# Patient Record
Sex: FEMALE | Race: BLACK OR AFRICAN AMERICAN | NOT HISPANIC OR LATINO | Employment: OTHER | URBAN - METROPOLITAN AREA
[De-identification: names, ages, dates, MRNs, and addresses within clinical notes are randomized per-mention and may not be internally consistent; named-entity substitution may affect disease eponyms.]

---

## 2017-01-18 DIAGNOSIS — E78.5 HYPERLIPIDEMIA: ICD-10-CM

## 2017-01-18 RX ORDER — ROSUVASTATIN CALCIUM 10 MG/1
TABLET, COATED ORAL
Qty: 90 TABLET | Refills: 1 | Status: SHIPPED | OUTPATIENT
Start: 2017-01-18 | End: 2017-02-20 | Stop reason: CLARIF

## 2017-02-20 ENCOUNTER — OFFICE VISIT (OUTPATIENT)
Dept: FAMILY MEDICINE | Facility: CLINIC | Age: 77
End: 2017-02-20
Payer: MEDICARE

## 2017-02-20 VITALS
HEIGHT: 59 IN | RESPIRATION RATE: 16 BRPM | TEMPERATURE: 98 F | WEIGHT: 220.44 LBS | HEART RATE: 75 BPM | DIASTOLIC BLOOD PRESSURE: 82 MMHG | SYSTOLIC BLOOD PRESSURE: 130 MMHG | OXYGEN SATURATION: 95 % | BODY MASS INDEX: 44.44 KG/M2

## 2017-02-20 DIAGNOSIS — F32.A DEPRESSION, UNSPECIFIED DEPRESSION TYPE: Primary | ICD-10-CM

## 2017-02-20 DIAGNOSIS — J44.9 CHRONIC OBSTRUCTIVE PULMONARY DISEASE, UNSPECIFIED COPD TYPE: ICD-10-CM

## 2017-02-20 DIAGNOSIS — K21.9 GASTROESOPHAGEAL REFLUX DISEASE, ESOPHAGITIS PRESENCE NOT SPECIFIED: ICD-10-CM

## 2017-02-20 DIAGNOSIS — E66.01 MORBID OBESITY DUE TO EXCESS CALORIES: ICD-10-CM

## 2017-02-20 DIAGNOSIS — Z86.73 H/O: STROKE: ICD-10-CM

## 2017-02-20 PROCEDURE — 1126F AMNT PAIN NOTED NONE PRSNT: CPT | Mod: S$GLB,,, | Performed by: INTERNAL MEDICINE

## 2017-02-20 PROCEDURE — 1159F MED LIST DOCD IN RCRD: CPT | Mod: S$GLB,,, | Performed by: INTERNAL MEDICINE

## 2017-02-20 PROCEDURE — 3075F SYST BP GE 130 - 139MM HG: CPT | Mod: S$GLB,,, | Performed by: INTERNAL MEDICINE

## 2017-02-20 PROCEDURE — 99214 OFFICE O/P EST MOD 30 MIN: CPT | Mod: S$GLB,,, | Performed by: INTERNAL MEDICINE

## 2017-02-20 PROCEDURE — 1157F ADVNC CARE PLAN IN RCRD: CPT | Mod: S$GLB,,, | Performed by: INTERNAL MEDICINE

## 2017-02-20 PROCEDURE — 99499 UNLISTED E&M SERVICE: CPT | Mod: S$GLB,,, | Performed by: INTERNAL MEDICINE

## 2017-02-20 PROCEDURE — 3079F DIAST BP 80-89 MM HG: CPT | Mod: S$GLB,,, | Performed by: INTERNAL MEDICINE

## 2017-02-20 PROCEDURE — 99999 PR PBB SHADOW E&M-EST. PATIENT-LVL III: CPT | Mod: PBBFAC,,, | Performed by: INTERNAL MEDICINE

## 2017-02-20 RX ORDER — CITALOPRAM 20 MG/1
20 TABLET, FILM COATED ORAL DAILY
Qty: 30 TABLET | Refills: 1 | Status: SHIPPED | OUTPATIENT
Start: 2017-02-20 | End: 2017-08-11

## 2017-02-20 RX ORDER — OMEPRAZOLE 20 MG/1
20 CAPSULE, DELAYED RELEASE ORAL DAILY
Qty: 90 CAPSULE | Refills: 3 | Status: SHIPPED | OUTPATIENT
Start: 2017-02-20 | End: 2018-05-14 | Stop reason: SDUPTHER

## 2017-02-20 RX ORDER — DIAZEPAM 10 MG/1
10 TABLET ORAL EVERY 12 HOURS PRN
Qty: 60 TABLET | Refills: 0 | Status: SHIPPED | OUTPATIENT
Start: 2017-02-20 | End: 2017-05-12 | Stop reason: SDUPTHER

## 2017-02-20 RX ORDER — ATORVASTATIN CALCIUM 40 MG/1
40 TABLET, FILM COATED ORAL DAILY
Qty: 90 TABLET | Refills: 3 | Status: SHIPPED | OUTPATIENT
Start: 2017-02-20 | End: 2018-03-09 | Stop reason: SDUPTHER

## 2017-02-20 NOTE — PROGRESS NOTES
"Subjective:       Patient ID: Jose Manuel Boyd is a 76 y.o. female.    Chief Complaint: Medication Refill    HPI Comments: Depression    HPI: 77 y/o w/ HTN morbid obesity COPD presents for follow up of depressed mood. On benzos for >10 years. Reports over last two months more social isolation anhedonia easily tearful. No SI/HI. Sleep "okay" using diazepam five to six times per week. No alcohol. Had taken sertraline in past but "made me feel bad". No history of jovita or psych hospitalization    Regarding copd compliant with advair rinsing after use. No cough or wheezing    Review of Systems   Constitutional: Negative for activity change, appetite change, fatigue, fever and unexpected weight change.   HENT: Negative for ear pain, rhinorrhea and sore throat.    Eyes: Negative for discharge and visual disturbance.   Respiratory: Negative for chest tightness, shortness of breath and wheezing.    Cardiovascular: Negative for chest pain, palpitations and leg swelling.   Gastrointestinal: Negative for abdominal pain, constipation and diarrhea.   Endocrine: Negative for cold intolerance and heat intolerance.   Genitourinary: Negative for dysuria and hematuria.   Musculoskeletal: Negative for joint swelling and neck stiffness.   Skin: Negative for rash.   Neurological: Negative for dizziness, syncope, weakness and headaches.   Psychiatric/Behavioral: Negative for suicidal ideas.       Objective:     Vitals:    02/20/17 0958   BP: 130/82   BP Location: Right arm   Patient Position: Sitting   BP Method: Manual   Pulse: 75   Resp: 16   Temp: 97.8 °F (36.6 °C)   TempSrc: Oral   SpO2: 95%   Weight: 100 kg (220 lb 7.4 oz)   Height: 4' 11" (1.499 m)          Physical Exam   Constitutional: She is oriented to person, place, and time. She appears well-developed and well-nourished.   HENT:   Head: Normocephalic and atraumatic.   Eyes: Conjunctivae are normal. Pupils are equal, round, and reactive to light.   Neck: Normal range of " motion.   Cardiovascular: Normal rate and regular rhythm.  Exam reveals no gallop and no friction rub.    No murmur heard.  Pulmonary/Chest: Effort normal and breath sounds normal. She has no wheezes. She has no rales.   Abdominal: Soft. Bowel sounds are normal. There is no tenderness. There is no rebound and no guarding.   Musculoskeletal: Normal range of motion. She exhibits no edema or tenderness.   Neurological: She is alert and oriented to person, place, and time. No cranial nerve deficit.   Skin: Skin is warm and dry.   Psychiatric: She has a normal mood and affect.   Tearful taking abotu   and son       Assessment:       1. Depression, unspecified depression type    2. Chronic obstructive pulmonary disease, unspecified COPD type    3. Morbid obesity due to excess calories    4. Gastroesophageal reflux disease, esophagitis presence not specified    5. H/O: stroke        Plan:    1. Trial citalopram daily targeting anhedonia prn diazepam refilled    2. conitnue advair    3. The patient is asked to make an attempt to improve diet and exercise patterns to aid in medical management of this problem.    4. Stable on daily ppi    5. Continue full dose asa and statin bp at goal

## 2017-03-24 DIAGNOSIS — I10 ESSENTIAL HYPERTENSION: ICD-10-CM

## 2017-03-24 RX ORDER — TRIAMTERENE AND HYDROCHLOROTHIAZIDE 37.5; 25 MG/1; MG/1
1 CAPSULE ORAL EVERY MORNING
Qty: 90 CAPSULE | Refills: 1 | Status: SHIPPED | OUTPATIENT
Start: 2017-03-24 | End: 2017-09-15 | Stop reason: SDUPTHER

## 2017-05-12 ENCOUNTER — OFFICE VISIT (OUTPATIENT)
Dept: FAMILY MEDICINE | Facility: CLINIC | Age: 77
End: 2017-05-12
Payer: MEDICARE

## 2017-05-12 VITALS
BODY MASS INDEX: 44.35 KG/M2 | WEIGHT: 220 LBS | SYSTOLIC BLOOD PRESSURE: 134 MMHG | OXYGEN SATURATION: 97 % | HEIGHT: 59 IN | DIASTOLIC BLOOD PRESSURE: 86 MMHG | TEMPERATURE: 99 F | HEART RATE: 72 BPM | RESPIRATION RATE: 16 BRPM

## 2017-05-12 DIAGNOSIS — J30.89 NON-SEASONAL ALLERGIC RHINITIS, UNSPECIFIED ALLERGIC RHINITIS TRIGGER: ICD-10-CM

## 2017-05-12 DIAGNOSIS — Z23 NEED FOR VACCINATION: ICD-10-CM

## 2017-05-12 DIAGNOSIS — Z13.820 SCREENING FOR OSTEOPOROSIS: ICD-10-CM

## 2017-05-12 DIAGNOSIS — F32.A DEPRESSION, UNSPECIFIED DEPRESSION TYPE: Primary | ICD-10-CM

## 2017-05-12 DIAGNOSIS — E66.01 MORBID OBESITY DUE TO EXCESS CALORIES: ICD-10-CM

## 2017-05-12 DIAGNOSIS — J44.9 CHRONIC OBSTRUCTIVE PULMONARY DISEASE, UNSPECIFIED COPD TYPE: ICD-10-CM

## 2017-05-12 PROCEDURE — G0009 ADMIN PNEUMOCOCCAL VACCINE: HCPCS | Mod: 59,S$GLB,, | Performed by: INTERNAL MEDICINE

## 2017-05-12 PROCEDURE — 90670 PCV13 VACCINE IM: CPT | Mod: S$GLB,,, | Performed by: INTERNAL MEDICINE

## 2017-05-12 PROCEDURE — 90715 TDAP VACCINE 7 YRS/> IM: CPT | Mod: S$GLB,,, | Performed by: INTERNAL MEDICINE

## 2017-05-12 PROCEDURE — 99214 OFFICE O/P EST MOD 30 MIN: CPT | Mod: 25,S$GLB,, | Performed by: INTERNAL MEDICINE

## 2017-05-12 PROCEDURE — 1159F MED LIST DOCD IN RCRD: CPT | Mod: S$GLB,,, | Performed by: INTERNAL MEDICINE

## 2017-05-12 PROCEDURE — 99999 PR PBB SHADOW E&M-EST. PATIENT-LVL IV: CPT | Mod: PBBFAC,,, | Performed by: INTERNAL MEDICINE

## 2017-05-12 PROCEDURE — 1126F AMNT PAIN NOTED NONE PRSNT: CPT | Mod: S$GLB,,, | Performed by: INTERNAL MEDICINE

## 2017-05-12 PROCEDURE — 99499 UNLISTED E&M SERVICE: CPT | Mod: S$GLB,,, | Performed by: INTERNAL MEDICINE

## 2017-05-12 PROCEDURE — 3079F DIAST BP 80-89 MM HG: CPT | Mod: S$GLB,,, | Performed by: INTERNAL MEDICINE

## 2017-05-12 PROCEDURE — 90471 IMMUNIZATION ADMIN: CPT | Mod: 59,S$GLB,, | Performed by: INTERNAL MEDICINE

## 2017-05-12 PROCEDURE — 1160F RVW MEDS BY RX/DR IN RCRD: CPT | Mod: S$GLB,,, | Performed by: INTERNAL MEDICINE

## 2017-05-12 PROCEDURE — 3075F SYST BP GE 130 - 139MM HG: CPT | Mod: S$GLB,,, | Performed by: INTERNAL MEDICINE

## 2017-05-12 RX ORDER — ALBUTEROL SULFATE 0.63 MG/3ML
0.63 SOLUTION RESPIRATORY (INHALATION) EVERY 6 HOURS PRN
Qty: 30 ML | Refills: 5 | Status: SHIPPED | OUTPATIENT
Start: 2017-05-12 | End: 2018-05-12

## 2017-05-12 RX ORDER — DIAZEPAM 10 MG/1
10 TABLET ORAL EVERY 12 HOURS PRN
Qty: 60 TABLET | Refills: 0 | Status: SHIPPED | OUTPATIENT
Start: 2017-05-12 | End: 2017-06-13 | Stop reason: SDUPTHER

## 2017-05-12 NOTE — PROGRESS NOTES
"Subjective:       Patient ID: Jose Manuel Boyd is a 76 y.o. female.    Chief Complaint: Sinus Problem (w/earach )    HPI Comments: F/u chronic conditions    HPI: 75 y/o with COPD chronic anxiety and depressed mood presents for scheduled follow up. She discusses primarily stressors including care for a brother with DM and alzheimer's. She has not been taking citalopram regularlly "because I don't want to get hooked on it". Has been attending her [employer] and getting out more frequently then when we met three months ago. No SI/HI. Appetite and sleep reportedly "good". She does note worsening nasal congestion over last week no fevers chills. No cough no wheezing    Review of Systems   Constitutional: Negative for activity change, appetite change, fatigue, fever and unexpected weight change.   HENT: Positive for postnasal drip and rhinorrhea. Negative for ear pain, sore throat and trouble swallowing.    Eyes: Negative for discharge and visual disturbance.   Respiratory: Negative for chest tightness, shortness of breath and wheezing.    Cardiovascular: Negative for chest pain, palpitations and leg swelling.   Gastrointestinal: Negative for abdominal pain, constipation and diarrhea.   Endocrine: Negative for cold intolerance and heat intolerance.   Genitourinary: Negative for dysuria and hematuria.   Musculoskeletal: Negative for joint swelling and neck stiffness.   Skin: Negative for rash.   Neurological: Negative for dizziness, syncope, weakness and headaches.   Psychiatric/Behavioral: Negative for suicidal ideas.       Objective:     Vitals:    05/12/17 0838 05/12/17 0914   BP: (!) 130/92 134/86   BP Location: Right arm    Patient Position: Sitting    BP Method: Manual    Pulse: 81 72   Resp: 16    Temp: 98.6 °F (37 °C)    TempSrc: Oral    SpO2: 97%    Weight: 99.8 kg (220 lb 0.3 oz)    Height: 4' 11" (1.499 m)           Physical Exam   Constitutional: She is oriented to person, place, and time. She appears " well-developed and well-nourished.   HENT:   Head: Normocephalic and atraumatic.   Right Ear: Tympanic membrane normal.   Nose: Mucosal edema and rhinorrhea present.   Eyes: Conjunctivae are normal. Pupils are equal, round, and reactive to light.   Neck: Normal range of motion.   Cardiovascular: Normal rate and regular rhythm.  Exam reveals no gallop and no friction rub.    No murmur heard.  Pulmonary/Chest: Effort normal and breath sounds normal. She has no wheezes. She has no rales.   Abdominal: Soft. Bowel sounds are normal. There is no tenderness. There is no rebound and no guarding.   Musculoskeletal: Normal range of motion. She exhibits no edema or tenderness.   Neurological: She is alert and oriented to person, place, and time. No cranial nerve deficit.   Skin: Skin is warm and dry.   Psychiatric: She has a normal mood and affect.       Assessment:       1. Depression, unspecified depression type    2. Need for vaccination    3. Screening for osteoporosis    4. Morbid obesity due to excess calories    5. Non-seasonal allergic rhinitis, unspecified allergic rhinitis trigger    6. Chronic obstructive pulmonary disease, unspecified COPD type        Plan:    1. Encourage daily use of celexa to minimize symptoms uses valium sparingly ( confirms no fills since feb 2017)   2,. Pneumovax and tdap today    3. dexa    4. The patient is asked to make an attempt to improve diet and exercise patterns to aid in medical management of this problem.    5. Nasal steroid bid    6. Continue advair, prn albuterol    F/u 3 months

## 2017-05-24 RX ORDER — FLUTICASONE PROPIONATE 50 MCG
1 SPRAY, SUSPENSION (ML) NASAL 2 TIMES DAILY
Qty: 1 BOTTLE | Refills: 1 | Status: SHIPPED | OUTPATIENT
Start: 2017-05-24 | End: 2021-04-22 | Stop reason: SDUPTHER

## 2017-05-26 ENCOUNTER — HOSPITAL ENCOUNTER (OUTPATIENT)
Dept: RADIOLOGY | Facility: HOSPITAL | Age: 77
Discharge: HOME | End: 2017-05-26
Attending: INTERNAL MEDICINE
Payer: MEDICARE

## 2017-05-26 DIAGNOSIS — Z13.820 SCREENING FOR OSTEOPOROSIS: ICD-10-CM

## 2017-05-26 PROCEDURE — 77080 DXA BONE DENSITY AXIAL: CPT | Mod: TC

## 2017-05-26 PROCEDURE — 77080 DXA BONE DENSITY AXIAL: CPT | Mod: 26,,, | Performed by: RADIOLOGY

## 2017-06-13 DIAGNOSIS — F32.A DEPRESSION, UNSPECIFIED DEPRESSION TYPE: ICD-10-CM

## 2017-06-13 RX ORDER — DIAZEPAM 10 MG/1
10 TABLET ORAL EVERY 12 HOURS PRN
Qty: 60 TABLET | Refills: 0 | Status: SHIPPED | OUTPATIENT
Start: 2017-06-13 | End: 2017-08-11 | Stop reason: ALTCHOICE

## 2017-08-11 ENCOUNTER — LAB VISIT (OUTPATIENT)
Dept: LAB | Facility: HOSPITAL | Age: 77
End: 2017-08-11
Attending: INTERNAL MEDICINE
Payer: MEDICARE

## 2017-08-11 ENCOUNTER — OFFICE VISIT (OUTPATIENT)
Dept: FAMILY MEDICINE | Facility: CLINIC | Age: 77
End: 2017-08-11
Payer: MEDICARE

## 2017-08-11 VITALS
WEIGHT: 224 LBS | BODY MASS INDEX: 45.16 KG/M2 | SYSTOLIC BLOOD PRESSURE: 140 MMHG | TEMPERATURE: 98 F | DIASTOLIC BLOOD PRESSURE: 98 MMHG | HEIGHT: 59 IN | RESPIRATION RATE: 17 BRPM | HEART RATE: 72 BPM | OXYGEN SATURATION: 97 %

## 2017-08-11 DIAGNOSIS — I10 HTN (HYPERTENSION), BENIGN: Primary | ICD-10-CM

## 2017-08-11 DIAGNOSIS — R73.9 BLOOD GLUCOSE ELEVATED: ICD-10-CM

## 2017-08-11 DIAGNOSIS — E66.01 MORBID OBESITY DUE TO EXCESS CALORIES: ICD-10-CM

## 2017-08-11 DIAGNOSIS — F32.A DEPRESSION, UNSPECIFIED DEPRESSION TYPE: ICD-10-CM

## 2017-08-11 DIAGNOSIS — I10 HTN (HYPERTENSION), BENIGN: ICD-10-CM

## 2017-08-11 LAB
ALBUMIN SERPL BCP-MCNC: 3.4 G/DL
ALP SERPL-CCNC: 130 U/L
ALT SERPL W/O P-5'-P-CCNC: 11 U/L
ANION GAP SERPL CALC-SCNC: 10 MMOL/L
AST SERPL-CCNC: 16 U/L
BASOPHILS # BLD AUTO: 0.06 K/UL
BASOPHILS NFR BLD: 0.7 %
BILIRUB SERPL-MCNC: 0.4 MG/DL
BUN SERPL-MCNC: 15 MG/DL
CALCIUM SERPL-MCNC: 10 MG/DL
CHLORIDE SERPL-SCNC: 101 MMOL/L
CO2 SERPL-SCNC: 31 MMOL/L
CREAT SERPL-MCNC: 0.8 MG/DL
DIFFERENTIAL METHOD: ABNORMAL
EOSINOPHIL # BLD AUTO: 0.1 K/UL
EOSINOPHIL NFR BLD: 1.6 %
ERYTHROCYTE [DISTWIDTH] IN BLOOD BY AUTOMATED COUNT: 15.2 %
EST. GFR  (AFRICAN AMERICAN): >60 ML/MIN/1.73 M^2
EST. GFR  (NON AFRICAN AMERICAN): >60 ML/MIN/1.73 M^2
GLUCOSE SERPL-MCNC: 85 MG/DL
HCT VFR BLD AUTO: 44.5 %
HGB BLD-MCNC: 14 G/DL
LYMPHOCYTES # BLD AUTO: 1.6 K/UL
LYMPHOCYTES NFR BLD: 18.9 %
MCH RBC QN AUTO: 26.6 PG
MCHC RBC AUTO-ENTMCNC: 31.5 G/DL
MCV RBC AUTO: 84 FL
MONOCYTES # BLD AUTO: 0.6 K/UL
MONOCYTES NFR BLD: 6.7 %
NEUTROPHILS # BLD AUTO: 6.1 K/UL
NEUTROPHILS NFR BLD: 72.1 %
PLATELET # BLD AUTO: 245 K/UL
PMV BLD AUTO: 11.8 FL
POTASSIUM SERPL-SCNC: 3.9 MMOL/L
PROT SERPL-MCNC: 7.7 G/DL
RBC # BLD AUTO: 5.27 M/UL
SODIUM SERPL-SCNC: 142 MMOL/L
TSH SERPL DL<=0.005 MIU/L-ACNC: 0.92 UIU/ML
WBC # BLD AUTO: 8.38 K/UL

## 2017-08-11 PROCEDURE — 3077F SYST BP >= 140 MM HG: CPT | Mod: S$GLB,,, | Performed by: INTERNAL MEDICINE

## 2017-08-11 PROCEDURE — 80053 COMPREHEN METABOLIC PANEL: CPT

## 2017-08-11 PROCEDURE — 85025 COMPLETE CBC W/AUTO DIFF WBC: CPT

## 2017-08-11 PROCEDURE — 1125F AMNT PAIN NOTED PAIN PRSNT: CPT | Mod: S$GLB,,, | Performed by: INTERNAL MEDICINE

## 2017-08-11 PROCEDURE — 99214 OFFICE O/P EST MOD 30 MIN: CPT | Mod: S$GLB,,, | Performed by: INTERNAL MEDICINE

## 2017-08-11 PROCEDURE — 83036 HEMOGLOBIN GLYCOSYLATED A1C: CPT

## 2017-08-11 PROCEDURE — 36415 COLL VENOUS BLD VENIPUNCTURE: CPT | Mod: PN

## 2017-08-11 PROCEDURE — 82607 VITAMIN B-12: CPT

## 2017-08-11 PROCEDURE — 99999 PR PBB SHADOW E&M-EST. PATIENT-LVL III: CPT | Mod: PBBFAC,,, | Performed by: INTERNAL MEDICINE

## 2017-08-11 PROCEDURE — 99499 UNLISTED E&M SERVICE: CPT | Mod: S$GLB,,, | Performed by: INTERNAL MEDICINE

## 2017-08-11 PROCEDURE — 3080F DIAST BP >= 90 MM HG: CPT | Mod: S$GLB,,, | Performed by: INTERNAL MEDICINE

## 2017-08-11 PROCEDURE — 84443 ASSAY THYROID STIM HORMONE: CPT

## 2017-08-11 PROCEDURE — 3008F BODY MASS INDEX DOCD: CPT | Mod: S$GLB,,, | Performed by: INTERNAL MEDICINE

## 2017-08-11 PROCEDURE — 1159F MED LIST DOCD IN RCRD: CPT | Mod: S$GLB,,, | Performed by: INTERNAL MEDICINE

## 2017-08-11 RX ORDER — BUPROPION HYDROCHLORIDE 100 MG/1
100 TABLET ORAL 2 TIMES DAILY
Qty: 60 TABLET | Refills: 0 | Status: SHIPPED | OUTPATIENT
Start: 2017-08-11 | End: 2017-08-30

## 2017-08-12 LAB
ESTIMATED AVG GLUCOSE: 134 MG/DL
HBA1C MFR BLD HPLC: 6.3 %
VIT B12 SERPL-MCNC: 526 PG/ML

## 2017-08-24 ENCOUNTER — HOSPITAL ENCOUNTER (EMERGENCY)
Facility: HOSPITAL | Age: 77
Discharge: HOME | End: 2017-08-24
Attending: EMERGENCY MEDICINE
Payer: MEDICARE

## 2017-08-24 VITALS
HEART RATE: 72 BPM | OXYGEN SATURATION: 96 % | TEMPERATURE: 97 F | DIASTOLIC BLOOD PRESSURE: 66 MMHG | BODY MASS INDEX: 45.76 KG/M2 | SYSTOLIC BLOOD PRESSURE: 158 MMHG | HEIGHT: 58 IN | WEIGHT: 218 LBS | RESPIRATION RATE: 20 BRPM

## 2017-08-24 DIAGNOSIS — R51.9 ACUTE NONINTRACTABLE HEADACHE, UNSPECIFIED HEADACHE TYPE: ICD-10-CM

## 2017-08-24 DIAGNOSIS — I10 UNCONTROLLED HYPERTENSION: Primary | ICD-10-CM

## 2017-08-24 LAB
ALBUMIN SERPL BCP-MCNC: 3.1 G/DL
ALP SERPL-CCNC: 114 U/L
ALT SERPL W/O P-5'-P-CCNC: 12 U/L
ANION GAP SERPL CALC-SCNC: 14 MMOL/L
AST SERPL-CCNC: 19 U/L
BASOPHILS # BLD AUTO: 0.03 K/UL
BASOPHILS NFR BLD: 0.4 %
BILIRUB SERPL-MCNC: 0.5 MG/DL
BILIRUB UR QL STRIP: NEGATIVE
BNP SERPL-MCNC: 60 PG/ML
BUN SERPL-MCNC: 13 MG/DL
CALCIUM SERPL-MCNC: 9.8 MG/DL
CHLORIDE SERPL-SCNC: 102 MMOL/L
CLARITY UR: CLEAR
CO2 SERPL-SCNC: 24 MMOL/L
COLOR UR: NORMAL
CREAT SERPL-MCNC: 0.8 MG/DL
DIFFERENTIAL METHOD: ABNORMAL
EOSINOPHIL # BLD AUTO: 0.1 K/UL
EOSINOPHIL NFR BLD: 1.5 %
ERYTHROCYTE [DISTWIDTH] IN BLOOD BY AUTOMATED COUNT: 15 %
EST. GFR  (AFRICAN AMERICAN): >60 ML/MIN/1.73 M^2
EST. GFR  (NON AFRICAN AMERICAN): >60 ML/MIN/1.73 M^2
GLUCOSE SERPL-MCNC: 142 MG/DL
GLUCOSE UR QL STRIP: NEGATIVE
HCT VFR BLD AUTO: 41 %
HGB BLD-MCNC: 13.3 G/DL
HGB UR QL STRIP: NEGATIVE
KETONES UR QL STRIP: NEGATIVE
LEUKOCYTE ESTERASE UR QL STRIP: NEGATIVE
LYMPHOCYTES # BLD AUTO: 1.3 K/UL
LYMPHOCYTES NFR BLD: 15.6 %
MCH RBC QN AUTO: 26.5 PG
MCHC RBC AUTO-ENTMCNC: 32.4 G/DL
MCV RBC AUTO: 82 FL
MONOCYTES # BLD AUTO: 0.6 K/UL
MONOCYTES NFR BLD: 7.1 %
NEUTROPHILS # BLD AUTO: 6.2 K/UL
NEUTROPHILS NFR BLD: 75.4 %
NITRITE UR QL STRIP: NEGATIVE
PH UR STRIP: 7 [PH] (ref 5–8)
PLATELET # BLD AUTO: 206 K/UL
PMV BLD AUTO: 10.7 FL
POTASSIUM SERPL-SCNC: 3.3 MMOL/L
PROT SERPL-MCNC: 7.1 G/DL
PROT UR QL STRIP: NEGATIVE
RBC # BLD AUTO: 5.02 M/UL
SODIUM SERPL-SCNC: 140 MMOL/L
SP GR UR STRIP: 1 (ref 1–1.03)
TROPONIN I SERPL DL<=0.01 NG/ML-MCNC: <0.006 NG/ML
URN SPEC COLLECT METH UR: NORMAL
UROBILINOGEN UR STRIP-ACNC: NEGATIVE EU/DL
WBC # BLD AUTO: 8.19 K/UL

## 2017-08-24 PROCEDURE — 99284 EMERGENCY DEPT VISIT MOD MDM: CPT

## 2017-08-24 PROCEDURE — 85025 COMPLETE CBC W/AUTO DIFF WBC: CPT

## 2017-08-24 PROCEDURE — 81003 URINALYSIS AUTO W/O SCOPE: CPT

## 2017-08-24 PROCEDURE — 84484 ASSAY OF TROPONIN QUANT: CPT

## 2017-08-24 PROCEDURE — 83880 ASSAY OF NATRIURETIC PEPTIDE: CPT

## 2017-08-24 PROCEDURE — 80053 COMPREHEN METABOLIC PANEL: CPT

## 2017-08-24 PROCEDURE — 93010 ELECTROCARDIOGRAM REPORT: CPT | Mod: ,,, | Performed by: INTERNAL MEDICINE

## 2017-08-24 PROCEDURE — 25000003 PHARM REV CODE 250: Performed by: EMERGENCY MEDICINE

## 2017-08-24 RX ORDER — BUTALBITAL, ACETAMINOPHEN AND CAFFEINE 50; 325; 40 MG/1; MG/1; MG/1
1 TABLET ORAL
Status: COMPLETED | OUTPATIENT
Start: 2017-08-24 | End: 2017-08-24

## 2017-08-24 RX ADMIN — BUTALBITAL, ACETAMINOPHEN AND CAFFEINE 1 TABLET: 50; 325; 40 TABLET ORAL at 12:08

## 2017-08-24 NOTE — ED PROVIDER NOTES
"Encounter Date: 8/24/2017    SCRIBE #1 NOTE: I, Sierra Dawson, am scribing for, and in the presence of,  Alex Desai MD. I have scribed the following portions of the note - Other sections scribed: ROS and HPI.       History     Chief Complaint   Patient presents with    Hypertension     "I have a headache with my pressure is out of control."  At home 178/106.     CC: Hypertension  HPI: This 77 y.o. female with a past medical history of Asthma; Depression; Hypercholesterolemia; and Hypertension, presents to the ED complaining of fluctuating blood pressure with associated generalized headache since yesterday morning. No exacerbating or alleviating factors are reported. She reports noting BP of "140/70" yesterday morning and later in a day noted "117/54". She reports noting the BP of "178/106" this morning. No reported changes in her BP medications. She reports compliance with her prescribed medication. She denies CP, dizziness or any other associated sx. She reports twitching to her eyelids and reports using eye drops with minimal relief. She also reports recent visit to a eye doctor for a regular exam. She denies any urinary sx. No prior medical intervention is reported for her headache.      The history is provided by the patient. No  was used.     Review of patient's allergies indicates:   Allergen Reactions    Codeine      Other reaction(s): Rash     Past Medical History:   Diagnosis Date    Asthma     Depression     Hypercholesterolemia     Hypertension      Past Surgical History:   Procedure Laterality Date    CHOLECYSTECTOMY      HYSTERECTOMY      knee repalcement       Family History   Problem Relation Age of Onset    Diabetes Mother     Hypertension Mother     Kidney disease Mother     Heart disease Father      Social History   Substance Use Topics    Smoking status: Never Smoker    Smokeless tobacco: Never Used    Alcohol use No     Review of Systems   Constitutional: " Negative for chills and fever.   HENT: Negative for congestion, ear pain, rhinorrhea and sore throat.    Eyes: Negative for pain and visual disturbance.   Respiratory: Negative for cough and shortness of breath.    Cardiovascular: Negative for chest pain.   Gastrointestinal: Negative for abdominal pain, diarrhea, nausea and vomiting.   Genitourinary: Negative for dysuria.   Musculoskeletal: Negative for back pain and neck pain.   Skin: Negative for rash.   Neurological: Positive for headaches.   All other systems reviewed and are negative.      Physical Exam     Initial Vitals [08/24/17 0843]   BP Pulse Resp Temp SpO2   130/73 87 16 98.3 °F (36.8 °C) 96 %      MAP       92         Physical Exam  Nursing note and vitals reviewed.  Constitutional:  Well appearing elderly female in no obvious distress or discomfort  HENT:    Head: NC/AT    Eyes: Conjunctivae normal.   (-) scleral icterus.              Mouth/Throat: MMM.      Neck: Neck supple, normal rom.  (-) Nuchal rigidity  Cardiovascular: RRR  Pulmonary/Chest: CTAB   Abdominal: Soft. ND/NT   (-) CVA tenderness.  Musculoskeletal: FROM of all major joints. No extremity edema or tenderness.  Neurological: A&Ox3, Normal speech.  No acute focal motor deficits.     Skin: Skin is warm and dry.   Psychiatric: normal mood and affect.      ED Course   Procedures  Labs Reviewed   CBC W/ AUTO DIFFERENTIAL - Abnormal; Notable for the following:        Result Value    MCH 26.5 (*)     RDW 15.0 (*)     Gran% 75.4 (*)     Lymph% 15.6 (*)     All other components within normal limits   COMPREHENSIVE METABOLIC PANEL - Abnormal; Notable for the following:     Potassium 3.3 (*)     Glucose 142 (*)     Albumin 3.1 (*)     All other components within normal limits   TROPONIN I   B-TYPE NATRIURETIC PEPTIDE   URINALYSIS          X-Rays:   Independently Interpreted Readings:   Head CT: No hemorrhage.  No skull fracture.  No acute stroke.               EKG Readings: (Independently  Interpreted)   Initial Reading: No STEMI.   Normal sinus rhythm, rate 72, normal axis/intervals, no ST/T-wave abnormalities.    Additional Medical Decision Makin-year-old female with history of hypertension, COPD and depression along with a past medical history of ischemic stroke who presents to the emergency department complaining of uncontrolled hypertension along with generalized headache x 24hrs.    Initial vitals wnls - /73.   Basic screening labs wnls - no evidence of end organ damage.  EKG without ischemia or dysrhythmia - troponin negative.  BNP within normal limits.  CT head unremarkable.  Findings at this time are most consistent with uncomplicated headache along with unrelated uncontrolled hypertension.  She has been advised to follow-up with her primary care physician for reevaluation further management including repeat BP check.  Headache precautions as well as return instructions discussed prior to discharge.      Scribe Attestation:   Scribe #1: I performed the above scribed service and the documentation accurately describes the services I performed. I attest to the accuracy of the note.    Attending Attestation:           Physician Attestation for Scribe:  Physician Attestation Statement for Scribe #1: I, Alex Desai MD, reviewed documentation, as scribed by Sierra Dawson in my presence, and it is both accurate and complete.                 ED Course     Clinical Impression:   The primary encounter diagnosis was Uncontrolled hypertension. A diagnosis of Acute nonintractable headache, unspecified headache type was also pertinent to this visit.    Disposition:   Disposition: Discharged            Alex Desai MD  [phone]

## 2017-08-24 NOTE — ED PROVIDER NOTES
"Encounter Date: 8/24/2017    SCRIBE #1 NOTE: I, Dangelo Gamble, am scribing for, and in the presence of,  Kaylin Cowan MD. I have scribed the following portions of the note - Other sections scribed: HPI and ROS.       History     Chief Complaint   Patient presents with    Hypertension     "I have a headache with my pressure is out of control."  At home 178/106.     Chief Complaint: HTN    HPI: This 77 y.o. Female with hypercholesteremia, HTN, depression, and asthma presents to the ED secondary to HTN.       The history is provided by the patient. No  was used.     Review of patient's allergies indicates:   Allergen Reactions    Codeine      Other reaction(s): Rash     Past Medical History:   Diagnosis Date    Asthma     Depression     Hypercholesterolemia     Hypertension      Past Surgical History:   Procedure Laterality Date    CHOLECYSTECTOMY      HYSTERECTOMY      knee repalcement       Family History   Problem Relation Age of Onset    Diabetes Mother     Hypertension Mother     Kidney disease Mother     Heart disease Father      Social History   Substance Use Topics    Smoking status: Never Smoker    Smokeless tobacco: Never Used    Alcohol use No     Review of Systems    Physical Exam     Initial Vitals [08/24/17 0843]   BP Pulse Resp Temp SpO2   130/73 87 16 98.3 °F (36.8 °C) 96 %      MAP       92         Physical Exam    ED Course   Procedures  Labs Reviewed - No data to display                     Scribe Attestation:   Scribe #1: I performed the above scribed service and the documentation accurately describes the services I performed. I attest to the accuracy of the note.    Attending Attestation:           Physician Attestation for Scribe:  Physician Attestation Statement for Scribe #1: I, Kaylin Cowan MD, reviewed documentation, as scribed by Dangelo Gamble in my presence, and it is both accurate and complete.                 ED Course     Clinical Impression:   There " were no encounter diagnoses.

## 2017-08-24 NOTE — ED TRIAGE NOTES
78 yo female comes in with the cc of HTN. Pt aox3 denies any cp, or fanny. . Pt skin is normal. Pt in no distress

## 2017-08-30 ENCOUNTER — OFFICE VISIT (OUTPATIENT)
Dept: FAMILY MEDICINE | Facility: CLINIC | Age: 77
End: 2017-08-30
Payer: MEDICARE

## 2017-08-30 VITALS
SYSTOLIC BLOOD PRESSURE: 138 MMHG | RESPIRATION RATE: 17 BRPM | DIASTOLIC BLOOD PRESSURE: 84 MMHG | BODY MASS INDEX: 44.31 KG/M2 | HEIGHT: 59 IN | WEIGHT: 219.81 LBS | HEART RATE: 83 BPM | TEMPERATURE: 98 F | OXYGEN SATURATION: 95 %

## 2017-08-30 DIAGNOSIS — I10 HYPERTENSION, ESSENTIAL: Primary | ICD-10-CM

## 2017-08-30 PROCEDURE — 3075F SYST BP GE 130 - 139MM HG: CPT | Mod: S$GLB,,, | Performed by: INTERNAL MEDICINE

## 2017-08-30 PROCEDURE — 3079F DIAST BP 80-89 MM HG: CPT | Mod: S$GLB,,, | Performed by: INTERNAL MEDICINE

## 2017-08-30 PROCEDURE — 3008F BODY MASS INDEX DOCD: CPT | Mod: S$GLB,,, | Performed by: INTERNAL MEDICINE

## 2017-08-30 PROCEDURE — 99214 OFFICE O/P EST MOD 30 MIN: CPT | Mod: S$GLB,,, | Performed by: INTERNAL MEDICINE

## 2017-08-30 PROCEDURE — 1126F AMNT PAIN NOTED NONE PRSNT: CPT | Mod: S$GLB,,, | Performed by: INTERNAL MEDICINE

## 2017-08-30 PROCEDURE — 1159F MED LIST DOCD IN RCRD: CPT | Mod: S$GLB,,, | Performed by: INTERNAL MEDICINE

## 2017-08-30 PROCEDURE — 99499 UNLISTED E&M SERVICE: CPT | Mod: S$GLB,,, | Performed by: INTERNAL MEDICINE

## 2017-08-30 PROCEDURE — 99999 PR PBB SHADOW E&M-EST. PATIENT-LVL III: CPT | Mod: PBBFAC,,, | Performed by: INTERNAL MEDICINE

## 2017-08-30 NOTE — PROGRESS NOTES
"Subjective:       Patient ID: Jose Manuel Boyd is a 77 y.o. female.    Chief Complaint: Hypertension and Follow-up (ED)    F/u ED    HPI: 76 y/o presents for follow up from ED. She went to ED last week after deveolping a frontal headache for approximately one hour. She measured her blood pressure by wrist cuff initially got (150/100 and second repeat 170/110) she had no vision changes no difficulty with speech or motor function. ED evaluation including labs and CT head were unremarkable. Her presentation BP was 130/80's. She reports no further episodes of headache or high blood pressure readings. She denies any lower extremity swelling or CP. Last week was anniversary of her son's death and she admits to increase stress and depressed mood related to this. At last visit we discussed trying buproprion for depression she never took this medication due to side effects listed on medicatino that included dry mouth and headaches ("I already got those, I don't need more medication. . . prayer will help"). She feels mood has improved with support from her [employer]. She is looking forward to visit with her grandson in [place] the end of September      Review of Systems   Constitutional: Negative for activity change, appetite change, fatigue, fever and unexpected weight change.   HENT: Negative for ear pain, rhinorrhea and sore throat.    Eyes: Negative for discharge and visual disturbance.   Respiratory: Negative for chest tightness, shortness of breath and wheezing.    Cardiovascular: Negative for chest pain, palpitations and leg swelling.   Gastrointestinal: Negative for abdominal pain, constipation and diarrhea.   Endocrine: Negative for cold intolerance and heat intolerance.   Genitourinary: Negative for dysuria and hematuria.   Musculoskeletal: Negative for joint swelling and neck stiffness.   Skin: Negative for rash.   Neurological: Negative for dizziness, syncope, weakness and headaches. " "  Psychiatric/Behavioral: Negative for suicidal ideas.       Objective:     Vitals:    08/30/17 0903   BP: 138/84   BP Location: Right arm   Patient Position: Sitting   BP Method: Medium (Manual)   Pulse: 83   Resp: 17   Temp: 98.2 °F (36.8 °C)   TempSrc: Oral   SpO2: 95%   Weight: 99.7 kg (219 lb 12.8 oz)   Height: 4' 11" (1.499 m)          Physical Exam   Constitutional: She is oriented to person, place, and time. She appears well-developed and well-nourished.   HENT:   Head: Normocephalic and atraumatic.   Eyes: Conjunctivae are normal. Pupils are equal, round, and reactive to light.   Neck: Normal range of motion.   Cardiovascular: Normal rate and regular rhythm.  Exam reveals no gallop and no friction rub.    No murmur heard.  No pitting edema   Pulmonary/Chest: Effort normal and breath sounds normal. She has no wheezes. She has no rales.   Abdominal: Soft. Bowel sounds are normal. There is no tenderness. There is no rebound and no guarding.   Musculoskeletal: Normal range of motion. She exhibits no edema or tenderness.   Neurological: She is alert and oriented to person, place, and time. No cranial nerve deficit.   Skin: Skin is warm and dry.   Psychiatric: She has a normal mood and affect.       Assessment:       1. Hypertension, essential        Plan:        1. BP at goal continue current medications no evidence of end organ dysfunction based on recent labs.    "

## 2017-09-15 ENCOUNTER — OFFICE VISIT (OUTPATIENT)
Dept: FAMILY MEDICINE | Facility: CLINIC | Age: 77
End: 2017-09-15
Payer: MEDICARE

## 2017-09-15 VITALS
HEART RATE: 80 BPM | WEIGHT: 222.69 LBS | RESPIRATION RATE: 17 BRPM | SYSTOLIC BLOOD PRESSURE: 136 MMHG | OXYGEN SATURATION: 96 % | BODY MASS INDEX: 44.89 KG/M2 | DIASTOLIC BLOOD PRESSURE: 84 MMHG | HEIGHT: 59 IN | TEMPERATURE: 98 F

## 2017-09-15 DIAGNOSIS — I10 HYPERTENSION, ESSENTIAL: Primary | ICD-10-CM

## 2017-09-15 PROCEDURE — 3079F DIAST BP 80-89 MM HG: CPT | Mod: S$GLB,,, | Performed by: INTERNAL MEDICINE

## 2017-09-15 PROCEDURE — 1126F AMNT PAIN NOTED NONE PRSNT: CPT | Mod: S$GLB,,, | Performed by: INTERNAL MEDICINE

## 2017-09-15 PROCEDURE — 3075F SYST BP GE 130 - 139MM HG: CPT | Mod: S$GLB,,, | Performed by: INTERNAL MEDICINE

## 2017-09-15 PROCEDURE — 99999 PR PBB SHADOW E&M-EST. PATIENT-LVL III: CPT | Mod: PBBFAC,,, | Performed by: INTERNAL MEDICINE

## 2017-09-15 PROCEDURE — 1159F MED LIST DOCD IN RCRD: CPT | Mod: S$GLB,,, | Performed by: INTERNAL MEDICINE

## 2017-09-15 PROCEDURE — 3008F BODY MASS INDEX DOCD: CPT | Mod: S$GLB,,, | Performed by: INTERNAL MEDICINE

## 2017-09-15 PROCEDURE — 99499 UNLISTED E&M SERVICE: CPT | Mod: S$GLB,,, | Performed by: INTERNAL MEDICINE

## 2017-09-15 PROCEDURE — 99214 OFFICE O/P EST MOD 30 MIN: CPT | Mod: S$GLB,,, | Performed by: INTERNAL MEDICINE

## 2017-09-15 RX ORDER — TRIAMTERENE AND HYDROCHLOROTHIAZIDE 37.5; 25 MG/1; MG/1
1 CAPSULE ORAL EVERY MORNING
Qty: 90 CAPSULE | Refills: 1 | Status: SHIPPED | OUTPATIENT
Start: 2017-09-15 | End: 2017-10-31

## 2017-10-11 ENCOUNTER — OFFICE VISIT (OUTPATIENT)
Dept: FAMILY MEDICINE | Facility: CLINIC | Age: 77
End: 2017-10-11
Payer: MEDICARE

## 2017-10-11 VITALS
BODY MASS INDEX: 44.35 KG/M2 | DIASTOLIC BLOOD PRESSURE: 78 MMHG | HEART RATE: 74 BPM | SYSTOLIC BLOOD PRESSURE: 142 MMHG | RESPIRATION RATE: 16 BRPM | WEIGHT: 220 LBS | OXYGEN SATURATION: 97 % | TEMPERATURE: 98 F | HEIGHT: 59 IN

## 2017-10-11 DIAGNOSIS — G25.0 ESSENTIAL TREMOR: Primary | ICD-10-CM

## 2017-10-11 PROCEDURE — 99999 PR PBB SHADOW E&M-EST. PATIENT-LVL III: CPT | Mod: PBBFAC,,, | Performed by: INTERNAL MEDICINE

## 2017-10-11 PROCEDURE — 99214 OFFICE O/P EST MOD 30 MIN: CPT | Mod: S$GLB,,, | Performed by: INTERNAL MEDICINE

## 2017-10-11 RX ORDER — PROPRANOLOL HYDROCHLORIDE 60 MG/1
60 CAPSULE, EXTENDED RELEASE ORAL DAILY
Qty: 30 CAPSULE | Refills: 2 | Status: SHIPPED | OUTPATIENT
Start: 2017-10-11 | End: 2017-10-31

## 2017-10-11 NOTE — PROGRESS NOTES
"Subjective:       Patient ID: Jose Manuel Boyd is a 77 y.o. female.    Chief Complaint: Hypertension; Depression; and Follow-up    F/u tremor    HPI: 76 y/o w/ HTN comes to discuss "Shaking". Has resting tremor that is exacerbated when writing has taken librium and valium for this in past. [place] librium was more effective. No problems with falls or urinary incontinence. No motor weakness      Review of Systems   Constitutional: Negative for activity change, appetite change, fatigue, fever and unexpected weight change.   HENT: Negative for ear pain, rhinorrhea and sore throat.    Eyes: Negative for discharge and visual disturbance.   Respiratory: Negative for chest tightness, shortness of breath and wheezing.    Cardiovascular: Negative for chest pain, palpitations and leg swelling.   Gastrointestinal: Negative for abdominal pain, constipation and diarrhea.   Endocrine: Negative for cold intolerance and heat intolerance.   Genitourinary: Negative for dysuria and hematuria.   Musculoskeletal: Negative for joint swelling and neck stiffness.   Skin: Negative for rash.   Neurological: Negative for dizziness, syncope, weakness and headaches.   Psychiatric/Behavioral: Negative for suicidal ideas.       Objective:     Vitals:    10/11/17 1038 10/11/17 1049   BP: (!) 150/88 (!) 142/78   BP Location: Right arm    Patient Position: Sitting    BP Method: Medium (Manual)    Pulse: 74    Resp: 16    Temp: 97.6 °F (36.4 °C)    TempSrc: Oral    SpO2: 97%    Weight: 99.8 kg (220 lb 0.3 oz)    Height: 4' 11" (1.499 m)           Physical Exam   Constitutional: She is oriented to person, place, and time. She appears well-developed and well-nourished.   HENT:   Head: Normocephalic and atraumatic.   Eyes: Conjunctivae are normal. Pupils are equal, round, and reactive to light.   Neck: Normal range of motion.   Cardiovascular: Normal rate and regular rhythm.  Exam reveals no gallop and no friction rub.    No murmur " heard.  Pulmonary/Chest: Effort normal and breath sounds normal. She has no wheezes. She has no rales.   Abdominal: Soft. Bowel sounds are normal. There is no tenderness. There is no rebound and no guarding.   Musculoskeletal: Normal range of motion. She exhibits no edema or tenderness.   Neurological: She is alert and oriented to person, place, and time. No cranial nerve deficit.   Fine resting tremor of bilateral hand no tremor at end point of reach no cogwheeling of wrists or elbows   Skin: Skin is warm and dry.   Psychiatric: She has a normal mood and affect.   Full affect normal speech       Assessment:       1. Essential tremor        Plan:    1. Trial propranolol  F/u two weeks to monitor symptoms

## 2017-10-16 ENCOUNTER — HOSPITAL ENCOUNTER (EMERGENCY)
Facility: HOSPITAL | Age: 77
Discharge: HOME | End: 2017-10-16
Attending: EMERGENCY MEDICINE
Payer: MEDICARE

## 2017-10-16 VITALS
BODY MASS INDEX: 42.8 KG/M2 | TEMPERATURE: 98 F | DIASTOLIC BLOOD PRESSURE: 64 MMHG | RESPIRATION RATE: 18 BRPM | OXYGEN SATURATION: 95 % | SYSTOLIC BLOOD PRESSURE: 134 MMHG | WEIGHT: 218 LBS | HEART RATE: 63 BPM | HEIGHT: 60 IN

## 2017-10-16 DIAGNOSIS — H92.02 LEFT EAR PAIN: Primary | ICD-10-CM

## 2017-10-16 DIAGNOSIS — R51.9 NONINTRACTABLE HEADACHE, UNSPECIFIED CHRONICITY PATTERN, UNSPECIFIED HEADACHE TYPE: ICD-10-CM

## 2017-10-16 DIAGNOSIS — R07.89 CHEST DISCOMFORT: ICD-10-CM

## 2017-10-16 LAB
BUN SERPL-MCNC: 11 MG/DL (ref 6–30)
CHLORIDE SERPL-SCNC: 100 MMOL/L (ref 95–110)
CREAT SERPL-MCNC: 0.8 MG/DL (ref 0.5–1.4)
GLUCOSE SERPL-MCNC: 104 MG/DL (ref 70–110)
HCT VFR BLD CALC: 48 %PCV (ref 36–54)
POC IONIZED CALCIUM: 1.12 MMOL/L (ref 1.06–1.42)
POC TCO2 (MEASURED): 31 MMOL/L (ref 23–29)
POTASSIUM BLD-SCNC: 3.5 MMOL/L (ref 3.5–5.1)
SAMPLE: ABNORMAL
SODIUM BLD-SCNC: 140 MMOL/L (ref 136–145)

## 2017-10-16 PROCEDURE — 93005 ELECTROCARDIOGRAM TRACING: CPT

## 2017-10-16 PROCEDURE — 99283 EMERGENCY DEPT VISIT LOW MDM: CPT | Mod: 25

## 2017-10-16 PROCEDURE — 99284 EMERGENCY DEPT VISIT MOD MDM: CPT | Mod: ,,, | Performed by: PHYSICIAN ASSISTANT

## 2017-10-16 PROCEDURE — 93010 ELECTROCARDIOGRAM REPORT: CPT | Mod: ,,, | Performed by: INTERNAL MEDICINE

## 2017-10-16 NOTE — ED NOTES
Pt presented to the ED c/o multiple complaints. Pt c/o chest burning in which started this morning. Pt alert. Pt denies chest pain and sob. Pt states one of her breast are larger than the other one. Pt c/o left ear pain. Pt states it feels like a bone popping. Pt states she put sweet oil in her ear.

## 2017-10-16 NOTE — PROVIDER PROGRESS NOTES - EMERGENCY DEPT.
Encounter Date: 10/16/2017    ED Physician Progress Notes         EKG - STEMI Decision  Initial Reading: No STEMI present.  Response: No Action Needed.

## 2017-10-16 NOTE — ED NOTES
Pt identifiers Jose Manuel Boyd were checked and correct  LOC: The patient is awake, alert, aware of environment with an appropriate affect. Oriented x3, speaking appropriately  APPEARANCE: Pt resting comfortably, in no acute distress, pt is clean and well groomed, clothing properly fastened  SKIN: Skin warm, dry and intact, normal skin turgor, moist mucus membranes  RESPIRATORY: Airway is open and patent, respirations are spontaneous, even and unlabored, normal effort and rate  CARDIAC: Normal rate and rhythm, +2 peripheral edema noted to bilateral legs, capillary refill < 3 seconds, bilateral radial pulses 2+, pt c/o chest burning.  ABDOMEN: Soft, non tender, non distended. Bowel sounds present x 4 quadrants.   NEUROLOGIC: PERRLA, facial expression is symmetrical, patient moving all extremities spontaneously, normal sensation in all extremities when touched with a finger.  Follows all commands appropriately. Pt c/o pain to the left ear.    MUSCULOSKELETAL: No obvious deformities.

## 2017-10-17 NOTE — ED PROVIDER NOTES
"Encounter Date: 10/16/2017       History     Chief Complaint   Patient presents with    Multiple Complaints     burning top of head, L ear, burning in my chest, saw my dr and L breast bigger than R, seen by dr last week given med for anxiety     77-year-old female with past medical history of hypertension, hyperlipidemia, asthma presents to the ED with multiple complaints.  She reports earlier today around 2:30 PM she felt like "her head was on fire" that has since completely resolved.  She also is complaining of left ear pain, dry cough, and sore throat.  She states that she's been sweating more than normal.  She has a "burning" her bilateral breasts for the past week that is nonexertional nonradiating and intermittent.  She denies any current pain.  She denies fever, chills, abdominal pain, nausea, vomiting, changes in vision, dysuria, confusion, numbness, weakness.  She denies tobacco, alcohol, or drug use.      The history is provided by the patient.     Review of patient's allergies indicates:   Allergen Reactions    Codeine      Other reaction(s): Rash     Past Medical History:   Diagnosis Date    Asthma     Depression     Hypercholesterolemia     Hypertension      Past Surgical History:   Procedure Laterality Date    CHOLECYSTECTOMY      HYSTERECTOMY      knee repalcement       Family History   Problem Relation Age of Onset    Diabetes Mother     Hypertension Mother     Kidney disease Mother     Heart disease Father      Social History   Substance Use Topics    Smoking status: Never Smoker    Smokeless tobacco: Never Used    Alcohol use No     Review of Systems   Constitutional: Positive for diaphoresis. Negative for chills and fever.   HENT: Positive for ear pain. Negative for congestion, rhinorrhea and sore throat.    Eyes: Negative for photophobia and visual disturbance.   Respiratory: Positive for shortness of breath (secondary to asthma).    Cardiovascular: Positive for chest pain. "   Gastrointestinal: Negative for abdominal pain, constipation, diarrhea, nausea and vomiting.   Genitourinary: Negative for dysuria and hematuria.   Musculoskeletal: Negative for back pain, neck pain and neck stiffness.   Skin: Negative for rash and wound.   Neurological: Negative for dizziness, syncope, weakness, light-headedness, numbness and headaches.   Psychiatric/Behavioral: Negative for confusion.       Physical Exam     Initial Vitals [10/16/17 1625]   BP Pulse Resp Temp SpO2   (!) 191/90 70 18 97.9 °F (36.6 °C) 97 %      MAP       123.67         Physical Exam    Nursing note and vitals reviewed.  Constitutional: She appears well-developed and well-nourished. She is not diaphoretic. No distress.   HENT:   Head: Normocephalic and atraumatic.   Eyes: EOM are normal. Pupils are equal, round, and reactive to light.   Neck: Normal range of motion. Neck supple.   Cardiovascular: Normal rate, regular rhythm and normal heart sounds. Exam reveals no gallop and no friction rub.    No murmur heard.  Pulmonary/Chest: Breath sounds normal. She has no wheezes. She has no rhonchi. She has no rales.   Abdominal: Soft. Bowel sounds are normal. There is no tenderness. There is no rebound and no guarding.   Musculoskeletal: Normal range of motion.   Neurological: She is alert and oriented to person, place, and time. She has normal strength. No cranial nerve deficit or sensory deficit. Gait normal. GCS eye subscore is 4. GCS verbal subscore is 5. GCS motor subscore is 6.   Skin: Skin is warm and dry. No rash noted. No erythema.   Psychiatric: She has a normal mood and affect.         ED Course   Procedures  Labs Reviewed   ISTAT PROCEDURE - Abnormal; Notable for the following:        Result Value    POC TCO2 (MEASURED) 31 (*)     All other components within normal limits   ISTAT CHEM8                 APC / Resident Notes:   77-year-old female with past medical history of hypertension, hyperlipidemia, asthma presents to the ED  with multiple complaints.  Vital signs stable.  Regular rate and rhythm.  Lungs are clear.  Abdomen is soft and nontender.  No signs of acute otitis media or strep pharyngitis.  Neurologically intact without any focal neuro deficits.  Patient denies any current symptoms of chest pain, chest discomfort, or headache.      EKG shows normal sinus rhythm without any signs of ischemia.  I do not suspect ACS, acute intracranial hemorrhage, pneumonia.  Patient is requesting lab work at this time, will obtain chem 8 for further evaluation.    Chem8 with no abnormalities. Repeat /64.    I do not feel that she needs any further labs or imaging at this time. Stable for discharge.    She was discharged without any new prescriptions.  She will follow up with her PCP.  All of the patient's questions were answered.  I reviewed the patient's chart and labs and discussed the case with my supervising physician.                 ED Course      Clinical Impression:   The primary encounter diagnosis was Left ear pain. Diagnoses of Chest discomfort and Nonintractable headache, unspecified chronicity pattern, unspecified headache type were also pertinent to this visit.    Disposition:   Disposition: Discharged  Condition: Stable                        Tressa Barker PA-C  10/16/17 0642

## 2017-10-31 ENCOUNTER — OFFICE VISIT (OUTPATIENT)
Dept: FAMILY MEDICINE | Facility: CLINIC | Age: 77
End: 2017-10-31
Payer: MEDICARE

## 2017-10-31 VITALS
OXYGEN SATURATION: 97 % | DIASTOLIC BLOOD PRESSURE: 86 MMHG | SYSTOLIC BLOOD PRESSURE: 126 MMHG | HEART RATE: 72 BPM | RESPIRATION RATE: 12 BRPM | HEIGHT: 60 IN | BODY MASS INDEX: 43.07 KG/M2 | TEMPERATURE: 97 F | WEIGHT: 219.38 LBS

## 2017-10-31 DIAGNOSIS — I10 HYPERTENSION, ESSENTIAL: ICD-10-CM

## 2017-10-31 PROCEDURE — 99213 OFFICE O/P EST LOW 20 MIN: CPT | Mod: S$GLB,,, | Performed by: INTERNAL MEDICINE

## 2017-10-31 PROCEDURE — 99999 PR PBB SHADOW E&M-EST. PATIENT-LVL III: CPT | Mod: PBBFAC,,, | Performed by: INTERNAL MEDICINE

## 2017-10-31 PROCEDURE — 99499 UNLISTED E&M SERVICE: CPT | Mod: S$GLB,,, | Performed by: INTERNAL MEDICINE

## 2017-10-31 RX ORDER — TRIAMTERENE AND HYDROCHLOROTHIAZIDE 37.5; 25 MG/1; MG/1
1 CAPSULE ORAL EVERY MORNING
Qty: 90 CAPSULE | Refills: 1 | Status: SHIPPED | OUTPATIENT
Start: 2017-10-31 | End: 2018-01-09 | Stop reason: SDUPTHER

## 2017-10-31 NOTE — PROGRESS NOTES
"Subjective:       Patient ID: Jose Manuel Boyd is a 77 y.o. female.    Chief Complaint: Follow-up    F/u tremors    HPI: 76 y/o seen for follow up of resting tremor. Started two weeks ago on propranolol. After two days she develped "burning" of scalp and right ear. She stopped medication went to ED. Work up was essentially normal she has not taken the propranolol further and her symptoms have not returned (resolved one day after stopping propranolol) she takes valium when she feels tremor is effecting her (five to six times in last week).       Review of Systems   Constitutional: Negative for activity change, appetite change, fatigue, fever and unexpected weight change.   HENT: Negative for ear pain, rhinorrhea and sore throat.    Eyes: Negative for discharge and visual disturbance.   Respiratory: Negative for chest tightness, shortness of breath and wheezing.    Cardiovascular: Negative for chest pain, palpitations and leg swelling.   Gastrointestinal: Negative for abdominal pain, constipation and diarrhea.   Endocrine: Negative for cold intolerance and heat intolerance.   Genitourinary: Negative for dysuria and hematuria.   Musculoskeletal: Negative for joint swelling and neck stiffness.   Skin: Negative for rash.   Neurological: Negative for dizziness, syncope, weakness and headaches.   Psychiatric/Behavioral: Negative for suicidal ideas.       Objective:     Vitals:    10/31/17 1145   BP: 126/86   BP Location: Left arm   Patient Position: Sitting   BP Method: Medium (Manual)   Pulse: 72   Resp: 12   Temp: 97 °F (36.1 °C)   TempSrc: Oral   SpO2: 97%   Weight: 99.5 kg (219 lb 5.7 oz)   Height: 5' (1.524 m)          Physical Exam   Constitutional: She is oriented to person, place, and time. She appears well-developed and well-nourished.   HENT:   Head: Normocephalic and atraumatic.   Eyes: Conjunctivae are normal. Pupils are equal, round, and reactive to light.   Neck: Normal range of motion.   Cardiovascular: " Normal rate and regular rhythm.  Exam reveals no gallop and no friction rub.    No murmur heard.  Pulmonary/Chest: Effort normal and breath sounds normal. She has no wheezes. She has no rales.   Abdominal: Soft. Bowel sounds are normal. There is no tenderness. There is no rebound and no guarding.   Musculoskeletal: Normal range of motion. She exhibits no edema or tenderness.   Neurological: She is alert and oriented to person, place, and time. No cranial nerve deficit.   Fine resting tremor noted   Skin: Skin is warm and dry.   Psychiatric: She has a normal mood and affect.       Assessment:       1. Hypertension, essential        Plan:    1. At goal continue current medications    Intolerant of propranolol added to allergy list, continue prn diazepam

## 2017-10-31 NOTE — PROGRESS NOTES
Subjective:       Patient ID: Jose Manuel Boyd is a 77 y.o. female.    Chief Complaint: Follow-up    HPI  Review of Systems    Objective:     Vitals:    10/31/17 1145   BP: 126/86   BP Location: Left arm   Patient Position: Sitting   BP Method: Medium (Manual)   Pulse: 72   Resp: 12   Temp: 97 °F (36.1 °C)   TempSrc: Oral   SpO2: 97%   Weight: 99.5 kg (219 lb 5.7 oz)   Height: 5' (1.524 m)          Physical Exam    Assessment:       No diagnosis found.    Plan:       ***

## 2017-12-19 DIAGNOSIS — F32.A DEPRESSION, UNSPECIFIED DEPRESSION TYPE: ICD-10-CM

## 2017-12-19 RX ORDER — DIAZEPAM 10 MG/1
TABLET ORAL
Qty: 30 TABLET | Refills: 0 | Status: SHIPPED | OUTPATIENT
Start: 2017-12-19 | End: 2018-03-09 | Stop reason: SINTOL

## 2018-01-09 ENCOUNTER — OFFICE VISIT (OUTPATIENT)
Dept: FAMILY MEDICINE | Facility: CLINIC | Age: 78
End: 2018-01-09
Payer: MEDICARE

## 2018-01-09 VITALS
WEIGHT: 220.44 LBS | OXYGEN SATURATION: 97 % | SYSTOLIC BLOOD PRESSURE: 130 MMHG | DIASTOLIC BLOOD PRESSURE: 86 MMHG | BODY MASS INDEX: 43.28 KG/M2 | TEMPERATURE: 98 F | HEIGHT: 60 IN | HEART RATE: 76 BPM | RESPIRATION RATE: 16 BRPM

## 2018-01-09 DIAGNOSIS — I10 HTN (HYPERTENSION), BENIGN: Primary | ICD-10-CM

## 2018-01-09 DIAGNOSIS — E66.01 MORBID OBESITY: ICD-10-CM

## 2018-01-09 DIAGNOSIS — I10 HYPERTENSION, ESSENTIAL: ICD-10-CM

## 2018-01-09 PROCEDURE — 99999 PR PBB SHADOW E&M-EST. PATIENT-LVL III: CPT | Mod: PBBFAC,,, | Performed by: INTERNAL MEDICINE

## 2018-01-09 PROCEDURE — 99214 OFFICE O/P EST MOD 30 MIN: CPT | Mod: S$GLB,,, | Performed by: INTERNAL MEDICINE

## 2018-01-09 RX ORDER — TRIAMTERENE AND HYDROCHLOROTHIAZIDE 37.5; 25 MG/1; MG/1
1 CAPSULE ORAL 2 TIMES DAILY
Qty: 180 CAPSULE | Refills: 1 | Status: SHIPPED | OUTPATIENT
Start: 2018-01-09 | End: 2019-01-24 | Stop reason: SDUPTHER

## 2018-01-09 NOTE — PROGRESS NOTES
Subjective:       Patient ID: Jose Manuel Boyd is a 77 y.o. female.    Chief Complaint: Headache    F/u blood pressure    HPI: 78 y/o w/ HTN COPD presents to discuss blood pressure. Two weeks ago she had headache for two days she noted blood pressure on home cuff with systolic 160-180 she took an extra dose of her HCTZ medication and pressures improved she has been taking twice per day since that time. No LE swelling no leg cramps. She denies any vision changes parathesia or motor weakness. No further headaches      Review of Systems   Constitutional: Negative for activity change, appetite change, fatigue, fever and unexpected weight change.   HENT: Negative for ear pain, rhinorrhea and sore throat.    Eyes: Negative for discharge and visual disturbance.   Respiratory: Negative for chest tightness, shortness of breath and wheezing.    Cardiovascular: Negative for chest pain, palpitations and leg swelling.   Gastrointestinal: Negative for abdominal pain, constipation and diarrhea.   Endocrine: Negative for cold intolerance and heat intolerance.   Genitourinary: Negative for dysuria and hematuria.   Musculoskeletal: Negative for joint swelling and neck stiffness.   Skin: Negative for rash.   Neurological: Positive for headaches. Negative for dizziness, syncope and weakness.   Psychiatric/Behavioral: Negative for suicidal ideas.       Objective:     Vitals:    01/09/18 0958   BP: 130/86   BP Location: Left arm   Patient Position: Sitting   BP Method: Medium (Manual)   Pulse: 76   Resp: 16   Temp: 98.2 °F (36.8 °C)   TempSrc: Oral   SpO2: 97%   Weight: 100 kg (220 lb 7.4 oz)   Height: 5' (1.524 m)          Physical Exam   Constitutional: She is oriented to person, place, and time. She appears well-developed and well-nourished.   HENT:   Head: Normocephalic and atraumatic.   Eyes: Conjunctivae are normal. Pupils are equal, round, and reactive to light.   Neck: Normal range of motion.   Cardiovascular: Normal rate and  regular rhythm.  Exam reveals no gallop and no friction rub.    No murmur heard.  No LE edema   Pulmonary/Chest: Effort normal and breath sounds normal. She has no wheezes. She has no rales.   Good air movement to bases bilaterally   Abdominal: Soft. Bowel sounds are normal. There is no tenderness. There is no rebound and no guarding.   obese   Musculoskeletal: Normal range of motion. She exhibits no edema or tenderness.   Neurological: She is alert and oriented to person, place, and time. No cranial nerve deficit.   Negative pronator drift, symmetric face, 5/5  strength bilaterally normal tandem gait observed   Skin: Skin is warm and dry.   Psychiatric: She has a normal mood and affect.       Assessment:       1. HTN (hypertension), benign    2. Morbid obesity    3. Hypertension, essential        Plan:    1/3. BP at goal continue split dosing of hctz no evidence of end organ damage on exam    2.  The patient is asked to make an attempt to improve diet and exercise patterns to aid in medical management of this problem.

## 2018-02-23 ENCOUNTER — HOSPITAL ENCOUNTER (EMERGENCY)
Facility: HOSPITAL | Age: 78
Discharge: HOME | End: 2018-02-23
Attending: EMERGENCY MEDICINE
Payer: MEDICARE

## 2018-02-23 VITALS
TEMPERATURE: 98 F | DIASTOLIC BLOOD PRESSURE: 68 MMHG | HEART RATE: 78 BPM | OXYGEN SATURATION: 95 % | BODY MASS INDEX: 38.3 KG/M2 | WEIGHT: 190 LBS | HEIGHT: 59 IN | RESPIRATION RATE: 16 BRPM | SYSTOLIC BLOOD PRESSURE: 147 MMHG

## 2018-02-23 DIAGNOSIS — R06.02 SHORTNESS OF BREATH: ICD-10-CM

## 2018-02-23 DIAGNOSIS — M79.89 LEG SWELLING: Primary | ICD-10-CM

## 2018-02-23 LAB
ALBUMIN SERPL BCP-MCNC: 3.5 G/DL
ALP SERPL-CCNC: 122 U/L
ALT SERPL W/O P-5'-P-CCNC: 22 U/L
ANION GAP SERPL CALC-SCNC: 12 MMOL/L
AST SERPL-CCNC: 31 U/L
BASOPHILS # BLD AUTO: 0.06 K/UL
BASOPHILS NFR BLD: 0.4 %
BILIRUB SERPL-MCNC: 0.4 MG/DL
BILIRUB UR QL STRIP: NEGATIVE
BNP SERPL-MCNC: 99 PG/ML
BUN SERPL-MCNC: 11 MG/DL
CALCIUM SERPL-MCNC: 10 MG/DL
CHLORIDE SERPL-SCNC: 100 MMOL/L
CLARITY UR REFRACT.AUTO: CLEAR
CO2 SERPL-SCNC: 27 MMOL/L
COLOR UR AUTO: NORMAL
CREAT SERPL-MCNC: 0.8 MG/DL
DIFFERENTIAL METHOD: ABNORMAL
EOSINOPHIL # BLD AUTO: 0 K/UL
EOSINOPHIL NFR BLD: 0.2 %
ERYTHROCYTE [DISTWIDTH] IN BLOOD BY AUTOMATED COUNT: 15.3 %
EST. GFR  (AFRICAN AMERICAN): >60 ML/MIN/1.73 M^2
EST. GFR  (NON AFRICAN AMERICAN): >60 ML/MIN/1.73 M^2
GLUCOSE SERPL-MCNC: 88 MG/DL
GLUCOSE UR QL STRIP: NEGATIVE
HCT VFR BLD AUTO: 41.6 %
HGB BLD-MCNC: 13.3 G/DL
HGB UR QL STRIP: NEGATIVE
IMM GRANULOCYTES # BLD AUTO: 0.06 K/UL
IMM GRANULOCYTES NFR BLD AUTO: 0.4 %
KETONES UR QL STRIP: NEGATIVE
LEUKOCYTE ESTERASE UR QL STRIP: NEGATIVE
LYMPHOCYTES # BLD AUTO: 1.6 K/UL
LYMPHOCYTES NFR BLD: 11.4 %
MCH RBC QN AUTO: 25.7 PG
MCHC RBC AUTO-ENTMCNC: 32 G/DL
MCV RBC AUTO: 80 FL
MONOCYTES # BLD AUTO: 0.9 K/UL
MONOCYTES NFR BLD: 6.5 %
NEUTROPHILS # BLD AUTO: 11.4 K/UL
NEUTROPHILS NFR BLD: 81.1 %
NITRITE UR QL STRIP: NEGATIVE
NRBC BLD-RTO: 0 /100 WBC
PH UR STRIP: 7 [PH] (ref 5–8)
PLATELET # BLD AUTO: 273 K/UL
PMV BLD AUTO: 10.7 FL
POTASSIUM SERPL-SCNC: 3.7 MMOL/L
PROT SERPL-MCNC: 8 G/DL
PROT UR QL STRIP: NEGATIVE
RBC # BLD AUTO: 5.18 M/UL
SODIUM SERPL-SCNC: 139 MMOL/L
SP GR UR STRIP: 1 (ref 1–1.03)
URN SPEC COLLECT METH UR: NORMAL
UROBILINOGEN UR STRIP-ACNC: NEGATIVE EU/DL
WBC # BLD AUTO: 14.05 K/UL

## 2018-02-23 PROCEDURE — 99284 EMERGENCY DEPT VISIT MOD MDM: CPT

## 2018-02-23 PROCEDURE — 83880 ASSAY OF NATRIURETIC PEPTIDE: CPT

## 2018-02-23 PROCEDURE — 81003 URINALYSIS AUTO W/O SCOPE: CPT

## 2018-02-23 PROCEDURE — 85025 COMPLETE CBC W/AUTO DIFF WBC: CPT

## 2018-02-23 PROCEDURE — 80053 COMPREHEN METABOLIC PANEL: CPT

## 2018-02-23 NOTE — ED PROVIDER NOTES
"Encounter Date: 2/23/2018       History     Chief Complaint   Patient presents with    Shortness of Breath     cleaning with bleach and it touched her skin ,  also her legs and abdomen are swollen. denies chest pain . C/O increased body tremors. Her mouth is dry and "something is wrong".      76 y/o female with history of HTN, depression, asthma presents to the ER with chief complaint of bilateral upper leg swelling x 6 days . Patient reports that her legs gave out on her this morning while standing.  She denies head injury, associated dizziness, LOC, and she says her brother helped her up. She denies new leg pain.  She has chronic left knee pain.  She has low back pain, which is intermittent for 2 months.  She denies bowel or bladder incontinence, saddle paresthesias, numbness or tingling of the extremities.    Patient says she has been cleaning with bleach all this week and notes that she has increased activity with cleaning around the house this week. She has SOB "sometimes" (with exertion and when in the sun for 2 years), but says she is breathing well today.  She reports throat burning, but denies difficulty swallowing.  She noticed a jefe on her arm and maybe some skin discoloration and thought this might be from the bleach.     Patient reports that she had 1 episode of burning in her chest while eating beans 2 weeks ago, but this pain has now resolved.  She denies abdominal pain, dysuria, urinary frequency or urgency, vomiting, diarrhea, fever, productive cough, wheezing, or any additional complaints at this time.            Review of patient's allergies indicates:   Allergen Reactions    Codeine      Other reaction(s): Rash    Propranolol Other (See Comments)     Burning sensation of scalp/skin     Past Medical History:   Diagnosis Date    Asthma     Depression     Hypercholesterolemia     Hypertension      Past Surgical History:   Procedure Laterality Date    CHOLECYSTECTOMY      HYSTERECTOMY      " knee repalcement       Family History   Problem Relation Age of Onset    Diabetes Mother     Hypertension Mother     Kidney disease Mother     Heart disease Father      Social History   Substance Use Topics    Smoking status: Never Smoker    Smokeless tobacco: Never Used    Alcohol use No     Review of Systems   Constitutional: Negative for chills and fever.   HENT: Negative for sore throat.    Respiratory: Positive for shortness of breath. Negative for cough, chest tightness and wheezing.    Cardiovascular: Negative for chest pain.   Gastrointestinal: Negative for abdominal pain, nausea and vomiting.   Genitourinary: Negative for dysuria.   Musculoskeletal: Negative for back pain.   Skin: Negative for rash.   Neurological: Negative for dizziness, syncope, weakness and light-headedness.   Hematological: Does not bruise/bleed easily.   Psychiatric/Behavioral: Negative for confusion.       Physical Exam     Initial Vitals [02/23/18 0815]   BP Pulse Resp Temp SpO2   (!) 152/91 96 18 98.7 °F (37.1 °C) 95 %      MAP       111.33         Physical Exam    Nursing note and vitals reviewed.  Constitutional: She appears well-developed and well-nourished.   HENT:   Head: Atraumatic.   Mouth/Throat: Oropharynx is clear and moist.   Eyes: Conjunctivae and EOM are normal. Pupils are equal, round, and reactive to light.   Neck: Normal range of motion. Neck supple.   Cardiovascular: Normal rate, regular rhythm and intact distal pulses.   Pulmonary/Chest: Breath sounds normal. No respiratory distress. She has no wheezes. She has no rhonchi. She has no rales.   Abdominal: Soft. Bowel sounds are normal. There is no tenderness.   Musculoskeletal: She exhibits edema.        Left knee: She exhibits normal range of motion, no swelling, no erythema, no LCL laxity and no MCL laxity.   Bilateral swelling upper thighs, no skin changes or tenderness.    Neurological: She is alert and oriented to person, place, and time. She has normal  strength. No cranial nerve deficit or sensory deficit.   Skin: Capillary refill takes less than 2 seconds. No rash noted.   Psychiatric: She has a normal mood and affect.         ED Course   Procedures  Labs Reviewed   CBC W/ AUTO DIFFERENTIAL - Abnormal; Notable for the following:        Result Value    WBC 14.05 (*)     MCV 80 (*)     MCH 25.7 (*)     RDW 15.3 (*)     Gran # (ANC) 11.4 (*)     Immature Grans (Abs) 0.06 (*)     Gran% 81.1 (*)     Lymph% 11.4 (*)     All other components within normal limits   URINALYSIS, REFLEX TO URINE CULTURE    Narrative:     Preferred Collection Type->Urine, Clean Catch   COMPREHENSIVE METABOLIC PANEL   B-TYPE NATRIURETIC PEPTIDE        Imaging Results          US Lower Extremity Veins Bilateral (Final result)  Result time 02/23/18 13:23:28    Final result by Fabienne Mauricio MD (02/23/18 13:23:28)                 Impression:        No evidence of deep venous thrombosis bilaterally.  ______________________________________     Electronically signed by resident: RODRIGO BERRIOS  Date:     02/23/18  Time:    13:17            As the supervising and teaching physician, I personally reviewed the images and resident's interpretation and I agree with the findings.            Electronically signed by: FABIENNE MAURICIO MD  Date:     02/23/18  Time:    13:23              Narrative:    Time of Procedure: 02/23/18 11:00:00  Accession # [id number]    Technique: Duplex and color flow Doppler evaluation of the bilateral lower extremities.    Comparison: None.    Clinical Indication:   edema.    Findings:    Right - There is no evidence of acute venous thrombosis in the common femoral, superficial femoral, greater saphenous, popliteal, peroneal, anterior tibial and posterior tibial veins.  There is no reflux to suggest valvular incompetence.    Left - There is no evidence of acute venous thrombosis in the common femoral, superficial femoral, greater saphenous, popliteal, peroneal, anterior  tibial and posterior tibial veins.  There is no reflux to suggest valvular incompetence.                             X-Ray Chest PA And Lateral (Final result)  Result time 02/23/18 10:24:09    Final result by Regulo Nieves MD (02/23/18 10:24:09)                 Impression:     See above      Electronically signed by: Regulo Nieves MD  Date:     02/23/18  Time:    10:24              Narrative:    2 views    Heart size normal.  Mild opacification in the left lung base.  Otherwise the lungs are clear.  No pleural effusion                                       APC / Resident Notes:   Patient presents to the ER with chief complaint of bilateral upper leg swelling ×6 days.  Patient has chronic left knee pain but denies new leg pain.  She is having intermittent low back pain ×2 months but denies previous fall.  She is neurovascularly intact on exam and is ambulating without assistance and that.   There are no signs of saddle anesthesia, incontinence,  fevers, trauma or midline tenderness on history or physical to suggest cauda equina, infectious process, fracture or subluxation.   She has no skin changes or obvious signs of infection.  Due to new leg swelling we will order ultrasound of the lower extremities to evaluate for possible DVT.    Patient was reporting some shortness of breath over the last week and says that she has been using bleach to clean for the last week.  She is not complaining of shortness of breath or wheezing today and her lungs are clear on exam.    I will order labs, chest xray and reassess.      Patient's labs reveal slight elevation of CBC, otherwise labs are within normal limits.  She has a normal BNP and I do not suspect CHF.   Chest xray reveals mild opacification of the left lung base, no other acute process.  She has no fever or cough and her presentation is not concerning for pneumonia.    Patients U/S is negative for DVT.  Bilateral leg swelling likely secondary to dependent edema/ venous  insufficiency.   We feel the patient is stable for discharge and she is advised on close f/u with her PCP within 1 week for ER follow up exam and to schedule repeat chest xray.   She is given ER return precautions.   I discussed the care of this pt with my supervising MD.             Attending Attestation:     Physician Attestation Statement for NP/PA:   I reviewed the chart but I did not personally examine the patient. The face to face encounter was performed by the NP/PA.                     Clinical Impression:   The primary encounter diagnosis was Leg swelling. A diagnosis of Shortness of breath was also pertinent to this visit.                           LEEANNE Su  02/23/18 [id number]

## 2018-02-23 NOTE — ED TRIAGE NOTES
Presents to ER with SOB having using bleach all week to clean with.    Pt identifiers checked and correct  LOC: The patient is awake, alert, aware of environment with an appropriate affect. Oriented x3, speaking appropriately  APPEARANCE: Pt resting comfortably, in no acute distress, pt is clean and well groomed, clothing properly fastened  SKIN: Skin warm, dry and intact, normal skin turgor, moist mucus membranes  RESPIRATORY: Airway is open and patent, respirations are spontaneous, even and unlabored, normal effort and rate  MUSCULOSKELETAL: No obvious deformities.

## 2018-02-25 ENCOUNTER — HOSPITAL ENCOUNTER (EMERGENCY)
Facility: HOSPITAL | Age: 78
Discharge: HOME | End: 2018-02-25
Attending: EMERGENCY MEDICINE
Payer: MEDICARE

## 2018-02-25 VITALS
WEIGHT: 190 LBS | HEART RATE: 77 BPM | BODY MASS INDEX: 43.97 KG/M2 | HEIGHT: 55 IN | RESPIRATION RATE: 17 BRPM | OXYGEN SATURATION: 100 % | TEMPERATURE: 98 F | SYSTOLIC BLOOD PRESSURE: 147 MMHG | DIASTOLIC BLOOD PRESSURE: 63 MMHG

## 2018-02-25 DIAGNOSIS — E87.6 HYPOKALEMIA: Primary | ICD-10-CM

## 2018-02-25 PROBLEM — Z76.89 ENCOUNTER FOR PSYCHIATRIC ASSESSMENT: Status: ACTIVE | Noted: 2018-02-25

## 2018-02-25 LAB
ALBUMIN SERPL BCP-MCNC: 3.2 G/DL
ALP SERPL-CCNC: 106 U/L
ALT SERPL W/O P-5'-P-CCNC: 18 U/L
AMPHET+METHAMPHET UR QL: NEGATIVE
ANION GAP SERPL CALC-SCNC: 11 MMOL/L
APAP SERPL-MCNC: <3 UG/ML
AST SERPL-CCNC: 22 U/L
BARBITURATES UR QL SCN>200 NG/ML: NEGATIVE
BASOPHILS # BLD AUTO: 0.08 K/UL
BASOPHILS NFR BLD: 0.6 %
BENZODIAZ UR QL SCN>200 NG/ML: NORMAL
BILIRUB SERPL-MCNC: 0.4 MG/DL
BILIRUB UR QL STRIP: NEGATIVE
BUN SERPL-MCNC: 11 MG/DL
BZE UR QL SCN: NEGATIVE
CALCIUM SERPL-MCNC: 9.3 MG/DL
CANNABINOIDS UR QL SCN: NEGATIVE
CHLORIDE SERPL-SCNC: 103 MMOL/L
CLARITY UR REFRACT.AUTO: ABNORMAL
CO2 SERPL-SCNC: 28 MMOL/L
COLOR UR AUTO: YELLOW
CREAT SERPL-MCNC: 0.8 MG/DL
CREAT UR-MCNC: 170 MG/DL
DIFFERENTIAL METHOD: ABNORMAL
EOSINOPHIL # BLD AUTO: 0.1 K/UL
EOSINOPHIL NFR BLD: 0.7 %
ERYTHROCYTE [DISTWIDTH] IN BLOOD BY AUTOMATED COUNT: 17.3 %
EST. GFR  (AFRICAN AMERICAN): >60 ML/MIN/1.73 M^2
EST. GFR  (NON AFRICAN AMERICAN): >60 ML/MIN/1.73 M^2
ETHANOL SERPL-MCNC: <10 MG/DL
GLUCOSE SERPL-MCNC: 111 MG/DL
GLUCOSE UR QL STRIP: NEGATIVE
HCT VFR BLD AUTO: 45.8 %
HGB BLD-MCNC: 14.5 G/DL
HGB UR QL STRIP: NEGATIVE
IMM GRANULOCYTES # BLD AUTO: 0.04 K/UL
IMM GRANULOCYTES NFR BLD AUTO: 0.3 %
KETONES UR QL STRIP: ABNORMAL
LEUKOCYTE ESTERASE UR QL STRIP: NEGATIVE
LYMPHOCYTES # BLD AUTO: 2.4 K/UL
LYMPHOCYTES NFR BLD: 18.4 %
MCH RBC QN AUTO: 25.7 PG
MCHC RBC AUTO-ENTMCNC: 31.7 G/DL
MCV RBC AUTO: 81 FL
METHADONE UR QL SCN>300 NG/ML: NEGATIVE
MONOCYTES # BLD AUTO: 1.3 K/UL
MONOCYTES NFR BLD: 10.2 %
NEUTROPHILS # BLD AUTO: 9 K/UL
NEUTROPHILS NFR BLD: 69.8 %
NITRITE UR QL STRIP: NEGATIVE
NRBC BLD-RTO: 0 /100 WBC
OPIATES UR QL SCN: NEGATIVE
PCP UR QL SCN>25 NG/ML: NEGATIVE
PH UR STRIP: 6 [PH] (ref 5–8)
PLATELET # BLD AUTO: 288 K/UL
PMV BLD AUTO: 10.2 FL
POTASSIUM SERPL-SCNC: 3.1 MMOL/L
PROT SERPL-MCNC: 6.8 G/DL
PROT UR QL STRIP: NEGATIVE
RBC # BLD AUTO: 5.65 M/UL
SODIUM SERPL-SCNC: 142 MMOL/L
SP GR UR STRIP: 1.01 (ref 1–1.03)
T4 FREE SERPL-MCNC: 1.32 NG/DL
TOXICOLOGY INFORMATION: NORMAL
TSH SERPL DL<=0.005 MIU/L-ACNC: 0.15 UIU/ML
URN SPEC COLLECT METH UR: ABNORMAL
UROBILINOGEN UR STRIP-ACNC: NEGATIVE EU/DL
WBC # BLD AUTO: 12.88 K/UL

## 2018-02-25 PROCEDURE — 80053 COMPREHEN METABOLIC PANEL: CPT

## 2018-02-25 PROCEDURE — 80320 DRUG SCREEN QUANTALCOHOLS: CPT

## 2018-02-25 PROCEDURE — 80329 ANALGESICS NON-OPIOID 1 OR 2: CPT

## 2018-02-25 PROCEDURE — 25000003 PHARM REV CODE 250: Performed by: EMERGENCY MEDICINE

## 2018-02-25 PROCEDURE — 51701 INSERT BLADDER CATHETER: CPT

## 2018-02-25 PROCEDURE — 99282 EMERGENCY DEPT VISIT SF MDM: CPT | Mod: ,,, | Performed by: EMERGENCY MEDICINE

## 2018-02-25 PROCEDURE — 81003 URINALYSIS AUTO W/O SCOPE: CPT

## 2018-02-25 PROCEDURE — 80307 DRUG TEST PRSMV CHEM ANLYZR: CPT

## 2018-02-25 PROCEDURE — 84443 ASSAY THYROID STIM HORMONE: CPT

## 2018-02-25 PROCEDURE — 84439 ASSAY OF FREE THYROXINE: CPT

## 2018-02-25 PROCEDURE — 85025 COMPLETE CBC W/AUTO DIFF WBC: CPT

## 2018-02-25 PROCEDURE — 99284 EMERGENCY DEPT VISIT MOD MDM: CPT | Mod: 25

## 2018-02-25 RX ORDER — POTASSIUM CHLORIDE 20 MEQ/1
40 TABLET, EXTENDED RELEASE ORAL
Status: COMPLETED | OUTPATIENT
Start: 2018-02-25 | End: 2018-02-25

## 2018-02-25 RX ADMIN — POTASSIUM CHLORIDE 40 MEQ: 1500 TABLET, EXTENDED RELEASE ORAL at 07:02

## 2018-02-25 NOTE — ED NOTES
Resting in stretcher at this time.  Family at bedside.  Attempted to void but unable to produce sample. Ambulates with steady gait.  Denies pain at this time.

## 2018-02-25 NOTE — ASSESSMENT & PLAN NOTE
- no objective signs of psychosis on exam and patient does not endorse symptoms of psychosis  - testing negative for delirium  - some word finding difficulty and occasional tangential thought process occurring in context of rumination on various somatic complaints, differential remains broad; would rule out neurocognitive disorder, medication induced side effect ( 2/2 valium), anxiety disorder  - collateral from patient's relative at bedside supports that patient is likely at her baseline, by history patient's functional status does not appear to be impaired to point of grave disability  - do not recommend PEC or inpatient psychiatric hospitalization at this time, patient requests to be discharged after medical clearance  - would recommend patient decrease dose of valium or discontinue due to increased risk of falls and cognitive impairment in the elderly  - patient should follow up with outpatient provider, please provide patient with resources to do so if needed
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
## 2018-02-25 NOTE — SUBJECTIVE & OBJECTIVE
Patient History           Medical as of 2/25/2018     Past Medical History     Diagnosis Date Comments Source    Asthma -- -- Provider    Depression -- -- Provider    Hypercholesterolemia -- -- Provider    Hypertension -- -- Provider                  Surgical as of 2/25/2018     Past Surgical History     Procedure Laterality Date Comments Source    HYSTERECTOMY -- -- -- Provider    CHOLECYSTECTOMY -- -- -- Provider    knee repalcement [Other] -- -- -- Provider                  Family as of 2/25/2018     Problem Relation Name Age of Onset Comments Source    Diabetes Mother -- -- -- Provider    Hypertension Mother -- -- -- Provider    Kidney disease Mother -- -- -- Provider    Heart disease Father -- -- -- Provider            Tobacco Use as of 2/25/2018     Smoking Status Smoking Start Date Smoking Quit Date Packs/day Years Used    Never Smoker -- -- -- --    Types Comments Smokeless Tobacco Status Smokeless Tobacco Quit Date Source    -- -- Never Used -- Provider            Alcohol Use as of 2/25/2018     Alcohol Use Drinks/Week Alcohol/Week Comments Source    No -- -- -- Provider            Drug Use as of 2/25/2018     Drug Use Types Frequency Comments Source    No -- -- -- Provider            Sexual Activity as of 2/25/2018     Sexually Active Birth Control Partners Comments Source    -- -- -- -- Provider            Activities of Daily Living as of 2/25/2018    **None**           Social Documentation as of 2/25/2018    **None**           Occupational as of 2/25/2018    **None**           Socioeconomic as of 2/25/2018     Marital Status Spouse Name Number of Children Years Education Preferred Language Ethnicity Race Source     -- -- -- English /Black Black or  --         Pertinent History Q A Comments    as of 2/25/2018 Lives with      Place in Birth Order      Lives in      Number of Siblings      Raised by      Legal Involvement      Childhood Trauma      Criminal  History of      Financial Status      Highest Level of Education      Does patient have access to a firearm?       Service      Primary Leisure Activity      Spirituality       Past Medical History:   Diagnosis Date    Asthma     Depression     Hypercholesterolemia     Hypertension      Past Surgical History:   Procedure Laterality Date    CHOLECYSTECTOMY      HYSTERECTOMY      knee repalcement       Family History     Problem Relation (Age of Onset)    Diabetes Mother    Heart disease Father    Hypertension Mother    Kidney disease Mother        Social History Main Topics    Smoking status: Never Smoker    Smokeless tobacco: Never Used    Alcohol use No    Drug use: No    Sexual activity: Not on file     Review of patient's allergies indicates:   Allergen Reactions    Codeine      Other reaction(s): Rash    Propranolol Other (See Comments)     Burning sensation of scalp/skin       No current facility-administered medications on file prior to encounter.      Current Outpatient Prescriptions on File Prior to Encounter   Medication Sig    aspirin 325 MG tablet Take 1 tablet (325 mg total) by mouth once daily.    atorvastatin (LIPITOR) 40 MG tablet Take 1 tablet (40 mg total) by mouth once daily.    diazePAM (VALIUM) 10 MG Tab take 1 tablet by mouth every 12 hours if needed    omeprazole (PRILOSEC) 20 MG capsule Take 1 capsule (20 mg total) by mouth once daily.    ADVAIR DISKUS 250-50 mcg/dose diskus inhaler     albuterol (ACCUNEB) 0.63 mg/3 mL Nebu Take 3 mLs (0.63 mg total) by nebulization every 6 (six) hours as needed. Rescue    albuterol (VENTOLIN HFA) 90 mcg/actuation inhaler Inhale 2 puffs into the lungs every 4 (four) hours as needed for Wheezing.    epinastine 0.05 % ophthalmic solution Place 1 drop into both eyes 2 (two) times daily.    fluticasone (FLONASE) 50 mcg/actuation nasal spray 1 spray by Each Nare route 2 (two) times daily.    FLUZONE HIGH-DOSE 2017-18, PF, 180 mcg/0.5  "mL vaccine     triamterene-hydrochlorothiazide 37.5-25 mg (DYAZIDE) 37.5-25 mg per capsule Take 1 capsule by mouth 2 (two) times daily.     Psychotherapeutics     None        Review of Systems  Strengths and Liabilities: Strength: Patient accepts guidance/feedback, Strength: Patient is expressive/articulate., Liability: Patient has possible cognitive impairment.    Objective:     Vital Signs (Most Recent):  Temp: 97.8 °F (36.6 °C) (02/25/18 0102)  Pulse: 96 (02/25/18 0102)  Resp: 20 (02/25/18 0102)  BP: (!) 153/77 (02/25/18 0102)  SpO2: 95 % (02/25/18 0102) Vital Signs (24h Range):  Temp:  [97.8 °F (36.6 °C)] 97.8 °F (36.6 °C)  Pulse:  [96] 96  Resp:  [20] 20  SpO2:  [95 %] 95 %  BP: (153)/(77) 153/77     Height: 4' (121.9 cm)  Weight: 86.2 kg (190 lb)  Body mass index is 57.98 kg/m².    No intake or output data in the 24 hours ending 02/25/18 0410    Physical Exam       CONSTITUTIONAL  General Appearance: in casual dress, NAD, calm, cooperative    PSYCHIATRIC   Level of Consciousness: alert  Orientation: oriented to person, place, situation, date, month, year  Grooming: fair  Psychomotor Behavior: no agitation, retardation  Speech: normal rate, volume, tone  Language: English, some word finding difficulties  Mood: "ok"  Affect: constricted  Thought Process: mostly linear, with some tangential instances throughout  Associations: cohesive  Thought Content: denies Si, Hi, AVH  Memory: intact to recent and remote events  Attention: not distracted, SAVEAHAART with no errors  Fund of Knowledge: appropriate for education level  Insight: fair  Judgment: fair    "

## 2018-02-25 NOTE — DISCHARGE INSTRUCTIONS
COMMUNITY CLINICS     Mj Banerjee   [address]   [phone]     Floyd Ragsdale   [address] [facility] [address] [facility]   [phone]     Efren Sandhu    [address]   [phone]     Pavan Mahoney   [address] [facility] [address]   [phone]     [facility]   [address] for HIV  Patients   [phone]     OTHER    [facility]   [address]   [phone]     [facility]   [address]   [phone]     [facility]   [address] [address] [facility]   [phone]     [facility]   [address] [facility]   [phone]     [facility]   [address]  (Mon- Wed       Fri 1-5pm)   [phone]     [facility]    [phone]     [facility]   [address]   [phone]     [facility]   [address] [facility]   [phone]     [facility]   [address]   [phone]     MENTAL HEALTH    [facility]   [address]   [phone]     [facility]   [address] [place] [address]   [phone]     [facility]   [address]   [phone]     [facility]   [address]   [phone]     [facility]   [address] [place] [address]   [phone]     [facility]   [address]   [phone]     [facility]    [address] [place] [address] [phone]     [facility]    [phone]         MEDICARE AND MEDICAID SERVICES                                [phone]     [facility]    [facility] All Specialties    [phone]     Medicine Clinic   [address]   [phone]     Dermatology   [address]   [phone]     Ophthalmology Clinic   [address]   [phone]     Primary Care   [address]   [phone]     Infectious Disease   [address]   [phone]     [facility]   [address]   [phone]     [facility]  Practice    [phone]     [facility]   [address] [facility]   [phone]     [facility]   [address]   [phone] and [phone]
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
## 2018-02-25 NOTE — ED NOTES
Dr. Das made aware of pt being unable to void.  Straight cath performed as per facility protocol w/o difficulty.
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
## 2018-03-01 ENCOUNTER — OFFICE VISIT (OUTPATIENT)
Dept: FAMILY MEDICINE | Facility: CLINIC | Age: 78
End: 2018-03-01
Payer: MEDICARE

## 2018-03-01 VITALS
BODY MASS INDEX: 47.8 KG/M2 | TEMPERATURE: 98 F | HEIGHT: 55 IN | DIASTOLIC BLOOD PRESSURE: 76 MMHG | HEART RATE: 85 BPM | RESPIRATION RATE: 17 BRPM | WEIGHT: 206.56 LBS | OXYGEN SATURATION: 95 % | SYSTOLIC BLOOD PRESSURE: 118 MMHG

## 2018-03-01 DIAGNOSIS — N30.00 ACUTE CYSTITIS WITHOUT HEMATURIA: ICD-10-CM

## 2018-03-01 DIAGNOSIS — R53.83 FATIGUE, UNSPECIFIED TYPE: Primary | ICD-10-CM

## 2018-03-01 LAB
BILIRUB SERPL-MCNC: NORMAL MG/DL
BLOOD URINE, POC: NORMAL
COLOR, POC UA: YELLOW
GLUCOSE UR QL STRIP: NORMAL
KETONES UR QL STRIP: NORMAL
LEUKOCYTE ESTERASE URINE, POC: NORMAL
NITRITE, POC UA: NORMAL
PH, POC UA: 5
PROTEIN, POC: NORMAL
SPECIFIC GRAVITY, POC UA: 1.01
UROBILINOGEN, POC UA: NORMAL

## 2018-03-01 PROCEDURE — 81002 URINALYSIS NONAUTO W/O SCOPE: CPT | Mod: S$GLB,,, | Performed by: NURSE PRACTITIONER

## 2018-03-01 PROCEDURE — 3078F DIAST BP <80 MM HG: CPT | Mod: S$GLB,,, | Performed by: NURSE PRACTITIONER

## 2018-03-01 PROCEDURE — 99999 PR PBB SHADOW E&M-EST. PATIENT-LVL IV: CPT | Mod: PBBFAC,,, | Performed by: NURSE PRACTITIONER

## 2018-03-01 PROCEDURE — 99214 OFFICE O/P EST MOD 30 MIN: CPT | Mod: 25,S$GLB,, | Performed by: NURSE PRACTITIONER

## 2018-03-01 PROCEDURE — 3074F SYST BP LT 130 MM HG: CPT | Mod: S$GLB,,, | Performed by: NURSE PRACTITIONER

## 2018-03-01 RX ORDER — SULFAMETHOXAZOLE AND TRIMETHOPRIM 800; 160 MG/1; MG/1
1 TABLET ORAL 2 TIMES DAILY
Qty: 6 TABLET | Refills: 0 | Status: SHIPPED | OUTPATIENT
Start: 2018-03-01 | End: 2018-03-09 | Stop reason: ALTCHOICE

## 2018-03-01 NOTE — PATIENT INSTRUCTIONS
Follow up with Dr Piña on Friday 3/9/18  ER for new worse or concerning symptoms  Monitor blood pressure every day, bring these numbers to your appointment with Dr Piña  If your blood pressure is less than 120/80 Hold your triamterene    Understanding Urinary Tract Infections (UTIs)  Most UTIs are caused by bacteria, although they may also be caused by viruses or fungi. Bacteria from the bowel are the most common source of infection. The infection may start because of any of the following:  · Sexual activity. During sex, bacteria can travel from the penis, vagina, or rectum into the urethra.   · Bacteria on the skin outside the rectum may travel into the urethra. This is more common in women since the rectum and urethra are closer to each other than in men. Wiping from front to back after using the toilet and keeping the area clean can help prevent germs from getting to the urethra.  · Blockage of urine flow through the urinary tract. If urine sits too long, germs may start to grow out of control.      Parts of the urinary tract  The infection can occur in any part of the urinary tract.  · The kidneys collect and store urine.  · The ureters carry urine from the kidneys to the bladder.  · The bladder holds urine until you are ready to let it out.  · The urethra carries urine from the bladder out of the body. It is shorter in women, so bacteria can move through it more easily. The urethra is longer in men, so a UTI is less likely to reach the bladder or kidneys in men.  Date Last Reviewed: 1/1/2017  © 5605-2872 [employer]. [address] [place] [address], [place] [address]. All rights reserved. This information is not intended as a substitute for professional medical care. Always follow your healthcare professional's instructions.

## 2018-03-01 NOTE — Clinical Note
I'm not sure what's going on with her, but she was WNL to the extent she can be checked here.  She has a f/u with you next week, she's going to monitor her BP daily.
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
## 2018-03-09 ENCOUNTER — OFFICE VISIT (OUTPATIENT)
Dept: FAMILY MEDICINE | Facility: CLINIC | Age: 78
End: 2018-03-09
Payer: MEDICARE

## 2018-03-09 ENCOUNTER — LAB VISIT (OUTPATIENT)
Dept: LAB | Facility: HOSPITAL | Age: 78
End: 2018-03-09
Attending: INTERNAL MEDICINE
Payer: MEDICARE

## 2018-03-09 VITALS
OXYGEN SATURATION: 95 % | BODY MASS INDEX: 41.81 KG/M2 | WEIGHT: 212.94 LBS | TEMPERATURE: 98 F | HEART RATE: 77 BPM | DIASTOLIC BLOOD PRESSURE: 96 MMHG | SYSTOLIC BLOOD PRESSURE: 130 MMHG | HEIGHT: 60 IN | RESPIRATION RATE: 16 BRPM

## 2018-03-09 DIAGNOSIS — R06.02 SHORTNESS OF BREATH: Primary | ICD-10-CM

## 2018-03-09 DIAGNOSIS — R79.89 ABNORMAL TSH: ICD-10-CM

## 2018-03-09 DIAGNOSIS — R73.09 ELEVATED RANDOM BLOOD GLUCOSE LEVEL: ICD-10-CM

## 2018-03-09 DIAGNOSIS — F32.A DEPRESSION, UNSPECIFIED DEPRESSION TYPE: ICD-10-CM

## 2018-03-09 DIAGNOSIS — G47.00 INSOMNIA, UNSPECIFIED TYPE: ICD-10-CM

## 2018-03-09 DIAGNOSIS — Z86.73 H/O: STROKE: ICD-10-CM

## 2018-03-09 DIAGNOSIS — E66.01 MORBID OBESITY DUE TO EXCESS CALORIES: ICD-10-CM

## 2018-03-09 DIAGNOSIS — R06.02 SHORTNESS OF BREATH: ICD-10-CM

## 2018-03-09 LAB
ALBUMIN SERPL BCP-MCNC: 3.2 G/DL
ALP SERPL-CCNC: 99 U/L
ALT SERPL W/O P-5'-P-CCNC: 17 U/L
ANION GAP SERPL CALC-SCNC: 9 MMOL/L
AST SERPL-CCNC: 15 U/L
BASOPHILS # BLD AUTO: 0.05 K/UL
BASOPHILS NFR BLD: 0.5 %
BILIRUB SERPL-MCNC: 0.3 MG/DL
BUN SERPL-MCNC: 14 MG/DL
CALCIUM SERPL-MCNC: 9.7 MG/DL
CHLORIDE SERPL-SCNC: 103 MMOL/L
CO2 SERPL-SCNC: 29 MMOL/L
CREAT SERPL-MCNC: 0.9 MG/DL
DIFFERENTIAL METHOD: ABNORMAL
EOSINOPHIL # BLD AUTO: 0.1 K/UL
EOSINOPHIL NFR BLD: 1 %
ERYTHROCYTE [DISTWIDTH] IN BLOOD BY AUTOMATED COUNT: 16.4 %
EST. GFR  (AFRICAN AMERICAN): >60 ML/MIN/1.73 M^2
EST. GFR  (NON AFRICAN AMERICAN): >60 ML/MIN/1.73 M^2
GLUCOSE SERPL-MCNC: 87 MG/DL
HCT VFR BLD AUTO: 43.7 %
HGB BLD-MCNC: 13.6 G/DL
LYMPHOCYTES # BLD AUTO: 1.7 K/UL
LYMPHOCYTES NFR BLD: 16.3 %
MCH RBC QN AUTO: 25.9 PG
MCHC RBC AUTO-ENTMCNC: 31.1 G/DL
MCV RBC AUTO: 83 FL
MONOCYTES # BLD AUTO: 0.9 K/UL
MONOCYTES NFR BLD: 8.4 %
NEUTROPHILS # BLD AUTO: 7.5 K/UL
NEUTROPHILS NFR BLD: 73.7 %
PLATELET # BLD AUTO: 263 K/UL
PMV BLD AUTO: 11.4 FL
POTASSIUM SERPL-SCNC: 4.2 MMOL/L
PROT SERPL-MCNC: 6.9 G/DL
RBC # BLD AUTO: 5.26 M/UL
SODIUM SERPL-SCNC: 141 MMOL/L
T4 FREE SERPL-MCNC: 1.05 NG/DL
TSH SERPL DL<=0.005 MIU/L-ACNC: 0.87 UIU/ML
WBC # BLD AUTO: 10.14 K/UL

## 2018-03-09 PROCEDURE — 83036 HEMOGLOBIN GLYCOSYLATED A1C: CPT

## 2018-03-09 PROCEDURE — 3080F DIAST BP >= 90 MM HG: CPT | Mod: S$GLB,,, | Performed by: INTERNAL MEDICINE

## 2018-03-09 PROCEDURE — 80053 COMPREHEN METABOLIC PANEL: CPT

## 2018-03-09 PROCEDURE — 99999 PR PBB SHADOW E&M-EST. PATIENT-LVL III: CPT | Mod: PBBFAC,,, | Performed by: INTERNAL MEDICINE

## 2018-03-09 PROCEDURE — 99214 OFFICE O/P EST MOD 30 MIN: CPT | Mod: S$GLB,,, | Performed by: INTERNAL MEDICINE

## 2018-03-09 PROCEDURE — 99499 UNLISTED E&M SERVICE: CPT | Mod: S$GLB,,, | Performed by: INTERNAL MEDICINE

## 2018-03-09 PROCEDURE — 36415 COLL VENOUS BLD VENIPUNCTURE: CPT | Mod: PN

## 2018-03-09 PROCEDURE — 85025 COMPLETE CBC W/AUTO DIFF WBC: CPT

## 2018-03-09 PROCEDURE — 84439 ASSAY OF FREE THYROXINE: CPT

## 2018-03-09 PROCEDURE — 84443 ASSAY THYROID STIM HORMONE: CPT

## 2018-03-09 PROCEDURE — 3075F SYST BP GE 130 - 139MM HG: CPT | Mod: S$GLB,,, | Performed by: INTERNAL MEDICINE

## 2018-03-09 RX ORDER — ATORVASTATIN CALCIUM 40 MG/1
40 TABLET, FILM COATED ORAL DAILY
Qty: 90 TABLET | Refills: 3 | Status: SHIPPED | OUTPATIENT
Start: 2018-03-09 | End: 2018-07-16 | Stop reason: SDUPTHER

## 2018-03-09 RX ORDER — MIRTAZAPINE 7.5 MG/1
7.5 TABLET, FILM COATED ORAL NIGHTLY
Qty: 30 TABLET | Refills: 0 | Status: SHIPPED | OUTPATIENT
Start: 2018-03-09 | End: 2018-03-26 | Stop reason: ALTCHOICE

## 2018-03-09 RX ORDER — DIAZEPAM 10 MG/1
10 TABLET ORAL EVERY 12 HOURS PRN
Qty: 30 TABLET | Refills: 0 | Status: CANCELLED | OUTPATIENT
Start: 2018-03-09

## 2018-03-09 RX ORDER — ALBUTEROL SULFATE 90 UG/1
2 AEROSOL, METERED RESPIRATORY (INHALATION) EVERY 4 HOURS PRN
Qty: 6.7 G | Refills: 6 | Status: SHIPPED | OUTPATIENT
Start: 2018-03-09 | End: 2021-02-01 | Stop reason: SDUPTHER

## 2018-03-09 NOTE — PROGRESS NOTES
"Subjective:       Patient ID: Jose Manuel Boyd is a 77 y.o. female.    Chief Complaint: Fatigue; Hypertension; and Follow-up    Anxiety and fatigue    HPI: 78 y/o w/ HTN ?COPD? Presents with her aunt to discuss some problems with anxiety. Has had two ED visits within last month with multiple somatic complaints. seh relates these to mold in her home which she tried to clean with bleach. Since that time she feels more short of breath at rest and with exertion. No cough no fevers chills. No change in breathign with lyign flat. She reported legs were more swollen two weeks ago but this has resolved. She has been taking diazepam up to twice per day for last week for "my nerves". She had negative ultrasound for DVT and psychiatric evaluation while in ED did not find any signficiant threat to herself or others. Her aunt reports her home was inspected for mold and none was found. She feels this is accurate and no longer believes her home to have mold in it. She does endorse difficulty with falling and staying asleep. Of note last ED visit TSH was suppressed with normal free T4      Review of Systems   Constitutional: Positive for fatigue. Negative for activity change, appetite change, fever and unexpected weight change.   HENT: Negative for ear pain, rhinorrhea and sore throat.    Eyes: Negative for discharge and visual disturbance.   Respiratory: Positive for shortness of breath. Negative for cough, chest tightness and wheezing.    Cardiovascular: Negative for chest pain, palpitations and leg swelling.   Gastrointestinal: Negative for abdominal pain, constipation and diarrhea.   Endocrine: Negative for cold intolerance and heat intolerance.   Genitourinary: Negative for dysuria and hematuria.   Musculoskeletal: Negative for joint swelling and neck stiffness.   Skin: Negative for rash.   Neurological: Negative for dizziness, syncope, weakness and headaches.   Psychiatric/Behavioral: Positive for sleep disturbance. " Negative for suicidal ideas. The patient is nervous/anxious.        Objective:     Vitals:    03/09/18 0926   BP: (!) 130/96   BP Location: Right arm   Patient Position: Sitting   BP Method: Medium (Manual)   Pulse: 77   Resp: 16   Temp: 97.9 °F (36.6 °C)   TempSrc: Oral   SpO2: 95%   Weight: 96.6 kg (212 lb 15.4 oz)   Height: 5' (1.524 m)          Physical Exam   Constitutional: She is oriented to person, place, and time. She appears well-developed and well-nourished.   Morbidly obese. Siting SpO2 95% with ambulating 300 feet lowest SpO2 93%   HENT:   Head: Normocephalic and atraumatic.   Eyes: Conjunctivae are normal. Pupils are equal, round, and reactive to light.   Neck: Normal range of motion. No thyromegaly present.   Cardiovascular: Normal rate and regular rhythm.  Exam reveals no gallop and no friction rub.    No murmur heard.  Pulmonary/Chest: Effort normal and breath sounds normal. She has no wheezes. She has no rales.   No palpable axillary adenopathy   Abdominal: Soft. Bowel sounds are normal. There is no tenderness. There is no rebound and no guarding.   Musculoskeletal: Normal range of motion. She exhibits no edema or tenderness.   Lymphadenopathy:     She has no cervical adenopathy.   Neurological: She is alert and oriented to person, place, and time. No cranial nerve deficit.   Skin: Skin is warm and dry.   Psychiatric: She has a normal mood and affect.     CXR from 2/23 decreased penetration to left lower field (body habitus?)   Assessment:       1. Shortness of breath    2. Depression, unspecified depression type    3. Morbid obesity due to excess calories    4. Abnormal TSH    5. Elevated random blood glucose level    6. H/O: stroke    7. Insomnia, unspecified type        Plan:        1. For > 1 month she has reported history of COPD, has been using prn albuterol for long period of time check non contrast ct for interstitial changes versus pleural disease.     2. With chronic intermittent benzo  use as well as nightly antihistamine, these meds could be contributing to altered mentation, stop trial nightly mirtazipine     4. Repeat tsh and free t4 today doubt hyperthryoid but if further suppression could consider check thyroid ultrasound    5. Repeat a1c to monotire    6. On statin and full dose asa continue    7. Mirtazipine, stop antihistamine and benzos    F/u two weeks

## 2018-03-10 LAB
ESTIMATED AVG GLUCOSE: 134 MG/DL
HBA1C MFR BLD HPLC: 6.3 %

## 2018-03-13 ENCOUNTER — TELEPHONE (OUTPATIENT)
Dept: FAMILY MEDICINE | Facility: CLINIC | Age: 78
End: 2018-03-13

## 2018-03-13 NOTE — TELEPHONE ENCOUNTER
Spoke with patient regarding medication Remeron. She states she started taking medication on 3/9/18 and every time she take it; she get headache that she can't bare. Please advise.

## 2018-03-21 ENCOUNTER — TELEPHONE (OUTPATIENT)
Dept: FAMILY MEDICINE | Facility: CLINIC | Age: 78
End: 2018-03-21

## 2018-03-21 ENCOUNTER — HOSPITAL ENCOUNTER (OUTPATIENT)
Dept: RADIOLOGY | Facility: HOSPITAL | Age: 78
Discharge: HOME | End: 2018-03-21
Attending: INTERNAL MEDICINE
Payer: MEDICARE

## 2018-03-21 DIAGNOSIS — R79.89 ABNORMAL TSH: ICD-10-CM

## 2018-03-21 DIAGNOSIS — F32.A DEPRESSION, UNSPECIFIED DEPRESSION TYPE: ICD-10-CM

## 2018-03-21 DIAGNOSIS — R06.02 SHORTNESS OF BREATH: ICD-10-CM

## 2018-03-21 DIAGNOSIS — E04.1 THYROID NODULE: Primary | ICD-10-CM

## 2018-03-21 PROCEDURE — 71250 CT THORAX DX C-: CPT | Mod: 26,,, | Performed by: RADIOLOGY

## 2018-03-21 PROCEDURE — 71250 CT THORAX DX C-: CPT | Mod: TC

## 2018-03-21 PROCEDURE — 76536 US EXAM OF HEAD AND NECK: CPT | Mod: TC

## 2018-03-21 PROCEDURE — 76536 US EXAM OF HEAD AND NECK: CPT | Mod: 26,,, | Performed by: RADIOLOGY

## 2018-03-21 NOTE — TELEPHONE ENCOUNTER
Please let the patient know she has thyroid nodules, I have sent a referral to interventional radiology for a fine needle aspiration.

## 2018-03-22 RX ORDER — DIAZEPAM 10 MG/1
TABLET ORAL
Qty: 30 TABLET | Refills: 0 | OUTPATIENT
Start: 2018-03-22

## 2018-03-22 NOTE — TELEPHONE ENCOUNTER
----- Message from Paris Mckeon sent at 3/21/2018  1:45 PM CDT -----  Contact: self  Patient called stating that she wants to get back on Diazepam. Please contact her at [phone].    Thanks!

## 2018-03-26 ENCOUNTER — OFFICE VISIT (OUTPATIENT)
Dept: FAMILY MEDICINE | Facility: CLINIC | Age: 78
End: 2018-03-26
Payer: MEDICARE

## 2018-03-26 VITALS
DIASTOLIC BLOOD PRESSURE: 78 MMHG | OXYGEN SATURATION: 96 % | BODY MASS INDEX: 41.81 KG/M2 | RESPIRATION RATE: 16 BRPM | WEIGHT: 212.94 LBS | HEIGHT: 60 IN | SYSTOLIC BLOOD PRESSURE: 156 MMHG | HEART RATE: 72 BPM | TEMPERATURE: 98 F

## 2018-03-26 DIAGNOSIS — E66.01 MORBID OBESITY DUE TO EXCESS CALORIES: ICD-10-CM

## 2018-03-26 DIAGNOSIS — J44.9 CHRONIC OBSTRUCTIVE PULMONARY DISEASE, UNSPECIFIED COPD TYPE: Primary | ICD-10-CM

## 2018-03-26 DIAGNOSIS — E04.1 THYROID NODULE: ICD-10-CM

## 2018-03-26 PROCEDURE — 3078F DIAST BP <80 MM HG: CPT | Mod: CPTII,S$GLB,, | Performed by: INTERNAL MEDICINE

## 2018-03-26 PROCEDURE — 3077F SYST BP >= 140 MM HG: CPT | Mod: CPTII,S$GLB,, | Performed by: INTERNAL MEDICINE

## 2018-03-26 PROCEDURE — 99999 PR PBB SHADOW E&M-EST. PATIENT-LVL III: CPT | Mod: PBBFAC,,, | Performed by: INTERNAL MEDICINE

## 2018-03-26 PROCEDURE — 99214 OFFICE O/P EST MOD 30 MIN: CPT | Mod: S$GLB,,, | Performed by: INTERNAL MEDICINE

## 2018-03-26 PROCEDURE — 99499 UNLISTED E&M SERVICE: CPT | Mod: S$GLB,,, | Performed by: INTERNAL MEDICINE

## 2018-03-26 NOTE — PROGRESS NOTES
Subjective:       Patient ID: Jose Manuel Boyd is a 77 y.o. female.    Chief Complaint: Hypertension and Follow-up    F/u sleep    HPI: 76 y/o seen two weeks ago for disturbed sleep. Since moving in with her aunt sleep has improved significantly she is not taking remeron because it was causing her headaches. She feels breathing is back to normal. No wheezing she is not exerting herself. She is having all carpeting and curtains removed from her home with hopes this will improve her breathing. CT non contrast of chest did not show any significant masses or consolidation. She did have 1.9cm right thyroid nodule for which she is awaiting a biopsy (TSH was normal at repeat draw)       Review of Systems   Constitutional: Negative for activity change, appetite change, fatigue, fever and unexpected weight change.   HENT: Negative for ear pain, rhinorrhea and sore throat.    Eyes: Negative for discharge and visual disturbance.   Respiratory: Negative for chest tightness, shortness of breath and wheezing.    Cardiovascular: Negative for chest pain, palpitations and leg swelling.   Gastrointestinal: Negative for abdominal pain, constipation and diarrhea.   Endocrine: Negative for cold intolerance and heat intolerance.   Genitourinary: Negative for dysuria and hematuria.   Musculoskeletal: Negative for joint swelling and neck stiffness.   Skin: Negative for rash.   Neurological: Negative for dizziness, syncope, weakness and headaches.   Psychiatric/Behavioral: Negative for suicidal ideas.       Objective:     Vitals:    03/26/18 1436 03/26/18 1527   BP: (!) 170/96 (!) 156/78   BP Location: Right arm    Patient Position: Sitting    BP Method: Large (Manual)    Pulse: 78 72   Resp: 16    Temp: 97.9 °F (36.6 °C)    TempSrc: Oral    SpO2: 96%    Weight: 96.6 kg (212 lb 15.4 oz)    Height: 5' (1.524 m)           Physical Exam   Constitutional: She is oriented to person, place, and time. She appears well-developed and  well-nourished.   HENT:   Head: Normocephalic and atraumatic.   Eyes: Conjunctivae are normal. Pupils are equal, round, and reactive to light.   Neck: Normal range of motion.   Cardiovascular: Normal rate and regular rhythm.  Exam reveals no gallop and no friction rub.    No murmur heard.  Pulmonary/Chest: Effort normal and breath sounds normal. She has no wheezes. She has no rales.   Abdominal: Soft. Bowel sounds are normal. There is no tenderness. There is no rebound and no guarding.   Musculoskeletal: Normal range of motion. She exhibits no edema or tenderness.   Neurological: She is alert and oriented to person, place, and time. No cranial nerve deficit.   Skin: Skin is warm and dry.   Psychiatric: She has a normal mood and affect.       Assessment:       1. Chronic obstructive pulmonary disease, unspecified COPD type    2. Morbid obesity due to excess calories    3. Thyroid nodule        Plan:    1. Improving respiratory  Status with change in home suggests some environmental irritatant she is back to baseline and not using albuterol daily    2. The patient is asked to make an attempt to improve diet and exercise patterns to aid in medical management of this problem.    3. IR to preform FNA

## 2018-04-16 ENCOUNTER — HOSPITAL ENCOUNTER (OUTPATIENT)
Dept: INTERVENTIONAL RADIOLOGY/VASCULAR | Facility: HOSPITAL | Age: 78
Discharge: HOME | End: 2018-04-16
Attending: NURSE PRACTITIONER
Payer: MEDICARE

## 2018-04-16 DIAGNOSIS — E04.1 THYROID NODULE: ICD-10-CM

## 2018-04-16 PROCEDURE — 88173 CYTOPATH EVAL FNA REPORT: CPT | Mod: 26,,, | Performed by: PATHOLOGY

## 2018-04-16 PROCEDURE — 88172 CYTP DX EVAL FNA 1ST EA SITE: CPT | Mod: 26,,, | Performed by: PATHOLOGY

## 2018-04-16 PROCEDURE — 88172 CYTP DX EVAL FNA 1ST EA SITE: CPT | Performed by: PATHOLOGY

## 2018-04-16 PROCEDURE — 10022 IR FNA WITH ULTRASOUND: CPT

## 2018-04-16 PROCEDURE — 10022 IR FNA WITH ULTRASOUND: CPT | Mod: LT,,, | Performed by: RADIOLOGY

## 2018-04-16 PROCEDURE — 76942 ECHO GUIDE FOR BIOPSY: CPT | Mod: 26,,, | Performed by: RADIOLOGY

## 2018-04-16 NOTE — OP NOTE
[facility]  Interventional Radiology  Procedure - Outpatient    Date: 04/16/2018 Time: 10:44 AM    Pre-Op Diagnosis: Thyroid nodule, left    Post-Op Diagnosis: same    Procedure Performed by: Alex Coto MD    Assistant: none    Procedure: U/S guided FNA left thyroid nodule.    Specimen/Tissue Removed: 3 x 25 gauge FNA passes    Estimated Blood Loss: Less than 5 mL    Procedure Note/Findings: U/S guided FNA of dominant nodule left lobe performed with 3 x 25 gauge FNA passes obtained.    Please refer to dictated report for additional details.

## 2018-04-16 NOTE — DISCHARGE SUMMARY
Radiology Discharge Summary      Admit date: 4/16/2018  8:47 AM  Discharge date: April 16, 2018    Instructions Given to patient: YesVerbal    Diet: Regular    Activity:NO Restrictions    Medications on discharge (List): Refer to Discharge Medication List    Hospital Course: U/S guided FNA of left thyroid nodule    Description of Condition on Discharge: stable    Discharge Disposition: Home    Discharge Diagnosis: Left thyroid nodule    Follow up with Dr. Cooley as scheduled.

## 2018-04-16 NOTE — H&P
[facility]  History & Physical - Short Stay  Interventional Radiology    SUBJECTIVE:     Chief Complaint/Reason for Admission: Left lobe thyroid nodule    Informant(s):  self and Electronic Health Record    History of Present Illness:  Jose Manuel Boyd is a 77 y.o. female with a history of multinodular thyroid with dominant left lobe nodule.    Patient presents for U/S guided thyroid nodule FNA.    Scheduled Meds:   Continuous Infusions:   PRN Meds:     Review of patient's allergies indicates:   Allergen Reactions    Codeine      Other reaction(s): Rash    Propranolol Other (See Comments)     Burning sensation of scalp/skin       Past Medical History:   Diagnosis Date    Asthma     Depression     Hypercholesterolemia     Hypertension      Past Surgical History:   Procedure Laterality Date    CHOLECYSTECTOMY      HYSTERECTOMY      knee repalcement       Family History   Problem Relation Age of Onset    Diabetes Mother     Hypertension Mother     Kidney disease Mother     Heart disease Father      Social History   Substance Use Topics    Smoking status: Never Smoker    Smokeless tobacco: Never Used    Alcohol use No        Review of Systems:  ROS not obtained.    OBJECTIVE:     Vital Signs (Most Recent):       Physical Exam:  Alert and oriented    Laboratory  CBC:   Lab Results   Component Value Date/Time    WBC 10.14 03/09/2018 10:57 AM    RBC 5.26 03/09/2018 10:57 AM    HGB 13.6 03/09/2018 10:57 AM    HCT 43.7 03/09/2018 10:57 AM    HCT 48 10/16/2017 07:27 PM     03/09/2018 10:57 AM    MCV 83 03/09/2018 10:57 AM    MCH 25.9 (L) 03/09/2018 10:57 AM    MCHC 31.1 (L) 03/09/2018 10:57 AM         ASSESSMENT/PLAN:     Left lobe thyroid nodule.    Patient will undergo U/S guided thyroid FNA.    Sedation Plan: 1% lidocaine local anesthesia

## 2018-04-18 ENCOUNTER — TELEPHONE (OUTPATIENT)
Dept: FAMILY MEDICINE | Facility: CLINIC | Age: 78
End: 2018-04-18

## 2018-04-18 NOTE — TELEPHONE ENCOUNTER
Patient contacted and ID confirmed by name and     Reviewed thyroid pathology result no atypical cells no indication for further treatment all questions answered

## 2018-05-14 DIAGNOSIS — K21.9 GASTROESOPHAGEAL REFLUX DISEASE, ESOPHAGITIS PRESENCE NOT SPECIFIED: ICD-10-CM

## 2018-05-14 RX ORDER — OMEPRAZOLE 20 MG/1
CAPSULE, DELAYED RELEASE ORAL
Qty: 90 CAPSULE | Refills: 3 | Status: SHIPPED | OUTPATIENT
Start: 2018-05-14 | End: 2019-04-02 | Stop reason: CLARIF

## 2018-05-18 ENCOUNTER — LAB VISIT (OUTPATIENT)
Dept: LAB | Facility: HOSPITAL | Age: 78
End: 2018-05-18
Attending: FAMILY MEDICINE
Payer: MEDICARE

## 2018-05-18 ENCOUNTER — OFFICE VISIT (OUTPATIENT)
Dept: FAMILY MEDICINE | Facility: CLINIC | Age: 78
End: 2018-05-18
Payer: MEDICARE

## 2018-05-18 VITALS
BODY MASS INDEX: 41.07 KG/M2 | HEIGHT: 60 IN | TEMPERATURE: 98 F | OXYGEN SATURATION: 96 % | WEIGHT: 209.19 LBS | DIASTOLIC BLOOD PRESSURE: 78 MMHG | HEART RATE: 78 BPM | SYSTOLIC BLOOD PRESSURE: 140 MMHG | RESPIRATION RATE: 16 BRPM

## 2018-05-18 DIAGNOSIS — M54.50 ACUTE BILATERAL LOW BACK PAIN WITHOUT SCIATICA: ICD-10-CM

## 2018-05-18 DIAGNOSIS — R10.30 LOWER ABDOMINAL PAIN: Primary | ICD-10-CM

## 2018-05-18 DIAGNOSIS — R10.30 LOWER ABDOMINAL PAIN: ICD-10-CM

## 2018-05-18 LAB
ALBUMIN SERPL BCP-MCNC: 3.7 G/DL
ALP SERPL-CCNC: 116 U/L
ALT SERPL W/O P-5'-P-CCNC: 15 U/L
ANION GAP SERPL CALC-SCNC: 9 MMOL/L
AST SERPL-CCNC: 16 U/L
BASOPHILS # BLD AUTO: 0.06 K/UL
BASOPHILS NFR BLD: 0.5 %
BILIRUB SERPL-MCNC: 0.4 MG/DL
BUN SERPL-MCNC: 14 MG/DL
CALCIUM SERPL-MCNC: 10.3 MG/DL
CHLORIDE SERPL-SCNC: 98 MMOL/L
CO2 SERPL-SCNC: 31 MMOL/L
CREAT SERPL-MCNC: 0.8 MG/DL
DIFFERENTIAL METHOD: ABNORMAL
EOSINOPHIL # BLD AUTO: 0.1 K/UL
EOSINOPHIL NFR BLD: 0.7 %
ERYTHROCYTE [DISTWIDTH] IN BLOOD BY AUTOMATED COUNT: 16.4 %
EST. GFR  (AFRICAN AMERICAN): >60 ML/MIN/1.73 M^2
EST. GFR  (NON AFRICAN AMERICAN): >60 ML/MIN/1.73 M^2
GLUCOSE SERPL-MCNC: 62 MG/DL
HCT VFR BLD AUTO: 42.1 %
HGB BLD-MCNC: 13.6 G/DL
LYMPHOCYTES # BLD AUTO: 1.7 K/UL
LYMPHOCYTES NFR BLD: 13.7 %
MCH RBC QN AUTO: 26.1 PG
MCHC RBC AUTO-ENTMCNC: 32.3 G/DL
MCV RBC AUTO: 81 FL
MONOCYTES # BLD AUTO: 1.3 K/UL
MONOCYTES NFR BLD: 10.3 %
NEUTROPHILS # BLD AUTO: 9.3 K/UL
NEUTROPHILS NFR BLD: 74.4 %
PLATELET # BLD AUTO: 284 K/UL
PMV BLD AUTO: 11.5 FL
POTASSIUM SERPL-SCNC: 4.6 MMOL/L
PROT SERPL-MCNC: 7.4 G/DL
RBC # BLD AUTO: 5.22 M/UL
SODIUM SERPL-SCNC: 138 MMOL/L
WBC # BLD AUTO: 12.54 K/UL

## 2018-05-18 PROCEDURE — 3078F DIAST BP <80 MM HG: CPT | Mod: CPTII,S$GLB,, | Performed by: FAMILY MEDICINE

## 2018-05-18 PROCEDURE — 36415 COLL VENOUS BLD VENIPUNCTURE: CPT | Mod: PN

## 2018-05-18 PROCEDURE — 99999 PR PBB SHADOW E&M-EST. PATIENT-LVL III: CPT | Mod: PBBFAC,,, | Performed by: FAMILY MEDICINE

## 2018-05-18 PROCEDURE — 87086 URINE CULTURE/COLONY COUNT: CPT

## 2018-05-18 PROCEDURE — 80053 COMPREHEN METABOLIC PANEL: CPT

## 2018-05-18 PROCEDURE — 99214 OFFICE O/P EST MOD 30 MIN: CPT | Mod: S$GLB,,, | Performed by: FAMILY MEDICINE

## 2018-05-18 PROCEDURE — 85025 COMPLETE CBC W/AUTO DIFF WBC: CPT

## 2018-05-18 PROCEDURE — 3077F SYST BP >= 140 MM HG: CPT | Mod: CPTII,S$GLB,, | Performed by: FAMILY MEDICINE

## 2018-05-18 RX ORDER — NAPROXEN 375 MG/1
375 TABLET ORAL 2 TIMES DAILY WITH MEALS
Qty: 15 TABLET | Refills: 0 | Status: SHIPPED | OUTPATIENT
Start: 2018-05-18 | End: 2019-02-22

## 2018-05-20 LAB — BACTERIA UR CULT: NORMAL

## 2018-05-20 NOTE — PROGRESS NOTES
Routine Office Visit    Patient Name: Jose Manuel Boyd    : 1940  MRN: [id number]    Subjective:  Jose Manuel is a 77 y.o. female who presents today for:   Chief Complaint   Patient presents with    Back Pain    Abdominal Pain       77-year-old female comes in with complaint 4-5 days of lower abdominal pain, and low back pain she reports that she is not sure what started the pain.  She denies any burning with urination, fevers, chills, blood in the urine or stools, constipation, diarrhea, nausea or vomiting.  She states that yesterday she took an Aleve and it helped with both pains.  She denies any radiation of the back pain into her legs.  She denies any numbness in her inner thighs.    Past Medical History  Past Medical History:   Diagnosis Date    Asthma     Depression     Hypercholesterolemia     Hypertension        Past Surgical History  Past Surgical History:   Procedure Laterality Date    CHOLECYSTECTOMY      HYSTERECTOMY      knee repalcement          Family History  Family History   Problem Relation Age of Onset    Diabetes Mother     Hypertension Mother     Kidney disease Mother     Heart disease Father        Social History  Social History     Social History    Marital status:      Spouse name: N/A    Number of children: N/A    Years of education: N/A     Occupational History    Not on file.     Social History Main Topics    Smoking status: Never Smoker    Smokeless tobacco: Never Used    Alcohol use No    Drug use: No    Sexual activity: Not on file     Other Topics Concern    Not on file     Social History Narrative    No narrative on file       Current Medications  Current Outpatient Prescriptions on File Prior to Visit   Medication Sig Dispense Refill    albuterol (VENTOLIN HFA) 90 mcg/actuation inhaler Inhale 2 puffs into the lungs every 4 (four) hours as needed for Wheezing. 6.7 g 6    aspirin 325 MG tablet Take 1 tablet (325 mg total) by mouth once daily. 1  Bottle 0    atorvastatin (LIPITOR) 40 MG tablet Take 1 tablet (40 mg total) by mouth once daily. 90 tablet 3    epinastine 0.05 % ophthalmic solution Place 1 drop into both eyes 2 (two) times daily.  0    fluticasone (FLONASE) 50 mcg/actuation nasal spray 1 spray by Each Nare route 2 (two) times daily. 1 Bottle 1    FLUZONE HIGH-DOSE 2017-18, PF, 180 mcg/0.5 mL vaccine       omeprazole (PRILOSEC) 20 MG capsule take 1 capsule by mouth once daily 90 capsule 3    triamterene-hydrochlorothiazide 37.5-25 mg (DYAZIDE) 37.5-25 mg per capsule Take 1 capsule by mouth 2 (two) times daily. 180 capsule 1     No current facility-administered medications on file prior to visit.        Allergies   Review of patient's allergies indicates:   Allergen Reactions    Codeine      Other reaction(s): Rash    Propranolol Other (See Comments)     Burning sensation of scalp/skin       Review of Systems   Gastrointestinal: Positive for abdominal pain. Negative for blood in stool, constipation, diarrhea, nausea and vomiting.   Genitourinary: Negative for dysuria.   Musculoskeletal: Positive for back pain. Negative for gait problem.   Neurological: Negative for numbness.         BP (!) 140/78 (BP Location: Left arm, Patient Position: Sitting, BP Method: Medium (Manual))   Pulse 78   Temp 98 °F (36.7 °C) (Oral)   Resp 16   Ht 5' (1.524 m)   Wt 94.9 kg (209 lb 3.5 oz)   SpO2 96%   BMI 40.86 kg/m²     Physical Exam   Constitutional: She appears well-developed and well-nourished.   HENT:   Head: Normocephalic and atraumatic.   Right Ear: External ear normal.   Left Ear: External ear normal.   Nose: Nose normal.   Mouth/Throat: Oropharynx is clear and moist. No oropharyngeal exudate.   Eyes: Conjunctivae and EOM are normal. Pupils are equal, round, and reactive to light. Right eye exhibits no discharge. Left eye exhibits no discharge.   Neck: Normal range of motion. Neck supple. No tracheal deviation present.   Cardiovascular: Normal  rate, regular rhythm, normal heart sounds and intact distal pulses.    No murmur heard.  Pulmonary/Chest: Effort normal and breath sounds normal. She has no wheezes. She has no rales.   Abdominal: Soft. Bowel sounds are normal. She exhibits no mass. There is no tenderness.   Lymphadenopathy:     She has no cervical adenopathy.   Psychiatric: She has a normal mood and affect.   Vitals reviewed.      Assessment/Plan:  Jose Manuel was seen today for back pain and abdominal pain.    Diagnoses and all orders for this visit:    Lower abdominal pain  -     Comprehensive metabolic panel; Future  -     POCT URINE DIPSTICK WITH MICROSCOPE, AUTOMATED  -     CBC auto differential; Future  -     Urine culture  Urine dipstick in office was negative.  Will send for urine culture however.  Check labs for further evaluation.  Patient leave as it has helped with the abdominal pain and not the back pain.  Reevaluate next week.    Acute bilateral low back pain without sciatica  -     POCT URINE DIPSTICK WITH MICROSCOPE, AUTOMATED  -     naproxen (EC-NAPROSYN) 375 MG TbEC EC tablet; Take 1 tablet (375 mg total) by mouth 2 (two) times daily with meals.  As above

## 2018-05-22 ENCOUNTER — OFFICE VISIT (OUTPATIENT)
Dept: FAMILY MEDICINE | Facility: CLINIC | Age: 78
End: 2018-05-22
Payer: MEDICARE

## 2018-05-22 VITALS
HEART RATE: 81 BPM | WEIGHT: 203.94 LBS | SYSTOLIC BLOOD PRESSURE: 138 MMHG | BODY MASS INDEX: 40.04 KG/M2 | TEMPERATURE: 98 F | RESPIRATION RATE: 18 BRPM | DIASTOLIC BLOOD PRESSURE: 90 MMHG | HEIGHT: 60 IN | OXYGEN SATURATION: 97 %

## 2018-05-22 DIAGNOSIS — R63.4 ABNORMAL WEIGHT LOSS: ICD-10-CM

## 2018-05-22 DIAGNOSIS — R10.819 ABDOMINAL TENDERNESS WITHOUT REBOUND TENDERNESS, UNSPECIFIED LOCATION: ICD-10-CM

## 2018-05-22 DIAGNOSIS — R10.30 LOWER ABDOMINAL PAIN: Primary | ICD-10-CM

## 2018-05-22 LAB
BILIRUB UR QL STRIP: NEGATIVE
CLARITY UR: CLEAR
COLOR UR: YELLOW
GLUCOSE UR QL STRIP: NEGATIVE
HGB UR QL STRIP: NEGATIVE
KETONES UR QL STRIP: NEGATIVE
LEUKOCYTE ESTERASE UR QL STRIP: NEGATIVE
MICROSCOPIC COMMENT: NORMAL
NITRITE UR QL STRIP: NEGATIVE
PH UR STRIP: 7 [PH] (ref 5–8)
PROT UR QL STRIP: NEGATIVE
RBC #/AREA URNS HPF: 1 /HPF (ref 0–4)
SP GR UR STRIP: 1.01 (ref 1–1.03)
SQUAMOUS #/AREA URNS HPF: 2 /HPF
URN SPEC COLLECT METH UR: NORMAL
UROBILINOGEN UR STRIP-ACNC: NEGATIVE EU/DL

## 2018-05-22 PROCEDURE — 81000 URINALYSIS NONAUTO W/SCOPE: CPT

## 2018-05-22 PROCEDURE — 99214 OFFICE O/P EST MOD 30 MIN: CPT | Mod: S$GLB,,, | Performed by: FAMILY MEDICINE

## 2018-05-22 PROCEDURE — 99999 PR PBB SHADOW E&M-EST. PATIENT-LVL IV: CPT | Mod: PBBFAC,,, | Performed by: FAMILY MEDICINE

## 2018-05-22 PROCEDURE — 3080F DIAST BP >= 90 MM HG: CPT | Mod: CPTII,S$GLB,, | Performed by: FAMILY MEDICINE

## 2018-05-22 PROCEDURE — 3075F SYST BP GE 130 - 139MM HG: CPT | Mod: CPTII,S$GLB,, | Performed by: FAMILY MEDICINE

## 2018-05-23 ENCOUNTER — TELEPHONE (OUTPATIENT)
Dept: FAMILY MEDICINE | Facility: CLINIC | Age: 78
End: 2018-05-23

## 2018-05-23 ENCOUNTER — HOSPITAL ENCOUNTER (OUTPATIENT)
Dept: RADIOLOGY | Facility: HOSPITAL | Age: 78
Discharge: HOME | End: 2018-05-23
Attending: FAMILY MEDICINE
Payer: MEDICARE

## 2018-05-23 DIAGNOSIS — R63.4 ABNORMAL WEIGHT LOSS: ICD-10-CM

## 2018-05-23 DIAGNOSIS — R10.819 ABDOMINAL TENDERNESS WITHOUT REBOUND TENDERNESS, UNSPECIFIED LOCATION: ICD-10-CM

## 2018-05-23 DIAGNOSIS — R10.30 LOWER ABDOMINAL PAIN: ICD-10-CM

## 2018-05-23 DIAGNOSIS — K57.32 SIGMOID DIVERTICULITIS: Primary | ICD-10-CM

## 2018-05-23 PROCEDURE — 74177 CT ABD & PELVIS W/CONTRAST: CPT | Mod: TC

## 2018-05-23 PROCEDURE — 25500020 PHARM REV CODE 255: Performed by: FAMILY MEDICINE

## 2018-05-23 PROCEDURE — 74177 CT ABD & PELVIS W/CONTRAST: CPT | Mod: 26,,, | Performed by: RADIOLOGY

## 2018-05-23 RX ORDER — AMOXICILLIN AND CLAVULANATE POTASSIUM 500; 125 MG/1; MG/1
1 TABLET, FILM COATED ORAL 3 TIMES DAILY
Qty: 21 TABLET | Refills: 0 | Status: SHIPPED | OUTPATIENT
Start: 2018-05-23 | End: 2018-05-30

## 2018-05-23 RX ADMIN — IOHEXOL 80 ML: 350 INJECTION, SOLUTION INTRAVENOUS at 03:05

## 2018-05-23 RX ADMIN — IOHEXOL 15 ML: 300 INJECTION, SOLUTION INTRAVENOUS at 03:05

## 2018-05-23 NOTE — PROGRESS NOTES
Routine Office Visit    Patient Name: Jose Manuel Boyd    : 1940  MRN: [id number]    Subjective:  Jose Manuel is a 77 y.o. female who presents today for:   Chief Complaint   Patient presents with    Abdominal discomfort       77 year old female comes in for evaluation of continued abdominal discomfort. She states that she feels a not in her lower abdomen. Furthermore it feels like it is turning of her. She reports that it feels like the not is turning on itself and that she has a lot of rumbling sensation.     Past Medical History  Past Medical History:   Diagnosis Date    Asthma     Depression     Hypercholesterolemia     Hypertension        Past Surgical History  Past Surgical History:   Procedure Laterality Date    CHOLECYSTECTOMY      HYSTERECTOMY      knee repalcement          Family History  Family History   Problem Relation Age of Onset    Diabetes Mother     Hypertension Mother     Kidney disease Mother     Heart disease Father        Social History  Social History     Social History    Marital status:      Spouse name: N/A    Number of children: N/A    Years of education: N/A     Occupational History    Not on file.     Social History Main Topics    Smoking status: Never Smoker    Smokeless tobacco: Never Used    Alcohol use No    Drug use: No    Sexual activity: Not on file     Other Topics Concern    Not on file     Social History Narrative    No narrative on file       Current Medications  Current Outpatient Prescriptions on File Prior to Visit   Medication Sig Dispense Refill    albuterol (VENTOLIN HFA) 90 mcg/actuation inhaler Inhale 2 puffs into the lungs every 4 (four) hours as needed for Wheezing. 6.7 g 6    aspirin 325 MG tablet Take 1 tablet (325 mg total) by mouth once daily. 1 Bottle 0    atorvastatin (LIPITOR) 40 MG tablet Take 1 tablet (40 mg total) by mouth once daily. 90 tablet 3    epinastine 0.05 % ophthalmic solution Place 1 drop into both eyes 2  (two) times daily.  0    fluticasone (FLONASE) 50 mcg/actuation nasal spray 1 spray by Each Nare route 2 (two) times daily. 1 Bottle 1    FLUZONE HIGH-DOSE 2017-18, PF, 180 mcg/0.5 mL vaccine       naproxen (EC-NAPROSYN) 375 MG TbEC EC tablet Take 1 tablet (375 mg total) by mouth 2 (two) times daily with meals. 15 tablet 0    omeprazole (PRILOSEC) 20 MG capsule take 1 capsule by mouth once daily 90 capsule 3    triamterene-hydrochlorothiazide 37.5-25 mg (DYAZIDE) 37.5-25 mg per capsule Take 1 capsule by mouth 2 (two) times daily. 180 capsule 1     No current facility-administered medications on file prior to visit.        Allergies   Review of patient's allergies indicates:   Allergen Reactions    Codeine      Other reaction(s): Rash    Propranolol Other (See Comments)     Burning sensation of scalp/skin       Review of Systems   Constitutional: Positive for fatigue.   Respiratory: Negative for cough.    Gastrointestinal: Positive for abdominal pain. Negative for blood in stool, constipation, diarrhea and nausea.   Genitourinary: Negative for dysuria.       BP (!) 138/90 (BP Location: Right arm, Patient Position: Sitting, BP Method: Medium (Manual))   Pulse 81   Temp 98.2 °F (36.8 °C) (Oral)   Resp 18   Ht 5' (1.524 m)   Wt 92.5 kg (203 lb 14.8 oz)   SpO2 97%   BMI 39.83 kg/m²     Physical Exam   Constitutional: She is active and cooperative.   Cardiovascular: Normal rate, S1 normal and S2 normal.    Pulmonary/Chest: No tachypnea and no bradypnea. She has no wheezes. She has no rhonchi. She has no rales.   Abdominal: Soft. Bowel sounds are normal. She exhibits distension. She exhibits no abdominal bruit and no pulsatile midline mass. There is tenderness in the suprapubic area. There is no rigidity, no rebound, no CVA tenderness, no tenderness at McBurney's point and negative Doty's sign.   Vitals reviewed.      Assessment/Plan:  Jose Manuel was seen today for abdominal discomfort.    Diagnoses and all  orders for this visit:    Lower abdominal pain  -     URINALYSIS  -     Urinalysis Microscopic  -     Ambulatory referral to Gastroenterology  -     CT Abdomen Pelvis With Contrast; Future    Abdominal tenderness without rebound tenderness, unspecified location  -     URINALYSIS  -     Urinalysis Microscopic  -     Ambulatory referral to Gastroenterology  -     CT Abdomen Pelvis With Contrast; Future    Abnormal weight loss  -     Ambulatory referral to Gastroenterology  -     CT Abdomen Pelvis With Contrast; Future      Review of her record shows that her weight has dropped 9 lbs in under 2 months. Given reported symptoms and physical exam finding, will send for urgent CT abdomen and pelvis to evaluate for any masses.  Patient also referred to GI for urgent evaluation.  Discussed with patient concern for a mass, potentially a malignant mass.   Patient agrees to do CT scan and then follow up in the office.

## 2018-05-24 NOTE — TELEPHONE ENCOUNTER
Patient was informed via phone yesterday of CT scan findings and need to start antibiotic.   She reports no penicillin allergy. As such will start on Augment and patient agrees to come in for office visit on Friday for reevaluation.    Advised if any acute worsening or severe abdominal pain to call 911. Patient expresses understanding

## 2018-05-25 ENCOUNTER — OFFICE VISIT (OUTPATIENT)
Dept: FAMILY MEDICINE | Facility: CLINIC | Age: 78
End: 2018-05-25
Payer: MEDICARE

## 2018-05-25 VITALS
HEART RATE: 80 BPM | SYSTOLIC BLOOD PRESSURE: 146 MMHG | DIASTOLIC BLOOD PRESSURE: 82 MMHG | RESPIRATION RATE: 17 BRPM | BODY MASS INDEX: 40.21 KG/M2 | HEIGHT: 60 IN | TEMPERATURE: 98 F | OXYGEN SATURATION: 96 % | WEIGHT: 204.81 LBS

## 2018-05-25 DIAGNOSIS — K63.0 ABSCESS OF SIGMOID COLON: Primary | ICD-10-CM

## 2018-05-25 DIAGNOSIS — K57.32 SIGMOID DIVERTICULITIS: ICD-10-CM

## 2018-05-25 PROCEDURE — 3077F SYST BP >= 140 MM HG: CPT | Mod: CPTII,S$GLB,, | Performed by: FAMILY MEDICINE

## 2018-05-25 PROCEDURE — 99999 PR PBB SHADOW E&M-EST. PATIENT-LVL III: CPT | Mod: PBBFAC,,, | Performed by: FAMILY MEDICINE

## 2018-05-25 PROCEDURE — 3079F DIAST BP 80-89 MM HG: CPT | Mod: CPTII,S$GLB,, | Performed by: FAMILY MEDICINE

## 2018-05-25 PROCEDURE — 99214 OFFICE O/P EST MOD 30 MIN: CPT | Mod: S$GLB,,, | Performed by: FAMILY MEDICINE

## 2018-05-25 RX ORDER — ONDANSETRON 4 MG/1
4 TABLET, ORALLY DISINTEGRATING ORAL EVERY 8 HOURS PRN
Qty: 15 TABLET | Refills: 0 | Status: SHIPPED | OUTPATIENT
Start: 2018-05-25 | End: 2019-12-10

## 2018-05-25 NOTE — PROGRESS NOTES
Routine Office Visit    Patient Name: Jose Manuel Boyd    : 1940  MRN: [id number]    Subjective:  Jose Manuel is a 77 y.o. female who presents today for:   Chief Complaint   Patient presents with    Follow-up     divirticulitis     Fatigue    Anorexia       77 year old female comes in for follow up on diverticulitis. She reports that she started tammie antibiotic and felt better yesterday compared to Wed, and today feels better compared to yesterday. She is still having loss of appetite however, and some difficulty with sleep. Pain is improving. One episode of nausea yesterday, but no vomiting. No fever or chills. No chest pain or shortness of breath. No urinary symptoms. Today, she reports that she did have an episode of diverticulitis a few years ago, which at that time required hospitalization.     Past Medical History  Past Medical History:   Diagnosis Date    Asthma     Depression     Hypercholesterolemia     Hypertension        Past Surgical History  Past Surgical History:   Procedure Laterality Date    CHOLECYSTECTOMY      HYSTERECTOMY      knee repalcement          Family History  Family History   Problem Relation Age of Onset    Diabetes Mother     Hypertension Mother     Kidney disease Mother     Heart disease Father        Social History  Social History     Social History    Marital status:      Spouse name: N/A    Number of children: N/A    Years of education: N/A     Occupational History    Not on file.     Social History Main Topics    Smoking status: Never Smoker    Smokeless tobacco: Never Used    Alcohol use No    Drug use: No    Sexual activity: Not on file     Other Topics Concern    Not on file     Social History Narrative    No narrative on file       Current Medications  Current Outpatient Prescriptions on File Prior to Visit   Medication Sig Dispense Refill    albuterol (VENTOLIN HFA) 90 mcg/actuation inhaler Inhale 2 puffs into the lungs every 4 (four) hours  as needed for Wheezing. 6.7 g 6    amoxicillin-clavulanate 500-125mg (AUGMENTIN) 500-125 mg Tab Take 1 tablet (500 mg total) by mouth 3 (three) times daily. 21 tablet 0    aspirin 325 MG tablet Take 1 tablet (325 mg total) by mouth once daily. 1 Bottle 0    atorvastatin (LIPITOR) 40 MG tablet Take 1 tablet (40 mg total) by mouth once daily. 90 tablet 3    epinastine 0.05 % ophthalmic solution Place 1 drop into both eyes 2 (two) times daily.  0    fluticasone (FLONASE) 50 mcg/actuation nasal spray 1 spray by Each Nare route 2 (two) times daily. 1 Bottle 1    FLUZONE HIGH-DOSE 2017-18, PF, 180 mcg/0.5 mL vaccine       naproxen (EC-NAPROSYN) 375 MG TbEC EC tablet Take 1 tablet (375 mg total) by mouth 2 (two) times daily with meals. 15 tablet 0    omeprazole (PRILOSEC) 20 MG capsule take 1 capsule by mouth once daily 90 capsule 3    triamterene-hydrochlorothiazide 37.5-25 mg (DYAZIDE) 37.5-25 mg per capsule Take 1 capsule by mouth 2 (two) times daily. 180 capsule 1     No current facility-administered medications on file prior to visit.        Allergies   Review of patient's allergies indicates:   Allergen Reactions    Codeine      Other reaction(s): Rash    Propranolol Other (See Comments)     Burning sensation of scalp/skin       Review of Systems   Constitutional: Positive for fatigue. Negative for chills and fever.   Respiratory: Negative for cough.    Cardiovascular: Negative for chest pain.   Gastrointestinal: Positive for abdominal pain. Negative for blood in stool, constipation, diarrhea and nausea.   Genitourinary: Negative for dysuria.       BP (!) 146/82 (BP Location: Right arm, Patient Position: Sitting, BP Method: Medium (Manual))   Pulse 80   Temp 98.1 °F (36.7 °C) (Oral)   Resp 17   Ht 5' (1.524 m)   Wt 92.9 kg (204 lb 12.9 oz)   SpO2 96%   BMI 40.00 kg/m²     Physical Exam   Constitutional: She is active and cooperative.   Cardiovascular: Normal rate, S1 normal, S2 normal, normal heart  sounds and intact distal pulses.    Pulmonary/Chest: No tachypnea and no bradypnea. She has no wheezes. She has no rhonchi. She has no rales.   Abdominal: Soft. Bowel sounds are normal. She exhibits distension. She exhibits no abdominal bruit and no pulsatile midline mass. There is tenderness in the suprapubic area. There is no rigidity, no rebound, no CVA tenderness, no tenderness at McBurney's point and negative Doty's sign.   Vitals reviewed.      Assessment/Plan:  Jose Manuel was seen today for follow-up, fatigue and anorexia.    Diagnoses and all orders for this visit:    Abscess of sigmoid colon  -     Ambulatory referral to Colorectal Surgery  -     ondansetron (ZOFRAN-ODT) 4 MG TbDL; Take 1 tablet (4 mg total) by mouth every 8 (eight) hours as needed (nausea).  CT scan results reviewed with patient.  Will refer to colorectal for surgery.  Appointment made for Monday.  Advised patient if any fevers, chills, starts having more nausea, or blood in stools, or pain intensifies to call 911, and patient states she will.  Continue antibiotic.     Sigmoid diverticulitis  -     ondansetron (ZOFRAN-ODT) 4 MG TbDL; Take 1 tablet (4 mg total) by mouth every 8 (eight) hours as needed (nausea).  See above

## 2018-05-27 ENCOUNTER — HOSPITAL ENCOUNTER (EMERGENCY)
Facility: HOSPITAL | Age: 78
Discharge: HOME | End: 2018-05-27
Attending: EMERGENCY MEDICINE
Payer: MEDICARE

## 2018-05-27 VITALS
HEIGHT: 60 IN | TEMPERATURE: 98 F | SYSTOLIC BLOOD PRESSURE: 124 MMHG | WEIGHT: 190 LBS | HEART RATE: 68 BPM | DIASTOLIC BLOOD PRESSURE: 59 MMHG | RESPIRATION RATE: 20 BRPM | BODY MASS INDEX: 37.3 KG/M2 | OXYGEN SATURATION: 96 %

## 2018-05-27 DIAGNOSIS — R51.9 ACUTE NONINTRACTABLE HEADACHE, UNSPECIFIED HEADACHE TYPE: Primary | ICD-10-CM

## 2018-05-27 DIAGNOSIS — E87.6 HYPOKALEMIA: ICD-10-CM

## 2018-05-27 LAB
ALBUMIN SERPL BCP-MCNC: 3.1 G/DL
ALP SERPL-CCNC: 117 U/L
ALT SERPL W/O P-5'-P-CCNC: 15 U/L
ANION GAP SERPL CALC-SCNC: 10 MMOL/L
AST SERPL-CCNC: 16 U/L
BASOPHILS # BLD AUTO: 0.07 K/UL
BASOPHILS NFR BLD: 0.6 %
BILIRUB SERPL-MCNC: 0.4 MG/DL
BILIRUB UR QL STRIP: NEGATIVE
BUN SERPL-MCNC: 12 MG/DL
BUN SERPL-MCNC: 14 MG/DL (ref 6–30)
CALCIUM SERPL-MCNC: 9.6 MG/DL
CHLORIDE SERPL-SCNC: 102 MMOL/L
CHLORIDE SERPL-SCNC: 98 MMOL/L (ref 95–110)
CLARITY UR REFRACT.AUTO: CLEAR
CO2 SERPL-SCNC: 27 MMOL/L
COLOR UR AUTO: NORMAL
CREAT SERPL-MCNC: 0.8 MG/DL
CREAT SERPL-MCNC: 0.8 MG/DL (ref 0.5–1.4)
DIFFERENTIAL METHOD: ABNORMAL
EOSINOPHIL # BLD AUTO: 0.1 K/UL
EOSINOPHIL NFR BLD: 0.6 %
ERYTHROCYTE [DISTWIDTH] IN BLOOD BY AUTOMATED COUNT: 15.5 %
EST. GFR  (AFRICAN AMERICAN): >60 ML/MIN/1.73 M^2
EST. GFR  (NON AFRICAN AMERICAN): >60 ML/MIN/1.73 M^2
GLUCOSE SERPL-MCNC: 163 MG/DL
GLUCOSE SERPL-MCNC: 164 MG/DL (ref 70–110)
GLUCOSE UR QL STRIP: NEGATIVE
HCT VFR BLD AUTO: 40 %
HCT VFR BLD CALC: 41 %PCV (ref 36–54)
HGB BLD-MCNC: 12.5 G/DL
HGB UR QL STRIP: NEGATIVE
IMM GRANULOCYTES # BLD AUTO: 0.04 K/UL
IMM GRANULOCYTES NFR BLD AUTO: 0.3 %
KETONES UR QL STRIP: NEGATIVE
LEUKOCYTE ESTERASE UR QL STRIP: NEGATIVE
LYMPHOCYTES # BLD AUTO: 1.2 K/UL
LYMPHOCYTES NFR BLD: 10.4 %
MCH RBC QN AUTO: 26 PG
MCHC RBC AUTO-ENTMCNC: 31.3 G/DL
MCV RBC AUTO: 83 FL
MONOCYTES # BLD AUTO: 1 K/UL
MONOCYTES NFR BLD: 8.1 %
NEUTROPHILS # BLD AUTO: 9.4 K/UL
NEUTROPHILS NFR BLD: 80 %
NITRITE UR QL STRIP: NEGATIVE
NRBC BLD-RTO: 0 /100 WBC
PH UR STRIP: 7 [PH] (ref 5–8)
PLATELET # BLD AUTO: 248 K/UL
PMV BLD AUTO: 11.3 FL
POC IONIZED CALCIUM: 1.09 MMOL/L (ref 1.06–1.42)
POC TCO2 (MEASURED): 32 MMOL/L (ref 23–29)
POTASSIUM BLD-SCNC: 3.1 MMOL/L (ref 3.5–5.1)
POTASSIUM SERPL-SCNC: 3.2 MMOL/L
PROT SERPL-MCNC: 6.9 G/DL
PROT UR QL STRIP: NEGATIVE
RBC # BLD AUTO: 4.8 M/UL
SAMPLE: ABNORMAL
SODIUM BLD-SCNC: 140 MMOL/L (ref 136–145)
SODIUM SERPL-SCNC: 139 MMOL/L
SP GR UR STRIP: 1 (ref 1–1.03)
URN SPEC COLLECT METH UR: NORMAL
UROBILINOGEN UR STRIP-ACNC: NEGATIVE EU/DL
WBC # BLD AUTO: 11.7 K/UL

## 2018-05-27 PROCEDURE — 96374 THER/PROPH/DIAG INJ IV PUSH: CPT

## 2018-05-27 PROCEDURE — 25000003 PHARM REV CODE 250: Performed by: EMERGENCY MEDICINE

## 2018-05-27 PROCEDURE — 96361 HYDRATE IV INFUSION ADD-ON: CPT

## 2018-05-27 PROCEDURE — 99284 EMERGENCY DEPT VISIT MOD MDM: CPT | Mod: ,,, | Performed by: EMERGENCY MEDICINE

## 2018-05-27 PROCEDURE — 81003 URINALYSIS AUTO W/O SCOPE: CPT

## 2018-05-27 PROCEDURE — 99284 EMERGENCY DEPT VISIT MOD MDM: CPT | Mod: 25

## 2018-05-27 PROCEDURE — 85025 COMPLETE CBC W/AUTO DIFF WBC: CPT

## 2018-05-27 PROCEDURE — 63600175 PHARM REV CODE 636 W HCPCS: Performed by: EMERGENCY MEDICINE

## 2018-05-27 PROCEDURE — 96375 TX/PRO/DX INJ NEW DRUG ADDON: CPT

## 2018-05-27 PROCEDURE — 80053 COMPREHEN METABOLIC PANEL: CPT

## 2018-05-27 RX ORDER — DIPHENHYDRAMINE HYDROCHLORIDE 50 MG/ML
12.5 INJECTION INTRAMUSCULAR; INTRAVENOUS
Status: COMPLETED | OUTPATIENT
Start: 2018-05-27 | End: 2018-05-27

## 2018-05-27 RX ORDER — POTASSIUM CHLORIDE 20 MEQ/15ML
40 SOLUTION ORAL
Status: COMPLETED | OUTPATIENT
Start: 2018-05-27 | End: 2018-05-27

## 2018-05-27 RX ORDER — ACETAMINOPHEN 325 MG/1
650 TABLET ORAL
Status: COMPLETED | OUTPATIENT
Start: 2018-05-27 | End: 2018-05-27

## 2018-05-27 RX ORDER — METOCLOPRAMIDE HYDROCHLORIDE 5 MG/ML
5 INJECTION INTRAMUSCULAR; INTRAVENOUS
Status: COMPLETED | OUTPATIENT
Start: 2018-05-27 | End: 2018-05-27

## 2018-05-27 RX ADMIN — POTASSIUM CHLORIDE 40 MEQ: 20 SOLUTION ORAL at 05:05

## 2018-05-27 RX ADMIN — SODIUM CHLORIDE 500 ML: 9 INJECTION, SOLUTION INTRAVENOUS at 04:05

## 2018-05-27 RX ADMIN — DIPHENHYDRAMINE HYDROCHLORIDE 12.5 MG: 50 INJECTION, SOLUTION INTRAMUSCULAR; INTRAVENOUS at 04:05

## 2018-05-27 RX ADMIN — ACETAMINOPHEN 650 MG: 325 TABLET ORAL at 04:05

## 2018-05-27 RX ADMIN — METOCLOPRAMIDE 5 MG: 5 INJECTION, SOLUTION INTRAMUSCULAR; INTRAVENOUS at 05:05

## 2018-05-27 NOTE — ED PROVIDER NOTES
Encounter Date: 5/27/2018    SCRIBE #1 NOTE: I, Danitza Stearns , am scribing for, and in the presence of,  Dr. Das. I have scribed the entire note.       History     Chief Complaint   Patient presents with    Headache     my head been hurting , i'm on antibiotics for diverticulitis, i have to keep putting cold rags on my head     Time seen by provider: 4:24 PM    This is a 77 y.o. female with pmhx HLD, HTN, depression, asthma, diverticulosis, CVA, obesity presents with complaint of acute headache x4 hours. Non-thunderclap in onset. Associated with chills as onset. Pain is located at the top of her head with radiation to generalized scalp. She reported seeing dark spots in her vision and lightheadedness upon standing while at [employer] earlier today. Pt states she gets headaches regularly however symptoms were not resolved by taking Excedrin as usual. She is currently on antibiotics for diverticulitis. No fever. No pain in eyes, ear, or mouth.       The history is provided by the patient and medical records.     Review of patient's allergies indicates:   Allergen Reactions    Codeine      Other reaction(s): Rash    Propranolol Other (See Comments)     Burning sensation of scalp/skin     Past Medical History:   Diagnosis Date    Asthma     Depression     Hypercholesterolemia     Hypertension      Past Surgical History:   Procedure Laterality Date    CHOLECYSTECTOMY      HYSTERECTOMY      knee repalcement       Family History   Problem Relation Age of Onset    Diabetes Mother     Hypertension Mother     Kidney disease Mother     Heart disease Father      Social History   Substance Use Topics    Smoking status: Never Smoker    Smokeless tobacco: Never Used    Alcohol use No     Review of Systems   Constitutional: Positive for chills. Negative for fever.   HENT: Negative for ear pain.    Eyes: Positive for visual disturbance.   Respiratory: Negative for shortness of breath.    Cardiovascular: Negative  for chest pain.   Gastrointestinal: Negative for diarrhea.   Genitourinary: Negative for dysuria.   Musculoskeletal: Negative for back pain.   Skin: Negative for rash.   Neurological: Positive for light-headedness and headaches.       Physical Exam     Initial Vitals [05/27/18 1545]   BP Pulse Resp Temp SpO2   (!) 152/68 91 18 98.2 °F (36.8 °C) 96 %      MAP       96         Physical Exam    Nursing note and vitals reviewed.  Constitutional: She appears well-developed and well-nourished. She is not diaphoretic. No distress.   HENT:   Head: Normocephalic and atraumatic.   Right Ear: Tympanic membrane normal.   Left Ear: Tympanic membrane normal.   Mouth/Throat: Oropharynx is clear and moist.   Tenderness to palpation along the occiput. No tenderness to percussion on the sinuses. No active dental infection.     Eyes: EOM are normal. Right eye exhibits no discharge. Left eye exhibits no discharge.   Neck: Normal range of motion. Neck supple.   No meningeal signs.    Cardiovascular: Normal rate, regular rhythm, normal heart sounds and intact distal pulses. Exam reveals no gallop and no friction rub.    No murmur heard.  Pulmonary/Chest: Breath sounds normal. No respiratory distress.   Abdominal: Soft. Bowel sounds are normal. She exhibits no distension. There is guarding (Voluntary). There is no rebound.   Lower abdomen tenderness to palpation.    Musculoskeletal: Normal range of motion. She exhibits no tenderness.   Neurological: She is alert and oriented to person, place, and time. She has normal strength. No cranial nerve deficit or sensory deficit. Coordination normal. GCS eye subscore is 4. GCS verbal subscore is 5. GCS motor subscore is 6.   Cranial nerves 2 through 12 intact. No pronator drift bilaterally. Finger to nose normal bilaterally.    Skin: Skin is warm and dry. Capillary refill takes less than 2 seconds.   No rashes or wound on scalp.    Psychiatric: Her affect is labile.   Tearful             ED Course    Procedures  Labs Reviewed   CBC W/ AUTO DIFFERENTIAL - Abnormal; Notable for the following:        Result Value    MCH 26.0 (*)     MCHC 31.3 (*)     RDW 15.5 (*)     Gran # (ANC) 9.4 (*)     Gran% 80.0 (*)     Lymph% 10.4 (*)     All other components within normal limits   COMPREHENSIVE METABOLIC PANEL - Abnormal; Notable for the following:     Potassium 3.2 (*)     Glucose 163 (*)     Albumin 3.1 (*)     All other components within normal limits   ISTAT PROCEDURE - Abnormal; Notable for the following:     POC Glucose 164 (*)     POC Potassium 3.1 (*)     POC TCO2 (MEASURED) 32 (*)     All other components within normal limits   URINALYSIS, REFLEX TO URINE CULTURE    Narrative:     Preferred Collection Type->Urine, Clean Catch             Medical Decision Making:   History:   Old Medical Records: I decided to obtain old medical records.  Initial Assessment:   77 y.o. female presents with 4 hours of occipital headache which is reproducible on palpation. Pt is neurologically intact and well appearing, I doubt subarachnoid hemorrhage. No fever or meningeal signs to suggest meningitis. Brain tumor is possible but less likely given the short time course.     Will administer migraine cocktail, obtain labs, and CT head. Monitor response to treatment.    Reassessment:   UA clear. Labs without leucocytosis or anemia. CMP with mild hypokalemia which was replaced by mouth. On reassessment, pt reports feeling improved. Ambulating independently thoughout ED without difficulty.  CT head is clear. She reports feeling improved and appears well. Provided with extensive return precautions. Follow up with PCP.  Independently Interpreted Test(s):   I have ordered and independently interpreted X-rays - see prior notes.  Clinical Tests:   Lab Tests: Ordered and Reviewed  Radiological Study: Ordered and Reviewed    Imaging Results          CT Head Without Contrast (Final result)  Result time 05/27/18 20:39:43    Final result by Gerald  PRERNA Reyez MD (05/27/18 20:39:43)                 Impression:      No acute intracranial abnormality.    Senescent changes as above.    Electronically signed by resident: Jamie Shay  Date:    05/27/2018  Time:    20:26    Electronically signed by: Gerald Reyez MD  Date:    05/27/2018  Time:    20:39             Narrative:    EXAMINATION:  CT HEAD WITHOUT CONTRAST    CLINICAL HISTORY:  Headache, acute, severe, thunderclap, worst HA of life;    TECHNIQUE:  Low dose axial images were obtained through the head.  Coronal and sagittal reformations were also performed. Contrast was not administered.    COMPARISON:  CT head without contrast 08/24/2017    FINDINGS:  Ventricles are appropriate in size and configuration.    Brain parenchyma demonstrates generalized cerebral volume loss with compensatory sulcal prominence.  Mild periventricular hypoattenuation suggests chronic microvascular ischemic change.  Incidental note is made of a cavum septum pellucidum.  No mass lesion, parenchymal hemorrhage, or evidence for acute major vascular distribution infarction.    No extra-axial hemorrhage or abnormal fluid collections.    Osseous calvarium appears intact.  Visualized paranasal sinuses and mastoid air cells are essentially clear.                                        Scribe Attestation:   Scribe #1: I performed the above scribed service and the documentation accurately describes the services I performed. I attest to the accuracy of the note.    Attending Attestation:           Physician Attestation for Scribe:      Comments: I, Dr. Barron Das, personally performed the services described in this documentation. All medical record entries made by the scribe were at my direction and in my presence.  I have reviewed the chart and agree that the record reflects my personal performance and is accurate and complete. Barron Das MD.  10:23 PM 05/27/2018                 Clinical Impression:   The primary encounter diagnosis  was Acute nonintractable headache, unspecified headache type. A diagnosis of Hypokalemia was also pertinent to this visit.    Disposition:   Disposition: Discharged  Condition: Stable                        Barron Das MD  05/27/18 7103

## 2018-05-27 NOTE — ED NOTES
To recliner area to obtain urine speci,  IV fluids and wait for CT scan. Transported per ER tech/ w/c.

## 2018-05-28 ENCOUNTER — OFFICE VISIT (OUTPATIENT)
Dept: SURGERY | Facility: CLINIC | Age: 78
End: 2018-05-28
Payer: MEDICARE

## 2018-05-28 VITALS
WEIGHT: 209.19 LBS | HEART RATE: 70 BPM | SYSTOLIC BLOOD PRESSURE: 155 MMHG | DIASTOLIC BLOOD PRESSURE: 84 MMHG | BODY MASS INDEX: 41.07 KG/M2 | HEIGHT: 60 IN

## 2018-05-28 DIAGNOSIS — K57.32 DIVERTICULITIS OF COLON: Primary | ICD-10-CM

## 2018-05-28 PROCEDURE — 3079F DIAST BP 80-89 MM HG: CPT | Mod: CPTII,S$GLB,, | Performed by: COLON & RECTAL SURGERY

## 2018-05-28 PROCEDURE — 3077F SYST BP >= 140 MM HG: CPT | Mod: CPTII,S$GLB,, | Performed by: COLON & RECTAL SURGERY

## 2018-05-28 PROCEDURE — 99999 PR PBB SHADOW E&M-EST. PATIENT-LVL III: CPT | Mod: PBBFAC,,, | Performed by: COLON & RECTAL SURGERY

## 2018-05-28 PROCEDURE — 99203 OFFICE O/P NEW LOW 30 MIN: CPT | Mod: S$GLB,,, | Performed by: COLON & RECTAL SURGERY

## 2018-05-28 NOTE — PROGRESS NOTES
Subjective:       Patient ID: Jose Manuel Boyd is a 77 y.o. female.    Chief Complaint: No chief complaint on file.    HPI  77 to F referred following recent episode of diverticulitis.        She presented to her PCPs office on 5/22/2018 with complaints of lower abdominal pain.  CT scan was done the following day  which revealed sigmoid diverticulitis.  She is being treated with a course of Augmentin that we'll finish in 2 days.  She continues to have some lower abdominal pain.  She was also in the emergency room estradiol with severe headaches.  She denies fevers or chills.  She denies nausea or vomiting.  She is eating well.    CT A/P 5/23/18: (Images personally reviewed.)  -Findings suggestive of sigmoid colon diverticulitis with possible small (2.7 x 1.4 cm) abscess along the wall of the sigmoid colon.  Note that this is not amenable to percutaneous drainage due to its close proximity/involvement of the wall of the sigmoid colon.  -Small hiatal hernia.  -Right inguinal fluid collection likely representing a small lymphocele, stable when compared to prior examinations.    Last colonoscopy was done more than 3 years ago at [facility].  She has had a prior hospitalization in the distant past for diverticulitis.    No family hx of CRC or IBD.      Review of patient's allergies indicates:   Allergen Reactions    Codeine      Other reaction(s): Rash    Propranolol Other (See Comments)     Burning sensation of scalp/skin       Past Medical History:   Diagnosis Date    Asthma     Depression     Hypercholesterolemia     Hypertension        Past Surgical History:   Procedure Laterality Date    CHOLECYSTECTOMY      HYSTERECTOMY      knee repalcement         Current Outpatient Prescriptions   Medication Sig Dispense Refill    albuterol (VENTOLIN HFA) 90 mcg/actuation inhaler Inhale 2 puffs into the lungs every 4 (four) hours as needed for Wheezing. 6.7 g 6    aspirin 325 MG tablet Take 1 tablet  (325 mg total) by mouth once daily. 1 Bottle 0    atorvastatin (LIPITOR) 40 MG tablet Take 1 tablet (40 mg total) by mouth once daily. 90 tablet 3    epinastine 0.05 % ophthalmic solution Place 1 drop into both eyes 2 (two) times daily.  0    fluticasone (FLONASE) 50 mcg/actuation nasal spray 1 spray by Each Nare route 2 (two) times daily. 1 Bottle 1    FLUZONE HIGH-DOSE 2017-18, PF, 180 mcg/0.5 mL vaccine       naproxen (EC-NAPROSYN) 375 MG TbEC EC tablet Take 1 tablet (375 mg total) by mouth 2 (two) times daily with meals. 15 tablet 0    omeprazole (PRILOSEC) 20 MG capsule take 1 capsule by mouth once daily 90 capsule 3    ondansetron (ZOFRAN-ODT) 4 MG TbDL Take 1 tablet (4 mg total) by mouth every 8 (eight) hours as needed (nausea). 15 tablet 0    triamterene-hydrochlorothiazide 37.5-25 mg (DYAZIDE) 37.5-25 mg per capsule Take 1 capsule by mouth 2 (two) times daily. 180 capsule 1     No current facility-administered medications for this visit.        Family History   Problem Relation Age of Onset    Diabetes Mother     Hypertension Mother     Kidney disease Mother     Heart disease Father        Social History     Social History    Marital status:      Spouse name: N/A    Number of children: N/A    Years of education: N/A     Social History Main Topics    Smoking status: Never Smoker    Smokeless tobacco: Never Used    Alcohol use No    Drug use: No    Sexual activity: Not Asked     Other Topics Concern    None     Social History Narrative    None       Review of Systems   Constitutional: Negative for chills and fever.   HENT: Negative for congestion and sore throat.    Eyes: Negative for visual disturbance.   Respiratory: Negative for cough and shortness of breath.    Cardiovascular: Negative for chest pain and palpitations.   Gastrointestinal: Positive for abdominal pain. Negative for abdominal distention, anal bleeding, blood in stool, constipation, diarrhea, nausea, rectal pain  and vomiting.   Endocrine: Negative for cold intolerance and heat intolerance.   Genitourinary: Negative for dysuria and frequency.   Musculoskeletal: Negative for arthralgias, back pain and neck pain.   Skin: Negative for rash.   Allergic/Immunologic: Negative for immunocompromised state.   Neurological: Positive for headaches. Negative for dizziness and light-headedness.   Hematological: Does not bruise/bleed easily.   Psychiatric/Behavioral: Negative for confusion. The patient is not nervous/anxious.        Objective:      Physical Exam   Constitutional: She is oriented to person, place, and time. She appears well-developed and well-nourished.   HENT:   Head: Normocephalic.   Pulmonary/Chest: Effort normal. No respiratory distress.   Abdominal: Soft. Bowel sounds are normal. She exhibits no distension and no mass. There is tenderness (mild, lower abdominal ). There is no rebound and no guarding. No hernia.   Musculoskeletal: Normal range of motion.   Neurological: She is alert and oriented to person, place, and time.   Skin: Skin is warm and dry.   Psychiatric: She has a normal mood and affect.         Lab Results   Component Value Date    WBC 11.70 05/27/2018    HGB 12.5 05/27/2018    HCT 41 05/27/2018    MCV 83 05/27/2018     05/27/2018     BMP  Lab Results   Component Value Date     05/27/2018    K 3.2 (L) 05/27/2018     05/27/2018    CO2 27 05/27/2018    BUN 12 05/27/2018    CREATININE 0.8 05/27/2018    CALCIUM 9.6 05/27/2018    ANIONGAP 10 05/27/2018    ESTGFRAFRICA >60.0 05/27/2018    EGFRNONAA >60.0 05/27/2018     CMP  Sodium   Date Value Ref Range Status   05/27/2018 139 136 - 145 mmol/L Final     Potassium   Date Value Ref Range Status   05/27/2018 3.2 (L) 3.5 - 5.1 mmol/L Final     Chloride   Date Value Ref Range Status   05/27/2018 102 95 - 110 mmol/L Final     CO2   Date Value Ref Range Status   05/27/2018 27 23 - 29 mmol/L Final     Glucose   Date Value Ref Range Status    05/27/2018 163 (H) 70 - 110 mg/dL Final     BUN, Bld   Date Value Ref Range Status   05/27/2018 12 8 - 23 mg/dL Final     Creatinine   Date Value Ref Range Status   05/27/2018 0.8 0.5 - 1.4 mg/dL Final     Calcium   Date Value Ref Range Status   05/27/2018 9.6 8.7 - 10.5 mg/dL Final     Total Protein   Date Value Ref Range Status   05/27/2018 6.9 6.0 - 8.4 g/dL Final     Albumin   Date Value Ref Range Status   05/27/2018 3.1 (L) 3.5 - 5.2 g/dL Final     Total Bilirubin   Date Value Ref Range Status   05/27/2018 0.4 0.1 - 1.0 mg/dL Final     Comment:     For infants and newborns, interpretation of results should be based  on gestational age, weight and in agreement with clinical  observations.  Premature Infant recommended reference ranges:  Up to 24 hours.............<8.0 mg/dL  Up to 48 hours............<12.0 mg/dL  3-5 days..................<15.0 mg/dL  6-29 days.................<15.0 mg/dL       Alkaline Phosphatase   Date Value Ref Range Status   05/27/2018 117 55 - 135 U/L Final     AST   Date Value Ref Range Status   05/27/2018 16 10 - 40 U/L Final     ALT   Date Value Ref Range Status   05/27/2018 15 10 - 44 U/L Final     Anion Gap   Date Value Ref Range Status   05/27/2018 10 8 - 16 mmol/L Final     eGFR if    Date Value Ref Range Status   05/27/2018 >60.0 >60 mL/min/1.73 m^2 Final     eGFR if non    Date Value Ref Range Status   05/27/2018 >60.0 >60 mL/min/1.73 m^2 Final     Comment:     Calculation used to obtain the estimated glomerular filtration  rate (eGFR) is the CKD-EPI equation.        No results found for: CEA        Assessment:       1. Diverticulitis of colon        Plan:   Given that she still is having some abdominal pain, I'd like to see her back in a week after she's completed antibiotics.  If she still having abdominal pain at that point we may consider repeating her CT scan given that she did have a small pericolonic abscess.  She'll need a colonoscopy 6-8  weeks from resolution of symptoms.  I counseled her regarding a low fiber diet for now which will be transitioned to a high-fiber diet once this acute episode has resolved.    Alexandru Mukherjee MD, FACS, FASCRS  Staff Surgeon  Department of Colon & Rectal Surgery

## 2018-05-28 NOTE — LETTER
June 4, 2018      Cam Mendez Jr., MD  [address] [facility]  [place] [address]           [facility]  [address]  [facility] [address]  Phone: [phone]          Patient: Jose Manuel Boyd   MR Number: [id number]   YOB: 1940   Date of Visit: 5/28/2018       Dear Dr. Cam Mendez Jr.:    Thank you for referring Jose Manuel Boyd to me for evaluation. Attached you will find relevant portions of my assessment and plan of care.    If you have questions, please do not hesitate to call me. I look forward to following Jose Manuel Boyd along with you.    Sincerely,    Alexandru Mukherjee MD    Enclosure  CC:  No Recipients    If you would like to receive this communication electronically, please contact [email or web address] or [phone] to request more information on [employer] Link access.    For providers and/or their staff who would like to refer a patient to [facility], please contact us through our one-stop-shop provider referral line, [facility], at [phone].    If you feel you have received this communication in error or would no longer like to receive these types of communications, please e-mail [email or web address]

## 2018-05-29 ENCOUNTER — TELEPHONE (OUTPATIENT)
Dept: ADMINISTRATIVE | Facility: HOSPITAL | Age: 78
End: 2018-05-29

## 2018-06-04 ENCOUNTER — OFFICE VISIT (OUTPATIENT)
Dept: SURGERY | Facility: CLINIC | Age: 78
End: 2018-06-04
Payer: MEDICARE

## 2018-06-04 VITALS
SYSTOLIC BLOOD PRESSURE: 193 MMHG | HEIGHT: 60 IN | DIASTOLIC BLOOD PRESSURE: 88 MMHG | BODY MASS INDEX: 39.78 KG/M2 | WEIGHT: 202.63 LBS | HEART RATE: 80 BPM

## 2018-06-04 DIAGNOSIS — R10.84 GENERALIZED ABDOMINAL PAIN: ICD-10-CM

## 2018-06-04 DIAGNOSIS — K57.32 DIVERTICULITIS OF COLON: Primary | ICD-10-CM

## 2018-06-04 PROCEDURE — 3077F SYST BP >= 140 MM HG: CPT | Mod: CPTII,S$GLB,, | Performed by: COLON & RECTAL SURGERY

## 2018-06-04 PROCEDURE — 99999 PR PBB SHADOW E&M-EST. PATIENT-LVL III: CPT | Mod: PBBFAC,,, | Performed by: COLON & RECTAL SURGERY

## 2018-06-04 PROCEDURE — 3079F DIAST BP 80-89 MM HG: CPT | Mod: CPTII,S$GLB,, | Performed by: COLON & RECTAL SURGERY

## 2018-06-04 PROCEDURE — 99213 OFFICE O/P EST LOW 20 MIN: CPT | Mod: S$GLB,,, | Performed by: COLON & RECTAL SURGERY

## 2018-06-04 NOTE — LETTER
June 11, 2018      Ajit Piña MD  [address]  [place] [address]           [facility]  [address]  [facility] [address]  Phone: [phone]          Patient: Jose Manuel Boyd   MR Number: [id number]   YOB: 1940   Date of Visit: 6/4/2018       Dear No ref. provider found:    Thank you for referring Jose Manuel Boyd to me for evaluation. Attached you will find relevant portions of my assessment and plan of care.    If you have questions, please do not hesitate to call me. I look forward to following Jose Manuel Boyd along with you.    Sincerely,    Alexandru Mukherjee MD    Enclosure  CC:  Cam Mendez Jr., MD    If you would like to receive this communication electronically, please contact [email or web address] or [phone] to request more information on [employer] Link access.    For providers and/or their staff who would like to refer a patient to [facility], please contact us through our one-stop-shop provider referral line, [facility], at [phone].    If you feel you have received this communication in error or would no longer like to receive these types of communications, please e-mail [email or web address]

## 2018-06-09 ENCOUNTER — HOSPITAL ENCOUNTER (OUTPATIENT)
Dept: RADIOLOGY | Facility: HOSPITAL | Age: 78
Discharge: HOME | End: 2018-06-09
Attending: COLON & RECTAL SURGERY
Payer: MEDICARE

## 2018-06-09 DIAGNOSIS — K57.32 DIVERTICULITIS OF COLON: ICD-10-CM

## 2018-06-09 DIAGNOSIS — R10.84 GENERALIZED ABDOMINAL PAIN: ICD-10-CM

## 2018-06-09 PROCEDURE — 74177 CT ABD & PELVIS W/CONTRAST: CPT | Mod: TC

## 2018-06-09 PROCEDURE — 74177 CT ABD & PELVIS W/CONTRAST: CPT | Mod: 26,,, | Performed by: RADIOLOGY

## 2018-06-09 PROCEDURE — 25500020 PHARM REV CODE 255: Performed by: COLON & RECTAL SURGERY

## 2018-06-09 RX ADMIN — IOHEXOL 100 ML: 350 INJECTION, SOLUTION INTRAVENOUS at 12:06

## 2018-06-09 RX ADMIN — IOHEXOL 15 ML: 350 INJECTION, SOLUTION INTRAVENOUS at 12:06

## 2018-06-11 NOTE — PROGRESS NOTES
Subjective:       Patient ID: Jose Manuel Boyd is a 77 y.o. female.    Chief Complaint: Follow-up and Diverticulitis    HPI  77 to F referred following recent episode of diverticulitis.      She presented to her PCPs office on 5/22/2018 with complaints of lower abdominal pain.  CT scan was done the following day which revealed sigmoid diverticulitis.  I initially saw her on 05/28/2018.  At that time she was being treated with a course of Augmentin that had 2 days left.  She continued to have some lower abdominal pain.  She was also in the emergency room the day prior with severe headaches.  She denied fevers or chills.  She denied nausea or vomiting.  She was eating well.    CT A/P 5/23/18: (Images personally reviewed.)  -Findings suggestive of sigmoid colon diverticulitis with possible small (2.7 x 1.4 cm) abscess along the wall of the sigmoid colon.  Note that this is not amenable to percutaneous drainage due to its close proximity/involvement of the wall of the sigmoid colon.  -Small hiatal hernia.  -Right inguinal fluid collection likely representing a small lymphocele, stable when compared to prior examinations.    Last colonoscopy was done more than 3 years ago at [facility].  She has had a prior hospitalization in the distant past for diverticulitis.    No family hx of CRC or IBD.    At that time she was still having some moderate abdominal discomfort on examination/stir see me back in a week after she completed her course of antibiotics.    She returns today for follow-up.  She continues to have abdominal pain in the lower portion of her abdomen. She denies any fevers or chills.  Says she is eating well but is losing weight.  She denies nausea or vomiting.    Review of patient's allergies indicates:   Allergen Reactions    Codeine      Other reaction(s): Rash    Propranolol Other (See Comments)     Burning sensation of scalp/skin       Past Medical History:   Diagnosis Date    Asthma      Depression     Hypercholesterolemia     Hypertension        Past Surgical History:   Procedure Laterality Date    CHOLECYSTECTOMY      HYSTERECTOMY      knee repalcement         Current Outpatient Prescriptions   Medication Sig Dispense Refill    albuterol (VENTOLIN HFA) 90 mcg/actuation inhaler Inhale 2 puffs into the lungs every 4 (four) hours as needed for Wheezing. 6.7 g 6    aspirin 325 MG tablet Take 1 tablet (325 mg total) by mouth once daily. 1 Bottle 0    atorvastatin (LIPITOR) 40 MG tablet Take 1 tablet (40 mg total) by mouth once daily. 90 tablet 3    epinastine 0.05 % ophthalmic solution Place 1 drop into both eyes 2 (two) times daily.  0    fluticasone (FLONASE) 50 mcg/actuation nasal spray 1 spray by Each Nare route 2 (two) times daily. 1 Bottle 1    FLUZONE HIGH-DOSE 2017-18, PF, 180 mcg/0.5 mL vaccine       naproxen (EC-NAPROSYN) 375 MG TbEC EC tablet Take 1 tablet (375 mg total) by mouth 2 (two) times daily with meals. 15 tablet 0    omeprazole (PRILOSEC) 20 MG capsule take 1 capsule by mouth once daily 90 capsule 3    ondansetron (ZOFRAN-ODT) 4 MG TbDL Take 1 tablet (4 mg total) by mouth every 8 (eight) hours as needed (nausea). 15 tablet 0    triamterene-hydrochlorothiazide 37.5-25 mg (DYAZIDE) 37.5-25 mg per capsule Take 1 capsule by mouth 2 (two) times daily. 180 capsule 1     No current facility-administered medications for this visit.        Family History   Problem Relation Age of Onset    Diabetes Mother     Hypertension Mother     Kidney disease Mother     Heart disease Father        Social History     Social History    Marital status:      Spouse name: N/A    Number of children: N/A    Years of education: N/A     Social History Main Topics    Smoking status: Never Smoker    Smokeless tobacco: Never Used    Alcohol use No    Drug use: No    Sexual activity: Not Asked     Other Topics Concern    None     Social History Narrative    None       Review of  Systems   Constitutional: Negative for chills and fever.   HENT: Negative for congestion and sore throat.    Eyes: Negative for visual disturbance.   Respiratory: Negative for cough and shortness of breath.    Cardiovascular: Negative for chest pain and palpitations.   Gastrointestinal: Positive for abdominal pain. Negative for abdominal distention, anal bleeding, blood in stool, constipation, diarrhea, nausea, rectal pain and vomiting.   Endocrine: Negative for cold intolerance and heat intolerance.   Genitourinary: Negative for dysuria and frequency.   Musculoskeletal: Negative for arthralgias, back pain and neck pain.   Skin: Negative for rash.   Allergic/Immunologic: Negative for immunocompromised state.   Neurological: Positive for headaches. Negative for dizziness and light-headedness.   Hematological: Does not bruise/bleed easily.   Psychiatric/Behavioral: Negative for confusion. The patient is not nervous/anxious.        Objective:      Physical Exam   Constitutional: She is oriented to person, place, and time. She appears well-developed and well-nourished.   HENT:   Head: Normocephalic.   Pulmonary/Chest: Effort normal. No respiratory distress.   Abdominal: Soft. Bowel sounds are normal. She exhibits no distension and no mass. There is tenderness (mild, lower abdominal ). There is no rebound and no guarding. No hernia.   Musculoskeletal: Normal range of motion.   Neurological: She is alert and oriented to person, place, and time.   Skin: Skin is warm and dry.   Psychiatric: She has a normal mood and affect.         Lab Results   Component Value Date    WBC 11.08 06/04/2018    HGB 13.8 06/04/2018    HCT 44.6 06/04/2018    MCV 84 06/04/2018     06/04/2018     BMP  Lab Results   Component Value Date     06/04/2018    K 3.8 06/04/2018    CL 95 06/04/2018    CO2 29 06/04/2018    BUN 9 06/04/2018    CREATININE 0.7 06/04/2018    CALCIUM 10.1 06/04/2018    ANIONGAP 12 06/04/2018    ESTGFRAFRICA >60.0  06/04/2018    EGFRNONAA >60.0 06/04/2018     CMP  Sodium   Date Value Ref Range Status   06/04/2018 136 136 - 145 mmol/L Final     Potassium   Date Value Ref Range Status   06/04/2018 3.8 3.5 - 5.1 mmol/L Final     Chloride   Date Value Ref Range Status   06/04/2018 95 95 - 110 mmol/L Final     CO2   Date Value Ref Range Status   06/04/2018 29 23 - 29 mmol/L Final     Glucose   Date Value Ref Range Status   06/04/2018 105 70 - 110 mg/dL Final     BUN, Bld   Date Value Ref Range Status   06/04/2018 9 8 - 23 mg/dL Final     Creatinine   Date Value Ref Range Status   06/04/2018 0.7 0.5 - 1.4 mg/dL Final     Calcium   Date Value Ref Range Status   06/04/2018 10.1 8.7 - 10.5 mg/dL Final     Total Protein   Date Value Ref Range Status   06/04/2018 7.4 6.0 - 8.4 g/dL Final     Albumin   Date Value Ref Range Status   06/04/2018 3.3 (L) 3.5 - 5.2 g/dL Final     Total Bilirubin   Date Value Ref Range Status   06/04/2018 0.4 0.1 - 1.0 mg/dL Final     Comment:     For infants and newborns, interpretation of results should be based  on gestational age, weight and in agreement with clinical  observations.  Premature Infant recommended reference ranges:  Up to 24 hours.............<8.0 mg/dL  Up to 48 hours............<12.0 mg/dL  3-5 days..................<15.0 mg/dL  6-29 days.................<15.0 mg/dL       Alkaline Phosphatase   Date Value Ref Range Status   06/04/2018 136 (H) 55 - 135 U/L Final     AST   Date Value Ref Range Status   06/04/2018 15 10 - 40 U/L Final     ALT   Date Value Ref Range Status   06/04/2018 13 10 - 44 U/L Final     Anion Gap   Date Value Ref Range Status   06/04/2018 12 8 - 16 mmol/L Final     eGFR if    Date Value Ref Range Status   06/04/2018 >60.0 >60 mL/min/1.73 m^2 Final     eGFR if non    Date Value Ref Range Status   06/04/2018 >60.0 >60 mL/min/1.73 m^2 Final     Comment:     Calculation used to obtain the estimated glomerular filtration  rate (eGFR) is the  CKD-EPI equation.        No results found for: CEA        Assessment:       1. Diverticulitis of colon    2. Generalized abdominal pain        Plan:   Given that she still is having some abdominal pain after completing her course of antibiotics I would like to repeat her CT scan to be sure that the pericolonic abscess is not worsening.  I have also ordered blood work, including a CBC and a CMP.  I told her to continue with a low residue diet for the time being.    She'll need a colonoscopy 6-8 weeks from resolution of symptoms.      Alexandru Mukherjee MD, FACS, FASCRS  Staff Surgeon  Department of Colon & Rectal Surgery

## 2018-06-12 ENCOUNTER — OFFICE VISIT (OUTPATIENT)
Dept: FAMILY MEDICINE | Facility: CLINIC | Age: 78
End: 2018-06-12
Payer: MEDICARE

## 2018-06-12 VITALS
DIASTOLIC BLOOD PRESSURE: 78 MMHG | HEART RATE: 88 BPM | BODY MASS INDEX: 39.14 KG/M2 | SYSTOLIC BLOOD PRESSURE: 118 MMHG | OXYGEN SATURATION: 96 % | RESPIRATION RATE: 16 BRPM | WEIGHT: 199.38 LBS | HEIGHT: 60 IN | TEMPERATURE: 98 F

## 2018-06-12 DIAGNOSIS — K57.32 SIGMOID DIVERTICULITIS: Primary | ICD-10-CM

## 2018-06-12 DIAGNOSIS — G47.00 INSOMNIA, UNSPECIFIED TYPE: ICD-10-CM

## 2018-06-12 PROCEDURE — 99214 OFFICE O/P EST MOD 30 MIN: CPT | Mod: S$GLB,,, | Performed by: FAMILY MEDICINE

## 2018-06-12 PROCEDURE — 3074F SYST BP LT 130 MM HG: CPT | Mod: CPTII,S$GLB,, | Performed by: FAMILY MEDICINE

## 2018-06-12 PROCEDURE — 99999 PR PBB SHADOW E&M-EST. PATIENT-LVL III: CPT | Mod: PBBFAC,,, | Performed by: FAMILY MEDICINE

## 2018-06-12 PROCEDURE — 3078F DIAST BP <80 MM HG: CPT | Mod: CPTII,S$GLB,, | Performed by: FAMILY MEDICINE

## 2018-06-12 RX ORDER — TRAZODONE HYDROCHLORIDE 50 MG/1
50 TABLET ORAL NIGHTLY
Qty: 30 TABLET | Refills: 3 | Status: SHIPPED | OUTPATIENT
Start: 2018-06-12 | End: 2018-07-10

## 2018-06-12 NOTE — PROGRESS NOTES
Routine Office Visit    Patient Name: Jose Manuel Boyd    : 1940  MRN: [id number]    Subjective:  Jose Manuel is a 77 y.o. female who presents today for:   Chief Complaint   Patient presents with    Diverticulitis       77 year old female comes in for follow up on diverticulitis. Reports that all her abdominal symptoms have resolved and she is back to a normal diet. Moving bowels normally. Urinating normally. She is feeling her normal self.    Only concern is problems falling and staying asleep at night. States this has been for a long time. Would like to try something that is not habit forming.    Past Medical History  Past Medical History:   Diagnosis Date    Asthma     Depression     Hypercholesterolemia     Hypertension        Past Surgical History  Past Surgical History:   Procedure Laterality Date    CHOLECYSTECTOMY      HYSTERECTOMY      knee repalcement          Family History  Family History   Problem Relation Age of Onset    Diabetes Mother     Hypertension Mother     Kidney disease Mother     Heart disease Father        Social History  Social History     Social History    Marital status:      Spouse name: N/A    Number of children: N/A    Years of education: N/A     Occupational History    Not on file.     Social History Main Topics    Smoking status: Never Smoker    Smokeless tobacco: Never Used    Alcohol use No    Drug use: No    Sexual activity: Not on file     Other Topics Concern    Not on file     Social History Narrative    No narrative on file       Current Medications  Current Outpatient Prescriptions on File Prior to Visit   Medication Sig Dispense Refill    albuterol (VENTOLIN HFA) 90 mcg/actuation inhaler Inhale 2 puffs into the lungs every 4 (four) hours as needed for Wheezing. 6.7 g 6    aspirin 325 MG tablet Take 1 tablet (325 mg total) by mouth once daily. 1 Bottle 0    atorvastatin (LIPITOR) 40 MG tablet Take 1 tablet (40 mg total) by mouth once  daily. 90 tablet 3    epinastine 0.05 % ophthalmic solution Place 1 drop into both eyes 2 (two) times daily.  0    fluticasone (FLONASE) 50 mcg/actuation nasal spray 1 spray by Each Nare route 2 (two) times daily. 1 Bottle 1    naproxen (EC-NAPROSYN) 375 MG TbEC EC tablet Take 1 tablet (375 mg total) by mouth 2 (two) times daily with meals. 15 tablet 0    omeprazole (PRILOSEC) 20 MG capsule take 1 capsule by mouth once daily 90 capsule 3    ondansetron (ZOFRAN-ODT) 4 MG TbDL Take 1 tablet (4 mg total) by mouth every 8 (eight) hours as needed (nausea). 15 tablet 0    triamterene-hydrochlorothiazide 37.5-25 mg (DYAZIDE) 37.5-25 mg per capsule Take 1 capsule by mouth 2 (two) times daily. 180 capsule 1    FLUZONE HIGH-DOSE 2017-18, PF, 180 mcg/0.5 mL vaccine        No current facility-administered medications on file prior to visit.        Allergies   Review of patient's allergies indicates:   Allergen Reactions    Codeine      Other reaction(s): Rash    Propranolol Other (See Comments)     Burning sensation of scalp/skin       Review of Systems   Constitutional: Negative for chills, fatigue and fever.   Gastrointestinal: Negative for abdominal pain, blood in stool, diarrhea and nausea.   Psychiatric/Behavioral: Positive for sleep disturbance. Negative for confusion, decreased concentration, dysphoric mood, hallucinations and suicidal ideas. The patient is not nervous/anxious and is not hyperactive.        /78 (BP Location: Left arm, Patient Position: Sitting, BP Method: Medium (Manual))   Pulse 88   Temp 98.1 °F (36.7 °C) (Oral)   Resp 16   Ht 5' (1.524 m)   Wt 90.4 kg (199 lb 6.4 oz)   SpO2 96%   BMI 38.94 kg/m²     Physical Exam   Constitutional: She appears well-developed and well-nourished.   HENT:   Head: Normocephalic and atraumatic.   Right Ear: External ear normal.   Left Ear: External ear normal.   Nose: Nose normal.   Mouth/Throat: Oropharynx is clear and moist. No oropharyngeal exudate.    Eyes: Conjunctivae and EOM are normal. Pupils are equal, round, and reactive to light. Right eye exhibits no discharge. Left eye exhibits no discharge.   Neck: Normal range of motion. Neck supple. No tracheal deviation present.   Cardiovascular: Normal rate, regular rhythm, normal heart sounds and intact distal pulses.    No murmur heard.  Pulmonary/Chest: Effort normal and breath sounds normal. She has no wheezes. She has no rales.   Abdominal: Soft. Bowel sounds are normal. She exhibits no mass. There is no tenderness.   Lymphadenopathy:     She has no cervical adenopathy.   Psychiatric: She has a normal mood and affect. Her behavior is normal. Thought content normal.   Vitals reviewed.      Assessment/Plan:  Jose Manuel was seen today for diverticulitis.    Diagnoses and all orders for this visit:    Sigmoid diverticulitis  Patient doing better.  Patient advised that if symptoms return, to call office immediately.    Insomnia, unspecified type  -     traZODone (DESYREL) 50 MG tablet; Take 1 tablet (50 mg total) by mouth every evening.  Discussed risks and benefits of Trazodone and correct way to use.  Will do trial of trazodone.

## 2018-07-02 ENCOUNTER — OFFICE VISIT (OUTPATIENT)
Dept: FAMILY MEDICINE | Facility: CLINIC | Age: 78
End: 2018-07-02
Payer: MEDICARE

## 2018-07-02 VITALS
TEMPERATURE: 98 F | HEART RATE: 80 BPM | HEIGHT: 60 IN | DIASTOLIC BLOOD PRESSURE: 80 MMHG | WEIGHT: 196 LBS | RESPIRATION RATE: 18 BRPM | BODY MASS INDEX: 38.48 KG/M2 | OXYGEN SATURATION: 97 % | SYSTOLIC BLOOD PRESSURE: 128 MMHG

## 2018-07-02 DIAGNOSIS — F32.A DEPRESSION, UNSPECIFIED DEPRESSION TYPE: Primary | ICD-10-CM

## 2018-07-02 DIAGNOSIS — G47.00 INSOMNIA, UNSPECIFIED TYPE: ICD-10-CM

## 2018-07-02 DIAGNOSIS — R63.4 WEIGHT LOSS, UNINTENTIONAL: ICD-10-CM

## 2018-07-02 DIAGNOSIS — F32.A DEPRESSION, UNSPECIFIED DEPRESSION TYPE: ICD-10-CM

## 2018-07-02 DIAGNOSIS — K57.90 DIVERTICULOSIS OF INTESTINE WITHOUT BLEEDING, UNSPECIFIED INTESTINAL TRACT LOCATION: ICD-10-CM

## 2018-07-02 PROCEDURE — 3074F SYST BP LT 130 MM HG: CPT | Mod: CPTII,S$GLB,, | Performed by: FAMILY MEDICINE

## 2018-07-02 PROCEDURE — 99214 OFFICE O/P EST MOD 30 MIN: CPT | Mod: 25,S$GLB,, | Performed by: FAMILY MEDICINE

## 2018-07-02 PROCEDURE — 99999 PR PBB SHADOW E&M-EST. PATIENT-LVL IV: CPT | Mod: PBBFAC,,, | Performed by: FAMILY MEDICINE

## 2018-07-02 PROCEDURE — 3079F DIAST BP 80-89 MM HG: CPT | Mod: CPTII,S$GLB,, | Performed by: FAMILY MEDICINE

## 2018-07-02 RX ORDER — MIRTAZAPINE 15 MG/1
15 TABLET, FILM COATED ORAL NIGHTLY
Qty: 30 TABLET | Refills: 0 | Status: SHIPPED | OUTPATIENT
Start: 2018-07-02 | End: 2018-07-10 | Stop reason: SDUPTHER

## 2018-07-02 RX ORDER — MIRTAZAPINE 15 MG/1
TABLET, FILM COATED ORAL
Qty: 90 TABLET | Refills: 0 | OUTPATIENT
Start: 2018-07-02

## 2018-07-02 NOTE — PROGRESS NOTES
Routine Office Visit    Patient Name: Jose Manuel Boyd    : 1940  MRN: [id number]    Subjective:  Jose Manuel is a 77 y.o. female who presents today for:   Chief Complaint   Patient presents with    Depression    Fatigue       77 year old female comes in today with her granddaughter because of continued insomnia, and poor appetite. She states that she feels full as soon as she eats. She reports that she is also losing weight. No fevers or chills. No chest pain or palpitations. No abdominal pain, nausea, vomiting, or blood in stool. She does have a history of diverticulosis.   On questioning she reports low mood, difficulty concentrating, some memory concerns, and feeling depressed.    Patient reports trazodone did not help with sleep.    Past Medical History  Past Medical History:   Diagnosis Date    Asthma     Depression     Hypercholesterolemia     Hypertension        Past Surgical History  Past Surgical History:   Procedure Laterality Date    CHOLECYSTECTOMY      HYSTERECTOMY      knee repalcement          Family History  Family History   Problem Relation Age of Onset    Diabetes Mother     Hypertension Mother     Kidney disease Mother     Heart disease Father        Social History  Social History     Social History    Marital status:      Spouse name: N/A    Number of children: N/A    Years of education: N/A     Occupational History    Not on file.     Social History Main Topics    Smoking status: Never Smoker    Smokeless tobacco: Never Used    Alcohol use No    Drug use: No    Sexual activity: Not on file     Other Topics Concern    Not on file     Social History Narrative    No narrative on file       Current Medications  Current Outpatient Prescriptions on File Prior to Visit   Medication Sig Dispense Refill    albuterol (VENTOLIN HFA) 90 mcg/actuation inhaler Inhale 2 puffs into the lungs every 4 (four) hours as needed for Wheezing. 6.7 g 6    aspirin 325 MG tablet Take  1 tablet (325 mg total) by mouth once daily. 1 Bottle 0    atorvastatin (LIPITOR) 40 MG tablet Take 1 tablet (40 mg total) by mouth once daily. 90 tablet 3    epinastine 0.05 % ophthalmic solution Place 1 drop into both eyes 2 (two) times daily.  0    fluticasone (FLONASE) 50 mcg/actuation nasal spray 1 spray by Each Nare route 2 (two) times daily. 1 Bottle 1    FLUZONE HIGH-DOSE 2017-18, PF, 180 mcg/0.5 mL vaccine       naproxen (EC-NAPROSYN) 375 MG TbEC EC tablet Take 1 tablet (375 mg total) by mouth 2 (two) times daily with meals. 15 tablet 0    omeprazole (PRILOSEC) 20 MG capsule take 1 capsule by mouth once daily 90 capsule 3    ondansetron (ZOFRAN-ODT) 4 MG TbDL Take 1 tablet (4 mg total) by mouth every 8 (eight) hours as needed (nausea). 15 tablet 0    traZODone (DESYREL) 50 MG tablet Take 1 tablet (50 mg total) by mouth every evening. 30 tablet 3    triamterene-hydrochlorothiazide 37.5-25 mg (DYAZIDE) 37.5-25 mg per capsule Take 1 capsule by mouth 2 (two) times daily. 180 capsule 1     No current facility-administered medications on file prior to visit.        Allergies   Review of patient's allergies indicates:   Allergen Reactions    Codeine      Other reaction(s): Rash    Propranolol Other (See Comments)     Burning sensation of scalp/skin       Review of Systems   Constitutional: Negative for chills and fever.   Respiratory: Negative for shortness of breath.    Cardiovascular: Negative for chest pain and palpitations.   Gastrointestinal: Negative for abdominal pain, blood in stool, constipation, diarrhea and nausea.   Psychiatric/Behavioral: Positive for decreased concentration, dysphoric mood and sleep disturbance. Negative for self-injury and suicidal ideas. The patient is nervous/anxious. The patient is not hyperactive.        /80 (BP Location: Left arm, Patient Position: Sitting, BP Method: Medium (Manual))   Pulse 80   Temp 97.7 °F (36.5 °C) (Oral)   Resp 18   Ht 5' (1.524 m)    Wt 88.9 kg (195 lb 15.8 oz)   SpO2 97%   BMI 38.28 kg/m²     Physical Exam   Constitutional: She appears well-developed.   Cardiovascular: Normal rate, regular rhythm, normal heart sounds and intact distal pulses.    Pulmonary/Chest: Effort normal and breath sounds normal. She has no wheezes. She has no rales.   Abdominal: Soft. Bowel sounds are normal. She exhibits no mass. There is no tenderness.   Psychiatric: Her mood appears anxious. Her speech is not rapid and/or pressured. She is not actively hallucinating. Thought content is not paranoid. Cognition and memory are not impaired. She does not express impulsivity or inappropriate judgment. She exhibits a depressed mood. She is attentive.   Vitals reviewed.      Assessment/Plan:  Jose Manuel was seen today for depression and fatigue.    Diagnoses and all orders for this visit:    Depression, unspecified depression type  -     TSH; Future  -     mirtazapine (REMERON) 15 MG tablet; Take 1 tablet (15 mg total) by mouth every evening.  -     Ambulatory referral to Psychiatry  Patient to start on mirtazapine. Benefits and side effects discussed.   Should help with her appetite, sleep, and depression  Also advised follow up with psychiatry.    Diverticulosis of intestine without bleeding, unspecified intestinal tract location  -     Ambulatory referral to Gastroenterology  Referral for GI placed again.    Weight loss, unintentional  -     mirtazapine (REMERON) 15 MG tablet; Take 1 tablet (15 mg total) by mouth every evening.  -     Ambulatory referral to Gastroenterology  Likely from decreased eating.  Will monitor.  Rule out thyroid concern.    Insomnia, unspecified type  -     mirtazapine (REMERON) 15 MG tablet; Take 1 tablet (15 mg total) by mouth every evening.  -     Ambulatory referral to Psychiatry

## 2018-07-05 ENCOUNTER — TELEPHONE (OUTPATIENT)
Dept: ADMINISTRATIVE | Facility: HOSPITAL | Age: 78
End: 2018-07-05

## 2018-07-09 ENCOUNTER — OFFICE VISIT (OUTPATIENT)
Dept: SURGERY | Facility: CLINIC | Age: 78
End: 2018-07-09
Payer: MEDICARE

## 2018-07-09 VITALS
DIASTOLIC BLOOD PRESSURE: 75 MMHG | WEIGHT: 197.06 LBS | HEART RATE: 84 BPM | HEIGHT: 60 IN | BODY MASS INDEX: 38.69 KG/M2 | SYSTOLIC BLOOD PRESSURE: 154 MMHG

## 2018-07-09 DIAGNOSIS — K57.32 DIVERTICULITIS OF COLON: Primary | ICD-10-CM

## 2018-07-09 PROCEDURE — 3077F SYST BP >= 140 MM HG: CPT | Mod: CPTII,S$GLB,, | Performed by: COLON & RECTAL SURGERY

## 2018-07-09 PROCEDURE — 99213 OFFICE O/P EST LOW 20 MIN: CPT | Mod: S$GLB,,, | Performed by: COLON & RECTAL SURGERY

## 2018-07-09 PROCEDURE — 99999 PR PBB SHADOW E&M-EST. PATIENT-LVL III: CPT | Mod: PBBFAC,,, | Performed by: COLON & RECTAL SURGERY

## 2018-07-09 PROCEDURE — 3078F DIAST BP <80 MM HG: CPT | Mod: CPTII,S$GLB,, | Performed by: COLON & RECTAL SURGERY

## 2018-07-09 NOTE — LETTER
July 9, 2018      Ajit Piña MD  [address]  [place] [address]           [facility]  [address]  [facility] [address]  Phone: [phone]          Patient: Jose Manuel Boyd   MR Number: [id number]   YOB: 1940   Date of Visit: 7/9/2018       Dear Dr. Ajit Piña:    Thank you for referring Jose Manuel Boyd to me for evaluation. Attached you will find relevant portions of my assessment and plan of care.    If you have questions, please do not hesitate to call me. I look forward to following Jose Manuel Boyd along with you.    Sincerely,    Alexandru Mukherjee MD    Enclosure  CC:  No Recipients    If you would like to receive this communication electronically, please contact [email or web address] or [phone] to request more information on [employer] Link access.    For providers and/or their staff who would like to refer a patient to [facility], please contact us through our one-stop-shop provider referral line, [facility], at [phone].    If you feel you have received this communication in error or would no longer like to receive these types of communications, please e-mail [email or web address]

## 2018-07-09 NOTE — PROGRESS NOTES
Subjective:       Patient ID: Jose Manuel Boyd is a 77 y.o. female.    Chief Complaint: Follow-up and Diverticulitis    HPI  77 to F referred following recent episode of diverticulitis.      She presented to her PCPs office on 5/22/2018 with complaints of lower abdominal pain.  CT scan was done the following day which revealed sigmoid diverticulitis.  I initially saw her on 05/28/2018.  At that time she was being treated with a course of Augmentin that had 2 days left.  She continued to have some lower abdominal pain.  She was also in the emergency room the day prior with severe headaches.  She denied fevers or chills.  She denied nausea or vomiting.  She was eating well.    CT A/P 5/23/18: (Images personally reviewed.)  -Findings suggestive of sigmoid colon diverticulitis with possible small (2.7 x 1.4 cm) abscess along the wall of the sigmoid colon.  Note that this is not amenable to percutaneous drainage due to its close proximity/involvement of the wall of the sigmoid colon.  -Small hiatal hernia.  -Right inguinal fluid collection likely representing a small lymphocele, stable when compared to prior examinations.    Last colonoscopy was done more than 3 years ago at [facility].  She has had a prior hospitalization in the distant past for diverticulitis.    No family hx of CRC or IBD.    At that time she was still having some moderate abdominal discomfort on examination/stir see me back in a week after she completed her course of antibiotics.    She returned 6/4/18 for follow-up.  She continued to have abdominal pain in the lower portion of her abdomen. She denied any fevers or chills.  Said she was eating well but was losing weight.  She denied nausea or vomiting.    Repeat CT 6/9/18:  1. No significant change in the appearance of sigmoid colon diverticulitis with small mural fluid collection.  2. Right inguinal hernia containing loop of small bowel without obstruction or strangulation.  3.  Small subcutaneous fluid collection at the right groin, possibly seroma or lymphocele.    At that time she was feeling better so we not resume antibiotics.    She returns today for follow-up - she has no abdominal pain at all.  She is eating well, no N/V, no F/C, having normal BM's.      Review of patient's allergies indicates:   Allergen Reactions    Codeine      Other reaction(s): Rash    Propranolol Other (See Comments)     Burning sensation of scalp/skin       Past Medical History:   Diagnosis Date    Asthma     Depression     Hypercholesterolemia     Hypertension        Past Surgical History:   Procedure Laterality Date    CHOLECYSTECTOMY      HYSTERECTOMY      knee repalcement         Current Outpatient Prescriptions   Medication Sig Dispense Refill    albuterol (VENTOLIN HFA) 90 mcg/actuation inhaler Inhale 2 puffs into the lungs every 4 (four) hours as needed for Wheezing. 6.7 g 6    aspirin 325 MG tablet Take 1 tablet (325 mg total) by mouth once daily. 1 Bottle 0    atorvastatin (LIPITOR) 40 MG tablet Take 1 tablet (40 mg total) by mouth once daily. 90 tablet 3    epinastine 0.05 % ophthalmic solution Place 1 drop into both eyes 2 (two) times daily.  0    fluticasone (FLONASE) 50 mcg/actuation nasal spray 1 spray by Each Nare route 2 (two) times daily. 1 Bottle 1    FLUZONE HIGH-DOSE 2017-18, PF, 180 mcg/0.5 mL vaccine       naproxen (EC-NAPROSYN) 375 MG TbEC EC tablet Take 1 tablet (375 mg total) by mouth 2 (two) times daily with meals. 15 tablet 0    omeprazole (PRILOSEC) 20 MG capsule take 1 capsule by mouth once daily 90 capsule 3    ondansetron (ZOFRAN-ODT) 4 MG TbDL Take 1 tablet (4 mg total) by mouth every 8 (eight) hours as needed (nausea). 15 tablet 0    traZODone (DESYREL) 50 MG tablet Take 1 tablet (50 mg total) by mouth every evening. 30 tablet 3    triamterene-hydrochlorothiazide 37.5-25 mg (DYAZIDE) 37.5-25 mg per capsule Take 1 capsule by mouth 2 (two) times daily. 180  capsule 1    mirtazapine (REMERON) 15 MG tablet Take 1 tablet (15 mg total) by mouth every evening. 30 tablet 0     No current facility-administered medications for this visit.        Family History   Problem Relation Age of Onset    Diabetes Mother     Hypertension Mother     Kidney disease Mother     Heart disease Father        Social History     Social History    Marital status:      Spouse name: N/A    Number of children: N/A    Years of education: N/A     Social History Main Topics    Smoking status: Never Smoker    Smokeless tobacco: Never Used    Alcohol use No    Drug use: No    Sexual activity: Not Asked     Other Topics Concern    None     Social History Narrative    None       Review of Systems   Constitutional: Negative for chills and fever.   HENT: Negative for congestion and sore throat.    Eyes: Negative for visual disturbance.   Respiratory: Negative for cough and shortness of breath.    Cardiovascular: Negative for chest pain and palpitations.   Gastrointestinal: Negative for abdominal distention, abdominal pain, anal bleeding, blood in stool, constipation, diarrhea, nausea, rectal pain and vomiting.   Endocrine: Negative for cold intolerance and heat intolerance.   Genitourinary: Negative for dysuria and frequency.   Musculoskeletal: Negative for arthralgias, back pain and neck pain.   Skin: Negative for rash.   Allergic/Immunologic: Negative for immunocompromised state.   Neurological: Negative for dizziness and light-headedness.   Hematological: Does not bruise/bleed easily.   Psychiatric/Behavioral: Negative for confusion. The patient is not nervous/anxious.        Objective:      Physical Exam   Constitutional: She is oriented to person, place, and time. She appears well-developed and well-nourished.   HENT:   Head: Normocephalic.   Pulmonary/Chest: Effort normal. No respiratory distress.   Abdominal: Soft. Bowel sounds are normal. She exhibits no distension and no mass.  There is no tenderness (mild, lower abdominal ). There is no rebound and no guarding. No hernia.   Musculoskeletal: Normal range of motion.   Neurological: She is alert and oriented to person, place, and time.   Skin: Skin is warm and dry.   Psychiatric: She has a normal mood and affect.         Lab Results   Component Value Date    WBC 11.08 06/04/2018    HGB 13.8 06/04/2018    HCT 44.6 06/04/2018    MCV 84 06/04/2018     06/04/2018     BMP  Lab Results   Component Value Date     06/04/2018    K 3.8 06/04/2018    CL 95 06/04/2018    CO2 29 06/04/2018    BUN 9 06/04/2018    CREATININE 0.7 06/04/2018    CALCIUM 10.1 06/04/2018    ANIONGAP 12 06/04/2018    ESTGFRAFRICA >60.0 06/04/2018    EGFRNONAA >60.0 06/04/2018     CMP  Sodium   Date Value Ref Range Status   06/04/2018 136 136 - 145 mmol/L Final     Potassium   Date Value Ref Range Status   06/04/2018 3.8 3.5 - 5.1 mmol/L Final     Chloride   Date Value Ref Range Status   06/04/2018 95 95 - 110 mmol/L Final     CO2   Date Value Ref Range Status   06/04/2018 29 23 - 29 mmol/L Final     Glucose   Date Value Ref Range Status   06/04/2018 105 70 - 110 mg/dL Final     BUN, Bld   Date Value Ref Range Status   06/04/2018 9 8 - 23 mg/dL Final     Creatinine   Date Value Ref Range Status   06/04/2018 0.7 0.5 - 1.4 mg/dL Final     Calcium   Date Value Ref Range Status   06/04/2018 10.1 8.7 - 10.5 mg/dL Final     Total Protein   Date Value Ref Range Status   06/04/2018 7.4 6.0 - 8.4 g/dL Final     Albumin   Date Value Ref Range Status   06/04/2018 3.3 (L) 3.5 - 5.2 g/dL Final     Total Bilirubin   Date Value Ref Range Status   06/04/2018 0.4 0.1 - 1.0 mg/dL Final     Comment:     For infants and newborns, interpretation of results should be based  on gestational age, weight and in agreement with clinical  observations.  Premature Infant recommended reference ranges:  Up to 24 hours.............<8.0 mg/dL  Up to 48 hours............<12.0 mg/dL  3-5  days..................<15.0 mg/dL  6-29 days.................<15.0 mg/dL       Alkaline Phosphatase   Date Value Ref Range Status   06/04/2018 136 (H) 55 - 135 U/L Final     AST   Date Value Ref Range Status   06/04/2018 15 10 - 40 U/L Final     ALT   Date Value Ref Range Status   06/04/2018 13 10 - 44 U/L Final     Anion Gap   Date Value Ref Range Status   06/04/2018 12 8 - 16 mmol/L Final     eGFR if    Date Value Ref Range Status   06/04/2018 >60.0 >60 mL/min/1.73 m^2 Final     eGFR if non    Date Value Ref Range Status   06/04/2018 >60.0 >60 mL/min/1.73 m^2 Final     Comment:     Calculation used to obtain the estimated glomerular filtration  rate (eGFR) is the CKD-EPI equation.        No results found for: CEA        Assessment:       1. Diverticulitis of colon      Now resolved  Plan:   I have recommended a colonoscopy in a month or so - she is fairly adamant that she does not want to have this done at the present time.  I asked her to call if she reconsiders and we can get her scheduled.  Recommend high fiber diet  RTO prn.      Alexandru Mukherjee MD, FACS, FASCRS  Staff Surgeon  Department of Colon & Rectal Surgery

## 2018-07-10 ENCOUNTER — OFFICE VISIT (OUTPATIENT)
Dept: PSYCHIATRY | Facility: CLINIC | Age: 78
End: 2018-07-10
Payer: COMMERCIAL

## 2018-07-10 VITALS
BODY MASS INDEX: 38.91 KG/M2 | HEIGHT: 60 IN | SYSTOLIC BLOOD PRESSURE: 138 MMHG | HEART RATE: 82 BPM | DIASTOLIC BLOOD PRESSURE: 68 MMHG | WEIGHT: 198.19 LBS

## 2018-07-10 DIAGNOSIS — F51.05 INSOMNIA RELATED TO ANOTHER MENTAL DISORDER: ICD-10-CM

## 2018-07-10 DIAGNOSIS — F33.0 MAJOR DEPRESSIVE DISORDER, RECURRENT EPISODE, MILD WITH ANXIOUS DISTRESS: Primary | ICD-10-CM

## 2018-07-10 PROCEDURE — 99999 PR PBB SHADOW E&M-EST. PATIENT-LVL III: CPT | Mod: PBBFAC,,, | Performed by: NURSE PRACTITIONER

## 2018-07-10 PROCEDURE — 90792 PSYCH DIAG EVAL W/MED SRVCS: CPT | Mod: S$GLB,,, | Performed by: NURSE PRACTITIONER

## 2018-07-10 PROCEDURE — 99499 UNLISTED E&M SERVICE: CPT | Mod: S$GLB,,, | Performed by: NURSE PRACTITIONER

## 2018-07-10 RX ORDER — MIRTAZAPINE 15 MG/1
15 TABLET, FILM COATED ORAL NIGHTLY
Qty: 30 TABLET | Refills: 0 | Status: SHIPPED | OUTPATIENT
Start: 2018-07-10 | End: 2018-08-10 | Stop reason: SDUPTHER

## 2018-07-10 RX ORDER — HYDROXYZINE PAMOATE 25 MG/1
25 CAPSULE ORAL 2 TIMES DAILY PRN
Qty: 60 CAPSULE | Refills: 0 | Status: SHIPPED | OUTPATIENT
Start: 2018-07-10 | End: 2019-01-11

## 2018-07-10 NOTE — PROGRESS NOTES
"Outpatient Psychiatry Initial Visit (MD/NP)    7/10/2018    Jose Manuel Boyd, a 77 y.o. female, presenting for initial evaluation visit. Met with patient.    Reason for Encounter: Referral from Dr. Mendez. Patient complains of   Chief Complaint   Patient presents with    New Patient/ Depression    Anxiety   .    History of Present Illness: Jose Manuel Boyd states that her physician, Dr. Mendez wanted her to come her because her daughter "raised hell". She states she has been on nerve medication for over 40 years. She states she was on valium and librium and she is no longer receiving a prescription for these medications. Dr. Mendez gave her Remeron for sleep and she states this helps some. She states she was seeing Dr. Marie but Dr. Marie's father  and he is on leave. She states that she went to the ER two days in a row because her nerves were bad and she had inhaled a lot of bleach from cleaning and it made her feel sick. She also had LE edema which she attributes to inhaling the bleach. She talks about her  , grandson, daughter, brother and mother in law and is tearful. They all  within 8-12 months of each other. She states that the remeron is helping her mood as she is not as sad as she was and she is sleeping o.k. on it. She has started taking ensure and it is helping to increase her energy. Overall, her mood and sleep have improved with the Remeron and her anxiety is under control.     He overall symptom complex includes: poor sleep, feels anxious, tearful, cries easily, feels sad, anhedonia, feels lonely, multiple losses in close proximity by death, excessive worry, poor appetite, low energy    Review Of Systems:     GENERAL:  Appears actual age  SKIN:  No rashes or lacerations  HEAD:  No headache today  EYES:  No exophthalmos, jaundice or blindness  EARS:  No dizziness, tinnitus or hearing loss  NOSE:  No changes in smell  MOUTH & THROAT:  No dyskinetic movements or obvious " "goiter  CHEST:  No shortness of breath, hyperventilation or cough  CARDIOVASCULAR:  No tachycardia or chest pain  ABDOMEN:  No nausea, vomiting, pain, constipation or diarrhea  URINARY:  No frequency, or dysuria   ENDOCRINE:  No polydipsia, polyuria  MUSCULOSKELETAL:  No tremors or tics  NEUROLOGIC:  No abnormal movements    Current Evaluation:     Nutritional Screening: Considering the patient's height and weight, medications, medical history and preferences, should a referral be made to the dietitian? Patient being followed by PCP.    Constitutional  Vitals:  Most recent vital signs, dated less than 90 days prior to this appointment, were reviewed.    Vitals:    07/10/18 0929   BP: 138/68   Pulse: 82   Weight: 89.9 kg (198 lb 3.1 oz)   Height: 5' (1.524 m)        General:  unremarkable, age appropriate     Musculoskeletal  Muscle Strength/Tone:  no tremor, no tic   Gait & Station:  non-ataxic     Psychiatric  Speech:  no latency; no press   Mood & Affect:  "yesterday was the best day I had in quite a while and today is pretty good."  tearful when speaking about sad things otherwise appropriate and congruent   Thought Process:  normal and logical   Associations:  intact   Thought Content:  normal, no suicidality, no homicidality, delusions, or paranoia   Insight:  has awareness of illness   Judgement: behavior is adequate to circumstances   Orientation:  grossly intact, person, place, situation   Memory: intact for content of interview; immediate memory is 3/3 objects, after 5 minutes is 3/3 objects   Language: grossly intact   Attention Span & Concentration:  able to focus; able to spell WORLD forward and DOULW.backward and states she cannot spell words backward   Fund of Knowledge:  intact and appropriate to age and level of education; adequate (President, Taurus Issa, current events: Trump and [place]).       Relevant Elements of Neurological Exam: normal gait    Functioning in " Relationships:  Spouse/partner: NA  Peers: Good  Employers: NA    Laboratory Data  No visits with results within 1 Month(s) from this visit.   Latest known visit with results is:   Lab Visit on 06/04/2018   Component Date Value Ref Range Status    WBC 06/04/2018 11.08  3.90 - 12.70 K/uL Final    RBC 06/04/2018 5.33  4.00 - 5.40 M/uL Final    Hemoglobin 06/04/2018 13.8  12.0 - 16.0 g/dL Final    Hematocrit 06/04/2018 44.6  37.0 - 48.5 % Final    MCV 06/04/2018 84  82 - 98 fL Final    MCH 06/04/2018 25.9* 27.0 - 31.0 pg Final    MCHC 06/04/2018 30.9* 32.0 - 36.0 g/dL Final    RDW 06/04/2018 15.9* 11.5 - 14.5 % Final    Platelets 06/04/2018 299  150 - 350 K/uL Final    MPV 06/04/2018 11.5  9.2 - 12.9 fL Final    Immature Granulocytes 06/04/2018 0.5  0.0 - 0.5 % Final    Gran # (ANC) 06/04/2018 8.0* 1.8 - 7.7 K/uL Final    Immature Grans (Abs) 06/04/2018 0.05* 0.00 - 0.04 K/uL Final    Lymph # 06/04/2018 1.7  1.0 - 4.8 K/uL Final    Mono # 06/04/2018 1.1* 0.3 - 1.0 K/uL Final    Eos # 06/04/2018 0.1  0.0 - 0.5 K/uL Final    Baso # 06/04/2018 0.06  0.00 - 0.20 K/uL Final    nRBC 06/04/2018 0  0 /100 WBC Final    Gran% 06/04/2018 72.5  38.0 - 73.0 % Final    Lymph% 06/04/2018 15.3* 18.0 - 48.0 % Final    Mono% 06/04/2018 10.3  4.0 - 15.0 % Final    Eosinophil% 06/04/2018 0.9  0.0 - 8.0 % Final    Basophil% 06/04/2018 0.5  0.0 - 1.9 % Final    Differential Method 06/04/2018 Automated   Final    Sodium 06/04/2018 136  136 - 145 mmol/L Final    Potassium 06/04/2018 3.8  3.5 - 5.1 mmol/L Final    Chloride 06/04/2018 95  95 - 110 mmol/L Final    CO2 06/04/2018 29  23 - 29 mmol/L Final    Glucose 06/04/2018 105  70 - 110 mg/dL Final    BUN, Bld 06/04/2018 9  8 - 23 mg/dL Final    Creatinine 06/04/2018 0.7  0.5 - 1.4 mg/dL Final    Calcium 06/04/2018 10.1  8.7 - 10.5 mg/dL Final    Total Protein 06/04/2018 7.4  6.0 - 8.4 g/dL Final    Albumin 06/04/2018 3.3* 3.5 - 5.2 g/dL Final    Total  Bilirubin 06/04/2018 0.4  0.1 - 1.0 mg/dL Final    Alkaline Phosphatase 06/04/2018 136* 55 - 135 U/L Final    AST 06/04/2018 15  10 - 40 U/L Final    ALT 06/04/2018 13  10 - 44 U/L Final    Anion Gap 06/04/2018 12  8 - 16 mmol/L Final    eGFR if African American 06/04/2018 >60.0  >60 mL/min/1.73 m^2 Final    eGFR if non African American 06/04/2018 >60.0  >60 mL/min/1.73 m^2 Final         Medications  Outpatient Encounter Prescriptions as of 7/10/2018   Medication Sig Dispense Refill    albuterol (VENTOLIN HFA) 90 mcg/actuation inhaler Inhale 2 puffs into the lungs every 4 (four) hours as needed for Wheezing. 6.7 g 6    aspirin 325 MG tablet Take 1 tablet (325 mg total) by mouth once daily. 1 Bottle 0    atorvastatin (LIPITOR) 40 MG tablet Take 1 tablet (40 mg total) by mouth once daily. 90 tablet 3    epinastine 0.05 % ophthalmic solution Place 1 drop into both eyes 2 (two) times daily.  0    fluticasone (FLONASE) 50 mcg/actuation nasal spray 1 spray by Each Nare route 2 (two) times daily. 1 Bottle 1    FLUZONE HIGH-DOSE 2017-18, PF, 180 mcg/0.5 mL vaccine       mirtazapine (REMERON) 15 MG tablet Take 1 tablet (15 mg total) by mouth every evening. 30 tablet 0    naproxen (EC-NAPROSYN) 375 MG TbEC EC tablet Take 1 tablet (375 mg total) by mouth 2 (two) times daily with meals. 15 tablet 0    omeprazole (PRILOSEC) 20 MG capsule take 1 capsule by mouth once daily 90 capsule 3    ondansetron (ZOFRAN-ODT) 4 MG TbDL Take 1 tablet (4 mg total) by mouth every 8 (eight) hours as needed (nausea). 15 tablet 0    traZODone (DESYREL) 50 MG tablet Take 1 tablet (50 mg total) by mouth every evening. 30 tablet 3    triamterene-hydrochlorothiazide 37.5-25 mg (DYAZIDE) 37.5-25 mg per capsule Take 1 capsule by mouth 2 (two) times daily. 180 capsule 1     No facility-administered encounter medications on file as of 7/10/2018.        Lab Visit on 06/04/2018   Component Date Value Ref Range Status    WBC 06/04/2018  11.08  3.90 - 12.70 K/uL Final    RBC 06/04/2018 5.33  4.00 - 5.40 M/uL Final    Hemoglobin 06/04/2018 13.8  12.0 - 16.0 g/dL Final    Hematocrit 06/04/2018 44.6  37.0 - 48.5 % Final    MCV 06/04/2018 84  82 - 98 fL Final    MCH 06/04/2018 25.9* 27.0 - 31.0 pg Final    MCHC 06/04/2018 30.9* 32.0 - 36.0 g/dL Final    RDW 06/04/2018 15.9* 11.5 - 14.5 % Final    Platelets 06/04/2018 299  150 - 350 K/uL Final    MPV 06/04/2018 11.5  9.2 - 12.9 fL Final    Immature Granulocytes 06/04/2018 0.5  0.0 - 0.5 % Final    Gran # (ANC) 06/04/2018 8.0* 1.8 - 7.7 K/uL Final    Immature Grans (Abs) 06/04/2018 0.05* 0.00 - 0.04 K/uL Final    Comment: Mild elevation in immature granulocytes is non specific and   can be seen in a variety of conditions including stress response,   acute inflammation, trauma and pregnancy. Correlation with other   laboratory and clinical findings is essential.      Lymph # 06/04/2018 1.7  1.0 - 4.8 K/uL Final    Mono # 06/04/2018 1.1* 0.3 - 1.0 K/uL Final    Eos # 06/04/2018 0.1  0.0 - 0.5 K/uL Final    Baso # 06/04/2018 0.06  0.00 - 0.20 K/uL Final    nRBC 06/04/2018 0  0 /100 WBC Final    Gran% 06/04/2018 72.5  38.0 - 73.0 % Final    Lymph% 06/04/2018 15.3* 18.0 - 48.0 % Final    Mono% 06/04/2018 10.3  4.0 - 15.0 % Final    Eosinophil% 06/04/2018 0.9  0.0 - 8.0 % Final    Basophil% 06/04/2018 0.5  0.0 - 1.9 % Final    Differential Method 06/04/2018 Automated   Final    Sodium 06/04/2018 136  136 - 145 mmol/L Final    Potassium 06/04/2018 3.8  3.5 - 5.1 mmol/L Final    Chloride 06/04/2018 95  95 - 110 mmol/L Final    CO2 06/04/2018 29  23 - 29 mmol/L Final    Glucose 06/04/2018 105  70 - 110 mg/dL Final    BUN, Bld 06/04/2018 9  8 - 23 mg/dL Final    Creatinine 06/04/2018 0.7  0.5 - 1.4 mg/dL Final    Calcium 06/04/2018 10.1  8.7 - 10.5 mg/dL Final    Total Protein 06/04/2018 7.4  6.0 - 8.4 g/dL Final    Albumin 06/04/2018 3.3* 3.5 - 5.2 g/dL Final    Total Bilirubin  06/04/2018 0.4  0.1 - 1.0 mg/dL Final    Comment: For infants and newborns, interpretation of results should be based  on gestational age, weight and in agreement with clinical  observations.  Premature Infant recommended reference ranges:  Up to 24 hours.............<8.0 mg/dL  Up to 48 hours............<12.0 mg/dL  3-5 days..................<15.0 mg/dL  6-29 days.................<15.0 mg/dL      Alkaline Phosphatase 06/04/2018 136* 55 - 135 U/L Final    AST 06/04/2018 15  10 - 40 U/L Final    ALT 06/04/2018 13  10 - 44 U/L Final    Anion Gap 06/04/2018 12  8 - 16 mmol/L Final    eGFR if African American 06/04/2018 >60.0  >60 mL/min/1.73 m^2 Final    eGFR if non African American 06/04/2018 >60.0  >60 mL/min/1.73 m^2 Final    Comment: Calculation used to obtain the estimated glomerular filtration  rate (eGFR) is the CKD-EPI equation.      Admission on 05/27/2018, Discharged on 05/27/2018   Component Date Value Ref Range Status    Specimen UA 05/27/2018 Urine, Clean Catch   Final    Color, UA 05/27/2018 Straw  Yellow, Straw, Dulce Maria Final    Appearance, UA 05/27/2018 Clear  Clear Final    pH, UA 05/27/2018 7.0  5.0 - 8.0 Final    Specific Gravity, UA 05/27/2018 1.005  1.005 - 1.030 Final    Protein, UA 05/27/2018 Negative  Negative Final    Comment: Recommend a 24 hour urine protein or a urine   protein/creatinine ratio if globulin induced proteinuria is  clinically suspected.      Glucose, UA 05/27/2018 Negative  Negative Final    Ketones, UA 05/27/2018 Negative  Negative Final    Bilirubin (UA) 05/27/2018 Negative  Negative Final    Occult Blood UA 05/27/2018 Negative  Negative Final    Nitrite, UA 05/27/2018 Negative  Negative Final    Urobilinogen, UA 05/27/2018 Negative  <2.0 EU/dL Final    Leukocytes, UA 05/27/2018 Negative  Negative Final    WBC 05/27/2018 11.70  3.90 - 12.70 K/uL Final    RBC 05/27/2018 4.80  4.00 - 5.40 M/uL Final    Hemoglobin 05/27/2018 12.5  12.0 - 16.0 g/dL Final     Hematocrit 05/27/2018 40.0  37.0 - 48.5 % Final    MCV 05/27/2018 83  82 - 98 fL Final    MCH 05/27/2018 26.0* 27.0 - 31.0 pg Final    MCHC 05/27/2018 31.3* 32.0 - 36.0 g/dL Final    RDW 05/27/2018 15.5* 11.5 - 14.5 % Final    Platelets 05/27/2018 248  150 - 350 K/uL Final    MPV 05/27/2018 11.3  9.2 - 12.9 fL Final    Immature Granulocytes 05/27/2018 0.3  0.0 - 0.5 % Final    Gran # (ANC) 05/27/2018 9.4* 1.8 - 7.7 K/uL Final    Immature Grans (Abs) 05/27/2018 0.04  0.00 - 0.04 K/uL Final    Comment: Mild elevation in immature granulocytes is non specific and   can be seen in a variety of conditions including stress response,   acute inflammation, trauma and pregnancy. Correlation with other   laboratory and clinical findings is essential.      Lymph # 05/27/2018 1.2  1.0 - 4.8 K/uL Final    Mono # 05/27/2018 1.0  0.3 - 1.0 K/uL Final    Eos # 05/27/2018 0.1  0.0 - 0.5 K/uL Final    Baso # 05/27/2018 0.07  0.00 - 0.20 K/uL Final    nRBC 05/27/2018 0  0 /100 WBC Final    Gran% 05/27/2018 80.0* 38.0 - 73.0 % Final    Lymph% 05/27/2018 10.4* 18.0 - 48.0 % Final    Mono% 05/27/2018 8.1  4.0 - 15.0 % Final    Eosinophil% 05/27/2018 0.6  0.0 - 8.0 % Final    Basophil% 05/27/2018 0.6  0.0 - 1.9 % Final    Differential Method 05/27/2018 Automated   Final    Sodium 05/27/2018 139  136 - 145 mmol/L Final    Potassium 05/27/2018 3.2* 3.5 - 5.1 mmol/L Final    Chloride 05/27/2018 102  95 - 110 mmol/L Final    CO2 05/27/2018 27  23 - 29 mmol/L Final    Glucose 05/27/2018 163* 70 - 110 mg/dL Final    BUN, Bld 05/27/2018 12  8 - 23 mg/dL Final    Creatinine 05/27/2018 0.8  0.5 - 1.4 mg/dL Final    Calcium 05/27/2018 9.6  8.7 - 10.5 mg/dL Final    Total Protein 05/27/2018 6.9  6.0 - 8.4 g/dL Final    Albumin 05/27/2018 3.1* 3.5 - 5.2 g/dL Final    Total Bilirubin 05/27/2018 0.4  0.1 - 1.0 mg/dL Final    Comment: For infants and newborns, interpretation of results should be based  on gestational age,  weight and in agreement with clinical  observations.  Premature Infant recommended reference ranges:  Up to 24 hours.............<8.0 mg/dL  Up to 48 hours............<12.0 mg/dL  3-5 days..................<15.0 mg/dL  6-29 days.................<15.0 mg/dL      Alkaline Phosphatase 05/27/2018 117  55 - 135 U/L Final    AST 05/27/2018 16  10 - 40 U/L Final    ALT 05/27/2018 15  10 - 44 U/L Final    Anion Gap 05/27/2018 10  8 - 16 mmol/L Final    eGFR if African American 05/27/2018 >60.0  >60 mL/min/1.73 m^2 Final    eGFR if non African American 05/27/2018 >60.0  >60 mL/min/1.73 m^2 Final    Comment: Calculation used to obtain the estimated glomerular filtration  rate (eGFR) is the CKD-EPI equation.       POC Glucose 05/27/2018 164* 70 - 110 mg/dL Final    POC BUN 05/27/2018 14  6 - 30 mg/dL Final    POC Creatinine 05/27/2018 0.8  0.5 - 1.4 mg/dL Final    POC Sodium 05/27/2018 140  136 - 145 mmol/L Final    POC Potassium 05/27/2018 3.1* 3.5 - 5.1 mmol/L Final    POC Chloride 05/27/2018 98  95 - 110 mmol/L Final    POC TCO2 (MEASURED) 05/27/2018 32* 23 - 29 mmol/L Final    POC Ionized Calcium 05/27/2018 1.09  1.06 - 1.42 mmol/L Final    POC Hematocrit 05/27/2018 41  36 - 54 %PCV Final    Sample 05/27/2018 DEVON   Final   Office Visit on 05/22/2018   Component Date Value Ref Range Status    Specimen UA 05/22/2018 Urine, Clean Catch   Final    Color, UA 05/22/2018 Yellow  Yellow, Straw, Dulce Maria Final    Appearance, UA 05/22/2018 Clear  Clear Final    pH, UA 05/22/2018 7.0  5.0 - 8.0 Final    Specific Gravity, UA 05/22/2018 1.010  1.005 - 1.030 Final    Protein, UA 05/22/2018 Negative  Negative Final    Comment: Recommend a 24 hour urine protein or a urine   protein/creatinine ratio if globulin induced proteinuria is  clinically suspected.      Glucose, UA 05/22/2018 Negative  Negative Final    Ketones, UA 05/22/2018 Negative  Negative Final    Bilirubin (UA) 05/22/2018 Negative  Negative Final     Occult Blood UA 05/22/2018 Negative  Negative Final    Nitrite, UA 05/22/2018 Negative  Negative Final    Urobilinogen, UA 05/22/2018 Negative  <2.0 EU/dL Final    Leukocytes, UA 05/22/2018 Negative  Negative Final    RBC, UA 05/22/2018 1  0 - 4 /hpf Final    Squam Epithel, UA 05/22/2018 2  /hpf Final    Microscopic Comment 05/22/2018 SEE COMMENT   Final    Comment: Other formed elements not mentioned in the report are not   present in the microscopic examination.      Lab Visit on 05/18/2018   Component Date Value Ref Range Status    Sodium 05/18/2018 138  136 - 145 mmol/L Final    Potassium 05/18/2018 4.6  3.5 - 5.1 mmol/L Final    Chloride 05/18/2018 98  95 - 110 mmol/L Final    CO2 05/18/2018 31* 23 - 29 mmol/L Final    Glucose 05/18/2018 62* 70 - 110 mg/dL Final    BUN, Bld 05/18/2018 14  8 - 23 mg/dL Final    Creatinine 05/18/2018 0.8  0.5 - 1.4 mg/dL Final    Calcium 05/18/2018 10.3  8.7 - 10.5 mg/dL Final    Total Protein 05/18/2018 7.4  6.0 - 8.4 g/dL Final    Albumin 05/18/2018 3.7  3.5 - 5.2 g/dL Final    Total Bilirubin 05/18/2018 0.4  0.1 - 1.0 mg/dL Final    Comment: For infants and newborns, interpretation of results should be based  on gestational age, weight and in agreement with clinical  observations.  Premature Infant recommended reference ranges:  Up to 24 hours.............<8.0 mg/dL  Up to 48 hours............<12.0 mg/dL  3-5 days..................<15.0 mg/dL  6-29 days.................<15.0 mg/dL      Alkaline Phosphatase 05/18/2018 116  55 - 135 U/L Final    AST 05/18/2018 16  10 - 40 U/L Final    ALT 05/18/2018 15  10 - 44 U/L Final    Anion Gap 05/18/2018 9  8 - 16 mmol/L Final    eGFR if African American 05/18/2018 >60  >60 mL/min/1.73 m^2 Final    eGFR if non African American 05/18/2018 >60  >60 mL/min/1.73 m^2 Final    Comment: Calculation used to obtain the estimated glomerular filtration  rate (eGFR) is the CKD-EPI equation.       WBC 05/18/2018 12.54  3.90 -  12.70 K/uL Final    RBC 05/18/2018 5.22  4.00 - 5.40 M/uL Final    Hemoglobin 05/18/2018 13.6  12.0 - 16.0 g/dL Final    Hematocrit 05/18/2018 42.1  37.0 - 48.5 % Final    MCV 05/18/2018 81* 82 - 98 fL Final    MCH 05/18/2018 26.1* 27.0 - 31.0 pg Final    MCHC 05/18/2018 32.3  32.0 - 36.0 g/dL Final    RDW 05/18/2018 16.4* 11.5 - 14.5 % Final    Platelets 05/18/2018 284  150 - 350 K/uL Final    MPV 05/18/2018 11.5  9.2 - 12.9 fL Final    Gran # (ANC) 05/18/2018 9.3* 1.8 - 7.7 K/uL Final    Lymph # 05/18/2018 1.7  1.0 - 4.8 K/uL Final    Mono # 05/18/2018 1.3* 0.3 - 1.0 K/uL Final    Eos # 05/18/2018 0.1  0.0 - 0.5 K/uL Final    Baso # 05/18/2018 0.06  0.00 - 0.20 K/uL Final    Gran% 05/18/2018 74.4* 38.0 - 73.0 % Final    Lymph% 05/18/2018 13.7* 18.0 - 48.0 % Final    Mono% 05/18/2018 10.3  4.0 - 15.0 % Final    Eosinophil% 05/18/2018 0.7  0.0 - 8.0 % Final    Basophil% 05/18/2018 0.5  0.0 - 1.9 % Final    Differential Method 05/18/2018 Automated   Final   Office Visit on 05/18/2018   Component Date Value Ref Range Status    Urine Culture, Routine 05/18/2018 No significant growth   Final   ]      Assessment - Diagnosis - Goals:     Impression: depression, anxiety, insomnia      ICD-10-CM ICD-9-CM   1. 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 Insomnia related to another mental disorder F51.05 300.9     327.02       Strengths and Liabilities: Strength: Patient accepts guidance/feedback, Strength: Patient is expressive/articulate., Liability: Patient lacks coping skills.    Treatment Goals:  Specify outcomes written in observable, behavioral terms:   Safety: Will call 911 or Crisis Line or go to ER for suicidal ideation, adverse effects of medication or any other emergency  Anxiety: Will continue to verbalize that her anxiety is under control with Remeron  Sleep: Will continue to sleep a minimum of 7 hours of restful sleep a night on the  Remeron.  Depression: Will verbalize that she is no longer depressed.    Treatment Plan/Recommendations:   · Medication Management: The risks and benefits of medication were discussed with the patient.  · The treatment plan and follow up plan were reviewed with the patient.   1. Safety: Call 911 or Crisis Line or go to ER for suicidal ideation, adverse effects of medication or any other emergency  2. Continue Remeron 15 mg po qhs for sleep, depression and anxiety.  3. Return to clinic in one month or sooner prn.    Patient agrees with POC.    INSTRUCTIONS    Instructed on uses, effects, side effects, adverse reactions and benefits vs risks of Remeron with emphasis on risks for suicidality and jovita, and instructed on when to seek 911/ER. Verbalizes understanding and plans to comply.    Instructed to call 911 or go to ER for suicidal ideation, adverse effects of medication or any other emergency. Verbalizes understanding and plan to comply.      Return to Clinic: 1 month, as needed    Counseling time: 35 minutes  Total time: 67 minutes  Consulting clinician was informed of the encounter and consult note.

## 2018-07-10 NOTE — PATIENT INSTRUCTIONS
[facility] - DEPARTMENT OF PSYCHIATRY   NEW PATIENT ORIENTATION INFORMATION  OUTPATIENT SERVICES COUNSELING CONTRACT    We appreciate the opportunity to participate in your medical care and hope the following protocols will make it easier for you to receive quality treatment in our department.    1. PUNCTUALITY: Your appointment is scheduled for a fixed amount of time reserved especially for you.  To get the benefit of your appointment, please arrive early enough to allow time for parking and registration.  If you are late for your appointment, your clinician is not able to offer additional time.  Please make every effort to be on time.      2. PAYMENT FOR SERVICES:   Payments are expected at the time of service.  Please contact [phone] if you need to resolve issues involving your account at [facility] or to set up a payment plan.    3. CANCELLATION / MISSED APPOINTMENTS:   In order to receive quality care, all appointments must be kept.  Appointment may be cancelled, ONLY by talking with an  at phone number [phone], between 8:00 a.m. and 5:00 p.m., Monday through Friday, at least 24 hours before your appointment time.  Your clinician reserves this time specifically for you, and if you will be unable to use it, it is necessary that you cancel in a timely manner.  If you do not give at least 24-hour notice of cancellation a full fee will be assessed.  Please note that insurance does not cover no-show charges, so you will be billed directly.  If you are consistently late, cancel, or do not show for your appointments, our department reserves the right to terminate treatment    4. CALLING THE DEPARTMENT:   MESSAGES, SCHEDULE OR CANCEL APPOINTMENTS- In general you can reach the department by calling [phone], between 8:00 a.m. and 5:00 p.m., Monday through Friday, to schedule or cancel appointments or leave a message for your clinician.  It is advisable to schedule your visits  far in advance to obtain the most convenient times for your appointments.   AFTER HOURS, WEEKEND OR HOLIDAYS- For urgent questions after hours, weekends and holidays, calling the department number [phone] will connect you to the nursing staff on the Psychiatry Inpatient Unit.  The nursing staff will speak with you and contact the On Call psychiatrist if necessary.   EMERGENCY-  In case of a crisis when there is a concern of harm to self or others, call 911 or the office [phone] between 8:00 a.m. and 5:00 p.m., Monday through Friday.  After hours, weekends or holidays, please call 911 or go to the Emergency Department where you can be thoroughly evaluated by the On Call psychiatrist.  If possible, contact the psychiatrist on call at [phone] prior to leaving for the [facility] Department.    5. TEAM APPROACH:  Most patients receive therapy through our team system.  In the team system, your primary therapist will be a , psychologist, psychiatry resident or psychiatrist.  If your therapist is a , psychologist or psychiatry resident, please contact your primary therapist first in matters other than medications or acute medical problems.    6. PRESCRIPTION REFILLS:  Prescription refills must be done at your physician office visit.  You will be given a sufficient number of refills to last one extra month beyond your next appointment.  No additional refills will be approved beyond the original treatment plan.  After hours, sufficient medication may be approved to last until the next scheduled appointment. After hours requests for refills on controlled substances may be declined by the psychiatrist on call, as he or she may not be familiar with your case  Please work closely with your doctor so that you have sufficient medication until your next appointment.  Again, please note that no additional prescriptions will be approved per patient request over the phone.   No  additional refills will be approved beyond the original treatment plan.   In certain exceptional situations, a phone consult appointment may be arranged, with appropriate charge, for the review and approval of prescription refills to last until the next scheduled appointment.    7. FOLLOW UP APPOINTMENTS:  Follow-up appointments can be made in person at the Psychiatry Appointment Desk, [place] [address], or by calling [phone], from 8am to 5pm, between Monday and Friday.  It is advisable to schedule your office visits far enough in advance to obtain the most convenient times for your appointments.    Revised Feb. 23, 2010 - F/OA1/Newpatient

## 2018-07-16 ENCOUNTER — LAB VISIT (OUTPATIENT)
Dept: LAB | Facility: HOSPITAL | Age: 78
End: 2018-07-16
Attending: FAMILY MEDICINE
Payer: MEDICARE

## 2018-07-16 ENCOUNTER — OFFICE VISIT (OUTPATIENT)
Dept: FAMILY MEDICINE | Facility: CLINIC | Age: 78
End: 2018-07-16
Payer: MEDICARE

## 2018-07-16 VITALS
BODY MASS INDEX: 38.14 KG/M2 | TEMPERATURE: 98 F | WEIGHT: 194.25 LBS | OXYGEN SATURATION: 97 % | SYSTOLIC BLOOD PRESSURE: 118 MMHG | RESPIRATION RATE: 17 BRPM | DIASTOLIC BLOOD PRESSURE: 72 MMHG | HEART RATE: 73 BPM | HEIGHT: 60 IN

## 2018-07-16 DIAGNOSIS — F32.A DEPRESSION, UNSPECIFIED DEPRESSION TYPE: ICD-10-CM

## 2018-07-16 DIAGNOSIS — R63.4 WEIGHT LOSS, UNINTENTIONAL: Primary | ICD-10-CM

## 2018-07-16 DIAGNOSIS — Z86.73 H/O: STROKE: ICD-10-CM

## 2018-07-16 DIAGNOSIS — K57.90 DIVERTICULOSIS OF INTESTINE WITHOUT BLEEDING, UNSPECIFIED INTESTINAL TRACT LOCATION: ICD-10-CM

## 2018-07-16 LAB — TSH SERPL DL<=0.005 MIU/L-ACNC: 0.96 UIU/ML

## 2018-07-16 PROCEDURE — 99999 PR PBB SHADOW E&M-EST. PATIENT-LVL III: CPT | Mod: PBBFAC,,, | Performed by: FAMILY MEDICINE

## 2018-07-16 PROCEDURE — 84443 ASSAY THYROID STIM HORMONE: CPT

## 2018-07-16 PROCEDURE — 3078F DIAST BP <80 MM HG: CPT | Mod: CPTII,S$GLB,, | Performed by: FAMILY MEDICINE

## 2018-07-16 PROCEDURE — 3074F SYST BP LT 130 MM HG: CPT | Mod: CPTII,S$GLB,, | Performed by: FAMILY MEDICINE

## 2018-07-16 PROCEDURE — 99214 OFFICE O/P EST MOD 30 MIN: CPT | Mod: S$GLB,,, | Performed by: FAMILY MEDICINE

## 2018-07-16 PROCEDURE — 36415 COLL VENOUS BLD VENIPUNCTURE: CPT | Mod: PN

## 2018-07-16 RX ORDER — ATORVASTATIN CALCIUM 40 MG/1
40 TABLET, FILM COATED ORAL DAILY
Qty: 90 TABLET | Refills: 3 | Status: SHIPPED | OUTPATIENT
Start: 2018-07-16 | End: 2019-01-30 | Stop reason: SDUPTHER

## 2018-07-16 NOTE — PROGRESS NOTES
Routine Office Visit    Patient Name: Jose Manuel Boyd    : 1940  MRN: [id number]    Subjective:  Jose Manuel is a 77 y.o. female who presents today for:   Chief Complaint   Patient presents with    Weight Loss     37-year-old female comes in for follow-up on weight loss and depression.  She reports that she has been taking the Remeron since I prescribed.  She reports that her appetite is improving and her mood is improving.  She reports that she has seen the psychiatric nurse practitioner since I last saw her.  She states that the psychiatric nurse practitioner recommended she continues on Remeron.  She reports that she continues to lose some weight however.  She denies any abdominal pain, but still has the rumbling in her lower abdomen.  She also reports that she saw the colorectal surgeon, who recommended she have a colonoscopy.  However, she refused to have the [place] stating [place]  She also states that she does not want to do it at [facility].    She denies any nausea or vomiting, she denies a any blood in the stools or other changes in the stool.  She denies any fevers.  On questioning, she reports that she wants to continue the same dose of the Remeron and does not want to increase the just yet.    Past Medical History  Past Medical History:   Diagnosis Date    Anxiety     Asthma     Depression     Headache     Hx of psychiatric care     Hypercholesterolemia     Hypertension     Psychiatric problem     Sleep difficulties     Therapy     Thyroid disease     multiple nodules       Past Surgical History  Past Surgical History:   Procedure Laterality Date    CHOLECYSTECTOMY      HYSTERECTOMY      knee repalcement          Family History  Family History   Problem Relation Age of Onset    Diabetes Mother     Hypertension Mother     Kidney disease Mother     Heart disease Father     Bipolar disorder Daughter         Social History  Social History     Social History    Marital status:      Spouse name: N/A    Number of children: N/A    Years of education: N/A     Occupational History    retired      Domestic service, [employer]     Social History Main Topics    Smoking status: Never Smoker    Smokeless tobacco: Never Used    Alcohol use No    Drug use: No    Sexual activity: No     Other Topics Concern    Patient Feels They Ought To Cut Down On Drinking/Drug Use No    Patient Annoyed By Others Criticizing Their Drinking/Drug Use No    Patient Has Felt Bad Or Guilty About Drinking/Drug Use No    Patient Has Had A Drink/Used Drugs As An Eye Opener In [place] No     Social History Narrative    Enjoys going to [employer]       Current Medications  Current Outpatient Prescriptions on File Prior to Visit   Medication Sig Dispense Refill    albuterol (VENTOLIN HFA) 90 mcg/actuation inhaler Inhale 2 puffs into the lungs every 4 (four) hours as needed for Wheezing. 6.7 g 6    aspirin 325 MG tablet Take 1 tablet (325 mg total) by mouth once daily. 1 Bottle 0    atorvastatin (LIPITOR) 40 MG tablet Take 1 tablet (40 mg total) by mouth once daily. 90 tablet 3    epinastine 0.05 % ophthalmic solution Place 1 drop into both eyes 2 (two) times daily.  0    fluticasone (FLONASE) 50 mcg/actuation nasal spray 1 spray by Each Nare route 2 (two) times daily. 1 Bottle 1    hydrOXYzine pamoate (VISTARIL) 25 MG Cap Take 1 capsule (25 mg total) by mouth 2 (two) times daily as needed (anxiety/sleep). 60 capsule 0    mirtazapine (REMERON) 15 MG tablet Take 1 tablet (15 mg total) by mouth every evening. 30 tablet 0    naproxen (EC-NAPROSYN) 375 MG TbEC EC tablet Take 1 tablet (375 mg total) by mouth 2 (two) times daily with meals. 15 tablet 0    omeprazole (PRILOSEC) 20 MG capsule take 1 capsule by mouth once daily 90 capsule 3    ondansetron (ZOFRAN-ODT) 4 MG TbDL Take 1 tablet (4 mg total) by mouth every 8 (eight) hours as  needed (nausea). 15 tablet 0    triamterene-hydrochlorothiazide 37.5-25 mg (DYAZIDE) 37.5-25 mg per capsule Take 1 capsule by mouth 2 (two) times daily. 180 capsule 1     No current facility-administered medications on file prior to visit.        Allergies   Review of patient's allergies indicates:   Allergen Reactions    Codeine      Other reaction(s): Rash    Propranolol Other (See Comments)     Burning sensation of scalp/skin     Review of Systems   Constitutional: Negative for chills, fatigue and fever.   Respiratory: Negative for cough, shortness of breath and wheezing.    Cardiovascular: Negative for chest pain and palpitations.   Gastrointestinal: Negative for abdominal pain, blood in stool, diarrhea and nausea.   Genitourinary: Negative for dysuria, hematuria, pelvic pain and urgency.   Psychiatric/Behavioral: Negative for confusion, decreased concentration, dysphoric mood, hallucinations, sleep disturbance and suicidal ideas. The patient is not nervous/anxious and is not hyperactive.        /72 (BP Location: Right arm, Patient Position: Sitting, BP Method: Medium (Manual))   Pulse 73   Temp 98 °F (36.7 °C) (Oral)   Resp 17   Ht 5' (1.524 m)   Wt 88.1 kg (194 lb 3.6 oz)   SpO2 97%   BMI 37.93 kg/m²     Physical Exam   Constitutional: She appears well-developed.   Eyes: EOM are normal. Pupils are equal, round, and reactive to light.   Neck: Normal range of motion. Neck supple. No tracheal deviation present.   Cardiovascular: Normal rate, regular rhythm, normal heart sounds and intact distal pulses.    Pulmonary/Chest: Effort normal and breath sounds normal. She has no wheezes. She has no rales.   Abdominal: Soft. Bowel sounds are normal. She exhibits no mass. There is no tenderness.   Lymphadenopathy:     She has no cervical adenopathy.   Psychiatric: Her mood appears not anxious. Her speech is not rapid and/or pressured. She is not agitated and not actively hallucinating. Thought content is  not paranoid. Cognition and memory are not impaired. She does not express impulsivity or inappropriate judgment. She does not exhibit a depressed mood. She expresses no suicidal ideation. She expresses no suicidal plans. She is attentive.   Vitals reviewed.      Assessment/Plan:  Jose Manuel was seen today for weight loss.    Diagnoses and all orders for this visit:    Weight loss, unintentional  -     Ambulatory referral to Gastroenterology  The patient was advised to continue current Remeron.  She was referred to Gastroenterology for follow-up on [place] loss [place]  Discussed with patient risk of malignancy and need for further studies.  She explained that if she does have a malignancy, she would leave that in God's hands and she is not too concerned.  Patient to follow up within the next 6 weeks after seeing GI    Depression, unspecified depression type  As patient is doing better, will continue Remeron, and management as per Psychiatry recommendations.    Diverticulosis of intestine without bleeding, unspecified intestinal tract location  -     Ambulatory referral to Gastroenterology  Referral to Gastroenterology    H/O: stroke  -     atorvastatin (LIPITOR) 40 MG tablet; Take 1 tablet (40 mg total) by mouth once daily.  Patient requested renewal of atorvastatin, and this was sent in.            This office note has been dictated.  This dictation has been generated using M-Modal Fluency Direct dictation; some phonetic errors may occur.

## 2018-08-10 ENCOUNTER — OFFICE VISIT (OUTPATIENT)
Dept: PSYCHIATRY | Facility: CLINIC | Age: 78
End: 2018-08-10
Payer: MEDICARE

## 2018-08-10 VITALS
WEIGHT: 199.94 LBS | DIASTOLIC BLOOD PRESSURE: 70 MMHG | SYSTOLIC BLOOD PRESSURE: 136 MMHG | BODY MASS INDEX: 39.25 KG/M2 | HEIGHT: 60 IN | HEART RATE: 80 BPM

## 2018-08-10 DIAGNOSIS — F33.0 MAJOR DEPRESSIVE DISORDER, RECURRENT EPISODE, MILD WITH ANXIOUS DISTRESS: Primary | ICD-10-CM

## 2018-08-10 DIAGNOSIS — F51.05 INSOMNIA RELATED TO ANOTHER MENTAL DISORDER: ICD-10-CM

## 2018-08-10 PROCEDURE — 3075F SYST BP GE 130 - 139MM HG: CPT | Mod: CPTII,S$GLB,, | Performed by: NURSE PRACTITIONER

## 2018-08-10 PROCEDURE — 99213 OFFICE O/P EST LOW 20 MIN: CPT | Mod: S$GLB,,, | Performed by: NURSE PRACTITIONER

## 2018-08-10 PROCEDURE — 99999 PR PBB SHADOW E&M-EST. PATIENT-LVL III: CPT | Mod: PBBFAC,,, | Performed by: NURSE PRACTITIONER

## 2018-08-10 PROCEDURE — 3078F DIAST BP <80 MM HG: CPT | Mod: CPTII,S$GLB,, | Performed by: NURSE PRACTITIONER

## 2018-08-10 RX ORDER — MIRTAZAPINE 15 MG/1
15 TABLET, FILM COATED ORAL NIGHTLY
Qty: 30 TABLET | Refills: 2 | Status: SHIPPED | OUTPATIENT
Start: 2018-08-10 | End: 2018-11-12 | Stop reason: SDUPTHER

## 2018-08-10 RX ORDER — MIRTAZAPINE 15 MG/1
15 TABLET, FILM COATED ORAL NIGHTLY
Qty: 30 TABLET | Refills: 2 | Status: SHIPPED | OUTPATIENT
Start: 2018-08-10 | End: 2018-08-10 | Stop reason: SDUPTHER

## 2018-08-10 RX ORDER — POLYETHYLENE GLYCOL-3350 AND ELECTROLYTES WITH FLAVOR PACK 240; 5.84; 2.98; 6.72; 22.72 G/278.26G; G/278.26G; G/278.26G; G/278.26G; G/278.26G
POWDER, FOR SOLUTION ORAL
COMMUNITY
Start: 2018-08-07 | End: 2019-12-10

## 2018-08-10 NOTE — PROGRESS NOTES
Outpatient Psychiatry Follow-Up Visit (MD/NP)    8/10/2018    Clinical Status of Patient:  Outpatient (Ambulatory)    Chief Complaint:  Jose Manuel Boyd is a 78 y.o. female who presents today for follow-up of depression, anxiety and poor sleep.  Met with patient.      Interval History and Content of Current Session:  Interim Events/Subjective Report/Content of Current Session: Jose Manuel  was last seen by me on 07/10/2018 for an initial psychiatric evaluation. Jose Manuel was diagnosed with MDD, mild with anxious distress and insomnia. A plan of care was devised to include:    1. Safety: Call 911 or Crisis Line or go to ER for suicidal ideation, adverse effects of medication or any other emergency  2. Continue Remeron 15 mg po qhs for sleep, depression and anxiety.  3. Return to clinic in one month or sooner prn.     Today Jose Manuel is seen face to face. Denies any problems with medications. She states she is doing well. Sleep is good. Appetite has improved and energy level is fair. Her depression is under control with the Remeron but she does get lonely. She is spontaneous and talkative today. She has a colonoscopy on next Friday.     Psychotherapy:  · Target symptoms: depression, anxiety , poor sleep  · Why chosen therapy is appropriate versus another modality: relevant to diagnosis, evidence based practice  · Outcome monitoring methods: self-report, observation  · Therapeutic intervention type: supportive psychotherapy  · Topics discussed/themes: relationships difficulties with grown son  · The patient's response to the intervention is accepting. The patient's progress toward treatment goals is good.   · Duration of intervention: 16 minutes.    Review of Systems   PSYCHIATRIC: Pertinant items are noted in the narrative.   GENERAL:  Appears actual age  SKIN:  No rashes or lacerations  HEAD:  No headache today  EYES:  No exophthalmos, jaundice or blindness  EARS:  No dizziness, tinnitus or hearing loss  NOSE:  No  "changes in smell  MOUTH & THROAT:  No dyskinetic movements or obvious goiter  CHEST:  No shortness of breath, hyperventilation or cough  CARDIOVASCULAR:  No tachycardia or chest pain  ABDOMEN:  No nausea, vomiting, pain, constipation or diarrhea  URINARY:  No frequency, or dysuria   ENDOCRINE:  No polydipsia, polyuria  MUSCULOSKELETAL:  No tremors or tics  NEUROLOGIC:  No abnormal movements    Past Medical, Family and Social History: The patient's past medical, family and social history have been reviewed and updated as appropriate within the electronic medical record - see encounter notes.    Compliance: yes    Side effects: None    Risk Parameters:  Patient reports no suicidal ideation  Patient reports no homicidal ideation  Patient reports no self-injurious behavior  Patient reports no violent behavior    Exam (detailed: at least 9 elements; comprehensive: all 15 elements)   Constitutional  Vitals:  Most recent vital signs, dated less than 90 days prior to this appointment, were reviewed.   Vitals:    08/10/18 1035   BP: 136/70   Pulse: 80   Weight: 90.7 kg (199 lb 15.3 oz)   Height: 5' (1.524 m)        General:  unremarkable, age appropriate     Musculoskeletal  Muscle Strength/Tone:  no tremor, no tic   Gait & Station:  non-ataxic     Psychiatric  Speech:  no latency; no press   Mood & Affect:  " pretty good."  appropriate   Thought Process:  normal and logical   Associations:  intact   Thought Content:  normal, no suicidality, no homicidality, delusions, or paranoia   Insight:  has awareness of illness   Judgement: behavior is adequate to circumstances   Orientation:  person, place, situation   Memory: intact for content of interview   Language: grossly intact   Attention Span & Concentration:  able to focus   Fund of Knowledge:  intact and appropriate to age and level of education     Assessment and Diagnosis   Status/Progress: Based on the examination today, the patient's problem(s) is/are improved.  New " problems have not been presented today.   Lack of compliance are not complicating management of the primary condition.  There are no active rule-out diagnoses for this patient at this time.     General Impression: She is doing well.      ICD-10-CM ICD-9-CM   1. 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 Insomnia related to another mental disorder F51.05 300.9     327.02       Intervention/Counseling/Treatment Plan   · Medication Management: The risks and benefits of medication were discussed with the patient.  · The treatment plan and follow up plan were reviewed with the patient.   1. Safety: Call 911 or Crisis Line or go to ER for suicidal ideation, adverse effects of medication or any other emergency  2. Continue Remeron 15 mg po qhs for sleep, depression and anxiety.  3. Return to clinic in 3 months or sooner prn.    Patient agrees with POC.    INSTRUCTIONS    Instructed to call 911 or go to ER for suicidal ideation, adverse effects of medication or any other emergency. Verbalizes understanding and plan to comply.      Return to Clinic: 3 months, as needed

## 2018-08-10 NOTE — PATIENT INSTRUCTIONS
"        You have been provided with a certain amount of medication with a specified number of refills.  Please follow up within an adequate time before you run out of medications.    REFILLS FOR CONTROLLED SUBSTANCES WILL NOT BE GIVEN WITHOUT AN APPOINTMENT.  I will not honor or fill automated refill requests from pharmacies.  You must come in for an appointment to get refills.        Please book your next appointment for myself or therapist by phone by calling our office at [phone].          PLEASE BE AT LEAST 15 MINUTES EARLY FOR YOUR NEXT APPOINTMENT.  PLEASE, DO NOT BE LATE OR YOU WILL BE TURNED AWAY AND ASKED TO RESCHEDULE.  YOU MUST COME EARLY TO ALLOW TIME FOR CHECK-IN AS WELL AS GET YOUR VITAL SIGNS AND GO OVER YOUR MEDICATIONS.  [facility] is not fair to the patients who present after you and are on time for their appointments.  It causes a delay in the appointments for patients and staff.  IF YOU ARE LATE, THERE IS A POSSIBILITY THAT YOU WILL BE CHARGED FOR THE APPOINTMENT TIME PERSONALLY AND IT WILL NOT GO TO YOUR INSURANCE.  YOU MAY ALSO BE DISCHARGED FROM CLINIC with multiple "No Show" appointments.       -----------------------------------------------------------------------------------------------------------------  IF YOU FEEL SUICIDAL OR HAVING THOUGHTS OR PLANS TO HURT YOURSELF OR OTHERS, CALL 911 OR REPORT TO THE NEAREST EMERGENCY ROOM.  YOU CAN ALSO ACCESS THE FOLLOWING HOTLINE:    National Suicide Hotline Number 5-917-301-TALK (8807)                  "

## 2018-11-12 ENCOUNTER — OFFICE VISIT (OUTPATIENT)
Dept: PSYCHIATRY | Facility: CLINIC | Age: 78
End: 2018-11-12
Payer: COMMERCIAL

## 2018-11-12 VITALS
DIASTOLIC BLOOD PRESSURE: 62 MMHG | HEART RATE: 82 BPM | BODY MASS INDEX: 39.43 KG/M2 | SYSTOLIC BLOOD PRESSURE: 116 MMHG | WEIGHT: 200.81 LBS | HEIGHT: 60 IN

## 2018-11-12 DIAGNOSIS — F51.05 INSOMNIA RELATED TO ANOTHER MENTAL DISORDER: ICD-10-CM

## 2018-11-12 DIAGNOSIS — F33.0 MAJOR DEPRESSIVE DISORDER, RECURRENT EPISODE, MILD WITH ANXIOUS DISTRESS: Primary | ICD-10-CM

## 2018-11-12 PROCEDURE — 99999 PR PBB SHADOW E&M-EST. PATIENT-LVL III: CPT | Mod: PBBFAC,,, | Performed by: NURSE PRACTITIONER

## 2018-11-12 PROCEDURE — 1101F PT FALLS ASSESS-DOCD LE1/YR: CPT | Mod: CPTII,S$GLB,, | Performed by: NURSE PRACTITIONER

## 2018-11-12 PROCEDURE — 99214 OFFICE O/P EST MOD 30 MIN: CPT | Mod: S$GLB,,, | Performed by: NURSE PRACTITIONER

## 2018-11-12 PROCEDURE — 3074F SYST BP LT 130 MM HG: CPT | Mod: CPTII,S$GLB,, | Performed by: NURSE PRACTITIONER

## 2018-11-12 PROCEDURE — 99499 UNLISTED E&M SERVICE: CPT | Mod: S$GLB,,, | Performed by: NURSE PRACTITIONER

## 2018-11-12 PROCEDURE — 3078F DIAST BP <80 MM HG: CPT | Mod: CPTII,S$GLB,, | Performed by: NURSE PRACTITIONER

## 2018-11-12 PROCEDURE — 90833 PSYTX W PT W E/M 30 MIN: CPT | Mod: S$GLB,,, | Performed by: NURSE PRACTITIONER

## 2018-11-12 RX ORDER — MIRTAZAPINE 30 MG/1
30 TABLET, FILM COATED ORAL NIGHTLY
Qty: 30 TABLET | Refills: 0 | Status: SHIPPED | OUTPATIENT
Start: 2018-11-12 | End: 2018-12-12 | Stop reason: SDUPTHER

## 2018-11-12 NOTE — PATIENT INSTRUCTIONS
"You have been provided with a certain amount of medication with a specified number of refills.  Please follow up within an adequate time before you run out of medications.    REFILLS FOR CONTROLLED SUBSTANCES WILL NOT BE GIVEN WITHOUT AN APPOINTMENT.  I will not honor or fill automated refill requests from pharmacies.  You must come in for an appointment to get refills.    Please book your next appointment for myself or therapist by phone by calling our office at [phone].      PLEASE BE AT LEAST 15 MINUTES EARLY FOR YOUR NEXT APPOINTMENT.  PLEASE, DO NOT BE LATE OR YOU WILL BE TURNED AWAY AND ASKED TO RESCHEDULE.  YOU MUST COME EARLY TO ALLOW TIME FOR CHECK-IN AS WELL AS GET YOUR VITAL SIGNS AND GO OVER YOUR MEDICATIONS.  [facility] is not fair to the patients who present after you and are on time for their appointments.  It causes a delay in the appointments for patients and staff.  IF YOU ARE LATE, THERE IS A POSSIBILITY THAT YOU WILL BE CHARGED FOR THE APPOINTMENT TIME PERSONALLY AND IT WILL NOT GO TO YOUR INSURANCE.  YOU MAY ALSO BE DISCHARGED FROM CLINIC with multiple "No Show" appointments.  -----------------------------------------------------------------------------------------------------------------  IF YOU FEEL SUICIDAL OR HAVING THOUGHTS OR PLANS TO HURT YOURSELF OR OTHERS, CALL 911 OR REPORT TO THE NEAREST EMERGENCY ROOM.  YOU CAN ALSO ACCESS THE FOLLOWING HOTLINE(S):    National Suicide Hotline Number 5-786-683-TALK (2086)     ([facility] Mobile Crisis, [phone]'   [facility] Crisis Line, [phone]; [place], 24 hours / 7 days, [phone]-COPE (1575), [phone]-COPE ([phone]))     "

## 2018-11-12 NOTE — PROGRESS NOTES
Outpatient Psychiatry Follow-Up Visit (MD/NP)    11/12/2018    Clinical Status of Patient:  Outpatient (Ambulatory)    Chief Complaint:  Jose Manuel Boyd is a 78 y.o. female who presents today for follow-up of depression, anxiety and poor sleep.  Met with patient.      Interval History and Content of Current Session:  Interim Events/Subjective Report/Content of Current Session: Jose Manuel  was last seen by me on 08/10/2018 for a follow up visit. She was doing well. Jose Manuel was diagnosed with MDD, mild with anxious distress and insomnia. A plan of care was devised to include:    1. Safety: Call 911 or Crisis Line or go to ER for suicidal ideation, adverse effects of medication or any other emergency  2. Continue Remeron 15 mg po qhs for sleep, depression and anxiety.  3. Return to clinic in 3 months or sooner prn.     Today Jose Manuel is seen face to face. Her sleep is not good and she stopped the Remeron because it ws not working for sleep. She states she is feeling much better. She states she feels depressed at times but not all the time. She has no thoughts of hurting or killing herself or anyone else. She spends a lot of time talking about other people and their problems. She especially focuses on her son who was diagnosed with Diabetes. She states she is nervous about leaving her brother at her house because she fears he will cook and fall asleep. She admits that she isolates. Her energy level is low. She cries easily at times. She did have a good night where she made a big pot of soup. She feels less depressed but still feels down. She admits that she was less depressed when she was taking the Remeron. She agrees to restart it now. She was sleeping about 4 hours on the Remeron 15 mgs.     What was scheduled as a 30 minute visit actually takes 42 minutes with greater than 50% of time spent in psychotherapy.    PSYCHOTHERAPY    · Target symptoms: depression, anxiety , poor sleep  · Why chosen therapy is appropriate  versus another modality: relevant to diagnosis, evidence based practice  · Outcome monitoring methods: self-report, observation  · Therapeutic intervention type: supportive psychotherapy  · Topics discussed/themes: relationships difficulties with grown son, worrying about extended family, going away for Thanksgiving, sleep, Reverse mortgage, Prayer  · The patient's response to the intervention is accepting. The patient's progress toward treatment goals is good.   · Duration of intervention: 23 minutes.    Review of Systems   PSYCHIATRIC: Pertinant items are noted in the narrative.   GENERAL:  Appears actual age  SKIN:  No rashes or lacerations  HEAD:  No headache today, states getting less headaches now  MOUTH & THROAT:  No dyskinetic movements or obvious goiter  CHEST:  No shortness of breath, hyperventilation or cough  CARDIOVASCULAR:  No tachycardia or chest pain  ABDOMEN:  No nausea, vomiting, pain, constipation or diarrhea  URINARY:  No dysuria   MUSCULOSKELETAL:  No tremors or tics, left leg pain (states from the cold weather, took Tylenol this morning).  NEUROLOGIC:  No abnormal movements    Past Medical, Family and Social History: The patient's past medical, family and social history have been reviewed and updated as appropriate within the electronic medical record - see encounter notes.    Compliance: yes    Side effects: None    Risk Parameters:  Patient reports no suicidal ideation  Patient reports no homicidal ideation  Patient reports no self-injurious behavior  Patient reports no violent behavior    Exam (detailed: at least 9 elements; comprehensive: all 15 elements)   Constitutional  Vitals:  Most recent vital signs, dated less than 90 days prior to this appointment, were reviewed.   Vitals:    11/12/18 0902   BP: 116/62   Pulse: 82   Weight: 91.1 kg (200 lb 13.4 oz)   Height: 5' (1.524 m)        General:  unremarkable, age appropriate     Musculoskeletal  Muscle Strength/Tone:  no tremor, no tic  "  Gait & Station:  non-ataxic     Psychiatric  Speech:  no latency; no press   Mood & Affect:  " Its good."  anxious, tearful at times   Thought Process:  normal and logical   Associations:  intact   Thought Content:  normal, no suicidality, no homicidality, delusions, or paranoia   Insight:  has awareness of illness   Judgement: behavior is adequate to circumstances   Orientation:  person, place, situation   Memory: intact for content of interview   Language: grossly intact   Attention Span & Concentration:  able to focus   Fund of Knowledge:  intact and appropriate to age and level of education     Assessment and Diagnosis   Status/Progress: Based on the examination today, the patient's problem(s) is/are worsening.  New problems have not been presented today.   Lack of compliance is complicating management of the primary condition.  There are no active rule-out diagnoses for this patient at this time.     General Impression: She has stopped her medication and has regressed.       ICD-10-CM ICD-9-CM   1. 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 Insomnia related to another mental disorder F51.05 300.9     327.02       Intervention/Counseling/Treatment Plan   · Medication Management: The risks and benefits of medication were discussed with the patient.  · The treatment plan and follow up plan were reviewed with the patient.   1. Safety: Call 911 or Crisis Line or go to ER for suicidal ideation, adverse effects of medication or any other emergency  2. Restart Remeron at 30 mg po qhs for sleep, depression and anxiety.  3. Return to clinic in one month or sooner prn.    Patient agrees with POC.    INSTRUCTIONS    Instructed to call 911 or go to ER for suicidal ideation, adverse effects of medication or any other emergency. Verbalizes understanding and plan to comply.      Return to Clinic: 1 month, as needed  "

## 2018-12-12 ENCOUNTER — OFFICE VISIT (OUTPATIENT)
Dept: PSYCHIATRY | Facility: CLINIC | Age: 78
End: 2018-12-12
Payer: MEDICARE

## 2018-12-12 VITALS
HEIGHT: 60 IN | HEART RATE: 80 BPM | BODY MASS INDEX: 40.13 KG/M2 | WEIGHT: 204.38 LBS | SYSTOLIC BLOOD PRESSURE: 174 MMHG | DIASTOLIC BLOOD PRESSURE: 100 MMHG

## 2018-12-12 DIAGNOSIS — F39 MOOD INSOMNIA: ICD-10-CM

## 2018-12-12 DIAGNOSIS — F33.0 MAJOR DEPRESSIVE DISORDER, RECURRENT EPISODE, MILD WITH ANXIOUS DISTRESS: Primary | ICD-10-CM

## 2018-12-12 DIAGNOSIS — F51.05 MOOD INSOMNIA: ICD-10-CM

## 2018-12-12 DIAGNOSIS — Z91.148 NONCOMPLIANCE WITH MEDICATION REGIMEN: ICD-10-CM

## 2018-12-12 PROCEDURE — 99999 PR PBB SHADOW E&M-EST. PATIENT-LVL III: CPT | Mod: PBBFAC,,, | Performed by: NURSE PRACTITIONER

## 2018-12-12 PROCEDURE — 3077F SYST BP >= 140 MM HG: CPT | Mod: CPTII,S$GLB,, | Performed by: NURSE PRACTITIONER

## 2018-12-12 PROCEDURE — 3080F DIAST BP >= 90 MM HG: CPT | Mod: CPTII,S$GLB,, | Performed by: NURSE PRACTITIONER

## 2018-12-12 PROCEDURE — 90833 PSYTX W PT W E/M 30 MIN: CPT | Mod: ,,, | Performed by: NURSE PRACTITIONER

## 2018-12-12 PROCEDURE — 99214 OFFICE O/P EST MOD 30 MIN: CPT | Mod: S$GLB,,, | Performed by: NURSE PRACTITIONER

## 2018-12-12 PROCEDURE — 1101F PT FALLS ASSESS-DOCD LE1/YR: CPT | Mod: CPTII,S$GLB,, | Performed by: NURSE PRACTITIONER

## 2018-12-12 RX ORDER — MIRTAZAPINE 30 MG/1
30 TABLET, FILM COATED ORAL NIGHTLY
Qty: 30 TABLET | Refills: 0 | Status: SHIPPED | OUTPATIENT
Start: 2018-12-12 | End: 2019-01-11 | Stop reason: SDUPTHER

## 2018-12-12 RX ORDER — BUSPIRONE HYDROCHLORIDE 7.5 MG/1
7.5 TABLET ORAL 2 TIMES DAILY
Qty: 60 TABLET | Refills: 0 | Status: SHIPPED | OUTPATIENT
Start: 2018-12-12 | End: 2019-01-11 | Stop reason: SDUPTHER

## 2018-12-12 NOTE — PATIENT INSTRUCTIONS
"You have been provided with a certain amount of medication with a specified number of refills.  Please follow up within an adequate time before you run out of medications.    REFILLS FOR CONTROLLED SUBSTANCES WILL NOT BE GIVEN WITHOUT AN APPOINTMENT.  I will not honor or fill automated refill requests from pharmacies.  You must come in for an appointment to get refills.    Please book your next appointment for myself or therapist by phone by calling our office at [phone].      PLEASE BE AT LEAST 15 MINUTES EARLY FOR YOUR NEXT APPOINTMENT.  PLEASE, DO NOT BE LATE OR YOU WILL BE TURNED AWAY AND ASKED TO RESCHEDULE.  YOU MUST COME EARLY TO ALLOW TIME FOR CHECK-IN AS WELL AS GET YOUR VITAL SIGNS AND GO OVER YOUR MEDICATIONS.  [facility] is not fair to the patients who present after you and are on time for their appointments.  It causes a delay in the appointments for patients and staff.  IF YOU ARE LATE, THERE IS A POSSIBILITY THAT YOU WILL BE CHARGED FOR THE APPOINTMENT TIME PERSONALLY AND IT WILL NOT GO TO YOUR INSURANCE.  YOU MAY ALSO BE DISCHARGED FROM CLINIC with multiple "No Show" appointments.  -----------------------------------------------------------------------------------------------------------------  IF YOU FEEL SUICIDAL OR HAVING THOUGHTS OR PLANS TO HURT YOURSELF OR OTHERS, CALL 911 OR REPORT TO THE NEAREST EMERGENCY ROOM.  YOU CAN ALSO ACCESS THE FOLLOWING HOTLINE(S):    National Suicide Hotline Number 0-028-713-TALK (5559)     ([facility] Mobile Crisis, [phone]'   [facility], [phone]; [place], 24 hours / 7 days, [phone]) 357-COPE (2925), 1-100-510-COPE (0828))     "

## 2018-12-12 NOTE — PROGRESS NOTES
Outpatient Psychiatry Follow-Up Visit (MD/NP)    12/12/2018    Clinical Status of Patient:  Outpatient (Ambulatory)    Chief Complaint:  Jose Manuel Boyd is a 78 y.o. female who presents today for follow-up of depression, anxiety and poor sleep.  Met with patient.      Interval History and Content of Current Session:  Interim Events/Subjective Report/Content of Current Session: Jose Manuel  was last seen by me on 11/12/2018 for a follow up visit. She had stopped her medication and had regressed. Jose Manuel was diagnosed with MDD, mild with anxious distress and insomnia. A plan of care was devised to include:    1. Safety: Call 911 or Crisis Line or go to ER for suicidal ideation, adverse effects of medication or any other emergency  2. Restart Remeron at 30 mg po qhs for sleep, depression and anxiety.  3. Return to clinic in one month or sooner prn.     Today Jose Manuel is seen face to face. Her sleep fluctuates. She states she is worrying about her son and this is what interferes with her sleep. Emotional support given. Her appetite is good. Her energy level is okay. Her depression has improved. Her anxiety is not bothering her at this time. She went to [place], by her daughter for Thanks giving and had a great time. Her blood pressure is high and she states she took her medication late this morning and is worried about her son getting back home from a trip. Instructed on dangers of elevated blood pressure with emphasis on stroke and death. She is asking about valium for her anxiety. Instructed that valium increases risks for falls and memory problems and is addictive. Verbalizes understanding. Offered buspar and initially refuses but at the end of the interview, agrees to try.     PSYCHOTHERAPY    · Target symptoms: depression, anxiety , poor sleep  · Why chosen therapy is appropriate versus another modality: relevant to diagnosis, evidence based practice  · Outcome monitoring methods: self-report,  observation  · Therapeutic intervention type: supportive psychotherapy  · Topics discussed/themes: relationships difficulties with grown son, helping daughter with financial concerns.  · The patient's response to the intervention is accepting. The patient's progress toward treatment goals is good.  · Duration of intervention: 18 minutes.    Review of Systems   PSYCHIATRIC: Pertinant items are noted in the narrative.   GENERAL:  Appears actual age  SKIN:  No rashes or lacerations  HEAD:  No headache today, states getting less headaches now  MOUTH & THROAT:  No dyskinetic movements or obvious goiter  CHEST:  No shortness of breath, hyperventilation or cough  CARDIOVASCULAR:  No tachycardia or chest pain  ABDOMEN:  No nausea, vomiting, pain, constipation or diarrhea  URINARY:  No dysuria   MUSCULOSKELETAL:  Left hand tremor noted today, states both hands have a tremor at times and has had this for years ([facility] sees neurologist at [place]), no tics  NEUROLOGIC:  Left hand tremor    Past Medical, Family and Social History: The patient's past medical, family and social history have been reviewed and updated as appropriate within the electronic medical record - see encounter notes.    Compliance: states she is partially compliant with Remeron because some nights she does not want to go sleep.    Side effects: None    Risk Parameters:  Patient reports no suicidal ideation  Patient reports no homicidal ideation  Patient reports no self-injurious behavior  Patient reports no violent behavior    Exam (detailed: at least 9 elements; comprehensive: all 15 elements)   Constitutional  Vitals:  Most recent vital signs, dated less than 90 days prior to this appointment, were reviewed.   Vitals:    12/12/18 0930   BP: (!) 174/100   Pulse: 80   Weight: 92.7 kg (204 lb 5.9 oz)   Height: 5' (1.524 m)        General:  unremarkable, age appropriate     Musculoskeletal  Muscle Strength/Tone:  no tremor, no tic   Gait & Station:   "non-ataxic     Psychiatric  Speech:  no latency; no press   Mood & Affect:  " Good"  calm and pleasant    Thought Process:  normal and logical   Associations:  intact   Thought Content:  normal, no suicidality, no homicidality, delusions, or paranoia   Insight:  has awareness of illness   Judgement: behavior is adequate to circumstances   Orientation:  person, place, situation   Memory: intact for content of interview   Language: grossly intact   Attention Span & Concentration:  able to focus   Fund of Knowledge:  intact and appropriate to age and level of education     Assessment and Diagnosis   Status/Progress: Based on the examination today, the patient's problem(s) is/are improved.  New problems have not been presented today.   Lack of compliance is complicating management of the primary condition.  There are no active rule-out diagnoses for this patient at this time.     General Impression: She has improved but is only partially compliant with her medication.    ICD-10-CM ICD-9-CM   1. 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 Noncompliance with medication regimen Z91.14 V15.81       Intervention/Counseling/Treatment Plan   · Medication Management: The risks and benefits of medication were discussed with the patient.  · The treatment plan and follow up plan were reviewed with the patient.   1. Safety: Call 911 or Crisis Line or go to ER for suicidal ideation, adverse effects of medication or any other emergency  2. Remeron 30 mg po qhs for sleep, depression and anxiety.  3. Start Buspar 7.5 mg po BID.   4. Return to clinic in 1 month or sooner prn.  5. Follow up with PCP regarding elevated blood pressure readings.    Patient agrees with POC.    INSTRUCTIONS    Instructed to call 911 or go to ER for suicidal ideation, adverse effects of medication or any other emergency. Verbalizes understanding and plan to " comply.    Instructed that Remeron is also for her mood and she should take it every night to help with keeping her mood good. Verbalizes understanding.     Instructed on uses, effects, side effects, adverse reactions and benefits vs risks of Buspar with emphasis on delay in feeling effects of medication, insomnia and nausea and instructed on when to seek 911/ER. Verbalizes understanding and plans to comply.      Return to Clinic: 1 month, as needed

## 2018-12-26 ENCOUNTER — OFFICE VISIT (OUTPATIENT)
Dept: FAMILY MEDICINE | Facility: CLINIC | Age: 78
End: 2018-12-26
Payer: MEDICARE

## 2018-12-26 VITALS
OXYGEN SATURATION: 98 % | RESPIRATION RATE: 17 BRPM | HEART RATE: 88 BPM | TEMPERATURE: 99 F | SYSTOLIC BLOOD PRESSURE: 137 MMHG | DIASTOLIC BLOOD PRESSURE: 63 MMHG | WEIGHT: 205.5 LBS | BODY MASS INDEX: 40.34 KG/M2 | HEIGHT: 60 IN

## 2018-12-26 DIAGNOSIS — J43.8 OTHER EMPHYSEMA: Primary | ICD-10-CM

## 2018-12-26 PROCEDURE — 99999 PR PBB SHADOW E&M-EST. PATIENT-LVL III: CPT | Mod: PBBFAC,,, | Performed by: INTERNAL MEDICINE

## 2018-12-26 PROCEDURE — 1101F PT FALLS ASSESS-DOCD LE1/YR: CPT | Mod: CPTII,S$GLB,, | Performed by: INTERNAL MEDICINE

## 2018-12-26 PROCEDURE — 3075F SYST BP GE 130 - 139MM HG: CPT | Mod: CPTII,S$GLB,, | Performed by: INTERNAL MEDICINE

## 2018-12-26 PROCEDURE — 99214 OFFICE O/P EST MOD 30 MIN: CPT | Mod: S$GLB,,, | Performed by: INTERNAL MEDICINE

## 2018-12-26 PROCEDURE — 3078F DIAST BP <80 MM HG: CPT | Mod: CPTII,S$GLB,, | Performed by: INTERNAL MEDICINE

## 2018-12-26 RX ORDER — PREDNISONE 20 MG/1
40 TABLET ORAL DAILY
Qty: 10 TABLET | Refills: 0 | Status: SHIPPED | OUTPATIENT
Start: 2018-12-26 | End: 2018-12-31

## 2018-12-26 NOTE — PROGRESS NOTES
Subjective:       Patient ID: Jose Manuel Boyd is a 78 y.o. female.    Chief Complaint: Cough and Establish Care    Cough   This is a recurrent problem. The current episode started in the past 7 days. The problem has been unchanged. The problem occurs constantly. The cough is non-productive. Associated symptoms include postnasal drip, rhinorrhea, shortness of breath and wheezing. Pertinent negatives include no chest pain, chills, ear pain, fever, headaches, hemoptysis, rash or sore throat. She has tried a beta-agonist inhaler for the symptoms. The treatment provided moderate relief. Her past medical history is significant for COPD and emphysema.     Review of Systems   Constitutional: Negative for activity change, appetite change, chills, fatigue, fever and unexpected weight change.   HENT: Positive for postnasal drip and rhinorrhea. Negative for ear pain and sore throat.    Eyes: Negative for discharge and visual disturbance.   Respiratory: Positive for cough, shortness of breath and wheezing. Negative for hemoptysis and chest tightness.    Cardiovascular: Negative for chest pain, palpitations and leg swelling.   Gastrointestinal: Negative for abdominal pain, constipation and diarrhea.   Endocrine: Negative for cold intolerance and heat intolerance.   Genitourinary: Negative for dysuria and hematuria.   Musculoskeletal: Negative for joint swelling and neck stiffness.   Skin: Negative for rash.   Neurological: Negative for dizziness, syncope, weakness and headaches.   Psychiatric/Behavioral: Negative for suicidal ideas.       Objective:     Vitals:    12/26/18 1354 12/26/18 1358   BP: (!) 175/81 137/63   BP Location: Right arm    Patient Position: Sitting    Pulse: 88    Resp: 17    Temp: 98.5 °F (36.9 °C)    TempSrc: Oral    SpO2: 98%    Weight: 93.2 kg (205 lb 7.5 oz)    Height: 5' (1.524 m)           Physical Exam   Constitutional: She is oriented to person, place, and time. She appears well-developed and  well-nourished.   HENT:   Head: Normocephalic and atraumatic.   Eyes: Conjunctivae are normal. Pupils are equal, round, and reactive to light.   Neck: Normal range of motion.   Cardiovascular: Normal rate and regular rhythm. Exam reveals no gallop and no friction rub.   No murmur heard.  Pulmonary/Chest: Effort normal. She has wheezes. She has no rales.   Bilateral end exp wheezes   Abdominal: Soft. Bowel sounds are normal. There is no tenderness. There is no rebound and no guarding.   Musculoskeletal: Normal range of motion. She exhibits no edema or tenderness.   Neurological: She is alert and oriented to person, place, and time. No cranial nerve deficit.   Skin: Skin is warm and dry.   Psychiatric: She has a normal mood and affect.       Assessment and Plan   1. Other emphysema  Prednisone daily x five days if no improvement will need advance imaging  - predniSONE (DELTASONE) 20 MG tablet; Take 2 tablets (40 mg total) by mouth once daily. for 5 days  Dispense: 10 tablet; Refill: 0

## 2019-01-11 ENCOUNTER — OFFICE VISIT (OUTPATIENT)
Dept: PSYCHIATRY | Facility: CLINIC | Age: 79
End: 2019-01-11
Payer: COMMERCIAL

## 2019-01-11 VITALS
DIASTOLIC BLOOD PRESSURE: 88 MMHG | WEIGHT: 203.25 LBS | HEIGHT: 60 IN | BODY MASS INDEX: 39.9 KG/M2 | SYSTOLIC BLOOD PRESSURE: 142 MMHG | HEART RATE: 76 BPM

## 2019-01-11 DIAGNOSIS — F39 MOOD INSOMNIA: ICD-10-CM

## 2019-01-11 DIAGNOSIS — F51.05 MOOD INSOMNIA: ICD-10-CM

## 2019-01-11 DIAGNOSIS — F33.0 MAJOR DEPRESSIVE DISORDER, RECURRENT EPISODE, MILD WITH ANXIOUS DISTRESS: ICD-10-CM

## 2019-01-11 PROCEDURE — 99214 PR OFFICE/OUTPT VISIT, EST, LEVL IV, 30-39 MIN: ICD-10-PCS | Mod: S$GLB,,, | Performed by: NURSE PRACTITIONER

## 2019-01-11 PROCEDURE — 3079F PR MOST RECENT DIASTOLIC BLOOD PRESSURE 80-89 MM HG: ICD-10-PCS | Mod: CPTII,S$GLB,, | Performed by: NURSE PRACTITIONER

## 2019-01-11 PROCEDURE — 3077F PR MOST RECENT SYSTOLIC BLOOD PRESSURE >= 140 MM HG: ICD-10-PCS | Mod: CPTII,S$GLB,, | Performed by: NURSE PRACTITIONER

## 2019-01-11 PROCEDURE — 99214 OFFICE O/P EST MOD 30 MIN: CPT | Mod: S$GLB,,, | Performed by: NURSE PRACTITIONER

## 2019-01-11 PROCEDURE — 3079F DIAST BP 80-89 MM HG: CPT | Mod: CPTII,S$GLB,, | Performed by: NURSE PRACTITIONER

## 2019-01-11 PROCEDURE — 99999 PR PBB SHADOW E&M-EST. PATIENT-LVL III: ICD-10-PCS | Mod: PBBFAC,,, | Performed by: NURSE PRACTITIONER

## 2019-01-11 PROCEDURE — 99999 PR PBB SHADOW E&M-EST. PATIENT-LVL III: CPT | Mod: PBBFAC,,, | Performed by: NURSE PRACTITIONER

## 2019-01-11 PROCEDURE — 1101F PT FALLS ASSESS-DOCD LE1/YR: CPT | Mod: CPTII,S$GLB,, | Performed by: NURSE PRACTITIONER

## 2019-01-11 PROCEDURE — 1101F PR PT FALLS ASSESS DOC 0-1 FALLS W/OUT INJ PAST YR: ICD-10-PCS | Mod: CPTII,S$GLB,, | Performed by: NURSE PRACTITIONER

## 2019-01-11 PROCEDURE — 3077F SYST BP >= 140 MM HG: CPT | Mod: CPTII,S$GLB,, | Performed by: NURSE PRACTITIONER

## 2019-01-11 RX ORDER — BUSPIRONE HYDROCHLORIDE 7.5 MG/1
7.5 TABLET ORAL 2 TIMES DAILY
Qty: 180 TABLET | Refills: 0 | Status: SHIPPED | OUTPATIENT
Start: 2019-01-11 | End: 2019-02-22 | Stop reason: SINTOL

## 2019-01-11 RX ORDER — MIRTAZAPINE 30 MG/1
30 TABLET, FILM COATED ORAL NIGHTLY
Qty: 90 TABLET | Refills: 0 | Status: SHIPPED | OUTPATIENT
Start: 2019-01-11 | End: 2019-04-02 | Stop reason: SDUPTHER

## 2019-01-11 NOTE — PROGRESS NOTES
Outpatient Psychiatry Follow-Up Visit (MD/NP)    1/11/2019    Clinical Status of Patient:  Outpatient (Ambulatory)    Chief Complaint:  Jose Manuel Boyd is a 78 y.o. female who presents today for follow-up of depression, anxiety and poor sleep.  Met with patient.      Interval History and Content of Current Session:  Interim Events/Subjective Report/Content of Current Session: Jose Manuel  was last seen by me on 12/12/2018 for a follow up visit. She was better but admitted that she was only partially compliant with her medication. Jose Manuel was diagnosed with MDD, mild with anxious distress and insomnia. A plan of care was devised to include:    1. Safety: Call 911 or Crisis Line or go to ER for suicidal ideation, adverse effects of medication or any other emergency  2. Remeron 30 mg po qhs for sleep, depression and anxiety.  3. Start Buspar 7.5 mg po BID.   4. Return to clinic in 1 month or sooner prn.  5. Follow up with PCP regarding elevated blood pressure readings.     Today Jose Manuel is seen face to face. She has infected sinuses and has had the flu since she was last seen. Her sleep is poor because of the coughing, but she states it is letting up some and she has a decrease in congestion now. Her appetite is fair. Her energy level is low. Her depression is not bothering her at this time. Her anxiety is not bothering her at this time. She did followup with her PCP regarding her elevated blood pressure and Dr. Fong told her to take her blood pressure medication twice a day but to not take the second dose if her pressure is low. She takes her pressure everyday now. She states she no longer takes Hydroxyzine Pamoate as she does not feel she needs it. She feels that she is doing well on the Buspar and Remeron.     PSYCHOTHERAPY    · Target symptoms: depression, anxiety , poor sleep  · Why chosen therapy is appropriate versus another modality: relevant to diagnosis, evidence based practice  · Outcome monitoring  "methods: self-report, observation  · Therapeutic intervention type: supportive psychotherapy  · Topics discussed/themes: physical problems, blood pressure, flu, sinus problems, symptom improvement.   · The patient's response to the intervention is accepting. The patient's progress toward treatment goals is good.  · Duration of intervention: 19 minutes.    Review of Systems   PSYCHIATRIC: Pertinant items are noted in the narrative.   GENERAL:  Appears actual age  SKIN:  No rashes or lacerations  HEAD:  No headache today, states getting less headaches now  MOUTH & THROAT:  No dyskinetic movements or obvious goiter  CHEST:  No shortness of breath, hyperventilation or cough  CARDIOVASCULAR:  No tachycardia but has chest pain from coughing  ABDOMEN:  No nausea, vomiting, pain, constipation or diarrhea  URINARY:  No dysuria   MUSCULOSKELETAL:  Left hand tremor noted today, which is less in intensity than on her last visit (hospitals sees neurologist at [place]), no tics  NEUROLOGIC:  Left hand tremor    Past Medical, Family and Social History: The patient's past medical, family and social history have been reviewed and updated as appropriate within the electronic medical record - see encounter notes.    Compliance: yes    Side effects: None    Risk Parameters:  Patient reports no suicidal ideation  Patient reports no homicidal ideation  Patient reports no self-injurious behavior  Patient reports no violent behavior    Exam (detailed: at least 9 elements; comprehensive: all 15 elements)   Constitutional  Vitals:  Most recent vital signs, dated less than 90 days prior to this appointment, were reviewed.   Vitals:    01/11/19 0937   BP: (!) 142/88   Pulse: 76   Weight: 92.2 kg (203 lb 4.2 oz)   Height: 5' (1.524 m)        General:  unremarkable, age appropriate     Musculoskeletal  Muscle Strength/Tone:  no tremor, no tic   Gait & Station:  non-ataxic     Psychiatric  Speech:  no latency; no press   Mood & Affect:  " " "Good"  calm and pleasant    Thought Process:  normal and logical   Associations:  intact   Thought Content:  normal, no suicidality, no homicidality, delusions, or paranoia   Insight:  has awareness of illness   Judgement: behavior is adequate to circumstances   Orientation:  person, place, situation   Memory: intact for content of interview   Language: grossly intact   Attention Span & Concentration:  able to focus   Fund of Knowledge:  intact and appropriate to age and level of education     Assessment and Diagnosis   Status/Progress: Based on the examination today, the patient's problem(s) is/are improved.  New problems have not been presented today.   Lack of compliance is not complicating management of the primary condition.  There are no active rule-out diagnoses for this patient at this time.       Compliance: States is being compliant daily.    General Impression: She is doing well.      ICD-10-CM ICD-9-CM   1. 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 Mood insomnia F51.05 [id number]    F39        Intervention/Counseling/Treatment Plan   · Medication Management: The risks and benefits of medication were discussed with the patient.  · The treatment plan and follow up plan were reviewed with the patient.   1. Safety: Call 911 or Crisis Line or go to ER for suicidal ideation, adverse effects of medication or any other emergency  2. Remeron 30 mg po qhs for sleep, depression and anxiety.  3.  Buspar 7.5 mg po BID.   4. Return to clinic in 3 months or sooner prn.  5. D/C Hydroxyzine pamoate as patient is no longer taking.     Patient agrees with POC.    INSTRUCTIONS    Instructed to call 911 or go to ER for suicidal ideation, adverse effects of medication or any other emergency. Verbalizes understanding and plan to comply.    Return to Clinic: 3 months, as needed  "

## 2019-01-11 NOTE — PATIENT INSTRUCTIONS
"You have been provided with a certain amount of medication with a specified number of refills.  Please follow up within an adequate time before you run out of medications.    REFILLS FOR CONTROLLED SUBSTANCES WILL NOT BE GIVEN WITHOUT AN APPOINTMENT.  I will not honor or fill automated refill requests from pharmacies.  You must come in for an appointment to get refills.    Please book your next appointment for myself or therapist by phone by calling our office at [phone].      PLEASE BE AT LEAST 15 MINUTES EARLY FOR YOUR NEXT APPOINTMENT.  PLEASE, DO NOT BE LATE OR YOU WILL BE TURNED AWAY AND ASKED TO RESCHEDULE.  YOU MUST COME EARLY TO ALLOW TIME FOR CHECK-IN AS WELL AS GET YOUR VITAL SIGNS AND GO OVER YOUR MEDICATIONS.  [facility] is not fair to the patients who present after you and are on time for their appointments.  It causes a delay in the appointments for patients and staff.  IF YOU ARE LATE, THERE IS A POSSIBILITY THAT YOU WILL BE CHARGED FOR THE APPOINTMENT TIME PERSONALLY AND IT WILL NOT GO TO YOUR INSURANCE.  YOU MAY ALSO BE DISCHARGED FROM CLINIC with multiple "No Show" appointments.  -----------------------------------------------------------------------------------------------------------------  IF YOU FEEL SUICIDAL OR HAVING THOUGHTS OR PLANS TO HURT YOURSELF OR OTHERS, CALL 911 OR REPORT TO THE NEAREST EMERGENCY ROOM.  YOU CAN ALSO ACCESS THE FOLLOWING HOTLINE(S):    National Suicide Hotline Number 1-709-695-TALK (9265)     ([facility] Mobile Crisis, [phone]'   [facility], (389) 232-2423; [facility], 24 hours / 7 days, (039) 013-COPE (4613), 6-061-587-COPE (7212))     "

## 2019-01-24 DIAGNOSIS — I10 HYPERTENSION, ESSENTIAL: Primary | ICD-10-CM

## 2019-01-24 RX ORDER — TRIAMTERENE/HYDROCHLOROTHIAZID 37.5-25 MG
1 TABLET ORAL 2 TIMES DAILY
Qty: 180 TABLET | Refills: 1 | Status: SHIPPED | OUTPATIENT
Start: 2019-01-24 | End: 2020-02-18

## 2019-01-25 ENCOUNTER — HOSPITAL ENCOUNTER (EMERGENCY)
Facility: HOSPITAL | Age: 79
Discharge: HOME | End: 2019-01-25
Attending: EMERGENCY MEDICINE
Payer: MEDICARE

## 2019-01-25 VITALS
SYSTOLIC BLOOD PRESSURE: 144 MMHG | OXYGEN SATURATION: 98 % | WEIGHT: 203 LBS | BODY MASS INDEX: 39.85 KG/M2 | RESPIRATION RATE: 21 BRPM | HEIGHT: 60 IN | TEMPERATURE: 98 F | HEART RATE: 84 BPM | DIASTOLIC BLOOD PRESSURE: 84 MMHG

## 2019-01-25 DIAGNOSIS — R20.0 RIGHT LEG NUMBNESS: Primary | ICD-10-CM

## 2019-01-25 DIAGNOSIS — I10 HYPERTENSION, UNSPECIFIED TYPE: ICD-10-CM

## 2019-01-25 DIAGNOSIS — R20.2 PARESTHESIA: ICD-10-CM

## 2019-01-25 LAB
ALBUMIN SERPL BCP-MCNC: 3.3 G/DL
ALP SERPL-CCNC: 102 U/L
ALT SERPL W/O P-5'-P-CCNC: 12 U/L
ANION GAP SERPL CALC-SCNC: 12 MMOL/L
AST SERPL-CCNC: 17 U/L
BASOPHILS # BLD AUTO: 0.05 K/UL
BASOPHILS NFR BLD: 0.5 %
BILIRUB SERPL-MCNC: 0.2 MG/DL
BNP SERPL-MCNC: 93 PG/ML
BUN SERPL-MCNC: 19 MG/DL
CALCIUM SERPL-MCNC: 10.3 MG/DL
CHLORIDE SERPL-SCNC: 99 MMOL/L
CO2 SERPL-SCNC: 28 MMOL/L
CREAT SERPL-MCNC: 1 MG/DL
DIFFERENTIAL METHOD: ABNORMAL
EOSINOPHIL # BLD AUTO: 0.2 K/UL
EOSINOPHIL NFR BLD: 1.9 %
ERYTHROCYTE [DISTWIDTH] IN BLOOD BY AUTOMATED COUNT: 15.4 %
EST. GFR  (AFRICAN AMERICAN): >60 ML/MIN/1.73 M^2
EST. GFR  (NON AFRICAN AMERICAN): 53 ML/MIN/1.73 M^2
GLUCOSE SERPL-MCNC: 170 MG/DL
HCT VFR BLD AUTO: 42.8 %
HGB BLD-MCNC: 13.6 G/DL
LYMPHOCYTES # BLD AUTO: 2.4 K/UL
LYMPHOCYTES NFR BLD: 21.6 %
MAGNESIUM SERPL-MCNC: 1.9 MG/DL
MCH RBC QN AUTO: 26.8 PG
MCHC RBC AUTO-ENTMCNC: 31.8 G/DL
MCV RBC AUTO: 84 FL
MONOCYTES # BLD AUTO: 0.8 K/UL
MONOCYTES NFR BLD: 7.3 %
NEUTROPHILS # BLD AUTO: 7.6 K/UL
NEUTROPHILS NFR BLD: 68.7 %
PHOSPHATE SERPL-MCNC: 3.2 MG/DL
PLATELET # BLD AUTO: 302 K/UL
PMV BLD AUTO: 11.3 FL
POTASSIUM SERPL-SCNC: 3.6 MMOL/L
PROT SERPL-MCNC: 7.4 G/DL
RBC # BLD AUTO: 5.08 M/UL
SODIUM SERPL-SCNC: 139 MMOL/L
TROPONIN I SERPL DL<=0.01 NG/ML-MCNC: <0.006 NG/ML
WBC # BLD AUTO: 11.08 K/UL

## 2019-01-25 PROCEDURE — 85025 COMPLETE CBC W/AUTO DIFF WBC: CPT

## 2019-01-25 PROCEDURE — 84484 ASSAY OF TROPONIN QUANT: CPT

## 2019-01-25 PROCEDURE — 99284 EMERGENCY DEPT VISIT MOD MDM: CPT

## 2019-01-25 PROCEDURE — 93005 ELECTROCARDIOGRAM TRACING: CPT

## 2019-01-25 PROCEDURE — 84100 ASSAY OF PHOSPHORUS: CPT

## 2019-01-25 PROCEDURE — 93010 EKG 12-LEAD: ICD-10-PCS | Mod: ,,, | Performed by: INTERNAL MEDICINE

## 2019-01-25 PROCEDURE — 80053 COMPREHEN METABOLIC PANEL: CPT

## 2019-01-25 PROCEDURE — 83735 ASSAY OF MAGNESIUM: CPT

## 2019-01-25 PROCEDURE — 83880 ASSAY OF NATRIURETIC PEPTIDE: CPT

## 2019-01-25 PROCEDURE — 93010 ELECTROCARDIOGRAM REPORT: CPT | Mod: ,,, | Performed by: INTERNAL MEDICINE

## 2019-01-26 NOTE — ED TRIAGE NOTES
Patient presents to Ed via personal transportation. Patient states feeling knot at the back of the neck, then part of right leg started to feel a tingling sensation. Patient states she placed a warm towel on leg when she felt the tingling sensation and went away. Patient denies SOB, headache, N/V, dizziness, diarrhea.

## 2019-01-26 NOTE — ED PROVIDER NOTES
"Encounter Date: 1/25/2019    SCRIBE #1 NOTE: I, Kj Lentz, am scribing for, and in the presence of,  Jean Carlos Flores MD. I have scribed the following portions of the note - Other sections scribed: HPI, ROS, PE.       History     Chief Complaint   Patient presents with    Tingling     right leg tingling about 30 min ago; she reports she felt a "knot" in her "vein" in the right side of her neck but it has spontaneously resolved; also reports she was unable to obtain a BP at home     CC: Tingling    HPI: This 78 y.o female with medical hx of HCL, HTN, HA, Thyorid disease presents to the ED c/o acute onset, R-leg tingling sensations located from thigh to knee that began at about 1900 tonight (Friday 1/25/19). Pt reports that she was laying on her stomach in bed talking on the phone until her tingling began. She states that she noticed a "knot" in her "vein" to lateral R-side neck and was feeling "funny" at that time, noting her head felt heavy. She denies back pain. No dizziness, blurry vision, palpitations, CP, or SOB. No abdominal pain or N/V/D. No urinary sx. She denies being able to take BP reading at the time due to complications with her machine. She attempted tx by taking her BP medication, juice, lemon water, and applying a hot towel to R-leg with relief of her tingling sensations, noting it had lasted about 10-15 minutes.  She denied any focal weakness. No dizziness.  No numbness anywhere else.  She is completely asymptomatic at this time.  Denies any speech disturbance.    Pt mentions hx of TIA 20 years ago which involved speech difficulty.      The history is provided by the patient. No  was used.     Review of patient's allergies indicates:   Allergen Reactions    Codeine      Other reaction(s): Rash    Propranolol Other (See Comments)     Burning sensation of scalp/skin     Past Medical History:   Diagnosis Date    Anxiety     Asthma     Depression     Headache     Hx of " psychiatric care     Hypercholesterolemia     Hypertension     Psychiatric problem     Sleep difficulties     Therapy     Thyroid disease     multiple nodules     Past Surgical History:   Procedure Laterality Date    CHOLECYSTECTOMY      HYSTERECTOMY      knee repalcement       Family History   Problem Relation Age of Onset    Diabetes Mother     Hypertension Mother     Kidney disease Mother     Heart disease Father     Bipolar disorder Daughter      Social History     Tobacco Use    Smoking status: Never Smoker    Smokeless tobacco: Never Used   Substance Use Topics    Alcohol use: No    Drug use: No     Review of Systems   Constitutional: Negative for chills and fever.   HENT: Negative for congestion, ear pain, rhinorrhea and sore throat.    Eyes: Negative for pain and visual disturbance.   Respiratory: Negative for cough and shortness of breath.    Cardiovascular: Negative for chest pain.   Gastrointestinal: Negative for abdominal pain, diarrhea, nausea and vomiting.   Genitourinary: Negative for dysuria.   Musculoskeletal: Negative for back pain and neck pain.   Skin: Negative for rash.   Neurological: Positive for numbness (R-leg tingling, thigh to knee). Negative for headaches.   All other systems reviewed and are negative.      Physical Exam     Initial Vitals [01/25/19 1929]   BP Pulse Resp Temp SpO2   (!) 181/83 96 15 98 °F (36.7 °C) 98 %      MAP       --         Vitals:    01/25/19 1929 01/25/19 2031 01/25/19 2042   BP: (!) 181/83 (!) 144/84    BP Location: Right arm     Patient Position: Sitting     Pulse: 96 84    Resp: 15 (!) 21    Temp: 98 °F (36.7 °C)  98.1 °F (36.7 °C)   TempSrc: Oral     SpO2: 98% 98%    Weight: 92.1 kg (203 lb)     Height: 5' (1.524 m)         Physical Exam    Nursing note and vitals reviewed.  Constitutional: She appears well-developed and well-nourished. She is not diaphoretic. No distress.   HENT:   Head: Normocephalic and atraumatic.   Nose: Nose normal.    Mouth/Throat: Oropharynx is clear and moist.   Eyes: Conjunctivae and EOM are normal. Pupils are equal, round, and reactive to light. No scleral icterus.   Neck: Normal range of motion. Neck supple. No thyromegaly present. No JVD present.   Cardiovascular: Normal rate, regular rhythm and normal heart sounds. Exam reveals no gallop and no friction rub.    No murmur heard.  Pulmonary/Chest: Breath sounds normal. No stridor. No respiratory distress.   Abdominal: Soft. She exhibits no distension. There is no tenderness.   Musculoskeletal: Normal range of motion. She exhibits no edema or tenderness.   Neurological: She is alert and oriented to person, place, and time. She has normal strength. No cranial nerve deficit or sensory deficit. GCS score is 15. GCS eye subscore is 4. GCS verbal subscore is 5. GCS motor subscore is 6.   Normal coordination.   Skin: Skin is warm and dry. No rash noted.   Psychiatric: She has a normal mood and affect. Her behavior is normal. Judgment and thought content normal.         ED Course   Procedures  Labs Reviewed   CBC W/ AUTO DIFFERENTIAL - Abnormal; Notable for the following components:       Result Value    MCH 26.8 (*)     MCHC 31.8 (*)     RDW 15.4 (*)     All other components within normal limits   COMPREHENSIVE METABOLIC PANEL - Abnormal; Notable for the following components:    Glucose 170 (*)     Albumin 3.3 (*)     eGFR if non  53 (*)     All other components within normal limits   MAGNESIUM   TROPONIN I   PHOSPHORUS   B-TYPE NATRIURETIC PEPTIDE     EKG Readings: (Independently Interpreted)   Initial Reading: No STEMI. Rhythm: Normal Sinus Rhythm. Heart Rate: 92. Ectopy: PVCs. Conduction: Normal. ST Segments: Normal ST Segments. T Waves: Normal. Axis: Normal.       Imaging Results    None          Medical Decision Making:   Differential Diagnosis:   Neurapraxia, peripheral neuropathy, hyperglycemia, TIA, sciatica.  Clinical Tests:   Lab Tests: Reviewed  The  following lab test(s) were unremarkable: CBC, CMP, Troponin and BNP  Medical Tests: Reviewed  ED Management:  Numbness is located to a focal area in the lateral right leg at the thigh and knee.  Given the brief illness of the symptoms and the fact that her symptoms were not more diffuse, I suspect that her symptoms were likely due to peripheral nerve impingement that resolved after she changed positions.  The there is no evidence on exam or history that the patient had a TIA.  Patient is ready go home does not want any further testing.  States she will follow up with her primary care physician.  Blood pressure is normal at this point.            Scribe Attestation:   Scribe #1: I performed the above scribed service and the documentation accurately describes the services I performed. I attest to the accuracy of the note.    Attending Attestation:           Physician Attestation for Scribe:  Physician Attestation Statement for Scribe #1: I, Jean Carlos Flores MD, reviewed documentation, as scribed by Kj Lentz in my presence, and it is both accurate and complete.                    Clinical Impression:   The primary encounter diagnosis was Right leg numbness. Diagnoses of Paresthesia and Hypertension, unspecified type were also pertinent to this visit.      Disposition:   Disposition: Discharged  Condition: Stable                        Jean Carlos Flores MD  01/25/19 2118

## 2019-01-30 ENCOUNTER — OFFICE VISIT (OUTPATIENT)
Dept: FAMILY MEDICINE | Facility: CLINIC | Age: 79
End: 2019-01-30
Payer: MEDICARE

## 2019-01-30 ENCOUNTER — LAB VISIT (OUTPATIENT)
Dept: LAB | Facility: HOSPITAL | Age: 79
End: 2019-01-30
Attending: FAMILY MEDICINE
Payer: MEDICARE

## 2019-01-30 VITALS
BODY MASS INDEX: 39.65 KG/M2 | HEIGHT: 60 IN | SYSTOLIC BLOOD PRESSURE: 150 MMHG | OXYGEN SATURATION: 95 % | DIASTOLIC BLOOD PRESSURE: 88 MMHG | WEIGHT: 201.94 LBS | HEART RATE: 83 BPM | RESPIRATION RATE: 16 BRPM | TEMPERATURE: 98 F

## 2019-01-30 DIAGNOSIS — I10 ESSENTIAL HYPERTENSION: Primary | ICD-10-CM

## 2019-01-30 DIAGNOSIS — R73.9 HYPERGLYCEMIA: ICD-10-CM

## 2019-01-30 DIAGNOSIS — J44.9 CHRONIC OBSTRUCTIVE PULMONARY DISEASE, UNSPECIFIED COPD TYPE: ICD-10-CM

## 2019-01-30 DIAGNOSIS — Z86.73 H/O: STROKE: ICD-10-CM

## 2019-01-30 DIAGNOSIS — E66.01 MORBID OBESITY DUE TO EXCESS CALORIES: ICD-10-CM

## 2019-01-30 PROCEDURE — 83036 HEMOGLOBIN GLYCOSYLATED A1C: CPT

## 2019-01-30 PROCEDURE — 3077F PR MOST RECENT SYSTOLIC BLOOD PRESSURE >= 140 MM HG: ICD-10-PCS | Mod: CPTII,S$GLB,, | Performed by: FAMILY MEDICINE

## 2019-01-30 PROCEDURE — 99499 UNLISTED E&M SERVICE: CPT | Mod: S$GLB,,, | Performed by: FAMILY MEDICINE

## 2019-01-30 PROCEDURE — 1101F PR PT FALLS ASSESS DOC 0-1 FALLS W/OUT INJ PAST YR: ICD-10-PCS | Mod: CPTII,S$GLB,, | Performed by: FAMILY MEDICINE

## 2019-01-30 PROCEDURE — 36415 COLL VENOUS BLD VENIPUNCTURE: CPT | Mod: PN

## 2019-01-30 PROCEDURE — 99499 RISK ADDL DX/OHS AUDIT: ICD-10-PCS | Mod: S$GLB,,, | Performed by: FAMILY MEDICINE

## 2019-01-30 PROCEDURE — 99999 PR PBB SHADOW E&M-EST. PATIENT-LVL III: CPT | Mod: PBBFAC,,, | Performed by: FAMILY MEDICINE

## 2019-01-30 PROCEDURE — 99999 PR PBB SHADOW E&M-EST. PATIENT-LVL III: ICD-10-PCS | Mod: PBBFAC,,, | Performed by: FAMILY MEDICINE

## 2019-01-30 PROCEDURE — 99214 OFFICE O/P EST MOD 30 MIN: CPT | Mod: S$GLB,,, | Performed by: FAMILY MEDICINE

## 2019-01-30 PROCEDURE — 1101F PT FALLS ASSESS-DOCD LE1/YR: CPT | Mod: CPTII,S$GLB,, | Performed by: FAMILY MEDICINE

## 2019-01-30 PROCEDURE — 3079F PR MOST RECENT DIASTOLIC BLOOD PRESSURE 80-89 MM HG: ICD-10-PCS | Mod: CPTII,S$GLB,, | Performed by: FAMILY MEDICINE

## 2019-01-30 PROCEDURE — 3077F SYST BP >= 140 MM HG: CPT | Mod: CPTII,S$GLB,, | Performed by: FAMILY MEDICINE

## 2019-01-30 PROCEDURE — 3079F DIAST BP 80-89 MM HG: CPT | Mod: CPTII,S$GLB,, | Performed by: FAMILY MEDICINE

## 2019-01-30 PROCEDURE — 99214 PR OFFICE/OUTPT VISIT, EST, LEVL IV, 30-39 MIN: ICD-10-PCS | Mod: S$GLB,,, | Performed by: FAMILY MEDICINE

## 2019-01-30 RX ORDER — ATORVASTATIN CALCIUM 40 MG/1
40 TABLET, FILM COATED ORAL DAILY
Qty: 90 TABLET | Refills: 3 | Status: SHIPPED | OUTPATIENT
Start: 2019-01-30 | End: 2019-04-02 | Stop reason: SDUPTHER

## 2019-01-30 RX ORDER — AMLODIPINE BESYLATE 2.5 MG/1
2.5 TABLET ORAL DAILY
Qty: 30 TABLET | Refills: 0 | Status: SHIPPED | OUTPATIENT
Start: 2019-01-30 | End: 2019-02-22

## 2019-01-30 NOTE — PROGRESS NOTES
Routine Office Visit    Patient Name: Jose Manuel Boyd    : 1940  MRN: [id number]    Subjective:  Jose Manuel is a 78 y.o. female who presents today for:   Chief Complaint   Patient presents with    Hospital Follow Up    Hypertension    Medication Problem     pt wants to request Lipitor be added back to her formalary       78-year-old female comes in because she was recently in the hospital emergency room and her blood pressure was noted to be extremely elevated.  The she was discharged from the emergency room without any changes in her blood pressure regimen.  She reports that she has been checking her blood pressures at home and have been elevated at home.  She denies changing her blood pressure medication.  She reports that she continues taking the triamterene-hydrochlorothiazide.  She reports that a friend told her to take it twice a day because of the elevation.  She denies a doing this although she was tempted to.  Noted in her blood tests is that her sugars have been trending up.  She does admit that she has been drinking more and more sugar water when she gets nervous.  The patient also reports that she would like a refill on her Lipitor.  She is convinced that the last medicine she was given for her cholesterol, was not Lipitor so she has not been taking it.  The patient has a history of chronic obstructive pulmonary disease and has no acute concerns with this.    Past Medical History  Past Medical History:   Diagnosis Date    Anxiety     Asthma     Depression     Headache     Hx of psychiatric care     Hypercholesterolemia     Hypertension     Psychiatric problem     Sleep difficulties     Therapy     Thyroid disease     multiple nodules       Past Surgical History  Past Surgical History:   Procedure Laterality Date    CHOLECYSTECTOMY      HYSTERECTOMY      knee repalcement          Family History  Family History   Problem Relation Age of Onset    Diabetes Mother     Hypertension  Mother     Kidney disease Mother     Heart disease Father     Bipolar disorder Daughter        Social History  Social History     Socioeconomic History    Marital status:      Spouse name: Not on file    Number of children: Not on file    Years of education: Not on file    Highest education level: Not on file   Social Needs    Financial resource strain: Not on file    Food insecurity - worry: Not on file    Food insecurity - inability: Not on file    Transportation needs - medical: Not on file    Transportation needs - non-medical: Not on file   Occupational History    Occupation: retired     Comment: Domestic service, [employer]   Tobacco Use    Smoking status: Never Smoker    Smokeless tobacco: Never Used   Substance and Sexual Activity    Alcohol use: No    Drug use: No    Sexual activity: No   Other Topics Concern    Patient feels they ought to cut down on drinking/drug use No    Patient annoyed by others criticizing their drinking/drug use No    Patient has felt bad or guilty about drinking/drug use No    Patient has had a drink/used drugs as an eye opener in the AM No   Social History Narrative    Enjoys going to [employer]       Current Medications  Current Outpatient Medications on File Prior to Visit   Medication Sig Dispense Refill    albuterol (VENTOLIN HFA) 90 mcg/actuation inhaler Inhale 2 puffs into the lungs every 4 (four) hours as needed for Wheezing. 6.7 g 6    aspirin 325 MG tablet Take 1 tablet (325 mg total) by mouth once daily. 1 Bottle 0    busPIRone (BUSPAR) 7.5 MG tablet Take 1 tablet (7.5 mg total) by mouth 2 (two) times daily. 180 tablet 0    epinastine 0.05 % ophthalmic solution Place 1 drop into both eyes 2 (two) times daily.  0    fluticasone (FLONASE) 50 mcg/actuation nasal spray 1 spray by Each Nare route 2 (two) times daily. 1 Bottle 1    GAVILYTE-C 240-22.72-6.72 -5.84 gram SolR       mirtazapine (REMERON) 30 MG tablet Take 1 tablet (30 mg total) by mouth  every evening. 90 tablet 0    naproxen (EC-NAPROSYN) 375 MG TbEC EC tablet Take 1 tablet (375 mg total) by mouth 2 (two) times daily with meals. 15 tablet 0    omeprazole (PRILOSEC) 20 MG capsule take 1 capsule by mouth once daily 90 capsule 3    ondansetron (ZOFRAN-ODT) 4 MG TbDL Take 1 tablet (4 mg total) by mouth every 8 (eight) hours as needed (nausea). 15 tablet 0    triamterene-hydrochlorothiazide 37.5-25 mg (MAXZIDE-25) 37.5-25 mg per tablet Take 1 tablet by mouth 2 (two) times daily. 180 tablet 1    [DISCONTINUED] atorvastatin (LIPITOR) 40 MG tablet Take 1 tablet (40 mg total) by mouth once daily. 90 tablet 3     No current facility-administered medications on file prior to visit.        Allergies   Review of patient's allergies indicates:   Allergen Reactions    Codeine      Other reaction(s): Rash    Propranolol Other (See Comments)     Burning sensation of scalp/skin       Review of Systems   Constitutional: Negative for chills, fatigue and fever.   Respiratory: Negative for cough, shortness of breath and wheezing.    Cardiovascular: Negative for chest pain and palpitations.   Gastrointestinal: Negative for abdominal pain, blood in stool, diarrhea and nausea.   Genitourinary: Negative for dysuria, hematuria, pelvic pain and urgency.   Neurological: Negative for light-headedness and headaches.   Psychiatric/Behavioral: Positive for sleep disturbance. Negative for confusion, decreased concentration, dysphoric mood, hallucinations and suicidal ideas. The patient is not nervous/anxious and is not hyperactive.      BP (!) 150/88 (BP Location: Right arm, Patient Position: Sitting, BP Method: Medium (Manual))   Pulse 83   Temp 97.9 °F (36.6 °C) (Oral)   Resp 16   Ht 5' (1.524 m)   Wt 91.6 kg (201 lb 15.1 oz)   SpO2 95%   BMI 39.44 kg/m²     Physical Exam   Constitutional: She appears well-developed.   Eyes: EOM are normal. Pupils are equal, round, and reactive to light.   Neck: Normal range of  motion. Neck supple. No tracheal deviation present.   Cardiovascular: Normal rate, regular rhythm, normal heart sounds and intact distal pulses.   Pulmonary/Chest: Effort normal and breath sounds normal. She has no wheezes. She has no rales.   Abdominal: Soft. Bowel sounds are normal. She exhibits no mass. There is no tenderness.   Lymphadenopathy:     She has no cervical adenopathy.   Psychiatric: Her mood appears not anxious. Her speech is not rapid and/or pressured. She is not agitated and not actively hallucinating. Thought content is not paranoid. Cognition and memory are not impaired. She does not express impulsivity or inappropriate judgment. She does not exhibit a depressed mood. She expresses no suicidal ideation. She expresses no suicidal plans. She is attentive.   Vitals reviewed.      Assessment/Plan:  Jose Manuel was seen today for hospital follow up, hypertension and medication problem.    Diagnoses and all orders for this visit:    Essential hypertension  -     amLODIPine (NORVASC) 2.5 MG tablet; Take 1 tablet (2.5 mg total) by mouth once daily.  I discussed with the patient that I would not want her to increase the triamterene-hydrochlorothiazide for several reasons 1A can affect her kidneys.  To it can worsen her sugar levels.  Rather I would like her to add amlodipine to her regimen.  I will start a low-dose amlodipine.  She is to return to the office in 1-2 weeks to evaluate tolerance and check her blood pressure.    Hyperglycemia  -     Hemoglobin A1c; Future  Although patient was extremely hesitant to check for diabetes, she agrees to have the A1c test done.    H/O: stroke  -     atorvastatin (LIPITOR) 40 MG tablet; Take 1 tablet (40 mg total) by mouth once daily.  Lipitor sent to the pharmacy.     Morbid obesity due to excess calories  Discussed with patient between morbid obesity and diabetes and other chronic conditions such as hypertension.    Chronic obstructive pulmonary disease, unspecified  COPD type  No acute concerns.        This office note has been dictated.  This dictation has been generated using M-Modal Fluency Direct dictation; some phonetic errors may occur.

## 2019-01-31 LAB
ESTIMATED AVG GLUCOSE: 137 MG/DL
HBA1C MFR BLD HPLC: 6.4 %

## 2019-02-22 ENCOUNTER — OFFICE VISIT (OUTPATIENT)
Dept: FAMILY MEDICINE | Facility: CLINIC | Age: 79
End: 2019-02-22
Payer: MEDICARE

## 2019-02-22 VITALS
HEART RATE: 64 BPM | DIASTOLIC BLOOD PRESSURE: 67 MMHG | TEMPERATURE: 99 F | SYSTOLIC BLOOD PRESSURE: 152 MMHG | HEIGHT: 60 IN | RESPIRATION RATE: 16 BRPM | WEIGHT: 200.81 LBS | OXYGEN SATURATION: 97 % | BODY MASS INDEX: 39.43 KG/M2

## 2019-02-22 DIAGNOSIS — F32.A DEPRESSION, UNSPECIFIED DEPRESSION TYPE: ICD-10-CM

## 2019-02-22 DIAGNOSIS — I10 HTN (HYPERTENSION), BENIGN: Primary | ICD-10-CM

## 2019-02-22 PROCEDURE — 3078F PR MOST RECENT DIASTOLIC BLOOD PRESSURE < 80 MM HG: ICD-10-PCS | Mod: CPTII,S$GLB,, | Performed by: INTERNAL MEDICINE

## 2019-02-22 PROCEDURE — 3077F SYST BP >= 140 MM HG: CPT | Mod: CPTII,S$GLB,, | Performed by: INTERNAL MEDICINE

## 2019-02-22 PROCEDURE — 99214 OFFICE O/P EST MOD 30 MIN: CPT | Mod: S$GLB,,, | Performed by: INTERNAL MEDICINE

## 2019-02-22 PROCEDURE — 3077F PR MOST RECENT SYSTOLIC BLOOD PRESSURE >= 140 MM HG: ICD-10-PCS | Mod: CPTII,S$GLB,, | Performed by: INTERNAL MEDICINE

## 2019-02-22 PROCEDURE — 99999 PR PBB SHADOW E&M-EST. PATIENT-LVL III: ICD-10-PCS | Mod: PBBFAC,,, | Performed by: INTERNAL MEDICINE

## 2019-02-22 PROCEDURE — 3078F DIAST BP <80 MM HG: CPT | Mod: CPTII,S$GLB,, | Performed by: INTERNAL MEDICINE

## 2019-02-22 PROCEDURE — 99999 PR PBB SHADOW E&M-EST. PATIENT-LVL III: CPT | Mod: PBBFAC,,, | Performed by: INTERNAL MEDICINE

## 2019-02-22 PROCEDURE — 1101F PR PT FALLS ASSESS DOC 0-1 FALLS W/OUT INJ PAST YR: ICD-10-PCS | Mod: CPTII,S$GLB,, | Performed by: INTERNAL MEDICINE

## 2019-02-22 PROCEDURE — 99214 PR OFFICE/OUTPT VISIT, EST, LEVL IV, 30-39 MIN: ICD-10-PCS | Mod: S$GLB,,, | Performed by: INTERNAL MEDICINE

## 2019-02-22 PROCEDURE — 1101F PT FALLS ASSESS-DOCD LE1/YR: CPT | Mod: CPTII,S$GLB,, | Performed by: INTERNAL MEDICINE

## 2019-02-22 NOTE — PROGRESS NOTES
Subjective:       Patient ID: Jose Manuel Boyd is a 78 y.o. female.    Chief Complaint: No chief complaint on file.    Sweating    HPI: 79 y/o w/ HTN presents to discuss sweating from scalp. She reports when she takes a hot bath her hair becomes very wet from sweating. No sweating during the day. She denies heat or cold intolerance. No change in bowels no diarrhea ro constipation. No dyspnea with walking. She does take benadryl most nights to help her sleep.       Review of Systems   Constitutional: Negative for activity change, appetite change, fatigue, fever and unexpected weight change.   HENT: Negative for ear pain, rhinorrhea and sore throat.    Eyes: Negative for discharge and visual disturbance.   Respiratory: Negative for chest tightness, shortness of breath and wheezing.    Cardiovascular: Negative for chest pain, palpitations and leg swelling.   Gastrointestinal: Negative for abdominal pain, constipation and diarrhea.   Endocrine: Negative for cold intolerance and heat intolerance.   Genitourinary: Negative for dysuria and hematuria.   Musculoskeletal: Negative for joint swelling and neck stiffness.   Skin: Negative for rash.   Neurological: Negative for dizziness, syncope, weakness and headaches.   Psychiatric/Behavioral: Negative for suicidal ideas.       Objective:     Vitals:    02/22/19 1516   BP: (!) 152/67   BP Location: Right arm   Patient Position: Sitting   Pulse: 64   Resp: 16   Temp: 98.8 °F (37.1 °C)   TempSrc: Oral   SpO2: 97%   Weight: 91.1 kg (200 lb 13.4 oz)   Height: 5' (1.524 m)          Physical Exam   Constitutional: She is oriented to person, place, and time. She appears well-developed and well-nourished.   HENT:   Head: Normocephalic and atraumatic.   Eyes: Conjunctivae are normal. No scleral icterus.   Neck: Normal range of motion. No thyromegaly present.   Cardiovascular: Normal rate and regular rhythm. Exam reveals no gallop and no friction rub.   No murmur  heard.  Pulmonary/Chest: Effort normal and breath sounds normal. She has no wheezes. She has no rales.   Abdominal: Soft. Bowel sounds are normal. There is no tenderness. There is no rebound and no guarding.   Musculoskeletal: Normal range of motion. She exhibits no edema or tenderness.   Lymphadenopathy:     She has no cervical adenopathy.   Neurological: She is alert and oriented to person, place, and time. No cranial nerve deficit.   Skin: Skin is warm and dry.   Psychiatric: She has a normal mood and affect.       Assessment and Plan   1. HTN (hypertension), benign  BP just above goal for age avoid polypharmacy will montior I do not think this is the cause of her sweatign with baths, she has no other symptoms to suggest sympathetic surge there is no lymphadenopathy on exam her weight is essentially unchanged    2. Depression, unspecified depression type  Continue nightly remeron stop buspar as this could be causing sweating

## 2019-04-02 ENCOUNTER — OFFICE VISIT (OUTPATIENT)
Dept: FAMILY MEDICINE | Facility: CLINIC | Age: 79
End: 2019-04-02
Payer: MEDICARE

## 2019-04-02 VITALS
RESPIRATION RATE: 17 BRPM | OXYGEN SATURATION: 98 % | HEIGHT: 60 IN | HEART RATE: 78 BPM | BODY MASS INDEX: 40.47 KG/M2 | SYSTOLIC BLOOD PRESSURE: 130 MMHG | DIASTOLIC BLOOD PRESSURE: 94 MMHG | WEIGHT: 206.13 LBS | TEMPERATURE: 98 F

## 2019-04-02 DIAGNOSIS — Z86.73 H/O: STROKE: ICD-10-CM

## 2019-04-02 DIAGNOSIS — F39 MOOD INSOMNIA: ICD-10-CM

## 2019-04-02 DIAGNOSIS — K21.9 GASTROESOPHAGEAL REFLUX DISEASE, ESOPHAGITIS PRESENCE NOT SPECIFIED: ICD-10-CM

## 2019-04-02 DIAGNOSIS — F51.05 MOOD INSOMNIA: ICD-10-CM

## 2019-04-02 DIAGNOSIS — F33.0 MAJOR DEPRESSIVE DISORDER, RECURRENT EPISODE, MILD WITH ANXIOUS DISTRESS: ICD-10-CM

## 2019-04-02 PROCEDURE — 99499 UNLISTED E&M SERVICE: CPT | Mod: S$GLB,,, | Performed by: INTERNAL MEDICINE

## 2019-04-02 PROCEDURE — 3080F PR MOST RECENT DIASTOLIC BLOOD PRESSURE >= 90 MM HG: ICD-10-PCS | Mod: CPTII,S$GLB,, | Performed by: INTERNAL MEDICINE

## 2019-04-02 PROCEDURE — 99214 OFFICE O/P EST MOD 30 MIN: CPT | Mod: S$GLB,,, | Performed by: INTERNAL MEDICINE

## 2019-04-02 PROCEDURE — 1101F PT FALLS ASSESS-DOCD LE1/YR: CPT | Mod: CPTII,S$GLB,, | Performed by: INTERNAL MEDICINE

## 2019-04-02 PROCEDURE — 99499 RISK ADDL DX/OHS AUDIT: ICD-10-PCS | Mod: S$GLB,,, | Performed by: INTERNAL MEDICINE

## 2019-04-02 PROCEDURE — 3080F DIAST BP >= 90 MM HG: CPT | Mod: CPTII,S$GLB,, | Performed by: INTERNAL MEDICINE

## 2019-04-02 PROCEDURE — 99214 PR OFFICE/OUTPT VISIT, EST, LEVL IV, 30-39 MIN: ICD-10-PCS | Mod: S$GLB,,, | Performed by: INTERNAL MEDICINE

## 2019-04-02 PROCEDURE — 3075F PR MOST RECENT SYSTOLIC BLOOD PRESS GE 130-139MM HG: ICD-10-PCS | Mod: CPTII,S$GLB,, | Performed by: INTERNAL MEDICINE

## 2019-04-02 PROCEDURE — 1101F PR PT FALLS ASSESS DOC 0-1 FALLS W/OUT INJ PAST YR: ICD-10-PCS | Mod: CPTII,S$GLB,, | Performed by: INTERNAL MEDICINE

## 2019-04-02 PROCEDURE — 3075F SYST BP GE 130 - 139MM HG: CPT | Mod: CPTII,S$GLB,, | Performed by: INTERNAL MEDICINE

## 2019-04-02 PROCEDURE — 99999 PR PBB SHADOW E&M-EST. PATIENT-LVL III: CPT | Mod: PBBFAC,,, | Performed by: INTERNAL MEDICINE

## 2019-04-02 PROCEDURE — 99999 PR PBB SHADOW E&M-EST. PATIENT-LVL III: ICD-10-PCS | Mod: PBBFAC,,, | Performed by: INTERNAL MEDICINE

## 2019-04-02 RX ORDER — OMEPRAZOLE 20 MG/1
20 CAPSULE, DELAYED RELEASE ORAL DAILY
Qty: 90 CAPSULE | Refills: 3 | Status: SHIPPED | OUTPATIENT
Start: 2019-04-02 | End: 2019-10-21 | Stop reason: SDUPTHER

## 2019-04-02 RX ORDER — MIRTAZAPINE 30 MG/1
30 TABLET, FILM COATED ORAL NIGHTLY
Qty: 90 TABLET | Refills: 3 | Status: SHIPPED | OUTPATIENT
Start: 2019-04-02 | End: 2019-08-09

## 2019-04-02 RX ORDER — ATORVASTATIN CALCIUM 40 MG/1
40 TABLET, FILM COATED ORAL DAILY
Qty: 90 TABLET | Refills: 3 | Status: SHIPPED | OUTPATIENT
Start: 2019-04-02 | End: 2019-10-21 | Stop reason: SDUPTHER

## 2019-04-02 NOTE — PROGRESS NOTES
Subjective:       Patient ID: Jose Manuel Boyd is a 78 y.o. female.    Chief Complaint: Establish Care and Medication Refill    F/u chronic conditions    HPI: 79 y/o w/ HTN prediabetes (no medications) GERD presents for follow up feels. Well requesting refill of medicaitons has been out of mirtazapine x one week feels sleep is more broken without this medication. Head sweating resovled with stopping buspar.     Review of Systems   Constitutional: Negative for activity change, appetite change, fatigue, fever and unexpected weight change.   HENT: Negative for ear pain, rhinorrhea and sore throat.    Eyes: Negative for discharge and visual disturbance.   Respiratory: Negative for chest tightness, shortness of breath and wheezing.    Cardiovascular: Negative for chest pain, palpitations and leg swelling.   Gastrointestinal: Negative for abdominal pain, constipation and diarrhea.   Endocrine: Negative for cold intolerance and heat intolerance.   Genitourinary: Negative for dysuria and hematuria.   Musculoskeletal: Negative for joint swelling and neck stiffness.   Skin: Negative for rash.   Neurological: Negative for dizziness, syncope, weakness and headaches.   Psychiatric/Behavioral: Negative for suicidal ideas.       Objective:     Vitals:    04/02/19 1529   BP: (!) 130/94   BP Location: Right arm   Patient Position: Sitting   Pulse: 78   Resp: 17   Temp: 98.2 °F (36.8 °C)   TempSrc: Oral   SpO2: 98%   Weight: 93.5 kg (206 lb 2.1 oz)   Height: 5' (1.524 m)          Physical Exam   Constitutional: She is oriented to person, place, and time. She appears well-developed and well-nourished.   HENT:   Head: Normocephalic and atraumatic.   Eyes: Pupils are equal, round, and reactive to light. Conjunctivae are normal.   Neck: Normal range of motion.   Cardiovascular: Normal rate and regular rhythm. Exam reveals no gallop and no friction rub.   No murmur heard.  Pulmonary/Chest: Effort normal and breath sounds normal. She  has no wheezes. She has no rales.   Abdominal: Soft. Bowel sounds are normal. There is no tenderness. There is no rebound and no guarding.   Musculoskeletal: Normal range of motion. She exhibits no edema or tenderness.   Neurological: She is alert and oriented to person, place, and time. No cranial nerve deficit.   Skin: Skin is warm and dry.   Psychiatric: She has a normal mood and affect.       Assessment and Plan   1. H/O: stroke  On asa and statin BP just at goal   - atorvastatin (LIPITOR) 40 MG tablet; Take 1 tablet (40 mg total) by mouth once daily.  Dispense: 90 tablet; Refill: 3    2. Major depressive disorder, recurrent episode, mild with anxious distress  Restart remeron  - mirtazapine (REMERON) 30 MG tablet; Take 1 tablet (30 mg total) by mouth every evening.  Dispense: 90 tablet; Refill: 3    3. Mood insomnia  As abvoe  - mirtazapine (REMERON) 30 MG tablet; Take 1 tablet (30 mg total) by mouth every evening.  Dispense: 90 tablet; Refill: 3    4. Gastroesophageal reflux disease, esophagitis presence not specified  ppi at OTC dose due to refractory symptoms on H2 blocker  - omeprazole (PRILOSEC) 20 MG capsule; Take 1 capsule (20 mg total) by mouth once daily.  Dispense: 90 capsule; Refill: 3

## 2019-04-17 ENCOUNTER — HOSPITAL ENCOUNTER (EMERGENCY)
Facility: HOSPITAL | Age: 79
Discharge: HOME | End: 2019-04-18
Attending: EMERGENCY MEDICINE
Payer: MEDICARE

## 2019-04-17 VITALS
DIASTOLIC BLOOD PRESSURE: 73 MMHG | WEIGHT: 198 LBS | HEART RATE: 67 BPM | SYSTOLIC BLOOD PRESSURE: 162 MMHG | RESPIRATION RATE: 19 BRPM | TEMPERATURE: 98 F | OXYGEN SATURATION: 94 % | BODY MASS INDEX: 33.8 KG/M2 | HEIGHT: 64 IN

## 2019-04-17 DIAGNOSIS — I10 HYPERTENSION, UNSPECIFIED TYPE: ICD-10-CM

## 2019-04-17 DIAGNOSIS — R51.9 NONINTRACTABLE HEADACHE, UNSPECIFIED CHRONICITY PATTERN, UNSPECIFIED HEADACHE TYPE: Primary | ICD-10-CM

## 2019-04-17 LAB
ALBUMIN SERPL BCP-MCNC: 3.3 G/DL (ref 3.5–5.2)
ALP SERPL-CCNC: 112 U/L (ref 55–135)
ALT SERPL W/O P-5'-P-CCNC: 9 U/L (ref 10–44)
ANION GAP SERPL CALC-SCNC: 9 MMOL/L (ref 8–16)
APTT BLDCRRT: 25.7 SEC (ref 21–32)
AST SERPL-CCNC: 14 U/L (ref 10–40)
BASOPHILS # BLD AUTO: 0.06 K/UL (ref 0–0.2)
BASOPHILS NFR BLD: 0.7 % (ref 0–1.9)
BILIRUB SERPL-MCNC: 0.2 MG/DL (ref 0.1–1)
BUN SERPL-MCNC: 17 MG/DL (ref 8–23)
CALCIUM SERPL-MCNC: 9.3 MG/DL (ref 8.7–10.5)
CHLORIDE SERPL-SCNC: 103 MMOL/L (ref 95–110)
CO2 SERPL-SCNC: 30 MMOL/L (ref 23–29)
CREAT SERPL-MCNC: 0.8 MG/DL (ref 0.5–1.4)
DIFFERENTIAL METHOD: ABNORMAL
EOSINOPHIL # BLD AUTO: 0.3 K/UL (ref 0–0.5)
EOSINOPHIL NFR BLD: 3.2 % (ref 0–8)
ERYTHROCYTE [DISTWIDTH] IN BLOOD BY AUTOMATED COUNT: 15.4 % (ref 11.5–14.5)
EST. GFR  (AFRICAN AMERICAN): >60 ML/MIN/1.73 M^2
EST. GFR  (NON AFRICAN AMERICAN): >60 ML/MIN/1.73 M^2
GLUCOSE SERPL-MCNC: 138 MG/DL (ref 70–110)
HCT VFR BLD AUTO: 41.1 % (ref 37–48.5)
HGB BLD-MCNC: 12.9 G/DL (ref 12–16)
INR PPP: 1 (ref 0.8–1.2)
LYMPHOCYTES # BLD AUTO: 2 K/UL (ref 1–4.8)
LYMPHOCYTES NFR BLD: 22.2 % (ref 18–48)
MCH RBC QN AUTO: 26.9 PG (ref 27–31)
MCHC RBC AUTO-ENTMCNC: 31.4 G/DL (ref 32–36)
MCV RBC AUTO: 86 FL (ref 82–98)
MONOCYTES # BLD AUTO: 0.9 K/UL (ref 0.3–1)
MONOCYTES NFR BLD: 10 % (ref 4–15)
NEUTROPHILS # BLD AUTO: 5.7 K/UL (ref 1.8–7.7)
NEUTROPHILS NFR BLD: 63.9 % (ref 38–73)
PLATELET # BLD AUTO: 238 K/UL (ref 150–350)
PMV BLD AUTO: 10.9 FL (ref 9.2–12.9)
POTASSIUM SERPL-SCNC: 3.6 MMOL/L (ref 3.5–5.1)
PROT SERPL-MCNC: 7.1 G/DL (ref 6–8.4)
PROTHROMBIN TIME: 10.2 SEC (ref 9–12.5)
RBC # BLD AUTO: 4.79 M/UL (ref 4–5.4)
SODIUM SERPL-SCNC: 142 MMOL/L (ref 136–145)
WBC # BLD AUTO: 8.96 K/UL (ref 3.9–12.7)

## 2019-04-17 PROCEDURE — 82962 GLUCOSE BLOOD TEST: CPT

## 2019-04-17 PROCEDURE — 85730 THROMBOPLASTIN TIME PARTIAL: CPT

## 2019-04-17 PROCEDURE — 99284 EMERGENCY DEPT VISIT MOD MDM: CPT | Mod: 25

## 2019-04-17 PROCEDURE — 85610 PROTHROMBIN TIME: CPT

## 2019-04-17 PROCEDURE — 80053 COMPREHEN METABOLIC PANEL: CPT

## 2019-04-17 PROCEDURE — 85025 COMPLETE CBC W/AUTO DIFF WBC: CPT

## 2019-04-17 NOTE — ED TRIAGE NOTES
Pt arrives to er via personal vehicle from home aaox4, no distress. Pt states left side of head 7/10, throbbing pain and tingling around 1530. Pt states 325 asa, 81 asa. Pt states never had headache like this before. Denies numbness, chest pain, sob,.

## 2019-04-17 NOTE — ED PROVIDER NOTES
Encounter Date: 4/17/2019    SCRIBE #1 NOTE: I, Tacho Muñiz, am scribing for, and in the presence of,  Luly Ward MD. I have scribed the following portions of the note - Other sections scribed: HPI and ROS.       History     Chief Complaint   Patient presents with    Headache     Pt report L side headache and tingling that started about 20 mins ago. Pt denies extremity weakness with triage.      CC: Headache    HPI: Patient is a 78-year-old female PMHx hypercholesteremia, HTN, depression, anxiety, thyroid disease, psychiatric care who presents with acute onset left temporal headache at approximately 15:30 this afternoon. Patient characterizes her headache as throbbing. She denies vision changes, slurred speech, weakness, numbness, chest pain, SOB. Patient expresses concern due to a family history of aneurysm. She denies any previous history of similar headache. She took Aspirin prior to arrival.     The history is provided by the patient. No  was used.     Review of patient's allergies indicates:   Allergen Reactions    Codeine      Other reaction(s): Rash    Propranolol Other (See Comments)     Burning sensation of scalp/skin     Past Medical History:   Diagnosis Date    Anxiety     Asthma     Depression     Headache     Hx of psychiatric care     Hypercholesterolemia     Hypertension     Psychiatric problem     Sleep difficulties     Therapy     Thyroid disease     multiple nodules     Past Surgical History:   Procedure Laterality Date    CHOLECYSTECTOMY      HYSTERECTOMY      knee repalcement       Family History   Problem Relation Age of Onset    Diabetes Mother     Hypertension Mother     Kidney disease Mother     Heart disease Father     Bipolar disorder Daughter      Social History     Tobacco Use    Smoking status: Never Smoker    Smokeless tobacco: Never Used   Substance Use Topics    Alcohol use: No    Drug use: No     Review of Systems    Constitutional: Negative for fever.   Eyes: Negative for visual disturbance.   Respiratory: Negative for shortness of breath.    Cardiovascular: Negative for chest pain.   Gastrointestinal: Negative for abdominal pain, diarrhea, nausea and vomiting.   Genitourinary: Negative for dysuria.   Neurological: Positive for headaches. Negative for speech difficulty, weakness and numbness.   All other systems reviewed and are negative.      Physical Exam     Initial Vitals [04/17/19 1558]   BP Pulse Resp Temp SpO2   (!) 183/83 91 18 98.3 °F (36.8 °C) 97 %      MAP       --         Physical Exam    Constitutional: She appears well-developed and well-nourished. She is not diaphoretic. No distress.   HENT:   Mouth/Throat: Oropharynx is clear and moist.   No tenderness on palpation of temporal area bilaterally   Eyes: EOM are normal. Pupils are equal, round, and reactive to light.   Neck: Neck supple.   Cardiovascular: Normal rate and regular rhythm.   Pulmonary/Chest: Breath sounds normal.   Abdominal: Soft. There is no tenderness.   Musculoskeletal: She exhibits no edema.   Neurological: She is alert and oriented to person, place, and time. She has normal strength. No cranial nerve deficit or sensory deficit. GCS score is 15. GCS eye subscore is 4. GCS verbal subscore is 5. GCS motor subscore is 6.   No pronator drift, normal finger to nose testin   Skin: Skin is warm and dry.   Psychiatric: She has a normal mood and affect.         ED Course   Procedures  Labs Reviewed   CBC W/ AUTO DIFFERENTIAL - Abnormal; Notable for the following components:       Result Value    MCH 26.9 (*)     MCHC 31.4 (*)     RDW 15.4 (*)     All other components within normal limits   COMPREHENSIVE METABOLIC PANEL - Abnormal; Notable for the following components:    CO2 30 (*)     Glucose 138 (*)     Albumin 3.3 (*)     ALT 9 (*)     All other components within normal limits   POCT GLUCOSE - Abnormal; Notable for the following components:    POCT  Glucose 143 (*)     All other components within normal limits   APTT   PROTIME-INR     EKG Readings: (Independently Interpreted)   Normal sinus rhythm, rate 82 beats per minute, no ST elevation, no T-wave inversions, normal TN interval, .  No STEMI.       Imaging Results          CT Head Without Contrast (Final result)  Result time 04/17/19 17:34:52    Final result by Chung Reagan MD (04/17/19 17:34:52)                 Impression:      1. No convincing acute intraparenchymal abnormality, noting motion limited exam.  2. Grossly stable sequela of chronic microvascular ischemic change and senescent change.      Electronically signed by: Chung Reagan MD  Date:    04/17/2019  Time:    17:34             Narrative:    EXAMINATION:  CT HEAD WITHOUT CONTRAST    CLINICAL HISTORY:  Headache, acute, severe, thunderclap, worst HA of life;    TECHNIQUE:  Low dose axial images were obtained through the head.  Coronal and sagittal reformations were also performed. Contrast was not administered.    COMPARISON:  05/27/2018    FINDINGS:  There is generalized cerebral volume loss.  There is hypoattenuation in a periventricular fashion, likely sequela of chronic microvascular ischemic change.  There is no evidence of acute major vascular territory infarct, hemorrhage, or mass.  There is no hydrocephalus.  There are no abnormal extra-axial fluid collections.  The paranasal sinuses and mastoid air cells are clear, and there is no evidence of calvarial fracture.  The visualized soft tissues are unremarkable.                                 Medical Decision Making:   Initial Assessment:   78-year-old female presents with sudden, left-sided headache. Denies previous history of similar headaches in the past.  Denies neurologic symptoms such as change in vision, difficulty with speech, numbness or weakness. Her exam is nonfocal. No temporal tenderness to suggest arteritis, no focal deficits to suggest acute CVA, no fever neck  pain to suggest meningitis.  Concern for subarachnoid hemorrhage given the patient's report of her symptoms.  Initial workup with labs, coags and CT head.  I discussed with patient the possibility of performing a lumbar puncture after the CT depending on results.            Scribe Attestation:   Scribe #1: I performed the above scribed service and the documentation accurately describes the services I performed. I attest to the accuracy of the note.    Attending Attestation:           Physician Attestation for Scribe:  Physician Attestation Statement for Scribe #1: I, Luly Ward MD, reviewed documentation, as scribed by Tacho Muñiz in my presence, and it is both accurate and complete.                 ED Course as of Apr 18 1240 Wed Apr 17, 2019 1918 Patient's CT head shows no acute findings, remaining labs are within acceptable limits. Her blood pressure has improved without intervention to 162/73.  Patient denies headache at this time.  We discussed proceeding with a lumbar puncture she complained of sudden onset severe headache.  I reviewed the risks and benefits of the procedure, advised patient I cannot rule out a subarachnoid hemorrhage from an aneurysm based on the CT results and that a missed subarachnoid hemorrhage could lead to death or permanent disability.  The patient is very hesitant to proceed with the lumbar puncture as she has had a negative experience in the past when she was younger.  She wants to call her sister to discuss.  I will reassess shortly.    [LH]   2003 I reassessed the patient, she remains asymptomatic.  She talked to her family members via phone and declines proceeding with lumbar puncture at this time.  Reason for declining as she feels that given her age and her previous experience, she does not feel that the risk outweighs the benefits at this time.  The patient does not show any signs of intoxication or impairment, I believe she has the capacity to make her own medical  decisions and she understands the risks and benefits of those decisions.  I advised patient I can send her to a neurologist to follow up, I also advised her she may return to the emergency department at any point with any new or worsening symptoms or she changes her mind regarding proceeding with a lumbar puncture.  She states she understands and agrees with this plan.    [LH]      ED Course User Index  [LH] Luly Ward MD     Clinical Impression:       ICD-10-CM ICD-9-CM   1. Nonintractable headache, unspecified chronicity pattern, unspecified headache type R51 784.0   2. Hypertension, unspecified type I10 401.9                                Luly Ward MD  04/18/19 1244

## 2019-04-18 LAB — POCT GLUCOSE: 143 MG/DL (ref 70–110)

## 2019-04-22 NOTE — PROGRESS NOTES
"Subjective:       Patient ID: Jose Manuel Boyd is a 77 y.o. female.    Chief Complaint: Depression; Hypertension; and Follow-up (3 month)    F/u chronic conditions    HPI: 78 y/o w/ asthma morbid obesity HTN, impaired fasting glucose (no medications) and depression presents for scheduled follow up. Stopped taking celexa due to "making me feel funny" does admit to feelings of increased anxiety now off diazepam. Sleep "okay". Mood is exacerbated as this si the three year anniversary of son's death. Other adult son lives in [place] she speaks with him daily. She reports breathing at baseline using albuteroal inhaler for wheezing one to two times per week. No night times symptoms no cough. Regarding blood pressure she reports compliance with medication did not take today. No LE swelling       Review of Systems   Constitutional: Negative for activity change, appetite change, fatigue, fever and unexpected weight change.   HENT: Negative for ear pain, rhinorrhea and sore throat.    Eyes: Negative for discharge and visual disturbance.   Respiratory: Negative for chest tightness, shortness of breath and wheezing.    Cardiovascular: Negative for chest pain, palpitations and leg swelling.   Gastrointestinal: Negative for abdominal pain, constipation and diarrhea.   Endocrine: Negative for cold intolerance and heat intolerance.   Genitourinary: Negative for dysuria and hematuria.   Musculoskeletal: Negative for joint swelling and neck stiffness.   Skin: Negative for rash.   Neurological: Negative for dizziness, syncope, weakness and headaches.   Psychiatric/Behavioral: Negative for suicidal ideas.       Objective:     Vitals:    08/11/17 0855   BP: (!) 140/98   BP Location: Right arm   Patient Position: Sitting   BP Method: Medium (Manual)   Pulse: 72   Resp: 17   Temp: 97.7 °F (36.5 °C)   TempSrc: Oral   SpO2: 97%   Weight: 101.6 kg (223 lb 15.8 oz)   Height: 4' 11" (1.499 m)          Physical Exam   Constitutional: She " Subjective   Mary Alice Sanders is a 82 y.o. female. Patient is here today for   Chief Complaint   Patient presents with   • Follow-up     Vitamin D poisoning, did not have checked prior to    • Urinary Frequency          Vitals:    04/11/19 1319   BP: 108/72   Pulse: 66   Resp: 16   Temp: 97.6 °F (36.4 °C)   SpO2: 97%       Past Medical History:   Diagnosis Date   • Allergic rhinitis    • Chest pain    • GERD (gastroesophageal reflux disease)    • Hx of migraine headaches    • Hx: UTI (urinary tract infection)    • Hypertension    • Varicose veins       Allergies   Allergen Reactions   • No Known Drug Allergy       Social History     Socioeconomic History   • Marital status:      Spouse name: Not on file   • Number of children: Not on file   • Years of education: Not on file   • Highest education level: Not on file   Tobacco Use   • Smoking status: Never Smoker   • Smokeless tobacco: Never Used   Substance and Sexual Activity   • Alcohol use: No   • Drug use: Defer   • Sexual activity: Defer        Current Outpatient Medications:   •  acetaminophen-codeine (TYLENOL #3) 300-30 MG per tablet, Take 1 tablet by mouth Every 6 (Six) Hours As Needed for Moderate Pain ., Disp: 12 tablet, Rfl: 0  •  amitriptyline (ELAVIL) 25 MG tablet, Take 1 tablet by mouth Every Night., Disp: 90 tablet, Rfl: 3  •  atorvastatin (LIPITOR) 40 MG tablet, Take 1 tablet by mouth Daily., Disp: 90 tablet, Rfl: 3  •  donepezil (ARICEPT) 10 MG tablet, Take 1 tablet by mouth Every Night., Disp: 30 tablet, Rfl: 1  •  DULoxetine (CYMBALTA) 60 MG capsule, Take 1 capsule by mouth Daily., Disp: 90 capsule, Rfl: 3  •  esomeprazole (NEXIUM) 40 MG capsule, Take 1 capsule by mouth Every Morning Before Breakfast., Disp: 90 capsule, Rfl: 3  •  memantine (NAMENDA) 10 MG tablet, Take 1 tablet by mouth 2 (Two) Times a Day., Disp: 180 tablet, Rfl: 3  •  propranolol (INDERAL) 20 MG tablet, Take 1 tablet by mouth 2 (Two) Times a Day., Disp: 180 tablet, Rfl: 3  •   is oriented to person, place, and time. She appears well-developed and well-nourished.   HENT:   Head: Normocephalic and atraumatic.   Eyes: Conjunctivae are normal. Pupils are equal, round, and reactive to light.   Neck: Normal range of motion.   Cardiovascular: Normal rate and regular rhythm.  Exam reveals no gallop and no friction rub.    No murmur heard.  Pulmonary/Chest: Effort normal and breath sounds normal. She has no wheezes. She has no rales.   Abdominal: Soft. Bowel sounds are normal. There is no tenderness. There is no rebound and no guarding.   Musculoskeletal: Normal range of motion. She exhibits no edema or tenderness.   Neurological: She is alert and oriented to person, place, and time. No cranial nerve deficit.   Skin: Skin is warm and dry.   Psychiatric: She has a normal mood and affect.       Assessment:       1. HTN (hypertension), benign    2. Depression, unspecified depression type    3. Morbid obesity due to excess calories    4. Blood glucose elevated        Plan:    1. Above goal today off medication restart likely would benefit from additon of ACEi check renal function and electrolytes return in 4 weeks to follow up and monitor    2. Uncontrolled no medications, trial bupropion twice daily discussed GI side effects. Follow up one month to monitor symptoms    3. The patient is asked to make an attempt to improve diet and exercise patterns to aid in medical management of this problem.    4. Repeat A1c today          sulfamethoxazole-trimethoprim (BACTRIM DS) 800-160 MG per tablet, Take 1 tablet by mouth 2 (Two) Times a Day., Disp: 14 tablet, Rfl: 0     Objective     She is pleasant, but demented.  Her son is with her today.    She is here to follow-up on hypercholesterolemia.    She tells me that she feels well.         Review of Systems   Constitutional: Negative.    HENT: Negative.    Respiratory: Negative.    Cardiovascular: Negative.    Musculoskeletal: Negative.    Psychiatric/Behavioral: Positive for confusion.       Physical Exam   Constitutional: She is oriented to person, place, and time. She appears well-developed and well-nourished.   Pleasant, neatly groomed, BMI 21.2   HENT:   Head: Normocephalic and atraumatic.   Neck: Neck supple.   Cardiovascular: Normal rate, regular rhythm and normal heart sounds.   Pulmonary/Chest: Effort normal and breath sounds normal.   Neurological: She is alert and oriented to person, place, and time.   Psychiatric: She has a normal mood and affect. Her behavior is normal.   Nursing note and vitals reviewed.        Problem List Items Addressed This Visit        Cardiovascular and Mediastinum    Hypercholesterolemia    Relevant Orders    Lipid Panel With LDL / HDL Ratio (Completed)    BP (high blood pressure)    Relevant Orders    CBC & Differential (Completed)       Nervous and Auditory    Alzheimer's disease       Other    Blues    Vitamin D toxicity    Relevant Orders    Vitamin D 25 Hydroxy (Completed)      Other Visit Diagnoses     Urinary body odor    -  Primary    Relevant Orders    POC Urinalysis Dipstick, Automated (Completed)    Comprehensive Metabolic Panel (Completed)    Hyperglycemia        Relevant Orders    Hemoglobin A1c (Completed)    Vitamin D deficiency         Relevant Orders    Vitamin D 25 Hydroxy (Completed)            PLAN  Her hypertension appears to be well controlled.    She has a history of vitamin D toxicity.  I would recheck her vitamin D level.  She is  fasting this morning, she has a history of hypercholesterolemia, will check a lipid profile, comprehensive metabolic panel, and hemoglobin A1c (she has a history of hyperglycemia.    Follow-up 4 months or so.  No Follow-up on file.

## 2019-08-09 ENCOUNTER — OFFICE VISIT (OUTPATIENT)
Dept: FAMILY MEDICINE | Facility: CLINIC | Age: 79
End: 2019-08-09
Payer: MEDICARE

## 2019-08-09 VITALS
TEMPERATURE: 98 F | OXYGEN SATURATION: 94 % | HEART RATE: 81 BPM | DIASTOLIC BLOOD PRESSURE: 84 MMHG | BODY MASS INDEX: 36.92 KG/M2 | SYSTOLIC BLOOD PRESSURE: 171 MMHG | HEIGHT: 64 IN | WEIGHT: 216.25 LBS | RESPIRATION RATE: 17 BRPM

## 2019-08-09 DIAGNOSIS — F41.9 ANXIETY: ICD-10-CM

## 2019-08-09 DIAGNOSIS — I10 BENIGN ESSENTIAL HTN: Primary | ICD-10-CM

## 2019-08-09 PROCEDURE — 3077F PR MOST RECENT SYSTOLIC BLOOD PRESSURE >= 140 MM HG: ICD-10-PCS | Mod: CPTII,S$GLB,, | Performed by: FAMILY MEDICINE

## 2019-08-09 PROCEDURE — 1101F PT FALLS ASSESS-DOCD LE1/YR: CPT | Mod: CPTII,S$GLB,, | Performed by: FAMILY MEDICINE

## 2019-08-09 PROCEDURE — 3079F PR MOST RECENT DIASTOLIC BLOOD PRESSURE 80-89 MM HG: ICD-10-PCS | Mod: CPTII,S$GLB,, | Performed by: FAMILY MEDICINE

## 2019-08-09 PROCEDURE — 99999 PR PBB SHADOW E&M-EST. PATIENT-LVL III: ICD-10-PCS | Mod: PBBFAC,,, | Performed by: FAMILY MEDICINE

## 2019-08-09 PROCEDURE — 3077F SYST BP >= 140 MM HG: CPT | Mod: CPTII,S$GLB,, | Performed by: FAMILY MEDICINE

## 2019-08-09 PROCEDURE — 1101F PR PT FALLS ASSESS DOC 0-1 FALLS W/OUT INJ PAST YR: ICD-10-PCS | Mod: CPTII,S$GLB,, | Performed by: FAMILY MEDICINE

## 2019-08-09 PROCEDURE — 99999 PR PBB SHADOW E&M-EST. PATIENT-LVL III: CPT | Mod: PBBFAC,,, | Performed by: FAMILY MEDICINE

## 2019-08-09 PROCEDURE — 99499 UNLISTED E&M SERVICE: CPT | Mod: S$GLB,,, | Performed by: FAMILY MEDICINE

## 2019-08-09 PROCEDURE — 3079F DIAST BP 80-89 MM HG: CPT | Mod: CPTII,S$GLB,, | Performed by: FAMILY MEDICINE

## 2019-08-09 PROCEDURE — 99214 OFFICE O/P EST MOD 30 MIN: CPT | Mod: S$GLB,,, | Performed by: FAMILY MEDICINE

## 2019-08-09 PROCEDURE — 99214 PR OFFICE/OUTPT VISIT, EST, LEVL IV, 30-39 MIN: ICD-10-PCS | Mod: S$GLB,,, | Performed by: FAMILY MEDICINE

## 2019-08-09 PROCEDURE — 99499 RISK ADDL DX/OHS AUDIT: ICD-10-PCS | Mod: S$GLB,,, | Performed by: FAMILY MEDICINE

## 2019-08-09 RX ORDER — OFLOXACIN 3 MG/ML
SOLUTION/ DROPS OPHTHALMIC
Refills: 1 | Status: ON HOLD | COMMUNITY
Start: 2019-06-03 | End: 2021-06-28

## 2019-08-09 RX ORDER — BROMFENAC SODIUM 0.7 MG/ML
SOLUTION/ DROPS OPHTHALMIC
Refills: 1 | Status: ON HOLD | COMMUNITY
Start: 2019-06-04 | End: 2021-06-28

## 2019-08-09 RX ORDER — DUREZOL 0.5 MG/ML
EMULSION OPHTHALMIC
Refills: 1 | Status: ON HOLD | COMMUNITY
Start: 2019-06-03 | End: 2021-06-28

## 2019-08-09 RX ORDER — SERTRALINE HYDROCHLORIDE 25 MG/1
25 TABLET, FILM COATED ORAL DAILY
Qty: 30 TABLET | Refills: 1 | Status: SHIPPED | OUTPATIENT
Start: 2019-08-09 | End: 2019-08-31 | Stop reason: SDUPTHER

## 2019-08-09 RX ORDER — LOSARTAN POTASSIUM 25 MG/1
25 TABLET ORAL DAILY
Qty: 90 TABLET | Refills: 0 | Status: SHIPPED | OUTPATIENT
Start: 2019-08-09 | End: 2019-10-12 | Stop reason: SDUPTHER

## 2019-08-09 NOTE — PROGRESS NOTES
"Routine Office Visit    Patient Name: Jose Manuel Boyd    : 1940  MRN: [id number]    Subjective:  Jose Manuel is a 79 y.o. female who presents today for:    1. htn and anxiety  Patient presenting today for anxiety and elevated blood pressure.  She states that this month is the 3 year anniversary of her grandsons death and this year is the 7th anniversary of her  and daughters death.  She states that despite time she has not been able to cope with their deaths.  She reports that her PCP took weaned her off Valium, but she feels she needs to be back on the medication.  She states that "my nerves are bad" and sometimes they are so bad that "my hands shake and I can't write pretty".  She does have episodes of crying when thinking about the members of her family that she has lost.  She does have her sister, great grandchildren, and other family members that she has for support.  She states that she is not sure if the anxiety is causing her blood pressure to be high, but she is getting headaches from the blood pressure and has been to the ED recently where her blood pressure was in the 180's systolic.  She has not had chest pain, shortness of breath, weakness, or slurred speech.  She is currently on Maxide and reports that PCP told her she could take it twice a day if needed.  She is not currently on an ACEI or ARB.      Past Medical History  Past Medical History:   Diagnosis Date    Anxiety     Asthma     Depression     Headache     Hx of psychiatric care     Hypercholesterolemia     Hypertension     Psychiatric problem     Sleep difficulties     Therapy     Thyroid disease     multiple nodules       Past Surgical History  Past Surgical History:   Procedure Laterality Date    CHOLECYSTECTOMY      HYSTERECTOMY      knee repalcement         Family History  Family History   Problem Relation Age of Onset    Diabetes Mother     Hypertension Mother     Kidney disease Mother     Heart disease Father "     Bipolar disorder Daughter        Social History  Social History     Socioeconomic History    Marital status:      Spouse name: Not on file    Number of children: Not on file    Years of education: Not on file    Highest education level: Not on file   Occupational History    Occupation: retired     Comment: Domestic service, [employer]   Social Needs    Financial resource strain: Not on file    Food insecurity:     Worry: Not on file     Inability: Not on file    Transportation needs:     Medical: Not on file     Non-medical: Not on file   Tobacco Use    Smoking status: Never Smoker    Smokeless tobacco: Never Used   Substance and Sexual Activity    Alcohol use: No    Drug use: No    Sexual activity: Never   Lifestyle    Physical activity:     Days per week: Not on file     Minutes per session: Not on file    Stress: Not on file   Relationships    Social connections:     Talks on phone: Not on file     Gets together: Not on file     Attends [employer] service: Not on file     Active member of club or organization: Not on file     Attends meetings of clubs or organizations: Not on file     Relationship status: Not on file   Other Topics Concern    Patient feels they ought to cut down on drinking/drug use No    Patient annoyed by others criticizing their drinking/drug use No    Patient has felt bad or guilty about drinking/drug use No    Patient has had a drink/used drugs as an eye opener in the AM No   Social History Narrative    Enjoys going to [employer]       Current Medications  Current Outpatient Medications on File Prior to Visit   Medication Sig Dispense Refill    albuterol (VENTOLIN HFA) 90 mcg/actuation inhaler Inhale 2 puffs into the lungs every 4 (four) hours as needed for Wheezing. 6.7 g 6    aspirin 325 MG tablet Take 1 tablet (325 mg total) by mouth once daily. 1 Bottle 0    atorvastatin (LIPITOR) 40 MG tablet Take 1 tablet (40 mg total) by mouth once daily. 90 tablet 3     "DUREZOL 0.05 % Drop ophthalmic solution INSTILL 2 DROPS IN SURGICAL EYE TWICE A DAY. START AFTER SURGERY.  1    epinastine 0.05 % ophthalmic solution Place 1 drop into both eyes 2 (two) times daily.  0    fluticasone (FLONASE) 50 mcg/actuation nasal spray 1 spray by Each Nare route 2 (two) times daily. 1 Bottle 1    GAVILYTE-C 240-22.72-6.72 -5.84 gram SolR       ofloxacin (OCUFLOX) 0.3 % ophthalmic solution 1 DROP IN BOTH EYES FOUR TIMES A DAY 1 DROP 4 TIMES A DAY  1    omeprazole (PRILOSEC) 20 MG capsule Take 1 capsule (20 mg total) by mouth once daily. 90 capsule 3    ondansetron (ZOFRAN-ODT) 4 MG TbDL Take 1 tablet (4 mg total) by mouth every 8 (eight) hours as needed (nausea). 15 tablet 0    PROLENSA 0.07 % Drop 2 DROPS IN SURGICAL EYE AT BEDTIME START 2 DAYS BEFORE SURGERY  1    triamterene-hydrochlorothiazide 37.5-25 mg (MAXZIDE-25) 37.5-25 mg per tablet Take 1 tablet by mouth 2 (two) times daily. 180 tablet 1    [DISCONTINUED] mirtazapine (REMERON) 30 MG tablet Take 1 tablet (30 mg total) by mouth every evening. 90 tablet 3     No current facility-administered medications on file prior to visit.        Allergies   Review of patient's allergies indicates:   Allergen Reactions    Codeine      Other reaction(s): Rash    Propranolol Other (See Comments)     Burning sensation of scalp/skin       Review of Systems (Pertinent positives)  Review of Systems   Constitutional: Negative.    HENT: Negative.    Eyes: Negative.    Respiratory: Negative.    Cardiovascular: Negative.    Gastrointestinal: Negative.    Skin: Negative.    Neurological: Negative.    Psychiatric/Behavioral: Positive for depression. Negative for hallucinations, memory loss, substance abuse and suicidal ideas. The patient is nervous/anxious and has insomnia.          BP (!) 171/84 (BP Location: Left arm, Patient Position: Sitting, BP Method: Medium (Automatic))   Pulse 81   Temp 98.1 °F (36.7 °C) (Oral)   Resp 17   Ht 5' 4.02" (1.626 " m)   Wt 98.1 kg (216 lb 4.3 oz)   SpO2 (!) 94%   BMI 37.10 kg/m²     GENERAL APPEARANCE: in no apparent distress and well developed and well nourished  HEENT: PERRL, EOMI, Sclera clear, anicteric, Oropharynx clear, no lesions, Neck supple with midline trachea  NECK: normal, supple, no adenopathy, thyroid normal in size  RESPIRATORY: appears well, vitals normal, no respiratory distress, acyanotic, normal RR, chest clear, no wheezing, crepitations, rhonchi, normal symmetric air entry  HEART: regular rate and rhythm, S1, S2 normal, no murmur, click, rub or gallop.    ABDOMEN: abdomen is soft without tenderness, no masses, no hernias, no organomegaly, no rebound, no guarding. Suprapubic tenderness absent. No CVA tenderness.  NEUROLOGIC: normal without focal findings, CN II-XII are intact.    SKIN: no rashes, no wounds, no other lesions  PSYCH: Alert, oriented x 3, thought content appropriate, speech normal, pleasant and cooperative, good eye contact, well groomed    Assessment/Plan:  Jose Manuel Boyd is a 79 y.o. female who presents today for :    Jose Manuel was seen today for headache.    Diagnoses and all orders for this visit:    Benign essential HTN  -     losartan (COZAAR) 25 MG tablet; Take 1 tablet (25 mg total) by mouth once daily.    Anxiety  -     sertraline (ZOLOFT) 25 MG tablet; Take 1 tablet (25 mg total) by mouth once daily.      1.  Add losartan for blood pressure control  2.  Stop Remeron and start Zoloft  3.  She was told to follow up in 4 weeks or sooner if symptoms worsen  4.  She is to call 911 or go to the ED for any SI/HI or AH/VH  5.  Patient was instructed to contact insurance company for potential therapist she could use  6.  She is to call with any concerns      Chi Thomson MD

## 2019-08-31 DIAGNOSIS — F41.9 ANXIETY: ICD-10-CM

## 2019-09-03 RX ORDER — SERTRALINE HYDROCHLORIDE 25 MG/1
TABLET, FILM COATED ORAL
Qty: 30 TABLET | Refills: 1 | Status: SHIPPED | OUTPATIENT
Start: 2019-09-03 | End: 2019-09-18 | Stop reason: SDUPTHER

## 2019-09-18 DIAGNOSIS — F41.9 ANXIETY: ICD-10-CM

## 2019-09-18 RX ORDER — SERTRALINE HYDROCHLORIDE 25 MG/1
25 TABLET, FILM COATED ORAL DAILY
Qty: 90 TABLET | Refills: 1 | Status: SHIPPED | OUTPATIENT
Start: 2019-09-18 | End: 2019-10-21

## 2019-09-20 ENCOUNTER — TELEPHONE (OUTPATIENT)
Dept: FAMILY MEDICINE | Facility: CLINIC | Age: 79
End: 2019-09-20

## 2019-09-20 NOTE — TELEPHONE ENCOUNTER
LVM for patient to contact the clinic. Patient no showed OV on 9/9/19.   Patient scheduled for 1st available on 10/21/19 at 1:40 and placed on Wait List. If patient does not want that appoitment, patient will need to be rescheduled with the NP for a sooner appointment. No further questions at this time.       *PATIENT SCHEDULED WITH DR. HORN FOR 10/21/19, PT CAN SEE NP IF SOONER APPOINTMENT NEEDED.

## 2019-09-20 NOTE — TELEPHONE ENCOUNTER
----- Message from Maribell Dhaval sent at 9/20/2019 10:10 AM CDT -----  Contact: Self [phone]  Type:  Sooner Appointment Request    Patient is requesting a sooner appointment.  Patient declined first available appointment listed as well as another facility and provider .  Patient will not accept being placed on the waitlist and is requesting a message be sent to doctor.    Name of Caller: Self    When is the first available appointment? 10/21    Symptoms: blood pressure has been high lately and she has headaches     Would the patient rather a call back or a response via [facility]? Call back    Best Call Back Number: [phone]

## 2019-10-12 DIAGNOSIS — I10 BENIGN ESSENTIAL HTN: ICD-10-CM

## 2019-10-14 RX ORDER — LOSARTAN POTASSIUM 25 MG/1
TABLET ORAL
Qty: 90 TABLET | Refills: 0 | Status: SHIPPED | OUTPATIENT
Start: 2019-10-14 | End: 2019-10-21

## 2019-10-21 ENCOUNTER — OFFICE VISIT (OUTPATIENT)
Dept: FAMILY MEDICINE | Facility: CLINIC | Age: 79
End: 2019-10-21
Payer: MEDICARE

## 2019-10-21 VITALS
WEIGHT: 209 LBS | BODY MASS INDEX: 35.68 KG/M2 | TEMPERATURE: 99 F | OXYGEN SATURATION: 95 % | HEART RATE: 81 BPM | DIASTOLIC BLOOD PRESSURE: 84 MMHG | HEIGHT: 64 IN | SYSTOLIC BLOOD PRESSURE: 150 MMHG

## 2019-10-21 DIAGNOSIS — Z86.73 H/O: STROKE: ICD-10-CM

## 2019-10-21 DIAGNOSIS — G25.0 ESSENTIAL TREMOR: ICD-10-CM

## 2019-10-21 DIAGNOSIS — I10 BENIGN ESSENTIAL HTN: ICD-10-CM

## 2019-10-21 DIAGNOSIS — G47.00 INSOMNIA, UNSPECIFIED TYPE: ICD-10-CM

## 2019-10-21 DIAGNOSIS — Z23 NEED FOR INFLUENZA VACCINATION: ICD-10-CM

## 2019-10-21 DIAGNOSIS — I10 HTN (HYPERTENSION), BENIGN: Primary | ICD-10-CM

## 2019-10-21 DIAGNOSIS — K21.9 GASTROESOPHAGEAL REFLUX DISEASE, ESOPHAGITIS PRESENCE NOT SPECIFIED: ICD-10-CM

## 2019-10-21 DIAGNOSIS — R73.09 ELEVATED RANDOM BLOOD GLUCOSE LEVEL: ICD-10-CM

## 2019-10-21 PROCEDURE — 99214 OFFICE O/P EST MOD 30 MIN: CPT | Mod: 25,S$GLB,, | Performed by: INTERNAL MEDICINE

## 2019-10-21 PROCEDURE — 99999 PR PBB SHADOW E&M-EST. PATIENT-LVL III: ICD-10-PCS | Mod: PBBFAC,,, | Performed by: INTERNAL MEDICINE

## 2019-10-21 PROCEDURE — 99214 PR OFFICE/OUTPT VISIT, EST, LEVL IV, 30-39 MIN: ICD-10-PCS | Mod: 25,S$GLB,, | Performed by: INTERNAL MEDICINE

## 2019-10-21 PROCEDURE — 1101F PR PT FALLS ASSESS DOC 0-1 FALLS W/OUT INJ PAST YR: ICD-10-PCS | Mod: CPTII,S$GLB,, | Performed by: INTERNAL MEDICINE

## 2019-10-21 PROCEDURE — 1101F PT FALLS ASSESS-DOCD LE1/YR: CPT | Mod: CPTII,S$GLB,, | Performed by: INTERNAL MEDICINE

## 2019-10-21 PROCEDURE — 3077F SYST BP >= 140 MM HG: CPT | Mod: CPTII,S$GLB,, | Performed by: INTERNAL MEDICINE

## 2019-10-21 PROCEDURE — 3079F DIAST BP 80-89 MM HG: CPT | Mod: CPTII,S$GLB,, | Performed by: INTERNAL MEDICINE

## 2019-10-21 PROCEDURE — 3077F PR MOST RECENT SYSTOLIC BLOOD PRESSURE >= 140 MM HG: ICD-10-PCS | Mod: CPTII,S$GLB,, | Performed by: INTERNAL MEDICINE

## 2019-10-21 PROCEDURE — 99999 PR PBB SHADOW E&M-EST. PATIENT-LVL III: CPT | Mod: PBBFAC,,, | Performed by: INTERNAL MEDICINE

## 2019-10-21 PROCEDURE — 90662 IIV NO PRSV INCREASED AG IM: CPT | Mod: S$GLB,,, | Performed by: INTERNAL MEDICINE

## 2019-10-21 PROCEDURE — 90662 FLU VACCINE - HIGH DOSE (65+) PRESERVATIVE FREE IM: ICD-10-PCS | Mod: S$GLB,,, | Performed by: INTERNAL MEDICINE

## 2019-10-21 PROCEDURE — 3079F PR MOST RECENT DIASTOLIC BLOOD PRESSURE 80-89 MM HG: ICD-10-PCS | Mod: CPTII,S$GLB,, | Performed by: INTERNAL MEDICINE

## 2019-10-21 PROCEDURE — G0008 ADMIN INFLUENZA VIRUS VAC: HCPCS | Mod: S$GLB,,, | Performed by: INTERNAL MEDICINE

## 2019-10-21 PROCEDURE — G0008 FLU VACCINE - HIGH DOSE (65+) PRESERVATIVE FREE IM: ICD-10-PCS | Mod: S$GLB,,, | Performed by: INTERNAL MEDICINE

## 2019-10-21 RX ORDER — TIZANIDINE 4 MG/1
4 TABLET ORAL NIGHTLY PRN
Qty: 90 TABLET | Refills: 0 | Status: SHIPPED | OUTPATIENT
Start: 2019-10-21 | End: 2020-01-16

## 2019-10-21 RX ORDER — PROPRANOLOL HYDROCHLORIDE 20 MG/1
20 TABLET ORAL 2 TIMES DAILY
Qty: 60 TABLET | Refills: 11 | Status: SHIPPED | OUTPATIENT
Start: 2019-10-21 | End: 2020-10-13

## 2019-10-21 RX ORDER — OMEPRAZOLE 20 MG/1
20 CAPSULE, DELAYED RELEASE ORAL DAILY
Qty: 90 CAPSULE | Refills: 3 | Status: SHIPPED | OUTPATIENT
Start: 2019-10-21 | End: 2019-12-10

## 2019-10-21 RX ORDER — ATORVASTATIN CALCIUM 40 MG/1
40 TABLET, FILM COATED ORAL DAILY
Qty: 90 TABLET | Refills: 3 | Status: SHIPPED | OUTPATIENT
Start: 2019-10-21 | End: 2020-02-18 | Stop reason: SDUPTHER

## 2019-10-21 RX ORDER — LOSARTAN POTASSIUM 50 MG/1
50 TABLET ORAL DAILY
Qty: 90 TABLET | Refills: 0 | Status: SHIPPED | OUTPATIENT
Start: 2019-10-21 | End: 2020-01-16

## 2019-10-21 NOTE — PROGRESS NOTES
"Subjective:       Patient ID: Jose Manuel Boyd is a 79 y.o. female.    Chief Complaint: Anxiety (patient c/o upset abdomen, headaches, no appetite for x 3 weeks. patient is not taking her medications as prescribe. ); Headache; and Insomnia    Anxiety    HPI: 80 y/o w/ HTN MDD presents for follow up. She reports sleep is "terrible" sleep two to three hours at a time awakening with racing thoughts. No SI/HI. She reports feeling her shaking is worse. No falls. She is using a cane. She does not have her medications with her  And is unsure of the names of the medications that she takes. During our interview I spoke with ehr daughter, Rossana, via phone with the patient in the room. Rossana reports patient has been complaining of poor sleep for several months. She was weaned of diazepam due to concerns of cognitive changes.     Review of Systems   Constitutional: Negative for activity change, appetite change, fatigue, fever and unexpected weight change.   HENT: Negative for ear pain, rhinorrhea and sore throat.    Eyes: Negative for discharge and visual disturbance.   Respiratory: Negative for chest tightness, shortness of breath and wheezing.    Cardiovascular: Negative for chest pain, palpitations and leg swelling.   Gastrointestinal: Negative for abdominal pain, constipation and diarrhea.   Endocrine: Negative for cold intolerance and heat intolerance.   Genitourinary: Negative for dysuria and hematuria.   Musculoskeletal: Negative for joint swelling and neck stiffness.   Skin: Negative for rash.   Neurological: Negative for dizziness, syncope, weakness and headaches.   Psychiatric/Behavioral: Positive for sleep disturbance. Negative for suicidal ideas. The patient is nervous/anxious.        Objective:     Vitals:    10/21/19 1333   BP: (!) 150/84   BP Location: Right arm   Patient Position: Sitting   BP Method: Medium (Manual)   Pulse: 81   Temp: 98.7 °F (37.1 °C)   TempSrc: Oral   SpO2: 95%   Weight: 94.8 kg (208 " "lb 15.9 oz)   Height: 5' 4" (1.626 m)          Physical Exam   Constitutional: She is oriented to person, place, and time. She appears well-developed and well-nourished.   HENT:   Head: Normocephalic and atraumatic.   Eyes: Conjunctivae are normal. No scleral icterus.   Neck: Normal range of motion.   Cardiovascular: Normal rate and regular rhythm. Exam reveals no gallop and no friction rub.   No murmur heard.  No LE edema   Pulmonary/Chest: Effort normal and breath sounds normal. She has no wheezes. She has no rales.   Abdominal: Soft. Bowel sounds are normal. There is no tenderness. There is no rebound and no guarding.   Musculoskeletal: Normal range of motion. She exhibits no edema or tenderness.   Neurological: She is alert and oriented to person, place, and time. No cranial nerve deficit.   Resting attention tremor bilateral hands no cogwheeling of wrists   Skin: Skin is warm and dry.   Psychiatric: She has a normal mood and affect.       Assessment and Plan   1. HTN (hypertension), benign  Increase arb dose, check labs for end organ damage  - CBC auto differential; Future  - Comprehensive metabolic panel; Future  - losartan (COZAAR) 50 MG tablet; Take 1 tablet (50 mg total) by mouth once daily. For high blood pressure  Dispense: 90 tablet; Refill: 0    2. Elevated random blood glucose level  Repeat a1c   - Hemoglobin A1c; Future    3. Benign essential HTN  As above  - propranolol (INDERAL) 20 MG tablet; Take 1 tablet (20 mg total) by mouth 2 (two) times daily. For shaking  Dispense: 60 tablet; Refill: 11    4. Essential tremor  Low dose propranolol for tremor and anxiety    5. Insomnia, unspecified type  Nightly tizanidine    Medication indication written on all prescriptions sent to pharmacy today  - tiZANidine (ZANAFLEX) 4 MG tablet; Take 1 tablet (4 mg total) by mouth nightly as needed (for insomnia).  Dispense: 90 tablet; Refill: 0    6. H/O: stroke  Continue statin  - atorvastatin (LIPITOR) 40 MG tablet; " Take 1 tablet (40 mg total) by mouth once daily.  Dispense: 90 tablet; Refill: 3    7. Need for influenza vaccination  Flu vaccine today  - Influenza - High Dose (65+) (PF) (IM)    8. Gastroesophageal reflux disease, esophagitis presence not specified  ppi once daily  - omeprazole (PRILOSEC) 20 MG capsule; Take 1 capsule (20 mg total) by mouth once daily. For acid reflux  Dispense: 90 capsule; Refill: 3

## 2019-10-28 ENCOUNTER — TELEPHONE (OUTPATIENT)
Dept: FAMILY MEDICINE | Facility: CLINIC | Age: 79
End: 2019-10-28

## 2019-10-28 NOTE — TELEPHONE ENCOUNTER
I spoke to the pt and she reports that the Inderal given for shaking is helping some. The medication given for sleep is not helping and she reports that she is taking it as directed. Please advise.

## 2019-10-28 NOTE — TELEPHONE ENCOUNTER
----- Message from Yossi Cornjeo sent at 10/28/2019  2:38 PM CDT -----  Contact: Self  Type: Patient Call Back    Who called:Self    What is the request in detail:She states the medication is not very effective     propranolol (INDERAL) 20 MG tablet   tiZANidine (ZANAFLEX) 4 MG tablet     She also states she has abdominal pain.    Can the clinic reply by [facility]?No    Would the patient rather a call back or a response via [facility]? Callback    Best call back number:[phone]    Additional Information:n/a

## 2019-11-07 ENCOUNTER — HOSPITAL ENCOUNTER (EMERGENCY)
Facility: HOSPITAL | Age: 79
Discharge: HOME | End: 2019-11-07
Attending: EMERGENCY MEDICINE
Payer: MEDICARE

## 2019-11-07 VITALS
HEART RATE: 78 BPM | OXYGEN SATURATION: 95 % | HEIGHT: 60 IN | WEIGHT: 208 LBS | BODY MASS INDEX: 40.84 KG/M2 | RESPIRATION RATE: 17 BRPM | DIASTOLIC BLOOD PRESSURE: 82 MMHG | TEMPERATURE: 98 F | SYSTOLIC BLOOD PRESSURE: 180 MMHG

## 2019-11-07 DIAGNOSIS — R51.9 NONINTRACTABLE HEADACHE, UNSPECIFIED CHRONICITY PATTERN, UNSPECIFIED HEADACHE TYPE: ICD-10-CM

## 2019-11-07 DIAGNOSIS — K30 UPSET STOMACH: ICD-10-CM

## 2019-11-07 DIAGNOSIS — I10 HYPERTENSION, UNSPECIFIED TYPE: Primary | ICD-10-CM

## 2019-11-07 LAB
ALBUMIN SERPL BCP-MCNC: 3.4 G/DL (ref 3.5–5.2)
ALP SERPL-CCNC: 109 U/L (ref 55–135)
ALT SERPL W/O P-5'-P-CCNC: 7 U/L (ref 10–44)
ANION GAP SERPL CALC-SCNC: 7 MMOL/L (ref 8–16)
AST SERPL-CCNC: 12 U/L (ref 10–40)
BASOPHILS # BLD AUTO: 0.08 K/UL (ref 0–0.2)
BASOPHILS NFR BLD: 0.7 % (ref 0–1.9)
BILIRUB SERPL-MCNC: 0.5 MG/DL (ref 0.1–1)
BILIRUB UR QL STRIP: NEGATIVE
BUN SERPL-MCNC: 9 MG/DL (ref 8–23)
CALCIUM SERPL-MCNC: 9.3 MG/DL (ref 8.7–10.5)
CHLORIDE SERPL-SCNC: 104 MMOL/L (ref 95–110)
CLARITY UR: CLEAR
CO2 SERPL-SCNC: 28 MMOL/L (ref 23–29)
COLOR UR: NORMAL
CREAT SERPL-MCNC: 0.8 MG/DL (ref 0.5–1.4)
DIFFERENTIAL METHOD: ABNORMAL
EOSINOPHIL # BLD AUTO: 0.3 K/UL (ref 0–0.5)
EOSINOPHIL NFR BLD: 2.7 % (ref 0–8)
ERYTHROCYTE [DISTWIDTH] IN BLOOD BY AUTOMATED COUNT: 16.2 % (ref 11.5–14.5)
EST. GFR  (AFRICAN AMERICAN): >60 ML/MIN/1.73 M^2
EST. GFR  (NON AFRICAN AMERICAN): >60 ML/MIN/1.73 M^2
GLUCOSE SERPL-MCNC: 110 MG/DL (ref 70–110)
GLUCOSE UR QL STRIP: NEGATIVE
HCT VFR BLD AUTO: 42.9 % (ref 37–48.5)
HGB BLD-MCNC: 13.4 G/DL (ref 12–16)
HGB UR QL STRIP: NEGATIVE
IMM GRANULOCYTES # BLD AUTO: 0.04 K/UL (ref 0–0.04)
IMM GRANULOCYTES NFR BLD AUTO: 0.3 % (ref 0–0.5)
KETONES UR QL STRIP: NEGATIVE
LEUKOCYTE ESTERASE UR QL STRIP: NEGATIVE
LYMPHOCYTES # BLD AUTO: 1.8 K/UL (ref 1–4.8)
LYMPHOCYTES NFR BLD: 15.9 % (ref 18–48)
MCH RBC QN AUTO: 26.1 PG (ref 27–31)
MCHC RBC AUTO-ENTMCNC: 31.2 G/DL (ref 32–36)
MCV RBC AUTO: 84 FL (ref 82–98)
MONOCYTES # BLD AUTO: 1.1 K/UL (ref 0.3–1)
MONOCYTES NFR BLD: 9.4 % (ref 4–15)
NEUTROPHILS # BLD AUTO: 8.2 K/UL (ref 1.8–7.7)
NEUTROPHILS NFR BLD: 71 % (ref 38–73)
NITRITE UR QL STRIP: NEGATIVE
NRBC BLD-RTO: 0 /100 WBC
PH UR STRIP: 6 [PH] (ref 5–8)
PLATELET # BLD AUTO: 248 K/UL (ref 150–350)
PMV BLD AUTO: 10.8 FL (ref 9.2–12.9)
POTASSIUM SERPL-SCNC: 3.8 MMOL/L (ref 3.5–5.1)
PROT SERPL-MCNC: 7.1 G/DL (ref 6–8.4)
PROT UR QL STRIP: NEGATIVE
RBC # BLD AUTO: 5.13 M/UL (ref 4–5.4)
SODIUM SERPL-SCNC: 139 MMOL/L (ref 136–145)
SP GR UR STRIP: 1 (ref 1–1.03)
URN SPEC COLLECT METH UR: NORMAL
UROBILINOGEN UR STRIP-ACNC: NEGATIVE EU/DL
WBC # BLD AUTO: 11.55 K/UL (ref 3.9–12.7)

## 2019-11-07 PROCEDURE — 80053 COMPREHEN METABOLIC PANEL: CPT

## 2019-11-07 PROCEDURE — 85025 COMPLETE CBC W/AUTO DIFF WBC: CPT

## 2019-11-07 PROCEDURE — 25000003 PHARM REV CODE 250: Performed by: EMERGENCY MEDICINE

## 2019-11-07 PROCEDURE — 96375 TX/PRO/DX INJ NEW DRUG ADDON: CPT

## 2019-11-07 PROCEDURE — 96374 THER/PROPH/DIAG INJ IV PUSH: CPT

## 2019-11-07 PROCEDURE — 96361 HYDRATE IV INFUSION ADD-ON: CPT

## 2019-11-07 PROCEDURE — 99284 EMERGENCY DEPT VISIT MOD MDM: CPT | Mod: 25

## 2019-11-07 PROCEDURE — 63600175 PHARM REV CODE 636 W HCPCS: Performed by: EMERGENCY MEDICINE

## 2019-11-07 PROCEDURE — 81003 URINALYSIS AUTO W/O SCOPE: CPT

## 2019-11-07 RX ORDER — BUTALBITAL, ACETAMINOPHEN AND CAFFEINE 50; 325; 40 MG/1; MG/1; MG/1
1 TABLET ORAL EVERY 4 HOURS PRN
Qty: 30 TABLET | Refills: 0 | Status: SHIPPED | OUTPATIENT
Start: 2019-11-07 | End: 2019-12-07

## 2019-11-07 RX ORDER — DEXAMETHASONE SODIUM PHOSPHATE 4 MG/ML
12 INJECTION, SOLUTION INTRA-ARTICULAR; INTRALESIONAL; INTRAMUSCULAR; INTRAVENOUS; SOFT TISSUE
Status: COMPLETED | OUTPATIENT
Start: 2019-11-07 | End: 2019-11-07

## 2019-11-07 RX ORDER — PROCHLORPERAZINE EDISYLATE 5 MG/ML
10 INJECTION INTRAMUSCULAR; INTRAVENOUS ONCE
Status: COMPLETED | OUTPATIENT
Start: 2019-11-07 | End: 2019-11-07

## 2019-11-07 RX ORDER — KETOROLAC TROMETHAMINE 30 MG/ML
15 INJECTION, SOLUTION INTRAMUSCULAR; INTRAVENOUS
Status: COMPLETED | OUTPATIENT
Start: 2019-11-07 | End: 2019-11-07

## 2019-11-07 RX ORDER — HYDRALAZINE HYDROCHLORIDE 10 MG/1
10 TABLET, FILM COATED ORAL 3 TIMES DAILY
Qty: 90 TABLET | Refills: 2 | Status: SHIPPED | OUTPATIENT
Start: 2019-11-07 | End: 2019-11-07 | Stop reason: SDUPTHER

## 2019-11-07 RX ORDER — SUCRALFATE 1 G/1
1 TABLET ORAL 2 TIMES DAILY
Qty: 60 TABLET | Refills: 2 | Status: SHIPPED | OUTPATIENT
Start: 2019-11-07 | End: 2019-12-10

## 2019-11-07 RX ORDER — METHYLPREDNISOLONE SOD SUCC 125 MG
125 VIAL (EA) INJECTION
Status: COMPLETED | OUTPATIENT
Start: 2019-11-07 | End: 2019-11-07

## 2019-11-07 RX ORDER — HYDRALAZINE HYDROCHLORIDE 20 MG/ML
10 INJECTION INTRAMUSCULAR; INTRAVENOUS
Status: COMPLETED | OUTPATIENT
Start: 2019-11-07 | End: 2019-11-07

## 2019-11-07 RX ORDER — HYDRALAZINE HYDROCHLORIDE 10 MG/1
10 TABLET, FILM COATED ORAL 3 TIMES DAILY
Qty: 90 TABLET | Refills: 2 | Status: SHIPPED | OUTPATIENT
Start: 2019-11-07 | End: 2020-02-18

## 2019-11-07 RX ORDER — BUTALBITAL, ACETAMINOPHEN AND CAFFEINE 50; 325; 40 MG/1; MG/1; MG/1
1 TABLET ORAL
Status: COMPLETED | OUTPATIENT
Start: 2019-11-07 | End: 2019-11-07

## 2019-11-07 RX ADMIN — LIDOCAINE HYDROCHLORIDE: 20 SOLUTION ORAL; TOPICAL at 04:11

## 2019-11-07 RX ADMIN — DEXAMETHASONE SODIUM PHOSPHATE 12 MG: 4 INJECTION, SOLUTION INTRA-ARTICULAR; INTRALESIONAL; INTRAMUSCULAR; INTRAVENOUS; SOFT TISSUE at 02:11

## 2019-11-07 RX ADMIN — HYDRALAZINE HYDROCHLORIDE 10 MG: 20 INJECTION INTRAMUSCULAR; INTRAVENOUS at 02:11

## 2019-11-07 RX ADMIN — SODIUM CHLORIDE 1000 ML: 0.9 INJECTION, SOLUTION INTRAVENOUS at 02:11

## 2019-11-07 RX ADMIN — PROCHLORPERAZINE EDISYLATE 10 MG: 5 INJECTION INTRAMUSCULAR; INTRAVENOUS at 02:11

## 2019-11-07 RX ADMIN — KETOROLAC TROMETHAMINE 15 MG: 30 INJECTION, SOLUTION INTRAMUSCULAR; INTRAVENOUS at 02:11

## 2019-11-07 RX ADMIN — BUTALBITAL, ACETAMINOPHEN AND CAFFEINE 1 TABLET: 50; 325; 40 TABLET ORAL at 02:11

## 2019-11-07 RX ADMIN — METHYLPREDNISOLONE SODIUM SUCCINATE 125 MG: 125 INJECTION, POWDER, FOR SOLUTION INTRAMUSCULAR; INTRAVENOUS at 02:11

## 2019-11-07 NOTE — ED TRIAGE NOTES
Patient reports headache since this morning  Denies vision changes, fever, N/V/D  Patient is crying due to pain

## 2019-11-07 NOTE — ED PROVIDER NOTES
Encounter Date: 11/7/2019       History     Chief Complaint   Patient presents with    Headache     The patient reports a generalized headache that started this morning. Denies vision changes, numbness/tingling, unilateral weakness, difficulty speaking.     79 y.o. female Past Medical History:  No date: Anxiety  No date: Asthma  No date: Depression  No date: Headache  No date: Hx of psychiatric care  No date: Hypercholesterolemia  No date: Hypertension  No date: Psychiatric problem  No date: Sleep difficulties  No date: Therapy  No date: Thyroid disease      Comment:  multiple nodules    Presents for evaluation of headache. Pt notes that she has been getting nearly daily headaches for the past 2 weeks, which is very unusual for her. Notes that today she awoke with mild headache which has since progressed to severe, whole head, no photophobia/neck stiffness/vomiting/confusion/falls/trauma/injury. Notes that her pmd recently increased her bp meds as her bp has been running high. Denies vomiting.         Review of patient's allergies indicates:   Allergen Reactions    Codeine      Other reaction(s): Rash     Past Medical History:   Diagnosis Date    Anxiety     Asthma     Depression     Headache     Hx of psychiatric care     Hypercholesterolemia     Hypertension     Psychiatric problem     Sleep difficulties     Therapy     Thyroid disease     multiple nodules     Past Surgical History:   Procedure Laterality Date    CHOLECYSTECTOMY      HYSTERECTOMY      knee repalcement       Family History   Problem Relation Age of Onset    Diabetes Mother     Hypertension Mother     Kidney disease Mother     Heart disease Father     Bipolar disorder Daughter      Social History     Tobacco Use    Smoking status: Never Smoker    Smokeless tobacco: Never Used   Substance Use Topics    Alcohol use: No    Drug use: No     Review of Systems   Constitutional: Negative for fever.   HENT: Negative for sore throat.     Respiratory: Negative for shortness of breath.    Cardiovascular: Negative for chest pain.   Gastrointestinal: Negative for nausea.   Genitourinary: Negative for dysuria.   Musculoskeletal: Negative for back pain.   Skin: Negative for rash.   Neurological: Positive for headaches. Negative for weakness.   Hematological: Does not bruise/bleed easily.   All other systems reviewed and are negative.      Physical Exam     Initial Vitals [11/07/19 1411]   BP Pulse Resp Temp SpO2   (!) 198/90 69 18 99 °F (37.2 °C) 95 %      MAP       --         Physical Exam    Nursing note and vitals reviewed.  Constitutional: She appears well-developed and well-nourished.   HENT:   Head: Normocephalic and atraumatic.   Eyes: Conjunctivae and EOM are normal. Pupils are equal, round, and reactive to light.   Neck: Normal range of motion.   Cardiovascular: Normal rate and regular rhythm.   Pulmonary/Chest: Breath sounds normal. No respiratory distress.   Abdominal: She exhibits no distension.   Musculoskeletal: Normal range of motion.   Neurological: She is alert. No cranial nerve deficit. GCS score is 15. GCS eye subscore is 4. GCS verbal subscore is 5. GCS motor subscore is 6.   Skin: Skin is warm and dry.   Psychiatric: She has a normal mood and affect. Thought content normal.     no ttp over temporal arteries  Cn 2-12 grossly intact    ED Course   Procedures  Labs Reviewed - No data to display       Imaging Results    None                                      given age and new frequency/persistence of headache will get head ct, will treat htn and manage pain.      Headache has improved. Will write for hydralazine, fioricet, carafate.  Clinical Impression:       ICD-10-CM ICD-9-CM   1. Hypertension, unspecified type I10 401.9   2. Nonintractable headache, unspecified chronicity pattern, unspecified headache type R51 784.0   3. Upset stomach K30 536.8                             Juancho Mckeon MD  11/07/19 [id number]

## 2019-11-07 NOTE — DISCHARGE INSTRUCTIONS
Thank you for coming to our Emergency Department today. It is important to remember that some problems are difficult to diagnose and may not be found during your first visit. Be sure to follow up with your primary care doctor and review any labs/imaging that was performed with them. If you do not have a primary care doctor, you may contact the one listed on your discharge paperwork or you may also call the [facility] Appointment Desk at [phone] to schedule an appointment with one.     All medications may potentially have side effects and it is impossible to predict which medications may give you side effects. If you feel that you are having a negative effect of any medication you should immediately stop taking them and seek medical attention.    Return to the ER with any questions/concerns, new/concerning symptoms, worsening or failure to improve. Do not drive or make any important decisions for 24 hours if you have received any pain medications, sedatives or mood altering drugs during your ER visit.

## 2019-11-11 ENCOUNTER — OFFICE VISIT (OUTPATIENT)
Dept: PSYCHIATRY | Facility: CLINIC | Age: 79
End: 2019-11-11
Payer: MEDICARE

## 2019-11-11 ENCOUNTER — LAB VISIT (OUTPATIENT)
Dept: LAB | Facility: HOSPITAL | Age: 79
End: 2019-11-11
Attending: NURSE PRACTITIONER
Payer: MEDICARE

## 2019-11-11 VITALS
DIASTOLIC BLOOD PRESSURE: 92 MMHG | SYSTOLIC BLOOD PRESSURE: 148 MMHG | HEART RATE: 60 BPM | BODY MASS INDEX: 40.15 KG/M2 | HEIGHT: 60 IN | WEIGHT: 204.5 LBS

## 2019-11-11 DIAGNOSIS — F33.0 MAJOR DEPRESSIVE DISORDER, RECURRENT EPISODE, MILD WITH ANXIOUS DISTRESS: Primary | ICD-10-CM

## 2019-11-11 DIAGNOSIS — F51.05 MOOD INSOMNIA: ICD-10-CM

## 2019-11-11 DIAGNOSIS — F41.1 GAD (GENERALIZED ANXIETY DISORDER): ICD-10-CM

## 2019-11-11 DIAGNOSIS — F39 MOOD INSOMNIA: ICD-10-CM

## 2019-11-11 DIAGNOSIS — F33.0 MAJOR DEPRESSIVE DISORDER, RECURRENT EPISODE, MILD WITH ANXIOUS DISTRESS: ICD-10-CM

## 2019-11-11 DIAGNOSIS — Z79.899 PHARMACOLOGIC THERAPY: ICD-10-CM

## 2019-11-11 DIAGNOSIS — Z91.199 NONCOMPLIANCE WITH TREATMENT: ICD-10-CM

## 2019-11-11 LAB
AMPHET+METHAMPHET UR QL: NEGATIVE
BARBITURATES UR QL SCN>200 NG/ML: NORMAL
BENZODIAZ UR QL SCN>200 NG/ML: NEGATIVE
BZE UR QL SCN: NEGATIVE
CANNABINOIDS UR QL SCN: NEGATIVE
CHOLEST SERPL-MCNC: 176 MG/DL (ref 120–199)
CHOLEST/HDLC SERPL: 3.5 {RATIO} (ref 2–5)
CREAT UR-MCNC: 212 MG/DL (ref 15–325)
ETHANOL UR-MCNC: <10 MG/DL
HDLC SERPL-MCNC: 50 MG/DL (ref 40–75)
HDLC SERPL: 28.4 % (ref 20–50)
LDLC SERPL CALC-MCNC: 110.2 MG/DL (ref 63–159)
METHADONE UR QL SCN>300 NG/ML: NEGATIVE
NONHDLC SERPL-MCNC: 126 MG/DL
OPIATES UR QL SCN: NEGATIVE
PCP UR QL SCN>25 NG/ML: NEGATIVE
T3FREE SERPL-MCNC: 2.1 PG/ML (ref 2.3–4.2)
T4 FREE SERPL-MCNC: 1.26 NG/DL (ref 0.71–1.51)
TOXICOLOGY INFORMATION: NORMAL
TRIGL SERPL-MCNC: 79 MG/DL (ref 30–150)
TSH SERPL DL<=0.005 MIU/L-ACNC: 0.67 UIU/ML (ref 0.4–4)

## 2019-11-11 PROCEDURE — 99499 RISK ADDL DX/OHS AUDIT: ICD-10-PCS | Mod: S$GLB,,, | Performed by: NURSE PRACTITIONER

## 2019-11-11 PROCEDURE — 99999 PR PBB SHADOW E&M-EST. PATIENT-LVL III: CPT | Mod: PBBFAC,,, | Performed by: NURSE PRACTITIONER

## 2019-11-11 PROCEDURE — 99214 OFFICE O/P EST MOD 30 MIN: CPT | Mod: S$GLB,,, | Performed by: NURSE PRACTITIONER

## 2019-11-11 PROCEDURE — 99214 PR OFFICE/OUTPT VISIT, EST, LEVL IV, 30-39 MIN: ICD-10-PCS | Mod: S$GLB,,, | Performed by: NURSE PRACTITIONER

## 2019-11-11 PROCEDURE — 99999 PR PBB SHADOW E&M-EST. PATIENT-LVL III: ICD-10-PCS | Mod: PBBFAC,,, | Performed by: NURSE PRACTITIONER

## 2019-11-11 PROCEDURE — 1101F PR PT FALLS ASSESS DOC 0-1 FALLS W/OUT INJ PAST YR: ICD-10-PCS | Mod: CPTII,S$GLB,, | Performed by: NURSE PRACTITIONER

## 2019-11-11 PROCEDURE — 80307 DRUG TEST PRSMV CHEM ANLYZR: CPT

## 2019-11-11 PROCEDURE — 80061 LIPID PANEL: CPT

## 2019-11-11 PROCEDURE — 3077F PR MOST RECENT SYSTOLIC BLOOD PRESSURE >= 140 MM HG: ICD-10-PCS | Mod: CPTII,S$GLB,, | Performed by: NURSE PRACTITIONER

## 2019-11-11 PROCEDURE — 84481 FREE ASSAY (FT-3): CPT

## 2019-11-11 PROCEDURE — 3077F SYST BP >= 140 MM HG: CPT | Mod: CPTII,S$GLB,, | Performed by: NURSE PRACTITIONER

## 2019-11-11 PROCEDURE — 36415 COLL VENOUS BLD VENIPUNCTURE: CPT

## 2019-11-11 PROCEDURE — 84439 ASSAY OF FREE THYROXINE: CPT

## 2019-11-11 PROCEDURE — 90833 PSYTX W PT W E/M 30 MIN: CPT | Mod: S$GLB,,, | Performed by: NURSE PRACTITIONER

## 2019-11-11 PROCEDURE — 1101F PT FALLS ASSESS-DOCD LE1/YR: CPT | Mod: CPTII,S$GLB,, | Performed by: NURSE PRACTITIONER

## 2019-11-11 PROCEDURE — 99499 UNLISTED E&M SERVICE: CPT | Mod: S$GLB,,, | Performed by: NURSE PRACTITIONER

## 2019-11-11 PROCEDURE — 3080F PR MOST RECENT DIASTOLIC BLOOD PRESSURE >= 90 MM HG: ICD-10-PCS | Mod: CPTII,S$GLB,, | Performed by: NURSE PRACTITIONER

## 2019-11-11 PROCEDURE — 3080F DIAST BP >= 90 MM HG: CPT | Mod: CPTII,S$GLB,, | Performed by: NURSE PRACTITIONER

## 2019-11-11 PROCEDURE — 90833 PR PSYCHOTHERAPY W/PATIENT W/E&M, 30 MIN (ADD ON): ICD-10-PCS | Mod: S$GLB,,, | Performed by: NURSE PRACTITIONER

## 2019-11-11 PROCEDURE — 84443 ASSAY THYROID STIM HORMONE: CPT

## 2019-11-11 RX ORDER — MIRTAZAPINE 15 MG/1
15 TABLET, FILM COATED ORAL NIGHTLY
Qty: 30 TABLET | Refills: 0 | Status: SHIPPED | OUTPATIENT
Start: 2019-11-11 | End: 2019-12-10 | Stop reason: SDUPTHER

## 2019-11-11 RX ORDER — BUSPIRONE HYDROCHLORIDE 7.5 MG/1
7.5 TABLET ORAL 3 TIMES DAILY
Qty: 90 TABLET | Refills: 0 | Status: SHIPPED | OUTPATIENT
Start: 2019-11-11 | End: 2019-12-10 | Stop reason: SDUPTHER
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
## 2019-11-11 NOTE — PATIENT INSTRUCTIONS
"You have been provided with a certain amount of medication with a specified number of refills.  Please follow up within an adequate time before you run out of medications.    REFILLS FOR CONTROLLED SUBSTANCES WILL NOT BE GIVEN WITHOUT AN APPOINTMENT.  I will not honor or fill automated refill requests from pharmacies.  You must come in for an appointment to get refills.    Please book your next appointment for myself or therapist by phone by calling our office at [phone].      PLEASE BE AT LEAST 15 MINUTES EARLY FOR YOUR NEXT APPOINTMENT.  PLEASE, DO NOT BE LATE OR YOU WILL BE TURNED AWAY AND ASKED TO RESCHEDULE.  YOU MUST COME EARLY TO ALLOW TIME FOR CHECK-IN AS WELL AS GET YOUR VITAL SIGNS AND GO OVER YOUR MEDICATIONS.  [employer] is not fair to the patients who present after you and are on time for their appointments.  It causes a delay in the appointments for patients and staff.  IF YOU ARE LATE, THERE IS A POSSIBILITY THAT YOU WILL BE CHARGED FOR THE APPOINTMENT TIME PERSONALLY AND IT WILL NOT GO TO YOUR INSURANCE.  YOU MAY ALSO BE DISCHARGED FROM CLINIC with multiple "No Show" appointments.  -----------------------------------------------------------------------------------------------------------------  IF YOU FEEL SUICIDAL OR HAVING THOUGHTS OR PLANS TO HURT YOURSELF OR OTHERS, CALL 911 OR REPORT TO THE NEAREST EMERGENCY ROOM.  YOU CAN ALSO ACCESS THE FOLLOWING HOTLINE(S):    National Suicide Hotline Number 6-369-321-TALK (1125)     ([facility] Mobile Crisis, [phone]'   [place] [facility] Line, [phone]) [phone]; [facility], 24 hours / 7 days, [phone]) [phone]-COPE (8110), 4-414-529-COPE (4763))     TIPS FOR GETTING YOUR PRESCRIPTIONS:    You can always ask your pharmacist the cost of your medications without the use of your insurance. Sometimes the medication will be cheaper if you do not use your insurance.     If you decide you want to have your prescriptions filled at " a different pharmacy, you can always go to the new pharmacy of your choice and have them call the pharmacy where your prescription was sent and they can have your prescription transferred to the new pharmacy.

## 2019-11-18 ENCOUNTER — OFFICE VISIT (OUTPATIENT)
Dept: FAMILY MEDICINE | Facility: CLINIC | Age: 79
End: 2019-11-18
Payer: MEDICARE

## 2019-11-18 VITALS
WEIGHT: 204.56 LBS | HEART RATE: 110 BPM | TEMPERATURE: 98 F | BODY MASS INDEX: 40.16 KG/M2 | HEIGHT: 60 IN | OXYGEN SATURATION: 95 % | SYSTOLIC BLOOD PRESSURE: 138 MMHG | DIASTOLIC BLOOD PRESSURE: 86 MMHG

## 2019-11-18 DIAGNOSIS — I10 HTN (HYPERTENSION), BENIGN: Primary | ICD-10-CM

## 2019-11-18 DIAGNOSIS — G25.0 ESSENTIAL TREMOR: ICD-10-CM

## 2019-11-18 PROCEDURE — 1101F PT FALLS ASSESS-DOCD LE1/YR: CPT | Mod: CPTII,S$GLB,, | Performed by: INTERNAL MEDICINE

## 2019-11-18 PROCEDURE — 3079F PR MOST RECENT DIASTOLIC BLOOD PRESSURE 80-89 MM HG: ICD-10-PCS | Mod: CPTII,S$GLB,, | Performed by: INTERNAL MEDICINE

## 2019-11-18 PROCEDURE — 99214 OFFICE O/P EST MOD 30 MIN: CPT | Mod: S$GLB,,, | Performed by: INTERNAL MEDICINE

## 2019-11-18 PROCEDURE — 99214 PR OFFICE/OUTPT VISIT, EST, LEVL IV, 30-39 MIN: ICD-10-PCS | Mod: S$GLB,,, | Performed by: INTERNAL MEDICINE

## 2019-11-18 PROCEDURE — 3075F PR MOST RECENT SYSTOLIC BLOOD PRESS GE 130-139MM HG: ICD-10-PCS | Mod: CPTII,S$GLB,, | Performed by: INTERNAL MEDICINE

## 2019-11-18 PROCEDURE — 99999 PR PBB SHADOW E&M-EST. PATIENT-LVL IV: ICD-10-PCS | Mod: PBBFAC,,, | Performed by: INTERNAL MEDICINE

## 2019-11-18 PROCEDURE — 3075F SYST BP GE 130 - 139MM HG: CPT | Mod: CPTII,S$GLB,, | Performed by: INTERNAL MEDICINE

## 2019-11-18 PROCEDURE — 1101F PR PT FALLS ASSESS DOC 0-1 FALLS W/OUT INJ PAST YR: ICD-10-PCS | Mod: CPTII,S$GLB,, | Performed by: INTERNAL MEDICINE

## 2019-11-18 PROCEDURE — 3079F DIAST BP 80-89 MM HG: CPT | Mod: CPTII,S$GLB,, | Performed by: INTERNAL MEDICINE

## 2019-11-18 PROCEDURE — 99999 PR PBB SHADOW E&M-EST. PATIENT-LVL IV: CPT | Mod: PBBFAC,,, | Performed by: INTERNAL MEDICINE

## 2019-11-18 NOTE — PROGRESS NOTES
Subjective:       Patient ID: Jose Manuel Boyd is a 79 y.o. female.    Chief Complaint: Follow-up    F/u tremor    HPI: 80 y/o w/ HTN MDD presents for follow up of tremor. Last visit started on twice daily propranolol she felt this helped significantly also helped with anxiety no orthostatic symptoms. She has not taken the propranolol since yesterday morning. She does note more shaking when she misses dose. No bowel or bladder incontinenece. Recently started on nihgtly mirtazpine for sleep which she feels is helping.     Review of Systems   Constitutional: Negative for activity change, appetite change, fatigue, fever and unexpected weight change.   HENT: Negative for ear pain, rhinorrhea and sore throat.    Eyes: Negative for discharge and visual disturbance.   Respiratory: Negative for chest tightness, shortness of breath and wheezing.    Cardiovascular: Negative for chest pain, palpitations and leg swelling.   Gastrointestinal: Negative for abdominal pain, constipation and diarrhea.   Endocrine: Negative for cold intolerance and heat intolerance.   Genitourinary: Negative for dysuria and hematuria.   Musculoskeletal: Negative for joint swelling and neck stiffness.   Skin: Negative for rash.   Neurological: Negative for dizziness, syncope, weakness and headaches.   Psychiatric/Behavioral: Negative for suicidal ideas.       Objective:     Vitals:    11/18/19 1307 11/18/19 1314   BP: (!) 146/80 (!) 140/78   Pulse: 110    Temp: 98.2 °F (36.8 °C)    TempSrc: Oral    SpO2: 95%    Weight: 92.8 kg (204 lb 9.4 oz)    Height: 5' (1.524 m)           Physical Exam   Constitutional: She is oriented to person, place, and time. She appears well-developed and well-nourished.   HENT:   Head: Normocephalic and atraumatic.   Eyes: Conjunctivae are normal. No scleral icterus.   Neck: Normal range of motion.   Cardiovascular: Normal rate and regular rhythm. Exam reveals no gallop and no friction rub.   No murmur heard.  Rate in   90's and regular   Pulmonary/Chest: Effort normal and breath sounds normal. She has no wheezes. She has no rales.   Abdominal: Soft. Bowel sounds are normal. There is no tenderness. There is no rebound and no guarding.   Musculoskeletal: Normal range of motion. She exhibits no edema or tenderness.   Neurological: She is alert and oriented to person, place, and time. No cranial nerve deficit.   Skin: Skin is warm and dry.   Psychiatric: She has a normal mood and affect.       Assessment and Plan   1. HTN (hypertension), benign  Just above goal no medication adjustment in light of no proprnaolol >24hrs    2. Essential tremor  Continue propranolol

## 2019-12-01 DIAGNOSIS — F41.1 GAD (GENERALIZED ANXIETY DISORDER): ICD-10-CM

## 2019-12-01 RX ORDER — BUSPIRONE HYDROCHLORIDE 7.5 MG/1
7.5 TABLET ORAL 3 TIMES DAILY
Qty: 90 TABLET | Refills: 0 | OUTPATIENT
Start: 2019-12-01 | End: 2020-11-30

## 2019-12-03 DIAGNOSIS — F51.05 MOOD INSOMNIA: ICD-10-CM

## 2019-12-03 DIAGNOSIS — F41.1 GAD (GENERALIZED ANXIETY DISORDER): ICD-10-CM

## 2019-12-03 DIAGNOSIS — F39 MOOD INSOMNIA: ICD-10-CM

## 2019-12-03 DIAGNOSIS — F33.0 MAJOR DEPRESSIVE DISORDER, RECURRENT EPISODE, MILD WITH ANXIOUS DISTRESS: ICD-10-CM

## 2019-12-03 RX ORDER — MIRTAZAPINE 15 MG/1
15 TABLET, FILM COATED ORAL NIGHTLY
Qty: 30 TABLET | Refills: 0 | OUTPATIENT
Start: 2019-12-03 | End: 2020-12-02

## 2019-12-09 NOTE — PROGRESS NOTES
Outpatient Psychiatry Follow-Up Visit (MD/NP)    12/10/2019    Clinical Status of Patient:  Outpatient (Ambulatory)    Chief Complaint:  Jose Manuel Boyd is a 79 y.o. female who presents today for follow-up of depression, anxiety and poor sleep.  Met with patient.      Interval History and Content of Current Session:  Interim Events/Subjective Report/Content of Current Session: Jose Manuel  was last seen by me on 11/11/2019 for a follow up visit. Jose Manuel was diagnosed with MDD, mild with anxious distress and mood insomnia, ZAK and noncompliance. She had been noncompliant and was symptomatic. The patient also made questionable comments that might be indicative for a mild psychosis but it was not clear at that time if it is an actual psychosis or personality. A plan of care was devised to include:    1. Safety: Call 911 or Crisis Line or go to ER for suicidal ideation, adverse effects of medication or any other emergency  2. Start Remeron 30 mg po qhs for sleep, depression and anxiety.  3. Start Buspar 7.5 mg po BID.   4. Lab: TSH, Free T3, Free T4, lipid panel, urine drug screening  5. Return to clinic in one month or sooner prn.      Today Jose Manuel is seen face to face. Her depression has improved. She reports that her anxiety level has decreased and is approaching her normal. Her sleep is better. Her appetite is poor. Her energy level is low. She has been noncompliant with her medications. She is only taking Buspar twice a day. She does not want to take it TID. She refuses an increase in dosage BID. She has her bottle with her and she has approximately 60 out of 90 tablets left, indicating that it is more likely that she is taking Buspar once a day.  She also admits to not taking all of her blood pressure medications as prescribed. She states the pharmacist told her things about the amount of blood pressure medications she is taking. She is no longer taking the Maxzide. Her blood pressure is high today. She is  instructed on the risk for death/paralysis in relation to poor compliance with her antihypertensive medication and is instructed to discuss her medications with the prescriber so that they can explain why she needs them as opposed to asking the pharmacist who only has information on the medication and does not have access to her personal medical history or test/lab results. Verbalizes understanding. She does state multiple times during the interview that she does not like taking medication. She is advised multiple times on the dangers of poor medication compliance to include but limited to increasing anxiety from elevated blood pressures, risk of stroke, paralysis and death. She verbalizes understanding.     Her labs are reviewed.   Lab Visit on 11/11/2019   Component Date Value Ref Range Status    TSH 11/11/2019 0.665  0.400 - 4.000 uIU/mL Final    T3, Free 11/11/2019 2.1* 2.3 - 4.2 pg/mL Final    Free T4 11/11/2019 1.26  0.71 - 1.51 ng/dL Final    Cholesterol 11/11/2019 176  120 - 199 mg/dL Final    Comment: The National Cholesterol Education Program (NCEP) has set the  following guidelines (reference ranges) for Cholesterol:  Optimal.....................<200 mg/dL  Borderline High.............200-239 mg/dL  High........................> or = 240 mg/dL      Triglycerides 11/11/2019 79  30 - 150 mg/dL Final    Comment: The National Cholesterol Education Program (NCEP) has set the  following guidelines (reference values) for triglycerides:  Normal......................<150 mg/dL  Borderline High.............150-199 mg/dL  High........................200-499 mg/dL      HDL 11/11/2019 50  40 - 75 mg/dL Final    Comment: The National Cholesterol Education Program (NCEP) has set the  following guidelines (reference values) for HDL Cholesterol:  Low...............<40 mg/dL  Optimal...........>60 mg/dL      LDL Cholesterol 11/11/2019 110.2  63.0 - 159.0 mg/dL Final    Comment: The National Cholesterol Education  Program (NCEP) has set the  following guidelines (reference values) for LDL Cholesterol:  Optimal.......................<130 mg/dL  Borderline High...............130-159 mg/dL  High..........................160-189 mg/dL  Very High.....................>190 mg/dL      Hdl/Cholesterol Ratio 11/11/2019 28.4  20.0 - 50.0 % Final    Total Cholesterol/HDL Ratio 11/11/2019 3.5  2.0 - 5.0 Final    Non-HDL Cholesterol 11/11/2019 126  mg/dL Final    Comment: Risk category and Non-HDL cholesterol goals:  Coronary heart disease (CHD)or equivalent (10-year risk of CHD >20%):  Non-HDL cholesterol goal     <130 mg/dL  Two or more CHD risk factors and 10-year risk of CHD <= 20%:  Non-HDL cholesterol goal     <160 mg/dL  0 to 1 CHD risk factor:  Non-HDL cholesterol goal     <190 mg/dL      Alcohol, Urine 11/11/2019 <10  <10 mg/dL Final    Benzodiazepines 11/11/2019 Negative   Final    Methadone metabolites 11/11/2019 Negative   Final    Cocaine (Metab.) 11/11/2019 Negative   Final    Opiate Scrn, Ur 11/11/2019 Negative   Final    Barbiturate Screen, Ur 11/11/2019 Presumptive Positive   Final    Amphetamine Screen, Ur 11/11/2019 Negative   Final    THC 11/11/2019 Negative   Final    Phencyclidine 11/11/2019 Negative   Final    Creatinine, Random Ur 11/11/2019 212.0  15.0 - 325.0 mg/dL Final    Comment: The random urine reference ranges provided were established   for 24 hour urine collections.  No reference ranges exist for  random urine specimens.  Correlate clinically.      Toxicology Information 11/11/2019 SEE COMMENT   Final    Comment: This screen includes the following classes of drugs at the   listed cut-off:  Benzodiazepines                  200 ng/ml  Methadone                        300 ng/ml  Cocaine metabolite               300 ng/ml  Opiates                          300 ng/ml  Barbiturates                     200 ng/ml  Amphetamines                    1000 ng/ml  Marijuana metabs (THC)            50  ng/ml  Phencyclidine (PCP)               25 ng/ml  High concentrations of Diphenhydramine may cross-react with  Phencyclidine PCP screening immunoassay giving a false   positive result.  High concentrations of Methylenedioxymethamphetamine (MDMA aka  Ectasy) and other structurally similar compounds may cross-   react with the Amphetamine/Methamphetamine screening   immunoassay giving a false positive result.  A metabolite of the anti-HIV drug Sustiva () may cause  false positive results in the Marijuana metabolite (THC)   screening assay.  Note: This exception list includes only more common   interferants i                           n toxicology screen testing.  Because of many   cross-reactantspositive results on toxicology drug screens   should be confirmed whenever results do not correlate with   clinical presentation.  This report is intended for use in clinical monitoring and  management of patients. It is not intended for use in   employment related drug testing.  Because of any cross-reactants, positive results on toxicology  drug screens should be confirmed whenever results do not  correlate with clinical presentation.  Presumptive positive results are unconfirmed and may be used   only for medical purposes.  Assay Intended Use: This assay provides only a preliminary analytical  test result. A more specific alternate chemical method must be used  to obtain a confirmed analytical result. Gas chromatography/mass  spectrometry (GS/MS)is the preferred confirmatory method. Clinical  consideration and professional judgement should be applied to any   drug of abuse test result, particularly when preliminary resul                           ts  are used.     ]    PSYCHOTHERAPY    · Target symptoms: depression, anxiety , poor sleep, noncompliance with treatment plan  · Why chosen therapy is appropriate versus another modality: relevant to diagnosis, evidence based practice  · Outcome monitoring methods:  "self-report, observation  · Therapeutic intervention type: supportive psychotherapy  · Topics discussed/themes: medication compliance, symptoms, medications, risk for stroke related to poor compliance with antihypertensive medications  · The patient's response to the intervention is guarded. The patient's progress toward treatment goals is good  · Duration of intervention: 20 minutes.    Review of Systems   PSYCHIATRIC: Pertinant items are noted in the narrative.   GENERAL:  Appears actual age  SKIN:  No rashes or lacerations  HEAD:  No headache today  MOUTH & THROAT:  No dyskinetic movements or obvious goiter  CHEST:  No shortness of breath, hyperventilation or cough  CARDIOVASCULAR:  No tachycardia   ABDOMEN:  No nausea, vomiting, pain, constipation or diarrhea  URINARY:  No dysuria   MUSCULOSKELETAL:  tremor noted (states sees neurologist at [place]), no tics  NEUROLOGIC:  generalized mild tremor    Past Medical, Family and Social History: The patient's past medical, family and social history have been reviewed and updated as appropriate within the electronic medical record - see encounter notes.    Compliance: partial    Side effects: None    Risk Parameters:  Patient reports no suicidal ideation  Patient reports no homicidal ideation  Patient reports no self-injurious behavior  Patient reports no violent behavior    Exam (detailed: at least 9 elements; comprehensive: all 15 elements)   Constitutional  Vitals:  Most recent vital signs, dated less than 90 days prior to this appointment, were reviewed.   Vitals:    12/10/19 0801   BP: (!) 144/82   Pulse: 63   Weight: 92.8 kg (204 lb 7.6 oz)   Height: 5' (1.524 m)      General:  unremarkable, age appropriate     Musculoskeletal  Muscle Strength/Tone:  tremor noted hands bilaterally, takes Propranolol and states this helps to decrease shaking, no tic   Gait & Station:  non-ataxic     Psychiatric  Speech:  no latency; no press   Mood & Affect:  " " "better"  appropriate   Thought Process:  normal and logical   Associations:  intact   Thought Content:  normal, no suicidality, no homicidality, delusions, or paranoia   Insight:  has awareness of illness   Judgement: poor in relation to coping and treatment plan compliance   Orientation:  person, place, situation   Memory: intact for content of interview   Language: grossly intact   Attention Span & Concentration:  able to focus   Fund of Knowledge:  intact and appropriate to age and level of education     Assessment and Diagnosis   Status/Progress: Based on the examination today, the patient's problem(s) is/are improved.  New problems have not been presented today.   Lack of compliance is complicating management of the primary condition.  There are no active rule-out diagnoses for this patient at this time.    General Impression: She has improved but is noncompliant with her medication regime and therefore at high risk for regression. She refuses any changes to her medication regime.       ICD-10-CM ICD-9-CM   1. 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 Mood insomnia F51.05 [id number]    F39    3. ZAK (generalized anxiety disorder) F41.1 300.02   4. Noncompliance with medication regimen Z91.14 V15.81       Intervention/Counseling/Treatment Plan   · Medication Management: The risks and benefits of medication were discussed with the patient.  · The treatment plan and follow up plan were reviewed with the patient.   1. Safety: Call 911 or Crisis Line or go to ER for suicidal ideation, adverse effects of medication or any other emergency  2. Continue Remeron 30 mg po qhs for sleep, depression and anxiety.  3. Continue Buspar 7.5 mg po BID.   4. Return to clinic in 3 months or sooner prn.  5. Patient to speak to prescriber about all blood pressure medications.    Patient agrees with POC.    INSTRUCTIONS    Instructed to call 911 or go to ER for suicidal ideation, adverse " effects of medication or any other emergency. Verbalizes understanding and plan to comply.    Return to Clinic: 3 months, as needed

## 2019-12-10 ENCOUNTER — OFFICE VISIT (OUTPATIENT)
Dept: PSYCHIATRY | Facility: CLINIC | Age: 79
End: 2019-12-10
Payer: MEDICARE

## 2019-12-10 VITALS
BODY MASS INDEX: 40.15 KG/M2 | DIASTOLIC BLOOD PRESSURE: 82 MMHG | HEART RATE: 63 BPM | SYSTOLIC BLOOD PRESSURE: 144 MMHG | HEIGHT: 60 IN | WEIGHT: 204.5 LBS

## 2019-12-10 DIAGNOSIS — Z91.148 NONCOMPLIANCE WITH MEDICATION REGIMEN: ICD-10-CM

## 2019-12-10 DIAGNOSIS — F33.0 MAJOR DEPRESSIVE DISORDER, RECURRENT EPISODE, MILD WITH ANXIOUS DISTRESS: Primary | ICD-10-CM

## 2019-12-10 DIAGNOSIS — F41.1 GAD (GENERALIZED ANXIETY DISORDER): ICD-10-CM

## 2019-12-10 DIAGNOSIS — F39 MOOD INSOMNIA: ICD-10-CM

## 2019-12-10 DIAGNOSIS — F51.05 MOOD INSOMNIA: ICD-10-CM

## 2019-12-10 PROCEDURE — 3077F PR MOST RECENT SYSTOLIC BLOOD PRESSURE >= 140 MM HG: ICD-10-PCS | Mod: CPTII,S$GLB,, | Performed by: NURSE PRACTITIONER

## 2019-12-10 PROCEDURE — 99999 PR PBB SHADOW E&M-EST. PATIENT-LVL III: ICD-10-PCS | Mod: PBBFAC,,, | Performed by: NURSE PRACTITIONER

## 2019-12-10 PROCEDURE — 1159F PR MEDICATION LIST DOCUMENTED IN MEDICAL RECORD: ICD-10-PCS | Mod: S$GLB,,, | Performed by: NURSE PRACTITIONER

## 2019-12-10 PROCEDURE — 3079F PR MOST RECENT DIASTOLIC BLOOD PRESSURE 80-89 MM HG: ICD-10-PCS | Mod: CPTII,S$GLB,, | Performed by: NURSE PRACTITIONER

## 2019-12-10 PROCEDURE — 99999 PR PBB SHADOW E&M-EST. PATIENT-LVL III: CPT | Mod: PBBFAC,,, | Performed by: NURSE PRACTITIONER

## 2019-12-10 PROCEDURE — 99499 RISK ADDL DX/OHS AUDIT: ICD-10-PCS | Mod: S$GLB,,, | Performed by: NURSE PRACTITIONER

## 2019-12-10 PROCEDURE — 3079F DIAST BP 80-89 MM HG: CPT | Mod: CPTII,S$GLB,, | Performed by: NURSE PRACTITIONER

## 2019-12-10 PROCEDURE — 99213 OFFICE O/P EST LOW 20 MIN: CPT | Mod: S$GLB,,, | Performed by: NURSE PRACTITIONER

## 2019-12-10 PROCEDURE — 1126F AMNT PAIN NOTED NONE PRSNT: CPT | Mod: S$GLB,,, | Performed by: NURSE PRACTITIONER

## 2019-12-10 PROCEDURE — 3077F SYST BP >= 140 MM HG: CPT | Mod: CPTII,S$GLB,, | Performed by: NURSE PRACTITIONER

## 2019-12-10 PROCEDURE — 99213 PR OFFICE/OUTPT VISIT, EST, LEVL III, 20-29 MIN: ICD-10-PCS | Mod: S$GLB,,, | Performed by: NURSE PRACTITIONER

## 2019-12-10 PROCEDURE — 1101F PT FALLS ASSESS-DOCD LE1/YR: CPT | Mod: CPTII,S$GLB,, | Performed by: NURSE PRACTITIONER

## 2019-12-10 PROCEDURE — 1126F PR PAIN SEVERITY QUANTIFIED, NO PAIN PRESENT: ICD-10-PCS | Mod: S$GLB,,, | Performed by: NURSE PRACTITIONER

## 2019-12-10 PROCEDURE — 99499 UNLISTED E&M SERVICE: CPT | Mod: S$GLB,,, | Performed by: NURSE PRACTITIONER

## 2019-12-10 PROCEDURE — 1159F MED LIST DOCD IN RCRD: CPT | Mod: S$GLB,,, | Performed by: NURSE PRACTITIONER

## 2019-12-10 PROCEDURE — 1101F PR PT FALLS ASSESS DOC 0-1 FALLS W/OUT INJ PAST YR: ICD-10-PCS | Mod: CPTII,S$GLB,, | Performed by: NURSE PRACTITIONER

## 2019-12-10 RX ORDER — BUSPIRONE HYDROCHLORIDE 7.5 MG/1
7.5 TABLET ORAL 2 TIMES DAILY
Qty: 120 TABLET | Refills: 0 | Status: SHIPPED | OUTPATIENT
Start: 2019-12-10 | End: 2020-06-29

## 2019-12-10 RX ORDER — MIRTAZAPINE 15 MG/1
15 TABLET, FILM COATED ORAL NIGHTLY
Qty: 90 TABLET | Refills: 0 | Status: SHIPPED | OUTPATIENT
Start: 2019-12-10 | End: 2020-03-25 | Stop reason: SDUPTHER

## 2019-12-10 NOTE — PATIENT INSTRUCTIONS
"You have been provided with a certain amount of medication with a specified number of refills.  Please follow up within an adequate time before you run out of medications.    REFILLS FOR CONTROLLED SUBSTANCES WILL NOT BE GIVEN WITHOUT AN APPOINTMENT.  I will not honor or fill automated refill requests from pharmacies.  You must come in for an appointment to get refills.    Please book your next appointment for myself or therapist by phone by calling our office at [phone].      PLEASE BE AT LEAST 15 MINUTES EARLY FOR YOUR NEXT APPOINTMENT.  PLEASE, DO NOT BE LATE OR YOU WILL BE TURNED AWAY AND ASKED TO RESCHEDULE.  YOU MUST COME EARLY TO ALLOW TIME FOR CHECK-IN AS WELL AS GET YOUR VITAL SIGNS AND GO OVER YOUR MEDICATIONS.  [facility] is not fair to the patients who present after you and are on time for their appointments.  It causes a delay in the appointments for patients and staff.  IF YOU ARE LATE, THERE IS A POSSIBILITY THAT YOU WILL BE CHARGED FOR THE APPOINTMENT TIME PERSONALLY AND IT WILL NOT GO TO YOUR INSURANCE.  YOU MAY ALSO BE DISCHARGED FROM CLINIC with multiple "No Show" appointments.  -----------------------------------------------------------------------------------------------------------------  IF YOU FEEL SUICIDAL OR HAVING THOUGHTS OR PLANS TO HURT YOURSELF OR OTHERS, CALL 911 OR REPORT TO THE NEAREST EMERGENCY ROOM.  YOU CAN ALSO ACCESS THE FOLLOWING HOTLINE(S):    National Suicide Hotline Number 8-054-620-TALK (6784)     ([facility] Crisis, [phone]'   [place] [facility], [phone]) [phone]; [facility], 24 hours / 7 days, [phone]) 159-COPE (7458), 3-691-294-COPE (7323))     TIPS FOR GETTING YOUR PRESCRIPTIONS:    You can always ask your pharmacist the cost of your medications without the use of your insurance. Sometimes the medication will be cheaper if you do not use your insurance.     If you decide you want to have your prescriptions filled at " a different pharmacy, you can always go to the new pharmacy of your choice and have them call the pharmacy where your prescription was sent and they can have your prescription transferred to the new pharmacy.

## 2020-01-09 DIAGNOSIS — F41.1 GAD (GENERALIZED ANXIETY DISORDER): ICD-10-CM

## 2020-01-09 DIAGNOSIS — F51.05 MOOD INSOMNIA: ICD-10-CM

## 2020-01-09 DIAGNOSIS — F39 MOOD INSOMNIA: ICD-10-CM

## 2020-01-09 DIAGNOSIS — F33.0 MAJOR DEPRESSIVE DISORDER, RECURRENT EPISODE, MILD WITH ANXIOUS DISTRESS: ICD-10-CM

## 2020-01-10 RX ORDER — MIRTAZAPINE 15 MG/1
TABLET, FILM COATED ORAL
Qty: 90 TABLET | Refills: 0 | OUTPATIENT
Start: 2020-01-10

## 2020-01-15 DIAGNOSIS — G47.00 INSOMNIA, UNSPECIFIED TYPE: ICD-10-CM

## 2020-01-15 DIAGNOSIS — I10 HTN (HYPERTENSION), BENIGN: ICD-10-CM

## 2020-01-16 RX ORDER — TIZANIDINE 4 MG/1
4 TABLET ORAL NIGHTLY PRN
Qty: 90 TABLET | Refills: 0 | Status: SHIPPED | OUTPATIENT
Start: 2020-01-16 | End: 2020-01-26

## 2020-01-16 RX ORDER — LOSARTAN POTASSIUM 50 MG/1
50 TABLET ORAL DAILY
Qty: 90 TABLET | Refills: 0 | Status: SHIPPED | OUTPATIENT
Start: 2020-01-16 | End: 2020-06-29 | Stop reason: SDUPTHER

## 2020-02-04 ENCOUNTER — TELEPHONE (OUTPATIENT)
Dept: FAMILY MEDICINE | Facility: CLINIC | Age: 80
End: 2020-02-04

## 2020-02-04 NOTE — TELEPHONE ENCOUNTER
Called pt regarding a ov with  tomorrow concerning left side leg and foot swelling. Advised pt to come in and see the NP because her PCP is filled for tomorrow but PCP will be notified of current condition. Pt expressed understanding verbally. Appointment scheduled!

## 2020-02-05 ENCOUNTER — OFFICE VISIT (OUTPATIENT)
Dept: FAMILY MEDICINE | Facility: CLINIC | Age: 80
End: 2020-02-05
Payer: MEDICARE

## 2020-02-05 ENCOUNTER — LAB VISIT (OUTPATIENT)
Dept: LAB | Facility: HOSPITAL | Age: 80
End: 2020-02-05
Payer: MEDICARE

## 2020-02-05 VITALS
TEMPERATURE: 98 F | DIASTOLIC BLOOD PRESSURE: 71 MMHG | RESPIRATION RATE: 20 BRPM | BODY MASS INDEX: 40.16 KG/M2 | HEART RATE: 66 BPM | HEIGHT: 60 IN | OXYGEN SATURATION: 97 % | SYSTOLIC BLOOD PRESSURE: 127 MMHG | WEIGHT: 204.56 LBS

## 2020-02-05 DIAGNOSIS — R06.02 SHORTNESS OF BREATH: ICD-10-CM

## 2020-02-05 DIAGNOSIS — R60.0 BILATERAL LOWER EXTREMITY EDEMA: Primary | ICD-10-CM

## 2020-02-05 LAB — BNP SERPL-MCNC: 167 PG/ML (ref 0–99)

## 2020-02-05 PROCEDURE — 36415 COLL VENOUS BLD VENIPUNCTURE: CPT | Mod: PN

## 2020-02-05 PROCEDURE — 3078F DIAST BP <80 MM HG: CPT | Mod: CPTII,S$GLB,, | Performed by: NURSE PRACTITIONER

## 2020-02-05 PROCEDURE — 1101F PT FALLS ASSESS-DOCD LE1/YR: CPT | Mod: CPTII,S$GLB,, | Performed by: NURSE PRACTITIONER

## 2020-02-05 PROCEDURE — 1159F MED LIST DOCD IN RCRD: CPT | Mod: S$GLB,,, | Performed by: NURSE PRACTITIONER

## 2020-02-05 PROCEDURE — 3074F PR MOST RECENT SYSTOLIC BLOOD PRESSURE < 130 MM HG: ICD-10-PCS | Mod: CPTII,S$GLB,, | Performed by: NURSE PRACTITIONER

## 2020-02-05 PROCEDURE — 3078F PR MOST RECENT DIASTOLIC BLOOD PRESSURE < 80 MM HG: ICD-10-PCS | Mod: CPTII,S$GLB,, | Performed by: NURSE PRACTITIONER

## 2020-02-05 PROCEDURE — 3074F SYST BP LT 130 MM HG: CPT | Mod: CPTII,S$GLB,, | Performed by: NURSE PRACTITIONER

## 2020-02-05 PROCEDURE — 93005 EKG 12-LEAD: ICD-10-PCS | Mod: S$GLB,,, | Performed by: NURSE PRACTITIONER

## 2020-02-05 PROCEDURE — 93010 ELECTROCARDIOGRAM REPORT: CPT | Mod: S$GLB,,, | Performed by: INTERNAL MEDICINE

## 2020-02-05 PROCEDURE — 93010 EKG 12-LEAD: ICD-10-PCS | Mod: S$GLB,,, | Performed by: INTERNAL MEDICINE

## 2020-02-05 PROCEDURE — 99999 PR PBB SHADOW E&M-EST. PATIENT-LVL IV: CPT | Mod: PBBFAC,,, | Performed by: NURSE PRACTITIONER

## 2020-02-05 PROCEDURE — 99999 PR PBB SHADOW E&M-EST. PATIENT-LVL IV: ICD-10-PCS | Mod: PBBFAC,,, | Performed by: NURSE PRACTITIONER

## 2020-02-05 PROCEDURE — 83880 ASSAY OF NATRIURETIC PEPTIDE: CPT

## 2020-02-05 PROCEDURE — 99214 PR OFFICE/OUTPT VISIT, EST, LEVL IV, 30-39 MIN: ICD-10-PCS | Mod: S$GLB,,, | Performed by: NURSE PRACTITIONER

## 2020-02-05 PROCEDURE — 1159F PR MEDICATION LIST DOCUMENTED IN MEDICAL RECORD: ICD-10-PCS | Mod: S$GLB,,, | Performed by: NURSE PRACTITIONER

## 2020-02-05 PROCEDURE — 99214 OFFICE O/P EST MOD 30 MIN: CPT | Mod: S$GLB,,, | Performed by: NURSE PRACTITIONER

## 2020-02-05 PROCEDURE — 1101F PR PT FALLS ASSESS DOC 0-1 FALLS W/OUT INJ PAST YR: ICD-10-PCS | Mod: CPTII,S$GLB,, | Performed by: NURSE PRACTITIONER

## 2020-02-05 PROCEDURE — 93005 ELECTROCARDIOGRAM TRACING: CPT | Mod: S$GLB,,, | Performed by: NURSE PRACTITIONER

## 2020-02-05 RX ORDER — OMEPRAZOLE 20 MG/1
20 CAPSULE, DELAYED RELEASE ORAL DAILY
COMMUNITY
Start: 2019-12-20 | End: 2020-03-16

## 2020-02-05 RX ORDER — MIRTAZAPINE 30 MG/1
TABLET, FILM COATED ORAL
COMMUNITY
Start: 2020-01-12 | End: 2020-03-25 | Stop reason: SDUPTHER

## 2020-02-05 NOTE — PROGRESS NOTES
Routine Office Visit    Patient Name: Jose Manuel Boyd    : 1940  MRN: [id number]    Chief Complaint:  Leg swelling    Subjective:  Jose Manuel is a 79 y.o. female who presents today for:    1.  Leg swelling - patient reports today for bilateral leg swelling. States that she developed swelling on her left leg and foot 1 week ago and then she had the same swelling develop on her right leg 2 days ago.  She denies any calf pain, recent immobilizations, history of cancer,, or smoking.  She states that the swelling on her bilateral legs got so bad yesterday that she took 1 of her brothers Lasix and this helped with the swelling tremendously.  Of note, she states that she has been short of breath more than usual lately in the past 2 weeks which she believes is due to her asthma.  She has a history of COPD/asthma and has been needing to use her albuterol inhaler more frequently in the past couple of weeks.  She states that the Lasix did help with her shortness of breath as well.  +wheezing.  Denies any coughing, fevers, chills, chest pain, or palpitations.  She reports that both her mother and father were diagnosed with heart failure.  She has never been told that she has any cardiac problems.    Last 2D echo from 2012 showed normal EF.  Last BNP done 1 year ago was within normal limits.    Patient is new to me and this is the extent of her concerns at this time.    Past Medical History  Past Medical History:   Diagnosis Date    Anxiety     Asthma     Depression     Headache     Hx of psychiatric care     Hypercholesterolemia     Hypertension     Psychiatric problem     Sleep difficulties     Therapy     Thyroid disease     multiple nodules       Past Surgical History  Past Surgical History:   Procedure Laterality Date    CHOLECYSTECTOMY      HYSTERECTOMY      knee repalcement         Family History  Family History   Problem Relation Age of Onset    Diabetes Mother     Hypertension Mother      Kidney disease Mother     Heart disease Father     Bipolar disorder Daughter        Social History  Social History     Socioeconomic History    Marital status:      Spouse name: Not on file    Number of children: 4    Years of education: Not on file    Highest education level: Not on file   Occupational History    Occupation: retired     Comment: Domestic service, [employer]   Social Needs    Financial resource strain: Not on file    Food insecurity:     Worry: Not on file     Inability: Not on file    Transportation needs:     Medical: Not on file     Non-medical: Not on file   Tobacco Use    Smoking status: Never Smoker    Smokeless tobacco: Never Used   Substance and Sexual Activity    Alcohol use: No    Drug use: No    Sexual activity: Not Currently   Lifestyle    Physical activity:     Days per week: Not on file     Minutes per session: Not on file    Stress: Not on file   Relationships    Social connections:     Talks on phone: Not on file     Gets together: Not on file     Attends [employer] service: Not on file     Active member of club or organization: Not on file     Attends meetings of clubs or organizations: Not on file     Relationship status: Not on file   Other Topics Concern    Patient feels they ought to cut down on drinking/drug use No    Patient annoyed by others criticizing their drinking/drug use No    Patient has felt bad or guilty about drinking/drug use No    Patient has had a drink/used drugs as an eye opener in the AM No   Social History Narrative    Enjoys going to [employer]       Current Medications  Current Outpatient Medications on File Prior to Visit   Medication Sig Dispense Refill    albuterol (VENTOLIN HFA) 90 mcg/actuation inhaler Inhale 2 puffs into the lungs every 4 (four) hours as needed for Wheezing. 6.7 g 6    aspirin 325 MG tablet Take 1 tablet (325 mg total) by mouth once daily. 1 Bottle 0    atorvastatin (LIPITOR) 40 MG tablet Take 1 tablet (40 mg  total) by mouth once daily. 90 tablet 3    busPIRone (BUSPAR) 7.5 MG tablet Take 1 tablet (7.5 mg total) by mouth 2 (two) times daily. 120 tablet 0    DUREZOL 0.05 % Drop ophthalmic solution INSTILL 2 DROPS IN SURGICAL EYE TWICE A DAY. START AFTER SURGERY.  1    epinastine 0.05 % ophthalmic solution Place 1 drop into both eyes 2 (two) times daily.  0    fluticasone (FLONASE) 50 mcg/actuation nasal spray 1 spray by Each Nare route 2 (two) times daily. 1 Bottle 1    hydrALAZINE (APRESOLINE) 10 MG tablet Take 1 tablet (10 mg total) by mouth 3 (three) times daily. 90 tablet 2    losartan (COZAAR) 50 MG tablet TAKE 1 TABLET (50 MG TOTAL) BY MOUTH ONCE DAILY. FOR HIGH BLOOD PRESSURE 90 tablet 0    mirtazapine (REMERON) 15 MG tablet Take 1 tablet (15 mg total) by mouth every evening. At bedtime for sleep, anxiety and depression. 90 tablet 0    mirtazapine (REMERON) 30 MG tablet TAKE 1 TABLET (30 MG TOTAL) BY MOUTH EVERY EVENING.      omeprazole (PRILOSEC) 20 MG capsule Take 20 mg by mouth once daily.      propranolol (INDERAL) 20 MG tablet Take 1 tablet (20 mg total) by mouth 2 (two) times daily. For shaking 60 tablet 11    ofloxacin (OCUFLOX) 0.3 % ophthalmic solution 1 DROP IN BOTH EYES FOUR TIMES A DAY 1 DROP 4 TIMES A DAY  1    PROLENSA 0.07 % Drop 2 DROPS IN SURGICAL EYE AT BEDTIME START 2 DAYS BEFORE SURGERY  1    psyllium (METAMUCIL) powder Take 1 packet by mouth daily as needed.      triamterene-hydrochlorothiazide 37.5-25 mg (MAXZIDE-25) 37.5-25 mg per tablet Take 1 tablet by mouth 2 (two) times daily. (Patient not taking: Reported on 2/5/2020) 180 tablet 1     No current facility-administered medications on file prior to visit.        Allergies   Review of patient's allergies indicates:   Allergen Reactions    Codeine      Other reaction(s): Rash       Review of Systems (Pertinent positives)  Review of Systems   Constitutional: Negative for chills, diaphoresis, fever, malaise/fatigue and weight  loss.   HENT: Negative.    Eyes: Negative.    Respiratory: Positive for shortness of breath and wheezing. Negative for cough, hemoptysis and sputum production.    Cardiovascular: Positive for orthopnea and leg swelling. Negative for chest pain, palpitations, claudication and PND.   Gastrointestinal: Negative.    Genitourinary: Negative.    Musculoskeletal: Negative.    Skin: Negative.    Neurological: Negative for dizziness, tingling, tremors, sensory change, speech change, focal weakness, seizures, loss of consciousness, weakness and headaches.   Endo/Heme/Allergies: Negative.    Psychiatric/Behavioral: Negative.        /71 (BP Location: Left arm, Patient Position: Sitting, BP Method: Small (Manual))   Pulse 66   Temp 98.4 °F (36.9 °C) (Oral)   Resp 20   Ht 5' (1.524 m)   Wt 92.8 kg (204 lb 9.4 oz)   SpO2 97%   BMI 39.96 kg/m²     Physical Exam   Constitutional: She is oriented to person, place, and time. She appears well-developed and well-nourished.  Non-toxic appearance. She does not have a sickly appearance. She does not appear ill. No distress.   Patient resting comfortably in examination room in no acute distress, speaking in complete sentences conversing appropriately   Eyes: Pupils are equal, round, and reactive to light. Conjunctivae and EOM are normal.   Neck: Normal range of motion. Neck supple. No JVD present.   Cardiovascular: Normal rate, regular rhythm, normal heart sounds, intact distal pulses and normal pulses. Exam reveals no gallop and no friction rub.   No murmur heard.  Pulses:       Dorsalis pedis pulses are 2+ on the right side.        Posterior tibial pulses are 2+ on the right side, and 2+ on the left side.     Patient has 2+ pretibial pitting edema noted on bilateral lower extremities extending from ankles MCFP up tibia; no pedal edema       Pulmonary/Chest: Effort normal and breath sounds normal. No stridor. No respiratory distress. She has no wheezes. She has no rales.  She exhibits no tenderness.   Good air movement to bases bilaterally no adventitious sounds   Abdominal: Soft. Bowel sounds are normal.   Musculoskeletal: Normal range of motion. She exhibits edema. She exhibits no tenderness.   Negative Homans sign bilaterally    No calf TTP bilaterally   Lymphadenopathy:     She has no cervical adenopathy.   Neurological: She is alert and oriented to person, place, and time. She displays normal reflexes. No cranial nerve deficit or sensory deficit. She exhibits normal muscle tone. Coordination normal.   Skin: Skin is warm and dry. Capillary refill takes less than 2 seconds. She is not diaphoretic.   Vitals reviewed.       Assessment/Plan:  Jose Manuel Boyd is a 79 y.o. female who presents today for :    Jose Manuel was seen today for leg swelling and foot swelling.    Diagnoses and all orders for this visit:    Bilateral lower extremity edema  -     US Ankle Brachial Indices Ext LTD WO Str; Future  -     CV US Lower Extremity Veins Bilateral Insufficiency; Future    Shortness of breath  -     Brain natriuretic peptide; Future  -     IN OFFICE EKG 12-LEAD (to [place])  -     Ambulatory referral/consult to Cardiology; Future  -     X-Ray Chest PA And Lateral; Future     An EKG was done in the clinic which showed sinus bradycardia.  Patient without history of cardiac issues.  I had a long discussion with the patient regarding the potential etiology of her symptoms.  Educated patient that her lower extremity edema could be due to venous disease. Will check ROSY and insufficiency ultrasound to evaluate.  Instructed patient to elevate her legs every time she sits or lies down.    Given symptoms will check BNP and x-ray.  Shortness of breath could be due to asthma rather than a cardiac issue so I encouraged the patient to continue her albuterol p.r.n..  No wheezing no rhonchi on examination no rales.  No need for antibiotics or steroids at this time.  Patient is resting comfortably in  examination room in no acute distress.    I will however refer the patient to Cardiology in case the patient's symptoms worsen.    Patient verbalized understanding of plan of care and is in agreement with plan of care.    Patient should follow up or go to the emergency room for any acutely worsening signs or symptoms.    Patient verbalized understanding of instructions.        This office note has been dictated.  This dictation has been generated using M-Modal Fluency Direct dictation; some phonetic errors may occur.   My collaborating physician is Dr. Jose French.

## 2020-02-07 ENCOUNTER — TELEPHONE (OUTPATIENT)
Dept: FAMILY MEDICINE | Facility: CLINIC | Age: 80
End: 2020-02-07

## 2020-02-07 NOTE — TELEPHONE ENCOUNTER
----- Message from Fareed Brumfield NP sent at 2/7/2020  3:24 PM CST -----  [place], please call patient and let her know that her x-ray does not show any acute changes.  No fluid buildup in the lungs no pneumonia.  If her symptoms do not improve she needs to follow up with Cardiology.  Thank you

## 2020-02-12 ENCOUNTER — HOSPITAL ENCOUNTER (OUTPATIENT)
Dept: CARDIOLOGY | Facility: HOSPITAL | Age: 80
Discharge: HOME | End: 2020-02-12
Attending: NURSE PRACTITIONER
Payer: MEDICARE

## 2020-02-12 ENCOUNTER — HOSPITAL ENCOUNTER (OUTPATIENT)
Dept: RADIOLOGY | Facility: HOSPITAL | Age: 80
Discharge: HOME | End: 2020-02-12
Attending: NURSE PRACTITIONER
Payer: MEDICARE

## 2020-02-12 DIAGNOSIS — R60.0 BILATERAL LOWER EXTREMITY EDEMA: ICD-10-CM

## 2020-02-12 PROCEDURE — 93970 EXTREMITY STUDY: CPT | Mod: 26,,, | Performed by: SURGERY

## 2020-02-12 PROCEDURE — 93970 EXTREMITY STUDY: CPT | Mod: 50,TC

## 2020-02-12 PROCEDURE — 93922 UPR/L XTREMITY ART 2 LEVELS: CPT | Mod: TC

## 2020-02-12 PROCEDURE — 93922 US ANKLE/BRACH INDICES EXT LTD W/O STR 1-2 LEVELS: ICD-10-PCS | Mod: 26,,, | Performed by: RADIOLOGY

## 2020-02-12 PROCEDURE — 93970 CV US LOWER VENOUS INSUFFICIENCY BILATERAL (CUPID ONLY): ICD-10-PCS | Mod: 26,,, | Performed by: SURGERY

## 2020-02-12 PROCEDURE — 93922 UPR/L XTREMITY ART 2 LEVELS: CPT | Mod: 26,,, | Performed by: RADIOLOGY

## 2020-02-13 LAB
LEFT GREAT SAPHENOUS DISTAL THIGH DIA: 0.4 CM
LEFT GREAT SAPHENOUS JUNCTION DIA: 0.7 CM
LEFT GREAT SAPHENOUS KNEE DIA: 0.3 CM
LEFT GREAT SAPHENOUS MIDDLE THIGH DIA: 0.3 CM
LEFT GREAT SAPHENOUS PROXIMAL CALF DIA: 0.3 CM
LEFT SMALL SAPHENOUS KNEE DIA: 0.3 CM
LEFT SMALL SAPHENOUS SPJ DIA: 0.2 CM
RIGHT GREAT SAPHENOUS DISTAL THIGH DIA: 0.3 CM
RIGHT GREAT SAPHENOUS JUNCTION DIA: 0.8 CM
RIGHT GREAT SAPHENOUS KNEE DIA: 0.3 CM
RIGHT GREAT SAPHENOUS MIDDLE THIGH DIA: 0.4 CM
RIGHT GREAT SAPHENOUS PROXIMAL CALF DIA: 0.4 CM
RIGHT SMALL SAPHENOUS KNEE DIA: 0.3 CM
RIGHT SMALL SAPHENOUS SPJ DIA: 0.3 CM

## 2020-02-18 ENCOUNTER — OFFICE VISIT (OUTPATIENT)
Dept: FAMILY MEDICINE | Facility: CLINIC | Age: 80
End: 2020-02-18
Payer: MEDICARE

## 2020-02-18 ENCOUNTER — TELEPHONE (OUTPATIENT)
Dept: ADMINISTRATIVE | Facility: HOSPITAL | Age: 80
End: 2020-02-18

## 2020-02-18 VITALS
TEMPERATURE: 98 F | DIASTOLIC BLOOD PRESSURE: 80 MMHG | RESPIRATION RATE: 17 BRPM | HEIGHT: 60 IN | BODY MASS INDEX: 41.07 KG/M2 | SYSTOLIC BLOOD PRESSURE: 146 MMHG | OXYGEN SATURATION: 95 % | WEIGHT: 209.19 LBS | HEART RATE: 72 BPM

## 2020-02-18 DIAGNOSIS — I10 HTN (HYPERTENSION), BENIGN: Primary | ICD-10-CM

## 2020-02-18 DIAGNOSIS — Z86.73 H/O: STROKE: ICD-10-CM

## 2020-02-18 DIAGNOSIS — E66.01 MORBID OBESITY DUE TO EXCESS CALORIES: ICD-10-CM

## 2020-02-18 PROCEDURE — 1126F PR PAIN SEVERITY QUANTIFIED, NO PAIN PRESENT: ICD-10-PCS | Mod: S$GLB,,, | Performed by: INTERNAL MEDICINE

## 2020-02-18 PROCEDURE — 3077F PR MOST RECENT SYSTOLIC BLOOD PRESSURE >= 140 MM HG: ICD-10-PCS | Mod: CPTII,S$GLB,, | Performed by: INTERNAL MEDICINE

## 2020-02-18 PROCEDURE — 3077F SYST BP >= 140 MM HG: CPT | Mod: CPTII,S$GLB,, | Performed by: INTERNAL MEDICINE

## 2020-02-18 PROCEDURE — 99499 RISK ADDL DX/OHS AUDIT: ICD-10-PCS | Mod: S$GLB,,, | Performed by: INTERNAL MEDICINE

## 2020-02-18 PROCEDURE — 1101F PT FALLS ASSESS-DOCD LE1/YR: CPT | Mod: CPTII,S$GLB,, | Performed by: INTERNAL MEDICINE

## 2020-02-18 PROCEDURE — 99499 UNLISTED E&M SERVICE: CPT | Mod: S$GLB,,, | Performed by: INTERNAL MEDICINE

## 2020-02-18 PROCEDURE — 1159F PR MEDICATION LIST DOCUMENTED IN MEDICAL RECORD: ICD-10-PCS | Mod: S$GLB,,, | Performed by: INTERNAL MEDICINE

## 2020-02-18 PROCEDURE — 3079F DIAST BP 80-89 MM HG: CPT | Mod: CPTII,S$GLB,, | Performed by: INTERNAL MEDICINE

## 2020-02-18 PROCEDURE — 1101F PR PT FALLS ASSESS DOC 0-1 FALLS W/OUT INJ PAST YR: ICD-10-PCS | Mod: CPTII,S$GLB,, | Performed by: INTERNAL MEDICINE

## 2020-02-18 PROCEDURE — 1159F MED LIST DOCD IN RCRD: CPT | Mod: S$GLB,,, | Performed by: INTERNAL MEDICINE

## 2020-02-18 PROCEDURE — 99214 OFFICE O/P EST MOD 30 MIN: CPT | Mod: S$GLB,,, | Performed by: INTERNAL MEDICINE

## 2020-02-18 PROCEDURE — 99999 PR PBB SHADOW E&M-EST. PATIENT-LVL IV: ICD-10-PCS | Mod: PBBFAC,,, | Performed by: INTERNAL MEDICINE

## 2020-02-18 PROCEDURE — 3079F PR MOST RECENT DIASTOLIC BLOOD PRESSURE 80-89 MM HG: ICD-10-PCS | Mod: CPTII,S$GLB,, | Performed by: INTERNAL MEDICINE

## 2020-02-18 PROCEDURE — 99214 PR OFFICE/OUTPT VISIT, EST, LEVL IV, 30-39 MIN: ICD-10-PCS | Mod: S$GLB,,, | Performed by: INTERNAL MEDICINE

## 2020-02-18 PROCEDURE — 99999 PR PBB SHADOW E&M-EST. PATIENT-LVL IV: CPT | Mod: PBBFAC,,, | Performed by: INTERNAL MEDICINE

## 2020-02-18 PROCEDURE — 1126F AMNT PAIN NOTED NONE PRSNT: CPT | Mod: S$GLB,,, | Performed by: INTERNAL MEDICINE

## 2020-02-18 RX ORDER — FUROSEMIDE 20 MG/1
20 TABLET ORAL DAILY
Qty: 90 TABLET | Refills: 0 | Status: SHIPPED | OUTPATIENT
Start: 2020-02-18 | End: 2020-05-28

## 2020-02-18 RX ORDER — ATORVASTATIN CALCIUM 40 MG/1
40 TABLET, FILM COATED ORAL DAILY
Qty: 90 TABLET | Refills: 3 | Status: SHIPPED | OUTPATIENT
Start: 2020-02-18 | End: 2020-06-29 | Stop reason: SDUPTHER

## 2020-02-18 NOTE — TELEPHONE ENCOUNTER
Patient did not want to schedule Cardiology at this time. She states she will discuss this when she sees her doctor

## 2020-02-18 NOTE — PROGRESS NOTES
"Subjective:       Patient ID: Jose Manuel Boyd is a 79 y.o. female.    Chief Complaint: Establish Care and Follow-up    F/u leg swelling    HPI: 80 y/o w/ HTN presents for follow up of leg swelling. Seen by NP two weeks ago for acute onset bilateral leg swelling. At that time had taken single dose of furosumide with resolution. She is no longer taking trimeterene/hctz due to perceived side effects. Denies muscle cramping no orthopnea or PND. No cough. Currently feels legs are "fine" does note tightness around socks if she is on feet for most of day. No skin breakdown     Review of Systems   Constitutional: Negative for activity change, appetite change, fatigue, fever and unexpected weight change.   HENT: Negative for ear pain, rhinorrhea and sore throat.    Eyes: Negative for discharge and visual disturbance.   Respiratory: Negative for chest tightness, shortness of breath and wheezing.    Cardiovascular: Positive for leg swelling. Negative for chest pain and palpitations.   Gastrointestinal: Negative for abdominal pain, constipation and diarrhea.   Endocrine: Negative for cold intolerance and heat intolerance.   Genitourinary: Negative for dysuria and hematuria.   Musculoskeletal: Negative for joint swelling and neck stiffness.   Skin: Negative for rash.   Neurological: Negative for dizziness, syncope, weakness and headaches.   Psychiatric/Behavioral: Negative for suicidal ideas.       Objective:     Vitals:    02/18/20 1406 02/18/20 1501   BP: (!) 163/68 (!) 146/80   BP Location: Right arm    Patient Position: Sitting    Pulse: 64 72   Resp: 17    Temp: 98.2 °F (36.8 °C)    TempSrc: Oral    SpO2: 95%    Weight: 94.9 kg (209 lb 3.5 oz)    Height: 5' (1.524 m)           Physical Exam   Constitutional: She is oriented to person, place, and time. She appears well-developed and well-nourished.   HENT:   Head: Normocephalic and atraumatic.   Eyes: Conjunctivae are normal. No scleral icterus.   Neck: Normal range of " motion.   Cardiovascular: Normal rate and regular rhythm. Exam reveals no gallop and no friction rub.   No murmur heard.  Trace pedal edema bilaterally   Pulmonary/Chest: Effort normal and breath sounds normal. She has no wheezes. She has no rales.   Abdominal: Soft. Bowel sounds are normal. There is no tenderness. There is no rebound and no guarding.   Musculoskeletal: Normal range of motion. She exhibits no edema or tenderness.   Neurological: She is alert and oriented to person, place, and time. No cranial nerve deficit.   Skin: Skin is warm and dry.   Psychiatric: She has a normal mood and affect.     ROSY's reviewed no significant PAD venous ultrasound w/o reflux  Assessment and Plan   1. H/O: stroke  Daily statin  - atorvastatin (LIPITOR) 40 MG tablet; Take 1 tablet (40 mg total) by mouth once daily.  Dispense: 90 tablet; Refill: 3    2. HTN (hypertension), benign  BP above goal add low dose loop diuretic also to target edema   - furosemide (LASIX) 20 MG tablet; Take 1 tablet (20 mg total) by mouth once daily.  Dispense: 90 tablet; Refill: 0  - Basic metabolic panel; Future    3. Morbid obesity due to excess calories  The patient is asked to make an attempt to improve diet and exercise patterns to aid in medical management of this problem.

## 2020-02-19 ENCOUNTER — TELEPHONE (OUTPATIENT)
Dept: FAMILY MEDICINE | Facility: CLINIC | Age: 80
End: 2020-02-19

## 2020-02-19 NOTE — TELEPHONE ENCOUNTER
----- Message from Fareed Brumfield NP sent at 2/18/2020 12:52 PM CST -----  Please let patient know that the studies on her legs show no abnormalities.  She should follow up with Cardiology or her PCP to discuss this especially if the swelling in her legs persists.  Thank you

## 2020-02-19 NOTE — TELEPHONE ENCOUNTER
Called pt and discussed US results . Pt verbalized understanding and agreed to f/u with cardiology .

## 2020-03-16 RX ORDER — OMEPRAZOLE 20 MG/1
CAPSULE, DELAYED RELEASE ORAL
Qty: 90 CAPSULE | Refills: 3 | Status: SHIPPED | OUTPATIENT
Start: 2020-03-16 | End: 2021-04-22 | Stop reason: SDUPTHER

## 2020-03-18 ENCOUNTER — LAB VISIT (OUTPATIENT)
Dept: LAB | Facility: HOSPITAL | Age: 80
End: 2020-03-18
Attending: INTERNAL MEDICINE
Payer: MEDICARE

## 2020-03-18 DIAGNOSIS — I10 HTN (HYPERTENSION), BENIGN: ICD-10-CM

## 2020-03-18 LAB
ANION GAP SERPL CALC-SCNC: 9 MMOL/L (ref 8–16)
BUN SERPL-MCNC: 14 MG/DL (ref 8–23)
CALCIUM SERPL-MCNC: 9.4 MG/DL (ref 8.7–10.5)
CHLORIDE SERPL-SCNC: 106 MMOL/L (ref 95–110)
CO2 SERPL-SCNC: 29 MMOL/L (ref 23–29)
CREAT SERPL-MCNC: 0.8 MG/DL (ref 0.5–1.4)
EST. GFR  (AFRICAN AMERICAN): >60 ML/MIN/1.73 M^2
EST. GFR  (NON AFRICAN AMERICAN): >60 ML/MIN/1.73 M^2
GLUCOSE SERPL-MCNC: 131 MG/DL (ref 70–110)
POTASSIUM SERPL-SCNC: 4.5 MMOL/L (ref 3.5–5.1)
SODIUM SERPL-SCNC: 144 MMOL/L (ref 136–145)

## 2020-03-18 PROCEDURE — 80048 BASIC METABOLIC PNL TOTAL CA: CPT

## 2020-03-18 PROCEDURE — 36415 COLL VENOUS BLD VENIPUNCTURE: CPT | Mod: PO

## 2020-03-25 ENCOUNTER — TELEPHONE (OUTPATIENT)
Dept: FAMILY MEDICINE | Facility: CLINIC | Age: 80
End: 2020-03-25

## 2020-03-25 DIAGNOSIS — F39 MOOD INSOMNIA: ICD-10-CM

## 2020-03-25 DIAGNOSIS — F41.1 GAD (GENERALIZED ANXIETY DISORDER): ICD-10-CM

## 2020-03-25 DIAGNOSIS — F51.05 MOOD INSOMNIA: ICD-10-CM

## 2020-03-25 DIAGNOSIS — F33.0 MAJOR DEPRESSIVE DISORDER, RECURRENT EPISODE, MILD WITH ANXIOUS DISTRESS: ICD-10-CM

## 2020-03-25 RX ORDER — MIRTAZAPINE 30 MG/1
30 TABLET, FILM COATED ORAL NIGHTLY PRN
Qty: 90 TABLET | Refills: 0 | Status: SHIPPED | OUTPATIENT
Start: 2020-03-25 | End: 2020-06-29 | Stop reason: SDUPTHER

## 2020-03-25 NOTE — TELEPHONE ENCOUNTER
Patient contacted and ID confirmed by name and   Reviewed recent labs normal potassium after starting dialy lasix reports leg swelling has resolved no difficulty with urination feels well following social distancing recommendations no fever requested refill of mirtazipine to assist with sleep sent to pharmacy all questions answered

## 2020-05-19 ENCOUNTER — TELEPHONE (OUTPATIENT)
Dept: FAMILY MEDICINE | Facility: CLINIC | Age: 80
End: 2020-05-19

## 2020-05-19 NOTE — TELEPHONE ENCOUNTER
----- Message from Wendi Waite sent at 5/19/2020 10:19 AM CDT -----  Contact: self  Type: Patient Call Back    Who called SELF    What is the request in detail:Dr Galvan are at end of June Pt need to se Dr CARDOZA.     Can the clinic reply by [facility]?NO    Would the patient rather a call back or a response via [facility]? CALL    Best call back number: [phone]

## 2020-05-27 DIAGNOSIS — I10 HTN (HYPERTENSION), BENIGN: ICD-10-CM

## 2020-05-28 RX ORDER — FUROSEMIDE 20 MG/1
TABLET ORAL
Qty: 90 TABLET | Refills: 0 | Status: SHIPPED | OUTPATIENT
Start: 2020-05-28 | End: 2020-08-21

## 2020-06-29 ENCOUNTER — OFFICE VISIT (OUTPATIENT)
Dept: FAMILY MEDICINE | Facility: CLINIC | Age: 80
End: 2020-06-29
Payer: MEDICARE

## 2020-06-29 VITALS
HEIGHT: 60 IN | OXYGEN SATURATION: 96 % | WEIGHT: 203.69 LBS | RESPIRATION RATE: 17 BRPM | HEART RATE: 64 BPM | SYSTOLIC BLOOD PRESSURE: 130 MMHG | DIASTOLIC BLOOD PRESSURE: 89 MMHG | TEMPERATURE: 97 F | BODY MASS INDEX: 39.99 KG/M2

## 2020-06-29 DIAGNOSIS — F39 MOOD INSOMNIA: ICD-10-CM

## 2020-06-29 DIAGNOSIS — Z86.73 H/O: STROKE: ICD-10-CM

## 2020-06-29 DIAGNOSIS — I10 HTN (HYPERTENSION), BENIGN: Primary | ICD-10-CM

## 2020-06-29 DIAGNOSIS — F33.0 MAJOR DEPRESSIVE DISORDER, RECURRENT EPISODE, MILD WITH ANXIOUS DISTRESS: ICD-10-CM

## 2020-06-29 DIAGNOSIS — F51.05 MOOD INSOMNIA: ICD-10-CM

## 2020-06-29 DIAGNOSIS — F41.1 GAD (GENERALIZED ANXIETY DISORDER): ICD-10-CM

## 2020-06-29 PROCEDURE — 99999 PR PBB SHADOW E&M-EST. PATIENT-LVL IV: ICD-10-PCS | Mod: PBBFAC,,, | Performed by: INTERNAL MEDICINE

## 2020-06-29 PROCEDURE — 1159F MED LIST DOCD IN RCRD: CPT | Mod: S$GLB,,, | Performed by: INTERNAL MEDICINE

## 2020-06-29 PROCEDURE — 3075F PR MOST RECENT SYSTOLIC BLOOD PRESS GE 130-139MM HG: ICD-10-PCS | Mod: CPTII,S$GLB,, | Performed by: INTERNAL MEDICINE

## 2020-06-29 PROCEDURE — 1126F AMNT PAIN NOTED NONE PRSNT: CPT | Mod: S$GLB,,, | Performed by: INTERNAL MEDICINE

## 2020-06-29 PROCEDURE — 99214 PR OFFICE/OUTPT VISIT, EST, LEVL IV, 30-39 MIN: ICD-10-PCS | Mod: S$GLB,,, | Performed by: INTERNAL MEDICINE

## 2020-06-29 PROCEDURE — 3079F PR MOST RECENT DIASTOLIC BLOOD PRESSURE 80-89 MM HG: ICD-10-PCS | Mod: CPTII,S$GLB,, | Performed by: INTERNAL MEDICINE

## 2020-06-29 PROCEDURE — 3075F SYST BP GE 130 - 139MM HG: CPT | Mod: CPTII,S$GLB,, | Performed by: INTERNAL MEDICINE

## 2020-06-29 PROCEDURE — 99999 PR PBB SHADOW E&M-EST. PATIENT-LVL IV: CPT | Mod: PBBFAC,,, | Performed by: INTERNAL MEDICINE

## 2020-06-29 PROCEDURE — 1159F PR MEDICATION LIST DOCUMENTED IN MEDICAL RECORD: ICD-10-PCS | Mod: S$GLB,,, | Performed by: INTERNAL MEDICINE

## 2020-06-29 PROCEDURE — 1101F PT FALLS ASSESS-DOCD LE1/YR: CPT | Mod: CPTII,S$GLB,, | Performed by: INTERNAL MEDICINE

## 2020-06-29 PROCEDURE — 1126F PR PAIN SEVERITY QUANTIFIED, NO PAIN PRESENT: ICD-10-PCS | Mod: S$GLB,,, | Performed by: INTERNAL MEDICINE

## 2020-06-29 PROCEDURE — 3079F DIAST BP 80-89 MM HG: CPT | Mod: CPTII,S$GLB,, | Performed by: INTERNAL MEDICINE

## 2020-06-29 PROCEDURE — 99214 OFFICE O/P EST MOD 30 MIN: CPT | Mod: S$GLB,,, | Performed by: INTERNAL MEDICINE

## 2020-06-29 PROCEDURE — 1101F PR PT FALLS ASSESS DOC 0-1 FALLS W/OUT INJ PAST YR: ICD-10-PCS | Mod: CPTII,S$GLB,, | Performed by: INTERNAL MEDICINE

## 2020-06-29 RX ORDER — MIRTAZAPINE 30 MG/1
30 TABLET, FILM COATED ORAL NIGHTLY PRN
Qty: 90 TABLET | Refills: 1 | Status: SHIPPED | OUTPATIENT
Start: 2020-06-29 | End: 2020-12-14 | Stop reason: SDUPTHER

## 2020-06-29 RX ORDER — ATORVASTATIN CALCIUM 40 MG/1
40 TABLET, FILM COATED ORAL DAILY
Qty: 90 TABLET | Refills: 3 | Status: SHIPPED | OUTPATIENT
Start: 2020-06-29 | End: 2021-10-29

## 2020-06-29 RX ORDER — BUSPIRONE HYDROCHLORIDE 7.5 MG/1
7.5 TABLET ORAL 2 TIMES DAILY
Qty: 180 TABLET | Refills: 1 | Status: SHIPPED | OUTPATIENT
Start: 2020-06-29 | End: 2021-01-03

## 2020-06-29 RX ORDER — LOSARTAN POTASSIUM 50 MG/1
50 TABLET ORAL DAILY
Qty: 90 TABLET | Refills: 1 | Status: SHIPPED | OUTPATIENT
Start: 2020-06-29 | End: 2020-12-14 | Stop reason: SDUPTHER

## 2020-06-29 NOTE — PROGRESS NOTES
"Subjective:       Patient ID: Jose Manuel Boyd is a 79 y.o. female.    Chief Complaint: Medication Refill, Establish Care, and Follow-up    F/u mood    HPI: 78 y/o w/ HTN MDD presents for follow up. Doing "okay" had several cousins and friends pass from COVID. seh has remained isolated speaking with family via phone. Sleep "okay" using remeron nightly propranolol has helpe diwth tremor using buspar twice daily for anxiety with good effect weight unchanged no LE swelling using lasix daily     Review of Systems   Constitutional: Negative for activity change, appetite change, fatigue, fever and unexpected weight change.   HENT: Negative for ear pain, rhinorrhea and sore throat.    Eyes: Negative for discharge and visual disturbance.   Respiratory: Negative for chest tightness, shortness of breath and wheezing.    Cardiovascular: Negative for chest pain, palpitations and leg swelling.   Gastrointestinal: Negative for abdominal pain, constipation and diarrhea.   Endocrine: Negative for cold intolerance and heat intolerance.   Genitourinary: Negative for dysuria and hematuria.   Musculoskeletal: Negative for joint swelling and neck stiffness.   Skin: Negative for rash.   Neurological: Negative for dizziness, syncope, weakness and headaches.   Psychiatric/Behavioral: Negative for suicidal ideas.       Objective:     Vitals:    06/29/20 1012   BP: 130/89   BP Location: Right arm   Patient Position: Sitting   BP Method: Medium (Automatic)   Pulse: 64   Resp: 17   Temp: 96.9 °F (36.1 °C)   TempSrc: Oral   SpO2: 96%   Weight: 92.4 kg (203 lb 11.3 oz)   Height: 5' (1.524 m)          Physical Exam  Constitutional:       Appearance: She is well-developed.   HENT:      Head: Normocephalic and atraumatic.   Eyes:      General: No scleral icterus.     Conjunctiva/sclera: Conjunctivae normal.   Neck:      Musculoskeletal: Normal range of motion.   Cardiovascular:      Rate and Rhythm: Normal rate and regular rhythm.      Heart " sounds: No murmur. No friction rub. No gallop.    Pulmonary:      Effort: Pulmonary effort is normal.      Breath sounds: Normal breath sounds. No wheezing or rales.   Abdominal:      Palpations: Abdomen is soft.      Tenderness: There is no abdominal tenderness. There is no guarding or rebound.   Musculoskeletal: Normal range of motion.         General: No tenderness.   Skin:     General: Skin is warm and dry.   Neurological:      Mental Status: She is alert and oriented to person, place, and time.      Cranial Nerves: No cranial nerve deficit.   Psychiatric:         Mood and Affect: Mood normal.         Thought Content: Thought content normal.         Judgment: Judgment normal.         Assessment and Plan   1. HTN (hypertension), benign  Continue loop diuretic and arb repeat bmp prior to follow up to monitor electrolytes and renal function  - losartan (COZAAR) 50 MG tablet; Take 1 tablet (50 mg total) by mouth once daily. For high blood pressure  Dispense: 90 tablet; Refill: 1  - CBC auto differential; Future  - Basic metabolic panel; Future    2. H/O: stroke  On statin continue  - atorvastatin (LIPITOR) 40 MG tablet; Take 1 tablet (40 mg total) by mouth once daily.  Dispense: 90 tablet; Refill: 3    3. Major depressive disorder, recurrent episode, mild with anxious distress  Well controlle diwth current nightly mirtazipine and daytime buspar continue  - mirtazapine (REMERON) 30 MG tablet; Take 1 tablet (30 mg total) by mouth nightly as needed (insomnia). At bedtime for sleep, anxiety and depression.  Dispense: 90 tablet; Refill: 1  - busPIRone (BUSPAR) 7.5 MG tablet; Take 1 tablet (7.5 mg total) by mouth 2 (two) times daily.  Dispense: 180 tablet; Refill: 1    4. Mood insomnia  As above    5. ZAK (generalized anxiety disorder)  As above

## 2020-10-06 ENCOUNTER — LAB VISIT (OUTPATIENT)
Dept: LAB | Facility: HOSPITAL | Age: 80
End: 2020-10-06
Attending: INTERNAL MEDICINE
Payer: MEDICARE

## 2020-10-06 DIAGNOSIS — I10 HTN (HYPERTENSION), BENIGN: ICD-10-CM

## 2020-10-06 LAB
ANION GAP SERPL CALC-SCNC: 12 MMOL/L (ref 8–16)
BASOPHILS # BLD AUTO: 0.09 K/UL (ref 0–0.2)
BASOPHILS NFR BLD: 0.8 % (ref 0–1.9)
BUN SERPL-MCNC: 17 MG/DL (ref 8–23)
CALCIUM SERPL-MCNC: 9.2 MG/DL (ref 8.7–10.5)
CHLORIDE SERPL-SCNC: 100 MMOL/L (ref 95–110)
CO2 SERPL-SCNC: 28 MMOL/L (ref 23–29)
CREAT SERPL-MCNC: 1 MG/DL (ref 0.5–1.4)
DIFFERENTIAL METHOD: ABNORMAL
EOSINOPHIL # BLD AUTO: 0.4 K/UL (ref 0–0.5)
EOSINOPHIL NFR BLD: 3.1 % (ref 0–8)
ERYTHROCYTE [DISTWIDTH] IN BLOOD BY AUTOMATED COUNT: 15.1 % (ref 11.5–14.5)
EST. GFR  (AFRICAN AMERICAN): >60 ML/MIN/1.73 M^2
EST. GFR  (NON AFRICAN AMERICAN): 53 ML/MIN/1.73 M^2
GLUCOSE SERPL-MCNC: 225 MG/DL (ref 70–110)
HCT VFR BLD AUTO: 42.3 % (ref 37–48.5)
HGB BLD-MCNC: 12.6 G/DL (ref 12–16)
IMM GRANULOCYTES # BLD AUTO: 0.15 K/UL (ref 0–0.04)
IMM GRANULOCYTES NFR BLD AUTO: 1.3 % (ref 0–0.5)
LYMPHOCYTES # BLD AUTO: 2.1 K/UL (ref 1–4.8)
LYMPHOCYTES NFR BLD: 18.7 % (ref 18–48)
MCH RBC QN AUTO: 26.8 PG (ref 27–31)
MCHC RBC AUTO-ENTMCNC: 29.8 G/DL (ref 32–36)
MCV RBC AUTO: 90 FL (ref 82–98)
MONOCYTES # BLD AUTO: 1 K/UL (ref 0.3–1)
MONOCYTES NFR BLD: 8.4 % (ref 4–15)
NEUTROPHILS # BLD AUTO: 7.7 K/UL (ref 1.8–7.7)
NEUTROPHILS NFR BLD: 67.7 % (ref 38–73)
NRBC BLD-RTO: 0 /100 WBC
PLATELET # BLD AUTO: 263 K/UL (ref 150–350)
PMV BLD AUTO: 11.6 FL (ref 9.2–12.9)
POTASSIUM SERPL-SCNC: 4.6 MMOL/L (ref 3.5–5.1)
RBC # BLD AUTO: 4.7 M/UL (ref 4–5.4)
SODIUM SERPL-SCNC: 140 MMOL/L (ref 136–145)
WBC # BLD AUTO: 11.37 K/UL (ref 3.9–12.7)

## 2020-10-06 PROCEDURE — 80048 BASIC METABOLIC PNL TOTAL CA: CPT

## 2020-10-06 PROCEDURE — 36415 COLL VENOUS BLD VENIPUNCTURE: CPT | Mod: PN

## 2020-10-06 PROCEDURE — 85025 COMPLETE CBC W/AUTO DIFF WBC: CPT

## 2020-10-19 ENCOUNTER — PES CALL (OUTPATIENT)
Dept: ADMINISTRATIVE | Facility: CLINIC | Age: 80
End: 2020-10-19

## 2020-10-21 ENCOUNTER — OFFICE VISIT (OUTPATIENT)
Dept: FAMILY MEDICINE | Facility: CLINIC | Age: 80
End: 2020-10-21
Payer: MEDICARE

## 2020-10-21 VITALS
DIASTOLIC BLOOD PRESSURE: 62 MMHG | SYSTOLIC BLOOD PRESSURE: 124 MMHG | WEIGHT: 205.94 LBS | HEIGHT: 60 IN | HEART RATE: 38 BPM | RESPIRATION RATE: 18 BRPM | TEMPERATURE: 98 F | BODY MASS INDEX: 40.43 KG/M2 | OXYGEN SATURATION: 97 %

## 2020-10-21 DIAGNOSIS — I10 BENIGN ESSENTIAL HTN: ICD-10-CM

## 2020-10-21 DIAGNOSIS — R00.1 BRADYCARDIA: Primary | ICD-10-CM

## 2020-10-21 DIAGNOSIS — R73.09 ELEVATED RANDOM BLOOD GLUCOSE LEVEL: ICD-10-CM

## 2020-10-21 LAB — GLUCOSE SERPL-MCNC: 107 MG/DL (ref 70–110)

## 2020-10-21 PROCEDURE — 99999 PR PBB SHADOW E&M-EST. PATIENT-LVL III: CPT | Mod: PBBFAC,,, | Performed by: INTERNAL MEDICINE

## 2020-10-21 PROCEDURE — 3078F DIAST BP <80 MM HG: CPT | Mod: CPTII,S$GLB,, | Performed by: INTERNAL MEDICINE

## 2020-10-21 PROCEDURE — 3074F PR MOST RECENT SYSTOLIC BLOOD PRESSURE < 130 MM HG: ICD-10-PCS | Mod: CPTII,S$GLB,, | Performed by: INTERNAL MEDICINE

## 2020-10-21 PROCEDURE — 1159F PR MEDICATION LIST DOCUMENTED IN MEDICAL RECORD: ICD-10-PCS | Mod: S$GLB,,, | Performed by: INTERNAL MEDICINE

## 2020-10-21 PROCEDURE — 3078F PR MOST RECENT DIASTOLIC BLOOD PRESSURE < 80 MM HG: ICD-10-PCS | Mod: CPTII,S$GLB,, | Performed by: INTERNAL MEDICINE

## 2020-10-21 PROCEDURE — 93005 EKG 12-LEAD: ICD-10-PCS | Mod: S$GLB,,, | Performed by: INTERNAL MEDICINE

## 2020-10-21 PROCEDURE — 1126F PR PAIN SEVERITY QUANTIFIED, NO PAIN PRESENT: ICD-10-PCS | Mod: S$GLB,,, | Performed by: INTERNAL MEDICINE

## 2020-10-21 PROCEDURE — 3288F PR FALLS RISK ASSESSMENT DOCUMENTED: ICD-10-PCS | Mod: CPTII,S$GLB,, | Performed by: INTERNAL MEDICINE

## 2020-10-21 PROCEDURE — 93010 EKG 12-LEAD: ICD-10-PCS | Mod: S$GLB,,, | Performed by: INTERNAL MEDICINE

## 2020-10-21 PROCEDURE — 1100F PTFALLS ASSESS-DOCD GE2>/YR: CPT | Mod: CPTII,S$GLB,, | Performed by: INTERNAL MEDICINE

## 2020-10-21 PROCEDURE — 93010 ELECTROCARDIOGRAM REPORT: CPT | Mod: S$GLB,,, | Performed by: INTERNAL MEDICINE

## 2020-10-21 PROCEDURE — 99214 PR OFFICE/OUTPT VISIT, EST, LEVL IV, 30-39 MIN: ICD-10-PCS | Mod: S$GLB,,, | Performed by: INTERNAL MEDICINE

## 2020-10-21 PROCEDURE — 99214 OFFICE O/P EST MOD 30 MIN: CPT | Mod: S$GLB,,, | Performed by: INTERNAL MEDICINE

## 2020-10-21 PROCEDURE — 3288F FALL RISK ASSESSMENT DOCD: CPT | Mod: CPTII,S$GLB,, | Performed by: INTERNAL MEDICINE

## 2020-10-21 PROCEDURE — 1100F PR PT FALLS ASSESS DOC 2+ FALLS/FALL W/INJURY/YR: ICD-10-PCS | Mod: CPTII,S$GLB,, | Performed by: INTERNAL MEDICINE

## 2020-10-21 PROCEDURE — 82962 GLUCOSE BLOOD TEST: CPT | Mod: S$GLB,,, | Performed by: INTERNAL MEDICINE

## 2020-10-21 PROCEDURE — 99999 PR PBB SHADOW E&M-EST. PATIENT-LVL III: ICD-10-PCS | Mod: PBBFAC,,, | Performed by: INTERNAL MEDICINE

## 2020-10-21 PROCEDURE — 3074F SYST BP LT 130 MM HG: CPT | Mod: CPTII,S$GLB,, | Performed by: INTERNAL MEDICINE

## 2020-10-21 PROCEDURE — 1126F AMNT PAIN NOTED NONE PRSNT: CPT | Mod: S$GLB,,, | Performed by: INTERNAL MEDICINE

## 2020-10-21 PROCEDURE — 93005 ELECTROCARDIOGRAM TRACING: CPT | Mod: S$GLB,,, | Performed by: INTERNAL MEDICINE

## 2020-10-21 PROCEDURE — 1159F MED LIST DOCD IN RCRD: CPT | Mod: S$GLB,,, | Performed by: INTERNAL MEDICINE

## 2020-10-21 PROCEDURE — 82962 POCT GLUCOSE, HAND-HELD DEVICE: ICD-10-PCS | Mod: S$GLB,,, | Performed by: INTERNAL MEDICINE

## 2020-10-21 RX ORDER — PROPRANOLOL HYDROCHLORIDE 10 MG/1
10 TABLET ORAL 2 TIMES DAILY
Qty: 180 TABLET | Refills: 3 | Status: ON HOLD | OUTPATIENT
Start: 2020-10-21 | End: 2021-06-28

## 2020-10-21 RX ORDER — PROPRANOLOL HYDROCHLORIDE 10 MG/1
10 TABLET ORAL 2 TIMES DAILY
Qty: 180 TABLET | Refills: 3
Start: 2020-10-21 | End: 2020-10-21

## 2020-10-21 NOTE — PROGRESS NOTES
Subjective:       Patient ID: Jose Manuel Boyd is a 80 y.o. female.    Chief Complaint: Follow-up and Flu Vaccine    F/u chronic condtiions/recent labs    HPI: 79 y/o w/ essential tremor and HTN presents for follow up. Reports generalized fatigue for last two months. No falls no change in bowel or bladder frequency. Taking propranolol 20mg bid for tremor. No orthostatic symptoms no dyspnea or chest pain.     Review of Systems   Constitutional: Positive for fatigue. Negative for activity change, appetite change, fever and unexpected weight change.   HENT: Negative for ear pain, rhinorrhea and sore throat.    Eyes: Negative for discharge and visual disturbance.   Respiratory: Negative for chest tightness, shortness of breath and wheezing.    Cardiovascular: Negative for chest pain, palpitations and leg swelling.   Gastrointestinal: Negative for abdominal pain, constipation and diarrhea.   Endocrine: Negative for cold intolerance and heat intolerance.   Genitourinary: Negative for dysuria and hematuria.   Musculoskeletal: Negative for joint swelling and neck stiffness.   Skin: Negative for rash.   Neurological: Negative for dizziness, syncope, weakness and headaches.   Psychiatric/Behavioral: Negative for suicidal ideas.       Objective:     Vitals:    10/21/20 1448   BP: 124/62   BP Location: Right arm   Patient Position: Sitting   BP Method: Medium (Automatic)   Pulse: (!) 38   Resp: 18   Temp: 98.1 °F (36.7 °C)   TempSrc: Oral   SpO2: 97%   Weight: 93.4 kg (205 lb 14.6 oz)   Height: 5' (1.524 m)          Physical Exam  Constitutional:       Appearance: She is well-developed.   HENT:      Head: Normocephalic and atraumatic.   Eyes:      General: No scleral icterus.     Conjunctiva/sclera: Conjunctivae normal.   Neck:      Musculoskeletal: Normal range of motion.   Cardiovascular:      Rate and Rhythm: Regular rhythm. Bradycardia present.      Heart sounds: No murmur. No friction rub. No gallop.    Pulmonary:       Effort: Pulmonary effort is normal.      Breath sounds: Normal breath sounds. No wheezing or rales.   Abdominal:      General: Bowel sounds are normal.      Palpations: Abdomen is soft.      Tenderness: There is no abdominal tenderness. There is no right CVA tenderness, left CVA tenderness or guarding.   Musculoskeletal: Normal range of motion.         General: No tenderness.   Skin:     General: Skin is warm and dry.   Neurological:      Mental Status: She is alert and oriented to person, place, and time.      Cranial Nerves: No cranial nerve deficit.       EKG sinus mickey rate 38   Assessment and Plan   1. Benign essential HTN  bp at goal on diuretic and arb low heart rate related to propranolol decrease dose short follow up to monitor   - Basic Metabolic Panel; Future  - CBC auto differential; Future  - propranoloL (INDERAL) 10 MG tablet; Take 1 tablet (10 mg total) by mouth 2 (two) times daily. For shaking  Dispense: 180 tablet; Refill: 3    2. Bradycardia  As above  - EKG 12-lead    3. Elevated random blood glucose level  Repeat a1c prior to follow up  - POCT Glucose, Hand-Held Device  - Hemoglobin A1C; Future

## 2020-11-20 ENCOUNTER — LAB VISIT (OUTPATIENT)
Dept: LAB | Facility: HOSPITAL | Age: 80
End: 2020-11-20
Attending: INTERNAL MEDICINE
Payer: MEDICARE

## 2020-11-20 DIAGNOSIS — I10 BENIGN ESSENTIAL HTN: ICD-10-CM

## 2020-11-20 DIAGNOSIS — R73.09 ELEVATED RANDOM BLOOD GLUCOSE LEVEL: ICD-10-CM

## 2020-11-20 LAB
ANION GAP SERPL CALC-SCNC: 13 MMOL/L (ref 8–16)
BASOPHILS # BLD AUTO: 0.08 K/UL (ref 0–0.2)
BASOPHILS NFR BLD: 0.8 % (ref 0–1.9)
BUN SERPL-MCNC: 17 MG/DL (ref 8–23)
CALCIUM SERPL-MCNC: 9.7 MG/DL (ref 8.7–10.5)
CHLORIDE SERPL-SCNC: 106 MMOL/L (ref 95–110)
CO2 SERPL-SCNC: 29 MMOL/L (ref 23–29)
CREAT SERPL-MCNC: 0.8 MG/DL (ref 0.5–1.4)
DIFFERENTIAL METHOD: ABNORMAL
EOSINOPHIL # BLD AUTO: 0.3 K/UL (ref 0–0.5)
EOSINOPHIL NFR BLD: 3.1 % (ref 0–8)
ERYTHROCYTE [DISTWIDTH] IN BLOOD BY AUTOMATED COUNT: 15.4 % (ref 11.5–14.5)
EST. GFR  (AFRICAN AMERICAN): >60 ML/MIN/1.73 M^2
EST. GFR  (NON AFRICAN AMERICAN): >60 ML/MIN/1.73 M^2
ESTIMATED AVG GLUCOSE: 134 MG/DL (ref 68–131)
GLUCOSE SERPL-MCNC: 122 MG/DL (ref 70–110)
HBA1C MFR BLD HPLC: 6.3 % (ref 4–5.6)
HCT VFR BLD AUTO: 41.1 % (ref 37–48.5)
HGB BLD-MCNC: 13.1 G/DL (ref 12–16)
IMM GRANULOCYTES # BLD AUTO: 0.03 K/UL (ref 0–0.04)
IMM GRANULOCYTES NFR BLD AUTO: 0.3 % (ref 0–0.5)
LYMPHOCYTES # BLD AUTO: 1.7 K/UL (ref 1–4.8)
LYMPHOCYTES NFR BLD: 17 % (ref 18–48)
MCH RBC QN AUTO: 26.7 PG (ref 27–31)
MCHC RBC AUTO-ENTMCNC: 31.9 G/DL (ref 32–36)
MCV RBC AUTO: 84 FL (ref 82–98)
MONOCYTES # BLD AUTO: 0.9 K/UL (ref 0.3–1)
MONOCYTES NFR BLD: 9.1 % (ref 4–15)
NEUTROPHILS # BLD AUTO: 6.7 K/UL (ref 1.8–7.7)
NEUTROPHILS NFR BLD: 69.7 % (ref 38–73)
NRBC BLD-RTO: 0 /100 WBC
PLATELET # BLD AUTO: 254 K/UL (ref 150–350)
PMV BLD AUTO: 11.3 FL (ref 9.2–12.9)
POTASSIUM SERPL-SCNC: 4.2 MMOL/L (ref 3.5–5.1)
RBC # BLD AUTO: 4.91 M/UL (ref 4–5.4)
SODIUM SERPL-SCNC: 148 MMOL/L (ref 136–145)
WBC # BLD AUTO: 9.68 K/UL (ref 3.9–12.7)

## 2020-11-20 PROCEDURE — 80048 BASIC METABOLIC PNL TOTAL CA: CPT

## 2020-11-20 PROCEDURE — 36415 COLL VENOUS BLD VENIPUNCTURE: CPT | Mod: PN

## 2020-11-20 PROCEDURE — 85025 COMPLETE CBC W/AUTO DIFF WBC: CPT

## 2020-11-20 PROCEDURE — 83036 HEMOGLOBIN GLYCOSYLATED A1C: CPT

## 2020-12-14 ENCOUNTER — OFFICE VISIT (OUTPATIENT)
Dept: FAMILY MEDICINE | Facility: CLINIC | Age: 80
End: 2020-12-14
Payer: MEDICARE

## 2020-12-14 VITALS
DIASTOLIC BLOOD PRESSURE: 80 MMHG | WEIGHT: 206.56 LBS | TEMPERATURE: 98 F | HEART RATE: 88 BPM | SYSTOLIC BLOOD PRESSURE: 120 MMHG | BODY MASS INDEX: 40.55 KG/M2 | OXYGEN SATURATION: 95 % | HEIGHT: 60 IN

## 2020-12-14 DIAGNOSIS — F33.0 MAJOR DEPRESSIVE DISORDER, RECURRENT EPISODE, MILD WITH ANXIOUS DISTRESS: ICD-10-CM

## 2020-12-14 DIAGNOSIS — I10 HTN (HYPERTENSION), BENIGN: ICD-10-CM

## 2020-12-14 PROCEDURE — 3079F DIAST BP 80-89 MM HG: CPT | Mod: CPTII,S$GLB,, | Performed by: INTERNAL MEDICINE

## 2020-12-14 PROCEDURE — 3074F SYST BP LT 130 MM HG: CPT | Mod: CPTII,S$GLB,, | Performed by: INTERNAL MEDICINE

## 2020-12-14 PROCEDURE — 1159F PR MEDICATION LIST DOCUMENTED IN MEDICAL RECORD: ICD-10-PCS | Mod: S$GLB,,, | Performed by: INTERNAL MEDICINE

## 2020-12-14 PROCEDURE — 3074F PR MOST RECENT SYSTOLIC BLOOD PRESSURE < 130 MM HG: ICD-10-PCS | Mod: CPTII,S$GLB,, | Performed by: INTERNAL MEDICINE

## 2020-12-14 PROCEDURE — 99214 PR OFFICE/OUTPT VISIT, EST, LEVL IV, 30-39 MIN: ICD-10-PCS | Mod: S$GLB,,, | Performed by: INTERNAL MEDICINE

## 2020-12-14 PROCEDURE — 99999 PR PBB SHADOW E&M-EST. PATIENT-LVL IV: ICD-10-PCS | Mod: PBBFAC,,, | Performed by: INTERNAL MEDICINE

## 2020-12-14 PROCEDURE — 1125F AMNT PAIN NOTED PAIN PRSNT: CPT | Mod: S$GLB,,, | Performed by: INTERNAL MEDICINE

## 2020-12-14 PROCEDURE — 1159F MED LIST DOCD IN RCRD: CPT | Mod: S$GLB,,, | Performed by: INTERNAL MEDICINE

## 2020-12-14 PROCEDURE — 3079F PR MOST RECENT DIASTOLIC BLOOD PRESSURE 80-89 MM HG: ICD-10-PCS | Mod: CPTII,S$GLB,, | Performed by: INTERNAL MEDICINE

## 2020-12-14 PROCEDURE — 1125F PR PAIN SEVERITY QUANTIFIED, PAIN PRESENT: ICD-10-PCS | Mod: S$GLB,,, | Performed by: INTERNAL MEDICINE

## 2020-12-14 PROCEDURE — 99214 OFFICE O/P EST MOD 30 MIN: CPT | Mod: S$GLB,,, | Performed by: INTERNAL MEDICINE

## 2020-12-14 PROCEDURE — 99999 PR PBB SHADOW E&M-EST. PATIENT-LVL IV: CPT | Mod: PBBFAC,,, | Performed by: INTERNAL MEDICINE

## 2020-12-14 RX ORDER — MIRTAZAPINE 30 MG/1
30 TABLET, FILM COATED ORAL NIGHTLY PRN
Qty: 90 TABLET | Refills: 1 | Status: SHIPPED | OUTPATIENT
Start: 2020-12-14 | End: 2022-08-24 | Stop reason: SDUPTHER

## 2020-12-14 RX ORDER — LOSARTAN POTASSIUM 50 MG/1
50 TABLET ORAL DAILY
Qty: 90 TABLET | Refills: 1 | Status: SHIPPED | OUTPATIENT
Start: 2020-12-14 | End: 2021-06-21

## 2020-12-14 NOTE — PROGRESS NOTES
"Subjective:       Patient ID: Jose Manuel Boyd is a 80 y.o. female.    Chief Complaint: Hypertension and Follow-up    F/u fatigue    HPI: 81 y/o w/ HTN MDD presents for follow up of fatigue. Last visit her propranolol for essential tremor was reduced in half due to fatigue and bradycardia. She does feel she is able to do more within her house since reducing dose. No orthostatic symptoms no tremor. No falls breathing "okay" no LE swelling (she does wear compression stockings at home not wearing today)    Review of Systems   Constitutional: Negative for activity change, appetite change, fatigue, fever and unexpected weight change.   HENT: Negative for ear pain, rhinorrhea and sore throat.    Eyes: Negative for discharge and visual disturbance.   Respiratory: Negative for chest tightness, shortness of breath and wheezing.    Cardiovascular: Negative for chest pain, palpitations and leg swelling.   Gastrointestinal: Negative for abdominal pain, constipation and diarrhea.   Endocrine: Negative for cold intolerance and heat intolerance.   Genitourinary: Negative for dysuria and hematuria.   Musculoskeletal: Negative for joint swelling and neck stiffness.   Skin: Negative for rash.   Neurological: Negative for dizziness, syncope, weakness and headaches.   Psychiatric/Behavioral: Negative for suicidal ideas.       Objective:     Vitals:    12/14/20 1315   BP: 120/80   BP Location: Left arm   Patient Position: Sitting   BP Method: Large (Manual)   Pulse: 88   Temp: 98.3 °F (36.8 °C)   TempSrc: Oral   SpO2: 95%   Weight: 93.7 kg (206 lb 9.1 oz)   Height: 5' (1.524 m)          Physical Exam  Constitutional:       Appearance: She is well-developed.   HENT:      Head: Normocephalic and atraumatic.   Eyes:      General: No scleral icterus.     Conjunctiva/sclera: Conjunctivae normal.   Neck:      Musculoskeletal: Normal range of motion.   Cardiovascular:      Rate and Rhythm: Normal rate and regular rhythm.      Heart sounds: " No murmur. No friction rub. No gallop.    Pulmonary:      Effort: Pulmonary effort is normal. No respiratory distress.      Breath sounds: Normal breath sounds. No wheezing or rales.   Abdominal:      General: Bowel sounds are normal.      Palpations: Abdomen is soft.      Tenderness: There is no abdominal tenderness. There is no guarding or rebound.   Musculoskeletal: Normal range of motion.         General: No tenderness.      Right lower leg: No edema.      Left lower leg: No edema.   Skin:     General: Skin is warm and dry.   Neurological:      Mental Status: She is alert and oriented to person, place, and time.      Cranial Nerves: No cranial nerve deficit.   Psychiatric:         Mood and Affect: Mood normal.         Behavior: Behavior normal.         Assessment and Plan   1. HTN (hypertension), benign  At goal continue arb  - losartan (COZAAR) 50 MG tablet; Take 1 tablet (50 mg total) by mouth once daily. For high blood pressure  Dispense: 90 tablet; Refill: 1    2. Major depressive disorder, recurrent episode, mild with anxious distress  Nightly remeron  - mirtazapine (REMERON) 30 MG tablet; Take 1 tablet (30 mg total) by mouth nightly as needed (insomnia). At bedtime for sleep, anxiety and depression.  Dispense: 90 tablet; Refill: 1

## 2021-01-10 ENCOUNTER — IMMUNIZATION (OUTPATIENT)
Dept: OBSTETRICS AND GYNECOLOGY | Facility: CLINIC | Age: 81
End: 2021-01-10
Payer: MEDICARE

## 2021-01-10 DIAGNOSIS — Z23 NEED FOR VACCINATION: ICD-10-CM

## 2021-01-10 PROCEDURE — 91300 COVID-19, MRNA, LNP-S, PF, 30 MCG/0.3 ML DOSE VACCINE: CPT | Mod: PBBFAC | Performed by: FAMILY MEDICINE

## 2021-01-21 ENCOUNTER — TELEPHONE (OUTPATIENT)
Dept: FAMILY MEDICINE | Facility: CLINIC | Age: 81
End: 2021-01-21

## 2021-01-31 ENCOUNTER — IMMUNIZATION (OUTPATIENT)
Dept: OBSTETRICS AND GYNECOLOGY | Facility: CLINIC | Age: 81
End: 2021-01-31
Payer: MEDICARE

## 2021-01-31 DIAGNOSIS — Z23 NEED FOR VACCINATION: Primary | ICD-10-CM

## 2021-01-31 PROCEDURE — 91300 COVID-19, MRNA, LNP-S, PF, 30 MCG/0.3 ML DOSE VACCINE: CPT | Mod: PBBFAC | Performed by: FAMILY MEDICINE

## 2021-01-31 PROCEDURE — 0002A COVID-19, MRNA, LNP-S, PF, 30 MCG/0.3 ML DOSE VACCINE: CPT | Mod: PBBFAC | Performed by: FAMILY MEDICINE

## 2021-02-01 ENCOUNTER — OFFICE VISIT (OUTPATIENT)
Dept: FAMILY MEDICINE | Facility: CLINIC | Age: 81
End: 2021-02-01
Payer: MEDICARE

## 2021-02-01 VITALS
SYSTOLIC BLOOD PRESSURE: 150 MMHG | DIASTOLIC BLOOD PRESSURE: 90 MMHG | RESPIRATION RATE: 18 BRPM | HEIGHT: 60 IN | HEART RATE: 92 BPM | OXYGEN SATURATION: 94 % | BODY MASS INDEX: 41.07 KG/M2 | WEIGHT: 209.19 LBS | TEMPERATURE: 99 F

## 2021-02-01 DIAGNOSIS — R06.02 SHORTNESS OF BREATH: ICD-10-CM

## 2021-02-01 DIAGNOSIS — J45.901 EXACERBATION OF ASTHMA, UNSPECIFIED ASTHMA SEVERITY, UNSPECIFIED WHETHER PERSISTENT: Primary | ICD-10-CM

## 2021-02-01 DIAGNOSIS — I10 HTN (HYPERTENSION), BENIGN: ICD-10-CM

## 2021-02-01 PROCEDURE — 1159F MED LIST DOCD IN RCRD: CPT | Mod: S$GLB,,, | Performed by: NURSE PRACTITIONER

## 2021-02-01 PROCEDURE — 99999 PR PBB SHADOW E&M-EST. PATIENT-LVL V: ICD-10-PCS | Mod: PBBFAC,,, | Performed by: NURSE PRACTITIONER

## 2021-02-01 PROCEDURE — 1159F PR MEDICATION LIST DOCUMENTED IN MEDICAL RECORD: ICD-10-PCS | Mod: S$GLB,,, | Performed by: NURSE PRACTITIONER

## 2021-02-01 PROCEDURE — 3080F PR MOST RECENT DIASTOLIC BLOOD PRESSURE >= 90 MM HG: ICD-10-PCS | Mod: CPTII,S$GLB,, | Performed by: NURSE PRACTITIONER

## 2021-02-01 PROCEDURE — 99499 UNLISTED E&M SERVICE: CPT | Mod: S$GLB,,, | Performed by: NURSE PRACTITIONER

## 2021-02-01 PROCEDURE — 3077F PR MOST RECENT SYSTOLIC BLOOD PRESSURE >= 140 MM HG: ICD-10-PCS | Mod: CPTII,S$GLB,, | Performed by: NURSE PRACTITIONER

## 2021-02-01 PROCEDURE — 99214 OFFICE O/P EST MOD 30 MIN: CPT | Mod: S$GLB,,, | Performed by: NURSE PRACTITIONER

## 2021-02-01 PROCEDURE — 3077F SYST BP >= 140 MM HG: CPT | Mod: CPTII,S$GLB,, | Performed by: NURSE PRACTITIONER

## 2021-02-01 PROCEDURE — 99999 PR PBB SHADOW E&M-EST. PATIENT-LVL V: CPT | Mod: PBBFAC,,, | Performed by: NURSE PRACTITIONER

## 2021-02-01 PROCEDURE — 99499 RISK ADDL DX/OHS AUDIT: ICD-10-PCS | Mod: S$GLB,,, | Performed by: NURSE PRACTITIONER

## 2021-02-01 PROCEDURE — 3080F DIAST BP >= 90 MM HG: CPT | Mod: CPTII,S$GLB,, | Performed by: NURSE PRACTITIONER

## 2021-02-01 PROCEDURE — 99214 PR OFFICE/OUTPT VISIT, EST, LEVL IV, 30-39 MIN: ICD-10-PCS | Mod: S$GLB,,, | Performed by: NURSE PRACTITIONER

## 2021-02-01 RX ORDER — ALBUTEROL SULFATE 90 UG/1
2 AEROSOL, METERED RESPIRATORY (INHALATION) EVERY 4 HOURS PRN
Qty: 6.7 G | Refills: 6 | Status: SHIPPED | OUTPATIENT
Start: 2021-02-01

## 2021-02-01 RX ORDER — PREDNISONE 20 MG/1
40 TABLET ORAL DAILY
Qty: 10 TABLET | Refills: 0 | Status: SHIPPED | OUTPATIENT
Start: 2021-02-01 | End: 2021-02-06

## 2021-02-18 ENCOUNTER — OFFICE VISIT (OUTPATIENT)
Dept: FAMILY MEDICINE | Facility: CLINIC | Age: 81
End: 2021-02-18
Payer: MEDICARE

## 2021-02-18 VITALS
HEIGHT: 60 IN | TEMPERATURE: 99 F | SYSTOLIC BLOOD PRESSURE: 136 MMHG | DIASTOLIC BLOOD PRESSURE: 74 MMHG | OXYGEN SATURATION: 95 % | HEART RATE: 83 BPM | WEIGHT: 207.44 LBS | BODY MASS INDEX: 40.72 KG/M2 | RESPIRATION RATE: 18 BRPM

## 2021-02-18 DIAGNOSIS — E66.01 MORBID OBESITY DUE TO EXCESS CALORIES: ICD-10-CM

## 2021-02-18 DIAGNOSIS — I10 HTN (HYPERTENSION), BENIGN: ICD-10-CM

## 2021-02-18 DIAGNOSIS — J45.909 ASTHMA, UNSPECIFIED ASTHMA SEVERITY, UNSPECIFIED WHETHER COMPLICATED, UNSPECIFIED WHETHER PERSISTENT: Primary | ICD-10-CM

## 2021-02-18 PROCEDURE — 1126F AMNT PAIN NOTED NONE PRSNT: CPT | Mod: S$GLB,,, | Performed by: NURSE PRACTITIONER

## 2021-02-18 PROCEDURE — 99999 PR PBB SHADOW E&M-EST. PATIENT-LVL V: ICD-10-PCS | Mod: PBBFAC,,, | Performed by: NURSE PRACTITIONER

## 2021-02-18 PROCEDURE — 99214 OFFICE O/P EST MOD 30 MIN: CPT | Mod: S$GLB,,, | Performed by: NURSE PRACTITIONER

## 2021-02-18 PROCEDURE — 3075F PR MOST RECENT SYSTOLIC BLOOD PRESS GE 130-139MM HG: ICD-10-PCS | Mod: CPTII,S$GLB,, | Performed by: NURSE PRACTITIONER

## 2021-02-18 PROCEDURE — 1159F PR MEDICATION LIST DOCUMENTED IN MEDICAL RECORD: ICD-10-PCS | Mod: S$GLB,,, | Performed by: NURSE PRACTITIONER

## 2021-02-18 PROCEDURE — 1159F MED LIST DOCD IN RCRD: CPT | Mod: S$GLB,,, | Performed by: NURSE PRACTITIONER

## 2021-02-18 PROCEDURE — 1126F PR PAIN SEVERITY QUANTIFIED, NO PAIN PRESENT: ICD-10-PCS | Mod: S$GLB,,, | Performed by: NURSE PRACTITIONER

## 2021-02-18 PROCEDURE — 3078F DIAST BP <80 MM HG: CPT | Mod: CPTII,S$GLB,, | Performed by: NURSE PRACTITIONER

## 2021-02-18 PROCEDURE — 99999 PR PBB SHADOW E&M-EST. PATIENT-LVL V: CPT | Mod: PBBFAC,,, | Performed by: NURSE PRACTITIONER

## 2021-02-18 PROCEDURE — 3075F SYST BP GE 130 - 139MM HG: CPT | Mod: CPTII,S$GLB,, | Performed by: NURSE PRACTITIONER

## 2021-02-18 PROCEDURE — 3288F PR FALLS RISK ASSESSMENT DOCUMENTED: ICD-10-PCS | Mod: CPTII,S$GLB,, | Performed by: NURSE PRACTITIONER

## 2021-02-18 PROCEDURE — 3288F FALL RISK ASSESSMENT DOCD: CPT | Mod: CPTII,S$GLB,, | Performed by: NURSE PRACTITIONER

## 2021-02-18 PROCEDURE — 99214 PR OFFICE/OUTPT VISIT, EST, LEVL IV, 30-39 MIN: ICD-10-PCS | Mod: S$GLB,,, | Performed by: NURSE PRACTITIONER

## 2021-02-18 PROCEDURE — 3078F PR MOST RECENT DIASTOLIC BLOOD PRESSURE < 80 MM HG: ICD-10-PCS | Mod: CPTII,S$GLB,, | Performed by: NURSE PRACTITIONER

## 2021-02-18 PROCEDURE — 1101F PT FALLS ASSESS-DOCD LE1/YR: CPT | Mod: CPTII,S$GLB,, | Performed by: NURSE PRACTITIONER

## 2021-02-18 PROCEDURE — 1101F PR PT FALLS ASSESS DOC 0-1 FALLS W/OUT INJ PAST YR: ICD-10-PCS | Mod: CPTII,S$GLB,, | Performed by: NURSE PRACTITIONER

## 2021-04-08 LAB
LEFT EYE DM RETINOPATHY: NEGATIVE
RIGHT EYE DM RETINOPATHY: NEGATIVE

## 2021-04-19 ENCOUNTER — OFFICE VISIT (OUTPATIENT)
Dept: FAMILY MEDICINE | Facility: CLINIC | Age: 81
End: 2021-04-19
Payer: MEDICARE

## 2021-04-19 ENCOUNTER — LAB VISIT (OUTPATIENT)
Dept: LAB | Facility: HOSPITAL | Age: 81
End: 2021-04-19
Attending: INTERNAL MEDICINE
Payer: MEDICARE

## 2021-04-19 VITALS
DIASTOLIC BLOOD PRESSURE: 84 MMHG | HEIGHT: 60 IN | SYSTOLIC BLOOD PRESSURE: 126 MMHG | OXYGEN SATURATION: 97 % | BODY MASS INDEX: 39.87 KG/M2 | RESPIRATION RATE: 16 BRPM | HEART RATE: 79 BPM | WEIGHT: 203.06 LBS

## 2021-04-19 DIAGNOSIS — I10 HTN (HYPERTENSION), BENIGN: Primary | ICD-10-CM

## 2021-04-19 DIAGNOSIS — R10.32 LEFT LOWER QUADRANT ABDOMINAL PAIN: ICD-10-CM

## 2021-04-19 DIAGNOSIS — R73.01 IMPAIRED FASTING GLUCOSE: ICD-10-CM

## 2021-04-19 DIAGNOSIS — I10 HTN (HYPERTENSION), BENIGN: ICD-10-CM

## 2021-04-19 LAB
ALBUMIN SERPL BCP-MCNC: 3.4 G/DL (ref 3.5–5.2)
ALP SERPL-CCNC: 120 U/L (ref 55–135)
ALT SERPL W/O P-5'-P-CCNC: 14 U/L (ref 10–44)
ANION GAP SERPL CALC-SCNC: 6 MMOL/L (ref 8–16)
AST SERPL-CCNC: 18 U/L (ref 10–40)
BASOPHILS # BLD AUTO: 0.08 K/UL (ref 0–0.2)
BASOPHILS NFR BLD: 0.8 % (ref 0–1.9)
BILIRUB SERPL-MCNC: 0.4 MG/DL (ref 0.1–1)
BUN SERPL-MCNC: 13 MG/DL (ref 8–23)
CALCIUM SERPL-MCNC: 9.4 MG/DL (ref 8.7–10.5)
CHLORIDE SERPL-SCNC: 103 MMOL/L (ref 95–110)
CO2 SERPL-SCNC: 34 MMOL/L (ref 23–29)
CREAT SERPL-MCNC: 0.7 MG/DL (ref 0.5–1.4)
DIFFERENTIAL METHOD: ABNORMAL
EOSINOPHIL # BLD AUTO: 0.2 K/UL (ref 0–0.5)
EOSINOPHIL NFR BLD: 1.8 % (ref 0–8)
ERYTHROCYTE [DISTWIDTH] IN BLOOD BY AUTOMATED COUNT: 14.6 % (ref 11.5–14.5)
EST. GFR  (AFRICAN AMERICAN): >60 ML/MIN/1.73 M^2
EST. GFR  (NON AFRICAN AMERICAN): >60 ML/MIN/1.73 M^2
GLUCOSE SERPL-MCNC: 110 MG/DL (ref 70–110)
HCT VFR BLD AUTO: 42.6 % (ref 37–48.5)
HGB BLD-MCNC: 13.5 G/DL (ref 12–16)
IMM GRANULOCYTES # BLD AUTO: 0.02 K/UL (ref 0–0.04)
IMM GRANULOCYTES NFR BLD AUTO: 0.2 % (ref 0–0.5)
LYMPHOCYTES # BLD AUTO: 2.6 K/UL (ref 1–4.8)
LYMPHOCYTES NFR BLD: 24.2 % (ref 18–48)
MCH RBC QN AUTO: 26.6 PG (ref 27–31)
MCHC RBC AUTO-ENTMCNC: 31.7 G/DL (ref 32–36)
MCV RBC AUTO: 84 FL (ref 82–98)
MONOCYTES # BLD AUTO: 0.9 K/UL (ref 0.3–1)
MONOCYTES NFR BLD: 8.3 % (ref 4–15)
NEUTROPHILS # BLD AUTO: 6.9 K/UL (ref 1.8–7.7)
NEUTROPHILS NFR BLD: 64.7 % (ref 38–73)
NRBC BLD-RTO: 0 /100 WBC
PLATELET # BLD AUTO: 272 K/UL (ref 150–450)
PMV BLD AUTO: 10.9 FL (ref 9.2–12.9)
POTASSIUM SERPL-SCNC: 4.5 MMOL/L (ref 3.5–5.1)
PROT SERPL-MCNC: 7.2 G/DL (ref 6–8.4)
RBC # BLD AUTO: 5.08 M/UL (ref 4–5.4)
SODIUM SERPL-SCNC: 143 MMOL/L (ref 136–145)
WBC # BLD AUTO: 10.64 K/UL (ref 3.9–12.7)

## 2021-04-19 PROCEDURE — 3074F PR MOST RECENT SYSTOLIC BLOOD PRESSURE < 130 MM HG: ICD-10-PCS | Mod: CPTII,S$GLB,, | Performed by: INTERNAL MEDICINE

## 2021-04-19 PROCEDURE — 99214 PR OFFICE/OUTPT VISIT, EST, LEVL IV, 30-39 MIN: ICD-10-PCS | Mod: S$GLB,,, | Performed by: INTERNAL MEDICINE

## 2021-04-19 PROCEDURE — 1101F PT FALLS ASSESS-DOCD LE1/YR: CPT | Mod: CPTII,S$GLB,, | Performed by: INTERNAL MEDICINE

## 2021-04-19 PROCEDURE — 83036 HEMOGLOBIN GLYCOSYLATED A1C: CPT | Performed by: INTERNAL MEDICINE

## 2021-04-19 PROCEDURE — 3288F FALL RISK ASSESSMENT DOCD: CPT | Mod: CPTII,S$GLB,, | Performed by: INTERNAL MEDICINE

## 2021-04-19 PROCEDURE — 99999 PR PBB SHADOW E&M-EST. PATIENT-LVL IV: CPT | Mod: PBBFAC,,, | Performed by: INTERNAL MEDICINE

## 2021-04-19 PROCEDURE — 1101F PR PT FALLS ASSESS DOC 0-1 FALLS W/OUT INJ PAST YR: ICD-10-PCS | Mod: CPTII,S$GLB,, | Performed by: INTERNAL MEDICINE

## 2021-04-19 PROCEDURE — 1125F PR PAIN SEVERITY QUANTIFIED, PAIN PRESENT: ICD-10-PCS | Mod: S$GLB,,, | Performed by: INTERNAL MEDICINE

## 2021-04-19 PROCEDURE — 1125F AMNT PAIN NOTED PAIN PRSNT: CPT | Mod: S$GLB,,, | Performed by: INTERNAL MEDICINE

## 2021-04-19 PROCEDURE — 3074F SYST BP LT 130 MM HG: CPT | Mod: CPTII,S$GLB,, | Performed by: INTERNAL MEDICINE

## 2021-04-19 PROCEDURE — 80053 COMPREHEN METABOLIC PANEL: CPT | Performed by: INTERNAL MEDICINE

## 2021-04-19 PROCEDURE — 85025 COMPLETE CBC W/AUTO DIFF WBC: CPT | Performed by: INTERNAL MEDICINE

## 2021-04-19 PROCEDURE — 99214 OFFICE O/P EST MOD 30 MIN: CPT | Mod: S$GLB,,, | Performed by: INTERNAL MEDICINE

## 2021-04-19 PROCEDURE — 3079F PR MOST RECENT DIASTOLIC BLOOD PRESSURE 80-89 MM HG: ICD-10-PCS | Mod: CPTII,S$GLB,, | Performed by: INTERNAL MEDICINE

## 2021-04-19 PROCEDURE — 3079F DIAST BP 80-89 MM HG: CPT | Mod: CPTII,S$GLB,, | Performed by: INTERNAL MEDICINE

## 2021-04-19 PROCEDURE — 36415 COLL VENOUS BLD VENIPUNCTURE: CPT | Mod: PN | Performed by: INTERNAL MEDICINE

## 2021-04-19 PROCEDURE — 1159F MED LIST DOCD IN RCRD: CPT | Mod: S$GLB,,, | Performed by: INTERNAL MEDICINE

## 2021-04-19 PROCEDURE — 3288F PR FALLS RISK ASSESSMENT DOCUMENTED: ICD-10-PCS | Mod: CPTII,S$GLB,, | Performed by: INTERNAL MEDICINE

## 2021-04-19 PROCEDURE — 99999 PR PBB SHADOW E&M-EST. PATIENT-LVL IV: ICD-10-PCS | Mod: PBBFAC,,, | Performed by: INTERNAL MEDICINE

## 2021-04-19 PROCEDURE — 1159F PR MEDICATION LIST DOCUMENTED IN MEDICAL RECORD: ICD-10-PCS | Mod: S$GLB,,, | Performed by: INTERNAL MEDICINE

## 2021-04-19 RX ORDER — METRONIDAZOLE 500 MG/1
500 TABLET ORAL EVERY 8 HOURS
Qty: 21 TABLET | Refills: 0 | Status: SHIPPED | OUTPATIENT
Start: 2021-04-19 | End: 2021-04-26

## 2021-04-20 LAB
ESTIMATED AVG GLUCOSE: 140 MG/DL (ref 68–131)
HBA1C MFR BLD: 6.5 % (ref 4–5.6)

## 2021-04-22 ENCOUNTER — OFFICE VISIT (OUTPATIENT)
Dept: PULMONOLOGY | Facility: CLINIC | Age: 81
End: 2021-04-22
Payer: MEDICARE

## 2021-04-22 VITALS
WEIGHT: 203.13 LBS | TEMPERATURE: 99 F | DIASTOLIC BLOOD PRESSURE: 77 MMHG | BODY MASS INDEX: 39.88 KG/M2 | HEIGHT: 60 IN | OXYGEN SATURATION: 96 % | HEART RATE: 66 BPM | SYSTOLIC BLOOD PRESSURE: 176 MMHG

## 2021-04-22 DIAGNOSIS — Z01.818 PREPROCEDURAL EXAMINATION: ICD-10-CM

## 2021-04-22 DIAGNOSIS — R06.02 SOB (SHORTNESS OF BREATH): ICD-10-CM

## 2021-04-22 DIAGNOSIS — J30.9 ALLERGIC RHINITIS, UNSPECIFIED SEASONALITY, UNSPECIFIED TRIGGER: ICD-10-CM

## 2021-04-22 DIAGNOSIS — K21.9 GASTROESOPHAGEAL REFLUX DISEASE, UNSPECIFIED WHETHER ESOPHAGITIS PRESENT: ICD-10-CM

## 2021-04-22 DIAGNOSIS — I10 HTN (HYPERTENSION), BENIGN: ICD-10-CM

## 2021-04-22 DIAGNOSIS — J45.30 MILD PERSISTENT ASTHMA, UNSPECIFIED WHETHER COMPLICATED: Primary | ICD-10-CM

## 2021-04-22 PROCEDURE — 1126F PR PAIN SEVERITY QUANTIFIED, NO PAIN PRESENT: ICD-10-PCS | Mod: S$GLB,,, | Performed by: NURSE PRACTITIONER

## 2021-04-22 PROCEDURE — 99203 PR OFFICE/OUTPT VISIT, NEW, LEVL III, 30-44 MIN: ICD-10-PCS | Mod: S$GLB,,, | Performed by: NURSE PRACTITIONER

## 2021-04-22 PROCEDURE — 1159F PR MEDICATION LIST DOCUMENTED IN MEDICAL RECORD: ICD-10-PCS | Mod: S$GLB,,, | Performed by: NURSE PRACTITIONER

## 2021-04-22 PROCEDURE — 99999 PR PBB SHADOW E&M-EST. PATIENT-LVL III: CPT | Mod: PBBFAC,,, | Performed by: NURSE PRACTITIONER

## 2021-04-22 PROCEDURE — 1101F PT FALLS ASSESS-DOCD LE1/YR: CPT | Mod: CPTII,S$GLB,, | Performed by: NURSE PRACTITIONER

## 2021-04-22 PROCEDURE — 1101F PR PT FALLS ASSESS DOC 0-1 FALLS W/OUT INJ PAST YR: ICD-10-PCS | Mod: CPTII,S$GLB,, | Performed by: NURSE PRACTITIONER

## 2021-04-22 PROCEDURE — 3288F FALL RISK ASSESSMENT DOCD: CPT | Mod: CPTII,S$GLB,, | Performed by: NURSE PRACTITIONER

## 2021-04-22 PROCEDURE — 1159F MED LIST DOCD IN RCRD: CPT | Mod: S$GLB,,, | Performed by: NURSE PRACTITIONER

## 2021-04-22 PROCEDURE — 3288F PR FALLS RISK ASSESSMENT DOCUMENTED: ICD-10-PCS | Mod: CPTII,S$GLB,, | Performed by: NURSE PRACTITIONER

## 2021-04-22 PROCEDURE — 99999 PR PBB SHADOW E&M-EST. PATIENT-LVL III: ICD-10-PCS | Mod: PBBFAC,,, | Performed by: NURSE PRACTITIONER

## 2021-04-22 PROCEDURE — 99203 OFFICE O/P NEW LOW 30 MIN: CPT | Mod: S$GLB,,, | Performed by: NURSE PRACTITIONER

## 2021-04-22 PROCEDURE — 1126F AMNT PAIN NOTED NONE PRSNT: CPT | Mod: S$GLB,,, | Performed by: NURSE PRACTITIONER

## 2021-04-22 RX ORDER — FLUTICASONE PROPIONATE 50 MCG
1 SPRAY, SUSPENSION (ML) NASAL 2 TIMES DAILY
Qty: 18 ML | Refills: 11 | Status: SHIPPED | OUTPATIENT
Start: 2021-04-22

## 2021-04-22 RX ORDER — OMEPRAZOLE 20 MG/1
20 CAPSULE, DELAYED RELEASE ORAL DAILY
Qty: 90 CAPSULE | Refills: 3 | Status: SHIPPED | OUTPATIENT
Start: 2021-04-22

## 2021-04-29 PROBLEM — R06.02 SOB (SHORTNESS OF BREATH): Status: ACTIVE | Noted: 2021-04-29

## 2021-05-03 ENCOUNTER — LAB VISIT (OUTPATIENT)
Dept: FAMILY MEDICINE | Facility: CLINIC | Age: 81
End: 2021-05-03
Payer: MEDICARE

## 2021-05-03 DIAGNOSIS — Z01.818 PREPROCEDURAL EXAMINATION: ICD-10-CM

## 2021-05-03 PROCEDURE — U0005 INFEC AGEN DETEC AMPLI PROBE: HCPCS | Performed by: NURSE PRACTITIONER

## 2021-05-03 PROCEDURE — U0003 INFECTIOUS AGENT DETECTION BY NUCLEIC ACID (DNA OR RNA); SEVERE ACUTE RESPIRATORY SYNDROME CORONAVIRUS 2 (SARS-COV-2) (CORONAVIRUS DISEASE [COVID-19]), AMPLIFIED PROBE TECHNIQUE, MAKING USE OF HIGH THROUGHPUT TECHNOLOGIES AS DESCRIBED BY CMS-2020-01-R: HCPCS | Performed by: NURSE PRACTITIONER

## 2021-05-04 LAB — SARS-COV-2 RNA RESP QL NAA+PROBE: NOT DETECTED

## 2021-05-06 ENCOUNTER — TELEPHONE (OUTPATIENT)
Dept: PULMONOLOGY | Facility: CLINIC | Age: 81
End: 2021-05-06

## 2021-05-10 ENCOUNTER — HOSPITAL ENCOUNTER (OUTPATIENT)
Dept: RESPIRATORY THERAPY | Facility: HOSPITAL | Age: 81
Discharge: HOME | End: 2021-05-10
Attending: NURSE PRACTITIONER
Payer: MEDICARE

## 2021-05-10 VITALS — RESPIRATION RATE: 18 BRPM | HEART RATE: 68 BPM | OXYGEN SATURATION: 99 %

## 2021-05-10 DIAGNOSIS — R06.02 SOB (SHORTNESS OF BREATH): ICD-10-CM

## 2021-05-10 DIAGNOSIS — J45.30 MILD PERSISTENT ASTHMA, UNSPECIFIED WHETHER COMPLICATED: ICD-10-CM

## 2021-05-10 PROCEDURE — 94727 PR PULM FUNCTION TEST BY GAS: ICD-10-PCS | Mod: 26,,, | Performed by: INTERNAL MEDICINE

## 2021-05-10 PROCEDURE — 94729 PR C02/MEMBANE DIFFUSE CAPACITY: ICD-10-PCS | Mod: 26,,, | Performed by: INTERNAL MEDICINE

## 2021-05-10 PROCEDURE — 94060 PR EVAL OF BRONCHOSPASM: ICD-10-PCS | Mod: 26,,, | Performed by: INTERNAL MEDICINE

## 2021-05-10 PROCEDURE — 25000242 PHARM REV CODE 250 ALT 637 W/ HCPCS: Performed by: NURSE PRACTITIONER

## 2021-05-10 PROCEDURE — 94060 EVALUATION OF WHEEZING: CPT | Mod: 26,,, | Performed by: INTERNAL MEDICINE

## 2021-05-10 PROCEDURE — 94010 BREATHING CAPACITY TEST: CPT

## 2021-05-10 PROCEDURE — 94729 DIFFUSING CAPACITY: CPT | Mod: 26,,, | Performed by: INTERNAL MEDICINE

## 2021-05-10 PROCEDURE — 94727 GAS DIL/WSHOT DETER LNG VOL: CPT

## 2021-05-10 PROCEDURE — 94729 DIFFUSING CAPACITY: CPT

## 2021-05-10 PROCEDURE — 94727 GAS DIL/WSHOT DETER LNG VOL: CPT | Mod: 26,,, | Performed by: INTERNAL MEDICINE

## 2021-05-10 PROCEDURE — 94618 PULMONARY STRESS TESTING: CPT

## 2021-05-10 RX ORDER — ALBUTEROL SULFATE 2.5 MG/.5ML
2.5 SOLUTION RESPIRATORY (INHALATION) ONCE
Status: COMPLETED | OUTPATIENT
Start: 2021-05-10 | End: 2021-05-10

## 2021-05-10 RX ADMIN — ALBUTEROL SULFATE 2.5 MG: 2.5 SOLUTION RESPIRATORY (INHALATION) at 11:05

## 2021-05-11 LAB
BRPFT: NORMAL
DLCO ADJ PRE: 12.05 ML/(MIN*MMHG)
DLCO SINGLE BREATH LLN: 11.21
DLCO SINGLE BREATH PRE REF: 71.3 %
DLCO SINGLE BREATH REF: 16.95
DLCOC SBVA LLN: 2.35
DLCOC SBVA PRE REF: 112.5 %
DLCOC SBVA REF: 4
DLCOC SINGLE BREATH LLN: 11.21
DLCOC SINGLE BREATH PRE REF: 71.1 %
DLCOC SINGLE BREATH REF: 16.95
DLCOVA LLN: 2.35
DLCOVA PRE REF: 112.8 %
DLCOVA PRE: 4.51 ML/(MIN*MMHG*L)
DLCOVA REF: 4
DLVAADJ PRE: 4.49 ML/(MIN*MMHG*L)
ERVN2 LLN: -16449.58
ERVN2 PRE REF: 77.9 %
ERVN2 PRE: 0.33 L
ERVN2 REF: 0.42
FEF 25 75 CHG: 26.2 %
FEF 25 75 LLN: 0.38
FEF 25 75 POST REF: 40.4 %
FEF 25 75 PRE REF: 32 %
FEF 25 75 REF: 1.19
FET100 CHG: -8.8 %
FEV1 CHG: 1.6 %
FEV1 FVC CHG: 4.2 %
FEV1 FVC LLN: 63
FEV1 FVC POST REF: 85 %
FEV1 FVC PRE REF: 81.5 %
FEV1 FVC REF: 78
FEV1 LLN: 0.92
FEV1 POST REF: 72.7 %
FEV1 PRE REF: 71.6 %
FEV1 REF: 1.39
FRCN2 LLN: 1.66
FRCN2 PRE REF: 62.3 %
FRCN2 REF: 2.49
FVC CHG: -2.5 %
FVC LLN: 1.21
FVC POST REF: 84.7 %
FVC PRE REF: 86.9 %
FVC REF: 1.81
IVC PRE: 1.49 L
IVC SINGLE BREATH LLN: 1.21
IVC SINGLE BREATH PRE REF: 82.2 %
IVC SINGLE BREATH REF: 1.81
PEF CHG: -10.9 %
PEF LLN: 1.31
PEF POST REF: 85.4 %
PEF PRE REF: 95.8 %
PEF REF: 3.06
POST FEF 25 75: 0.48 L/S
POST FET 100: 9.41 SEC
POST FEV1 FVC: 66.02 %
POST FEV1: 1.01 L
POST FVC: 1.54 L
POST PEF: 2.62 L/S
PRE DLCO: 12.09 ML/(MIN*MMHG)
PRE FEF 25 75: 0.38 L/S
PRE FET 100: 10.32 SEC
PRE FEV1 FVC: 63.32 %
PRE FEV1: 1 L
PRE FRC N2: 1.55 L
PRE FVC: 1.58 L
PRE PEF: 2.93 L/S
RVN2 LLN: 1.49
RVN2 PRE REF: 59.1 %
RVN2 PRE: 1.22 L
RVN2 REF: 2.06
RVN2TLCN2 LLN: 37.25
RVN2TLCN2 PRE REF: 90.8 %
RVN2TLCN2 PRE: 42.51 %
RVN2TLCN2 REF: 46.84
TLCN2 LLN: 3.25
TLCN2 PRE REF: 67.7 %
TLCN2 PRE: 2.87 L
TLCN2 REF: 4.24
VA PRE: 2.68 L
VA SINGLE BREATH LLN: 4.09
VA SINGLE BREATH PRE REF: 65.6 %
VA SINGLE BREATH REF: 4.09
VCMAXN2 LLN: 1.21
VCMAXN2 PRE REF: 91 %
VCMAXN2 PRE: 1.65 L
VCMAXN2 REF: 1.81

## 2021-05-12 DIAGNOSIS — I10 HTN (HYPERTENSION), BENIGN: ICD-10-CM

## 2021-05-12 RX ORDER — FUROSEMIDE 20 MG/1
TABLET ORAL
Qty: 90 TABLET | Refills: 1 | Status: SHIPPED | OUTPATIENT
Start: 2021-05-12 | End: 2022-06-01

## 2021-05-13 ENCOUNTER — OFFICE VISIT (OUTPATIENT)
Dept: FAMILY MEDICINE | Facility: CLINIC | Age: 81
End: 2021-05-13
Payer: MEDICARE

## 2021-05-13 VITALS
BODY MASS INDEX: 40.69 KG/M2 | WEIGHT: 207.25 LBS | HEART RATE: 76 BPM | HEIGHT: 60 IN | OXYGEN SATURATION: 96 % | SYSTOLIC BLOOD PRESSURE: 134 MMHG | DIASTOLIC BLOOD PRESSURE: 79 MMHG | TEMPERATURE: 99 F

## 2021-05-13 DIAGNOSIS — I10 HTN (HYPERTENSION), BENIGN: Primary | ICD-10-CM

## 2021-05-13 DIAGNOSIS — R73.01 IMPAIRED FASTING GLUCOSE: ICD-10-CM

## 2021-05-13 PROCEDURE — 1126F PR PAIN SEVERITY QUANTIFIED, NO PAIN PRESENT: ICD-10-PCS | Mod: S$GLB,,, | Performed by: INTERNAL MEDICINE

## 2021-05-13 PROCEDURE — 1126F AMNT PAIN NOTED NONE PRSNT: CPT | Mod: S$GLB,,, | Performed by: INTERNAL MEDICINE

## 2021-05-13 PROCEDURE — 1159F PR MEDICATION LIST DOCUMENTED IN MEDICAL RECORD: ICD-10-PCS | Mod: S$GLB,,, | Performed by: INTERNAL MEDICINE

## 2021-05-13 PROCEDURE — 3075F PR MOST RECENT SYSTOLIC BLOOD PRESS GE 130-139MM HG: ICD-10-PCS | Mod: CPTII,S$GLB,, | Performed by: INTERNAL MEDICINE

## 2021-05-13 PROCEDURE — 1159F MED LIST DOCD IN RCRD: CPT | Mod: S$GLB,,, | Performed by: INTERNAL MEDICINE

## 2021-05-13 PROCEDURE — 3075F SYST BP GE 130 - 139MM HG: CPT | Mod: CPTII,S$GLB,, | Performed by: INTERNAL MEDICINE

## 2021-05-13 PROCEDURE — 1101F PR PT FALLS ASSESS DOC 0-1 FALLS W/OUT INJ PAST YR: ICD-10-PCS | Mod: CPTII,S$GLB,, | Performed by: INTERNAL MEDICINE

## 2021-05-13 PROCEDURE — 99999 PR PBB SHADOW E&M-EST. PATIENT-LVL IV: CPT | Mod: PBBFAC,,, | Performed by: INTERNAL MEDICINE

## 2021-05-13 PROCEDURE — 99214 OFFICE O/P EST MOD 30 MIN: CPT | Mod: S$GLB,,, | Performed by: INTERNAL MEDICINE

## 2021-05-13 PROCEDURE — 99214 PR OFFICE/OUTPT VISIT, EST, LEVL IV, 30-39 MIN: ICD-10-PCS | Mod: S$GLB,,, | Performed by: INTERNAL MEDICINE

## 2021-05-13 PROCEDURE — 3288F PR FALLS RISK ASSESSMENT DOCUMENTED: ICD-10-PCS | Mod: CPTII,S$GLB,, | Performed by: INTERNAL MEDICINE

## 2021-05-13 PROCEDURE — 3078F DIAST BP <80 MM HG: CPT | Mod: CPTII,S$GLB,, | Performed by: INTERNAL MEDICINE

## 2021-05-13 PROCEDURE — 3288F FALL RISK ASSESSMENT DOCD: CPT | Mod: CPTII,S$GLB,, | Performed by: INTERNAL MEDICINE

## 2021-05-13 PROCEDURE — 99499 UNLISTED E&M SERVICE: CPT | Mod: S$GLB,,, | Performed by: INTERNAL MEDICINE

## 2021-05-13 PROCEDURE — 99499 RISK ADDL DX/OHS AUDIT: ICD-10-PCS | Mod: S$GLB,,, | Performed by: INTERNAL MEDICINE

## 2021-05-13 PROCEDURE — 1101F PT FALLS ASSESS-DOCD LE1/YR: CPT | Mod: CPTII,S$GLB,, | Performed by: INTERNAL MEDICINE

## 2021-05-13 PROCEDURE — 3078F PR MOST RECENT DIASTOLIC BLOOD PRESSURE < 80 MM HG: ICD-10-PCS | Mod: CPTII,S$GLB,, | Performed by: INTERNAL MEDICINE

## 2021-05-13 PROCEDURE — 99999 PR PBB SHADOW E&M-EST. PATIENT-LVL IV: ICD-10-PCS | Mod: PBBFAC,,, | Performed by: INTERNAL MEDICINE

## 2021-05-19 ENCOUNTER — PATIENT OUTREACH (OUTPATIENT)
Dept: ADMINISTRATIVE | Facility: OTHER | Age: 81
End: 2021-05-19

## 2021-05-20 ENCOUNTER — LAB VISIT (OUTPATIENT)
Dept: LAB | Facility: HOSPITAL | Age: 81
End: 2021-05-20
Attending: NURSE PRACTITIONER
Payer: MEDICARE

## 2021-05-20 ENCOUNTER — OFFICE VISIT (OUTPATIENT)
Dept: PULMONOLOGY | Facility: CLINIC | Age: 81
End: 2021-05-20
Payer: MEDICARE

## 2021-05-20 VITALS
TEMPERATURE: 97 F | DIASTOLIC BLOOD PRESSURE: 81 MMHG | OXYGEN SATURATION: 95 % | WEIGHT: 207.13 LBS | SYSTOLIC BLOOD PRESSURE: 160 MMHG | HEART RATE: 62 BPM | BODY MASS INDEX: 40.45 KG/M2

## 2021-05-20 DIAGNOSIS — G47.30 SLEEP-RELATED BREATHING DISORDER: ICD-10-CM

## 2021-05-20 DIAGNOSIS — J96.91 RESPIRATORY FAILURE WITH HYPOXIA, UNSPECIFIED CHRONICITY: ICD-10-CM

## 2021-05-20 DIAGNOSIS — J45.40 MODERATE PERSISTENT ASTHMA, UNSPECIFIED WHETHER COMPLICATED: Primary | ICD-10-CM

## 2021-05-20 LAB
ANION GAP SERPL CALC-SCNC: 8 MMOL/L (ref 8–16)
BNP SERPL-MCNC: 225 PG/ML (ref 0–99)
BUN SERPL-MCNC: 9 MG/DL (ref 8–23)
CALCIUM SERPL-MCNC: 9.8 MG/DL (ref 8.7–10.5)
CHLORIDE SERPL-SCNC: 102 MMOL/L (ref 95–110)
CO2 SERPL-SCNC: 33 MMOL/L (ref 23–29)
CREAT SERPL-MCNC: 0.8 MG/DL (ref 0.5–1.4)
D DIMER PPP IA.FEU-MCNC: 1.58 MG/L FEU
EST. GFR  (AFRICAN AMERICAN): >60 ML/MIN/1.73 M^2
EST. GFR  (NON AFRICAN AMERICAN): >60 ML/MIN/1.73 M^2
GLUCOSE SERPL-MCNC: 62 MG/DL (ref 70–110)
POTASSIUM SERPL-SCNC: 4 MMOL/L (ref 3.5–5.1)
SODIUM SERPL-SCNC: 143 MMOL/L (ref 136–145)

## 2021-05-20 PROCEDURE — 80048 BASIC METABOLIC PNL TOTAL CA: CPT | Performed by: NURSE PRACTITIONER

## 2021-05-20 PROCEDURE — 1101F PR PT FALLS ASSESS DOC 0-1 FALLS W/OUT INJ PAST YR: ICD-10-PCS | Mod: CPTII,S$GLB,, | Performed by: NURSE PRACTITIONER

## 2021-05-20 PROCEDURE — 1101F PT FALLS ASSESS-DOCD LE1/YR: CPT | Mod: CPTII,S$GLB,, | Performed by: NURSE PRACTITIONER

## 2021-05-20 PROCEDURE — 99499 RISK ADDL DX/OHS AUDIT: ICD-10-PCS | Mod: S$GLB,,, | Performed by: NURSE PRACTITIONER

## 2021-05-20 PROCEDURE — 1159F MED LIST DOCD IN RCRD: CPT | Mod: S$GLB,,, | Performed by: NURSE PRACTITIONER

## 2021-05-20 PROCEDURE — 99499 UNLISTED E&M SERVICE: CPT | Mod: S$GLB,,, | Performed by: NURSE PRACTITIONER

## 2021-05-20 PROCEDURE — 3288F FALL RISK ASSESSMENT DOCD: CPT | Mod: CPTII,S$GLB,, | Performed by: NURSE PRACTITIONER

## 2021-05-20 PROCEDURE — 36415 COLL VENOUS BLD VENIPUNCTURE: CPT | Performed by: NURSE PRACTITIONER

## 2021-05-20 PROCEDURE — 1126F PR PAIN SEVERITY QUANTIFIED, NO PAIN PRESENT: ICD-10-PCS | Mod: S$GLB,,, | Performed by: NURSE PRACTITIONER

## 2021-05-20 PROCEDURE — 99214 PR OFFICE/OUTPT VISIT, EST, LEVL IV, 30-39 MIN: ICD-10-PCS | Mod: S$GLB,,, | Performed by: NURSE PRACTITIONER

## 2021-05-20 PROCEDURE — 85379 FIBRIN DEGRADATION QUANT: CPT | Performed by: NURSE PRACTITIONER

## 2021-05-20 PROCEDURE — 99999 PR PBB SHADOW E&M-EST. PATIENT-LVL V: CPT | Mod: PBBFAC,,, | Performed by: NURSE PRACTITIONER

## 2021-05-20 PROCEDURE — 99999 PR PBB SHADOW E&M-EST. PATIENT-LVL V: ICD-10-PCS | Mod: PBBFAC,,, | Performed by: NURSE PRACTITIONER

## 2021-05-20 PROCEDURE — 3288F PR FALLS RISK ASSESSMENT DOCUMENTED: ICD-10-PCS | Mod: CPTII,S$GLB,, | Performed by: NURSE PRACTITIONER

## 2021-05-20 PROCEDURE — 1126F AMNT PAIN NOTED NONE PRSNT: CPT | Mod: S$GLB,,, | Performed by: NURSE PRACTITIONER

## 2021-05-20 PROCEDURE — 99214 OFFICE O/P EST MOD 30 MIN: CPT | Mod: S$GLB,,, | Performed by: NURSE PRACTITIONER

## 2021-05-20 PROCEDURE — 83880 ASSAY OF NATRIURETIC PEPTIDE: CPT | Performed by: NURSE PRACTITIONER

## 2021-05-20 PROCEDURE — 1159F PR MEDICATION LIST DOCUMENTED IN MEDICAL RECORD: ICD-10-PCS | Mod: S$GLB,,, | Performed by: NURSE PRACTITIONER

## 2021-05-20 RX ORDER — FLUTICASONE PROPIONATE AND SALMETEROL 500; 50 UG/1; UG/1
1 POWDER RESPIRATORY (INHALATION) 2 TIMES DAILY
Qty: 60 EACH | Refills: 11 | Status: SHIPPED | OUTPATIENT
Start: 2021-05-20 | End: 2021-08-10 | Stop reason: SDUPTHER

## 2021-05-21 ENCOUNTER — TELEPHONE (OUTPATIENT)
Dept: PULMONOLOGY | Facility: CLINIC | Age: 81
End: 2021-05-21

## 2021-05-21 DIAGNOSIS — R06.02 SOB (SHORTNESS OF BREATH): Primary | ICD-10-CM

## 2021-05-21 DIAGNOSIS — R07.9 ACUTE CHEST PAIN: ICD-10-CM

## 2021-05-25 ENCOUNTER — HOSPITAL ENCOUNTER (OUTPATIENT)
Dept: RADIOLOGY | Facility: HOSPITAL | Age: 81
Discharge: HOME | End: 2021-05-25
Attending: NURSE PRACTITIONER
Payer: MEDICARE

## 2021-05-25 DIAGNOSIS — R06.02 SOB (SHORTNESS OF BREATH): ICD-10-CM

## 2021-05-26 ENCOUNTER — PATIENT OUTREACH (OUTPATIENT)
Dept: ADMINISTRATIVE | Facility: HOSPITAL | Age: 81
End: 2021-05-26

## 2021-05-27 PROBLEM — J96.91 RESPIRATORY FAILURE WITH HYPOXIA: Status: ACTIVE | Noted: 2021-05-27

## 2021-05-27 PROBLEM — J45.40 MODERATE PERSISTENT ASTHMA WITHOUT COMPLICATION: Status: ACTIVE | Noted: 2021-04-22

## 2021-05-27 PROBLEM — G47.30 SLEEP-RELATED BREATHING DISORDER: Status: ACTIVE | Noted: 2021-05-27

## 2021-05-28 ENCOUNTER — TELEPHONE (OUTPATIENT)
Dept: PULMONOLOGY | Facility: CLINIC | Age: 81
End: 2021-05-28

## 2021-05-28 DIAGNOSIS — J44.9 CHRONIC OBSTRUCTIVE PULMONARY DISEASE, UNSPECIFIED COPD TYPE: Primary | ICD-10-CM

## 2021-06-17 ENCOUNTER — HOSPITAL ENCOUNTER (OUTPATIENT)
Dept: RESPIRATORY THERAPY | Facility: HOSPITAL | Age: 81
Discharge: HOME | End: 2021-06-17
Attending: NURSE PRACTITIONER
Payer: MEDICARE

## 2021-06-17 DIAGNOSIS — J96.91 RESPIRATORY FAILURE WITH HYPOXIA, UNSPECIFIED CHRONICITY: ICD-10-CM

## 2021-06-17 LAB
ALLENS TEST: ABNORMAL
DELSYS: ABNORMAL
FIO2: 21
HCO3 UR-SCNC: 29.2 MMOL/L (ref 24–28)
MODE: ABNORMAL
PCO2 BLDA: 47.4 MMHG (ref 35–45)
PH SMN: 7.4 [PH] (ref 7.35–7.45)
PO2 BLDA: 67 MMHG (ref 80–100)
POC BE: 4 MMOL/L
POC SATURATED O2: 93 % (ref 95–100)
POC TCO2: 31 MMOL/L (ref 23–27)
SAMPLE: ABNORMAL
SITE: ABNORMAL

## 2021-06-17 PROCEDURE — 82803 BLOOD GASES ANY COMBINATION: CPT

## 2021-06-17 PROCEDURE — 36600 WITHDRAWAL OF ARTERIAL BLOOD: CPT

## 2021-06-17 PROCEDURE — 99900035 HC TECH TIME PER 15 MIN (STAT)

## 2021-06-18 ENCOUNTER — OFFICE VISIT (OUTPATIENT)
Dept: PULMONOLOGY | Facility: CLINIC | Age: 81
End: 2021-06-18
Payer: MEDICARE

## 2021-06-18 VITALS
SYSTOLIC BLOOD PRESSURE: 169 MMHG | BODY MASS INDEX: 40.92 KG/M2 | HEART RATE: 77 BPM | OXYGEN SATURATION: 94 % | WEIGHT: 208.44 LBS | HEIGHT: 60 IN | DIASTOLIC BLOOD PRESSURE: 76 MMHG

## 2021-06-18 DIAGNOSIS — J96.11 CHRONIC RESPIRATORY FAILURE WITH HYPOXIA: ICD-10-CM

## 2021-06-18 DIAGNOSIS — R06.09 DOE (DYSPNEA ON EXERTION): Primary | ICD-10-CM

## 2021-06-18 DIAGNOSIS — G47.30 SLEEP-RELATED BREATHING DISORDER: ICD-10-CM

## 2021-06-18 DIAGNOSIS — J45.40 MODERATE PERSISTENT ASTHMA WITHOUT COMPLICATION: ICD-10-CM

## 2021-06-18 PROBLEM — J44.89 ASTHMA-COPD OVERLAP SYNDROME: Status: ACTIVE | Noted: 2021-04-22

## 2021-06-18 PROCEDURE — 1125F PR PAIN SEVERITY QUANTIFIED, PAIN PRESENT: ICD-10-PCS | Mod: S$GLB,,, | Performed by: NURSE PRACTITIONER

## 2021-06-18 PROCEDURE — 1159F PR MEDICATION LIST DOCUMENTED IN MEDICAL RECORD: ICD-10-PCS | Mod: S$GLB,,, | Performed by: NURSE PRACTITIONER

## 2021-06-18 PROCEDURE — 99999 PR PBB SHADOW E&M-EST. PATIENT-LVL IV: ICD-10-PCS | Mod: PBBFAC,,, | Performed by: NURSE PRACTITIONER

## 2021-06-18 PROCEDURE — 99214 PR OFFICE/OUTPT VISIT, EST, LEVL IV, 30-39 MIN: ICD-10-PCS | Mod: S$GLB,,, | Performed by: NURSE PRACTITIONER

## 2021-06-18 PROCEDURE — 1125F AMNT PAIN NOTED PAIN PRSNT: CPT | Mod: S$GLB,,, | Performed by: NURSE PRACTITIONER

## 2021-06-18 PROCEDURE — 1101F PT FALLS ASSESS-DOCD LE1/YR: CPT | Mod: CPTII,S$GLB,, | Performed by: NURSE PRACTITIONER

## 2021-06-18 PROCEDURE — 1101F PR PT FALLS ASSESS DOC 0-1 FALLS W/OUT INJ PAST YR: ICD-10-PCS | Mod: CPTII,S$GLB,, | Performed by: NURSE PRACTITIONER

## 2021-06-18 PROCEDURE — 99214 OFFICE O/P EST MOD 30 MIN: CPT | Mod: S$GLB,,, | Performed by: NURSE PRACTITIONER

## 2021-06-18 PROCEDURE — 1159F MED LIST DOCD IN RCRD: CPT | Mod: S$GLB,,, | Performed by: NURSE PRACTITIONER

## 2021-06-18 PROCEDURE — 99999 PR PBB SHADOW E&M-EST. PATIENT-LVL IV: CPT | Mod: PBBFAC,,, | Performed by: NURSE PRACTITIONER

## 2021-06-18 PROCEDURE — 3288F PR FALLS RISK ASSESSMENT DOCUMENTED: ICD-10-PCS | Mod: CPTII,S$GLB,, | Performed by: NURSE PRACTITIONER

## 2021-06-18 PROCEDURE — 3288F FALL RISK ASSESSMENT DOCD: CPT | Mod: CPTII,S$GLB,, | Performed by: NURSE PRACTITIONER

## 2021-06-21 ENCOUNTER — OFFICE VISIT (OUTPATIENT)
Dept: CARDIOLOGY | Facility: CLINIC | Age: 81
End: 2021-06-21
Payer: MEDICARE

## 2021-06-21 ENCOUNTER — HOSPITAL ENCOUNTER (OUTPATIENT)
Dept: RADIOLOGY | Facility: HOSPITAL | Age: 81
Discharge: HOME | End: 2021-06-21
Attending: NURSE PRACTITIONER
Payer: MEDICARE

## 2021-06-21 VITALS
HEART RATE: 69 BPM | WEIGHT: 205.81 LBS | BODY MASS INDEX: 40.4 KG/M2 | SYSTOLIC BLOOD PRESSURE: 210 MMHG | OXYGEN SATURATION: 95 % | HEIGHT: 60 IN | DIASTOLIC BLOOD PRESSURE: 102 MMHG

## 2021-06-21 DIAGNOSIS — J45.40 MODERATE PERSISTENT ASTHMA WITHOUT COMPLICATION: ICD-10-CM

## 2021-06-21 DIAGNOSIS — I10 HTN (HYPERTENSION), BENIGN: ICD-10-CM

## 2021-06-21 DIAGNOSIS — K21.9 GASTROESOPHAGEAL REFLUX DISEASE, UNSPECIFIED WHETHER ESOPHAGITIS PRESENT: ICD-10-CM

## 2021-06-21 DIAGNOSIS — R06.09 DOE (DYSPNEA ON EXERTION): Primary | ICD-10-CM

## 2021-06-21 DIAGNOSIS — R60.0 LOCALIZED EDEMA: ICD-10-CM

## 2021-06-21 DIAGNOSIS — J96.91 RESPIRATORY FAILURE WITH HYPOXIA, UNSPECIFIED CHRONICITY: ICD-10-CM

## 2021-06-21 DIAGNOSIS — Z86.73 H/O: STROKE: ICD-10-CM

## 2021-06-21 DIAGNOSIS — E66.01 CLASS 3 SEVERE OBESITY DUE TO EXCESS CALORIES WITH SERIOUS COMORBIDITY AND BODY MASS INDEX (BMI) OF 40.0 TO 44.9 IN ADULT: ICD-10-CM

## 2021-06-21 PROCEDURE — 93000 ELECTROCARDIOGRAM COMPLETE: CPT | Mod: S$GLB,,, | Performed by: INTERNAL MEDICINE

## 2021-06-21 PROCEDURE — 99999 PR PBB SHADOW E&M-EST. PATIENT-LVL IV: ICD-10-PCS | Mod: PBBFAC,,, | Performed by: INTERNAL MEDICINE

## 2021-06-21 PROCEDURE — 25500020 PHARM REV CODE 255: Performed by: NURSE PRACTITIONER

## 2021-06-21 PROCEDURE — 71275 CT ANGIOGRAPHY CHEST: CPT | Mod: 26,,, | Performed by: RADIOLOGY

## 2021-06-21 PROCEDURE — 99499 UNLISTED E&M SERVICE: CPT | Mod: S$GLB,,, | Performed by: INTERNAL MEDICINE

## 2021-06-21 PROCEDURE — 99204 PR OFFICE/OUTPT VISIT, NEW, LEVL IV, 45-59 MIN: ICD-10-PCS | Mod: S$GLB,,, | Performed by: INTERNAL MEDICINE

## 2021-06-21 PROCEDURE — 3288F FALL RISK ASSESSMENT DOCD: CPT | Mod: CPTII,S$GLB,, | Performed by: INTERNAL MEDICINE

## 2021-06-21 PROCEDURE — 71275 CT ANGIOGRAPHY CHEST: CPT | Mod: TC

## 2021-06-21 PROCEDURE — 1159F MED LIST DOCD IN RCRD: CPT | Mod: S$GLB,,, | Performed by: INTERNAL MEDICINE

## 2021-06-21 PROCEDURE — 1126F AMNT PAIN NOTED NONE PRSNT: CPT | Mod: S$GLB,,, | Performed by: INTERNAL MEDICINE

## 2021-06-21 PROCEDURE — 99499 RISK ADDL DX/OHS AUDIT: ICD-10-PCS | Mod: S$GLB,,, | Performed by: INTERNAL MEDICINE

## 2021-06-21 PROCEDURE — 99204 OFFICE O/P NEW MOD 45 MIN: CPT | Mod: S$GLB,,, | Performed by: INTERNAL MEDICINE

## 2021-06-21 PROCEDURE — 1101F PT FALLS ASSESS-DOCD LE1/YR: CPT | Mod: CPTII,S$GLB,, | Performed by: INTERNAL MEDICINE

## 2021-06-21 PROCEDURE — 93000 EKG 12-LEAD: ICD-10-PCS | Mod: S$GLB,,, | Performed by: INTERNAL MEDICINE

## 2021-06-21 PROCEDURE — 3288F PR FALLS RISK ASSESSMENT DOCUMENTED: ICD-10-PCS | Mod: CPTII,S$GLB,, | Performed by: INTERNAL MEDICINE

## 2021-06-21 PROCEDURE — 1101F PR PT FALLS ASSESS DOC 0-1 FALLS W/OUT INJ PAST YR: ICD-10-PCS | Mod: CPTII,S$GLB,, | Performed by: INTERNAL MEDICINE

## 2021-06-21 PROCEDURE — 1159F PR MEDICATION LIST DOCUMENTED IN MEDICAL RECORD: ICD-10-PCS | Mod: S$GLB,,, | Performed by: INTERNAL MEDICINE

## 2021-06-21 PROCEDURE — 99999 PR PBB SHADOW E&M-EST. PATIENT-LVL IV: CPT | Mod: PBBFAC,,, | Performed by: INTERNAL MEDICINE

## 2021-06-21 PROCEDURE — 1126F PR PAIN SEVERITY QUANTIFIED, NO PAIN PRESENT: ICD-10-PCS | Mod: S$GLB,,, | Performed by: INTERNAL MEDICINE

## 2021-06-21 PROCEDURE — 71275 CTA CHEST NON CORONARY: ICD-10-PCS | Mod: 26,,, | Performed by: RADIOLOGY

## 2021-06-21 RX ORDER — LOSARTAN POTASSIUM 100 MG/1
100 TABLET ORAL DAILY
Qty: 90 TABLET | Refills: 3 | Status: SHIPPED | OUTPATIENT
Start: 2021-06-21 | End: 2021-08-13

## 2021-06-21 RX ORDER — AMLODIPINE BESYLATE 5 MG/1
5 TABLET ORAL DAILY
Qty: 90 TABLET | Refills: 3 | Status: SHIPPED | OUTPATIENT
Start: 2021-06-21 | End: 2021-08-13

## 2021-06-21 RX ORDER — NAPROXEN SODIUM 220 MG/1
81 TABLET, FILM COATED ORAL DAILY
Qty: 90 TABLET | Refills: 3 | Status: ON HOLD | OUTPATIENT
Start: 2021-06-21 | End: 2021-06-28

## 2021-06-21 RX ADMIN — IOHEXOL 75 ML: 350 INJECTION, SOLUTION INTRAVENOUS at 02:06

## 2021-06-28 ENCOUNTER — HOSPITAL ENCOUNTER (OUTPATIENT)
Facility: HOSPITAL | Age: 81
Discharge: HOME | End: 2021-06-29
Attending: EMERGENCY MEDICINE | Admitting: HOSPITALIST
Payer: MEDICARE

## 2021-06-28 DIAGNOSIS — R51.9 ACUTE NONINTRACTABLE HEADACHE, UNSPECIFIED HEADACHE TYPE: ICD-10-CM

## 2021-06-28 DIAGNOSIS — R00.1 BRADYCARDIA: Primary | ICD-10-CM

## 2021-06-28 DIAGNOSIS — I10 HYPERTENSION: ICD-10-CM

## 2021-06-28 LAB
ALBUMIN SERPL BCP-MCNC: 3.1 G/DL (ref 3.5–5.2)
ALP SERPL-CCNC: 114 U/L (ref 55–135)
ALT SERPL W/O P-5'-P-CCNC: 10 U/L (ref 10–44)
ANION GAP SERPL CALC-SCNC: 6 MMOL/L (ref 8–16)
AST SERPL-CCNC: 12 U/L (ref 10–40)
BASOPHILS # BLD AUTO: 0.09 K/UL (ref 0–0.2)
BASOPHILS NFR BLD: 0.8 % (ref 0–1.9)
BILIRUB SERPL-MCNC: 0.3 MG/DL (ref 0.1–1)
BILIRUB UR QL STRIP: NEGATIVE
BNP SERPL-MCNC: 94 PG/ML (ref 0–99)
BUN SERPL-MCNC: 16 MG/DL (ref 8–23)
CALCIUM SERPL-MCNC: 9.4 MG/DL (ref 8.7–10.5)
CHLORIDE SERPL-SCNC: 103 MMOL/L (ref 95–110)
CLARITY UR: ABNORMAL
CO2 SERPL-SCNC: 31 MMOL/L (ref 23–29)
COLOR UR: YELLOW
CREAT SERPL-MCNC: 0.8 MG/DL (ref 0.5–1.4)
CTP QC/QA: YES
DIFFERENTIAL METHOD: ABNORMAL
EOSINOPHIL # BLD AUTO: 0.3 K/UL (ref 0–0.5)
EOSINOPHIL NFR BLD: 2.5 % (ref 0–8)
ERYTHROCYTE [DISTWIDTH] IN BLOOD BY AUTOMATED COUNT: 15.6 % (ref 11.5–14.5)
EST. GFR  (AFRICAN AMERICAN): >60 ML/MIN/1.73 M^2
EST. GFR  (NON AFRICAN AMERICAN): >60 ML/MIN/1.73 M^2
GLUCOSE SERPL-MCNC: 153 MG/DL (ref 70–110)
GLUCOSE UR QL STRIP: NEGATIVE
HCT VFR BLD AUTO: 41.6 % (ref 37–48.5)
HGB BLD-MCNC: 13 G/DL (ref 12–16)
HGB UR QL STRIP: NEGATIVE
HYALINE CASTS #/AREA URNS LPF: 12 /LPF
IMM GRANULOCYTES # BLD AUTO: 0.03 K/UL (ref 0–0.04)
IMM GRANULOCYTES NFR BLD AUTO: 0.3 % (ref 0–0.5)
KETONES UR QL STRIP: NEGATIVE
LEUKOCYTE ESTERASE UR QL STRIP: ABNORMAL
LYMPHOCYTES # BLD AUTO: 2.4 K/UL (ref 1–4.8)
LYMPHOCYTES NFR BLD: 21.4 % (ref 18–48)
MAGNESIUM SERPL-MCNC: 2 MG/DL (ref 1.6–2.6)
MCH RBC QN AUTO: 26.3 PG (ref 27–31)
MCHC RBC AUTO-ENTMCNC: 31.3 G/DL (ref 32–36)
MCV RBC AUTO: 84 FL (ref 82–98)
MICROSCOPIC COMMENT: ABNORMAL
MONOCYTES # BLD AUTO: 1 K/UL (ref 0.3–1)
MONOCYTES NFR BLD: 9.3 % (ref 4–15)
NEUTROPHILS # BLD AUTO: 7.4 K/UL (ref 1.8–7.7)
NEUTROPHILS NFR BLD: 65.7 % (ref 38–73)
NITRITE UR QL STRIP: NEGATIVE
NRBC BLD-RTO: 0 /100 WBC
PH UR STRIP: 6 [PH] (ref 5–8)
PLATELET # BLD AUTO: 314 K/UL (ref 150–450)
PMV BLD AUTO: 10.8 FL (ref 9.2–12.9)
POTASSIUM SERPL-SCNC: 4 MMOL/L (ref 3.5–5.1)
PROT SERPL-MCNC: 7.2 G/DL (ref 6–8.4)
PROT UR QL STRIP: ABNORMAL
RBC # BLD AUTO: 4.95 M/UL (ref 4–5.4)
RBC #/AREA URNS HPF: 2 /HPF (ref 0–4)
SARS-COV-2 RDRP RESP QL NAA+PROBE: NEGATIVE
SODIUM SERPL-SCNC: 140 MMOL/L (ref 136–145)
SP GR UR STRIP: 1.02 (ref 1–1.03)
SQUAMOUS #/AREA URNS HPF: 5 /HPF
TROPONIN I SERPL DL<=0.01 NG/ML-MCNC: 0.01 NG/ML (ref 0–0.03)
TSH SERPL DL<=0.005 MIU/L-ACNC: 0.98 UIU/ML (ref 0.4–4)
URN SPEC COLLECT METH UR: ABNORMAL
UROBILINOGEN UR STRIP-ACNC: NEGATIVE EU/DL
WBC # BLD AUTO: 11.24 K/UL (ref 3.9–12.7)
WBC #/AREA URNS HPF: 4 /HPF (ref 0–5)
WBC CLUMPS URNS QL MICRO: ABNORMAL

## 2021-06-28 PROCEDURE — 85025 COMPLETE CBC W/AUTO DIFF WBC: CPT | Performed by: EMERGENCY MEDICINE

## 2021-06-28 PROCEDURE — G0378 HOSPITAL OBSERVATION PER HR: HCPCS

## 2021-06-28 PROCEDURE — 93005 ELECTROCARDIOGRAM TRACING: CPT

## 2021-06-28 PROCEDURE — 25000003 PHARM REV CODE 250: Performed by: NURSE PRACTITIONER

## 2021-06-28 PROCEDURE — 84484 ASSAY OF TROPONIN QUANT: CPT | Mod: 91 | Performed by: NURSE PRACTITIONER

## 2021-06-28 PROCEDURE — 83735 ASSAY OF MAGNESIUM: CPT | Performed by: EMERGENCY MEDICINE

## 2021-06-28 PROCEDURE — 99285 EMERGENCY DEPT VISIT HI MDM: CPT | Mod: 25

## 2021-06-28 PROCEDURE — 84443 ASSAY THYROID STIM HORMONE: CPT | Performed by: EMERGENCY MEDICINE

## 2021-06-28 PROCEDURE — 94640 AIRWAY INHALATION TREATMENT: CPT

## 2021-06-28 PROCEDURE — 80053 COMPREHEN METABOLIC PANEL: CPT | Performed by: EMERGENCY MEDICINE

## 2021-06-28 PROCEDURE — 84484 ASSAY OF TROPONIN QUANT: CPT | Performed by: EMERGENCY MEDICINE

## 2021-06-28 PROCEDURE — 94760 N-INVAS EAR/PLS OXIMETRY 1: CPT

## 2021-06-28 PROCEDURE — 93010 EKG 12-LEAD: ICD-10-PCS | Mod: ,,, | Performed by: INTERNAL MEDICINE

## 2021-06-28 PROCEDURE — 81000 URINALYSIS NONAUTO W/SCOPE: CPT | Performed by: EMERGENCY MEDICINE

## 2021-06-28 PROCEDURE — 25000003 PHARM REV CODE 250: Performed by: EMERGENCY MEDICINE

## 2021-06-28 PROCEDURE — U0002 COVID-19 LAB TEST NON-CDC: HCPCS | Performed by: EMERGENCY MEDICINE

## 2021-06-28 PROCEDURE — 99220 PR INITIAL OBSERVATION CARE,LEVL III: CPT | Mod: 25,,, | Performed by: INTERNAL MEDICINE

## 2021-06-28 PROCEDURE — 93010 ELECTROCARDIOGRAM REPORT: CPT | Mod: ,,, | Performed by: INTERNAL MEDICINE

## 2021-06-28 PROCEDURE — 25000242 PHARM REV CODE 250 ALT 637 W/ HCPCS: Performed by: EMERGENCY MEDICINE

## 2021-06-28 PROCEDURE — 83880 ASSAY OF NATRIURETIC PEPTIDE: CPT | Performed by: EMERGENCY MEDICINE

## 2021-06-28 PROCEDURE — 36415 COLL VENOUS BLD VENIPUNCTURE: CPT | Performed by: NURSE PRACTITIONER

## 2021-06-28 PROCEDURE — 99220 PR INITIAL OBSERVATION CARE,LEVL III: ICD-10-PCS | Mod: 25,,, | Performed by: INTERNAL MEDICINE

## 2021-06-28 RX ORDER — MIRTAZAPINE 15 MG/1
30 TABLET, FILM COATED ORAL NIGHTLY PRN
Status: DISCONTINUED | OUTPATIENT
Start: 2021-06-28 | End: 2021-06-29 | Stop reason: HOSPADM

## 2021-06-28 RX ORDER — PANTOPRAZOLE SODIUM 40 MG/1
40 TABLET, DELAYED RELEASE ORAL DAILY
Refills: 3 | Status: DISCONTINUED | OUTPATIENT
Start: 2021-06-29 | End: 2021-06-29 | Stop reason: HOSPADM

## 2021-06-28 RX ORDER — ACETAMINOPHEN 325 MG/1
650 TABLET ORAL EVERY 4 HOURS PRN
Status: DISCONTINUED | OUTPATIENT
Start: 2021-06-28 | End: 2021-06-29 | Stop reason: HOSPADM

## 2021-06-28 RX ORDER — AMLODIPINE BESYLATE 5 MG/1
5 TABLET ORAL DAILY
Status: DISCONTINUED | OUTPATIENT
Start: 2021-06-29 | End: 2021-06-29 | Stop reason: HOSPADM

## 2021-06-28 RX ORDER — FLUTICASONE FUROATE AND VILANTEROL 200; 25 UG/1; UG/1
1 POWDER RESPIRATORY (INHALATION) DAILY
Refills: 11 | Status: DISCONTINUED | OUTPATIENT
Start: 2021-06-29 | End: 2021-06-29 | Stop reason: HOSPADM

## 2021-06-28 RX ORDER — ATORVASTATIN CALCIUM 40 MG/1
40 TABLET, FILM COATED ORAL DAILY
Status: DISCONTINUED | OUTPATIENT
Start: 2021-06-29 | End: 2021-06-29 | Stop reason: HOSPADM

## 2021-06-28 RX ORDER — ACETAMINOPHEN 325 MG/1
650 TABLET ORAL
Status: COMPLETED | OUTPATIENT
Start: 2021-06-28 | End: 2021-06-28

## 2021-06-28 RX ORDER — ASPIRIN 325 MG
325 TABLET ORAL DAILY
Status: DISCONTINUED | OUTPATIENT
Start: 2021-06-29 | End: 2021-06-29 | Stop reason: HOSPADM

## 2021-06-28 RX ORDER — SODIUM CHLORIDE 0.9 % (FLUSH) 0.9 %
10 SYRINGE (ML) INJECTION
Status: DISCONTINUED | OUTPATIENT
Start: 2021-06-28 | End: 2021-06-29 | Stop reason: HOSPADM

## 2021-06-28 RX ORDER — IPRATROPIUM BROMIDE AND ALBUTEROL SULFATE 2.5; .5 MG/3ML; MG/3ML
3 SOLUTION RESPIRATORY (INHALATION)
Status: COMPLETED | OUTPATIENT
Start: 2021-06-28 | End: 2021-06-28

## 2021-06-28 RX ORDER — LOSARTAN POTASSIUM 25 MG/1
100 TABLET ORAL DAILY
Status: DISCONTINUED | OUTPATIENT
Start: 2021-06-29 | End: 2021-06-29 | Stop reason: HOSPADM

## 2021-06-28 RX ADMIN — BUSPIRONE HYDROCHLORIDE 7.5 MG: 5 TABLET ORAL at 08:06

## 2021-06-28 RX ADMIN — IPRATROPIUM BROMIDE AND ALBUTEROL SULFATE 3 ML: .5; 3 SOLUTION RESPIRATORY (INHALATION) at 01:06

## 2021-06-28 RX ADMIN — ACETAMINOPHEN 650 MG: 325 TABLET ORAL at 10:06

## 2021-06-29 VITALS
DIASTOLIC BLOOD PRESSURE: 70 MMHG | HEIGHT: 60 IN | TEMPERATURE: 98 F | BODY MASS INDEX: 38.96 KG/M2 | RESPIRATION RATE: 18 BRPM | WEIGHT: 198.44 LBS | OXYGEN SATURATION: 96 % | SYSTOLIC BLOOD PRESSURE: 138 MMHG | HEART RATE: 75 BPM

## 2021-06-29 LAB
ALBUMIN SERPL BCP-MCNC: 2.8 G/DL (ref 3.5–5.2)
ALP SERPL-CCNC: 95 U/L (ref 55–135)
ALT SERPL W/O P-5'-P-CCNC: 9 U/L (ref 10–44)
ANION GAP SERPL CALC-SCNC: 8 MMOL/L (ref 8–16)
AORTIC ROOT ANNULUS: 3 CM
AORTIC VALVE CUSP SEPERATION: 2.14 CM
ASCENDING AORTA: 3.29 CM
AST SERPL-CCNC: 13 U/L (ref 10–40)
AV INDEX (PROSTH): 0.77
AV MEAN GRADIENT: 4 MMHG
AV PEAK GRADIENT: 7 MMHG
AV VALVE AREA: 2.7 CM2
AV VELOCITY RATIO: 0.86
BASOPHILS # BLD AUTO: 0.1 K/UL (ref 0–0.2)
BASOPHILS NFR BLD: 1.2 % (ref 0–1.9)
BILIRUB SERPL-MCNC: 0.4 MG/DL (ref 0.1–1)
BSA FOR ECHO PROCEDURE: 1.97 M2
BUN SERPL-MCNC: 15 MG/DL (ref 8–23)
CALCIUM SERPL-MCNC: 9.1 MG/DL (ref 8.7–10.5)
CHLORIDE SERPL-SCNC: 105 MMOL/L (ref 95–110)
CO2 SERPL-SCNC: 27 MMOL/L (ref 23–29)
CREAT SERPL-MCNC: 0.7 MG/DL (ref 0.5–1.4)
CV ECHO LV RWT: 0.57 CM
DIFFERENTIAL METHOD: ABNORMAL
DOP CALC AO PEAK VEL: 1.3 M/S
DOP CALC AO VTI: 28 CM
DOP CALC LVOT AREA: 3.5 CM2
DOP CALC LVOT DIAMETER: 2.11 CM
DOP CALC LVOT PEAK VEL: 1.12 M/S
DOP CALC LVOT STROKE VOLUME: 75.66 CM3
DOP CALCLVOT PEAK VEL VTI: 21.65 CM
E WAVE DECELERATION TIME: 352.64 MSEC
E/A RATIO: 0.55
E/E' RATIO: 5.73 M/S
ECHO LV POSTERIOR WALL: 1.1 CM (ref 0.6–1.1)
EJECTION FRACTION: 65 %
EOSINOPHIL # BLD AUTO: 0.3 K/UL (ref 0–0.5)
EOSINOPHIL NFR BLD: 4 % (ref 0–8)
ERYTHROCYTE [DISTWIDTH] IN BLOOD BY AUTOMATED COUNT: 16 % (ref 11.5–14.5)
EST. GFR  (AFRICAN AMERICAN): >60 ML/MIN/1.73 M^2
EST. GFR  (NON AFRICAN AMERICAN): >60 ML/MIN/1.73 M^2
FRACTIONAL SHORTENING: 26 % (ref 28–44)
GLUCOSE SERPL-MCNC: 117 MG/DL (ref 70–110)
HCT VFR BLD AUTO: 41.1 % (ref 37–48.5)
HGB BLD-MCNC: 12.6 G/DL (ref 12–16)
IMM GRANULOCYTES # BLD AUTO: 0.02 K/UL (ref 0–0.04)
IMM GRANULOCYTES NFR BLD AUTO: 0.2 % (ref 0–0.5)
INTERVENTRICULAR SEPTUM: 1.42 CM (ref 0.6–1.1)
LA MAJOR: 5.43 CM
LA MINOR: 5.02 CM
LA WIDTH: 3.49 CM
LEFT ATRIUM SIZE: 3.74 CM
LEFT ATRIUM VOLUME INDEX: 31.1 ML/M2
LEFT ATRIUM VOLUME: 57.88 CM3
LEFT INTERNAL DIMENSION IN SYSTOLE: 2.83 CM (ref 2.1–4)
LEFT VENTRICLE DIASTOLIC VOLUME INDEX: 34.1 ML/M2
LEFT VENTRICLE DIASTOLIC VOLUME: 63.43 ML
LEFT VENTRICLE MASS INDEX: 90 G/M2
LEFT VENTRICLE SYSTOLIC VOLUME INDEX: 16.3 ML/M2
LEFT VENTRICLE SYSTOLIC VOLUME: 30.34 ML
LEFT VENTRICULAR INTERNAL DIMENSION IN DIASTOLE: 3.84 CM (ref 3.5–6)
LEFT VENTRICULAR MASS: 167.53 G
LV LATERAL E/E' RATIO: 6.14 M/S
LV SEPTAL E/E' RATIO: 5.38 M/S
LYMPHOCYTES # BLD AUTO: 1.8 K/UL (ref 1–4.8)
LYMPHOCYTES NFR BLD: 21.1 % (ref 18–48)
MAGNESIUM SERPL-MCNC: 2 MG/DL (ref 1.6–2.6)
MCH RBC QN AUTO: 26.3 PG (ref 27–31)
MCHC RBC AUTO-ENTMCNC: 30.7 G/DL (ref 32–36)
MCV RBC AUTO: 86 FL (ref 82–98)
MONOCYTES # BLD AUTO: 1 K/UL (ref 0.3–1)
MONOCYTES NFR BLD: 11.4 % (ref 4–15)
MV PEAK A VEL: 0.78 M/S
MV PEAK E VEL: 0.43 M/S
MV STENOSIS PRESSURE HALF TIME: 124.74 MS
MV VALVE AREA P 1/2 METHOD: 1.76 CM2
NEUTROPHILS # BLD AUTO: 5.3 K/UL (ref 1.8–7.7)
NEUTROPHILS NFR BLD: 62.1 % (ref 38–73)
NRBC BLD-RTO: 0 /100 WBC
PISA TR MAX VEL: 2.5 M/S
PLATELET # BLD AUTO: 254 K/UL (ref 150–450)
PMV BLD AUTO: 10.6 FL (ref 9.2–12.9)
POTASSIUM SERPL-SCNC: 3.9 MMOL/L (ref 3.5–5.1)
PROT SERPL-MCNC: 6.4 G/DL (ref 6–8.4)
PV PEAK VELOCITY: 1.09 CM/S
RA MAJOR: 5.2 CM
RA PRESSURE: 3 MMHG
RA WIDTH: 3.19 CM
RBC # BLD AUTO: 4.79 M/UL (ref 4–5.4)
RIGHT VENTRICULAR END-DIASTOLIC DIMENSION: 3.75 CM
SODIUM SERPL-SCNC: 140 MMOL/L (ref 136–145)
STJ: 3.03 CM
TDI LATERAL: 0.07 M/S
TDI SEPTAL: 0.08 M/S
TDI: 0.08 M/S
TR MAX PG: 25 MMHG
TROPONIN I SERPL DL<=0.01 NG/ML-MCNC: 0.05 NG/ML (ref 0–0.03)
TROPONIN I SERPL DL<=0.01 NG/ML-MCNC: 0.07 NG/ML (ref 0–0.03)
TV REST PULMONARY ARTERY PRESSURE: 28 MMHG
WBC # BLD AUTO: 8.59 K/UL (ref 3.9–12.7)

## 2021-06-29 PROCEDURE — 99214 OFFICE O/P EST MOD 30 MIN: CPT | Mod: 25,,, | Performed by: INTERNAL MEDICINE

## 2021-06-29 PROCEDURE — 94640 AIRWAY INHALATION TREATMENT: CPT

## 2021-06-29 PROCEDURE — 83735 ASSAY OF MAGNESIUM: CPT | Performed by: NURSE PRACTITIONER

## 2021-06-29 PROCEDURE — 99214 PR OFFICE/OUTPT VISIT, EST, LEVL IV, 30-39 MIN: ICD-10-PCS | Mod: 25,,, | Performed by: INTERNAL MEDICINE

## 2021-06-29 PROCEDURE — 36415 COLL VENOUS BLD VENIPUNCTURE: CPT | Performed by: NURSE PRACTITIONER

## 2021-06-29 PROCEDURE — 94761 N-INVAS EAR/PLS OXIMETRY MLT: CPT

## 2021-06-29 PROCEDURE — 80053 COMPREHEN METABOLIC PANEL: CPT | Performed by: NURSE PRACTITIONER

## 2021-06-29 PROCEDURE — 84484 ASSAY OF TROPONIN QUANT: CPT | Performed by: NURSE PRACTITIONER

## 2021-06-29 PROCEDURE — 25000003 PHARM REV CODE 250: Performed by: NURSE PRACTITIONER

## 2021-06-29 PROCEDURE — 25000242 PHARM REV CODE 250 ALT 637 W/ HCPCS: Performed by: NURSE PRACTITIONER

## 2021-06-29 PROCEDURE — G0378 HOSPITAL OBSERVATION PER HR: HCPCS

## 2021-06-29 PROCEDURE — 85025 COMPLETE CBC W/AUTO DIFF WBC: CPT | Performed by: NURSE PRACTITIONER

## 2021-06-29 RX ORDER — ASPIRIN 81 MG/1
81 TABLET ORAL DAILY
COMMUNITY

## 2021-06-29 RX ADMIN — LOSARTAN POTASSIUM 100 MG: 25 TABLET, FILM COATED ORAL at 09:06

## 2021-06-29 RX ADMIN — ASPIRIN 325 MG ORAL TABLET 325 MG: 325 PILL ORAL at 09:06

## 2021-06-29 RX ADMIN — MIRTAZAPINE 30 MG: 15 TABLET, FILM COATED ORAL at 12:06

## 2021-06-29 RX ADMIN — PANTOPRAZOLE SODIUM 40 MG: 40 TABLET, DELAYED RELEASE ORAL at 09:06

## 2021-06-29 RX ADMIN — BUSPIRONE HYDROCHLORIDE 7.5 MG: 5 TABLET ORAL at 09:06

## 2021-06-29 RX ADMIN — ATORVASTATIN CALCIUM 40 MG: 40 TABLET, FILM COATED ORAL at 09:06

## 2021-06-29 RX ADMIN — FLUTICASONE FUROATE AND VILANTEROL TRIFENATATE 1 PUFF: 200; 25 POWDER RESPIRATORY (INHALATION) at 08:06

## 2021-06-29 RX ADMIN — AMLODIPINE BESYLATE 5 MG: 5 TABLET ORAL at 09:06

## 2021-07-15 ENCOUNTER — PATIENT OUTREACH (OUTPATIENT)
Dept: ADMINISTRATIVE | Facility: OTHER | Age: 81
End: 2021-07-15

## 2021-08-09 ENCOUNTER — LAB VISIT (OUTPATIENT)
Dept: LAB | Facility: HOSPITAL | Age: 81
End: 2021-08-09
Attending: INTERNAL MEDICINE
Payer: MEDICARE

## 2021-08-09 DIAGNOSIS — I10 HTN (HYPERTENSION), BENIGN: ICD-10-CM

## 2021-08-09 DIAGNOSIS — R73.01 IMPAIRED FASTING GLUCOSE: ICD-10-CM

## 2021-08-09 LAB
ANION GAP SERPL CALC-SCNC: 11 MMOL/L (ref 8–16)
BASOPHILS # BLD AUTO: 0.06 K/UL (ref 0–0.2)
BASOPHILS NFR BLD: 0.7 % (ref 0–1.9)
BUN SERPL-MCNC: 14 MG/DL (ref 8–23)
CALCIUM SERPL-MCNC: 9.7 MG/DL (ref 8.7–10.5)
CHLORIDE SERPL-SCNC: 101 MMOL/L (ref 95–110)
CO2 SERPL-SCNC: 31 MMOL/L (ref 23–29)
CREAT SERPL-MCNC: 0.8 MG/DL (ref 0.5–1.4)
DIFFERENTIAL METHOD: ABNORMAL
EOSINOPHIL # BLD AUTO: 0.2 K/UL (ref 0–0.5)
EOSINOPHIL NFR BLD: 2.6 % (ref 0–8)
ERYTHROCYTE [DISTWIDTH] IN BLOOD BY AUTOMATED COUNT: 15.5 % (ref 11.5–14.5)
EST. GFR  (AFRICAN AMERICAN): >60 ML/MIN/1.73 M^2
EST. GFR  (NON AFRICAN AMERICAN): >60 ML/MIN/1.73 M^2
ESTIMATED AVG GLUCOSE: 128 MG/DL (ref 68–131)
GLUCOSE SERPL-MCNC: 124 MG/DL (ref 70–110)
HBA1C MFR BLD: 6.1 % (ref 4–5.6)
HCT VFR BLD AUTO: 40.8 % (ref 37–48.5)
HGB BLD-MCNC: 12.8 G/DL (ref 12–16)
IMM GRANULOCYTES # BLD AUTO: 0.03 K/UL (ref 0–0.04)
IMM GRANULOCYTES NFR BLD AUTO: 0.4 % (ref 0–0.5)
LYMPHOCYTES # BLD AUTO: 1.7 K/UL (ref 1–4.8)
LYMPHOCYTES NFR BLD: 21 % (ref 18–48)
MCH RBC QN AUTO: 26 PG (ref 27–31)
MCHC RBC AUTO-ENTMCNC: 31.4 G/DL (ref 32–36)
MCV RBC AUTO: 83 FL (ref 82–98)
MONOCYTES # BLD AUTO: 0.8 K/UL (ref 0.3–1)
MONOCYTES NFR BLD: 9.3 % (ref 4–15)
NEUTROPHILS # BLD AUTO: 5.4 K/UL (ref 1.8–7.7)
NEUTROPHILS NFR BLD: 66 % (ref 38–73)
NRBC BLD-RTO: 0 /100 WBC
PLATELET # BLD AUTO: ABNORMAL K/UL (ref 150–450)
PMV BLD AUTO: ABNORMAL FL (ref 9.2–12.9)
POTASSIUM SERPL-SCNC: 3.8 MMOL/L (ref 3.5–5.1)
RBC # BLD AUTO: 4.92 M/UL (ref 4–5.4)
SODIUM SERPL-SCNC: 143 MMOL/L (ref 136–145)
WBC # BLD AUTO: 8.1 K/UL (ref 3.9–12.7)

## 2021-08-09 PROCEDURE — 83036 HEMOGLOBIN GLYCOSYLATED A1C: CPT | Performed by: INTERNAL MEDICINE

## 2021-08-09 PROCEDURE — 36415 COLL VENOUS BLD VENIPUNCTURE: CPT | Mod: PN | Performed by: INTERNAL MEDICINE

## 2021-08-09 PROCEDURE — 85025 COMPLETE CBC W/AUTO DIFF WBC: CPT | Performed by: INTERNAL MEDICINE

## 2021-08-09 PROCEDURE — 80048 BASIC METABOLIC PNL TOTAL CA: CPT | Performed by: INTERNAL MEDICINE

## 2021-08-10 ENCOUNTER — OFFICE VISIT (OUTPATIENT)
Dept: PULMONOLOGY | Facility: CLINIC | Age: 81
End: 2021-08-10
Payer: MEDICARE

## 2021-08-10 VITALS
WEIGHT: 209.88 LBS | DIASTOLIC BLOOD PRESSURE: 92 MMHG | SYSTOLIC BLOOD PRESSURE: 152 MMHG | HEIGHT: 60 IN | OXYGEN SATURATION: 97 % | BODY MASS INDEX: 41.2 KG/M2 | TEMPERATURE: 99 F | HEART RATE: 84 BPM

## 2021-08-10 DIAGNOSIS — R06.09 DOE (DYSPNEA ON EXERTION): Primary | ICD-10-CM

## 2021-08-10 DIAGNOSIS — J96.11 CHRONIC RESPIRATORY FAILURE WITH HYPOXIA: ICD-10-CM

## 2021-08-10 DIAGNOSIS — I10 HTN (HYPERTENSION), BENIGN: ICD-10-CM

## 2021-08-10 DIAGNOSIS — J45.40 MODERATE PERSISTENT ASTHMA, UNSPECIFIED WHETHER COMPLICATED: ICD-10-CM

## 2021-08-10 PROCEDURE — 3080F PR MOST RECENT DIASTOLIC BLOOD PRESSURE >= 90 MM HG: ICD-10-PCS | Mod: CPTII,S$GLB,, | Performed by: NURSE PRACTITIONER

## 2021-08-10 PROCEDURE — 1101F PT FALLS ASSESS-DOCD LE1/YR: CPT | Mod: CPTII,S$GLB,, | Performed by: NURSE PRACTITIONER

## 2021-08-10 PROCEDURE — 3077F PR MOST RECENT SYSTOLIC BLOOD PRESSURE >= 140 MM HG: ICD-10-PCS | Mod: CPTII,S$GLB,, | Performed by: NURSE PRACTITIONER

## 2021-08-10 PROCEDURE — 99999 PR PBB SHADOW E&M-EST. PATIENT-LVL IV: ICD-10-PCS | Mod: PBBFAC,,, | Performed by: NURSE PRACTITIONER

## 2021-08-10 PROCEDURE — 99999 PR PBB SHADOW E&M-EST. PATIENT-LVL IV: CPT | Mod: PBBFAC,,, | Performed by: NURSE PRACTITIONER

## 2021-08-10 PROCEDURE — 99499 UNLISTED E&M SERVICE: CPT | Mod: S$GLB,,, | Performed by: NURSE PRACTITIONER

## 2021-08-10 PROCEDURE — 3288F FALL RISK ASSESSMENT DOCD: CPT | Mod: CPTII,S$GLB,, | Performed by: NURSE PRACTITIONER

## 2021-08-10 PROCEDURE — 1126F AMNT PAIN NOTED NONE PRSNT: CPT | Mod: CPTII,S$GLB,, | Performed by: NURSE PRACTITIONER

## 2021-08-10 PROCEDURE — 99214 OFFICE O/P EST MOD 30 MIN: CPT | Mod: S$GLB,,, | Performed by: NURSE PRACTITIONER

## 2021-08-10 PROCEDURE — 1101F PR PT FALLS ASSESS DOC 0-1 FALLS W/OUT INJ PAST YR: ICD-10-PCS | Mod: CPTII,S$GLB,, | Performed by: NURSE PRACTITIONER

## 2021-08-10 PROCEDURE — 1126F PR PAIN SEVERITY QUANTIFIED, NO PAIN PRESENT: ICD-10-PCS | Mod: CPTII,S$GLB,, | Performed by: NURSE PRACTITIONER

## 2021-08-10 PROCEDURE — 3080F DIAST BP >= 90 MM HG: CPT | Mod: CPTII,S$GLB,, | Performed by: NURSE PRACTITIONER

## 2021-08-10 PROCEDURE — 3288F PR FALLS RISK ASSESSMENT DOCUMENTED: ICD-10-PCS | Mod: CPTII,S$GLB,, | Performed by: NURSE PRACTITIONER

## 2021-08-10 PROCEDURE — 99499 RISK ADDL DX/OHS AUDIT: ICD-10-PCS | Mod: S$GLB,,, | Performed by: NURSE PRACTITIONER

## 2021-08-10 PROCEDURE — 3077F SYST BP >= 140 MM HG: CPT | Mod: CPTII,S$GLB,, | Performed by: NURSE PRACTITIONER

## 2021-08-10 PROCEDURE — 99214 PR OFFICE/OUTPT VISIT, EST, LEVL IV, 30-39 MIN: ICD-10-PCS | Mod: S$GLB,,, | Performed by: NURSE PRACTITIONER

## 2021-08-10 RX ORDER — FLUTICASONE PROPIONATE AND SALMETEROL 500; 50 UG/1; UG/1
1 POWDER RESPIRATORY (INHALATION) 2 TIMES DAILY
Qty: 60 EACH | Refills: 11 | Status: SHIPPED | OUTPATIENT
Start: 2021-08-10 | End: 2022-05-09 | Stop reason: SDUPTHER

## 2021-08-13 ENCOUNTER — OFFICE VISIT (OUTPATIENT)
Dept: FAMILY MEDICINE | Facility: CLINIC | Age: 81
End: 2021-08-13
Payer: MEDICARE

## 2021-08-13 VITALS
DIASTOLIC BLOOD PRESSURE: 94 MMHG | BODY MASS INDEX: 39.74 KG/M2 | OXYGEN SATURATION: 95 % | RESPIRATION RATE: 16 BRPM | TEMPERATURE: 98 F | WEIGHT: 203.5 LBS | HEART RATE: 72 BPM | SYSTOLIC BLOOD PRESSURE: 162 MMHG

## 2021-08-13 DIAGNOSIS — I10 HTN (HYPERTENSION), BENIGN: ICD-10-CM

## 2021-08-13 PROCEDURE — 1101F PR PT FALLS ASSESS DOC 0-1 FALLS W/OUT INJ PAST YR: ICD-10-PCS | Mod: CPTII,S$GLB,, | Performed by: INTERNAL MEDICINE

## 2021-08-13 PROCEDURE — 1159F MED LIST DOCD IN RCRD: CPT | Mod: CPTII,S$GLB,, | Performed by: INTERNAL MEDICINE

## 2021-08-13 PROCEDURE — 99214 OFFICE O/P EST MOD 30 MIN: CPT | Mod: S$GLB,,, | Performed by: INTERNAL MEDICINE

## 2021-08-13 PROCEDURE — 3080F PR MOST RECENT DIASTOLIC BLOOD PRESSURE >= 90 MM HG: ICD-10-PCS | Mod: CPTII,S$GLB,, | Performed by: INTERNAL MEDICINE

## 2021-08-13 PROCEDURE — 99214 PR OFFICE/OUTPT VISIT, EST, LEVL IV, 30-39 MIN: ICD-10-PCS | Mod: S$GLB,,, | Performed by: INTERNAL MEDICINE

## 2021-08-13 PROCEDURE — 3077F PR MOST RECENT SYSTOLIC BLOOD PRESSURE >= 140 MM HG: ICD-10-PCS | Mod: CPTII,S$GLB,, | Performed by: INTERNAL MEDICINE

## 2021-08-13 PROCEDURE — 1160F RVW MEDS BY RX/DR IN RCRD: CPT | Mod: CPTII,S$GLB,, | Performed by: INTERNAL MEDICINE

## 2021-08-13 PROCEDURE — 3080F DIAST BP >= 90 MM HG: CPT | Mod: CPTII,S$GLB,, | Performed by: INTERNAL MEDICINE

## 2021-08-13 PROCEDURE — 99999 PR PBB SHADOW E&M-EST. PATIENT-LVL III: ICD-10-PCS | Mod: PBBFAC,,, | Performed by: INTERNAL MEDICINE

## 2021-08-13 PROCEDURE — 1160F PR REVIEW ALL MEDS BY PRESCRIBER/CLIN PHARMACIST DOCUMENTED: ICD-10-PCS | Mod: CPTII,S$GLB,, | Performed by: INTERNAL MEDICINE

## 2021-08-13 PROCEDURE — 3288F PR FALLS RISK ASSESSMENT DOCUMENTED: ICD-10-PCS | Mod: CPTII,S$GLB,, | Performed by: INTERNAL MEDICINE

## 2021-08-13 PROCEDURE — 1101F PT FALLS ASSESS-DOCD LE1/YR: CPT | Mod: CPTII,S$GLB,, | Performed by: INTERNAL MEDICINE

## 2021-08-13 PROCEDURE — 99999 PR PBB SHADOW E&M-EST. PATIENT-LVL III: CPT | Mod: PBBFAC,,, | Performed by: INTERNAL MEDICINE

## 2021-08-13 PROCEDURE — 3288F FALL RISK ASSESSMENT DOCD: CPT | Mod: CPTII,S$GLB,, | Performed by: INTERNAL MEDICINE

## 2021-08-13 PROCEDURE — 1159F PR MEDICATION LIST DOCUMENTED IN MEDICAL RECORD: ICD-10-PCS | Mod: CPTII,S$GLB,, | Performed by: INTERNAL MEDICINE

## 2021-08-13 PROCEDURE — 3077F SYST BP >= 140 MM HG: CPT | Mod: CPTII,S$GLB,, | Performed by: INTERNAL MEDICINE

## 2021-08-13 RX ORDER — LOSARTAN POTASSIUM 50 MG/1
50 TABLET ORAL DAILY
Qty: 90 TABLET | Refills: 3 | Status: SHIPPED | OUTPATIENT
Start: 2021-08-13 | End: 2022-08-24 | Stop reason: SDUPTHER

## 2021-08-16 ENCOUNTER — PATIENT OUTREACH (OUTPATIENT)
Dept: ADMINISTRATIVE | Facility: OTHER | Age: 81
End: 2021-08-16

## 2021-08-18 ENCOUNTER — OFFICE VISIT (OUTPATIENT)
Dept: CARDIOLOGY | Facility: CLINIC | Age: 81
End: 2021-08-18
Payer: MEDICARE

## 2021-08-18 ENCOUNTER — IMMUNIZATION (OUTPATIENT)
Dept: OBSTETRICS AND GYNECOLOGY | Facility: CLINIC | Age: 81
End: 2021-08-18
Payer: MEDICARE

## 2021-08-18 VITALS
HEIGHT: 60 IN | HEART RATE: 106 BPM | DIASTOLIC BLOOD PRESSURE: 111 MMHG | BODY MASS INDEX: 37.89 KG/M2 | WEIGHT: 193 LBS | OXYGEN SATURATION: 94 % | SYSTOLIC BLOOD PRESSURE: 171 MMHG

## 2021-08-18 DIAGNOSIS — Z86.73 H/O: STROKE: ICD-10-CM

## 2021-08-18 DIAGNOSIS — I51.89 GRADE I DIASTOLIC DYSFUNCTION: ICD-10-CM

## 2021-08-18 DIAGNOSIS — J45.40 MODERATE PERSISTENT ASTHMA WITHOUT COMPLICATION: ICD-10-CM

## 2021-08-18 DIAGNOSIS — I11.9 LEFT VENTRICULAR HYPERTROPHY DUE TO HYPERTENSIVE DISEASE, WITHOUT HEART FAILURE: ICD-10-CM

## 2021-08-18 DIAGNOSIS — R79.89 TROPONIN I ABOVE REFERENCE RANGE: Primary | ICD-10-CM

## 2021-08-18 DIAGNOSIS — I10 ESSENTIAL HYPERTENSION: ICD-10-CM

## 2021-08-18 DIAGNOSIS — Z23 NEED FOR VACCINATION: Primary | ICD-10-CM

## 2021-08-18 DIAGNOSIS — R00.1 BRADYCARDIA: ICD-10-CM

## 2021-08-18 DIAGNOSIS — E78.49 OTHER HYPERLIPIDEMIA: ICD-10-CM

## 2021-08-18 PROCEDURE — 99999 PR PBB SHADOW E&M-EST. PATIENT-LVL IV: ICD-10-PCS | Mod: PBBFAC,,, | Performed by: INTERNAL MEDICINE

## 2021-08-18 PROCEDURE — 3288F PR FALLS RISK ASSESSMENT DOCUMENTED: ICD-10-PCS | Mod: CPTII,S$GLB,, | Performed by: INTERNAL MEDICINE

## 2021-08-18 PROCEDURE — 1126F PR PAIN SEVERITY QUANTIFIED, NO PAIN PRESENT: ICD-10-PCS | Mod: CPTII,S$GLB,, | Performed by: INTERNAL MEDICINE

## 2021-08-18 PROCEDURE — 3080F PR MOST RECENT DIASTOLIC BLOOD PRESSURE >= 90 MM HG: ICD-10-PCS | Mod: CPTII,S$GLB,, | Performed by: INTERNAL MEDICINE

## 2021-08-18 PROCEDURE — 99999 PR PBB SHADOW E&M-EST. PATIENT-LVL IV: CPT | Mod: PBBFAC,,, | Performed by: INTERNAL MEDICINE

## 2021-08-18 PROCEDURE — 91300 COVID-19, MRNA, LNP-S, PF, 30 MCG/0.3 ML DOSE VACCINE: CPT | Mod: ,,, | Performed by: FAMILY MEDICINE

## 2021-08-18 PROCEDURE — 3080F DIAST BP >= 90 MM HG: CPT | Mod: CPTII,S$GLB,, | Performed by: INTERNAL MEDICINE

## 2021-08-18 PROCEDURE — 0003A COVID-19, MRNA, LNP-S, PF, 30 MCG/0.3 ML DOSE VACCINE: CPT | Mod: CV19,,, | Performed by: FAMILY MEDICINE

## 2021-08-18 PROCEDURE — 93000 ELECTROCARDIOGRAM COMPLETE: CPT | Mod: S$GLB,,, | Performed by: INTERNAL MEDICINE

## 2021-08-18 PROCEDURE — 0003A COVID-19, MRNA, LNP-S, PF, 30 MCG/0.3 ML DOSE VACCINE: ICD-10-PCS | Mod: CV19,,, | Performed by: FAMILY MEDICINE

## 2021-08-18 PROCEDURE — 99214 PR OFFICE/OUTPT VISIT, EST, LEVL IV, 30-39 MIN: ICD-10-PCS | Mod: S$GLB,,, | Performed by: INTERNAL MEDICINE

## 2021-08-18 PROCEDURE — 1159F MED LIST DOCD IN RCRD: CPT | Mod: CPTII,S$GLB,, | Performed by: INTERNAL MEDICINE

## 2021-08-18 PROCEDURE — 99214 OFFICE O/P EST MOD 30 MIN: CPT | Mod: S$GLB,,, | Performed by: INTERNAL MEDICINE

## 2021-08-18 PROCEDURE — 1126F AMNT PAIN NOTED NONE PRSNT: CPT | Mod: CPTII,S$GLB,, | Performed by: INTERNAL MEDICINE

## 2021-08-18 PROCEDURE — 1101F PR PT FALLS ASSESS DOC 0-1 FALLS W/OUT INJ PAST YR: ICD-10-PCS | Mod: CPTII,S$GLB,, | Performed by: INTERNAL MEDICINE

## 2021-08-18 PROCEDURE — 91300 COVID-19, MRNA, LNP-S, PF, 30 MCG/0.3 ML DOSE VACCINE: ICD-10-PCS | Mod: ,,, | Performed by: FAMILY MEDICINE

## 2021-08-18 PROCEDURE — 1101F PT FALLS ASSESS-DOCD LE1/YR: CPT | Mod: CPTII,S$GLB,, | Performed by: INTERNAL MEDICINE

## 2021-08-18 PROCEDURE — 1159F PR MEDICATION LIST DOCUMENTED IN MEDICAL RECORD: ICD-10-PCS | Mod: CPTII,S$GLB,, | Performed by: INTERNAL MEDICINE

## 2021-08-18 PROCEDURE — 3077F PR MOST RECENT SYSTOLIC BLOOD PRESSURE >= 140 MM HG: ICD-10-PCS | Mod: CPTII,S$GLB,, | Performed by: INTERNAL MEDICINE

## 2021-08-18 PROCEDURE — 3288F FALL RISK ASSESSMENT DOCD: CPT | Mod: CPTII,S$GLB,, | Performed by: INTERNAL MEDICINE

## 2021-08-18 PROCEDURE — 1160F PR REVIEW ALL MEDS BY PRESCRIBER/CLIN PHARMACIST DOCUMENTED: ICD-10-PCS | Mod: CPTII,S$GLB,, | Performed by: INTERNAL MEDICINE

## 2021-08-18 PROCEDURE — 1160F RVW MEDS BY RX/DR IN RCRD: CPT | Mod: CPTII,S$GLB,, | Performed by: INTERNAL MEDICINE

## 2021-08-18 PROCEDURE — 93000 EKG 12-LEAD: ICD-10-PCS | Mod: S$GLB,,, | Performed by: INTERNAL MEDICINE

## 2021-08-18 PROCEDURE — 3077F SYST BP >= 140 MM HG: CPT | Mod: CPTII,S$GLB,, | Performed by: INTERNAL MEDICINE

## 2021-09-20 ENCOUNTER — HOSPITAL ENCOUNTER (EMERGENCY)
Facility: HOSPITAL | Age: 81
Discharge: HOME | End: 2021-09-20
Attending: EMERGENCY MEDICINE
Payer: MEDICARE

## 2021-09-20 VITALS
WEIGHT: 203 LBS | SYSTOLIC BLOOD PRESSURE: 135 MMHG | RESPIRATION RATE: 18 BRPM | DIASTOLIC BLOOD PRESSURE: 74 MMHG | BODY MASS INDEX: 39.85 KG/M2 | OXYGEN SATURATION: 95 % | HEART RATE: 86 BPM | TEMPERATURE: 98 F | HEIGHT: 60 IN

## 2021-09-20 DIAGNOSIS — K14.0 GLOSSITIS: Primary | ICD-10-CM

## 2021-09-20 PROCEDURE — 99284 EMERGENCY DEPT VISIT MOD MDM: CPT

## 2021-09-20 RX ORDER — AMOXICILLIN 500 MG/1
500 CAPSULE ORAL EVERY 12 HOURS
Qty: 14 CAPSULE | Refills: 0 | Status: SHIPPED | OUTPATIENT
Start: 2021-09-20 | End: 2021-09-27

## 2021-09-20 RX ORDER — CHLORHEXIDINE GLUCONATE ORAL RINSE 1.2 MG/ML
15 SOLUTION DENTAL 2 TIMES DAILY
Qty: 473 ML | Refills: 3 | Status: SHIPPED | OUTPATIENT
Start: 2021-09-20 | End: 2021-10-04

## 2021-10-21 LAB
LEFT EYE DM RETINOPATHY: NEGATIVE
RIGHT EYE DM RETINOPATHY: NEGATIVE

## 2021-10-29 DIAGNOSIS — Z86.73 H/O: STROKE: ICD-10-CM

## 2021-10-29 RX ORDER — ATORVASTATIN CALCIUM 40 MG/1
TABLET, FILM COATED ORAL
Qty: 90 TABLET | Refills: 3 | Status: SHIPPED | OUTPATIENT
Start: 2021-10-29 | End: 2022-11-07

## 2021-11-05 ENCOUNTER — LAB VISIT (OUTPATIENT)
Dept: LAB | Facility: HOSPITAL | Age: 81
End: 2021-11-05
Attending: INTERNAL MEDICINE
Payer: MEDICARE

## 2021-11-05 DIAGNOSIS — I10 HTN (HYPERTENSION), BENIGN: ICD-10-CM

## 2021-11-05 LAB
ALBUMIN SERPL BCP-MCNC: 3.4 G/DL (ref 3.5–5.2)
ALP SERPL-CCNC: 125 U/L (ref 55–135)
ALT SERPL W/O P-5'-P-CCNC: 12 U/L (ref 10–44)
ANION GAP SERPL CALC-SCNC: 11 MMOL/L (ref 8–16)
AST SERPL-CCNC: 13 U/L (ref 10–40)
BASOPHILS # BLD AUTO: 0.07 K/UL (ref 0–0.2)
BASOPHILS NFR BLD: 0.8 % (ref 0–1.9)
BILIRUB SERPL-MCNC: 0.5 MG/DL (ref 0.1–1)
BUN SERPL-MCNC: 13 MG/DL (ref 8–23)
CALCIUM SERPL-MCNC: 10.3 MG/DL (ref 8.7–10.5)
CHLORIDE SERPL-SCNC: 103 MMOL/L (ref 95–110)
CO2 SERPL-SCNC: 32 MMOL/L (ref 23–29)
CREAT SERPL-MCNC: 0.8 MG/DL (ref 0.5–1.4)
DIFFERENTIAL METHOD: ABNORMAL
EOSINOPHIL # BLD AUTO: 0.2 K/UL (ref 0–0.5)
EOSINOPHIL NFR BLD: 2.2 % (ref 0–8)
ERYTHROCYTE [DISTWIDTH] IN BLOOD BY AUTOMATED COUNT: 16 % (ref 11.5–14.5)
EST. GFR  (AFRICAN AMERICAN): >60 ML/MIN/1.73 M^2
EST. GFR  (NON AFRICAN AMERICAN): >60 ML/MIN/1.73 M^2
GLUCOSE SERPL-MCNC: 132 MG/DL (ref 70–110)
HCT VFR BLD AUTO: 43.6 % (ref 37–48.5)
HGB BLD-MCNC: 13.2 G/DL (ref 12–16)
IMM GRANULOCYTES # BLD AUTO: 0.02 K/UL (ref 0–0.04)
IMM GRANULOCYTES NFR BLD AUTO: 0.2 % (ref 0–0.5)
LYMPHOCYTES # BLD AUTO: 1.9 K/UL (ref 1–4.8)
LYMPHOCYTES NFR BLD: 21.6 % (ref 18–48)
MCH RBC QN AUTO: 26.2 PG (ref 27–31)
MCHC RBC AUTO-ENTMCNC: 30.3 G/DL (ref 32–36)
MCV RBC AUTO: 87 FL (ref 82–98)
MONOCYTES # BLD AUTO: 0.7 K/UL (ref 0.3–1)
MONOCYTES NFR BLD: 8.4 % (ref 4–15)
NEUTROPHILS # BLD AUTO: 5.8 K/UL (ref 1.8–7.7)
NEUTROPHILS NFR BLD: 66.8 % (ref 38–73)
NRBC BLD-RTO: 0 /100 WBC
PLATELET # BLD AUTO: 270 K/UL (ref 150–450)
PMV BLD AUTO: 11 FL (ref 9.2–12.9)
POTASSIUM SERPL-SCNC: 4.3 MMOL/L (ref 3.5–5.1)
PROT SERPL-MCNC: 7.5 G/DL (ref 6–8.4)
RBC # BLD AUTO: 5.04 M/UL (ref 4–5.4)
SODIUM SERPL-SCNC: 146 MMOL/L (ref 136–145)
WBC # BLD AUTO: 8.74 K/UL (ref 3.9–12.7)

## 2021-11-05 PROCEDURE — 85025 COMPLETE CBC W/AUTO DIFF WBC: CPT | Performed by: INTERNAL MEDICINE

## 2021-11-05 PROCEDURE — 36415 COLL VENOUS BLD VENIPUNCTURE: CPT | Mod: PN | Performed by: INTERNAL MEDICINE

## 2021-11-05 PROCEDURE — 80053 COMPREHEN METABOLIC PANEL: CPT | Performed by: INTERNAL MEDICINE

## 2021-11-16 ENCOUNTER — OFFICE VISIT (OUTPATIENT)
Dept: FAMILY MEDICINE | Facility: CLINIC | Age: 81
End: 2021-11-16
Payer: MEDICARE

## 2021-11-16 VITALS
OXYGEN SATURATION: 95 % | SYSTOLIC BLOOD PRESSURE: 132 MMHG | DIASTOLIC BLOOD PRESSURE: 74 MMHG | HEART RATE: 88 BPM | WEIGHT: 201.94 LBS | HEIGHT: 60 IN | TEMPERATURE: 98 F | BODY MASS INDEX: 39.65 KG/M2

## 2021-11-16 DIAGNOSIS — E78.2 MIXED HYPERLIPIDEMIA: ICD-10-CM

## 2021-11-16 DIAGNOSIS — Z23 NEED FOR INFLUENZA VACCINATION: ICD-10-CM

## 2021-11-16 DIAGNOSIS — R73.01 IMPAIRED FASTING GLUCOSE: ICD-10-CM

## 2021-11-16 DIAGNOSIS — I10 HTN (HYPERTENSION), BENIGN: Primary | ICD-10-CM

## 2021-11-16 DIAGNOSIS — F33.0 MAJOR DEPRESSIVE DISORDER, RECURRENT EPISODE, MILD WITH ANXIOUS DISTRESS: ICD-10-CM

## 2021-11-16 PROCEDURE — 99499 UNLISTED E&M SERVICE: CPT | Mod: S$GLB,,, | Performed by: INTERNAL MEDICINE

## 2021-11-16 PROCEDURE — 1101F PT FALLS ASSESS-DOCD LE1/YR: CPT | Mod: CPTII,S$GLB,, | Performed by: INTERNAL MEDICINE

## 2021-11-16 PROCEDURE — 99214 PR OFFICE/OUTPT VISIT, EST, LEVL IV, 30-39 MIN: ICD-10-PCS | Mod: S$GLB,,, | Performed by: INTERNAL MEDICINE

## 2021-11-16 PROCEDURE — 99999 PR PBB SHADOW E&M-EST. PATIENT-LVL IV: ICD-10-PCS | Mod: PBBFAC,,, | Performed by: INTERNAL MEDICINE

## 2021-11-16 PROCEDURE — 1126F PR PAIN SEVERITY QUANTIFIED, NO PAIN PRESENT: ICD-10-PCS | Mod: CPTII,S$GLB,, | Performed by: INTERNAL MEDICINE

## 2021-11-16 PROCEDURE — 3078F PR MOST RECENT DIASTOLIC BLOOD PRESSURE < 80 MM HG: ICD-10-PCS | Mod: CPTII,S$GLB,, | Performed by: INTERNAL MEDICINE

## 2021-11-16 PROCEDURE — 1159F MED LIST DOCD IN RCRD: CPT | Mod: CPTII,S$GLB,, | Performed by: INTERNAL MEDICINE

## 2021-11-16 PROCEDURE — 3075F PR MOST RECENT SYSTOLIC BLOOD PRESS GE 130-139MM HG: ICD-10-PCS | Mod: CPTII,S$GLB,, | Performed by: INTERNAL MEDICINE

## 2021-11-16 PROCEDURE — 1159F PR MEDICATION LIST DOCUMENTED IN MEDICAL RECORD: ICD-10-PCS | Mod: CPTII,S$GLB,, | Performed by: INTERNAL MEDICINE

## 2021-11-16 PROCEDURE — 1126F AMNT PAIN NOTED NONE PRSNT: CPT | Mod: CPTII,S$GLB,, | Performed by: INTERNAL MEDICINE

## 2021-11-16 PROCEDURE — 99214 OFFICE O/P EST MOD 30 MIN: CPT | Mod: S$GLB,,, | Performed by: INTERNAL MEDICINE

## 2021-11-16 PROCEDURE — 3288F PR FALLS RISK ASSESSMENT DOCUMENTED: ICD-10-PCS | Mod: CPTII,S$GLB,, | Performed by: INTERNAL MEDICINE

## 2021-11-16 PROCEDURE — 3288F FALL RISK ASSESSMENT DOCD: CPT | Mod: CPTII,S$GLB,, | Performed by: INTERNAL MEDICINE

## 2021-11-16 PROCEDURE — 99499 RISK ADDL DX/OHS AUDIT: ICD-10-PCS | Mod: S$GLB,,, | Performed by: INTERNAL MEDICINE

## 2021-11-16 PROCEDURE — 3078F DIAST BP <80 MM HG: CPT | Mod: CPTII,S$GLB,, | Performed by: INTERNAL MEDICINE

## 2021-11-16 PROCEDURE — 3075F SYST BP GE 130 - 139MM HG: CPT | Mod: CPTII,S$GLB,, | Performed by: INTERNAL MEDICINE

## 2021-11-16 PROCEDURE — 1101F PR PT FALLS ASSESS DOC 0-1 FALLS W/OUT INJ PAST YR: ICD-10-PCS | Mod: CPTII,S$GLB,, | Performed by: INTERNAL MEDICINE

## 2021-11-16 PROCEDURE — 99999 PR PBB SHADOW E&M-EST. PATIENT-LVL IV: CPT | Mod: PBBFAC,,, | Performed by: INTERNAL MEDICINE

## 2021-11-16 PROCEDURE — 1160F RVW MEDS BY RX/DR IN RCRD: CPT | Mod: CPTII,S$GLB,, | Performed by: INTERNAL MEDICINE

## 2021-11-16 PROCEDURE — 1160F PR REVIEW ALL MEDS BY PRESCRIBER/CLIN PHARMACIST DOCUMENTED: ICD-10-PCS | Mod: CPTII,S$GLB,, | Performed by: INTERNAL MEDICINE

## 2021-11-18 ENCOUNTER — OFFICE VISIT (OUTPATIENT)
Dept: CARDIOLOGY | Facility: CLINIC | Age: 81
End: 2021-11-18
Payer: MEDICARE

## 2021-11-18 VITALS
BODY MASS INDEX: 39.46 KG/M2 | HEIGHT: 60 IN | DIASTOLIC BLOOD PRESSURE: 78 MMHG | OXYGEN SATURATION: 97 % | SYSTOLIC BLOOD PRESSURE: 126 MMHG | HEART RATE: 87 BPM | WEIGHT: 201 LBS

## 2021-11-18 DIAGNOSIS — E66.01 CLASS 2 SEVERE OBESITY DUE TO EXCESS CALORIES WITH SERIOUS COMORBIDITY AND BODY MASS INDEX (BMI) OF 39.0 TO 39.9 IN ADULT: ICD-10-CM

## 2021-11-18 DIAGNOSIS — E78.49 OTHER HYPERLIPIDEMIA: ICD-10-CM

## 2021-11-18 DIAGNOSIS — R79.89 TROPONIN I ABOVE REFERENCE RANGE: Primary | ICD-10-CM

## 2021-11-18 DIAGNOSIS — I10 ESSENTIAL HYPERTENSION: ICD-10-CM

## 2021-11-18 DIAGNOSIS — I51.89 GRADE I DIASTOLIC DYSFUNCTION: ICD-10-CM

## 2021-11-18 DIAGNOSIS — I11.9 LEFT VENTRICULAR HYPERTROPHY DUE TO HYPERTENSIVE DISEASE, WITHOUT HEART FAILURE: ICD-10-CM

## 2021-11-18 PROCEDURE — 1126F AMNT PAIN NOTED NONE PRSNT: CPT | Mod: CPTII,S$GLB,, | Performed by: INTERNAL MEDICINE

## 2021-11-18 PROCEDURE — 3288F FALL RISK ASSESSMENT DOCD: CPT | Mod: CPTII,S$GLB,, | Performed by: INTERNAL MEDICINE

## 2021-11-18 PROCEDURE — 3074F PR MOST RECENT SYSTOLIC BLOOD PRESSURE < 130 MM HG: ICD-10-PCS | Mod: CPTII,S$GLB,, | Performed by: INTERNAL MEDICINE

## 2021-11-18 PROCEDURE — 99214 OFFICE O/P EST MOD 30 MIN: CPT | Mod: S$GLB,,, | Performed by: INTERNAL MEDICINE

## 2021-11-18 PROCEDURE — 1159F PR MEDICATION LIST DOCUMENTED IN MEDICAL RECORD: ICD-10-PCS | Mod: CPTII,S$GLB,, | Performed by: INTERNAL MEDICINE

## 2021-11-18 PROCEDURE — 99999 PR PBB SHADOW E&M-EST. PATIENT-LVL III: ICD-10-PCS | Mod: PBBFAC,,, | Performed by: INTERNAL MEDICINE

## 2021-11-18 PROCEDURE — 1101F PR PT FALLS ASSESS DOC 0-1 FALLS W/OUT INJ PAST YR: ICD-10-PCS | Mod: CPTII,S$GLB,, | Performed by: INTERNAL MEDICINE

## 2021-11-18 PROCEDURE — 3078F PR MOST RECENT DIASTOLIC BLOOD PRESSURE < 80 MM HG: ICD-10-PCS | Mod: CPTII,S$GLB,, | Performed by: INTERNAL MEDICINE

## 2021-11-18 PROCEDURE — 1159F MED LIST DOCD IN RCRD: CPT | Mod: CPTII,S$GLB,, | Performed by: INTERNAL MEDICINE

## 2021-11-18 PROCEDURE — 1160F PR REVIEW ALL MEDS BY PRESCRIBER/CLIN PHARMACIST DOCUMENTED: ICD-10-PCS | Mod: CPTII,S$GLB,, | Performed by: INTERNAL MEDICINE

## 2021-11-18 PROCEDURE — 3078F DIAST BP <80 MM HG: CPT | Mod: CPTII,S$GLB,, | Performed by: INTERNAL MEDICINE

## 2021-11-18 PROCEDURE — 1101F PT FALLS ASSESS-DOCD LE1/YR: CPT | Mod: CPTII,S$GLB,, | Performed by: INTERNAL MEDICINE

## 2021-11-18 PROCEDURE — 99214 PR OFFICE/OUTPT VISIT, EST, LEVL IV, 30-39 MIN: ICD-10-PCS | Mod: S$GLB,,, | Performed by: INTERNAL MEDICINE

## 2021-11-18 PROCEDURE — 3074F SYST BP LT 130 MM HG: CPT | Mod: CPTII,S$GLB,, | Performed by: INTERNAL MEDICINE

## 2021-11-18 PROCEDURE — 99999 PR PBB SHADOW E&M-EST. PATIENT-LVL III: CPT | Mod: PBBFAC,,, | Performed by: INTERNAL MEDICINE

## 2021-11-18 PROCEDURE — 1126F PR PAIN SEVERITY QUANTIFIED, NO PAIN PRESENT: ICD-10-PCS | Mod: CPTII,S$GLB,, | Performed by: INTERNAL MEDICINE

## 2021-11-18 PROCEDURE — 1160F RVW MEDS BY RX/DR IN RCRD: CPT | Mod: CPTII,S$GLB,, | Performed by: INTERNAL MEDICINE

## 2021-11-18 PROCEDURE — 3288F PR FALLS RISK ASSESSMENT DOCUMENTED: ICD-10-PCS | Mod: CPTII,S$GLB,, | Performed by: INTERNAL MEDICINE

## 2021-11-27 ENCOUNTER — HOSPITAL ENCOUNTER (EMERGENCY)
Facility: HOSPITAL | Age: 81
Discharge: HOME | End: 2021-11-27
Attending: EMERGENCY MEDICINE
Payer: MEDICARE

## 2021-11-27 VITALS
TEMPERATURE: 98 F | RESPIRATION RATE: 19 BRPM | HEIGHT: 60 IN | DIASTOLIC BLOOD PRESSURE: 71 MMHG | SYSTOLIC BLOOD PRESSURE: 175 MMHG | HEART RATE: 75 BPM | BODY MASS INDEX: 39.46 KG/M2 | OXYGEN SATURATION: 96 % | WEIGHT: 201 LBS

## 2021-11-27 DIAGNOSIS — G44.209 TENSION HEADACHE: Primary | ICD-10-CM

## 2021-11-27 DIAGNOSIS — I10 HYPERTENSION: ICD-10-CM

## 2021-11-27 LAB
ALBUMIN SERPL BCP-MCNC: 3.2 G/DL (ref 3.5–5.2)
ALP SERPL-CCNC: 121 U/L (ref 55–135)
ALT SERPL W/O P-5'-P-CCNC: 13 U/L (ref 10–44)
ANION GAP SERPL CALC-SCNC: 12 MMOL/L (ref 8–16)
AST SERPL-CCNC: 17 U/L (ref 10–40)
BASOPHILS # BLD AUTO: 0.07 K/UL (ref 0–0.2)
BASOPHILS NFR BLD: 0.7 % (ref 0–1.9)
BILIRUB SERPL-MCNC: 0.2 MG/DL (ref 0.1–1)
BUN SERPL-MCNC: 13 MG/DL (ref 8–23)
CALCIUM SERPL-MCNC: 9.2 MG/DL (ref 8.7–10.5)
CHLORIDE SERPL-SCNC: 103 MMOL/L (ref 95–110)
CO2 SERPL-SCNC: 29 MMOL/L (ref 23–29)
CREAT SERPL-MCNC: 0.8 MG/DL (ref 0.5–1.4)
DIFFERENTIAL METHOD: ABNORMAL
EOSINOPHIL # BLD AUTO: 0.3 K/UL (ref 0–0.5)
EOSINOPHIL NFR BLD: 3.5 % (ref 0–8)
ERYTHROCYTE [DISTWIDTH] IN BLOOD BY AUTOMATED COUNT: 15.8 % (ref 11.5–14.5)
EST. GFR  (AFRICAN AMERICAN): >60 ML/MIN/1.73 M^2
EST. GFR  (NON AFRICAN AMERICAN): >60 ML/MIN/1.73 M^2
GLUCOSE SERPL-MCNC: 101 MG/DL (ref 70–110)
HCT VFR BLD AUTO: 41.5 % (ref 37–48.5)
HGB BLD-MCNC: 12.5 G/DL (ref 12–16)
IMM GRANULOCYTES # BLD AUTO: 0.03 K/UL (ref 0–0.04)
IMM GRANULOCYTES NFR BLD AUTO: 0.3 % (ref 0–0.5)
LYMPHOCYTES # BLD AUTO: 2.3 K/UL (ref 1–4.8)
LYMPHOCYTES NFR BLD: 24.7 % (ref 18–48)
MCH RBC QN AUTO: 26 PG (ref 27–31)
MCHC RBC AUTO-ENTMCNC: 30.1 G/DL (ref 32–36)
MCV RBC AUTO: 86 FL (ref 82–98)
MONOCYTES # BLD AUTO: 0.9 K/UL (ref 0.3–1)
MONOCYTES NFR BLD: 10 % (ref 4–15)
NEUTROPHILS # BLD AUTO: 5.7 K/UL (ref 1.8–7.7)
NEUTROPHILS NFR BLD: 60.8 % (ref 38–73)
NRBC BLD-RTO: 0 /100 WBC
PLATELET # BLD AUTO: ABNORMAL K/UL (ref 150–450)
PLATELET BLD QL SMEAR: ABNORMAL
PMV BLD AUTO: ABNORMAL FL (ref 9.2–12.9)
POTASSIUM SERPL-SCNC: 3.9 MMOL/L (ref 3.5–5.1)
PROT SERPL-MCNC: 6.4 G/DL (ref 6–8.4)
RBC # BLD AUTO: 4.81 M/UL (ref 4–5.4)
SODIUM SERPL-SCNC: 144 MMOL/L (ref 136–145)
WBC # BLD AUTO: 9.34 K/UL (ref 3.9–12.7)

## 2021-11-27 PROCEDURE — 99285 EMERGENCY DEPT VISIT HI MDM: CPT | Mod: 25

## 2021-11-27 PROCEDURE — 96376 TX/PRO/DX INJ SAME DRUG ADON: CPT

## 2021-11-27 PROCEDURE — 93010 ELECTROCARDIOGRAM REPORT: CPT | Mod: ,,, | Performed by: INTERNAL MEDICINE

## 2021-11-27 PROCEDURE — 85025 COMPLETE CBC W/AUTO DIFF WBC: CPT | Performed by: EMERGENCY MEDICINE

## 2021-11-27 PROCEDURE — 96374 THER/PROPH/DIAG INJ IV PUSH: CPT

## 2021-11-27 PROCEDURE — 93005 ELECTROCARDIOGRAM TRACING: CPT

## 2021-11-27 PROCEDURE — 93010 EKG 12-LEAD: ICD-10-PCS | Mod: ,,, | Performed by: INTERNAL MEDICINE

## 2021-11-27 PROCEDURE — 63600175 PHARM REV CODE 636 W HCPCS: Performed by: EMERGENCY MEDICINE

## 2021-11-27 PROCEDURE — 80053 COMPREHEN METABOLIC PANEL: CPT | Performed by: EMERGENCY MEDICINE

## 2021-11-27 PROCEDURE — 25000003 PHARM REV CODE 250: Performed by: EMERGENCY MEDICINE

## 2021-11-27 RX ORDER — HYDRALAZINE HYDROCHLORIDE 20 MG/ML
10 INJECTION INTRAMUSCULAR; INTRAVENOUS
Status: COMPLETED | OUTPATIENT
Start: 2021-11-27 | End: 2021-11-27

## 2021-11-27 RX ORDER — ACETAMINOPHEN 325 MG/1
650 TABLET ORAL
Status: COMPLETED | OUTPATIENT
Start: 2021-11-27 | End: 2021-11-27

## 2021-11-27 RX ADMIN — HYDRALAZINE HYDROCHLORIDE 10 MG: 20 INJECTION, SOLUTION INTRAMUSCULAR; INTRAVENOUS at 08:11

## 2021-11-27 RX ADMIN — HYDRALAZINE HYDROCHLORIDE 10 MG: 20 INJECTION, SOLUTION INTRAMUSCULAR; INTRAVENOUS at 07:11

## 2021-11-27 RX ADMIN — ACETAMINOPHEN 650 MG: 325 TABLET ORAL at 07:11

## 2021-12-06 ENCOUNTER — TELEPHONE (OUTPATIENT)
Dept: FAMILY MEDICINE | Facility: CLINIC | Age: 81
End: 2021-12-06
Payer: MEDICARE

## 2022-05-01 ENCOUNTER — HOSPITAL ENCOUNTER (EMERGENCY)
Facility: HOSPITAL | Age: 82
Discharge: HOME | End: 2022-05-01
Attending: STUDENT IN AN ORGANIZED HEALTH CARE EDUCATION/TRAINING PROGRAM
Payer: MEDICARE

## 2022-05-01 VITALS
TEMPERATURE: 99 F | OXYGEN SATURATION: 98 % | HEIGHT: 60 IN | HEART RATE: 86 BPM | RESPIRATION RATE: 18 BRPM | DIASTOLIC BLOOD PRESSURE: 76 MMHG | WEIGHT: 202 LBS | BODY MASS INDEX: 39.66 KG/M2 | SYSTOLIC BLOOD PRESSURE: 181 MMHG
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
## 2022-05-01 PROCEDURE — 99284 EMERGENCY DEPT VISIT MOD MDM: CPT | Mod: 25

## 2022-05-02 NOTE — ED PROVIDER NOTES
Encounter Date: 5/1/2022    SCRIBE #1 NOTE: I, Leeann Yan and am scribing for, and in the presence of, Beth Mcgraw DO.       History     Chief Complaint   Patient presents with    Fall     Mechanical fall tonight in the bathroom. Struck her head on the wall when falling. Struck her left side on the ground. Reports lower back pain. Denies LOC     81 year old female with a PMHx of tension headaches, asthma, hypercholesterolemia, HTN, and thyroid disease presents to the ED after a fall that occurred at 9 pm. The patient reports she was in her bathroom when she stumbled and hit her head and landed on her left side. Denies loss of consciousness. Her current complaints are a headache, back pain, and stiffness to her left side. The patient has chronic tension headaches (currently rates pain 3/10) and chronic back pain, denies any worsening pain from the fall. She took Tylenol this morning for her back pain. The patient denies any chest pain, shortness of breath, problems urinating, problems with bowel movements, numbness, abdominal pain, or any other associated symptoms. She takes Aspirin due to a previous stroke.  Denies anticoagulation.  The patient can ambulate on her own.    The history is provided by the patient. No  was used.     Review of patient's allergies indicates:   Allergen Reactions    Codeine      Other reaction(s): Rash     Past Medical History:   Diagnosis Date    Anxiety     Asthma     Depression     Headache     Hx of psychiatric care     Hypercholesterolemia     Hypertension     Psychiatric problem     Sleep difficulties     Therapy     Thyroid disease     multiple nodules     Past Surgical History:   Procedure Laterality Date    CHOLECYSTECTOMY      HYSTERECTOMY      knee repalcement       Family History   Problem Relation Age of Onset    Diabetes Mother     Hypertension Mother     Kidney disease Mother     Heart disease Father     Bipolar  disorder Daughter      Social History     Tobacco Use    Smoking status: Never Smoker    Smokeless tobacco: Never Used   Substance Use Topics    Alcohol use: No    Drug use: No     Review of Systems   Constitutional: Negative for chills and fever.   HENT: Negative for sore throat.    Eyes: Negative for visual disturbance.   Respiratory: Negative for shortness of breath.    Cardiovascular: Negative for chest pain.   Gastrointestinal: Negative for abdominal pain, anal bleeding, blood in stool, constipation, diarrhea, nausea and vomiting.   Genitourinary: Negative for decreased urine volume, difficulty urinating, dysuria, hematuria and urgency.   Musculoskeletal: Positive for back pain (chronic). Negative for gait problem and neck pain.        Positive for stiffness to her left side.   Skin: Negative for rash.   Neurological: Positive for headaches (chronic). Negative for syncope, weakness and numbness.       Physical Exam     Initial Vitals [05/01/22 2220]   BP Pulse Resp Temp SpO2   (!) 171/83 85 16 99.5 °F (37.5 °C) 98 %      MAP       --         Physical Exam    Nursing note and vitals reviewed.  Constitutional: She appears well-developed and well-nourished. She is not diaphoretic.  Non-toxic appearance. She does not have a sickly appearance. She does not appear ill.   HENT:   Head: Normocephalic and atraumatic.   Nose: Nose normal.   Eyes: EOM are normal. Pupils are equal, round, and reactive to light. No scleral icterus.   Neck: Neck supple. No JVD present.   Normal range of motion.  Cardiovascular: Normal rate, regular rhythm, normal heart sounds and intact distal pulses.   No murmur heard.  Pulmonary/Chest: Breath sounds normal. No stridor. No respiratory distress. She has no wheezes. She has no rales.   Abdominal: Abdomen is soft. She exhibits no distension. There is no abdominal tenderness. There is no rebound and no guarding.   Musculoskeletal:         General: No tenderness or edema. Normal range of  motion.      Cervical back: Normal range of motion and neck supple.     Neurological: She is alert and oriented to person, place, and time. She has normal strength. She displays no atrophy and no tremor. No cranial nerve deficit or sensory deficit. She exhibits normal muscle tone. She displays no seizure activity. Gait normal.   Moves all extremities, follows all commands, no focal neurological deficits   Skin: Skin is warm and dry. Capillary refill takes less than 2 seconds. No rash noted. No erythema.   Psychiatric: She has a normal mood and affect.         ED Course   Procedures  Labs Reviewed - No data to display       Imaging Results          CT Head Without Contrast (Final result)  Result time 05/01/22 23:04:21    Final result by Petey Brooks MD (05/01/22 23:04:21)                 Impression:      Chronic changes are noted, there is no evidence for superimposed acute intracranial process.      Electronically signed by: Petey Brooks  Date:    05/01/2022  Time:    23:04             Narrative:    EXAMINATION:  CT HEAD WITHOUT CONTRAST    CLINICAL HISTORY:  Head trauma, moderate-severe;    TECHNIQUE:  Low dose axial images were obtained through the head.  Coronal and sagittal reformations were also performed. Contrast was not administered.    COMPARISON:  CT examination of the brain without contrast November 27, 2021    FINDINGS:  The ventricular system, sulcal pattern and parenchymal attenuation characteristics are consistent with chronic change.  There is no evidence for intracranial mass, mass effect or midline shift, there is no evidence for acute intracranial hemorrhage.  Appropriate CSF spaces are seen at the skull base.    The visualized orbits appear intact.  The mastoid air cells on the left demonstrate minimal opacity, on the right appear appropriate.  The paranasal sinuses appear appropriate.  The osseous structures appear intact.                                 Medications - No data to  display  Medical Decision Making:   ED Management:   MDM  81 year old female with a PMHx of tension headaches, asthma, hypercholesterolemia, HTN, and thyroid disease presents after a mechanical fall that occurred at 9 pm. Initial vitals in the ED with a blood pressure of 171/83 and remainder vitals unremarkable . Physical exam noted above. DDx includes but is not limited to subdural hematoma, epidural hematoma, intracranial hemorrhage. Also considered but clinically less likely to be stroke, ACS.  Imaging including a CT head with was obtained.  No labs clinically indicated at this time. CT Head Without Contrast shows chronic changes with no evidence for superimposed acute intracranial process.  Patient was offered states Tylenol for pain.  However, she states that she is currently in no pain at this time.  Given benign exam and workup, I feel patient is stable for discharge. Will discharge with PCP follow-up and ED return precautions. Patient is aware of plan and is amenable.     Beth Mcgraw D.O  EMERGENCY MEDICINE  11:52 PM 05/01/2022            Scribe Attestation:   Scribe #1: I performed the above scribed service and the documentation accurately describes the services I performed. I attest to the accuracy of the note.                 Clinical Impression:   Final diagnoses:  [W19.XXXA] Fall, initial encounter (Primary)          ED Disposition Condition    Discharge Stable        ED Prescriptions     None        Follow-up Information     Follow up With Specialties Details Why Contact Info    Ajit Piña MD Internal Medicine, Wound Care Schedule an appointment as soon as possible for a visit in 2 days Emergency Room Follow-up [address] [facility] [address]  [phone]      [facility] Emergency Dept Emergency Medicine Go to  If symptoms worsen [address]  [facility] [id number] [phone]           IBeth DO, personally performed the services described in  this documentation. All medical record entries made by the scribe were at my direction and in my presence. I have reviewed the chart and agree that the record reflects my personal performance and is accurate and complete.       Beth Mcgraw, DO  05/07/22 [id number]

## 2022-05-09 ENCOUNTER — LAB VISIT (OUTPATIENT)
Dept: LAB | Facility: HOSPITAL | Age: 82
End: 2022-05-09
Attending: NURSE PRACTITIONER
Payer: MEDICARE

## 2022-05-09 ENCOUNTER — OFFICE VISIT (OUTPATIENT)
Dept: FAMILY MEDICINE | Facility: CLINIC | Age: 82
End: 2022-05-09
Payer: MEDICARE

## 2022-05-09 VITALS
DIASTOLIC BLOOD PRESSURE: 86 MMHG | OXYGEN SATURATION: 98 % | WEIGHT: 203.25 LBS | BODY MASS INDEX: 39.9 KG/M2 | HEART RATE: 88 BPM | SYSTOLIC BLOOD PRESSURE: 138 MMHG | HEIGHT: 60 IN | RESPIRATION RATE: 18 BRPM | TEMPERATURE: 99 F

## 2022-05-09 DIAGNOSIS — F33.0 MAJOR DEPRESSIVE DISORDER, RECURRENT EPISODE, MILD WITH ANXIOUS DISTRESS: ICD-10-CM

## 2022-05-09 DIAGNOSIS — R60.0 BILATERAL LOWER EXTREMITY EDEMA: ICD-10-CM

## 2022-05-09 DIAGNOSIS — E66.01 CLASS 2 SEVERE OBESITY DUE TO EXCESS CALORIES WITH SERIOUS COMORBIDITY AND BODY MASS INDEX (BMI) OF 39.0 TO 39.9 IN ADULT: ICD-10-CM

## 2022-05-09 DIAGNOSIS — J45.40 MODERATE PERSISTENT ASTHMA, UNSPECIFIED WHETHER COMPLICATED: Primary | ICD-10-CM

## 2022-05-09 LAB
ANION GAP SERPL CALC-SCNC: 11 MMOL/L (ref 8–16)
BUN SERPL-MCNC: 16 MG/DL (ref 8–23)
CALCIUM SERPL-MCNC: 9.5 MG/DL (ref 8.7–10.5)
CHLORIDE SERPL-SCNC: 102 MMOL/L (ref 95–110)
CO2 SERPL-SCNC: 32 MMOL/L (ref 23–29)
CREAT SERPL-MCNC: 0.8 MG/DL (ref 0.5–1.4)
EST. GFR  (AFRICAN AMERICAN): >60 ML/MIN/1.73 M^2
EST. GFR  (NON AFRICAN AMERICAN): >60 ML/MIN/1.73 M^2
GLUCOSE SERPL-MCNC: 124 MG/DL (ref 70–110)
POTASSIUM SERPL-SCNC: 3.8 MMOL/L (ref 3.5–5.1)
SODIUM SERPL-SCNC: 145 MMOL/L (ref 136–145)

## 2022-05-09 PROCEDURE — 1160F RVW MEDS BY RX/DR IN RCRD: CPT | Mod: CPTII,S$GLB,, | Performed by: NURSE PRACTITIONER

## 2022-05-09 PROCEDURE — 99499 RISK ADDL DX/OHS AUDIT: ICD-10-PCS | Mod: S$GLB,,, | Performed by: NURSE PRACTITIONER

## 2022-05-09 PROCEDURE — 1160F PR REVIEW ALL MEDS BY PRESCRIBER/CLIN PHARMACIST DOCUMENTED: ICD-10-PCS | Mod: CPTII,S$GLB,, | Performed by: NURSE PRACTITIONER

## 2022-05-09 PROCEDURE — 3079F DIAST BP 80-89 MM HG: CPT | Mod: CPTII,S$GLB,, | Performed by: NURSE PRACTITIONER

## 2022-05-09 PROCEDURE — 1126F AMNT PAIN NOTED NONE PRSNT: CPT | Mod: CPTII,S$GLB,, | Performed by: NURSE PRACTITIONER

## 2022-05-09 PROCEDURE — 36415 COLL VENOUS BLD VENIPUNCTURE: CPT | Mod: PN | Performed by: NURSE PRACTITIONER

## 2022-05-09 PROCEDURE — 1159F MED LIST DOCD IN RCRD: CPT | Mod: CPTII,S$GLB,, | Performed by: NURSE PRACTITIONER

## 2022-05-09 PROCEDURE — 3075F PR MOST RECENT SYSTOLIC BLOOD PRESS GE 130-139MM HG: ICD-10-PCS | Mod: CPTII,S$GLB,, | Performed by: NURSE PRACTITIONER

## 2022-05-09 PROCEDURE — 3288F FALL RISK ASSESSMENT DOCD: CPT | Mod: CPTII,S$GLB,, | Performed by: NURSE PRACTITIONER

## 2022-05-09 PROCEDURE — 1126F PR PAIN SEVERITY QUANTIFIED, NO PAIN PRESENT: ICD-10-PCS | Mod: CPTII,S$GLB,, | Performed by: NURSE PRACTITIONER

## 2022-05-09 PROCEDURE — 99214 OFFICE O/P EST MOD 30 MIN: CPT | Mod: S$GLB,,, | Performed by: NURSE PRACTITIONER

## 2022-05-09 PROCEDURE — 1101F PR PT FALLS ASSESS DOC 0-1 FALLS W/OUT INJ PAST YR: ICD-10-PCS | Mod: CPTII,S$GLB,, | Performed by: NURSE PRACTITIONER

## 2022-05-09 PROCEDURE — 80048 BASIC METABOLIC PNL TOTAL CA: CPT | Performed by: NURSE PRACTITIONER

## 2022-05-09 PROCEDURE — 99999 PR PBB SHADOW E&M-EST. PATIENT-LVL V: ICD-10-PCS | Mod: PBBFAC,,, | Performed by: NURSE PRACTITIONER

## 2022-05-09 PROCEDURE — 99999 PR PBB SHADOW E&M-EST. PATIENT-LVL V: CPT | Mod: PBBFAC,,, | Performed by: NURSE PRACTITIONER

## 2022-05-09 PROCEDURE — 3075F SYST BP GE 130 - 139MM HG: CPT | Mod: CPTII,S$GLB,, | Performed by: NURSE PRACTITIONER

## 2022-05-09 PROCEDURE — 3288F PR FALLS RISK ASSESSMENT DOCUMENTED: ICD-10-PCS | Mod: CPTII,S$GLB,, | Performed by: NURSE PRACTITIONER

## 2022-05-09 PROCEDURE — 1159F PR MEDICATION LIST DOCUMENTED IN MEDICAL RECORD: ICD-10-PCS | Mod: CPTII,S$GLB,, | Performed by: NURSE PRACTITIONER

## 2022-05-09 PROCEDURE — 99499 UNLISTED E&M SERVICE: CPT | Mod: S$GLB,,, | Performed by: NURSE PRACTITIONER

## 2022-05-09 PROCEDURE — 3079F PR MOST RECENT DIASTOLIC BLOOD PRESSURE 80-89 MM HG: ICD-10-PCS | Mod: CPTII,S$GLB,, | Performed by: NURSE PRACTITIONER

## 2022-05-09 PROCEDURE — 1101F PT FALLS ASSESS-DOCD LE1/YR: CPT | Mod: CPTII,S$GLB,, | Performed by: NURSE PRACTITIONER

## 2022-05-09 PROCEDURE — 99214 PR OFFICE/OUTPT VISIT, EST, LEVL IV, 30-39 MIN: ICD-10-PCS | Mod: S$GLB,,, | Performed by: NURSE PRACTITIONER

## 2022-05-09 RX ORDER — FLUTICASONE PROPIONATE AND SALMETEROL 500; 50 UG/1; UG/1
1 POWDER RESPIRATORY (INHALATION) 2 TIMES DAILY
Qty: 60 EACH | Refills: 11 | Status: SHIPPED | OUTPATIENT
Start: 2022-05-09 | End: 2023-10-27

## 2022-05-09 NOTE — PROGRESS NOTES
Routine Office Visit    Patient Name: Jose Manuel Boyd    : 1940  MRN: [id number]    Chief Complaint:  Follow-up, leg swelling    Subjective:  Jose Manuel is a 81 y.o. female who presents today for:    Follow-up, leg swelling - patient is known to me reports today for evaluation.  Over 1 week ago she fell and sustained an injury to her head requiring an ED visit with negative CT findings.  She is doing well from neurologic standpoint without any acute issues.    She does report over the last 2 weeks her chronic leg swelling has been getting worse.  Usually the swelling will resolve overnight, however in the last 2 weeks swelling only resolves somewhat in the mornings.  She states she has been having some shortness of breath on exertion when she goes outside only due to the pollen.  She thinks this is due to her asthma.  She does use albuterol as needed but has not been using Advair in the last couple of months.    She denies any chest pain, palpitations, or orthopnea.  She states she does not have any exertional dyspnea or dyspnea at rest when she is inside.    No other acute concerns.    Past Medical History  Past Medical History:   Diagnosis Date    Anxiety     Asthma     Depression     Headache     Hx of psychiatric care     Hypercholesterolemia     Hypertension     Psychiatric problem     Sleep difficulties     Therapy     Thyroid disease     multiple nodules       Past Surgical History  Past Surgical History:   Procedure Laterality Date    CHOLECYSTECTOMY      HYSTERECTOMY      knee repalcement         Family History  Family History   Problem Relation Age of Onset    Diabetes Mother     Hypertension Mother     Kidney disease Mother     Heart disease Father     Bipolar disorder Daughter        Social History  Social History     Socioeconomic History    Marital status:     Number of children: 4   Occupational History    Occupation: retired     Comment: Domestic service, [employer]    Tobacco Use    Smoking status: Never Smoker    Smokeless tobacco: Never Used   Substance and Sexual Activity    Alcohol use: No    Drug use: No    Sexual activity: Not Currently   Other Topics Concern    Patient feels they ought to cut down on drinking/drug use No    Patient annoyed by others criticizing their drinking/drug use No    Patient has felt bad or guilty about drinking/drug use No    Patient has had a drink/used drugs as an eye opener in the AM No   Social History Narrative    Enjoys going to [employer]       Current Medications  Current Outpatient Medications on File Prior to Visit   Medication Sig Dispense Refill    albuterol (VENTOLIN HFA) 90 mcg/actuation inhaler Inhale 2 puffs into the lungs every 4 (four) hours as needed for Wheezing. 6.7 g 6    aspirin (ECOTRIN) 81 MG EC tablet Take 81 mg by mouth once daily.      atorvastatin (LIPITOR) 40 MG tablet TAKE 1 TABLET BY MOUTH EVERY DAY 90 tablet 3    fluticasone propionate (FLONASE) 50 mcg/actuation nasal spray 1 spray (50 mcg total) by Each Nostril route 2 (two) times daily. For allergic rhinitis/sinusitis 18 mL 11    furosemide (LASIX) 20 MG tablet TAKE 1 TABLET BY MOUTH EVERY DAY 90 tablet 1    losartan (COZAAR) 50 MG tablet Take 1 tablet (50 mg total) by mouth once daily. For high blood pressure 90 tablet 3    omeprazole (PRILOSEC) 20 MG capsule Take 1 capsule (20 mg total) by mouth once daily. For gastric reflux 90 capsule 3    psyllium (METAMUCIL) powder Take 1 packet by mouth daily as needed.      [DISCONTINUED] fluticasone-salmeterol diskus inhaler 500-50 mcg Inhale 1 puff into the lungs 2 (two) times daily. Controller 60 each 11    busPIRone (BUSPAR) 7.5 MG tablet TAKE 1 TABLET (7.5 MG TOTAL) BY MOUTH 2 (TWO) TIMES DAILY. 180 tablet 1    mirtazapine (REMERON) 30 MG tablet Take 1 tablet (30 mg total) by mouth nightly as needed (insomnia). At bedtime for sleep, anxiety and depression. 90 tablet 1     No current  facility-administered medications on file prior to visit.       Allergies   Review of patient's allergies indicates:   Allergen Reactions    Codeine      Other reaction(s): Rash       Review of Systems (Pertinent positives)  Review of Systems   Constitutional: Negative.  Negative for chills, fever and weight loss.   HENT: Negative.    Respiratory: Positive for shortness of breath (On exertion, only when going outside). Negative for cough, hemoptysis, sputum production and wheezing.    Cardiovascular: Positive for leg swelling. Negative for chest pain, palpitations, orthopnea, claudication and PND.   Gastrointestinal: Negative.    Genitourinary: Negative.    Musculoskeletal: Negative.    Skin: Negative.    Neurological: Negative.    Endo/Heme/Allergies: Negative.    Psychiatric/Behavioral: Negative.        /86 (BP Location: Left arm, Patient Position: Sitting, BP Method: Large (Manual))   Pulse 88   Temp 98.8 °F (37.1 °C) (Oral)   Resp 18   Ht 5' (1.524 m)   Wt 92.2 kg (203 lb 4.2 oz)   SpO2 98%   BMI 39.70 kg/m²     Physical Exam  Vitals reviewed.   Constitutional:       General: She is not in acute distress.     Appearance: Normal appearance. She is obese. She is not ill-appearing, toxic-appearing or diaphoretic.   HENT:      Head: Normocephalic and atraumatic.   Eyes:      Extraocular Movements: Extraocular movements intact.      Conjunctiva/sclera: Conjunctivae normal.      Pupils: Pupils are equal, round, and reactive to light.   Cardiovascular:      Rate and Rhythm: Normal rate and regular rhythm.      Pulses: Normal pulses.      Heart sounds: Normal heart sounds.   Pulmonary:      Effort: Pulmonary effort is normal.      Breath sounds: Normal breath sounds.   Musculoskeletal:      Right lower le+ Edema present.      Left lower le+ Edema present.   Skin:     General: Skin is warm and dry.      Capillary Refill: Capillary refill takes less than 2 seconds.   Neurological:      General: No  focal deficit present.      Mental Status: She is alert and oriented to person, place, and time.      Motor: No weakness.      Coordination: Coordination normal.      Gait: Gait normal.      Deep Tendon Reflexes: Reflexes normal.   Psychiatric:         Mood and Affect: Mood normal.         Behavior: Behavior normal.          Assessment/Plan:  Jose Manuel Boyd is a 81 y.o. female who presents today for :    Diagnoses and all orders for this visit:    Moderate persistent asthma, unspecified whether complicated  -     fluticasone-salmeterol diskus inhaler 500-50 mcg; Inhale 1 puff into the lungs 2 (two) times daily. Controller    Breath sounds clear today.  Recommended patient to restart Advair for controller.  Discussed with patient need to rinse mouth out after each use.    Major depressive disorder, recurrent episode, mild with anxious distress    No acute concerns.    Class 2 severe obesity due to excess calories with serious comorbidity and body mass index (BMI) of 39.0 to 39.9 in adult    As below.    Bilateral lower extremity edema  -     Basic Metabolic Panel; Future    Likely contributed to by weight.  Recommended patient to exercise as much as possible.  Weight loss can help.  Reviewed previous lower extremity ultrasound which showed no reflux or DVT.  Check kidney function today.    Recommended patient increase Lasix dose to 40 mg for 5 days to help with swelling.  If symptoms do not improve she is to follow-up in the clinic.    All questions answered.  Patient verbalized understanding of instructions.        This office note has been dictated.  This dictation has been generated using M-Modal Fluency Direct dictation; some phonetic errors may occur.   My collaborating physician is Dr. Jose French.

## 2022-05-09 NOTE — PATIENT INSTRUCTIONS
Rinse mouth out after using advair.    Take two tablets of lasix daily (40 mg total) for 5 days, then decrease to normal dose

## 2022-05-10 ENCOUNTER — TELEPHONE (OUTPATIENT)
Dept: FAMILY MEDICINE | Facility: CLINIC | Age: 82
End: 2022-05-10
Payer: MEDICARE

## 2022-05-10 NOTE — TELEPHONE ENCOUNTER
Patient notified of lab results, verbalized understanding. Instructed to contact office with any questions or concerns.   ----- Message from Fareed Brumfield NP sent at 5/10/2022  8:18 AM CDT -----  Please let patient know her labs look good.  Thank you

## 2022-05-31 ENCOUNTER — HOSPITAL ENCOUNTER (EMERGENCY)
Facility: HOSPITAL | Age: 82
Discharge: HOME | End: 2022-05-31
Attending: EMERGENCY MEDICINE
Payer: MEDICARE

## 2022-05-31 VITALS
SYSTOLIC BLOOD PRESSURE: 140 MMHG | DIASTOLIC BLOOD PRESSURE: 68 MMHG | HEART RATE: 78 BPM | WEIGHT: 204 LBS | OXYGEN SATURATION: 96 % | TEMPERATURE: 98 F | BODY MASS INDEX: 40.05 KG/M2 | HEIGHT: 60 IN | RESPIRATION RATE: 18 BRPM

## 2022-05-31 DIAGNOSIS — K57.92 DIVERTICULITIS: Primary | ICD-10-CM

## 2022-05-31 DIAGNOSIS — B37.0 THRUSH: ICD-10-CM

## 2022-05-31 DIAGNOSIS — R10.13 EPIGASTRIC PAIN: ICD-10-CM

## 2022-05-31 LAB
ALBUMIN SERPL BCP-MCNC: 3.4 G/DL (ref 3.5–5.2)
ALP SERPL-CCNC: 114 U/L (ref 55–135)
ALT SERPL W/O P-5'-P-CCNC: 15 U/L (ref 10–44)
ANION GAP SERPL CALC-SCNC: 13 MMOL/L (ref 8–16)
AST SERPL-CCNC: 15 U/L (ref 10–40)
BASOPHILS # BLD AUTO: 0.07 K/UL (ref 0–0.2)
BASOPHILS NFR BLD: 0.7 % (ref 0–1.9)
BILIRUB SERPL-MCNC: 0.3 MG/DL (ref 0.1–1)
BILIRUB UR QL STRIP: NEGATIVE
BUN SERPL-MCNC: 14 MG/DL (ref 8–23)
CALCIUM SERPL-MCNC: 9.3 MG/DL (ref 8.7–10.5)
CHLORIDE SERPL-SCNC: 100 MMOL/L (ref 95–110)
CLARITY UR: CLEAR
CO2 SERPL-SCNC: 30 MMOL/L (ref 23–29)
COLOR UR: COLORLESS
CREAT SERPL-MCNC: 0.9 MG/DL (ref 0.5–1.4)
DIFFERENTIAL METHOD: ABNORMAL
EOSINOPHIL # BLD AUTO: 0.1 K/UL (ref 0–0.5)
EOSINOPHIL NFR BLD: 1.2 % (ref 0–8)
ERYTHROCYTE [DISTWIDTH] IN BLOOD BY AUTOMATED COUNT: 15.7 % (ref 11.5–14.5)
EST. GFR  (AFRICAN AMERICAN): >60 ML/MIN/1.73 M^2
EST. GFR  (NON AFRICAN AMERICAN): 59 ML/MIN/1.73 M^2
GLUCOSE SERPL-MCNC: 140 MG/DL (ref 70–110)
GLUCOSE UR QL STRIP: NEGATIVE
HCT VFR BLD AUTO: 43.3 % (ref 37–48.5)
HGB BLD-MCNC: 13.3 G/DL (ref 12–16)
HGB UR QL STRIP: NEGATIVE
IMM GRANULOCYTES # BLD AUTO: 0.03 K/UL (ref 0–0.04)
IMM GRANULOCYTES NFR BLD AUTO: 0.3 % (ref 0–0.5)
KETONES UR QL STRIP: NEGATIVE
LEUKOCYTE ESTERASE UR QL STRIP: NEGATIVE
LIPASE SERPL-CCNC: 6 U/L (ref 4–60)
LYMPHOCYTES # BLD AUTO: 1.8 K/UL (ref 1–4.8)
LYMPHOCYTES NFR BLD: 17.2 % (ref 18–48)
MCH RBC QN AUTO: 26.3 PG (ref 27–31)
MCHC RBC AUTO-ENTMCNC: 30.7 G/DL (ref 32–36)
MCV RBC AUTO: 86 FL (ref 82–98)
MONOCYTES # BLD AUTO: 0.9 K/UL (ref 0.3–1)
MONOCYTES NFR BLD: 8.9 % (ref 4–15)
NEUTROPHILS # BLD AUTO: 7.5 K/UL (ref 1.8–7.7)
NEUTROPHILS NFR BLD: 71.7 % (ref 38–73)
NITRITE UR QL STRIP: NEGATIVE
NRBC BLD-RTO: 0 /100 WBC
PH UR STRIP: 8 [PH] (ref 5–8)
PLATELET # BLD AUTO: 244 K/UL (ref 150–450)
PMV BLD AUTO: 10.7 FL (ref 9.2–12.9)
POTASSIUM SERPL-SCNC: 3.7 MMOL/L (ref 3.5–5.1)
PROT SERPL-MCNC: 7.2 G/DL (ref 6–8.4)
PROT UR QL STRIP: NEGATIVE
RBC # BLD AUTO: 5.05 M/UL (ref 4–5.4)
SODIUM SERPL-SCNC: 143 MMOL/L (ref 136–145)
SP GR UR STRIP: 1.01 (ref 1–1.03)
URN SPEC COLLECT METH UR: ABNORMAL
UROBILINOGEN UR STRIP-ACNC: NEGATIVE EU/DL
WBC # BLD AUTO: 10.41 K/UL (ref 3.9–12.7)

## 2022-05-31 PROCEDURE — 63600175 PHARM REV CODE 636 W HCPCS: Performed by: EMERGENCY MEDICINE

## 2022-05-31 PROCEDURE — 93010 EKG 12-LEAD: ICD-10-PCS | Mod: ,,, | Performed by: INTERNAL MEDICINE

## 2022-05-31 PROCEDURE — 80053 COMPREHEN METABOLIC PANEL: CPT | Performed by: EMERGENCY MEDICINE

## 2022-05-31 PROCEDURE — 93005 ELECTROCARDIOGRAM TRACING: CPT

## 2022-05-31 PROCEDURE — 85025 COMPLETE CBC W/AUTO DIFF WBC: CPT | Performed by: EMERGENCY MEDICINE

## 2022-05-31 PROCEDURE — 36000 PLACE NEEDLE IN VEIN: CPT

## 2022-05-31 PROCEDURE — 83690 ASSAY OF LIPASE: CPT | Performed by: EMERGENCY MEDICINE

## 2022-05-31 PROCEDURE — 81003 URINALYSIS AUTO W/O SCOPE: CPT | Performed by: EMERGENCY MEDICINE

## 2022-05-31 PROCEDURE — 99285 EMERGENCY DEPT VISIT HI MDM: CPT | Mod: 25

## 2022-05-31 PROCEDURE — 93010 ELECTROCARDIOGRAM REPORT: CPT | Mod: ,,, | Performed by: INTERNAL MEDICINE

## 2022-05-31 PROCEDURE — 96372 THER/PROPH/DIAG INJ SC/IM: CPT | Performed by: EMERGENCY MEDICINE

## 2022-05-31 PROCEDURE — 25000003 PHARM REV CODE 250: Performed by: EMERGENCY MEDICINE

## 2022-05-31 RX ORDER — DICYCLOMINE HYDROCHLORIDE 10 MG/ML
20 INJECTION INTRAMUSCULAR
Status: COMPLETED | OUTPATIENT
Start: 2022-05-31 | End: 2022-05-31

## 2022-05-31 RX ORDER — DICYCLOMINE HYDROCHLORIDE 20 MG/1
20 TABLET ORAL 2 TIMES DAILY PRN
Qty: 20 TABLET | Refills: 0 | Status: SHIPPED | OUTPATIENT
Start: 2022-05-31 | End: 2022-06-10

## 2022-05-31 RX ORDER — AMOXICILLIN AND CLAVULANATE POTASSIUM 875; 125 MG/1; MG/1
1 TABLET, FILM COATED ORAL 2 TIMES DAILY
Qty: 28 TABLET | Refills: 0 | Status: SHIPPED | OUTPATIENT
Start: 2022-05-31 | End: 2022-06-14

## 2022-05-31 RX ORDER — AMOXICILLIN AND CLAVULANATE POTASSIUM 875; 125 MG/1; MG/1
1 TABLET, FILM COATED ORAL
Status: COMPLETED | OUTPATIENT
Start: 2022-05-31 | End: 2022-05-31

## 2022-05-31 RX ADMIN — DICYCLOMINE HYDROCHLORIDE 20 MG: 20 INJECTION INTRAMUSCULAR at 03:05

## 2022-05-31 RX ADMIN — AMOXICILLIN AND CLAVULANATE POTASSIUM 1 TABLET: 875; 125 TABLET, FILM COATED ORAL at 04:05

## 2022-05-31 RX ADMIN — LIDOCAINE HYDROCHLORIDE 15 ML: 20 SOLUTION ORAL; TOPICAL at 03:05

## 2022-05-31 NOTE — ED PROVIDER NOTES
Encounter Date: 5/31/2022       History     Chief Complaint   Patient presents with    Abdominal Pain     Presents to the ED with c/o generalized abdominal pain and mouth pain. Reports she was on abx for mouth pain a few months back with no relief.  Denies N/V/D.      81-year-old female with a past medical history of anxiety, headache, hypertension, thyroid disease, thrush presenting today secondary to return of thrush but minimal compared to previous along with some epigastric pain which has improved over the last 24 hours.  No nausea vomiting.  No change in bowel movements.  No rashes.  No trauma to the area.  No associated symptoms.  Nothing makes it better nothing makes it worse.  No chest pain or shortness of breath.  No arm or leg pain.  No weakness.        Review of patient's allergies indicates:   Allergen Reactions    Codeine      Other reaction(s): Rash     Past Medical History:   Diagnosis Date    Anxiety     Asthma     Depression     Headache     Hx of psychiatric care     Hypercholesterolemia     Hypertension     Psychiatric problem     Sleep difficulties     Therapy     Thyroid disease     multiple nodules     Past Surgical History:   Procedure Laterality Date    CHOLECYSTECTOMY      HYSTERECTOMY      knee repalcement       Family History   Problem Relation Age of Onset    Diabetes Mother     Hypertension Mother     Kidney disease Mother     Heart disease Father     Bipolar disorder Daughter      Social History     Tobacco Use    Smoking status: Never Smoker    Smokeless tobacco: Never Used   Substance Use Topics    Alcohol use: No    Drug use: No     Review of Systems   Constitutional: Negative for fever.   HENT: Negative for sore throat.    Respiratory: Negative for shortness of breath.    Cardiovascular: Negative for chest pain.   Gastrointestinal: Negative for nausea.   Genitourinary: Negative for dysuria.   Musculoskeletal: Negative for back pain.   Skin: Negative for  rash.   Neurological: Negative for weakness.   Hematological: Does not bruise/bleed easily.   All other systems reviewed and are negative.      Physical Exam     Initial Vitals [05/31/22 1430]   BP Pulse Resp Temp SpO2   (!) 140/69 100 18 98.5 °F (36.9 °C) 95 %      MAP       --         Physical Exam    Nursing note and vitals reviewed.  Constitutional: She appears well-developed and well-nourished.   HENT:   Head: Normocephalic and atraumatic.   Mouth/Throat: Oropharynx is clear and moist and mucous membranes are normal.   Minimal thrush on tongue   Eyes: Conjunctivae and EOM are normal. Pupils are equal, round, and reactive to light. Right conjunctiva is not injected. Left conjunctiva is not injected. No scleral icterus.   Neck: Neck supple.   Normal range of motion.   Full passive range of motion without pain.     Cardiovascular: Normal rate, regular rhythm, S1 normal, S2 normal, normal heart sounds and normal pulses. Exam reveals no gallop and no friction rub.    No murmur heard.  Pulses:       Radial pulses are 2+ on the right side and 2+ on the left side.   Pulmonary/Chest: Effort normal and breath sounds normal. No respiratory distress.   Abdominal: Abdomen is soft. She exhibits no distension.   Minimal Epigastric tenderness.   Musculoskeletal:         General: No edema. Normal range of motion.      Cervical back: Full passive range of motion without pain, normal range of motion and neck supple.      Comments: Good active ROM of all extremities. No lower extremity edema or cyanosis.      Neurological: No cranial nerve deficit. Gait normal.   A&Ox4, normal speech.   Skin: Skin is warm. No ecchymosis and no rash noted.   Psychiatric: She has a normal mood and affect. Thought content normal.         ED Course   Procedures  Labs Reviewed   CBC W/ AUTO DIFFERENTIAL - Abnormal; Notable for the following components:       Result Value    MCH 26.3 (*)     MCHC 30.7 (*)     RDW 15.7 (*)     Lymph % 17.2 (*)     All  other components within normal limits   COMPREHENSIVE METABOLIC PANEL - Abnormal; Notable for the following components:    CO2 30 (*)     Glucose 140 (*)     Albumin 3.4 (*)     eGFR if non  59 (*)     All other components within normal limits   URINALYSIS, REFLEX TO URINE CULTURE - Abnormal; Notable for the following components:    Color, UA Colorless (*)     All other components within normal limits    Narrative:     Specimen Source->Urine   LIPASE     EKG Readings: (Independently Interpreted)   EKG done 1521 showing normal sinus rhythm rate 88. No ST elevation T-wave abnormality.  Normal axis QRS.  Normal EKG.       Imaging Results           CT Abdomen Pelvis  Without Contrast (Final result)  Result time 05/31/22 15:59:05    Final result by Tien Mason MD (05/31/22 15:59:05)                 Impression:      1. Diverticulosis of the sigmoid colon with associated wall thickening and mild stranding.  Findings could represent acute diverticulitis.  See above comments.  Follow-up colonoscopy recommended post treatment to exclude any underlying lesion of the sigmoid colon.  2. Small fluid collection right inguinal region, possibly within right inguinal hernia, minimally increased from the prior study.  3. Small hiatal hernia.  4. Single small nonobstructing stone or cortical calcification in the upper pole of the right kidney.  5. Possible constipation.  6.  This report was flagged in Epic as abnormal.      Electronically signed by: Tien Mason  Date:    05/31/2022  Time:    15:59             Narrative:    EXAMINATION:  CT ABDOMEN PELVIS WITHOUT CONTRAST    CLINICAL HISTORY:  Epigastric pain;    TECHNIQUE:  Low dose axial images, sagittal and coronal reformations were obtained from the lung bases to the pubic symphysis, Oral contrast was not administered.    COMPARISON:  06/09/2018    FINDINGS:  Heart: Normal in size. No pericardial effusion.    Lung Bases: Well aerated, without consolidation or  pleural fluid.    Small hiatal hernia.    Liver: Normal in size and attenuation, with no focal hepatic lesions.    Gallbladder: Status post cholecystectomy.    Bile Ducts: No evidence of dilated ducts.    Pancreas: No mass or peripancreatic fat stranding.    Spleen: Unremarkable.    Adrenals: Unremarkable.    Kidneys/ Ureters: Tiny stone or cortical calcification at the upper pole the right kidney with limited visualization.  Motion artifact.  No calcifications elsewhere bilaterally.  No mass, hydronephrosis or hydroureter bilaterally.    Bladder: Urinary bladder is incompletely distended.    Reproductive organs: Status post hysterectomy.    The appendix is within normal limits.    GI Tract/Mesentery: Diverticulosis of the sigmoid colon.  There is wall thickening noted, mildly increased from the prior study.  Mild adjacent stranding.  Findings could represent acute diverticulitis.  Follow-up colonoscopy recommended to exclude an underlying mass of the sigmoid colon.    No evidence of bowel obstruction.  Retained feces in the colon and rectum.    Peritoneal Space: No ascites. No free air.    Retroperitoneum: No significant adenopathy.    Abdominal wall: Unremarkable.    Vasculature: No significant atherosclerosis or aneurysm.    Bones: No acute fracture.    5.7 x 2.6 cm fluid collection in the right inguinal region, possibly within a right inguinal hernia, minimally increased from the prior study.    Grade 1 spondylolisthesis of L4 on L5.  Disc space narrowing and vacuum disc phenomena at L3-4, L4-5 and L5-S1.    Chronic changes of the SI joints bilaterally right greater than left.                                 Medications   magic mouthwash (diphenhydrAMINE 12.5 mg/5 mL 20 mL, aluminum & magnesium hydroxide-simethicone (MYLANTA) 20 mL, LIDOcaine HCl 2% (XYLOCAINE) 20 mL) solution (15 mLs Swish & Spit Given 5/31/22 1522)   dicyclomine injection 20 mg (20 mg Intramuscular Given 5/31/22 1523)   amoxicillin-clavulanate  875-125mg per tablet 1 tablet (1 tablet Oral Given 5/31/22 1631)     Medical Decision Making:   Initial Assessment:   81-year-old female presenting secondary to abdominal pain and also thrush on her tongue.  Magic mouthwash for patient.  Discharging home with this medication.  Epigastric tenderness.  CT showing diverticulitis.  Treating with antibiotics below.  Labs reassuring.  Repeat abdominal exam benign.  Resting comfortably in bed after Bentyl.  Tolerating p.o..  Still having bowel movements.  No urinary complaints no UTI.  Vitals reassuring.  Do not think pancreatitis or gallbladder disease or appendicitis or obstruction. I discussed with the patient/family the diagnosis, treatment plan, indications for return to the emergency department, and for expected follow-up. The patient/family verbalized an understanding. The patient/family is asked if there are any questions or concerns. We discuss the case, until all issues are addressed to the patient/familys satisfaction. Patient/family understands and is agreeable to the plan.   Martinez Esqueda      Clinical Tests:   Lab Tests: Reviewed and Ordered  Radiological Study: Reviewed and Ordered  Medical Tests: Ordered and Reviewed                      Clinical Impression:   Final diagnoses:  [R10.13] Epigastric pain  [K57.92] Diverticulitis (Primary)  [B37.0] Thrush          ED Disposition Condition    Discharge Stable        ED Prescriptions     Medication Sig Dispense Start Date End Date Auth. Provider    amoxicillin-clavulanate 875-125mg (AUGMENTIN) 875-125 mg per tablet Take 1 tablet by mouth 2 (two) times daily. for 14 days 28 tablet 5/31/2022 6/14/2022 Martinez Esqueda MD    dicyclomine (BENTYL) 20 mg tablet Take 1 tablet (20 mg total) by mouth 2 (two) times daily as needed (abdominal cramps). 20 tablet 5/31/2022 6/10/2022 Martinez Esqueda MD    diphenhydrAMINE-aluminum-magnesium hydroxide-simethicone-LIDOcaine HCl 2% Swish and spit 15 mLs every 4 (four) hours as  needed. 1 Bottle 5/31/2022  Martinez Esqueda MD        Follow-up Information     Follow up With Specialties Details Why Contact Info    Ajit Piña MD Internal Medicine, Wound Care Schedule an appointment as soon as possible for a visit in 2 days  [address] [facility] [address]  [phone]             Martinez Esqueda MD  05/31/22 3517

## 2022-05-31 NOTE — ED NOTES
"Patient presents to ED reports abd pain. Denies dysuria. Patient denies nausea. Patient reports weakness. Patient states symptoms began yesterday, but is getting progressively worse. Patient states has been rinsing mouth with prescribed medication, states now when spitting out finds liquid to be "slimy".   "

## 2022-05-31 NOTE — DISCHARGE INSTRUCTIONS
Thank you for coming to our Emergency Department today. It is important to remember that some problems are difficult to diagnose and may not be found during your first visit. Be sure to follow up with your primary care doctor and review any labs/imaging that was performed with them. If you do not have a primary care doctor, you may contact the one listed on your discharge paperwork or you may also call the [facility] Appointment Desk at [phone] to schedule an appointment with one.     All medications may potentially have side effects and it is impossible to predict which medications may give you side effects. If you feel that you are having a negative effect of any medication you should immediately stop taking them and seek medical attention.    Return to the ER with any questions/concerns, new/concerning symptoms, worsening or failure to improve. Do not drive or make any important decisions for 24 hours if you have received any pain medications, sedatives or mood altering drugs during your ER visit.

## 2022-06-01 DIAGNOSIS — I10 HTN (HYPERTENSION), BENIGN: ICD-10-CM

## 2022-06-01 DIAGNOSIS — K57.32 DIVERTICULITIS OF COLON: Primary | ICD-10-CM

## 2022-06-01 RX ORDER — FUROSEMIDE 20 MG/1
TABLET ORAL
Qty: 90 TABLET | Refills: 1 | Status: SHIPPED | OUTPATIENT
Start: 2022-06-01 | End: 2022-11-22

## 2022-06-01 NOTE — TELEPHONE ENCOUNTER
Care Due:                  Date            Visit Type   Department     Provider  --------------------------------------------------------------------------------                                [facility]                              PRIMARY      MEDICINE/  Last Visit: 11-      CARE ([facility])   INTERNAL MED   Ajit Piña                              [facility]                              PRIMARY      MEDICINE/  Next Visit: 06-      CARE ([facility])   INTERNAL MED   Ajit Piña                                                            Last  Test          Frequency    Reason                     Performed    Due Date  --------------------------------------------------------------------------------    Lipid Panel.  12 months..  atorvastatin.............  Not Found    Overdue    [employer] Embedded Care Gaps. Reference number: [id number]. 6/01/2022   12:08:18 AM CDT

## 2022-06-15 ENCOUNTER — OFFICE VISIT (OUTPATIENT)
Dept: FAMILY MEDICINE | Facility: CLINIC | Age: 82
End: 2022-06-15
Payer: MEDICARE

## 2022-06-15 VITALS
SYSTOLIC BLOOD PRESSURE: 140 MMHG | DIASTOLIC BLOOD PRESSURE: 78 MMHG | BODY MASS INDEX: 39.4 KG/M2 | RESPIRATION RATE: 17 BRPM | OXYGEN SATURATION: 97 % | HEART RATE: 75 BPM | WEIGHT: 201.75 LBS

## 2022-06-15 DIAGNOSIS — K57.32 DIVERTICULITIS OF COLON: Primary | ICD-10-CM

## 2022-06-15 PROCEDURE — 1159F PR MEDICATION LIST DOCUMENTED IN MEDICAL RECORD: ICD-10-PCS | Mod: CPTII,S$GLB,, | Performed by: INTERNAL MEDICINE

## 2022-06-15 PROCEDURE — 3077F PR MOST RECENT SYSTOLIC BLOOD PRESSURE >= 140 MM HG: ICD-10-PCS | Mod: CPTII,S$GLB,, | Performed by: INTERNAL MEDICINE

## 2022-06-15 PROCEDURE — 99999 PR PBB SHADOW E&M-EST. PATIENT-LVL IV: CPT | Mod: PBBFAC,,, | Performed by: INTERNAL MEDICINE

## 2022-06-15 PROCEDURE — 3078F DIAST BP <80 MM HG: CPT | Mod: CPTII,S$GLB,, | Performed by: INTERNAL MEDICINE

## 2022-06-15 PROCEDURE — 3078F PR MOST RECENT DIASTOLIC BLOOD PRESSURE < 80 MM HG: ICD-10-PCS | Mod: CPTII,S$GLB,, | Performed by: INTERNAL MEDICINE

## 2022-06-15 PROCEDURE — 99999 PR PBB SHADOW E&M-EST. PATIENT-LVL IV: ICD-10-PCS | Mod: PBBFAC,,, | Performed by: INTERNAL MEDICINE

## 2022-06-15 PROCEDURE — 3077F SYST BP >= 140 MM HG: CPT | Mod: CPTII,S$GLB,, | Performed by: INTERNAL MEDICINE

## 2022-06-15 PROCEDURE — 99214 OFFICE O/P EST MOD 30 MIN: CPT | Mod: S$GLB,,, | Performed by: INTERNAL MEDICINE

## 2022-06-15 PROCEDURE — 99214 PR OFFICE/OUTPT VISIT, EST, LEVL IV, 30-39 MIN: ICD-10-PCS | Mod: S$GLB,,, | Performed by: INTERNAL MEDICINE

## 2022-06-15 PROCEDURE — 1160F RVW MEDS BY RX/DR IN RCRD: CPT | Mod: CPTII,S$GLB,, | Performed by: INTERNAL MEDICINE

## 2022-06-15 PROCEDURE — 1160F PR REVIEW ALL MEDS BY PRESCRIBER/CLIN PHARMACIST DOCUMENTED: ICD-10-PCS | Mod: CPTII,S$GLB,, | Performed by: INTERNAL MEDICINE

## 2022-06-15 PROCEDURE — 1159F MED LIST DOCD IN RCRD: CPT | Mod: CPTII,S$GLB,, | Performed by: INTERNAL MEDICINE

## 2022-06-15 RX ORDER — DICYCLOMINE HYDROCHLORIDE 20 MG/1
20 TABLET ORAL 2 TIMES DAILY PRN
Qty: 60 TABLET | Refills: 0 | Status: SHIPPED | OUTPATIENT
Start: 2022-06-15 | End: 2022-07-11

## 2022-06-15 RX ORDER — OLOPATADINE HYDROCHLORIDE 1 MG/ML
1 SOLUTION/ DROPS OPHTHALMIC 2 TIMES DAILY
COMMUNITY
Start: 2022-04-29

## 2022-06-15 NOTE — PROGRESS NOTES
Subjective:       Patient ID: Jose Manuel Boyd is a 81 y.o. female.    Chief Complaint: No chief complaint on file.    ED follow up    HPI: 80 y/o w/ HTN presents for ED followup. Presented to ED 5/31 with three days diffuse abdominal pain CT of abdomen showed thickening in sigmoid colon wall. She was treated with augmentin to complete 14 day course today. No further abdominal pain stools formed without melena. Her last colonoscopy was more then 10 years ago. She is intersted in finding out other causes for this. Her weight is unchanged since last visit with me    Review of Systems   Constitutional: Negative for activity change, appetite change, fatigue, fever and unexpected weight change.   HENT: Negative for ear pain, rhinorrhea and sore throat.    Eyes: Negative for discharge and visual disturbance.   Respiratory: Negative for chest tightness, shortness of breath and wheezing.    Cardiovascular: Negative for chest pain, palpitations and leg swelling.   Gastrointestinal: Negative for abdominal pain, constipation and diarrhea.   Endocrine: Negative for cold intolerance and heat intolerance.   Genitourinary: Negative for dysuria and hematuria.   Musculoskeletal: Negative for joint swelling and neck stiffness.   Skin: Negative for rash.   Neurological: Negative for dizziness, syncope, weakness and headaches.   Psychiatric/Behavioral: Negative for suicidal ideas.       Objective:     Vitals:    06/15/22 1505   BP: (!) 140/78   BP Location: Left arm   Patient Position: Sitting   BP Method: Medium (Manual)   Pulse: 75   Resp: 17   SpO2: 97%   Weight: 91.5 kg (201 lb 11.5 oz)          Physical Exam  Constitutional:       General: She is not in acute distress.     Appearance: She is well-developed. She is obese.   HENT:      Head: Normocephalic and atraumatic.   Eyes:      Conjunctiva/sclera: Conjunctivae normal.   Cardiovascular:      Rate and Rhythm: Normal rate and regular rhythm.      Heart sounds: No murmur  heard.    No friction rub. No gallop.   Pulmonary:      Effort: Pulmonary effort is normal.      Breath sounds: Normal breath sounds. No wheezing or rales.   Abdominal:      Palpations: Abdomen is soft.      Tenderness: There is no abdominal tenderness. There is no right CVA tenderness, left CVA tenderness, guarding or rebound.   Musculoskeletal:         General: No tenderness. Normal range of motion.      Cervical back: Normal range of motion.      Comments: Wearing compression stockings   Skin:     General: Skin is warm and dry.   Neurological:      Mental Status: She is alert and oriented to person, place, and time.      Cranial Nerves: No cranial nerve deficit.         Assessment and Plan   1. Diverticulitis of colon  GI evaluation to consider colonoscopy in setting of abnromal non contrast imaging her symptoms have resolved prn bentyl   - Ambulatory referral/consult to Gastroenterology; Future  - dicyclomine (BENTYL) 20 mg tablet; Take 1 tablet (20 mg total) by mouth 2 (two) times daily as needed (abdominal pain).  Dispense: 60 tablet; Refill: 0

## 2022-06-24 ENCOUNTER — LAB VISIT (OUTPATIENT)
Dept: LAB | Facility: HOSPITAL | Age: 82
End: 2022-06-24
Attending: OTOLARYNGOLOGY
Payer: MEDICARE

## 2022-06-24 ENCOUNTER — OFFICE VISIT (OUTPATIENT)
Dept: OTOLARYNGOLOGY | Facility: CLINIC | Age: 82
End: 2022-06-24
Payer: MEDICARE

## 2022-06-24 VITALS — BODY MASS INDEX: 38.93 KG/M2 | WEIGHT: 198.31 LBS | HEIGHT: 60 IN

## 2022-06-24 DIAGNOSIS — J34.3 HYPERTROPHY OF INFERIOR NASAL TURBINATE: ICD-10-CM

## 2022-06-24 DIAGNOSIS — R13.10 DYSPHAGIA, UNSPECIFIED TYPE: ICD-10-CM

## 2022-06-24 DIAGNOSIS — K90.89 OTHER INTESTINAL MALABSORPTION: ICD-10-CM

## 2022-06-24 DIAGNOSIS — J30.2 SEASONAL ALLERGIC RHINITIS, UNSPECIFIED TRIGGER: ICD-10-CM

## 2022-06-24 DIAGNOSIS — K14.0 GLOSSITIS: ICD-10-CM

## 2022-06-24 DIAGNOSIS — K14.0 GLOSSITIS: Primary | ICD-10-CM

## 2022-06-24 PROCEDURE — 84207 ASSAY OF VITAMIN B-6: CPT | Performed by: OTOLARYNGOLOGY

## 2022-06-24 PROCEDURE — 36415 COLL VENOUS BLD VENIPUNCTURE: CPT | Performed by: OTOLARYNGOLOGY

## 2022-06-24 PROCEDURE — 1101F PR PT FALLS ASSESS DOC 0-1 FALLS W/OUT INJ PAST YR: ICD-10-PCS | Mod: CPTII,S$GLB,, | Performed by: OTOLARYNGOLOGY

## 2022-06-24 PROCEDURE — 1126F PR PAIN SEVERITY QUANTIFIED, NO PAIN PRESENT: ICD-10-PCS | Mod: CPTII,S$GLB,, | Performed by: OTOLARYNGOLOGY

## 2022-06-24 PROCEDURE — 82607 VITAMIN B-12: CPT | Performed by: OTOLARYNGOLOGY

## 2022-06-24 PROCEDURE — 1101F PT FALLS ASSESS-DOCD LE1/YR: CPT | Mod: CPTII,S$GLB,, | Performed by: OTOLARYNGOLOGY

## 2022-06-24 PROCEDURE — 1159F MED LIST DOCD IN RCRD: CPT | Mod: CPTII,S$GLB,, | Performed by: OTOLARYNGOLOGY

## 2022-06-24 PROCEDURE — 31575 DIAGNOSTIC LARYNGOSCOPY: CPT | Mod: S$GLB,,, | Performed by: OTOLARYNGOLOGY

## 2022-06-24 PROCEDURE — 1126F AMNT PAIN NOTED NONE PRSNT: CPT | Mod: CPTII,S$GLB,, | Performed by: OTOLARYNGOLOGY

## 2022-06-24 PROCEDURE — 3288F PR FALLS RISK ASSESSMENT DOCUMENTED: ICD-10-PCS | Mod: CPTII,S$GLB,, | Performed by: OTOLARYNGOLOGY

## 2022-06-24 PROCEDURE — 1159F PR MEDICATION LIST DOCUMENTED IN MEDICAL RECORD: ICD-10-PCS | Mod: CPTII,S$GLB,, | Performed by: OTOLARYNGOLOGY

## 2022-06-24 PROCEDURE — 99204 PR OFFICE/OUTPT VISIT, NEW, LEVL IV, 45-59 MIN: ICD-10-PCS | Mod: 25,S$GLB,, | Performed by: OTOLARYNGOLOGY

## 2022-06-24 PROCEDURE — 31575 PR LARYNGOSCOPY, FLEXIBLE; DIAGNOSTIC: ICD-10-PCS | Mod: S$GLB,,, | Performed by: OTOLARYNGOLOGY

## 2022-06-24 PROCEDURE — 82306 VITAMIN D 25 HYDROXY: CPT | Performed by: OTOLARYNGOLOGY

## 2022-06-24 PROCEDURE — 99204 OFFICE O/P NEW MOD 45 MIN: CPT | Mod: 25,S$GLB,, | Performed by: OTOLARYNGOLOGY

## 2022-06-24 PROCEDURE — 82746 ASSAY OF FOLIC ACID SERUM: CPT | Performed by: OTOLARYNGOLOGY

## 2022-06-24 PROCEDURE — 3288F FALL RISK ASSESSMENT DOCD: CPT | Mod: CPTII,S$GLB,, | Performed by: OTOLARYNGOLOGY

## 2022-06-24 RX ORDER — AZELASTINE 1 MG/ML
1 SPRAY, METERED NASAL 2 TIMES DAILY
Qty: 30 ML | Refills: 3 | Status: SHIPPED | OUTPATIENT
Start: 2022-06-24 | End: 2023-12-04

## 2022-06-24 NOTE — PATIENT INSTRUCTIONS
"Information and instructions from your visit with me today:    Start using the following medication nasal sprays:   Fluticasone spray:    This medication is a steroid spray. It stays within the nose and does not have absorption into the body that leads to side effects that one has with oral steroid medication. Fluticasone nasal spray is the same as the Flonase brand nasal spray. Discuss with your pharmacist if the price is lower over the counter or with a prescription ( this varies depending on insurance). The medication that is over the counter is the same as the prescription medication. Use this medication as instructed on the prescription, 1-2 sprays on each side of your nose twice daily.     Azelastine  spray:  This medication is an antihistamine used to treat nasal symptoms of allergy, which works specifically in the nose unlike antihistamine pills which have more of an effect on the whole body. Use this medication as instructed on the prescription, 1 spray on each side of your nose twice daily.     Additional instructions for medication sprays  Place the tip of the medication bottle in your nose and aim slightly up and out on each side to get medication high and deep into your nose and sinuses, and not have it all deposit in the very front of your nose. Aim the tip of the nozzle towards the outer corner of your eye . You can imagine aiming towards the back of your eyeball on each side for this, as opposed to straight back to the center of your nose and head.     You need to use this medication every day regardless of symptoms, as it takes time ( a few weeks) to work and get the benefits. It does not work on an "as needed" basis like taking a decongestant. If your symptoms only occur in a particular season, then the medication can be used seasonally instead of year long. For seasonal symptoms, you should start using the spray twice daily a month before when you normally have symptoms ( for example, if symptoms " start in August, should start at the end of June).     Start nasal irrigations with saline solution- you can either use a rinse or a mist spray:      NASAL SALINE SPRAY ( simply saline and arm and hammer are examples) There are several different brands found in the cold and flu aisle of the pharmacy. You can use any brand of saline spray - this will deliver the saline by a gentle mist ( if you have difficulty or discomfort with nasal rinse/ a lot of fluid in the nose, this will be more comfortable).       Always rinse your nose with saline prior to using medication sprays and wait a couple of hours before using again. You can use the saline throughout the day to help with stuffy nose or dry nose.    Do not use nasal decongestant sprays such as Afrin or similar products long term ( over 3 days) .  This can cause long term physical nasal addiction. Afrin should only be used if having nose bleeds, severe nasal congestion , or severe ear pain/fullness and should not be used for more than 2-3 days in a row . It is a not a medication that should be used for a long period of time.     It was nice meeting you today, and I look forward to helping you feel better soon. Please don't hesitate to call if you have any other questions or concerns, or if I can be of any assistance in the meantime.      Lyn Cabello MD    [facility]     Phone  [phone]    Fax      [phone]        Lyn Cabello MD  Otorhinolaryngology

## 2022-06-24 NOTE — PROGRESS NOTES
"OTOLARYNGOLOGY CLINIC NOTE  Date:  06/24/2022     Chief complaint:  Chief Complaint   Patient presents with    Other     Throat and mouth pains for a couple months now       History of Present Illness  Jose Manuel Boyd is a 81 y.o. female  presenting today for a new evaluation and treatment of  Oral cavity issues. Patient states that the primary problem was that her saliva has been very thick. She demonstrated to me today appearance of saliva by spitting in the sink -although she states that it is not as thick as when she initially started having the issue.   She notes that she has been to the er a couple of times for above described mouth issues (spitting up stuff that was "slimy"). Was given antibiotics and mouthwash which helped some,  Not as bad as it was but still there; mouth pain has been going on for a couple of months but she has not seen any sores/ulcers or mucosal changes. Records in [facility] show er visit on 5-31-22 noted to have slight thrush ( pt presented for return of thrush per note but I am unable to find initial note documenting when she presented and given meds for thrush however there is a note from er visit in November of 2021 that notes she feels her mouth is wet and was diagnosed with glossitis); given magic mouthwash and augmentin for diverticulitis flare up 5-31-22. She had previously been given chlorhexadine mouthwash and amoxicillin when presented to er 9-2021 with burning sensation ( similar description noted at that date as she noted to me below)     Certain foods cause burning of her tongue- she describes the sensation as feeling  like when you burn our mouth on something hot and the feeling that happens after that- this happens at times when she eats something even though it is not hot (temp wise not spicy). Does not get burning or pain without eating. No identified exacerbating foods.     No dry mouth. She has some dental issues-Has a couple of broken off teeth that needs to get " taken care of; had one that had retained roots on the other side that was removed prior to pandemic     When she swallows she gets a bitter taste; has to drink water when eating because stuff gets stuck with eating - has been that way for years. No choking with eating      Takes acid reflux meds which controls heartburn    She takes a daily vitamin     She has been using a mouthwash over the counter that she states is for sore mouths (not magic mouthwash) she is not sure of the name    Review of medical records in addition shows that patient had thyroid ultrasound in 2018 with subsequent FNA of left thyroid nodule which was benign ( bethesda II). CT chest in June of 2021 showed a sliding hiatal hernia       Past Medical History  Past Medical History:   Diagnosis Date    Anxiety     Asthma     Depression     Headache     Hx of psychiatric care     Hypercholesterolemia     Hypertension     Psychiatric problem     Sleep difficulties     Therapy     Thyroid disease     multiple nodules        Past Surgical History  Past Surgical History:   Procedure Laterality Date    CHOLECYSTECTOMY      HYSTERECTOMY      knee repalcement          Medications  Current Outpatient Medications on File Prior to Visit   Medication Sig Dispense Refill    albuterol (VENTOLIN HFA) 90 mcg/actuation inhaler Inhale 2 puffs into the lungs every 4 (four) hours as needed for Wheezing. 6.7 g 6    aspirin (ECOTRIN) 81 MG EC tablet Take 81 mg by mouth once daily.      atorvastatin (LIPITOR) 40 MG tablet TAKE 1 TABLET BY MOUTH EVERY DAY 90 tablet 3    dicyclomine (BENTYL) 20 mg tablet Take 1 tablet (20 mg total) by mouth 2 (two) times daily as needed (abdominal pain). 60 tablet 0    diphenhydrAMINE-aluminum-magnesium hydroxide-simethicone-LIDOcaine HCl 2% Swish and spit 15 mLs every 4 (four) hours as needed. 1 Bottle 0    fluticasone propionate (FLONASE) 50 mcg/actuation nasal spray 1 spray (50 mcg total) by Each Nostril route 2  (two) times daily. For allergic rhinitis/sinusitis 18 mL 11    fluticasone-salmeterol diskus inhaler 500-50 mcg Inhale 1 puff into the lungs 2 (two) times daily. Controller 60 each 11    furosemide (LASIX) 20 MG tablet TAKE 1 TABLET BY MOUTH EVERY DAY 90 tablet 1    losartan (COZAAR) 50 MG tablet Take 1 tablet (50 mg total) by mouth once daily. For high blood pressure 90 tablet 3    olopatadine (PATANOL) 0.1 % ophthalmic solution Place 1 drop into both eyes 2 (two) times daily.      omeprazole (PRILOSEC) 20 MG capsule Take 1 capsule (20 mg total) by mouth once daily. For gastric reflux 90 capsule 3    psyllium (METAMUCIL) powder Take 1 packet by mouth daily as needed.      busPIRone (BUSPAR) 7.5 MG tablet TAKE 1 TABLET (7.5 MG TOTAL) BY MOUTH 2 (TWO) TIMES DAILY. 180 tablet 1    mirtazapine (REMERON) 30 MG tablet Take 1 tablet (30 mg total) by mouth nightly as needed (insomnia). At bedtime for sleep, anxiety and depression. 90 tablet 1     No current facility-administered medications on file prior to visit.       Review of Systems  Review of Systems   Constitutional: Negative.    HENT: Positive for nosebleeds and sore throat.    Respiratory: Positive for shortness of breath.    Gastrointestinal: Negative.    Musculoskeletal: Positive for back pain.   Skin: Negative.    Neurological: Positive for headaches.   Psychiatric/Behavioral: Positive for depression.    Answers for HPI/ROS submitted by the patient on 6/24/2022  Sinus infection(s)?: Yes  mouth sores: Yes  Foot swelling?: Yes  Urinating too frequently?: Yes    Social History   reports that she has never smoked. She has never used smokeless tobacco. She reports that she does not drink alcohol and does not use drugs.     Family History  Family History   Problem Relation Age of Onset    Diabetes Mother     Hypertension Mother     Kidney disease Mother     Heart disease Father     Bipolar disorder Daughter         Physical Exam   There were no vitals  filed for this visit. Body mass index is 38.73 kg/m².  Weight: 89.9 kg (198 lb 4.9 oz)   Height: 5' (152.4 cm)     GENERAL: no acute distress.  HEAD: normocephalic.   EYES: lids and lashes normal. No scleral icterus  EARS: external ear without lesion, normal pinna shape and position.    NOSE: external nose without significant bony abnormality  ORAL CAVITY/OROPHARYNX: tongue midline and mobile. Glossitis type appearance to mucosa- blunted taste buds , smooth appearance with mild erythema. No ulcers nor other mucosal lesion by visualization nor palpation. Symmetric palate rise. Uvula midline. No thrush   NECK: trachea midline.   LYMPH NODES:No cervical lymphadenopathy.  RESPIRATORY: no stridor, no stertor. Voice normal. Respirations nonlabored.  NEURO: alert, responds to questions appropriately.   Cranial nerve exam as indicated in above sections and additionally showed facial movement symmetric with good eye closure and symmetric smile.   PSYCH:mood appropriate    PROCEDURE NOTE  NAME OF PROCEDURE: Flexible Laryngoscopy, diagnostic  INDICATIONS: gag reflex precludes mirror exam,dysphagia   FINDINGS: retropharyngeal carotid artery ; some findings suggestive of LPR such as pseudosulcus; findings of sinus disease-severe edema in left middle meatus; moderate turbinate hypertrophy but  good response to decongestant      Consent: After procedure was explained in detail and all questions answered, verbal consent was obtained for performing flexible laryngoscopy.  Anesthesia: topical 4% lidocaine and neosynephrine  Procedure: With patient in seated position, the scope was inserted into the bilateral nasal passageway and advanced atraumatically into the nasopharynx to examine the following structures:  Nasal cavity: Turbinates with moderate hypertrophy. Severe middle meatal edema on left. No purulent drainage.   Nasopharynx: no mass or lesion noted in nasopharynx.   Oropharynx: base of tongue without mass or ulceration.  Lingual tonsils normal in appearance  Hypopharynx: posterior pharyngeal wall without mass or lesion. No pooling of secretions. Pyriform sinuses visible without mass or lesion  Larynx: epiglottis normal without lesion. False vocal folds without edema/erythema/lesion. True vocal folds mobile and without lesion. pseudosulcus; Mild interarytenoid edema no erythema . Postcricoid region with mild edema no lesion   Subglottis: visualized portion of subglottis normal in appearance    After examination performed, the scope was removed atraumatically . The patient tolerated the procedure well. Photodocumentation obtained with representative images below, all images and/or videos uploaded in media section of epic.                    Retropharyngeal carotid artery ( see video showing pulsation)    Not obstructing airway but does narrow pharynx and could be leading to dysphagia          Supraglottic squeeze at times with vocal fold adduction      Imaging:  The patient does not have any recent imaging of the head and neck.     Labs:  CBC  Recent Labs   Lab 11/05/21  0957 11/27/21  1839 05/31/22  1520   WBC 8.74 9.34 10.41   Hemoglobin 13.2 12.5 13.3   Hematocrit 43.6 41.5 43.3   MCV 87 86 86   Platelets 270 SEE COMMENT 244     BMP  Recent Labs   Lab 06/28/21  1007 06/29/21  0549 08/09/21  0900 11/27/21  1839 05/09/22  1104 05/31/22  1520   Glucose 153 H 117 H   < > 101 124 H 140 H   Sodium 140 140   < > 144 145 143   Potassium 4.0 3.9   < > 3.9 3.8 3.7   Chloride 103 105   < > 103 102 100   CO2 31 H 27   < > 29 32 H 30 H   BUN 16 15   < > 13 16 14   Creatinine 0.8 0.7   < > 0.8 0.8 0.9   Calcium 9.4 9.1   < > 9.2 9.5 9.3   Magnesium 2.0 2.0  --   --   --   --     < > = values in this interval not displayed.   albumin low at 3.4  LFTs normal AST 15 ALT 15 and alk phos 114    COAGS        Assessment  1. Glossitis  - Vitamin D; Future  - VITAMIN B12; Future  - VITAMIN B6; Future  - FOLATE; Future    2. Other intestinal malabsorption    - Vitamin D; Future    3. Dysphagia, unspecified type    4. Hypertrophy of inferior nasal turbinate    5. Seasonal allergic rhinitis, unspecified trigger       Plan:  Discussed plan of care with patient in detail and all questions answered. Patient reported understanding of plan of care.   Offered to get swallow study to further investigate dysphagia . Discussed as well about aberrant artery that could be contributing. She would like to hold off for now on swallow study unless something changes with her symptoms ( reports symptoms there unchanged for many years). Discussed differential diagnosis of dysphagia including malignancy .     Add saline and astelin to flonase for nasal congestion/turbinate hypertrophy    Labs for glossitis workup- folate, B vitamins and vitamin D (has h/o diverticulitis and hiatal hernia, no other known GI issue); discussed with her about how gerd/lpr can cause saliva build up in the back of the mouth and throat however she states this is not the issue and the issue is the thick nature of her saliva. Possible burning mouth syndrome but this is diagnosis of exclusion     F/u 2-3 months to eval sinuses and if still with issue with mouth complaints    No evidence of thrush today, no lesions in mouth   Recommend scheduling appointment with dental provider and not overusing mouthwash as can disturb normal benji    Please be aware that this note has been generated with the assistance of MModal voice-to-text.  Please excuse any spelling or grammatical errors.

## 2022-06-25 LAB
25(OH)D3+25(OH)D2 SERPL-MCNC: 45 NG/ML (ref 30–96)
FOLATE SERPL-MCNC: 14.1 NG/ML (ref 4–24)
VIT B12 SERPL-MCNC: 1192 PG/ML (ref 210–950)

## 2022-06-30 LAB — PYRIDOXAL SERPL-MCNC: 12 UG/L (ref 5–50)

## 2022-07-02 ENCOUNTER — PES CALL (OUTPATIENT)
Dept: ADMINISTRATIVE | Facility: CLINIC | Age: 82
End: 2022-07-02
Payer: MEDICARE

## 2022-07-06 ENCOUNTER — PATIENT OUTREACH (OUTPATIENT)
Dept: ADMINISTRATIVE | Facility: HOSPITAL | Age: 82
End: 2022-07-06
Payer: MEDICARE

## 2022-07-24 ENCOUNTER — HOSPITAL ENCOUNTER (OUTPATIENT)
Facility: HOSPITAL | Age: 82
Discharge: HOME | End: 2022-07-25
Attending: EMERGENCY MEDICINE | Admitting: STUDENT IN AN ORGANIZED HEALTH CARE EDUCATION/TRAINING PROGRAM
Payer: MEDICARE

## 2022-07-24 DIAGNOSIS — I16.1 HYPERTENSIVE EMERGENCY: ICD-10-CM

## 2022-07-24 DIAGNOSIS — R06.09 DOE (DYSPNEA ON EXERTION): ICD-10-CM

## 2022-07-24 DIAGNOSIS — R79.89 ELEVATED TROPONIN I LEVEL: ICD-10-CM

## 2022-07-24 DIAGNOSIS — I10 UNCONTROLLED HYPERTENSION: ICD-10-CM

## 2022-07-24 DIAGNOSIS — R06.02 SHORTNESS OF BREATH: ICD-10-CM

## 2022-07-24 DIAGNOSIS — R07.9 CHEST PAIN: ICD-10-CM

## 2022-07-24 DIAGNOSIS — R61 DIAPHORESIS: Primary | ICD-10-CM

## 2022-07-24 DIAGNOSIS — R53.81 MALAISE: ICD-10-CM

## 2022-07-24 DIAGNOSIS — R06.02 SOB (SHORTNESS OF BREATH): ICD-10-CM

## 2022-07-24 PROBLEM — J96.91 RESPIRATORY FAILURE WITH HYPOXIA: Status: RESOLVED | Noted: 2021-05-27 | Resolved: 2022-07-24

## 2022-07-24 LAB
ALBUMIN SERPL BCP-MCNC: 3.3 G/DL (ref 3.5–5.2)
ALP SERPL-CCNC: 126 U/L (ref 55–135)
ALT SERPL W/O P-5'-P-CCNC: 16 U/L (ref 10–44)
ANION GAP SERPL CALC-SCNC: 9 MMOL/L (ref 8–16)
AST SERPL-CCNC: 24 U/L (ref 10–40)
BASOPHILS # BLD AUTO: 0.07 K/UL (ref 0–0.2)
BASOPHILS NFR BLD: 0.7 % (ref 0–1.9)
BILIRUB SERPL-MCNC: 0.4 MG/DL (ref 0.1–1)
BILIRUB UR QL STRIP: NEGATIVE
BNP SERPL-MCNC: 571 PG/ML (ref 0–99)
BUN SERPL-MCNC: 8 MG/DL (ref 8–23)
CALCIUM SERPL-MCNC: 9.6 MG/DL (ref 8.7–10.5)
CHLORIDE SERPL-SCNC: 103 MMOL/L (ref 95–110)
CLARITY UR: CLEAR
CO2 SERPL-SCNC: 30 MMOL/L (ref 23–29)
COLOR UR: COLORLESS
CREAT SERPL-MCNC: 0.8 MG/DL (ref 0.5–1.4)
DIFFERENTIAL METHOD: ABNORMAL
EOSINOPHIL # BLD AUTO: 0.6 K/UL (ref 0–0.5)
EOSINOPHIL NFR BLD: 5.7 % (ref 0–8)
ERYTHROCYTE [DISTWIDTH] IN BLOOD BY AUTOMATED COUNT: 15.4 % (ref 11.5–14.5)
EST. GFR  (AFRICAN AMERICAN): >60 ML/MIN/1.73 M^2
EST. GFR  (NON AFRICAN AMERICAN): >60 ML/MIN/1.73 M^2
GLUCOSE SERPL-MCNC: 115 MG/DL (ref 70–110)
GLUCOSE UR QL STRIP: NEGATIVE
HCT VFR BLD AUTO: 40.2 % (ref 37–48.5)
HGB BLD-MCNC: 12.8 G/DL (ref 12–16)
HGB UR QL STRIP: NEGATIVE
IMM GRANULOCYTES # BLD AUTO: 0.03 K/UL (ref 0–0.04)
IMM GRANULOCYTES NFR BLD AUTO: 0.3 % (ref 0–0.5)
KETONES UR QL STRIP: NEGATIVE
LEUKOCYTE ESTERASE UR QL STRIP: NEGATIVE
LYMPHOCYTES # BLD AUTO: 1.7 K/UL (ref 1–4.8)
LYMPHOCYTES NFR BLD: 17.5 % (ref 18–48)
MAGNESIUM SERPL-MCNC: 2.2 MG/DL (ref 1.6–2.6)
MCH RBC QN AUTO: 26.5 PG (ref 27–31)
MCHC RBC AUTO-ENTMCNC: 31.8 G/DL (ref 32–36)
MCV RBC AUTO: 83 FL (ref 82–98)
MONOCYTES # BLD AUTO: 0.6 K/UL (ref 0.3–1)
MONOCYTES NFR BLD: 5.6 % (ref 4–15)
NEUTROPHILS # BLD AUTO: 6.9 K/UL (ref 1.8–7.7)
NEUTROPHILS NFR BLD: 70.2 % (ref 38–73)
NITRITE UR QL STRIP: NEGATIVE
NRBC BLD-RTO: 0 /100 WBC
PH UR STRIP: 8 [PH] (ref 5–8)
PLATELET # BLD AUTO: 241 K/UL (ref 150–450)
PMV BLD AUTO: 10.8 FL (ref 9.2–12.9)
POTASSIUM SERPL-SCNC: 4.9 MMOL/L (ref 3.5–5.1)
PROT SERPL-MCNC: 7.4 G/DL (ref 6–8.4)
PROT UR QL STRIP: NEGATIVE
RBC # BLD AUTO: 4.83 M/UL (ref 4–5.4)
SODIUM SERPL-SCNC: 142 MMOL/L (ref 136–145)
SP GR UR STRIP: <1.005 (ref 1–1.03)
TROPONIN I SERPL DL<=0.01 NG/ML-MCNC: 0.04 NG/ML (ref 0–0.03)
TROPONIN I SERPL DL<=0.01 NG/ML-MCNC: 0.06 NG/ML (ref 0–0.03)
URN SPEC COLLECT METH UR: ABNORMAL
UROBILINOGEN UR STRIP-ACNC: NEGATIVE EU/DL
WBC # BLD AUTO: 9.87 K/UL (ref 3.9–12.7)

## 2022-07-24 PROCEDURE — G0378 HOSPITAL OBSERVATION PER HR: HCPCS

## 2022-07-24 PROCEDURE — 83735 ASSAY OF MAGNESIUM: CPT | Performed by: EMERGENCY MEDICINE

## 2022-07-24 PROCEDURE — 25000003 PHARM REV CODE 250: Performed by: EMERGENCY MEDICINE

## 2022-07-24 PROCEDURE — 80053 COMPREHEN METABOLIC PANEL: CPT | Performed by: EMERGENCY MEDICINE

## 2022-07-24 PROCEDURE — 93010 ELECTROCARDIOGRAM REPORT: CPT | Mod: ,,, | Performed by: INTERNAL MEDICINE

## 2022-07-24 PROCEDURE — 96374 THER/PROPH/DIAG INJ IV PUSH: CPT

## 2022-07-24 PROCEDURE — 63600175 PHARM REV CODE 636 W HCPCS: Performed by: HOSPITALIST

## 2022-07-24 PROCEDURE — 84484 ASSAY OF TROPONIN QUANT: CPT | Mod: 91 | Performed by: HOSPITALIST

## 2022-07-24 PROCEDURE — 99285 EMERGENCY DEPT VISIT HI MDM: CPT | Mod: 25

## 2022-07-24 PROCEDURE — 84484 ASSAY OF TROPONIN QUANT: CPT | Performed by: EMERGENCY MEDICINE

## 2022-07-24 PROCEDURE — 83880 ASSAY OF NATRIURETIC PEPTIDE: CPT | Performed by: EMERGENCY MEDICINE

## 2022-07-24 PROCEDURE — 85025 COMPLETE CBC W/AUTO DIFF WBC: CPT | Performed by: EMERGENCY MEDICINE

## 2022-07-24 PROCEDURE — 93010 EKG 12-LEAD: ICD-10-PCS | Mod: ,,, | Performed by: INTERNAL MEDICINE

## 2022-07-24 PROCEDURE — 96372 THER/PROPH/DIAG INJ SC/IM: CPT | Performed by: HOSPITALIST

## 2022-07-24 PROCEDURE — 25000003 PHARM REV CODE 250: Performed by: HOSPITALIST

## 2022-07-24 PROCEDURE — 81003 URINALYSIS AUTO W/O SCOPE: CPT | Performed by: EMERGENCY MEDICINE

## 2022-07-24 PROCEDURE — 96376 TX/PRO/DX INJ SAME DRUG ADON: CPT

## 2022-07-24 PROCEDURE — 63600175 PHARM REV CODE 636 W HCPCS: Performed by: EMERGENCY MEDICINE

## 2022-07-24 PROCEDURE — 93005 ELECTROCARDIOGRAM TRACING: CPT

## 2022-07-24 RX ORDER — HYDRALAZINE HYDROCHLORIDE 20 MG/ML
10 INJECTION INTRAMUSCULAR; INTRAVENOUS
Status: COMPLETED | OUTPATIENT
Start: 2022-07-24 | End: 2022-07-24

## 2022-07-24 RX ORDER — AMOXICILLIN 500 MG
CAPSULE ORAL DAILY
COMMUNITY

## 2022-07-24 RX ORDER — SIMETHICONE 80 MG
1 TABLET,CHEWABLE ORAL 4 TIMES DAILY PRN
Status: DISCONTINUED | OUTPATIENT
Start: 2022-07-24 | End: 2022-07-25 | Stop reason: HOSPADM

## 2022-07-24 RX ORDER — IBUPROFEN 200 MG
24 TABLET ORAL
Status: DISCONTINUED | OUTPATIENT
Start: 2022-07-24 | End: 2022-07-25 | Stop reason: HOSPADM

## 2022-07-24 RX ORDER — ENOXAPARIN SODIUM 100 MG/ML
40 INJECTION SUBCUTANEOUS EVERY 24 HOURS
Status: DISCONTINUED | OUTPATIENT
Start: 2022-07-24 | End: 2022-07-25 | Stop reason: HOSPADM

## 2022-07-24 RX ORDER — MAG HYDROX/ALUMINUM HYD/SIMETH 200-200-20
30 SUSPENSION, ORAL (FINAL DOSE FORM) ORAL 4 TIMES DAILY PRN
Status: DISCONTINUED | OUTPATIENT
Start: 2022-07-24 | End: 2022-07-25 | Stop reason: HOSPADM

## 2022-07-24 RX ORDER — ATORVASTATIN CALCIUM 40 MG/1
40 TABLET, FILM COATED ORAL DAILY
Status: DISCONTINUED | OUTPATIENT
Start: 2022-07-25 | End: 2022-07-25 | Stop reason: HOSPADM

## 2022-07-24 RX ORDER — IBUPROFEN 100 MG/5ML
1000 SUSPENSION, ORAL (FINAL DOSE FORM) ORAL DAILY
COMMUNITY

## 2022-07-24 RX ORDER — ASPIRIN 81 MG/1
81 TABLET ORAL DAILY
Status: DISCONTINUED | OUTPATIENT
Start: 2022-07-25 | End: 2022-07-25 | Stop reason: HOSPADM

## 2022-07-24 RX ORDER — IPRATROPIUM BROMIDE AND ALBUTEROL SULFATE 2.5; .5 MG/3ML; MG/3ML
3 SOLUTION RESPIRATORY (INHALATION) EVERY 4 HOURS PRN
Status: DISCONTINUED | OUTPATIENT
Start: 2022-07-24 | End: 2022-07-25 | Stop reason: HOSPADM

## 2022-07-24 RX ORDER — POLYETHYLENE GLYCOL 3350 17 G/17G
17 POWDER, FOR SOLUTION ORAL DAILY
Status: DISCONTINUED | OUTPATIENT
Start: 2022-07-25 | End: 2022-07-25 | Stop reason: HOSPADM

## 2022-07-24 RX ORDER — PROCHLORPERAZINE EDISYLATE 5 MG/ML
5 INJECTION INTRAMUSCULAR; INTRAVENOUS EVERY 6 HOURS PRN
Status: DISCONTINUED | OUTPATIENT
Start: 2022-07-24 | End: 2022-07-25 | Stop reason: HOSPADM

## 2022-07-24 RX ORDER — IBUPROFEN 200 MG
16 TABLET ORAL
Status: DISCONTINUED | OUTPATIENT
Start: 2022-07-24 | End: 2022-07-25 | Stop reason: HOSPADM

## 2022-07-24 RX ORDER — TALC
6 POWDER (GRAM) TOPICAL NIGHTLY PRN
Status: DISCONTINUED | OUTPATIENT
Start: 2022-07-24 | End: 2022-07-25 | Stop reason: HOSPADM

## 2022-07-24 RX ORDER — LANOLIN ALCOHOL/MO/W.PET/CERES
100 CREAM (GRAM) TOPICAL DAILY
COMMUNITY

## 2022-07-24 RX ORDER — ONDANSETRON 2 MG/ML
4 INJECTION INTRAMUSCULAR; INTRAVENOUS EVERY 8 HOURS PRN
Status: DISCONTINUED | OUTPATIENT
Start: 2022-07-24 | End: 2022-07-25 | Stop reason: HOSPADM

## 2022-07-24 RX ORDER — GLUCAGON 1 MG
1 KIT INJECTION
Status: DISCONTINUED | OUTPATIENT
Start: 2022-07-24 | End: 2022-07-25 | Stop reason: HOSPADM

## 2022-07-24 RX ORDER — CLONIDINE HYDROCHLORIDE 0.1 MG/1
0.1 TABLET ORAL
Status: COMPLETED | OUTPATIENT
Start: 2022-07-24 | End: 2022-07-24

## 2022-07-24 RX ORDER — FUROSEMIDE 20 MG/1
20 TABLET ORAL DAILY
Status: DISCONTINUED | OUTPATIENT
Start: 2022-07-25 | End: 2022-07-25 | Stop reason: HOSPADM

## 2022-07-24 RX ORDER — MIRTAZAPINE 15 MG/1
30 TABLET, FILM COATED ORAL NIGHTLY PRN
Status: DISCONTINUED | OUTPATIENT
Start: 2022-07-24 | End: 2022-07-25 | Stop reason: HOSPADM

## 2022-07-24 RX ORDER — LOSARTAN POTASSIUM 25 MG/1
50 TABLET ORAL DAILY
Status: DISCONTINUED | OUTPATIENT
Start: 2022-07-24 | End: 2022-07-25 | Stop reason: HOSPADM

## 2022-07-24 RX ORDER — ACETAMINOPHEN 500 MG
1000 TABLET ORAL DAILY PRN
Status: ON HOLD | COMMUNITY
End: 2022-07-25 | Stop reason: HOSPADM

## 2022-07-24 RX ORDER — ACETAMINOPHEN 325 MG/1
650 TABLET ORAL EVERY 6 HOURS PRN
Status: DISCONTINUED | OUTPATIENT
Start: 2022-07-24 | End: 2022-07-25 | Stop reason: HOSPADM

## 2022-07-24 RX ORDER — SODIUM CHLORIDE 0.9 % (FLUSH) 0.9 %
10 SYRINGE (ML) INJECTION EVERY 8 HOURS PRN
Status: DISCONTINUED | OUTPATIENT
Start: 2022-07-24 | End: 2022-07-25 | Stop reason: HOSPADM

## 2022-07-24 RX ORDER — ACETAMINOPHEN 500 MG
1000 TABLET ORAL
Status: COMPLETED | OUTPATIENT
Start: 2022-07-24 | End: 2022-07-24

## 2022-07-24 RX ORDER — NALOXONE HCL 0.4 MG/ML
0.02 VIAL (ML) INJECTION
Status: DISCONTINUED | OUTPATIENT
Start: 2022-07-24 | End: 2022-07-25 | Stop reason: HOSPADM

## 2022-07-24 RX ORDER — CLONIDINE HYDROCHLORIDE 0.1 MG/1
0.1 TABLET ORAL EVERY 8 HOURS PRN
Status: DISCONTINUED | OUTPATIENT
Start: 2022-07-24 | End: 2022-07-25 | Stop reason: HOSPADM

## 2022-07-24 RX ADMIN — ACETAMINOPHEN 1000 MG: 500 TABLET ORAL at 03:07

## 2022-07-24 RX ADMIN — ENOXAPARIN SODIUM 40 MG: 100 INJECTION SUBCUTANEOUS at 09:07

## 2022-07-24 RX ADMIN — HYDRALAZINE HYDROCHLORIDE 10 MG: 20 INJECTION, SOLUTION INTRAMUSCULAR; INTRAVENOUS at 07:07

## 2022-07-24 RX ADMIN — MIRTAZAPINE 30 MG: 15 TABLET, FILM COATED ORAL at 10:07

## 2022-07-24 RX ADMIN — HYDRALAZINE HYDROCHLORIDE 10 MG: 20 INJECTION, SOLUTION INTRAMUSCULAR; INTRAVENOUS at 03:07

## 2022-07-24 RX ADMIN — CLONIDINE HYDROCHLORIDE 0.1 MG: 0.1 TABLET ORAL at 07:07

## 2022-07-24 NOTE — ED TRIAGE NOTES
Patient presents to the ED via POV. Patient reports sob and diaphoresis that started around 1130 this morning, while she was sitting down in [employer]. Patient denies cardiac, pulmonary hx. Reports hx of anxiety. Denies chest pain, lightheadedness. Patient reports that she took anxiety medication prior to coming to the ED.

## 2022-07-24 NOTE — ED PROVIDER NOTES
Encounter Date: 7/24/2022       History     Chief Complaint   Patient presents with    Shortness of Breath     Pt reporting SOB and headaches x this morning. Pt has a hx of asthma. Pt thinks she may need a breathing tx.      HPI   This 81-year-old white female presents to the emergency room complaining that she developed sweating, frontal head pain and feeling bad while at [employer] this morning.  She then developed some shortness of breath.  She has a history of asthma.  She used her inhaler.  She is not short of breath now.  There is no history of cough.  The patient has no chest pain pressure or tightness.  She has no abdominal complaints.  There are no neurologic deficits.  The patient reports that her blood pressure was poorly controlled this morning.  She took her usual blood pressure medicine very early in the morning.  The patient denies other injuries or problems.  Her frontal head pain is now gone.  She is no longer feeling bad.  Review of patient's allergies indicates:   Allergen Reactions    Codeine      Other reaction(s): Rash     Past Medical History:   Diagnosis Date    Anxiety     Asthma     Depression     Headache     Hx of psychiatric care     Hypercholesterolemia     Hypertension     Psychiatric problem     Sleep difficulties     Therapy     Thyroid disease     multiple nodules     Past Surgical History:   Procedure Laterality Date    CHOLECYSTECTOMY      HYSTERECTOMY      knee repalcement       Family History   Problem Relation Age of Onset    Diabetes Mother     Hypertension Mother     Kidney disease Mother     Heart disease Father     Bipolar disorder Daughter      Social History     Tobacco Use    Smoking status: Never Smoker    Smokeless tobacco: Never Used   Substance Use Topics    Alcohol use: No    Drug use: No     Review of Systems  The patient was questioned specifically with regard to the following.  General: Fever, chills, sweats. Neuro: Headache. Eyes: eye  problems. ENT: Ear pain, sore throat. Cardiovascular: Chest pain. Respiratory: Cough, shortness of breath. Gastrointestinal: Abdominal pain, vomiting, diarrhea. Genitourinary: Painful urination.  Musculoskeletal: Arm and leg problems. Skin: Rash.  The review of systems was negative except for the following:  Frontal head pain, sweating in [employer], feeling bad, shortness of breath, now resolved.  No chest pain pressure tightness abdominal pain vomiting diarrhea painful urination fever or chills.  Patient reports that her blood pressure was high.  Physical Exam     Initial Vitals [07/24/22 1226]   BP Pulse Resp Temp SpO2   (S) (!) 236/102 (!) 56 18 98.9 °F (37.2 °C) 96 %      MAP       --         Physical Exam  The patient was examined specifically for the following:   General:No significant distress, Good color, Warm and dry. Head and neck:Scalp atraumatic, Neck supple. Neurological:Appropriate conversation, Gross motor deficits. Eyes:Conjugate gaze, Clear corneas. ENT: No epistaxis. Cardiac: Regular rate and rhythm, Grossly normal heart tones. Pulmonary: Wheezing, Rales. Gastrointestinal: Abdominal tenderness, Abdominal distention. Musculoskeletal: Extremity deformity, Apparent pain with range of motion of the joints. Skin: Rash.   The findings on examination were normal except for the following:  The patient lower I have with blood pressure 236/102.  Her heart rate is 56.  The neck is supple.  Mental status examination, cranial nerves, motor and sensory examination are normal.  Lungs are clear and free of wheezing rales rubs or rhonchi.  Heart tones are normal.  The patient has regular rate and rhythm.  The abdomen is nontender there is no guarding rebound mass or distention.  The patient has moderate bilateral symmetrical pedal edema worse in the feet and the ankles.  There is no posterior calf tenderness.  The patient has an elevated BMI.  ED Course   Procedures  Labs Reviewed   CBC W/ AUTO DIFFERENTIAL -  Abnormal; Notable for the following components:       Result Value    MCH 26.5 (*)     MCHC 31.8 (*)     RDW 15.4 (*)     Eos # 0.6 (*)     Lymph % 17.5 (*)     All other components within normal limits   B-TYPE NATRIURETIC PEPTIDE - Abnormal; Notable for the following components:     (*)     All other components within normal limits   URINALYSIS, REFLEX TO URINE CULTURE - Abnormal; Notable for the following components:    Color, UA Colorless (*)     Specific Gravity, UA <1.005 (*)     All other components within normal limits    Narrative:     Specimen Source->Urine   COMPREHENSIVE METABOLIC PANEL - Abnormal; Notable for the following components:    CO2 30 (*)     Glucose 115 (*)     Albumin 3.3 (*)     All other components within normal limits   TROPONIN I - Abnormal; Notable for the following components:    Troponin I 0.064 (*)     All other components within normal limits   MAGNESIUM     EKG Readings: (Independently Interpreted)   This patient is in a sinus bradycardia with a heart rate of 49.  There are no significant ST segment or T-wave changes.  This patient may have some premature atrial complexes.  There is no definite evidence of acute myocardial infarction.  The axis is normal.  Intervals are normal.  This is doctor Star dictating.       Imaging Results          X-Ray Chest AP Portable (Final result)  Result time 07/24/22 14:57:06    Final result by Chung Reagan MD (07/24/22 14:57:06)                 Impression:      1. Interstitial findings are likely related to shallow inspiratory effort and habitus however differential would include edema.  Correlation is advised.      Electronically signed by: Chung Reagan MD  Date:    07/24/2022  Time:    14:57             Narrative:    EXAMINATION:  XR CHEST AP PORTABLE    CLINICAL HISTORY:  chest pain;    TECHNIQUE:  Single frontal view of the chest was performed.    COMPARISON:  02/01/2021    FINDINGS:  The cardiomediastinal silhouette is  prominent noting magnification by technique, similar to the previous exam..  There is no pleural effusion.  The trachea is midline.  The lungs are symmetrically expanded bilaterally with mildly prominent central hilar interstitial attenuation accentuated by shallow inspiratory effort..  No large focal consolidation seen.  There is no pneumothorax.  The osseous structures are remarkable for degenerative changes and osteopenia..                              Medical decision making:  Given the above this patient presents to the emergency room with shortness of breath diaphoresis and feeling bad.  She is discovered to have a slightly elevated troponin.  Her heart score is 6. I will admit for observation.  Myocardial infarction remains in the differential diagnosis.  This patient may have some mild pulmonary edema  Her blood pressure is also uncontrolled.  It seems to fluctuate significantly.  Patient has no chest pain pressure tightness now she is not short of breath at this time.  Last blood pressure was 220/120.  Am treating with clonidine hydralazine and nitroglycerin.  Patient's repeat blood pressure is 165/73.       Medications   nitroGLYCERIN 2% TD oint ointment 1 inch (has no administration in time range)   hydrALAZINE injection 10 mg (10 mg Intravenous Given 7/24/22 1512)   acetaminophen tablet 1,000 mg (1,000 mg Oral Given 7/24/22 1512)   cloNIDine tablet 0.1 mg (0.1 mg Oral Given 7/24/22 1926)   hydrALAZINE injection 10 mg (10 mg Intravenous Given 7/24/22 1927)        Additional MDM:   Heart Score:    History:          Slightly suspicious.  ECG:             Normal  Age:               >65 years  Risk factors: >= 3 risk factors or history of atherosclerotic disease  Troponin:       >2x normal limit  Final Score: 6                       Clinical Impression:   Final diagnoses:  [R06.02] SOB (shortness of breath)  [R61] Diaphoresis (Primary)  [R77.8] Elevated troponin I level  [I10] Uncontrolled  hypertension  [R53.81] Malaise          ED Disposition Condition    Observation               Regulo Egan MD  07/24/22 2017

## 2022-07-25 VITALS
BODY MASS INDEX: 39.27 KG/M2 | RESPIRATION RATE: 18 BRPM | TEMPERATURE: 98 F | OXYGEN SATURATION: 95 % | HEIGHT: 60 IN | WEIGHT: 200 LBS | DIASTOLIC BLOOD PRESSURE: 72 MMHG | HEART RATE: 78 BPM | SYSTOLIC BLOOD PRESSURE: 165 MMHG

## 2022-07-25 LAB
AORTIC ROOT ANNULUS: 3.43 CM
AORTIC VALVE CUSP SEPERATION: 2.4 CM
ASCENDING AORTA: 2.81 CM
AV INDEX (PROSTH): 1.12
AV MEAN GRADIENT: 5 MMHG
AV PEAK GRADIENT: 9 MMHG
AV VALVE AREA: 4.43 CM2
AV VELOCITY RATIO: 1.12
BSA FOR ECHO PROCEDURE: 1.96 M2
CV ECHO LV RWT: 0.83 CM
DOP CALC AO PEAK VEL: 1.47 M/S
DOP CALC AO VTI: 28.43 CM
DOP CALC LVOT AREA: 3.9 CM2
DOP CALC LVOT DIAMETER: 2.24 CM
DOP CALC LVOT PEAK VEL: 1.64 M/S
DOP CALC LVOT STROKE VOLUME: 125.88 CM3
DOP CALCLVOT PEAK VEL VTI: 31.96 CM
E WAVE DECELERATION TIME: 245.73 MSEC
E/A RATIO: 0.78
E/E' RATIO: 9.71 M/S
ECHO LV POSTERIOR WALL: 1.63 CM (ref 0.6–1.1)
EJECTION FRACTION: 65 %
FRACTIONAL SHORTENING: 38 % (ref 28–44)
INTERVENTRICULAR SEPTUM: 1.79 CM (ref 0.6–1.1)
IVRT: 124.57 MSEC
LA MAJOR: 5.96 CM
LA MINOR: 6.11 CM
LA WIDTH: 4.11 CM
LEFT ATRIUM SIZE: 3.56 CM
LEFT ATRIUM VOLUME INDEX: 40.1 ML/M2
LEFT ATRIUM VOLUME: 75.04 CM3
LEFT INTERNAL DIMENSION IN SYSTOLE: 2.45 CM (ref 2.1–4)
LEFT VENTRICLE DIASTOLIC VOLUME INDEX: 35.89 ML/M2
LEFT VENTRICLE DIASTOLIC VOLUME: 67.12 ML
LEFT VENTRICLE MASS INDEX: 150 G/M2
LEFT VENTRICLE SYSTOLIC VOLUME INDEX: 11.3 ML/M2
LEFT VENTRICLE SYSTOLIC VOLUME: 21.14 ML
LEFT VENTRICULAR INTERNAL DIMENSION IN DIASTOLE: 3.93 CM (ref 3.5–6)
LEFT VENTRICULAR MASS: 280.46 G
LV LATERAL E/E' RATIO: 8.5 M/S
LV SEPTAL E/E' RATIO: 11.33 M/S
MV PEAK A VEL: 0.87 M/S
MV PEAK E VEL: 0.68 M/S
PISA TR MAX VEL: 2.86 M/S
PULM VEIN S/D RATIO: 1.23
PV PEAK D VEL: 0.44 M/S
PV PEAK S VEL: 0.54 M/S
PV PEAK VELOCITY: 1 CM/S
RA MAJOR: 5.19 CM
RA PRESSURE: 3 MMHG
RA WIDTH: 3.4 CM
RIGHT VENTRICULAR END-DIASTOLIC DIMENSION: 3.57 CM
RV TISSUE DOPPLER FREE WALL SYSTOLIC VELOCITY 1 (APICAL 4 CHAMBER VIEW): 18.78 CM/S
SINUS: 3.55 CM
STJ: 3.24 CM
TDI LATERAL: 0.08 M/S
TDI SEPTAL: 0.06 M/S
TDI: 0.07 M/S
TR MAX PG: 33 MMHG
TRICUSPID ANNULAR PLANE SYSTOLIC EXCURSION: 1.84 CM
TROPONIN I SERPL DL<=0.01 NG/ML-MCNC: 0.04 NG/ML (ref 0–0.03)
TV REST PULMONARY ARTERY PRESSURE: 36 MMHG

## 2022-07-25 PROCEDURE — 84484 ASSAY OF TROPONIN QUANT: CPT | Performed by: HOSPITALIST

## 2022-07-25 PROCEDURE — G0378 HOSPITAL OBSERVATION PER HR: HCPCS

## 2022-07-25 PROCEDURE — 25000003 PHARM REV CODE 250: Performed by: HOSPITALIST

## 2022-07-25 PROCEDURE — 36415 COLL VENOUS BLD VENIPUNCTURE: CPT | Performed by: HOSPITALIST

## 2022-07-25 PROCEDURE — 25000003 PHARM REV CODE 250: Performed by: EMERGENCY MEDICINE

## 2022-07-25 RX ORDER — ACETAMINOPHEN 325 MG/1
650 TABLET ORAL EVERY 6 HOURS PRN
Qty: 60 TABLET | Refills: 0 | Status: SHIPPED | OUTPATIENT
Start: 2022-07-25

## 2022-07-25 RX ORDER — CLONIDINE HYDROCHLORIDE 0.1 MG/1
0.1 TABLET ORAL EVERY 6 HOURS PRN
Qty: 60 TABLET | Refills: 11 | Status: SHIPPED | OUTPATIENT
Start: 2022-07-25 | End: 2022-08-17

## 2022-07-25 RX ADMIN — ACETAMINOPHEN 650 MG: 325 TABLET ORAL at 03:07

## 2022-07-25 RX ADMIN — NITROGLYCERIN 1 INCH: 20 OINTMENT TOPICAL at 05:07

## 2022-07-25 RX ADMIN — NITROGLYCERIN 1 INCH: 20 OINTMENT TOPICAL at 01:07

## 2022-07-25 RX ADMIN — FUROSEMIDE 20 MG: 20 TABLET ORAL at 09:07

## 2022-07-25 RX ADMIN — ASPIRIN 81 MG: 81 TABLET, COATED ORAL at 09:07

## 2022-07-25 RX ADMIN — LOSARTAN POTASSIUM 50 MG: 25 TABLET, FILM COATED ORAL at 09:07

## 2022-07-25 RX ADMIN — ATORVASTATIN CALCIUM 40 MG: 40 TABLET, FILM COATED ORAL at 09:07

## 2022-07-25 RX ADMIN — NITROGLYCERIN 1 INCH: 20 OINTMENT TOPICAL at 12:07

## 2022-07-25 NOTE — PLAN OF CARE
07/25/22 1209   Discharge Planning   Assessment Type Discharge Planning Brief Assessment   Resource/Environmental Concerns none   Support Systems Family members   Equipment Currently Used at Home none   Current Living Arrangements home/apartment/condo   Patient/Family Anticipates Transition to home with family   Patient/Family Anticipated Services at Transition none   DME Needed Upon Discharge  none   Discharge Plan A Home with family  (with onstructions to keep already scheduled followup appointments)     [id number] - [place] - [address]  [address] [address]  Phone: [phone] Fax: [phone]    [facility] - [place] - [address]  [address]  [place] [address]  Phone: [phone] Fax: [phone]

## 2022-07-25 NOTE — NURSING
Received report from ANNIE Rubio. Patient lying in bed resting, NAD noted. Safety precautions maintained, Will Monitor.

## 2022-07-25 NOTE — ASSESSMENT & PLAN NOTE
Patient has recurrent depression which is moderate and is currently controlled. Will Continue anti-depressant medications. We will not consult psychiatry at this time. Patient does not display psychosis at this time. Continue to monitor closely and adjust plan of care as needed.    Continue home regimen nightly mirtazapine.  Patient states that her symptoms are typical of past trouble with anxiety.  Had been on anxiolytics such as Librium and Valium in the past but not for the past few years.

## 2022-07-25 NOTE — HPI
81 y.o. female with hypertension, depression presents with complaint of shortness of breath.  Acute onset this morning, associated with mild-to-moderate generalized headache and malaise that have been intermittent for the past 2-3 days.  States her blood pressure readings on home cuff have been more elevated than usual.  Reports adherence to home medication regimen.  No known exacerbating or alleviating factors.  Attempts of treatment with albuterol without relief.  Denies fever, chills, cough, chest pain, palpitations, orthopnea, PND, dizziness, syncope, nausea, vomiting, diarrhea, abdominal pain, or dysuria.  In the ED, she was found to be markedly hypertensive with systolic blood pressures greater than 200 mm Hg, troponin found to be minimally elevated at 0.06.  .  EKG without evidence of acute ischemia.  Otherwise unremarkable for acute abnormality.  Was given antihypertensive medications with improvement of blood pressure and resolution of her symptoms.  Placed in observation for ACS rule out.

## 2022-07-25 NOTE — PLAN OF CARE
07/25/22 1211   Final Note   Assessment Type Final Discharge Note   Anticipated Discharge Disposition Home   Hospital Resources/Appts/Education Provided Appointments scheduled and added to AVS   Post-Acute Status   Post-Acute Authorization Other   Other Status No Post-Acute Service Needs   Pts nurse Beth notified that the pt can d/c from CM standpoint

## 2022-07-25 NOTE — NURSING
Pt arrive to the unit by wheelchair accompanied by transport. Assisted pt to bed. Tele monitoring initiated.  Admit assessment initiated.  Pt is AAO.  NO distress noted.

## 2022-07-25 NOTE — H&P
[facility] Medicine  History & Physical    Patient Name: Jose Manuel Boyd  MRN: [id number]  Patient Class: OP- Observation  Admission Date: 7/24/2022  Attending Physician: Ki Pope MD   Primary Care Provider: Ajit Piña MD         Patient information was obtained from patient, past medical records and ER records.     Subjective:     Principal Problem:Hypertensive emergency    Chief Complaint:   Chief Complaint   Patient presents with    Shortness of Breath     Pt reporting SOB and headaches x this morning. Pt has a hx of asthma. Pt thinks she may need a breathing tx.         HPI: 81 y.o. female with hypertension, depression presents with complaint of shortness of breath.  Acute onset this morning, associated with mild-to-moderate generalized headache and malaise that have been intermittent for the past 2-3 days.  States her blood pressure readings on home cuff have been more elevated than usual.  Reports adherence to home medication regimen.  No known exacerbating or alleviating factors.  Attempts of treatment with albuterol without relief.  Denies fever, chills, cough, chest pain, palpitations, orthopnea, PND, dizziness, syncope, nausea, vomiting, diarrhea, abdominal pain, or dysuria.  In the ED, she was found to be markedly hypertensive with systolic blood pressures greater than 200 mm Hg, troponin found to be minimally elevated at 0.06.  .  EKG without evidence of acute ischemia.  Otherwise unremarkable for acute abnormality.  Was given antihypertensive medications with improvement of blood pressure and resolution of her symptoms.  Placed in observation for ACS rule out.      Past Medical History:   Diagnosis Date    Anxiety     Asthma     Depression     Headache     Hx of psychiatric care     Hypercholesterolemia     Hypertension     Psychiatric problem     Sleep difficulties     Therapy     Thyroid disease     multiple nodules       Past Surgical History:    Procedure Laterality Date    CHOLECYSTECTOMY      HYSTERECTOMY      knee repalcement         Review of patient's allergies indicates:   Allergen Reactions    Codeine      Other reaction(s): Rash       No current facility-administered medications on file prior to encounter.     Current Outpatient Medications on File Prior to Encounter   Medication Sig    acetaminophen (TYLENOL) 500 MG tablet Take 1,000 mg by mouth daily as needed for Pain.    albuterol (VENTOLIN HFA) 90 mcg/actuation inhaler Inhale 2 puffs into the lungs every 4 (four) hours as needed for Wheezing.    aspirin (ECOTRIN) 81 MG EC tablet Take 81 mg by mouth once daily.    atorvastatin (LIPITOR) 40 MG tablet TAKE 1 TABLET BY MOUTH EVERY DAY    azelastine (ASTELIN) 137 mcg (0.1 %) nasal spray 1 spray (137 mcg total) by Nasal route 2 (two) times daily.    cyanocobalamin (VITAMIN B-12) 1000 MCG tablet Take 100 mcg by mouth once daily.    dicyclomine (BENTYL) 20 mg tablet TAKE 1 TABLET BY MOUTH TWICE A DAY AS NEEDED FOR ADOMINAL PAIN    fluticasone propionate (FLONASE) 50 mcg/actuation nasal spray 1 spray (50 mcg total) by Each Nostril route 2 (two) times daily. For allergic rhinitis/sinusitis    fluticasone-salmeterol diskus inhaler 500-50 mcg Inhale 1 puff into the lungs 2 (two) times daily. Controller    furosemide (LASIX) 20 MG tablet TAKE 1 TABLET BY MOUTH EVERY DAY    losartan (COZAAR) 50 MG tablet Take 1 tablet (50 mg total) by mouth once daily. For high blood pressure    multivitamin with minerals tablet Take 1 tablet by mouth once daily.    naphazoline HCl/pheniramine (EYE ALLERGY RELIEF OPHT) Apply to eye.    olopatadine (PATANOL) 0.1 % ophthalmic solution Place 1 drop into both eyes 2 (two) times daily.    omega-3 fatty acids/fish oil (FISH OIL-OMEGA-3 FATTY ACIDS) 300-1,000 mg capsule Take by mouth once daily.    omeprazole (PRILOSEC) 20 MG capsule Take 1 capsule (20 mg total) by mouth once daily. For gastric reflux     psyllium (METAMUCIL) powder Take 1 packet by mouth daily as needed.    ascorbic acid, vitamin C, (VITAMIN C) 1000 MG tablet Take 1,000 mg by mouth once daily.    busPIRone (BUSPAR) 7.5 MG tablet TAKE 1 TABLET (7.5 MG TOTAL) BY MOUTH 2 (TWO) TIMES DAILY.    diphenhydrAMINE-aluminum-magnesium hydroxide-simethicone-LIDOcaine HCl 2% Swish and spit 15 mLs every 4 (four) hours as needed.    mirtazapine (REMERON) 30 MG tablet Take 1 tablet (30 mg total) by mouth nightly as needed (insomnia). At bedtime for sleep, anxiety and depression.     Family History       Problem Relation (Age of Onset)    Bipolar disorder Daughter    Diabetes Mother    Heart disease Father    Hypertension Mother    Kidney disease Mother          Tobacco Use    Smoking status: Never Smoker    Smokeless tobacco: Never Used   Substance and Sexual Activity    Alcohol use: No    Drug use: No    Sexual activity: Not Currently     Review of Systems   Constitutional:  Positive for fatigue. Negative for chills and fever.   Eyes:  Negative for photophobia and visual disturbance.   Respiratory:  Positive for shortness of breath. Negative for cough.    Cardiovascular:  Negative for chest pain, palpitations and leg swelling.   Gastrointestinal:  Negative for abdominal pain, diarrhea, nausea and vomiting.   Genitourinary:  Negative for dysuria, frequency and urgency.   Skin:  Negative for pallor, rash and wound.   Neurological:  Positive for headaches. Negative for light-headedness.   Psychiatric/Behavioral:  Negative for confusion and decreased concentration.    Objective:     Vital Signs (Most Recent):  Temp: 98 °F (36.7 °C) (07/24/22 2204)  Pulse: 74 (07/24/22 2212)  Resp: 17 (07/24/22 2204)  BP: (!) 165/76 (07/24/22 2204)  SpO2: 95 % (07/24/22 2212)   Vital Signs (24h Range):  Temp:  [98 °F (36.7 °C)-98.9 °F (37.2 °C)] 98 °F (36.7 °C)  Pulse:  [56-78] 74  Resp:  [16-18] 17  SpO2:  [94 %-97 %] 95 %  BP: (127-236)/() 165/76     Weight: 90.9 kg  (200 lb 6.4 oz)  Body mass index is 39.14 kg/m².    Physical Exam  Vitals and nursing note reviewed.   Constitutional:       General: She is not in acute distress.     Appearance: She is well-developed.   HENT:      Head: Normocephalic and atraumatic.      Right Ear: External ear normal.      Left Ear: External ear normal.      Nose: Nose normal.   Eyes:      Conjunctiva/sclera: Conjunctivae normal.      Pupils: Pupils are equal, round, and reactive to light.   Cardiovascular:      Rate and Rhythm: Normal rate and regular rhythm.   Pulmonary:      Effort: Pulmonary effort is normal. No respiratory distress.      Breath sounds: Normal breath sounds. No wheezing.   Abdominal:      General: Bowel sounds are normal. There is no distension.      Palpations: Abdomen is soft.      Tenderness: There is no abdominal tenderness.      Comments: No palpable hepatomegaly or splenomegaly    Musculoskeletal:         General: No tenderness. Normal range of motion.      Cervical back: Normal range of motion and neck supple.   Skin:     General: Skin is warm and dry.   Neurological:      Mental Status: She is alert and oriented to person, place, and time.   Psychiatric:         Thought Content: Thought content normal.         CRANIAL NERVES     CN III, IV, VI   Pupils are equal, round, and reactive to light.     Significant Labs: All pertinent labs within the past 24 hours have been reviewed.    Significant Imaging: I have reviewed all pertinent imaging results/findings within the past 24 hours.    Assessment/Plan:     * Hypertensive emergency  Patient has a current diagnosis of Hypertensive emergency with end organ damage evidenced by elevated troponin which is controlled.  Latest blood pressure and vitals reviewed-   Temp:  [98 °F (36.7 °C)-98.9 °F (37.2 °C)]   Pulse:  [56-78]   Resp:  [16-18]   BP: (127-236)/()   SpO2:  [94 %-97 %] .   Patient currently off IV antihypertensives.   Home meds for hypertension were reviewed and  noted below.   Hypertension Medications             furosemide (LASIX) 20 MG tablet TAKE 1 TABLET BY MOUTH EVERY DAY    losartan (COZAAR) 50 MG tablet Take 1 tablet (50 mg total) by mouth once daily. For high blood pressure          Medication adjustment for hospital antihypertensives is as follows- resume home antihypertensive regimen with p.r.n. clonidine available    Will aim for controlled BP reduction by medications noted above. Monitor and mitigate end organ damage as indicated.    Major depressive disorder, recurrent episode, mild with anxious distress  Patient has recurrent depression which is moderate and is currently controlled. Will Continue anti-depressant medications. We will not consult psychiatry at this time. Patient does not display psychosis at this time. Continue to monitor closely and adjust plan of care as needed.    Continue home regimen nightly mirtazapine.  Patient states that her symptoms are typical of past trouble with anxiety.  Had been on anxiolytics such as Librium and Valium in the past but not for the past few years.    HTN (hypertension), benign  As above.      VTE Risk Mitigation (From admission, onward)         Ordered     enoxaparin injection 40 mg  Daily         07/24/22 2030     IP VTE HIGH RISK PATIENT  Once         07/24/22 2030     Place sequential compression device  Until discontinued         07/24/22 2030               As clarification, on 07/24/2022, patient should be placed in hospital observation services under my care in collaboration with MD Chano Amaro Jr., APRN, BILLIE-BC  Hospitalist - Department of Hospital Medicine  [facility] [address] [place] [id number]  Office #: [phone]; Pager #: [phone]    Ketoconazole Pregnancy And Lactation Text: This medication is Pregnancy Category C and it isn't know if it is safe during pregnancy. It is also excreted in breast milk and breast feeding isn't recommended.

## 2022-07-25 NOTE — ED NOTES
Pt requested that Sister is contacted and notified of her status. baljit Ramirez sister: [phone].

## 2022-07-25 NOTE — ED NOTES
Received report from ANNIE Jara. Introduced self at bedside, pt is resting comfortably, denies any pain at this time. Pt is alert, calm, and oriented x4, no acute distress noted.

## 2022-07-25 NOTE — ASSESSMENT & PLAN NOTE
Patient has a current diagnosis of Hypertensive emergency with end organ damage evidenced by elevated troponin which is controlled.  Latest blood pressure and vitals reviewed-   Temp:  [98 °F (36.7 °C)-98.9 °F (37.2 °C)]   Pulse:  [56-78]   Resp:  [16-18]   BP: (127-236)/()   SpO2:  [94 %-97 %] .   Patient currently off IV antihypertensives.   Home meds for hypertension were reviewed and noted below.   Hypertension Medications             furosemide (LASIX) 20 MG tablet TAKE 1 TABLET BY MOUTH EVERY DAY    losartan (COZAAR) 50 MG tablet Take 1 tablet (50 mg total) by mouth once daily. For high blood pressure          Medication adjustment for hospital antihypertensives is as follows- resume home antihypertensive regimen with p.r.n. clonidine available    Will aim for controlled BP reduction by medications noted above. Monitor and mitigate end organ damage as indicated.

## 2022-07-25 NOTE — ASSESSMENT & PLAN NOTE
Patient has a current diagnosis of Hypertensive emergency with end organ damage evidenced by elevated troponin which is controlled.  Latest blood pressure and vitals reviewed-   Temp:  [97.8 °F (36.6 °C)-98.9 °F (37.2 °C)]   Pulse:  [56-78]   Resp:  [16-18]   BP: (127-236)/()   SpO2:  [92 %-97 %] .   Patient currently off IV antihypertensives.   Home meds for hypertension were reviewed and noted below.   Hypertension Medications             furosemide (LASIX) 20 MG tablet TAKE 1 TABLET BY MOUTH EVERY DAY    losartan (COZAAR) 50 MG tablet Take 1 tablet (50 mg total) by mouth once daily. For high blood pressure          Medication adjustment for hospital antihypertensives is as follows- resume home antihypertensive regimen with p.r.n. clonidine available    Will aim for controlled BP reduction by medications noted above. Monitor and mitigate end organ damage as indicated.

## 2022-07-25 NOTE — SUBJECTIVE & OBJECTIVE
Past Medical History:   Diagnosis Date    Anxiety     Asthma     Depression     Headache     Hx of psychiatric care     Hypercholesterolemia     Hypertension     Psychiatric problem     Sleep difficulties     Therapy     Thyroid disease     multiple nodules       Past Surgical History:   Procedure Laterality Date    CHOLECYSTECTOMY      HYSTERECTOMY      knee repalcement         Review of patient's allergies indicates:   Allergen Reactions    Codeine      Other reaction(s): Rash       No current facility-administered medications on file prior to encounter.     Current Outpatient Medications on File Prior to Encounter   Medication Sig    acetaminophen (TYLENOL) 500 MG tablet Take 1,000 mg by mouth daily as needed for Pain.    albuterol (VENTOLIN HFA) 90 mcg/actuation inhaler Inhale 2 puffs into the lungs every 4 (four) hours as needed for Wheezing.    aspirin (ECOTRIN) 81 MG EC tablet Take 81 mg by mouth once daily.    atorvastatin (LIPITOR) 40 MG tablet TAKE 1 TABLET BY MOUTH EVERY DAY    azelastine (ASTELIN) 137 mcg (0.1 %) nasal spray 1 spray (137 mcg total) by Nasal route 2 (two) times daily.    cyanocobalamin (VITAMIN B-12) 1000 MCG tablet Take 100 mcg by mouth once daily.    dicyclomine (BENTYL) 20 mg tablet TAKE 1 TABLET BY MOUTH TWICE A DAY AS NEEDED FOR ADOMINAL PAIN    fluticasone propionate (FLONASE) 50 mcg/actuation nasal spray 1 spray (50 mcg total) by Each Nostril route 2 (two) times daily. For allergic rhinitis/sinusitis    fluticasone-salmeterol diskus inhaler 500-50 mcg Inhale 1 puff into the lungs 2 (two) times daily. Controller    furosemide (LASIX) 20 MG tablet TAKE 1 TABLET BY MOUTH EVERY DAY    losartan (COZAAR) 50 MG tablet Take 1 tablet (50 mg total) by mouth once daily. For high blood pressure    multivitamin with minerals tablet Take 1 tablet by mouth once daily.    naphazoline HCl/pheniramine (EYE ALLERGY RELIEF OPHT) Apply to eye.    olopatadine (PATANOL) 0.1 % ophthalmic solution Place 1  drop into both eyes 2 (two) times daily.    omega-3 fatty acids/fish oil (FISH OIL-OMEGA-3 FATTY ACIDS) 300-1,000 mg capsule Take by mouth once daily.    omeprazole (PRILOSEC) 20 MG capsule Take 1 capsule (20 mg total) by mouth once daily. For gastric reflux    psyllium (METAMUCIL) powder Take 1 packet by mouth daily as needed.    ascorbic acid, vitamin C, (VITAMIN C) 1000 MG tablet Take 1,000 mg by mouth once daily.    busPIRone (BUSPAR) 7.5 MG tablet TAKE 1 TABLET (7.5 MG TOTAL) BY MOUTH 2 (TWO) TIMES DAILY.    diphenhydrAMINE-aluminum-magnesium hydroxide-simethicone-LIDOcaine HCl 2% Swish and spit 15 mLs every 4 (four) hours as needed.    mirtazapine (REMERON) 30 MG tablet Take 1 tablet (30 mg total) by mouth nightly as needed (insomnia). At bedtime for sleep, anxiety and depression.     Family History       Problem Relation (Age of Onset)    Bipolar disorder Daughter    Diabetes Mother    Heart disease Father    Hypertension Mother    Kidney disease Mother          Tobacco Use    Smoking status: Never Smoker    Smokeless tobacco: Never Used   Substance and Sexual Activity    Alcohol use: No    Drug use: No    Sexual activity: Not Currently     Review of Systems   Constitutional:  Positive for fatigue. Negative for chills and fever.   Eyes:  Negative for photophobia and visual disturbance.   Respiratory:  Positive for shortness of breath. Negative for cough.    Cardiovascular:  Negative for chest pain, palpitations and leg swelling.   Gastrointestinal:  Negative for abdominal pain, diarrhea, nausea and vomiting.   Genitourinary:  Negative for dysuria, frequency and urgency.   Skin:  Negative for pallor, rash and wound.   Neurological:  Positive for headaches. Negative for light-headedness.   Psychiatric/Behavioral:  Negative for confusion and decreased concentration.    Objective:     Vital Signs (Most Recent):  Temp: 98 °F (36.7 °C) (07/24/22 2204)  Pulse: 74 (07/24/22 2212)  Resp: 17 (07/24/22 2204)  BP: (!)  165/76 (07/24/22 2204)  SpO2: 95 % (07/24/22 2212)   Vital Signs (24h Range):  Temp:  [98 °F (36.7 °C)-98.9 °F (37.2 °C)] 98 °F (36.7 °C)  Pulse:  [56-78] 74  Resp:  [16-18] 17  SpO2:  [94 %-97 %] 95 %  BP: (127-236)/() 165/76     Weight: 90.9 kg (200 lb 6.4 oz)  Body mass index is 39.14 kg/m².    Physical Exam  Vitals and nursing note reviewed.   Constitutional:       General: She is not in acute distress.     Appearance: She is well-developed.   HENT:      Head: Normocephalic and atraumatic.      Right Ear: External ear normal.      Left Ear: External ear normal.      Nose: Nose normal.   Eyes:      Conjunctiva/sclera: Conjunctivae normal.      Pupils: Pupils are equal, round, and reactive to light.   Cardiovascular:      Rate and Rhythm: Normal rate and regular rhythm.   Pulmonary:      Effort: Pulmonary effort is normal. No respiratory distress.      Breath sounds: Normal breath sounds. No wheezing.   Abdominal:      General: Bowel sounds are normal. There is no distension.      Palpations: Abdomen is soft.      Tenderness: There is no abdominal tenderness.      Comments: No palpable hepatomegaly or splenomegaly    Musculoskeletal:         General: No tenderness. Normal range of motion.      Cervical back: Normal range of motion and neck supple.   Skin:     General: Skin is warm and dry.   Neurological:      Mental Status: She is alert and oriented to person, place, and time.   Psychiatric:         Thought Content: Thought content normal.         CRANIAL NERVES     CN III, IV, VI   Pupils are equal, round, and reactive to light.     Significant Labs: All pertinent labs within the past 24 hours have been reviewed.    Significant Imaging: I have reviewed all pertinent imaging results/findings within the past 24 hours.

## 2022-07-25 NOTE — PHARMACY MED REC
"Admission Medication History     The home medication history was taken by Gerda Villavicencio.    You may go to "Admission" then "Reconcile Home Medications" tabs to review and/or act upon these items.      The home medication list has been updated by the Pharmacy department.    Please read ALL comments highlighted in yellow.    Please address this information as you see fit.     Feel free to contact us if you have any questions or require assistance.    Medications listed below were obtained from: Patient/family and Analytic software- [employer] and added to home medication:   Multivitamin    Vitamin B-12   Fish Oil   Vitamin C   Tylenol Extra Strength   Eye Allergy Relief    Potential issues to be addressed PRIOR TO DISCHARGE  Please discuss with the patient barriers to adherence with medication treatment plans  Patient requires education regarding drug therapies     The medication reconciliation was completed by the patient's bedside.    Gerda Villavicencio  [phone]                      .          "

## 2022-07-25 NOTE — DISCHARGE SUMMARY
[facility] Medicine  Discharge Summary      Patient Name: Jose Manuel Boyd  MRN: [id number]  Patient Class: OP- Observation  Admission Date: 7/24/2022  Hospital Length of Stay: 0 days  Discharge Date and Time:  07/25/2022 12:01 PM  Attending Physician: Ki Pope MD   Discharging Provider: Surjit Wallace NP  Primary Care Provider: Ajit Piña MD      HPI:   81 y.o. female with hypertension, depression presents with complaint of shortness of breath.  Acute onset this morning, associated with mild-to-moderate generalized headache and malaise that have been intermittent for the past 2-3 days.  States her blood pressure readings on home cuff have been more elevated than usual.  Reports adherence to home medication regimen.  No known exacerbating or alleviating factors.  Attempts of treatment with albuterol without relief.  Denies fever, chills, cough, chest pain, palpitations, orthopnea, PND, dizziness, syncope, nausea, vomiting, diarrhea, abdominal pain, or dysuria.  In the ED, she was found to be markedly hypertensive with systolic blood pressures greater than 200 mm Hg, troponin found to be minimally elevated at 0.06.  .  EKG without evidence of acute ischemia.  Otherwise unremarkable for acute abnormality.  Was given antihypertensive medications with improvement of blood pressure and resolution of her symptoms.  Placed in observation for ACS rule out.      * No surgery found *      Hospital Course:   81 y.o. female with hypertension, depression presents with complaint of shortness of breath.  Acute onset this morning, associated with mild-to-moderate generalized headache and malaise that have been intermittent for the past 2-3 days.  States her blood pressure readings on home cuff have been more elevated than usual.  Reports adherence to home medication regimen.  No known exacerbating or alleviating factors.  Attempts of treatment with albuterol without relief.  Denies  fever, chills, cough, chest pain, palpitations, orthopnea, PND, dizziness, syncope, nausea, vomiting, diarrhea, abdominal pain, or dysuria.  In the ED, she was found to be markedly hypertensive with systolic blood pressures greater than 200 mm Hg, troponin found to be minimally elevated at 0.06.  .  EKG without evidence of acute ischemia.  Otherwise unremarkable for acute abnormality.  Was given antihypertensive medications with improvement of blood pressure and resolution of her symptoms.  Placed in observation for ACS rule out.  Patient's blood pressure was controlled given her losartan and her Lasix.  Patient denies any shortness of breath following morning porch and to be discharged.  Shared decision-making made.  Patient will be discharged on home medicine will add clonidine 0.1 q.6 hours for systolic blood pressure greater than 180    Goals of Care Treatment Preferences:  Code Status: Full Code      Consults:     * Hypertensive emergency  Patient has a current diagnosis of Hypertensive emergency with end organ damage evidenced by elevated troponin which is controlled.  Latest blood pressure and vitals reviewed-   Temp:  [97.8 °F (36.6 °C)-98.9 °F (37.2 °C)]   Pulse:  [56-78]   Resp:  [16-18]   BP: (127-236)/()   SpO2:  [92 %-97 %] .   Patient currently off IV antihypertensives.   Home meds for hypertension were reviewed and noted below.   Hypertension Medications             furosemide (LASIX) 20 MG tablet TAKE 1 TABLET BY MOUTH EVERY DAY    losartan (COZAAR) 50 MG tablet Take 1 tablet (50 mg total) by mouth once daily. For high blood pressure          Medication adjustment for hospital antihypertensives is as follows- resume home antihypertensive regimen with p.r.n. clonidine available    Will aim for controlled BP reduction by medications noted above. Monitor and mitigate end organ damage as indicated.    Major depressive disorder, recurrent episode, mild with anxious distress  Patient has  recurrent depression which is moderate and is currently controlled. Will Continue anti-depressant medications. We will not consult psychiatry at this time. Patient does not display psychosis at this time. Continue to monitor closely and adjust plan of care as needed.    Continue home regimen nightly mirtazapine.  Patient states that her symptoms are typical of past trouble with anxiety.  Had been on anxiolytics such as Librium and Valium in the past but not for the past few years.    HTN (hypertension), benign  As above.      Final Active Diagnoses:    Diagnosis Date Noted POA    PRINCIPAL PROBLEM:  Hypertensive emergency [I16.1] 07/24/2022 Yes    Major depressive disorder, recurrent episode, mild with anxious distress [F33.0] 05/09/2022 Yes    HTN (hypertension), benign [I10] 11/05/2012 Yes      Problems Resolved During this Admission:       Discharged Condition: good    Disposition: Home or Self Care    Follow Up:    Patient Instructions:      Diet Cardiac     Notify your health care provider if you experience any of the following:  temperature >100.4     Activity as tolerated       Significant Diagnostic Studies: Labs: All labs within the past 24 hours have been reviewed  Radiology: X-Ray: CXR: X-Ray Chest 1 View (CXR): No results found for this visit on 07/24/22. and X-Ray Chest PA and Lateral (CXR): No results found for this visit on 07/24/22. and KUB: X-Ray Abdomen AP 1 View (KUB): No results found for this visit on 07/24/22.  CT scan: CT ABDOMEN PELVIS WITH CONTRAST: No results found for this visit on 07/24/22. and CT ABDOMEN PELVIS WITHOUT CONTRAST: No results found for this visit on 07/24/22.  Cardiac Graphics: Echocardiogram:   2D echo with color flow doppler:   Results for orders placed or performed in visit on 11/01/12   2D echo with color flow doppler   Result Value Ref Range    EF + QEF 65     Narrative    TEST DESCRIPTION   The aortic root is normal in size, measuring 2.9 cm across. The right  atrium is normal in size, measuring 5.2 cm in the apical views. The left atrial volume index is normal, measuring 27.62 cc/m2. The right ventricle is normal in size. The left ventricle   is normal in size, with an end-diastolic diameter of 4.3 cm, and an end-systolic diameter of 3.4 cm. Wall thickness is normal, with the septum measuring 0.9 cm and the posterior wall measuring 0.8 cm across. Relative wall thickness was normal at 0.37,   and the LV mass index was 70.5 g/m2 consistent with normal left ventricular mass.     Global right ventricular systolic function appears normal.   Regional and global left ventricular systolic function appears normal with a visually estimated ejection fraction of 65%.   The E/e'(lat) is 8, consistent with normal diastolic function. The Doppler derived stroke volume equals 78.0 ccs.     All valves appear structurally normal. There is trivial to mild mitral regurgitation. The IVC measures 5-10 mmHg. There is evidence of a trivial lateral pericardial effusion. There is no evidence of intracavity mass, thrombi, or vegetation.         CONCLUSIONS     1 - Normal left ventricular function (EF 65%).     2 - Normal diastolic function.     3 - Trivial to mild mitral regurgitation.     4 - Trivial pericardial effusion.         This document has been electronically    SIGNED BY: Lynnette Scruggs M.D. On: 11/01/2012 15:19    and Transthoracic echo (TTE) complete (Cupid Only):   Results for orders placed or performed during the hospital encounter of 06/28/21   Echo   Result Value Ref Range    Ascending aorta 3.29 cm    STJ 3.03 cm    AV mean gradient 4 mmHg    Ao peak hoang 1.30 m/s    Ao VTI 28.00 cm    IVS 1.42 (A) 0.6 - 1.1 cm    LA size 3.74 cm    Left Atrium Major Axis 5.43 cm    Left Atrium Minor Axis 5.02 cm    LVIDd 3.84 3.5 - 6.0 cm    LVIDs 2.83 2.1 - 4.0 cm    LVOT diameter 2.11 cm    LVOT peak VTI 21.65 cm    Posterior Wall 1.10 0.6 - 1.1 cm    MV Peak A Hoang 0.78 m/s    E wave  deceleration time 352.64 msec    MV Peak E Hoang 0.43 m/s    RA Major Axis 5.20 cm    RA Width 3.19 cm    RVDD 3.75 cm    TR Max Hoang 2.50 m/s    LA WIDTH 3.49 cm    Ao root annulus 3.00 cm    AORTIC VALVE CUSP SEPERATION 2.14 cm    PV PEAK VELOCITY 1.09 cm/s    MV stenosis pressure 1/2 time 124.74 ms    LV Diastolic Volume 63.43 mL    LV Systolic Volume 30.34 mL    LVOT peak hoang 1.12 m/s    TDI LATERAL 0.07 m/s    TDI SEPTAL 0.08 m/s    LV LATERAL E/E' RATIO 6.14 m/s    LV SEPTAL E/E' RATIO 5.38 m/s    FS 26 %    LA volume 57.88 cm3    LV mass 167.53 g    Left Ventricle Relative Wall Thickness 0.57 cm    AV valve area 2.70 cm2    AV Velocity Ratio 0.86     AV index (prosthetic) 0.77     MV valve area p 1/2 method 1.76 cm2    E/A ratio 0.55     Mean e' 0.08 m/s    LVOT area 3.5 cm2    LVOT stroke volume 75.66 cm3    AV peak gradient 7 mmHg    E/E' ratio 5.73 m/s    LV Systolic Volume Index 16.3 mL/m2    LV Diastolic Volume Index 34.10 mL/m2    LA Volume Index 31.1 mL/m2    LV Mass Index 90 g/m2    Triscuspid Valve Regurgitation Peak Gradient 25 mmHg    BSA 1.97 m2    Right Atrial Pressure (from IVC) 3 mmHg    EF 65 %    TV rest pulmonary artery pressure 28 mmHg    Narrative    · The estimated ejection fraction is 65%.  · Concentric hypertrophy and normal systolic function.  · Grade I left ventricular diastolic dysfunction.  · Normal right ventricular size with normal right ventricular systolic   function.  · Mild to moderate left atrial enlargement.  · Normal central venous pressure (3 mmHg).  · The estimated PA systolic pressure is 28 mmHg.          Pending Diagnostic Studies:     Procedure Component Value Units Date/Time    Echo [[id number]]     Order Status: Sent Lab Status: No result          Medications:  Reconciled Home Medications:      Medication List      CHANGE how you take these medications    acetaminophen 325 MG tablet  Commonly known as: TYLENOL  Take 2 tablets (650 mg total) by mouth every 6 (six) hours  as needed for Pain.  What changed:   · medication strength  · how much to take  · when to take this        CONTINUE taking these medications    albuterol 90 mcg/actuation inhaler  Commonly known as: VENTOLIN HFA  Inhale 2 puffs into the lungs every 4 (four) hours as needed for Wheezing.     aspirin 81 MG EC tablet  Commonly known as: ECOTRIN  Take 81 mg by mouth once daily.     atorvastatin 40 MG tablet  Commonly known as: LIPITOR  TAKE 1 TABLET BY MOUTH EVERY DAY     azelastine 137 mcg (0.1 %) nasal spray  Commonly known as: ASTELIN  1 spray (137 mcg total) by Nasal route 2 (two) times daily.     busPIRone 7.5 MG tablet  Commonly known as: BUSPAR  TAKE 1 TABLET (7.5 MG TOTAL) BY MOUTH 2 (TWO) TIMES DAILY.     dicyclomine 20 mg tablet  Commonly known as: BENTYL  TAKE 1 TABLET BY MOUTH TWICE A DAY AS NEEDED FOR ADOMINAL PAIN     diphenhydrAMINE-aluminum-magnesium hydroxide-simethicone-LIDOcaine HCl 2%  Swish and spit 15 mLs every 4 (four) hours as needed.     EYE ALLERGY RELIEF OPHT  Apply to eye.     fish oil-omega-3 fatty acids 300-1,000 mg capsule  Take by mouth once daily.     fluticasone propionate 50 mcg/actuation nasal spray  Commonly known as: FLONASE  1 spray (50 mcg total) by Each Nostril route 2 (two) times daily. For allergic rhinitis/sinusitis     fluticasone-salmeterol 500-50 mcg/dose 500-50 mcg/dose Dsdv diskus inhaler  Commonly known as: ADVAIR DISKUS  Inhale 1 puff into the lungs 2 (two) times daily. Controller     furosemide 20 MG tablet  Commonly known as: LASIX  TAKE 1 TABLET BY MOUTH EVERY DAY     losartan 50 MG tablet  Commonly known as: COZAAR  Take 1 tablet (50 mg total) by mouth once daily. For high blood pressure     mirtazapine 30 MG tablet  Commonly known as: REMERON  Take 1 tablet (30 mg total) by mouth nightly as needed (insomnia). At bedtime for sleep, anxiety and depression.     multivitamin with minerals tablet  Take 1 tablet by mouth once daily.     olopatadine 0.1 % ophthalmic  solution  Commonly known as: PATANOL  Place 1 drop into both eyes 2 (two) times daily.     omeprazole 20 MG capsule  Commonly known as: PRILOSEC  Take 1 capsule (20 mg total) by mouth once daily. For gastric reflux     psyllium powder  Commonly known as: METAMUCIL  Take 1 packet by mouth daily as needed.     VITAMIN B-12 1000 MCG tablet  Generic drug: cyanocobalamin  Take 100 mcg by mouth once daily.     VITAMIN C 1000 MG tablet  Generic drug: ascorbic acid (vitamin C)  Take 1,000 mg by mouth once daily.            Indwelling Lines/Drains at time of discharge:   Lines/Drains/Airways     None                 Time spent on the discharge of patient: 25   minutes         Surjit Wallace NP  Department of Hospital Medicine  [facility] [place] - Telemetry

## 2022-08-03 ENCOUNTER — OFFICE VISIT (OUTPATIENT)
Dept: FAMILY MEDICINE | Facility: CLINIC | Age: 82
End: 2022-08-03
Payer: MEDICARE

## 2022-08-03 VITALS
TEMPERATURE: 98 F | BODY MASS INDEX: 42.85 KG/M2 | WEIGHT: 218.25 LBS | RESPIRATION RATE: 16 BRPM | OXYGEN SATURATION: 97 % | HEART RATE: 84 BPM | HEIGHT: 60 IN | SYSTOLIC BLOOD PRESSURE: 120 MMHG | DIASTOLIC BLOOD PRESSURE: 70 MMHG

## 2022-08-03 DIAGNOSIS — R61 DIAPHORESIS: Primary | ICD-10-CM

## 2022-08-03 PROCEDURE — 1101F PR PT FALLS ASSESS DOC 0-1 FALLS W/OUT INJ PAST YR: ICD-10-PCS | Mod: CPTII,S$GLB,, | Performed by: FAMILY MEDICINE

## 2022-08-03 PROCEDURE — 1159F PR MEDICATION LIST DOCUMENTED IN MEDICAL RECORD: ICD-10-PCS | Mod: CPTII,S$GLB,, | Performed by: FAMILY MEDICINE

## 2022-08-03 PROCEDURE — 3078F PR MOST RECENT DIASTOLIC BLOOD PRESSURE < 80 MM HG: ICD-10-PCS | Mod: CPTII,S$GLB,, | Performed by: FAMILY MEDICINE

## 2022-08-03 PROCEDURE — 1126F PR PAIN SEVERITY QUANTIFIED, NO PAIN PRESENT: ICD-10-PCS | Mod: CPTII,S$GLB,, | Performed by: FAMILY MEDICINE

## 2022-08-03 PROCEDURE — 99999 PR PBB SHADOW E&M-EST. PATIENT-LVL V: CPT | Mod: PBBFAC,,, | Performed by: FAMILY MEDICINE

## 2022-08-03 PROCEDURE — 1159F MED LIST DOCD IN RCRD: CPT | Mod: CPTII,S$GLB,, | Performed by: FAMILY MEDICINE

## 2022-08-03 PROCEDURE — 3074F PR MOST RECENT SYSTOLIC BLOOD PRESSURE < 130 MM HG: ICD-10-PCS | Mod: CPTII,S$GLB,, | Performed by: FAMILY MEDICINE

## 2022-08-03 PROCEDURE — 3288F FALL RISK ASSESSMENT DOCD: CPT | Mod: CPTII,S$GLB,, | Performed by: FAMILY MEDICINE

## 2022-08-03 PROCEDURE — 99999 PR PBB SHADOW E&M-EST. PATIENT-LVL V: ICD-10-PCS | Mod: PBBFAC,,, | Performed by: FAMILY MEDICINE

## 2022-08-03 PROCEDURE — 1126F AMNT PAIN NOTED NONE PRSNT: CPT | Mod: CPTII,S$GLB,, | Performed by: FAMILY MEDICINE

## 2022-08-03 PROCEDURE — 1101F PT FALLS ASSESS-DOCD LE1/YR: CPT | Mod: CPTII,S$GLB,, | Performed by: FAMILY MEDICINE

## 2022-08-03 PROCEDURE — 99213 OFFICE O/P EST LOW 20 MIN: CPT | Mod: S$GLB,,, | Performed by: FAMILY MEDICINE

## 2022-08-03 PROCEDURE — 3074F SYST BP LT 130 MM HG: CPT | Mod: CPTII,S$GLB,, | Performed by: FAMILY MEDICINE

## 2022-08-03 PROCEDURE — 3078F DIAST BP <80 MM HG: CPT | Mod: CPTII,S$GLB,, | Performed by: FAMILY MEDICINE

## 2022-08-03 PROCEDURE — 3288F PR FALLS RISK ASSESSMENT DOCUMENTED: ICD-10-PCS | Mod: CPTII,S$GLB,, | Performed by: FAMILY MEDICINE

## 2022-08-03 PROCEDURE — 99213 PR OFFICE/OUTPT VISIT, EST, LEVL III, 20-29 MIN: ICD-10-PCS | Mod: S$GLB,,, | Performed by: FAMILY MEDICINE

## 2022-08-03 NOTE — PROGRESS NOTES
Chief Complaint   Patient presents with    oily  skin       HPI    Jose Manuel Boyd is a 81 y.o. female  presenting for evaluation for for sticky hands    Sticky hands  She first noticed this an hour ago  She washed her hands several times and used peroxide and they still felt sticky  She was inpatient for sweating and chest pain and has outpatient cardiology follow up  She states that her hands were not sweaty but they felt sticky  She cannot reproduce this but it happened after she washed her hands          ROS*  Review of Systems   Constitutional: Positive for diaphoresis. Negative for chills, fatigue, fever and unexpected weight change.   HENT: Negative for rhinorrhea, sinus pressure, sore throat and tinnitus.    Eyes: Negative for photophobia and visual disturbance.   Respiratory: Negative for cough, shortness of breath and wheezing.    Cardiovascular: Negative for chest pain and palpitations.   Gastrointestinal: Negative for abdominal pain, blood in stool, constipation, diarrhea, nausea and vomiting.   Genitourinary: Negative for dysuria, flank pain, frequency and vaginal discharge.   Musculoskeletal: Negative for arthralgias and joint swelling.   Skin: Negative for rash.   Neurological: Negative for speech difficulty, weakness, light-headedness and headaches.   Psychiatric/Behavioral: Negative for behavioral problems and dysphoric mood.         Physical Exam  Vitals:    08/03/22 1428   BP: 120/70   Pulse: 84   Resp: 16   Temp: 98 °F (36.7 °C)   TempSrc: Oral   SpO2: 97%   Weight: 99 kg (218 lb 4.1 oz)   Height: 5' (1.524 m)    Body mass index is 42.63 kg/m².  Weight: 99 kg (218 lb 4.1 oz)   Height: 5' (152.4 cm)     Physical Exam  Vitals and nursing note reviewed.   Constitutional:       Appearance: She is well-developed.   Skin:     General: Skin is warm and dry.      Findings: No erythema or rash.      Comments: Her palms are not sweaty, symptoms not reproducible   Neurological:      Mental Status: She  is alert and oriented to person, place, and time.   Psychiatric:         Behavior: Behavior normal.         ALLERGIES AND MEDICATIONS: updated and reviewed.  Review of patient's allergies indicates:   Allergen Reactions    Codeine      Other reaction(s): Rash     Medication List with Changes/Refills   Current Medications    ACETAMINOPHEN (TYLENOL) 325 MG TABLET    Take 2 tablets (650 mg total) by mouth every 6 (six) hours as needed for Pain.    ALBUTEROL (VENTOLIN HFA) 90 MCG/ACTUATION INHALER    Inhale 2 puffs into the lungs every 4 (four) hours as needed for Wheezing.    ASCORBIC ACID, VITAMIN C, (VITAMIN C) 1000 MG TABLET    Take 1,000 mg by mouth once daily.    ASPIRIN (ECOTRIN) 81 MG EC TABLET    Take 81 mg by mouth once daily.    ATORVASTATIN (LIPITOR) 40 MG TABLET    TAKE 1 TABLET BY MOUTH EVERY DAY    AZELASTINE (ASTELIN) 137 MCG (0.1 %) NASAL SPRAY    1 spray (137 mcg total) by Nasal route 2 (two) times daily.    BUSPIRONE (BUSPAR) 7.5 MG TABLET    TAKE 1 TABLET (7.5 MG TOTAL) BY MOUTH 2 (TWO) TIMES DAILY.    CLONIDINE (CATAPRES) 0.1 MG TABLET    Take 1 tablet (0.1 mg total) by mouth every 6 (six) hours as needed (SBP>180).    CYANOCOBALAMIN (VITAMIN B-12) 1000 MCG TABLET    Take 100 mcg by mouth once daily.    DICYCLOMINE (BENTYL) 20 MG TABLET    TAKE 1 TABLET BY MOUTH TWICE A DAY AS NEEDED FOR ADOMINAL PAIN    DIPHENHYDRAMINE-ALUMINUM-MAGNESIUM HYDROXIDE-SIMETHICONE-LIDOCAINE HCL 2%    Swish and spit 15 mLs every 4 (four) hours as needed.    FLUTICASONE PROPIONATE (FLONASE) 50 MCG/ACTUATION NASAL SPRAY    1 spray (50 mcg total) by Each Nostril route 2 (two) times daily. For allergic rhinitis/sinusitis    FLUTICASONE-SALMETEROL DISKUS INHALER 500-50 MCG    Inhale 1 puff into the lungs 2 (two) times daily. Controller    FUROSEMIDE (LASIX) 20 MG TABLET    TAKE 1 TABLET BY MOUTH EVERY DAY    LOSARTAN (COZAAR) 50 MG TABLET    Take 1 tablet (50 mg total) by mouth once daily. For high blood pressure     MIRTAZAPINE (REMERON) 30 MG TABLET    Take 1 tablet (30 mg total) by mouth nightly as needed (insomnia). At bedtime for sleep, anxiety and depression.    MULTIVITAMIN WITH MINERALS TABLET    Take 1 tablet by mouth once daily.    NAPHAZOLINE HCL/PHENIRAMINE (EYE ALLERGY RELIEF OPHT)    Apply to eye.    OLOPATADINE (PATANOL) 0.1 % OPHTHALMIC SOLUTION    Place 1 drop into both eyes 2 (two) times daily.    OMEGA-3 FATTY ACIDS/FISH OIL (FISH OIL-OMEGA-3 FATTY ACIDS) 300-1,000 MG CAPSULE    Take by mouth once daily.    OMEPRAZOLE (PRILOSEC) 20 MG CAPSULE    Take 1 capsule (20 mg total) by mouth once daily. For gastric reflux    PSYLLIUM (METAMUCIL) POWDER    Take 1 packet by mouth daily as needed.       Assessment & Plan  1. Diaphoresis        Problem List Items Addressed This Visit    None     Visit Diagnoses     Diaphoresis    -  Primary  Recommend we eval thyroid as she may be having increased sweating  Symptoms are not reproducible  Also consider elevated BG however she has labs set for 8/15 and is not fasting so she would like to do her labs at that time    Relevant Orders    TSH          Follow up if symptoms worsen or fail to improve.       Sonia Jean MD

## 2022-08-15 ENCOUNTER — OFFICE VISIT (OUTPATIENT)
Dept: GASTROENTEROLOGY | Facility: CLINIC | Age: 82
End: 2022-08-15
Payer: MEDICARE

## 2022-08-15 ENCOUNTER — LAB VISIT (OUTPATIENT)
Dept: LAB | Facility: HOSPITAL | Age: 82
End: 2022-08-15
Attending: INTERNAL MEDICINE
Payer: MEDICARE

## 2022-08-15 VITALS
WEIGHT: 195.69 LBS | HEIGHT: 60 IN | DIASTOLIC BLOOD PRESSURE: 94 MMHG | SYSTOLIC BLOOD PRESSURE: 174 MMHG | BODY MASS INDEX: 38.42 KG/M2 | HEART RATE: 73 BPM

## 2022-08-15 DIAGNOSIS — I10 HTN (HYPERTENSION), BENIGN: ICD-10-CM

## 2022-08-15 DIAGNOSIS — E78.2 MIXED HYPERLIPIDEMIA: ICD-10-CM

## 2022-08-15 DIAGNOSIS — R73.01 IMPAIRED FASTING GLUCOSE: ICD-10-CM

## 2022-08-15 DIAGNOSIS — K57.32 DIVERTICULITIS OF COLON: ICD-10-CM

## 2022-08-15 DIAGNOSIS — R61 DIAPHORESIS: ICD-10-CM

## 2022-08-15 LAB
ANION GAP SERPL CALC-SCNC: 9 MMOL/L (ref 8–16)
BASOPHILS # BLD AUTO: 0.09 K/UL (ref 0–0.2)
BASOPHILS NFR BLD: 0.9 % (ref 0–1.9)
BUN SERPL-MCNC: 13 MG/DL (ref 8–23)
CALCIUM SERPL-MCNC: 9.7 MG/DL (ref 8.7–10.5)
CHLORIDE SERPL-SCNC: 103 MMOL/L (ref 95–110)
CHOLEST SERPL-MCNC: 179 MG/DL (ref 120–199)
CHOLEST/HDLC SERPL: 3.4 {RATIO} (ref 2–5)
CO2 SERPL-SCNC: 29 MMOL/L (ref 23–29)
CREAT SERPL-MCNC: 0.8 MG/DL (ref 0.5–1.4)
DIFFERENTIAL METHOD: ABNORMAL
EOSINOPHIL # BLD AUTO: 0.4 K/UL (ref 0–0.5)
EOSINOPHIL NFR BLD: 3.8 % (ref 0–8)
ERYTHROCYTE [DISTWIDTH] IN BLOOD BY AUTOMATED COUNT: 15.6 % (ref 11.5–14.5)
EST. GFR  (NO RACE VARIABLE): >60 ML/MIN/1.73 M^2
ESTIMATED AVG GLUCOSE: 128 MG/DL (ref 68–131)
GLUCOSE SERPL-MCNC: 123 MG/DL (ref 70–110)
HBA1C MFR BLD: 6.1 % (ref 4–5.6)
HCT VFR BLD AUTO: 43.4 % (ref 37–48.5)
HDLC SERPL-MCNC: 52 MG/DL (ref 40–75)
HDLC SERPL: 29.1 % (ref 20–50)
HGB BLD-MCNC: 13.4 G/DL (ref 12–16)
IMM GRANULOCYTES # BLD AUTO: 0.03 K/UL (ref 0–0.04)
IMM GRANULOCYTES NFR BLD AUTO: 0.3 % (ref 0–0.5)
LDLC SERPL CALC-MCNC: 111.6 MG/DL (ref 63–159)
LYMPHOCYTES # BLD AUTO: 1.6 K/UL (ref 1–4.8)
LYMPHOCYTES NFR BLD: 16 % (ref 18–48)
MCH RBC QN AUTO: 25.9 PG (ref 27–31)
MCHC RBC AUTO-ENTMCNC: 30.9 G/DL (ref 32–36)
MCV RBC AUTO: 84 FL (ref 82–98)
MONOCYTES # BLD AUTO: 0.7 K/UL (ref 0.3–1)
MONOCYTES NFR BLD: 6.8 % (ref 4–15)
NEUTROPHILS # BLD AUTO: 7 K/UL (ref 1.8–7.7)
NEUTROPHILS NFR BLD: 72.2 % (ref 38–73)
NONHDLC SERPL-MCNC: 127 MG/DL
NRBC BLD-RTO: 0 /100 WBC
PLATELET # BLD AUTO: 256 K/UL (ref 150–450)
PMV BLD AUTO: 10.5 FL (ref 9.2–12.9)
POTASSIUM SERPL-SCNC: 4.4 MMOL/L (ref 3.5–5.1)
RBC # BLD AUTO: 5.17 M/UL (ref 4–5.4)
SODIUM SERPL-SCNC: 141 MMOL/L (ref 136–145)
TRIGL SERPL-MCNC: 77 MG/DL (ref 30–150)
TSH SERPL DL<=0.005 MIU/L-ACNC: 0.64 UIU/ML (ref 0.4–4)
WBC # BLD AUTO: 9.68 K/UL (ref 3.9–12.7)

## 2022-08-15 PROCEDURE — 1159F PR MEDICATION LIST DOCUMENTED IN MEDICAL RECORD: ICD-10-PCS | Mod: CPTII,S$GLB,, | Performed by: INTERNAL MEDICINE

## 2022-08-15 PROCEDURE — 3288F FALL RISK ASSESSMENT DOCD: CPT | Mod: CPTII,S$GLB,, | Performed by: INTERNAL MEDICINE

## 2022-08-15 PROCEDURE — 3077F PR MOST RECENT SYSTOLIC BLOOD PRESSURE >= 140 MM HG: ICD-10-PCS | Mod: CPTII,S$GLB,, | Performed by: INTERNAL MEDICINE

## 2022-08-15 PROCEDURE — 83036 HEMOGLOBIN GLYCOSYLATED A1C: CPT | Performed by: INTERNAL MEDICINE

## 2022-08-15 PROCEDURE — 3080F PR MOST RECENT DIASTOLIC BLOOD PRESSURE >= 90 MM HG: ICD-10-PCS | Mod: CPTII,S$GLB,, | Performed by: INTERNAL MEDICINE

## 2022-08-15 PROCEDURE — 3288F PR FALLS RISK ASSESSMENT DOCUMENTED: ICD-10-PCS | Mod: CPTII,S$GLB,, | Performed by: INTERNAL MEDICINE

## 2022-08-15 PROCEDURE — 80048 BASIC METABOLIC PNL TOTAL CA: CPT | Performed by: INTERNAL MEDICINE

## 2022-08-15 PROCEDURE — 99999 PR PBB SHADOW E&M-EST. PATIENT-LVL V: ICD-10-PCS | Mod: PBBFAC,,, | Performed by: INTERNAL MEDICINE

## 2022-08-15 PROCEDURE — 1160F RVW MEDS BY RX/DR IN RCRD: CPT | Mod: CPTII,S$GLB,, | Performed by: INTERNAL MEDICINE

## 2022-08-15 PROCEDURE — 84443 ASSAY THYROID STIM HORMONE: CPT | Performed by: FAMILY MEDICINE

## 2022-08-15 PROCEDURE — 1159F MED LIST DOCD IN RCRD: CPT | Mod: CPTII,S$GLB,, | Performed by: INTERNAL MEDICINE

## 2022-08-15 PROCEDURE — 99999 PR PBB SHADOW E&M-EST. PATIENT-LVL V: CPT | Mod: PBBFAC,,, | Performed by: INTERNAL MEDICINE

## 2022-08-15 PROCEDURE — 1125F AMNT PAIN NOTED PAIN PRSNT: CPT | Mod: CPTII,S$GLB,, | Performed by: INTERNAL MEDICINE

## 2022-08-15 PROCEDURE — 1125F PR PAIN SEVERITY QUANTIFIED, PAIN PRESENT: ICD-10-PCS | Mod: CPTII,S$GLB,, | Performed by: INTERNAL MEDICINE

## 2022-08-15 PROCEDURE — 36415 COLL VENOUS BLD VENIPUNCTURE: CPT | Performed by: FAMILY MEDICINE

## 2022-08-15 PROCEDURE — 1100F PR PT FALLS ASSESS DOC 2+ FALLS/FALL W/INJURY/YR: ICD-10-PCS | Mod: CPTII,S$GLB,, | Performed by: INTERNAL MEDICINE

## 2022-08-15 PROCEDURE — 85025 COMPLETE CBC W/AUTO DIFF WBC: CPT | Performed by: INTERNAL MEDICINE

## 2022-08-15 PROCEDURE — 3077F SYST BP >= 140 MM HG: CPT | Mod: CPTII,S$GLB,, | Performed by: INTERNAL MEDICINE

## 2022-08-15 PROCEDURE — 1100F PTFALLS ASSESS-DOCD GE2>/YR: CPT | Mod: CPTII,S$GLB,, | Performed by: INTERNAL MEDICINE

## 2022-08-15 PROCEDURE — 99203 PR OFFICE/OUTPT VISIT, NEW, LEVL III, 30-44 MIN: ICD-10-PCS | Mod: S$GLB,,, | Performed by: INTERNAL MEDICINE

## 2022-08-15 PROCEDURE — 1160F PR REVIEW ALL MEDS BY PRESCRIBER/CLIN PHARMACIST DOCUMENTED: ICD-10-PCS | Mod: CPTII,S$GLB,, | Performed by: INTERNAL MEDICINE

## 2022-08-15 PROCEDURE — 99203 OFFICE O/P NEW LOW 30 MIN: CPT | Mod: S$GLB,,, | Performed by: INTERNAL MEDICINE

## 2022-08-15 PROCEDURE — 3080F DIAST BP >= 90 MM HG: CPT | Mod: CPTII,S$GLB,, | Performed by: INTERNAL MEDICINE

## 2022-08-15 PROCEDURE — 80061 LIPID PANEL: CPT | Performed by: INTERNAL MEDICINE

## 2022-08-15 RX ORDER — ASPIRIN 325 MG
325 TABLET ORAL DAILY
Status: ON HOLD | COMMUNITY
End: 2022-10-19 | Stop reason: HOSPADM

## 2022-08-15 NOTE — PROGRESS NOTES
[facility] Note    CC: abdominal pain    HPI 82 y.o. female who presents for evaluation due to recent onset, left sided, moderate abdominal pain without nausea or vomiting.  She presented to the ED for this and was found to have diverticulitis.  She was discharged to home with antibiotics and is now feeling better.  She feels that eating a bag of cherries set off her diverticulitis.    She reports she had a colonoscopy at main campus with in the past few years but no colonoscopy was found on file.    Past Medical History  Past Medical History:   Diagnosis Date    Anxiety     Asthma     Depression     Headache     Hx of psychiatric care     Hypercholesterolemia     Hypertension     Psychiatric problem     Sleep difficulties     Therapy     Thyroid disease     multiple nodules     Past Surgical History  Childbirth    No pertinent family history of colon cancer    Review of Systems  General ROS: negative for chills, fever or weight loss  Cardiovascular ROS: no chest pain or dyspnea on exertion  Gastrointestinal ROS: no abdominal pain, change in bowel habits, or black/ bloody stools    Physical Examination  BP (!) 174/94 Comment: pt did not take b/p med today  Pulse 73   Ht 5' (1.524 m)   Wt 88.7 kg (195 lb 10.5 oz)   BMI 38.21 kg/m²   General appearance: alert, cooperative, no distress  HENT: Normocephalic, atraumatic, neck symmetrical, no nasal discharge   Lungs: clear to auscultation bilaterally, no dullness to percussion bilaterally  Heart: regular rate and rhythm without rub; no displacement of the PMI   Abdomen: soft, non-tender; bowel sounds normoactive; no organomegaly  Extremities: extremities symmetric; no clubbing, cyanosis, or edema  Neurologic: Alert and oriented X 3, normal strength, normal coordination and gait    Labs:  Lab Results   Component Value Date    WBC 9.68 08/15/2022    HGB 13.4 08/15/2022    HCT 43.4 08/15/2022    MCV 84 08/15/2022     08/15/2022          CMP  Sodium   Date Value Ref Range Status   08/15/2022 141 136 - 145 mmol/L Final     Potassium   Date Value Ref Range Status   08/15/2022 4.4 3.5 - 5.1 mmol/L Final     Chloride   Date Value Ref Range Status   08/15/2022 103 95 - 110 mmol/L Final     CO2   Date Value Ref Range Status   08/15/2022 29 23 - 29 mmol/L Final     Glucose   Date Value Ref Range Status   08/15/2022 123 (H) 70 - 110 mg/dL Final     BUN   Date Value Ref Range Status   08/15/2022 13 8 - 23 mg/dL Final     Creatinine   Date Value Ref Range Status   08/15/2022 0.8 0.5 - 1.4 mg/dL Final     Calcium   Date Value Ref Range Status   08/15/2022 9.7 8.7 - 10.5 mg/dL Final     Total Protein   Date Value Ref Range Status   07/24/2022 7.4 6.0 - 8.4 g/dL Final     Albumin   Date Value Ref Range Status   07/24/2022 3.3 (L) 3.5 - 5.2 g/dL Final     Total Bilirubin   Date Value Ref Range Status   07/24/2022 0.4 0.1 - 1.0 mg/dL Final     Comment:     For infants and newborns, interpretation of results should be based  on gestational age, weight and in agreement with clinical  observations.    Premature Infant recommended reference ranges:  Up to 24 hours.............<8.0 mg/dL  Up to 48 hours............<12.0 mg/dL  3-5 days..................<15.0 mg/dL  6-29 days.................<15.0 mg/dL       Alkaline Phosphatase   Date Value Ref Range Status   07/24/2022 126 55 - 135 U/L Final     AST   Date Value Ref Range Status   07/24/2022 24 10 - 40 U/L Final     ALT   Date Value Ref Range Status   07/24/2022 16 10 - 44 U/L Final     Anion Gap   Date Value Ref Range Status   08/15/2022 9 8 - 16 mmol/L Final     eGFR if    Date Value Ref Range Status   07/24/2022 >60 >60 mL/min/1.73 m^2 Final     eGFR if non    Date Value Ref Range Status   07/24/2022 >60 >60 mL/min/1.73 m^2 Final     Comment:     Calculation used to obtain the estimated glomerular filtration  rate (eGFR) is the CKD-EPI equation.            Assessment:   1.  Diverticulitis of colon        Plan:  -Schedule colonoscopy for follow up of her diverticulitis.    Yesica Pinto MD

## 2022-08-16 ENCOUNTER — TELEPHONE (OUTPATIENT)
Dept: FAMILY MEDICINE | Facility: CLINIC | Age: 82
End: 2022-08-16
Payer: MEDICARE

## 2022-08-16 DIAGNOSIS — K57.32 DIVERTICULITIS OF COLON: ICD-10-CM

## 2022-08-16 RX ORDER — DICYCLOMINE HYDROCHLORIDE 20 MG/1
TABLET ORAL
Qty: 60 TABLET | Refills: 2 | Status: SHIPPED | OUTPATIENT
Start: 2022-08-16 | End: 2023-05-01

## 2022-08-17 ENCOUNTER — OFFICE VISIT (OUTPATIENT)
Dept: CARDIOLOGY | Facility: CLINIC | Age: 82
End: 2022-08-17
Payer: MEDICARE

## 2022-08-17 VITALS
RESPIRATION RATE: 16 BRPM | DIASTOLIC BLOOD PRESSURE: 79 MMHG | BODY MASS INDEX: 37.92 KG/M2 | OXYGEN SATURATION: 95 % | HEART RATE: 63 BPM | WEIGHT: 193.13 LBS | SYSTOLIC BLOOD PRESSURE: 190 MMHG | HEIGHT: 60 IN

## 2022-08-17 DIAGNOSIS — I51.89 GRADE I DIASTOLIC DYSFUNCTION: ICD-10-CM

## 2022-08-17 DIAGNOSIS — I10 UNCONTROLLED HYPERTENSION: Primary | ICD-10-CM

## 2022-08-17 DIAGNOSIS — E78.49 OTHER HYPERLIPIDEMIA: ICD-10-CM

## 2022-08-17 DIAGNOSIS — I10 ESSENTIAL HYPERTENSION: ICD-10-CM

## 2022-08-17 DIAGNOSIS — Z86.73 H/O: STROKE: ICD-10-CM

## 2022-08-17 DIAGNOSIS — I11.9 LEFT VENTRICULAR HYPERTROPHY DUE TO HYPERTENSIVE DISEASE, WITHOUT HEART FAILURE: ICD-10-CM

## 2022-08-17 PROCEDURE — 3288F FALL RISK ASSESSMENT DOCD: CPT | Mod: CPTII,S$GLB,, | Performed by: INTERNAL MEDICINE

## 2022-08-17 PROCEDURE — 1160F RVW MEDS BY RX/DR IN RCRD: CPT | Mod: CPTII,S$GLB,, | Performed by: INTERNAL MEDICINE

## 2022-08-17 PROCEDURE — 3078F DIAST BP <80 MM HG: CPT | Mod: CPTII,S$GLB,, | Performed by: INTERNAL MEDICINE

## 2022-08-17 PROCEDURE — 1101F PT FALLS ASSESS-DOCD LE1/YR: CPT | Mod: CPTII,S$GLB,, | Performed by: INTERNAL MEDICINE

## 2022-08-17 PROCEDURE — 99214 PR OFFICE/OUTPT VISIT, EST, LEVL IV, 30-39 MIN: ICD-10-PCS | Mod: S$GLB,,, | Performed by: INTERNAL MEDICINE

## 2022-08-17 PROCEDURE — 3078F PR MOST RECENT DIASTOLIC BLOOD PRESSURE < 80 MM HG: ICD-10-PCS | Mod: CPTII,S$GLB,, | Performed by: INTERNAL MEDICINE

## 2022-08-17 PROCEDURE — 1101F PR PT FALLS ASSESS DOC 0-1 FALLS W/OUT INJ PAST YR: ICD-10-PCS | Mod: CPTII,S$GLB,, | Performed by: INTERNAL MEDICINE

## 2022-08-17 PROCEDURE — 3077F SYST BP >= 140 MM HG: CPT | Mod: CPTII,S$GLB,, | Performed by: INTERNAL MEDICINE

## 2022-08-17 PROCEDURE — 1160F PR REVIEW ALL MEDS BY PRESCRIBER/CLIN PHARMACIST DOCUMENTED: ICD-10-PCS | Mod: CPTII,S$GLB,, | Performed by: INTERNAL MEDICINE

## 2022-08-17 PROCEDURE — 3077F PR MOST RECENT SYSTOLIC BLOOD PRESSURE >= 140 MM HG: ICD-10-PCS | Mod: CPTII,S$GLB,, | Performed by: INTERNAL MEDICINE

## 2022-08-17 PROCEDURE — 3288F PR FALLS RISK ASSESSMENT DOCUMENTED: ICD-10-PCS | Mod: CPTII,S$GLB,, | Performed by: INTERNAL MEDICINE

## 2022-08-17 PROCEDURE — 99214 OFFICE O/P EST MOD 30 MIN: CPT | Mod: S$GLB,,, | Performed by: INTERNAL MEDICINE

## 2022-08-17 PROCEDURE — 99999 PR PBB SHADOW E&M-EST. PATIENT-LVL V: ICD-10-PCS | Mod: PBBFAC,,, | Performed by: INTERNAL MEDICINE

## 2022-08-17 PROCEDURE — 1159F PR MEDICATION LIST DOCUMENTED IN MEDICAL RECORD: ICD-10-PCS | Mod: CPTII,S$GLB,, | Performed by: INTERNAL MEDICINE

## 2022-08-17 PROCEDURE — 1159F MED LIST DOCD IN RCRD: CPT | Mod: CPTII,S$GLB,, | Performed by: INTERNAL MEDICINE

## 2022-08-17 PROCEDURE — 99999 PR PBB SHADOW E&M-EST. PATIENT-LVL V: CPT | Mod: PBBFAC,,, | Performed by: INTERNAL MEDICINE

## 2022-08-17 RX ORDER — CLONIDINE HYDROCHLORIDE 0.1 MG/1
0.1 TABLET ORAL 2 TIMES DAILY
Qty: 180 TABLET | Refills: 3 | Status: SHIPPED | OUTPATIENT
Start: 2022-08-17 | End: 2023-09-06
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
## 2022-08-24 ENCOUNTER — OFFICE VISIT (OUTPATIENT)
Dept: FAMILY MEDICINE | Facility: CLINIC | Age: 82
End: 2022-08-24
Payer: MEDICARE

## 2022-08-24 VITALS
TEMPERATURE: 98 F | WEIGHT: 199.5 LBS | HEIGHT: 60 IN | DIASTOLIC BLOOD PRESSURE: 80 MMHG | OXYGEN SATURATION: 99 % | BODY MASS INDEX: 39.17 KG/M2 | SYSTOLIC BLOOD PRESSURE: 172 MMHG | HEART RATE: 72 BPM

## 2022-08-24 DIAGNOSIS — I10 HTN (HYPERTENSION), BENIGN: ICD-10-CM

## 2022-08-24 DIAGNOSIS — F33.0 MAJOR DEPRESSIVE DISORDER, RECURRENT EPISODE, MILD WITH ANXIOUS DISTRESS: ICD-10-CM

## 2022-08-24 PROCEDURE — 1101F PR PT FALLS ASSESS DOC 0-1 FALLS W/OUT INJ PAST YR: ICD-10-PCS | Mod: CPTII,S$GLB,, | Performed by: INTERNAL MEDICINE

## 2022-08-24 PROCEDURE — 99214 OFFICE O/P EST MOD 30 MIN: CPT | Mod: S$GLB,,, | Performed by: INTERNAL MEDICINE

## 2022-08-24 PROCEDURE — 1126F AMNT PAIN NOTED NONE PRSNT: CPT | Mod: CPTII,S$GLB,, | Performed by: INTERNAL MEDICINE

## 2022-08-24 PROCEDURE — 3077F SYST BP >= 140 MM HG: CPT | Mod: CPTII,S$GLB,, | Performed by: INTERNAL MEDICINE

## 2022-08-24 PROCEDURE — 1160F RVW MEDS BY RX/DR IN RCRD: CPT | Mod: CPTII,S$GLB,, | Performed by: INTERNAL MEDICINE

## 2022-08-24 PROCEDURE — 1126F PR PAIN SEVERITY QUANTIFIED, NO PAIN PRESENT: ICD-10-PCS | Mod: CPTII,S$GLB,, | Performed by: INTERNAL MEDICINE

## 2022-08-24 PROCEDURE — 3079F PR MOST RECENT DIASTOLIC BLOOD PRESSURE 80-89 MM HG: ICD-10-PCS | Mod: CPTII,S$GLB,, | Performed by: INTERNAL MEDICINE

## 2022-08-24 PROCEDURE — 99214 PR OFFICE/OUTPT VISIT, EST, LEVL IV, 30-39 MIN: ICD-10-PCS | Mod: S$GLB,,, | Performed by: INTERNAL MEDICINE

## 2022-08-24 PROCEDURE — 1101F PT FALLS ASSESS-DOCD LE1/YR: CPT | Mod: CPTII,S$GLB,, | Performed by: INTERNAL MEDICINE

## 2022-08-24 PROCEDURE — 1159F PR MEDICATION LIST DOCUMENTED IN MEDICAL RECORD: ICD-10-PCS | Mod: CPTII,S$GLB,, | Performed by: INTERNAL MEDICINE

## 2022-08-24 PROCEDURE — 99999 PR PBB SHADOW E&M-EST. PATIENT-LVL V: CPT | Mod: PBBFAC,,, | Performed by: INTERNAL MEDICINE

## 2022-08-24 PROCEDURE — 99999 PR PBB SHADOW E&M-EST. PATIENT-LVL V: ICD-10-PCS | Mod: PBBFAC,,, | Performed by: INTERNAL MEDICINE

## 2022-08-24 PROCEDURE — 3079F DIAST BP 80-89 MM HG: CPT | Mod: CPTII,S$GLB,, | Performed by: INTERNAL MEDICINE

## 2022-08-24 PROCEDURE — 3077F PR MOST RECENT SYSTOLIC BLOOD PRESSURE >= 140 MM HG: ICD-10-PCS | Mod: CPTII,S$GLB,, | Performed by: INTERNAL MEDICINE

## 2022-08-24 PROCEDURE — 3288F FALL RISK ASSESSMENT DOCD: CPT | Mod: CPTII,S$GLB,, | Performed by: INTERNAL MEDICINE

## 2022-08-24 PROCEDURE — 1159F MED LIST DOCD IN RCRD: CPT | Mod: CPTII,S$GLB,, | Performed by: INTERNAL MEDICINE

## 2022-08-24 PROCEDURE — 1160F PR REVIEW ALL MEDS BY PRESCRIBER/CLIN PHARMACIST DOCUMENTED: ICD-10-PCS | Mod: CPTII,S$GLB,, | Performed by: INTERNAL MEDICINE

## 2022-08-24 PROCEDURE — 3288F PR FALLS RISK ASSESSMENT DOCUMENTED: ICD-10-PCS | Mod: CPTII,S$GLB,, | Performed by: INTERNAL MEDICINE

## 2022-08-24 RX ORDER — MIRTAZAPINE 30 MG/1
30 TABLET, FILM COATED ORAL NIGHTLY PRN
Qty: 90 TABLET | Refills: 1 | Status: SHIPPED | OUTPATIENT
Start: 2022-08-24 | End: 2023-03-01

## 2022-08-24 RX ORDER — LOSARTAN POTASSIUM 50 MG/1
50 TABLET ORAL DAILY
Qty: 90 TABLET | Refills: 3 | Status: SHIPPED | OUTPATIENT
Start: 2022-08-24 | End: 2023-09-06

## 2022-08-24 NOTE — PROGRESS NOTES
"Subjective:       Patient ID: Jose Manuel Boyd is a 82 y.o. female.    Chief Complaint: Annual Exam    F/u blood pressure    HPI: 81 y/o w/ HTN presents alone for scheduled follow up. Feels well no longer with loose stool no abdominal pain. Recently seen by cardiologist. She feels higher doses of losartan is "too strong for her" therefore was prescribed clonidine for elevated blood pressure has not yet started this medication. No palpitations no LE swelling breathing "Fine"    Review of Systems   Constitutional: Negative for activity change, appetite change, fatigue, fever and unexpected weight change.   HENT: Negative for ear pain, rhinorrhea and sore throat.    Eyes: Negative for discharge and visual disturbance.   Respiratory: Negative for chest tightness, shortness of breath and wheezing.    Cardiovascular: Negative for chest pain, palpitations and leg swelling.   Gastrointestinal: Negative for abdominal pain, constipation and diarrhea.   Endocrine: Negative for cold intolerance and heat intolerance.   Genitourinary: Negative for dysuria and hematuria.   Musculoskeletal: Negative for joint swelling and neck stiffness.   Skin: Negative for rash.   Neurological: Negative for dizziness, syncope, weakness and headaches.   Psychiatric/Behavioral: Negative for suicidal ideas.       Objective:     Vitals:    08/24/22 1511 08/24/22 1523   BP: (!) 182/78 (!) 172/80   BP Location: Right arm    Patient Position: Sitting    BP Method: Medium (Manual)    Pulse: 78 72   Temp: 98.3 °F (36.8 °C)    TempSrc: Oral    SpO2: 99%    Weight: 90.5 kg (199 lb 8.3 oz)    Height: 5' (1.524 m)           Physical Exam  Constitutional:       General: She is not in acute distress.     Appearance: She is well-developed. She is obese.   HENT:      Head: Normocephalic and atraumatic.   Eyes:      General: No scleral icterus.     Conjunctiva/sclera: Conjunctivae normal.   Cardiovascular:      Rate and Rhythm: Normal rate and regular rhythm. "      Heart sounds: No murmur heard.    No friction rub. No gallop.   Pulmonary:      Effort: Pulmonary effort is normal.      Breath sounds: Normal breath sounds. No wheezing or rales.   Abdominal:      General: There is no distension.      Palpations: Abdomen is soft.      Tenderness: There is no abdominal tenderness. There is no guarding or rebound.   Musculoskeletal:         General: No tenderness. Normal range of motion.      Cervical back: Normal range of motion.      Right lower leg: No edema.      Left lower leg: No edema.   Skin:     General: Skin is warm and dry.   Neurological:      Mental Status: She is alert and oriented to person, place, and time.      Cranial Nerves: No cranial nerve deficit.         Assessment and Plan   1. HTN (hypertension), benign  Encourage patient to start clonidine warned it can cause sedation   - losartan (COZAAR) 50 MG tablet; Take 1 tablet (50 mg total) by mouth once daily. For high blood pressure  Dispense: 90 tablet; Refill: 3    2. Major depressive disorder, recurrent episode, mild with anxious distress  Continue prn mirtazapine can hold when first starting clonidine  - mirtazapine (REMERON) 30 MG tablet; Take 1 tablet (30 mg total) by mouth nightly as needed (insomnia). At bedtime for sleep, anxiety and depression.  Dispense: 90 tablet; Refill: 1

## 2022-09-01 ENCOUNTER — PATIENT OUTREACH (OUTPATIENT)
Dept: ADMINISTRATIVE | Facility: HOSPITAL | Age: 82
End: 2022-09-01
Payer: MEDICARE

## 2022-09-01 DIAGNOSIS — Z78.0 ASYMPTOMATIC MENOPAUSAL STATE: ICD-10-CM

## 2022-09-07 DIAGNOSIS — Z78.0 MENOPAUSE: ICD-10-CM

## 2022-09-20 ENCOUNTER — TELEPHONE (OUTPATIENT)
Dept: FAMILY MEDICINE | Facility: CLINIC | Age: 82
End: 2022-09-20
Payer: MEDICARE

## 2022-09-20 NOTE — TELEPHONE ENCOUNTER
Spoke with the Patient about the EAWV and to schedule.  Patient stated that she had a visit already.  Declined appointment.

## 2022-10-11 ENCOUNTER — OFFICE VISIT (OUTPATIENT)
Dept: CARDIOLOGY | Facility: CLINIC | Age: 82
End: 2022-10-11
Payer: MEDICARE

## 2022-10-11 VITALS
HEIGHT: 60 IN | WEIGHT: 196 LBS | BODY MASS INDEX: 38.48 KG/M2 | HEART RATE: 73 BPM | OXYGEN SATURATION: 96 % | DIASTOLIC BLOOD PRESSURE: 50 MMHG | SYSTOLIC BLOOD PRESSURE: 119 MMHG

## 2022-10-11 DIAGNOSIS — E78.49 OTHER HYPERLIPIDEMIA: ICD-10-CM

## 2022-10-11 DIAGNOSIS — I51.89 GRADE I DIASTOLIC DYSFUNCTION: ICD-10-CM

## 2022-10-11 DIAGNOSIS — Z86.73 H/O: STROKE: ICD-10-CM

## 2022-10-11 DIAGNOSIS — I10 ESSENTIAL HYPERTENSION: Primary | ICD-10-CM

## 2022-10-11 DIAGNOSIS — I11.9 LEFT VENTRICULAR HYPERTROPHY DUE TO HYPERTENSIVE DISEASE, WITHOUT HEART FAILURE: ICD-10-CM

## 2022-10-11 DIAGNOSIS — I27.20 PULMONARY HTN: ICD-10-CM

## 2022-10-11 PROCEDURE — 99214 PR OFFICE/OUTPT VISIT, EST, LEVL IV, 30-39 MIN: ICD-10-PCS | Mod: S$GLB,,, | Performed by: INTERNAL MEDICINE

## 2022-10-11 PROCEDURE — 1126F PR PAIN SEVERITY QUANTIFIED, NO PAIN PRESENT: ICD-10-PCS | Mod: CPTII,S$GLB,, | Performed by: INTERNAL MEDICINE

## 2022-10-11 PROCEDURE — 3074F SYST BP LT 130 MM HG: CPT | Mod: CPTII,S$GLB,, | Performed by: INTERNAL MEDICINE

## 2022-10-11 PROCEDURE — 3078F PR MOST RECENT DIASTOLIC BLOOD PRESSURE < 80 MM HG: ICD-10-PCS | Mod: CPTII,S$GLB,, | Performed by: INTERNAL MEDICINE

## 2022-10-11 PROCEDURE — 1159F PR MEDICATION LIST DOCUMENTED IN MEDICAL RECORD: ICD-10-PCS | Mod: CPTII,S$GLB,, | Performed by: INTERNAL MEDICINE

## 2022-10-11 PROCEDURE — 3074F PR MOST RECENT SYSTOLIC BLOOD PRESSURE < 130 MM HG: ICD-10-PCS | Mod: CPTII,S$GLB,, | Performed by: INTERNAL MEDICINE

## 2022-10-11 PROCEDURE — 99214 OFFICE O/P EST MOD 30 MIN: CPT | Mod: S$GLB,,, | Performed by: INTERNAL MEDICINE

## 2022-10-11 PROCEDURE — 1160F PR REVIEW ALL MEDS BY PRESCRIBER/CLIN PHARMACIST DOCUMENTED: ICD-10-PCS | Mod: CPTII,S$GLB,, | Performed by: INTERNAL MEDICINE

## 2022-10-11 PROCEDURE — 1160F RVW MEDS BY RX/DR IN RCRD: CPT | Mod: CPTII,S$GLB,, | Performed by: INTERNAL MEDICINE

## 2022-10-11 PROCEDURE — 99499 UNLISTED E&M SERVICE: CPT | Mod: S$GLB,,, | Performed by: INTERNAL MEDICINE

## 2022-10-11 PROCEDURE — 1126F AMNT PAIN NOTED NONE PRSNT: CPT | Mod: CPTII,S$GLB,, | Performed by: INTERNAL MEDICINE

## 2022-10-11 PROCEDURE — 99999 PR PBB SHADOW E&M-EST. PATIENT-LVL IV: CPT | Mod: PBBFAC,,, | Performed by: INTERNAL MEDICINE

## 2022-10-11 PROCEDURE — 1159F MED LIST DOCD IN RCRD: CPT | Mod: CPTII,S$GLB,, | Performed by: INTERNAL MEDICINE

## 2022-10-11 PROCEDURE — 93000 ELECTROCARDIOGRAM COMPLETE: CPT | Mod: S$GLB,,, | Performed by: INTERNAL MEDICINE

## 2022-10-11 PROCEDURE — 99999 PR PBB SHADOW E&M-EST. PATIENT-LVL IV: ICD-10-PCS | Mod: PBBFAC,,, | Performed by: INTERNAL MEDICINE

## 2022-10-11 PROCEDURE — 3078F DIAST BP <80 MM HG: CPT | Mod: CPTII,S$GLB,, | Performed by: INTERNAL MEDICINE

## 2022-10-11 PROCEDURE — 93000 EKG 12-LEAD: ICD-10-PCS | Mod: S$GLB,,, | Performed by: INTERNAL MEDICINE
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
## 2022-10-16 ENCOUNTER — HOSPITAL ENCOUNTER (INPATIENT)
Facility: HOSPITAL | Age: 82
LOS: 3 days | Discharge: HOME HEALTH | DRG: 287 | End: 2022-10-19
Attending: EMERGENCY MEDICINE | Admitting: EMERGENCY MEDICINE
Payer: MEDICARE

## 2022-10-16 DIAGNOSIS — I16.1 HYPERTENSIVE EMERGENCY: ICD-10-CM

## 2022-10-16 DIAGNOSIS — R79.89 ELEVATED TROPONIN: Primary | ICD-10-CM

## 2022-10-16 DIAGNOSIS — R06.02 SHORTNESS OF BREATH: ICD-10-CM

## 2022-10-16 DIAGNOSIS — R07.9 CHEST PAIN: ICD-10-CM

## 2022-10-16 LAB
ALBUMIN SERPL BCP-MCNC: 3.3 G/DL (ref 3.5–5.2)
ALP SERPL-CCNC: 106 U/L (ref 55–135)
ALT SERPL W/O P-5'-P-CCNC: 12 U/L (ref 10–44)
ANION GAP SERPL CALC-SCNC: 9 MMOL/L (ref 8–16)
AST SERPL-CCNC: 16 U/L (ref 10–40)
BASOPHILS # BLD AUTO: 0.06 K/UL (ref 0–0.2)
BASOPHILS NFR BLD: 0.6 % (ref 0–1.9)
BILIRUB SERPL-MCNC: 0.5 MG/DL (ref 0.1–1)
BNP SERPL-MCNC: 378 PG/ML (ref 0–99)
BUN SERPL-MCNC: 8 MG/DL (ref 8–23)
CALCIUM SERPL-MCNC: 9.6 MG/DL (ref 8.7–10.5)
CHLORIDE SERPL-SCNC: 104 MMOL/L (ref 95–110)
CO2 SERPL-SCNC: 31 MMOL/L (ref 23–29)
CREAT SERPL-MCNC: 0.8 MG/DL (ref 0.5–1.4)
CTP QC/QA: YES
CTP QC/QA: YES
DIFFERENTIAL METHOD: ABNORMAL
EOSINOPHIL # BLD AUTO: 0.1 K/UL (ref 0–0.5)
EOSINOPHIL NFR BLD: 1.4 % (ref 0–8)
ERYTHROCYTE [DISTWIDTH] IN BLOOD BY AUTOMATED COUNT: 15.1 % (ref 11.5–14.5)
EST. GFR  (NO RACE VARIABLE): >60 ML/MIN/1.73 M^2
GLUCOSE SERPL-MCNC: 107 MG/DL (ref 70–110)
HCT VFR BLD AUTO: 41.5 % (ref 37–48.5)
HGB BLD-MCNC: 13.4 G/DL (ref 12–16)
IMM GRANULOCYTES # BLD AUTO: 0.03 K/UL (ref 0–0.04)
IMM GRANULOCYTES NFR BLD AUTO: 0.3 % (ref 0–0.5)
INR PPP: 1 (ref 0.8–1.2)
LYMPHOCYTES # BLD AUTO: 1.7 K/UL (ref 1–4.8)
LYMPHOCYTES NFR BLD: 18.4 % (ref 18–48)
MCH RBC QN AUTO: 27.1 PG (ref 27–31)
MCHC RBC AUTO-ENTMCNC: 32.3 G/DL (ref 32–36)
MCV RBC AUTO: 84 FL (ref 82–98)
MONOCYTES # BLD AUTO: 0.8 K/UL (ref 0.3–1)
MONOCYTES NFR BLD: 8 % (ref 4–15)
NEUTROPHILS # BLD AUTO: 6.8 K/UL (ref 1.8–7.7)
NEUTROPHILS NFR BLD: 71.3 % (ref 38–73)
NRBC BLD-RTO: 0 /100 WBC
PLATELET # BLD AUTO: 213 K/UL (ref 150–450)
PMV BLD AUTO: 11 FL (ref 9.2–12.9)
POC MOLECULAR INFLUENZA A AGN: NEGATIVE
POC MOLECULAR INFLUENZA B AGN: NEGATIVE
POTASSIUM SERPL-SCNC: 3.3 MMOL/L (ref 3.5–5.1)
PROT SERPL-MCNC: 7 G/DL (ref 6–8.4)
PROTHROMBIN TIME: 10.9 SEC (ref 9–12.5)
RBC # BLD AUTO: 4.95 M/UL (ref 4–5.4)
SARS-COV-2 RDRP RESP QL NAA+PROBE: NEGATIVE
SODIUM SERPL-SCNC: 144 MMOL/L (ref 136–145)
TROPONIN I SERPL DL<=0.01 NG/ML-MCNC: 0.3 NG/ML (ref 0–0.03)
TROPONIN I SERPL DL<=0.01 NG/ML-MCNC: 0.59 NG/ML (ref 0–0.03)
TROPONIN I SERPL DL<=0.01 NG/ML-MCNC: 0.84 NG/ML (ref 0–0.03)
WBC # BLD AUTO: 9.48 K/UL (ref 3.9–12.7)

## 2022-10-16 PROCEDURE — 84484 ASSAY OF TROPONIN QUANT: CPT | Mod: 91 | Performed by: EMERGENCY MEDICINE

## 2022-10-16 PROCEDURE — 85025 COMPLETE CBC W/AUTO DIFF WBC: CPT | Performed by: EMERGENCY MEDICINE

## 2022-10-16 PROCEDURE — 81003 URINALYSIS AUTO W/O SCOPE: CPT | Performed by: EMERGENCY MEDICINE

## 2022-10-16 PROCEDURE — 93005 ELECTROCARDIOGRAM TRACING: CPT

## 2022-10-16 PROCEDURE — 25000003 PHARM REV CODE 250: Performed by: EMERGENCY MEDICINE

## 2022-10-16 PROCEDURE — 63600175 PHARM REV CODE 636 W HCPCS: Performed by: STUDENT IN AN ORGANIZED HEALTH CARE EDUCATION/TRAINING PROGRAM

## 2022-10-16 PROCEDURE — 99291 CRITICAL CARE FIRST HOUR: CPT | Mod: 25

## 2022-10-16 PROCEDURE — 96366 THER/PROPH/DIAG IV INF ADDON: CPT

## 2022-10-16 PROCEDURE — 85610 PROTHROMBIN TIME: CPT | Performed by: EMERGENCY MEDICINE

## 2022-10-16 PROCEDURE — 96365 THER/PROPH/DIAG IV INF INIT: CPT

## 2022-10-16 PROCEDURE — 84484 ASSAY OF TROPONIN QUANT: CPT | Mod: 91 | Performed by: STUDENT IN AN ORGANIZED HEALTH CARE EDUCATION/TRAINING PROGRAM

## 2022-10-16 PROCEDURE — 63600175 PHARM REV CODE 636 W HCPCS: Performed by: EMERGENCY MEDICINE

## 2022-10-16 PROCEDURE — 93010 EKG 12-LEAD: ICD-10-PCS | Mod: ,,, | Performed by: INTERNAL MEDICINE

## 2022-10-16 PROCEDURE — 83880 ASSAY OF NATRIURETIC PEPTIDE: CPT | Performed by: EMERGENCY MEDICINE

## 2022-10-16 PROCEDURE — 11000001 HC ACUTE MED/SURG PRIVATE ROOM

## 2022-10-16 PROCEDURE — 25000003 PHARM REV CODE 250: Performed by: STUDENT IN AN ORGANIZED HEALTH CARE EDUCATION/TRAINING PROGRAM

## 2022-10-16 PROCEDURE — 87502 INFLUENZA DNA AMP PROBE: CPT

## 2022-10-16 PROCEDURE — 80053 COMPREHEN METABOLIC PANEL: CPT | Performed by: EMERGENCY MEDICINE

## 2022-10-16 PROCEDURE — 87635 SARS-COV-2 COVID-19 AMP PRB: CPT | Performed by: EMERGENCY MEDICINE

## 2022-10-16 PROCEDURE — 96375 TX/PRO/DX INJ NEW DRUG ADDON: CPT

## 2022-10-16 PROCEDURE — 93010 ELECTROCARDIOGRAM REPORT: CPT | Mod: ,,, | Performed by: INTERNAL MEDICINE

## 2022-10-16 PROCEDURE — 36415 COLL VENOUS BLD VENIPUNCTURE: CPT | Performed by: STUDENT IN AN ORGANIZED HEALTH CARE EDUCATION/TRAINING PROGRAM

## 2022-10-16 RX ORDER — HYDRALAZINE HYDROCHLORIDE 20 MG/ML
10 INJECTION INTRAMUSCULAR; INTRAVENOUS
Status: DISCONTINUED | OUTPATIENT
Start: 2022-10-16 | End: 2022-10-16

## 2022-10-16 RX ORDER — PNV NO.95/FERROUS FUM/FOLIC AC 28MG-0.8MG
100 TABLET ORAL DAILY
Status: DISCONTINUED | OUTPATIENT
Start: 2022-10-17 | End: 2022-10-19 | Stop reason: HOSPADM

## 2022-10-16 RX ORDER — NALOXONE HCL 0.4 MG/ML
0.02 VIAL (ML) INJECTION
Status: DISCONTINUED | OUTPATIENT
Start: 2022-10-16 | End: 2022-10-19 | Stop reason: HOSPADM

## 2022-10-16 RX ORDER — PANTOPRAZOLE SODIUM 40 MG/1
40 TABLET, DELAYED RELEASE ORAL DAILY
Status: DISCONTINUED | OUTPATIENT
Start: 2022-10-17 | End: 2022-10-19 | Stop reason: HOSPADM

## 2022-10-16 RX ORDER — HEPARIN SODIUM,PORCINE/D5W 25000/250
0-40 INTRAVENOUS SOLUTION INTRAVENOUS CONTINUOUS
Status: DISCONTINUED | OUTPATIENT
Start: 2022-10-16 | End: 2022-10-16

## 2022-10-16 RX ORDER — CLONIDINE HYDROCHLORIDE 0.1 MG/1
0.1 TABLET ORAL 2 TIMES DAILY
Status: DISCONTINUED | OUTPATIENT
Start: 2022-10-16 | End: 2022-10-19 | Stop reason: HOSPADM

## 2022-10-16 RX ORDER — MIRTAZAPINE 15 MG/1
30 TABLET, FILM COATED ORAL NIGHTLY
Status: DISCONTINUED | OUTPATIENT
Start: 2022-10-16 | End: 2022-10-19 | Stop reason: HOSPADM

## 2022-10-16 RX ORDER — FLUTICASONE FUROATE AND VILANTEROL 200; 25 UG/1; UG/1
1 POWDER RESPIRATORY (INHALATION) DAILY
Status: DISCONTINUED | OUTPATIENT
Start: 2022-10-17 | End: 2022-10-19 | Stop reason: HOSPADM

## 2022-10-16 RX ORDER — GLUCAGON 1 MG
1 KIT INJECTION
Status: DISCONTINUED | OUTPATIENT
Start: 2022-10-16 | End: 2022-10-19 | Stop reason: HOSPADM

## 2022-10-16 RX ORDER — LORAZEPAM 0.5 MG/1
1 TABLET ORAL
Status: COMPLETED | OUTPATIENT
Start: 2022-10-16 | End: 2022-10-16

## 2022-10-16 RX ORDER — ACETAMINOPHEN 325 MG/1
650 TABLET ORAL EVERY 6 HOURS PRN
Status: DISCONTINUED | OUTPATIENT
Start: 2022-10-16 | End: 2022-10-19 | Stop reason: HOSPADM

## 2022-10-16 RX ORDER — NAPROXEN SODIUM 220 MG/1
162 TABLET, FILM COATED ORAL DAILY
Status: DISCONTINUED | OUTPATIENT
Start: 2022-10-16 | End: 2022-10-16

## 2022-10-16 RX ORDER — FLUTICASONE PROPIONATE 50 MCG
1 SPRAY, SUSPENSION (ML) NASAL 2 TIMES DAILY
Status: DISCONTINUED | OUTPATIENT
Start: 2022-10-17 | End: 2022-10-19 | Stop reason: HOSPADM

## 2022-10-16 RX ORDER — ALBUTEROL SULFATE 90 UG/1
2 AEROSOL, METERED RESPIRATORY (INHALATION) EVERY 4 HOURS PRN
Status: DISCONTINUED | OUTPATIENT
Start: 2022-10-16 | End: 2022-10-19 | Stop reason: HOSPADM

## 2022-10-16 RX ORDER — ENOXAPARIN SODIUM 100 MG/ML
40 INJECTION SUBCUTANEOUS EVERY 24 HOURS
Status: DISCONTINUED | OUTPATIENT
Start: 2022-10-16 | End: 2022-10-16

## 2022-10-16 RX ORDER — ASPIRIN 81 MG/1
81 TABLET ORAL DAILY
Status: DISCONTINUED | OUTPATIENT
Start: 2022-10-17 | End: 2022-10-19 | Stop reason: HOSPADM

## 2022-10-16 RX ORDER — MIRTAZAPINE 15 MG/1
30 TABLET, FILM COATED ORAL NIGHTLY PRN
Status: DISCONTINUED | OUTPATIENT
Start: 2022-10-16 | End: 2022-10-16

## 2022-10-16 RX ORDER — HYDRALAZINE HYDROCHLORIDE 20 MG/ML
5 INJECTION INTRAMUSCULAR; INTRAVENOUS
Status: COMPLETED | OUTPATIENT
Start: 2022-10-16 | End: 2022-10-16

## 2022-10-16 RX ORDER — IBUPROFEN 200 MG
16 TABLET ORAL
Status: DISCONTINUED | OUTPATIENT
Start: 2022-10-16 | End: 2022-10-19 | Stop reason: HOSPADM

## 2022-10-16 RX ORDER — CLONIDINE HYDROCHLORIDE 0.1 MG/1
0.1 TABLET ORAL 2 TIMES DAILY
Status: DISCONTINUED | OUTPATIENT
Start: 2022-10-16 | End: 2022-10-16

## 2022-10-16 RX ORDER — ATORVASTATIN CALCIUM 40 MG/1
40 TABLET, FILM COATED ORAL DAILY
Status: DISCONTINUED | OUTPATIENT
Start: 2022-10-17 | End: 2022-10-19 | Stop reason: HOSPADM

## 2022-10-16 RX ORDER — POTASSIUM CHLORIDE 20 MEQ/1
20 TABLET, EXTENDED RELEASE ORAL ONCE
Status: COMPLETED | OUTPATIENT
Start: 2022-10-16 | End: 2022-10-16

## 2022-10-16 RX ORDER — ASCORBIC ACID 500 MG
1000 TABLET ORAL DAILY
Status: DISCONTINUED | OUTPATIENT
Start: 2022-10-17 | End: 2022-10-19 | Stop reason: HOSPADM

## 2022-10-16 RX ORDER — ONDANSETRON 8 MG/1
8 TABLET, ORALLY DISINTEGRATING ORAL EVERY 8 HOURS PRN
Status: DISCONTINUED | OUTPATIENT
Start: 2022-10-16 | End: 2022-10-19 | Stop reason: HOSPADM

## 2022-10-16 RX ORDER — SODIUM CHLORIDE 0.9 % (FLUSH) 0.9 %
10 SYRINGE (ML) INJECTION EVERY 12 HOURS PRN
Status: DISCONTINUED | OUTPATIENT
Start: 2022-10-16 | End: 2022-10-19 | Stop reason: HOSPADM

## 2022-10-16 RX ORDER — IBUPROFEN 200 MG
24 TABLET ORAL
Status: DISCONTINUED | OUTPATIENT
Start: 2022-10-16 | End: 2022-10-19 | Stop reason: HOSPADM

## 2022-10-16 RX ORDER — ENOXAPARIN SODIUM 100 MG/ML
1 INJECTION SUBCUTANEOUS
Status: DISCONTINUED | OUTPATIENT
Start: 2022-10-16 | End: 2022-10-19 | Stop reason: HOSPADM

## 2022-10-16 RX ORDER — OLOPATADINE HYDROCHLORIDE 1 MG/ML
1 SOLUTION/ DROPS OPHTHALMIC 2 TIMES DAILY
Status: DISCONTINUED | OUTPATIENT
Start: 2022-10-16 | End: 2022-10-19 | Stop reason: HOSPADM

## 2022-10-16 RX ORDER — FUROSEMIDE 20 MG/1
20 TABLET ORAL DAILY
Status: DISCONTINUED | OUTPATIENT
Start: 2022-10-17 | End: 2022-10-19 | Stop reason: HOSPADM

## 2022-10-16 RX ADMIN — POTASSIUM CHLORIDE 20 MEQ: 1500 TABLET, EXTENDED RELEASE ORAL at 05:10

## 2022-10-16 RX ADMIN — HEPARIN SODIUM AND DEXTROSE 12 UNITS/KG/HR: 10000; 5 INJECTION INTRAVENOUS at 02:10

## 2022-10-16 RX ADMIN — BUSPIRONE HYDROCHLORIDE 7.5 MG: 5 TABLET ORAL at 08:10

## 2022-10-16 RX ADMIN — OLOPATADINE HYDROCHLORIDE 1 DROP: 1.11 SOLUTION/ DROPS OPHTHALMIC at 08:10

## 2022-10-16 RX ADMIN — HYDRALAZINE HYDROCHLORIDE 5 MG: 20 INJECTION INTRAMUSCULAR; INTRAVENOUS at 03:10

## 2022-10-16 RX ADMIN — MIRTAZAPINE 30 MG: 15 TABLET, FILM COATED ORAL at 08:10

## 2022-10-16 RX ADMIN — LORAZEPAM 1 MG: 0.5 TABLET ORAL at 02:10

## 2022-10-16 RX ADMIN — CLONIDINE HYDROCHLORIDE 0.1 MG: 0.1 TABLET ORAL at 08:10

## 2022-10-16 RX ADMIN — ENOXAPARIN SODIUM 90 MG: 100 INJECTION SUBCUTANEOUS at 05:10

## 2022-10-16 NOTE — NURSING
[facility]  Nurses Note -- 4 Eyes      10/16/2022       Skin assessed on: Admit      [x] No Pressure Injuries Present    []Prevention Measures Documented    [] Yes LDA  for Pressure Injury Previously documented     [] Yes New Pressure Injury Discovered   [] LDA for New Pressure Injury Added      Attending RN:  Araceli Chang, RN     Second RN:  Kalee Reis, PCT

## 2022-10-16 NOTE — HPI
"Ms. Boyd is an 82-year-old female with a past medical history of depression, hyperlipidemia, hypertension and asthma who presents to the emergency department for "Slime" in her mouth.  She states she woke up this morning and noted she had sticky 7 her mouth.  She tried to rinse her mouth out with mouthwash but did not help.  She states this is the reason why she came to the emergency department.  She denies any fevers, chills, cough or shortness of breath.  No chest pain either.  She otherwise has been feeling well.    In the emergency department, she was found to be very hypertensive with systolic greater than 200.  Her troponin was also found to be elevated at 0.299.  Her EKG did not show any acute ST changes.  She was started on hypertrophied emergency department however cardiology was not consulted.  She was asked to admitted to hospital medicine for further management.  "

## 2022-10-16 NOTE — Clinical Note
The catheter was inserted into the aorta. Hemodynamics were performed.   Wire removed prior to hemos.

## 2022-10-16 NOTE — H&P
"[facility] Emergency [facility] Medicine  History & Physical    Patient Name: Jose Manuel Boyd  MRN: [id number]  Patient Class: IP- Inpatient  Admission Date: 10/16/2022  Attending Physician: John Lion MD   Primary Care Provider: Ajit Piña MD         Patient information was obtained from patient, EMS personnel, past medical records and ER records.     Subjective:     Principal Problem:HTN (hypertension), benign    Chief Complaint:   Chief Complaint   Patient presents with    Shortness of Breath     Pt c/o headache and excessive mucus in her mouth. X 2 days        HPI: Ms. Boyd is an 82-year-old female with a past medical history of depression, hyperlipidemia, hypertension and asthma who presents to the emergency department for "Slime" in her mouth.  She states she woke up this morning and noted she had sticky 7 her mouth.  She tried to rinse her mouth out with mouthwash but did not help.  She states this is the reason why she came to the emergency department.  She denies any fevers, chills, cough or shortness of breath.  No chest pain either.  She otherwise has been feeling well.    In the emergency department, she was found to be very hypertensive with systolic greater than 200.  Her troponin was also found to be elevated at 0.299.  Her EKG did not show any acute ST changes.  She was started on hypertrophied emergency department however cardiology was not consulted.  She was asked to admitted to hospital medicine for further management.      Past Medical History:   Diagnosis Date    Anxiety     Asthma     Depression     Headache     Hx of psychiatric care     Hypercholesterolemia     Hypertension     Psychiatric problem     Sleep difficulties     Therapy     Thyroid disease     multiple nodules       Past Surgical History:   Procedure Laterality Date    CHOLECYSTECTOMY      HYSTERECTOMY      knee repalcement         Review of patient's allergies indicates:   Allergen Reactions    " Codeine      Other reaction(s): Rash       No current facility-administered medications on file prior to encounter.     Current Outpatient Medications on File Prior to Encounter   Medication Sig    acetaminophen (TYLENOL) 325 MG tablet Take 2 tablets (650 mg total) by mouth every 6 (six) hours as needed for Pain.    albuterol (VENTOLIN HFA) 90 mcg/actuation inhaler Inhale 2 puffs into the lungs every 4 (four) hours as needed for Wheezing.    ascorbic acid, vitamin C, (VITAMIN C) 1000 MG tablet Take 1,000 mg by mouth once daily.    aspirin (ECOTRIN) 81 MG EC tablet Take 81 mg by mouth once daily.    atorvastatin (LIPITOR) 40 MG tablet TAKE 1 TABLET BY MOUTH EVERY DAY    azelastine (ASTELIN) 137 mcg (0.1 %) nasal spray 1 spray (137 mcg total) by Nasal route 2 (two) times daily.    cloNIDine (CATAPRES) 0.1 MG tablet Take 1 tablet (0.1 mg total) by mouth 2 (two) times daily.    cyanocobalamin (VITAMIN B-12) 1000 MCG tablet Take 100 mcg by mouth once daily.    dicyclomine (BENTYL) 20 mg tablet TAKE 1 TABLET BY MOUTH TWICE A DAY AS NEEDED FOR ADOMINAL PAIN    fluticasone propionate (FLONASE) 50 mcg/actuation nasal spray 1 spray (50 mcg total) by Each Nostril route 2 (two) times daily. For allergic rhinitis/sinusitis    fluticasone-salmeterol diskus inhaler 500-50 mcg Inhale 1 puff into the lungs 2 (two) times daily. Controller    furosemide (LASIX) 20 MG tablet TAKE 1 TABLET BY MOUTH EVERY DAY    losartan (COZAAR) 50 MG tablet Take 1 tablet (50 mg total) by mouth once daily. For high blood pressure    mirtazapine (REMERON) 30 MG tablet Take 1 tablet (30 mg total) by mouth nightly as needed (insomnia). At bedtime for sleep, anxiety and depression.    multivitamin with minerals tablet Take 1 tablet by mouth once daily.    naphazoline HCl/pheniramine (EYE ALLERGY RELIEF OPHT) Apply to eye.    olopatadine (PATANOL) 0.1 % ophthalmic solution Place 1 drop into both eyes 2 (two) times daily.    omega-3 fatty  acids/fish oil (FISH OIL-OMEGA-3 FATTY ACIDS) 300-1,000 mg capsule Take by mouth once daily.    omeprazole (PRILOSEC) 20 MG capsule Take 1 capsule (20 mg total) by mouth once daily. For gastric reflux    psyllium (METAMUCIL) powder Take 1 packet by mouth daily as needed.    aspirin 325 MG tablet Take 325 mg by mouth once daily.    busPIRone (BUSPAR) 7.5 MG tablet TAKE 1 TABLET (7.5 MG TOTAL) BY MOUTH 2 (TWO) TIMES DAILY.    diphenhydrAMINE-aluminum-magnesium hydroxide-simethicone-LIDOcaine HCl 2% Swish and spit 15 mLs every 4 (four) hours as needed.     Family History       Problem Relation (Age of Onset)    Bipolar disorder Daughter    Diabetes Mother    Heart disease Father    Hypertension Mother    Kidney disease Mother          Tobacco Use    Smoking status: Never    Smokeless tobacco: Never   Substance and Sexual Activity    Alcohol use: No    Drug use: No    Sexual activity: Not Currently     Review of Systems   Constitutional:  Negative for chills and fever.   HENT:  Positive for postnasal drip. Negative for congestion and nosebleeds.    Eyes:  Negative for visual disturbance.   Respiratory:  Negative for cough and shortness of breath.    Cardiovascular:  Negative for chest pain and leg swelling.   Gastrointestinal:  Negative for abdominal pain, diarrhea, nausea and vomiting.   Genitourinary:  Negative for dysuria and hematuria.   Musculoskeletal:  Negative for arthralgias.   Skin:  Negative for rash.   Neurological:  Negative for dizziness and weakness.   Psychiatric/Behavioral:  Negative for agitation and confusion.    Objective:     Vital Signs (Most Recent):  Temp: 98.3 °F (36.8 °C) (10/16/22 1128)  Pulse: 84 (10/16/22 1602)  Resp: 20 (10/16/22 1348)  BP: (!) 168/79 (10/16/22 1602)  SpO2: (!) 92 % (10/16/22 1548)   Vital Signs (24h Range):  Temp:  [98.3 °F (36.8 °C)] 98.3 °F (36.8 °C)  Pulse:  [65-84] 84  Resp:  [16-20] 20  SpO2:  [92 %-95 %] 92 %  BP: (152-218)/() 168/79     Weight: 88.5  kg (195 lb)  Body mass index is 38.08 kg/m².    Physical Exam  Vitals and nursing note reviewed.   Constitutional:       General: She is not in acute distress.     Appearance: Normal appearance. She is not ill-appearing.   HENT:      Head: Normocephalic and atraumatic.      Right Ear: External ear normal.      Left Ear: External ear normal.      Nose: Nose normal. No congestion or rhinorrhea.      Mouth/Throat:      Mouth: Mucous membranes are moist.      Pharynx: Oropharynx is clear. No oropharyngeal exudate.   Eyes:      General: No scleral icterus.     Conjunctiva/sclera: Conjunctivae normal.   Cardiovascular:      Rate and Rhythm: Normal rate and regular rhythm.      Pulses: Normal pulses.      Heart sounds: Normal heart sounds. No murmur heard.  Pulmonary:      Effort: Pulmonary effort is normal. No respiratory distress.      Breath sounds: Normal breath sounds. No wheezing, rhonchi or rales.   Abdominal:      General: Bowel sounds are normal. There is no distension.      Palpations: Abdomen is soft.      Tenderness: There is no abdominal tenderness. There is no guarding.   Musculoskeletal:         General: No swelling or tenderness. Normal range of motion.      Right lower leg: No edema.      Left lower leg: No edema.   Skin:     General: Skin is warm and dry.      Capillary Refill: Capillary refill takes less than 2 seconds.      Coloration: Skin is not jaundiced or pale.   Neurological:      General: No focal deficit present.      Mental Status: She is alert and oriented to person, place, and time. Mental status is at baseline.      Sensory: No sensory deficit.      Motor: No weakness.   Psychiatric:         Mood and Affect: Mood normal.         Behavior: Behavior normal.         Thought Content: Thought content normal.         Judgment: Judgment normal.           Significant Labs: All pertinent labs within the past 24 hours have been reviewed.    Significant Imaging: I have reviewed all pertinent imaging  results/findings within the past 24 hours.    Assessment/Plan:     * HTN (hypertension), benign  Uncontrolled, does not want to take higher doses of losartan so is on clonidine 0.1 mg twice a day.  It appear she does not take this all of the time.  Will restart her home medications and monitor.      Elevated troponin  Her troponin was elevated at 0.299.  She has no chest pain or shortness of breath.  Her EKG shows no ST changes.  Was given aspirin and statin.  Started on heparin but transition to Lovenox for now.  Will trend out troponin, if increases will consult Cardiology.  Check echocardiogram for thoroughness.      Gastroesophageal reflux disease  Resume PPI      Depression  Resume home mirtazapine at night      VTE Risk Mitigation (From admission, onward)         Ordered     enoxaparin injection 90 mg  Every 12 hours (non-standard times)         10/16/22 1644     IP VTE HIGH RISK PATIENT  Once         10/16/22 1638     Place sequential compression device  Until discontinued         10/16/22 1638                   John Lion MD  Department of Hospital Medicine   [facility] - Emergency Dept

## 2022-10-16 NOTE — ASSESSMENT & PLAN NOTE
Her troponin was elevated at 0.299.  She has no chest pain or shortness of breath.  Her EKG shows no ST changes.  Was given aspirin and statin.  Started on heparin but transition to Lovenox for now.  Will trend out troponin, if increases will consult Cardiology.  Check echocardiogram for thoroughness.

## 2022-10-16 NOTE — ED PROVIDER NOTES
Encounter Date: 10/16/2022    SCRIBE #1 NOTE: I, Kassie Daniel, am scribing for, and in the presence of,  Martinez Esqueda MD. I have scribed the following portions of the note - Other sections scribed: HPI, ROS, PE.     History     Chief Complaint   Patient presents with    Shortness of Breath     Pt c/o headache and excessive mucus in her mouth. X 2 days     Jose Manuel Boyd is a 82 y.o. female, with a past medical history of HTN, asthma, hypercholesterolemia, and headaches, who presents to the ED with complaints of a HA and excessive mucus in the mouth for the past 2 days. Pt has oxygen at home and last took pressure medications at 5:00 am today. No other exacerbating or alleviating factors. Patient denies chest pain, runny nose, congestion, change to baseline SOB, or other associated symptoms.      Patient took a 325 of aspirin earlier today.    The history is provided by the patient. No  was used.   Review of patient's allergies indicates:   Allergen Reactions    Codeine      Other reaction(s): Rash     Past Medical History:   Diagnosis Date    Anxiety     Asthma     Depression     Headache     Hx of psychiatric care     Hypercholesterolemia     Hypertension     Psychiatric problem     Sleep difficulties     Therapy     Thyroid disease     multiple nodules     Past Surgical History:   Procedure Laterality Date    CHOLECYSTECTOMY      HYSTERECTOMY      knee repalcement       Family History   Problem Relation Age of Onset    Diabetes Mother     Hypertension Mother     Kidney disease Mother     Heart disease Father     Bipolar disorder Daughter      Social History     Tobacco Use    Smoking status: Never    Smokeless tobacco: Never   Substance Use Topics    Alcohol use: No    Drug use: No     Review of Systems   HENT:  Positive for postnasal drip. Negative for congestion and sore throat.    Eyes:  Negative for photophobia and pain.   Respiratory:  Negative for chest tightness and shortness of  breath.    Cardiovascular:  Negative for chest pain.   Gastrointestinal:  Negative for nausea.   Endocrine: Negative for polydipsia and polyuria.   Genitourinary:  Negative for dysuria and flank pain.   Musculoskeletal:  Negative for back pain, neck pain and neck stiffness.   Skin:  Negative for rash.   Allergic/Immunologic: Negative for immunocompromised state.   Neurological:  Positive for headaches. Negative for dizziness and weakness.   Hematological:  Does not bruise/bleed easily.   Psychiatric/Behavioral:  Negative for agitation and behavioral problems.    All other systems reviewed and are negative.    Physical Exam     Initial Vitals [10/16/22 1128]   BP Pulse Resp Temp SpO2   (!) 218/100 77 18 98.3 °F (36.8 °C) (!) 94 %      MAP       --         Physical Exam    Nursing note and vitals reviewed.  Constitutional: She appears well-developed and well-nourished.   HENT:   Head: Normocephalic and atraumatic.   Mouth/Throat: Oropharynx is clear and moist and mucous membranes are normal.   Postnasal drip   Eyes: Conjunctivae and EOM are normal. Pupils are equal, round, and reactive to light. Right conjunctiva is not injected. Left conjunctiva is not injected. No scleral icterus.   Neck: Neck supple.   Normal range of motion.   Full passive range of motion without pain.     Cardiovascular:  Normal rate, regular rhythm, S1 normal, S2 normal, normal heart sounds and normal pulses.     Exam reveals no gallop and no friction rub.       No murmur heard.  Pulses:       Radial pulses are 2+ on the right side and 2+ on the left side.   Pulmonary/Chest: Effort normal and breath sounds normal. No respiratory distress.   Abdominal: Abdomen is soft. She exhibits no distension. There is no abdominal tenderness.   Musculoskeletal:         General: Normal range of motion.      Cervical back: Full passive range of motion without pain, normal range of motion and neck supple.      Right lower le+ Edema present.      Left lower  le+ Edema present.      Comments: Good active ROM of all extremities. No lower extremity edema or cyanosis.      Neurological: No cranial nerve deficit. Gait normal.   A&Ox4, normal speech.   Skin: Skin is warm. No ecchymosis and no rash noted.   Psychiatric: She has a normal mood and affect. Thought content normal.       ED Course   Procedures  Labs Reviewed   CBC W/ AUTO DIFFERENTIAL - Abnormal; Notable for the following components:       Result Value    RDW 15.1 (*)     All other components within normal limits   COMPREHENSIVE METABOLIC PANEL - Abnormal; Notable for the following components:    Potassium 3.3 (*)     CO2 31 (*)     Albumin 3.3 (*)     All other components within normal limits   TROPONIN I - Abnormal; Notable for the following components:    Troponin I 0.299 (*)     All other components within normal limits   B-TYPE NATRIURETIC PEPTIDE - Abnormal; Notable for the following components:     (*)     All other components within normal limits   PROTIME-INR   URINALYSIS, REFLEX TO URINE CULTURE   TROPONIN I   SARS-COV-2 RDRP GENE   POCT INFLUENZA A/B MOLECULAR     EKG Readings: (Independently Interpreted)   EKG done 1229 showed normal sinus rhythm with sinus arrhythmia with first-degree AV block with a rate of 63.  No ST elevation or major T-wave abnormality.  QRS is 84 and QTC is 446.  Nonspecific EKG compared to previous and similar.       Imaging Results              CT Head Without Contrast (Final result)  Result time 10/16/22 14:24:42      Final result by Chung Reagan MD (10/16/22 14:24:42)                   Impression:      1. No acute intracranial abnormalities noting sequela of chronic microvascular ischemic change and senescent change.      Electronically signed by: Chung Reagan MD  Date:    10/16/2022  Time:    14:24               Narrative:    EXAMINATION:  CT HEAD WITHOUT CONTRAST    CLINICAL HISTORY:  Headache, sudden, severe;    TECHNIQUE:  Low dose axial images were  obtained through the head.  Coronal and sagittal reformations were also performed. Contrast was not administered.    COMPARISON:  05/01/2022    FINDINGS:  There is generalized cerebral volume loss.  There is hypoattenuation in a periventricular fashion, likely sequela of chronic microvascular ischemic change.  There is no evidence of acute major vascular territory infarct, hemorrhage, or mass.  There is no hydrocephalus.  There are no abnormal extra-axial fluid collections.  The paranasal sinuses and mastoid air cells are clear, and there is no evidence of calvarial fracture.  The visualized soft tissues are unremarkable.                                       X-Ray Chest AP Portable (Final result)  Result time 10/16/22 12:18:31      Final result by Omar Aranda MD (10/16/22 12:18:31)                   Impression:      Findings suggestive of mild pulmonary edema.      Electronically signed by: Omar Aranda MD  Date:    10/16/2022  Time:    12:18               Narrative:    EXAMINATION:  XR CHEST AP PORTABLE    CLINICAL HISTORY:  CHF;    TECHNIQUE:  Single frontal view of the chest was performed.    COMPARISON:  Multiple prior exams, most recent from 07/24/2020    FINDINGS:  Cardiac silhouette is enlarged similar to prior exam.  Atherosclerosis of the aortic arch.  Prominence of the pulmonary vasculature with bilateral interstitial lung markings of the lower lung zones suggestive of mild pulmonary edema.  No pleural effusion.  No pneumothorax.  No evidence of acute fracture.                                       Medications   heparin 25,000 units in dextrose 5% 250 mL (100 units/mL) infusion LOW INTENSITY nomogram - [facility] (12 Units/kg/hr × 62.7 kg (Adjusted) Intravenous New Bag 10/16/22 4940)   heparin 25,000 units in dextrose 5% (100 units/ml) IV bolus from bag - ADDITIONAL PRN BOLUS - 60 units/kg (has no administration in time range)   heparin 25,000 units in dextrose 5% (100 units/ml) IV bolus from bag -  ADDITIONAL PRN BOLUS - 30 units/kg (has no administration in time range)   heparin 25,000 units in dextrose 5% (100 units/ml) IV bolus from bag INITIAL BOLUS (3,760 Units Intravenous Bolus from Bag 10/16/22 1450)   LORazepam tablet 1 mg (1 mg Oral Given 10/16/22 1446)   hydrALAZINE injection 5 mg (5 mg Intravenous Given 10/16/22 1542)     Medical Decision Making:   History:   Old Medical Records: I decided to obtain old medical records.  Initial Assessment:   82-year-old female presenting secondary to multiple complaints.  Patient seems anxious.  Patient has elevated blood pressure but came down without any interventions.  However when I spoke to her about her lab findings are blood pressure increased to the 240 systolic.  Gave her an Ativan and she decreased down to the 180s.  Gave 5 mg of hydralazine afterwards.  Do not want it aggressively decrease her blood pressure.  Patient's elevated troponin.  I think this is more consistent with a hypertensive emergency as opposed to NSTEMI.  Patient already took an aspirin.  Did put her on heparin prophylactically.  Had a headache before arrival to the emergency department but no major headache now.  CT head is reassuring.  No acute kidney injury.  Patient admitted for further workup management.    Please put in 35 minutes of critical care due to patient having a high risk of cardiac failure.   Separate from teaching and exclusive of procedure and ekg time  Includes:  Time at bedside  Time reviewing test results  Time discussing case with staff  Time documenting the medical record  Time spent with family members  Time spent with consults  Management    Clinical Tests:   Lab Tests: Ordered and Reviewed  Radiological Study: Reviewed and Ordered  Medical Tests: Ordered and Reviewed        Scribe Attestation:   Scribe #1: I performed the above scribed service and the documentation accurately describes the services I performed. I attest to the accuracy of the note.                    Clinical Impression:   Final diagnoses:  [R06.02] Shortness of breath  [I16.1] Hypertensive emergency        ED Disposition Condition    Admit Stable        I, Martinez Esqueda, personally performed the services described in this documentation. All medical record entries made by the scribe were at my direction and in my presence. I have reviewed the chart and agree that the record reflects my personal performance and is accurate and complete.          Martinez Esqueda MD  10/16/22 0093

## 2022-10-16 NOTE — Clinical Note
52 ml of contrast were injected throughout the case. 48 mL of contrast was the total wasted during the case. 100 mL was the total amount used during the case.

## 2022-10-16 NOTE — Clinical Note
The DP pulses were 1+ bilaterally. The PT pulses were 1+ bilaterally. The right radial pulse was +1.

## 2022-10-16 NOTE — SUBJECTIVE & OBJECTIVE
Past Medical History:   Diagnosis Date    Anxiety     Asthma     Depression     Headache     Hx of psychiatric care     Hypercholesterolemia     Hypertension     Psychiatric problem     Sleep difficulties     Therapy     Thyroid disease     multiple nodules       Past Surgical History:   Procedure Laterality Date    CHOLECYSTECTOMY      HYSTERECTOMY      knee repalcement         Review of patient's allergies indicates:   Allergen Reactions    Codeine      Other reaction(s): Rash       No current facility-administered medications on file prior to encounter.     Current Outpatient Medications on File Prior to Encounter   Medication Sig    acetaminophen (TYLENOL) 325 MG tablet Take 2 tablets (650 mg total) by mouth every 6 (six) hours as needed for Pain.    albuterol (VENTOLIN HFA) 90 mcg/actuation inhaler Inhale 2 puffs into the lungs every 4 (four) hours as needed for Wheezing.    ascorbic acid, vitamin C, (VITAMIN C) 1000 MG tablet Take 1,000 mg by mouth once daily.    aspirin (ECOTRIN) 81 MG EC tablet Take 81 mg by mouth once daily.    atorvastatin (LIPITOR) 40 MG tablet TAKE 1 TABLET BY MOUTH EVERY DAY    azelastine (ASTELIN) 137 mcg (0.1 %) nasal spray 1 spray (137 mcg total) by Nasal route 2 (two) times daily.    cloNIDine (CATAPRES) 0.1 MG tablet Take 1 tablet (0.1 mg total) by mouth 2 (two) times daily.    cyanocobalamin (VITAMIN B-12) 1000 MCG tablet Take 100 mcg by mouth once daily.    dicyclomine (BENTYL) 20 mg tablet TAKE 1 TABLET BY MOUTH TWICE A DAY AS NEEDED FOR ADOMINAL PAIN    fluticasone propionate (FLONASE) 50 mcg/actuation nasal spray 1 spray (50 mcg total) by Each Nostril route 2 (two) times daily. For allergic rhinitis/sinusitis    fluticasone-salmeterol diskus inhaler 500-50 mcg Inhale 1 puff into the lungs 2 (two) times daily. Controller    furosemide (LASIX) 20 MG tablet TAKE 1 TABLET BY MOUTH EVERY DAY    losartan (COZAAR) 50 MG tablet Take 1 tablet (50 mg total)  by mouth once daily. For high blood pressure    mirtazapine (REMERON) 30 MG tablet Take 1 tablet (30 mg total) by mouth nightly as needed (insomnia). At bedtime for sleep, anxiety and depression.    multivitamin with minerals tablet Take 1 tablet by mouth once daily.    naphazoline HCl/pheniramine (EYE ALLERGY RELIEF OPHT) Apply to eye.    olopatadine (PATANOL) 0.1 % ophthalmic solution Place 1 drop into both eyes 2 (two) times daily.    omega-3 fatty acids/fish oil (FISH OIL-OMEGA-3 FATTY ACIDS) 300-1,000 mg capsule Take by mouth once daily.    omeprazole (PRILOSEC) 20 MG capsule Take 1 capsule (20 mg total) by mouth once daily. For gastric reflux    psyllium (METAMUCIL) powder Take 1 packet by mouth daily as needed.    aspirin 325 MG tablet Take 325 mg by mouth once daily.    busPIRone (BUSPAR) 7.5 MG tablet TAKE 1 TABLET (7.5 MG TOTAL) BY MOUTH 2 (TWO) TIMES DAILY.    diphenhydrAMINE-aluminum-magnesium hydroxide-simethicone-LIDOcaine HCl 2% Swish and spit 15 mLs every 4 (four) hours as needed.     Family History       Problem Relation (Age of Onset)    Bipolar disorder Daughter    Diabetes Mother    Heart disease Father    Hypertension Mother    Kidney disease Mother          Tobacco Use    Smoking status: Never    Smokeless tobacco: Never   Substance and Sexual Activity    Alcohol use: No    Drug use: No    Sexual activity: Not Currently     Review of Systems   Constitutional:  Negative for chills and fever.   HENT:  Positive for postnasal drip. Negative for congestion and nosebleeds.    Eyes:  Negative for visual disturbance.   Respiratory:  Negative for cough and shortness of breath.    Cardiovascular:  Negative for chest pain and leg swelling.   Gastrointestinal:  Negative for abdominal pain, diarrhea, nausea and vomiting.   Genitourinary:  Negative for dysuria and hematuria.   Musculoskeletal:  Negative for arthralgias.   Skin:  Negative for rash.   Neurological:  Negative for dizziness and  weakness.   Psychiatric/Behavioral:  Negative for agitation and confusion.    Objective:     Vital Signs (Most Recent):  Temp: 98.3 °F (36.8 °C) (10/16/22 1128)  Pulse: 84 (10/16/22 1602)  Resp: 20 (10/16/22 1348)  BP: (!) 168/79 (10/16/22 1602)  SpO2: (!) 92 % (10/16/22 1548)   Vital Signs (24h Range):  Temp:  [98.3 °F (36.8 °C)] 98.3 °F (36.8 °C)  Pulse:  [65-84] 84  Resp:  [16-20] 20  SpO2:  [92 %-95 %] 92 %  BP: (152-218)/() 168/79     Weight: 88.5 kg (195 lb)  Body mass index is 38.08 kg/m².    Physical Exam  Vitals and nursing note reviewed.   Constitutional:       General: She is not in acute distress.     Appearance: Normal appearance. She is not ill-appearing.   HENT:      Head: Normocephalic and atraumatic.      Right Ear: External ear normal.      Left Ear: External ear normal.      Nose: Nose normal. No congestion or rhinorrhea.      Mouth/Throat:      Mouth: Mucous membranes are moist.      Pharynx: Oropharynx is clear. No oropharyngeal exudate.   Eyes:      General: No scleral icterus.     Conjunctiva/sclera: Conjunctivae normal.   Cardiovascular:      Rate and Rhythm: Normal rate and regular rhythm.      Pulses: Normal pulses.      Heart sounds: Normal heart sounds. No murmur heard.  Pulmonary:      Effort: Pulmonary effort is normal. No respiratory distress.      Breath sounds: Normal breath sounds. No wheezing, rhonchi or rales.   Abdominal:      General: Bowel sounds are normal. There is no distension.      Palpations: Abdomen is soft.      Tenderness: There is no abdominal tenderness. There is no guarding.   Musculoskeletal:         General: No swelling or tenderness. Normal range of motion.      Right lower leg: No edema.      Left lower leg: No edema.   Skin:     General: Skin is warm and dry.      Capillary Refill: Capillary refill takes less than 2 seconds.      Coloration: Skin is not jaundiced or pale.   Neurological:      General: No focal deficit present.      Mental Status: She is  alert and oriented to person, place, and time. Mental status is at baseline.      Sensory: No sensory deficit.      Motor: No weakness.   Psychiatric:         Mood and Affect: Mood normal.         Behavior: Behavior normal.         Thought Content: Thought content normal.         Judgment: Judgment normal.           Significant Labs: All pertinent labs within the past 24 hours have been reviewed.    Significant Imaging: I have reviewed all pertinent imaging results/findings within the past 24 hours.

## 2022-10-16 NOTE — ED NOTES
Patient wants to speak to provider prior to starting blood thinner, notified provider. States already took 325 of aspirin this AM

## 2022-10-16 NOTE — Clinical Note
The catheter was repositioned into the ostium   left main. An angiography was performed of the left coronary arteries. Multiple views were taken. The result was suboptimal.. Catheter removed.

## 2022-10-16 NOTE — Clinical Note
The catheter was inserted into the ostium   left main. An angiography was performed of the left coronary arteries. Multiple views were taken.  Catheter removed.

## 2022-10-16 NOTE — ASSESSMENT & PLAN NOTE
Uncontrolled, does not want to take higher doses of losartan so is on clonidine 0.1 mg twice a day.  It appear she does not take this all of the time.  Will restart her home medications and monitor.

## 2022-10-16 NOTE — PHARMACY MED REC
"Admission Medication History     The home medication history was taken by Gerda Villavicencio.    You may go to "Admission" then "Reconcile Home Medications" tabs to review and/or act upon these items.     The home medication list has been updated by the Pharmacy department.   Please read ALL comments highlighted in yellow.   Please address this information as you see fit.    Feel free to contact us if you have any questions or require assistance.      The medications listed below were removed from the home medication list. Please reorder if appropriate:  Patient reports no longer taking the following medication(s):  Magic Mouthwash    Medications listed below were obtained from: Patient/family and Analytic software- [employer]    The medication reconciliation was completed by the patient's bedside.      Gerda Villavicencio  [phone]                    .        "

## 2022-10-17 LAB
ANION GAP SERPL CALC-SCNC: 9 MMOL/L (ref 8–16)
AORTIC ROOT ANNULUS: 3.62 CM
AORTIC VALVE CUSP SEPERATION: 2.14 CM
ASCENDING AORTA: 2.77 CM
AV INDEX (PROSTH): 0.97
AV MEAN GRADIENT: 5 MMHG
AV PEAK GRADIENT: 8 MMHG
AV VALVE AREA: 3.34 CM2
AV VELOCITY RATIO: 0.93
BILIRUB UR QL STRIP: NEGATIVE
BSA FOR ECHO PROCEDURE: 1.95 M2
BUN SERPL-MCNC: 10 MG/DL (ref 8–23)
CALCIUM SERPL-MCNC: 9 MG/DL (ref 8.7–10.5)
CHLORIDE SERPL-SCNC: 109 MMOL/L (ref 95–110)
CLARITY UR: CLEAR
CO2 SERPL-SCNC: 28 MMOL/L (ref 23–29)
COLOR UR: COLORLESS
CREAT SERPL-MCNC: 0.7 MG/DL (ref 0.5–1.4)
CV ECHO LV RWT: 0.72 CM
DOP CALC AO PEAK VEL: 1.38 M/S
DOP CALC AO VTI: 27 CM
DOP CALC LVOT AREA: 3.4 CM2
DOP CALC LVOT DIAMETER: 2.09 CM
DOP CALC LVOT PEAK VEL: 1.28 M/S
DOP CALC LVOT STROKE VOLUME: 90.18 CM3
DOP CALCLVOT PEAK VEL VTI: 26.3 CM
E WAVE DECELERATION TIME: 205.58 MSEC
E/A RATIO: 0.79
E/E' RATIO: 11.45 M/S
ECHO LV POSTERIOR WALL: 1.38 CM (ref 0.6–1.1)
EJECTION FRACTION: 65 %
EST. GFR  (NO RACE VARIABLE): >60 ML/MIN/1.73 M^2
FRACTIONAL SHORTENING: 29 % (ref 28–44)
GLUCOSE SERPL-MCNC: 109 MG/DL (ref 70–110)
GLUCOSE UR QL STRIP: NEGATIVE
HGB UR QL STRIP: NEGATIVE
INTERVENTRICULAR SEPTUM: 1.4 CM (ref 0.6–1.1)
IVC DIAMETER: 1.91 CM
IVRT: 144.62 MSEC
KETONES UR QL STRIP: NEGATIVE
LA MAJOR: 6.43 CM
LA MINOR: 6.2 CM
LA WIDTH: 4.7 CM
LEFT ATRIUM SIZE: 3.41 CM
LEFT ATRIUM VOLUME INDEX: 46.2 ML/M2
LEFT ATRIUM VOLUME: 86 CM3
LEFT INTERNAL DIMENSION IN SYSTOLE: 2.71 CM (ref 2.1–4)
LEFT VENTRICLE DIASTOLIC VOLUME INDEX: 33.62 ML/M2
LEFT VENTRICLE DIASTOLIC VOLUME: 62.53 ML
LEFT VENTRICLE MASS INDEX: 104 G/M2
LEFT VENTRICLE SYSTOLIC VOLUME INDEX: 14.7 ML/M2
LEFT VENTRICLE SYSTOLIC VOLUME: 27.34 ML
LEFT VENTRICULAR INTERNAL DIMENSION IN DIASTOLE: 3.81 CM (ref 3.5–6)
LEFT VENTRICULAR MASS: 192.7 G
LEUKOCYTE ESTERASE UR QL STRIP: NEGATIVE
LV LATERAL E/E' RATIO: 9 M/S
LV SEPTAL E/E' RATIO: 15.75 M/S
LVOT MG: 4.52 MMHG
LVOT MV: 1.03 CM/S
MV PEAK A VEL: 0.8 M/S
MV PEAK E VEL: 0.63 M/S
MV STENOSIS PRESSURE HALF TIME: 59.62 MS
MV VALVE AREA P 1/2 METHOD: 3.69 CM2
NITRITE UR QL STRIP: NEGATIVE
PH UR STRIP: 7 [PH] (ref 5–8)
PISA TR MAX VEL: 2.21 M/S
POTASSIUM SERPL-SCNC: 3.4 MMOL/L (ref 3.5–5.1)
PROT UR QL STRIP: NEGATIVE
PULM VEIN S/D RATIO: 1.69
PV PEAK D VEL: 0.26 M/S
PV PEAK S VEL: 0.44 M/S
PV PEAK VELOCITY: 0.87 CM/S
RA MAJOR: 4.89 CM
RA PRESSURE: 8 MMHG
RA WIDTH: 4.3 CM
RIGHT VENTRICULAR END-DIASTOLIC DIMENSION: 3.9 CM
RV TISSUE DOPPLER FREE WALL SYSTOLIC VELOCITY 1 (APICAL 4 CHAMBER VIEW): 0.01 CM/S
SINUS: 3.54 CM
SODIUM SERPL-SCNC: 146 MMOL/L (ref 136–145)
SP GR UR STRIP: 1.01 (ref 1–1.03)
STJ: 2.83 CM
TDI LATERAL: 0.07 M/S
TDI SEPTAL: 0.04 M/S
TDI: 0.06 M/S
TR MAX PG: 20 MMHG
TRICUSPID ANNULAR PLANE SYSTOLIC EXCURSION: 2.02 CM
TROPONIN I SERPL DL<=0.01 NG/ML-MCNC: 0.61 NG/ML (ref 0–0.03)
TV REST PULMONARY ARTERY PRESSURE: 28 MMHG
URN SPEC COLLECT METH UR: ABNORMAL
UROBILINOGEN UR STRIP-ACNC: NEGATIVE EU/DL

## 2022-10-17 PROCEDURE — 94640 AIRWAY INHALATION TREATMENT: CPT

## 2022-10-17 PROCEDURE — 99223 PR INITIAL HOSPITAL CARE,LEVL III: ICD-10-PCS | Mod: 25,,, | Performed by: INTERNAL MEDICINE

## 2022-10-17 PROCEDURE — 25000003 PHARM REV CODE 250: Performed by: INTERNAL MEDICINE

## 2022-10-17 PROCEDURE — 25000242 PHARM REV CODE 250 ALT 637 W/ HCPCS: Performed by: STUDENT IN AN ORGANIZED HEALTH CARE EDUCATION/TRAINING PROGRAM

## 2022-10-17 PROCEDURE — 84484 ASSAY OF TROPONIN QUANT: CPT | Performed by: STUDENT IN AN ORGANIZED HEALTH CARE EDUCATION/TRAINING PROGRAM

## 2022-10-17 PROCEDURE — 80048 BASIC METABOLIC PNL TOTAL CA: CPT | Performed by: STUDENT IN AN ORGANIZED HEALTH CARE EDUCATION/TRAINING PROGRAM

## 2022-10-17 PROCEDURE — 63600175 PHARM REV CODE 636 W HCPCS: Performed by: STUDENT IN AN ORGANIZED HEALTH CARE EDUCATION/TRAINING PROGRAM

## 2022-10-17 PROCEDURE — 11000001 HC ACUTE MED/SURG PRIVATE ROOM

## 2022-10-17 PROCEDURE — 36415 COLL VENOUS BLD VENIPUNCTURE: CPT | Performed by: STUDENT IN AN ORGANIZED HEALTH CARE EDUCATION/TRAINING PROGRAM

## 2022-10-17 PROCEDURE — 99223 1ST HOSP IP/OBS HIGH 75: CPT | Mod: 25,,, | Performed by: INTERNAL MEDICINE

## 2022-10-17 PROCEDURE — 25000003 PHARM REV CODE 250: Performed by: STUDENT IN AN ORGANIZED HEALTH CARE EDUCATION/TRAINING PROGRAM

## 2022-10-17 RX ORDER — GUAIFENESIN 600 MG/1
600 TABLET, EXTENDED RELEASE ORAL 2 TIMES DAILY
Status: DISCONTINUED | OUTPATIENT
Start: 2022-10-17 | End: 2022-10-19 | Stop reason: HOSPADM

## 2022-10-17 RX ORDER — CLOPIDOGREL 300 MG/1
300 TABLET, FILM COATED ORAL ONCE
Status: COMPLETED | OUTPATIENT
Start: 2022-10-17 | End: 2022-10-17

## 2022-10-17 RX ORDER — POTASSIUM CHLORIDE 750 MG/1
10 TABLET, EXTENDED RELEASE ORAL DAILY
Status: DISCONTINUED | OUTPATIENT
Start: 2022-10-18 | End: 2022-10-19 | Stop reason: HOSPADM

## 2022-10-17 RX ORDER — HYDRALAZINE HYDROCHLORIDE 20 MG/ML
10 INJECTION INTRAMUSCULAR; INTRAVENOUS EVERY 6 HOURS PRN
Status: DISCONTINUED | OUTPATIENT
Start: 2022-10-17 | End: 2022-10-19 | Stop reason: HOSPADM

## 2022-10-17 RX ORDER — CLOPIDOGREL BISULFATE 75 MG/1
75 TABLET ORAL DAILY
Status: DISCONTINUED | OUTPATIENT
Start: 2022-10-18 | End: 2022-10-19 | Stop reason: HOSPADM

## 2022-10-17 RX ADMIN — ATORVASTATIN CALCIUM 40 MG: 40 TABLET, FILM COATED ORAL at 08:10

## 2022-10-17 RX ADMIN — VITAM B12 100 MCG: 100 TAB at 08:10

## 2022-10-17 RX ADMIN — MIRTAZAPINE 30 MG: 15 TABLET, FILM COATED ORAL at 10:10

## 2022-10-17 RX ADMIN — FLUTICASONE FUROATE AND VILANTEROL TRIFENATATE 1 PUFF: 200; 25 POWDER RESPIRATORY (INHALATION) at 08:10

## 2022-10-17 RX ADMIN — CLONIDINE HYDROCHLORIDE 0.1 MG: 0.1 TABLET ORAL at 10:10

## 2022-10-17 RX ADMIN — BUSPIRONE HYDROCHLORIDE 7.5 MG: 5 TABLET ORAL at 08:10

## 2022-10-17 RX ADMIN — OLOPATADINE HYDROCHLORIDE 1 DROP: 1.11 SOLUTION/ DROPS OPHTHALMIC at 08:10

## 2022-10-17 RX ADMIN — OLOPATADINE HYDROCHLORIDE 1 DROP: 1.11 SOLUTION/ DROPS OPHTHALMIC at 10:10

## 2022-10-17 RX ADMIN — FLUTICASONE PROPIONATE 50 MCG: 50 SPRAY, METERED NASAL at 10:10

## 2022-10-17 RX ADMIN — PANTOPRAZOLE SODIUM 40 MG: 40 TABLET, DELAYED RELEASE ORAL at 08:10

## 2022-10-17 RX ADMIN — ENOXAPARIN SODIUM 90 MG: 100 INJECTION SUBCUTANEOUS at 05:10

## 2022-10-17 RX ADMIN — HYDRALAZINE HYDROCHLORIDE 10 MG: 20 INJECTION INTRAMUSCULAR; INTRAVENOUS at 05:10

## 2022-10-17 RX ADMIN — GUAIFENESIN 600 MG: 600 TABLET, EXTENDED RELEASE ORAL at 11:10

## 2022-10-17 RX ADMIN — CLONIDINE HYDROCHLORIDE 0.1 MG: 0.1 TABLET ORAL at 08:10

## 2022-10-17 RX ADMIN — FLUTICASONE PROPIONATE 50 MCG: 50 SPRAY, METERED NASAL at 09:10

## 2022-10-17 RX ADMIN — OXYCODONE HYDROCHLORIDE AND ACETAMINOPHEN 1000 MG: 500 TABLET ORAL at 08:10

## 2022-10-17 RX ADMIN — BUSPIRONE HYDROCHLORIDE 7.5 MG: 5 TABLET ORAL at 10:10

## 2022-10-17 RX ADMIN — ACETAMINOPHEN 650 MG: 325 TABLET ORAL at 05:10

## 2022-10-17 RX ADMIN — ASPIRIN 81 MG: 81 TABLET, COATED ORAL at 08:10

## 2022-10-17 RX ADMIN — FUROSEMIDE 20 MG: 20 TABLET ORAL at 08:10

## 2022-10-17 RX ADMIN — CLOPIDOGREL BISULFATE 300 MG: 300 TABLET, FILM COATED ORAL at 05:10

## 2022-10-17 NOTE — H&P (VIEW-ONLY)
"[facility] - [place], [place] - [facility] Surg  Cardiology  Consult Note    Patient Name: Jose Manuel Boyd  MRN: [id number]  Admission Date: 10/16/2022  Hospital Length of Stay: 1 days  Code Status: Full Code   Attending Provider: John Lion MD   Consulting Provider: Yadira Packer MD  Primary Care Physician: Ajit Piña MD  Principal Problem:HTN (hypertension), benign    Patient information was obtained from patient and ER records.     Inpatient consult to Cardiology  Consult performed by: Yadira Packer MD  Consult ordered by: John Lion MD        Subjective:     Chief Complaint:  "Too much slime in my mouth"     HPI:   Patient of Dr. Randall.  Came in with lot of slime in the throat.  Denies any chest pains.  On presentation blood pressure was 218/100 mmHg.  Troponins were checked and were found to be elevated at 0.8 as the recent value.  Patient denies any chest pains at rest on exertion, orthopnea, PND.  Recently saw Dr. Randall in his clinic and was asymptomatic at that time also.  Echo done today did not show any significant new large wall motion abnormality.  Patient continues to be chest pain free.      Echocardiogram 07/25/2022:      The estimated ejection fraction is 65%.   The left ventricle is normal in size with moderate concentric hypertrophy and normal systolic function.   Moderate to severe left atrial enlargement.   Grade I left ventricular diastolic dysfunction.   Normal right ventricular size with normal right ventricular systolic function.   Mild right atrial enlargement.   Normal central venous pressure (3 mmHg).   The estimated PA systolic pressure is 36 mmHg.     Echocardiogram 06/29/2021:      The estimated ejection fraction is 65%.   Concentric hypertrophy and normal systolic function.   Grade I left ventricular diastolic dysfunction.   Normal right ventricular size with normal right ventricular systolic function.   Mild to moderate left atrial enlargement.   Normal central venous pressure " (3 mmHg).   The estimated PA systolic pressure is 28 mmHg.      Past Medical History:   Diagnosis Date    Anxiety     Asthma     Depression     Headache     Hx of psychiatric care     Hypercholesterolemia     Hypertension     Psychiatric problem     Sleep difficulties     Therapy     Thyroid disease     multiple nodules       Past Surgical History:   Procedure Laterality Date    CHOLECYSTECTOMY      HYSTERECTOMY      knee repalcement         Review of patient's allergies indicates:   Allergen Reactions    Codeine      Other reaction(s): Rash       No current facility-administered medications on file prior to encounter.     Current Outpatient Medications on File Prior to Encounter   Medication Sig    acetaminophen (TYLENOL) 325 MG tablet Take 2 tablets (650 mg total) by mouth every 6 (six) hours as needed for Pain.    albuterol (VENTOLIN HFA) 90 mcg/actuation inhaler Inhale 2 puffs into the lungs every 4 (four) hours as needed for Wheezing.    ascorbic acid, vitamin C, (VITAMIN C) 1000 MG tablet Take 1,000 mg by mouth once daily.    aspirin (ECOTRIN) 81 MG EC tablet Take 81 mg by mouth once daily.    atorvastatin (LIPITOR) 40 MG tablet TAKE 1 TABLET BY MOUTH EVERY DAY    azelastine (ASTELIN) 137 mcg (0.1 %) nasal spray 1 spray (137 mcg total) by Nasal route 2 (two) times daily.    cloNIDine (CATAPRES) 0.1 MG tablet Take 1 tablet (0.1 mg total) by mouth 2 (two) times daily.    cyanocobalamin (VITAMIN B-12) 1000 MCG tablet Take 100 mcg by mouth once daily.    dicyclomine (BENTYL) 20 mg tablet TAKE 1 TABLET BY MOUTH TWICE A DAY AS NEEDED FOR ADOMINAL PAIN    fluticasone propionate (FLONASE) 50 mcg/actuation nasal spray 1 spray (50 mcg total) by Each Nostril route 2 (two) times daily. For allergic rhinitis/sinusitis    fluticasone-salmeterol diskus inhaler 500-50 mcg Inhale 1 puff into the lungs 2 (two) times daily. Controller    furosemide (LASIX) 20 MG tablet TAKE 1 TABLET BY MOUTH EVERY DAY     losartan (COZAAR) 50 MG tablet Take 1 tablet (50 mg total) by mouth once daily. For high blood pressure    mirtazapine (REMERON) 30 MG tablet Take 1 tablet (30 mg total) by mouth nightly as needed (insomnia). At bedtime for sleep, anxiety and depression.    multivitamin with minerals tablet Take 1 tablet by mouth once daily.    naphazoline HCl/pheniramine (EYE ALLERGY RELIEF OPHT) Apply to eye.    olopatadine (PATANOL) 0.1 % ophthalmic solution Place 1 drop into both eyes 2 (two) times daily.    omega-3 fatty acids/fish oil (FISH OIL-OMEGA-3 FATTY ACIDS) 300-1,000 mg capsule Take by mouth once daily.    omeprazole (PRILOSEC) 20 MG capsule Take 1 capsule (20 mg total) by mouth once daily. For gastric reflux    psyllium (METAMUCIL) powder Take 1 packet by mouth daily as needed.    aspirin 325 MG tablet Take 325 mg by mouth once daily.    busPIRone (BUSPAR) 7.5 MG tablet TAKE 1 TABLET (7.5 MG TOTAL) BY MOUTH 2 (TWO) TIMES DAILY.    diphenhydrAMINE-aluminum-magnesium hydroxide-simethicone-LIDOcaine HCl 2% Swish and spit 15 mLs every 4 (four) hours as needed.     Family History       Problem Relation (Age of Onset)    Bipolar disorder Daughter    Diabetes Mother    Heart disease Father    Hypertension Mother    Kidney disease Mother          Tobacco Use    Smoking status: Never    Smokeless tobacco: Never   Substance and Sexual Activity    Alcohol use: No    Drug use: No    Sexual activity: Not Currently     Review of Systems   Constitutional: Negative.   HENT: Negative.     Eyes: Negative.    Cardiovascular: Negative.    Respiratory: Negative.     Endocrine: Negative.    Hematologic/Lymphatic: Negative.    Skin: Negative.    Musculoskeletal: Negative.    Gastrointestinal: Negative.    Genitourinary: Negative.    Neurological: Negative.    Psychiatric/Behavioral: Negative.     Allergic/Immunologic: Negative.    Objective:     Vital Signs (Most Recent):  Temp: 98.3 °F (36.8 °C) (10/17/22 0733)  Pulse:  83 (10/17/22 0855)  Resp: 17 (10/17/22 0855)  BP: (!) 178/79 (10/17/22 0831)  SpO2: (!) 94 % (10/17/22 0856)   Vital Signs (24h Range):  Temp:  [98.2 °F (36.8 °C)-99.2 °F (37.3 °C)] 98.3 °F (36.8 °C)  Pulse:  [65-89] 83  Resp:  [16-20] 17  SpO2:  [90 %-96 %] 94 %  BP: (120-218)/() 178/79     Weight: 90.1 kg (198 lb 10.2 oz)  Body mass index is 38.79 kg/m².    SpO2: (!) 94 %  O2 Device (Oxygen Therapy): room air      Intake/Output Summary (Last 24 hours) at 10/17/2022 1046  Last data filed at 10/16/2022 1646  Gross per 24 hour   Intake 49.92 ml   Output --   Net 49.92 ml       Lines/Drains/Airways       Peripheral Intravenous Line  Duration                  Peripheral IV - Single Lumen 07/24/22 1923 Left;Posterior Hand 84 days                    Physical Exam  Constitutional:       Appearance: Normal appearance. She is well-developed.   HENT:      Head: Normocephalic.   Eyes:      Pupils: Pupils are equal, round, and reactive to light.   Cardiovascular:      Rate and Rhythm: Normal rate and regular rhythm.   Pulmonary:      Effort: Pulmonary effort is normal.      Breath sounds: Normal breath sounds.   Abdominal:      General: Bowel sounds are normal.      Palpations: Abdomen is soft.      Tenderness: There is no abdominal tenderness.   Musculoskeletal:         General: Normal range of motion.      Cervical back: Normal range of motion and neck supple.   Skin:     General: Skin is warm.   Neurological:      Mental Status: She is alert and oriented to person, place, and time.       Significant Labs: BMP:   Recent Labs   Lab 10/16/22  1227 10/17/22  0354    109    146*   K 3.3* 3.4*    109   CO2 31* 28   BUN 8 10   CREATININE 0.8 0.7   CALCIUM 9.6 9.0   , CMP   Recent Labs   Lab 10/16/22  1227 10/17/22  0354    146*   K 3.3* 3.4*    109   CO2 31* 28    109   BUN 8 10   CREATININE 0.8 0.7   CALCIUM 9.6 9.0   PROT 7.0  --    ALBUMIN 3.3*  --    BILITOT 0.5  --    ALKPHOS 106   --    AST 16  --    ALT 12  --    ANIONGAP 9 9   , CBC   Recent Labs   Lab 10/16/22  1227   WBC 9.48   HGB 13.4   HCT 41.5      , INR   Recent Labs   Lab 10/16/22  1227   INR 1.0   , Lipid Panel No results for input(s): CHOL, HDL, LDLCALC, TRIG, CHOLHDL in the last 48 hours., Troponin   Recent Labs   Lab 10/16/22  1608 10/16/22  2005 10/17/22  0004   TROPONINI 0.594* 0.843* 0.606*   , and All pertinent lab results from the last 24 hours have been reviewed.    Significant Imaging: Echocardiogram: Transthoracic echo (TTE) complete (Cupid Only):   Results for orders placed or performed during the hospital encounter of 10/16/22   Echo   Result Value Ref Range    BSA 1.95 m2    TDI SEPTAL 0.04 m/s    LV LATERAL E/E' RATIO 9.00 m/s    LV SEPTAL E/E' RATIO 15.75 m/s    LA WIDTH 4.70 cm    IVC diameter 1.91 cm    Left Ventricular Outflow Tract Mean Velocity 1.03 cm/s    Left Ventricular Outflow Tract Mean Gradient 4.52 mmHg    AORTIC VALVE CUSP SEPERATION 2.14 cm    TDI LATERAL 0.07 m/s    PV PEAK VELOCITY 0.87 cm/s    LVIDd 3.81 3.5 - 6.0 cm    IVS 1.40 (A) 0.6 - 1.1 cm    Posterior Wall 1.38 (A) 0.6 - 1.1 cm    Ao root annulus 3.62 cm    LVIDs 2.71 2.1 - 4.0 cm    FS 29 28 - 44 %    LA volume 86.00 cm3    Sinus 3.54 cm    STJ 2.83 cm    Ascending aorta 2.77 cm    LV mass 192.70 g    LA size 3.41 cm    RVDD 3.90 cm    TAPSE 2.02 cm    RV S' 0.01 cm/s    Left Ventricle Relative Wall Thickness 0.72 cm    AV mean gradient 5 mmHg    AV valve area 3.34 cm2    AV Velocity Ratio 0.93     AV index (prosthetic) 0.97     MV valve area p 1/2 method 3.69 cm2    E/A ratio 0.79     Mean e' 0.06 m/s    E wave deceleration time 205.58 msec    IVRT 144.62 msec    Pulm vein S/D ratio 1.69     LVOT diameter 2.09 cm    LVOT area 3.4 cm2    LVOT peak hoang 1.28 m/s    LVOT peak VTI 26.30 cm    Ao peak hoang 1.38 m/s    Ao VTI 27.0 cm    LVOT stroke volume 90.18 cm3    AV peak gradient 8 mmHg    E/E' ratio 11.45 m/s    MV Peak E Hoang 0.63 m/s     TR Max Hoang 2.21 m/s    MV stenosis pressure 1/2 time 59.62 ms    MV Peak A Hoang 0.80 m/s    PV Peak S Hoang 0.44 m/s    PV Peak D Hoang 0.26 m/s    LV Systolic Volume 27.34 mL    LV Systolic Volume Index 14.7 mL/m2    LV Diastolic Volume 62.53 mL    LV Diastolic Volume Index 33.62 mL/m2    LA Volume Index 46.2 mL/m2    LV Mass Index 104 g/m2    RA Major Axis 4.89 cm    Left Atrium Minor Axis 6.20 cm    Left Atrium Major Axis 6.43 cm    Triscuspid Valve Regurgitation Peak Gradient 20 mmHg    RA Width 4.30 cm    Right Atrial Pressure (from IVC) 8 mmHg    EF 65 %    TV rest pulmonary artery pressure 28 mmHg    Narrative    · The estimated ejection fraction is 65%.  · The left ventricle is normal in size with concentric hypertrophy and   normal systolic function.  · Moderate to severe left atrial enlargement.  · Grade I left ventricular diastolic dysfunction.  · Normal right ventricular size with normal right ventricular systolic   function.  · Moderate right atrial enlargement.  · Intermediate central venous pressure (8 mmHg).  · The estimated PA systolic pressure is 28 mmHg.        Assessment and Plan:     * HTN (hypertension), benign        Elevated troponin  Patient with elevated troponin to 0.8, now down trending.  No chest pains.  EKG without any acute ischemic changes.  Echocardiogram without any wall motion abnormalities.  No angina or anginal equivalent symptoms.  Blood pressure elevated at 200 18/100 mmHg on presentation.  This could be the reason for her troponin elevation, however she does have multiple risk factors for coronary artery disease.  Hence offered her a coronary angiogram with Dr. Randall for further evaluation of this troponin elevation.  At the current time patient states that she would like to think about it and let us know if she wants to proceed with it or not..  Aspirin, Plavix, statins.    Gastroesophageal reflux disease        Depression            VTE Risk Mitigation (From admission, onward)          Ordered     enoxaparin injection 90 mg  Every 12 hours (non-standard times)         10/16/22 1644     IP VTE HIGH RISK PATIENT  Once         10/16/22 1638     Place sequential compression device  Until discontinued         10/16/22 1638                Thank you for your consult. I will follow-up with patient. Please contact us if you have any additional questions.    Yadira Packer MD  Cardiology   [facility]

## 2022-10-17 NOTE — CARE UPDATE
Case discussed with Dr. Randall.  Patient agreeable for angiogram.  Dr. Randall plans to do angiogram tomorrow.

## 2022-10-17 NOTE — ASSESSMENT & PLAN NOTE
Patient with elevated troponin to 0.8, now down trending.  No chest pains.  EKG without any acute ischemic changes.  Echocardiogram without any wall motion abnormalities.  No angina or anginal equivalent symptoms.  Blood pressure elevated at 200 18/100 mmHg on presentation.  This could be the reason for her troponin elevation, however she does have multiple risk factors for coronary artery disease.  Hence offered her a coronary angiogram with Dr. Randall for further evaluation of this troponin elevation.  At the current time patient states that she would like to think about it and let us know if she wants to proceed with it or not..  Aspirin, Plavix, statins.

## 2022-10-17 NOTE — PROGRESS NOTES
"[facility] Medicine  Progress Note    Patient Name: Jose Manuel Boyd  MRN: [id number]  Patient Class: IP- Inpatient   Admission Date: 10/16/2022  Length of Stay: 1 days  Attending Physician: John Lion MD  Primary Care Provider: Ajit Piña MD        Subjective:     Principal Problem:Elevated troponin        HPI:  Ms. Boyd is an 82-year-old female with a past medical history of depression, hyperlipidemia, hypertension and asthma who presents to the emergency department for "Slime" in her mouth.  She states she woke up this morning and noted she had sticky 7 her mouth.  She tried to rinse her mouth out with mouthwash but did not help.  She states this is the reason why she came to the emergency department.  She denies any fevers, chills, cough or shortness of breath.  No chest pain either.  She otherwise has been feeling well.    In the emergency department, she was found to be very hypertensive with systolic greater than 200.  Her troponin was also found to be elevated at 0.299.  Her EKG did not show any acute ST changes.  She was started on hypertrophied emergency department however cardiology was not consulted.  She was asked to admitted to hospital medicine for further management.      Overview/Hospital Course:  Admitted for elevated troponin and uncontrolled hypertension. Started on lovenox, troponin continued to trend up to 0.843. Cardiology consulted recommending [facility] - plan for 10/18.      Interval History:  No new issues, initially did not left heart catheterization but now that she spoke with her sister she would like to have this done before discharge.  Planning for tomorrow.    Review of Systems   Constitutional:  Negative for chills and fever.   HENT:  Positive for postnasal drip. Negative for congestion and nosebleeds.    Eyes:  Negative for visual disturbance.   Respiratory:  Negative for cough and shortness of breath.    Cardiovascular:  Negative for chest pain and leg " swelling.   Gastrointestinal:  Negative for abdominal pain, diarrhea, nausea and vomiting.   Genitourinary:  Negative for dysuria and hematuria.   Musculoskeletal:  Negative for arthralgias.   Skin:  Negative for rash.   Neurological:  Negative for dizziness and weakness.   Psychiatric/Behavioral:  Negative for agitation and confusion.    Objective:     Vital Signs (Most Recent):  Temp: 98.2 °F (36.8 °C) (10/17/22 1123)  Pulse: 71 (10/17/22 1123)  Resp: 18 (10/17/22 1123)  BP: (!) 166/80 (10/17/22 1123)  SpO2: 97 % (10/17/22 1123)   Vital Signs (24h Range):  Temp:  [98.2 °F (36.8 °C)-99.2 °F (37.3 °C)] 98.2 °F (36.8 °C)  Pulse:  [69-89] 71  Resp:  [17-20] 18  SpO2:  [90 %-97 %] 97 %  BP: (120-218)/(55-88) 166/80     Weight: 90.1 kg (198 lb 10.2 oz)  Body mass index is 38.79 kg/m².    Intake/Output Summary (Last 24 hours) at 10/17/2022 1427  Last data filed at 10/17/2022 0830  Gross per 24 hour   Intake 289.92 ml   Output --   Net 289.92 ml      Physical Exam  Vitals and nursing note reviewed.   Constitutional:       General: She is not in acute distress.     Appearance: Normal appearance. She is not ill-appearing.   HENT:      Head: Normocephalic and atraumatic.      Right Ear: External ear normal.      Left Ear: External ear normal.      Nose: Nose normal. No congestion or rhinorrhea.      Mouth/Throat:      Mouth: Mucous membranes are moist.      Pharynx: Oropharynx is clear. No oropharyngeal exudate.   Eyes:      General: No scleral icterus.     Conjunctiva/sclera: Conjunctivae normal.   Cardiovascular:      Rate and Rhythm: Normal rate and regular rhythm.      Pulses: Normal pulses.      Heart sounds: Normal heart sounds. No murmur heard.  Pulmonary:      Effort: Pulmonary effort is normal. No respiratory distress.      Breath sounds: Normal breath sounds. No wheezing, rhonchi or rales.   Abdominal:      General: Bowel sounds are normal. There is no distension.      Palpations: Abdomen is soft.      Tenderness:  There is no abdominal tenderness. There is no guarding.   Musculoskeletal:         General: No swelling or tenderness. Normal range of motion.      Right lower leg: No edema.      Left lower leg: No edema.   Skin:     General: Skin is warm and dry.      Capillary Refill: Capillary refill takes less than 2 seconds.      Coloration: Skin is not jaundiced or pale.   Neurological:      General: No focal deficit present.      Mental Status: She is alert and oriented to person, place, and time. Mental status is at baseline.      Sensory: No sensory deficit.      Motor: No weakness.   Psychiatric:         Mood and Affect: Mood normal.         Behavior: Behavior normal.         Thought Content: Thought content normal.         Judgment: Judgment normal.       Significant Labs: All pertinent labs within the past 24 hours have been reviewed.    Significant Imaging: I have reviewed all pertinent imaging results/findings within the past 24 hours.      Assessment/Plan:      * Elevated troponin  Her troponin was elevated at 0.299.  She has no chest pain or shortness of breath.  Her EKG shows no ST changes.  Was given aspirin and statin.  Started on heparin but transition to Lovenox for now.  Will trend out troponin, if increases will consult Cardiology.  Check echocardiogram for thoroughness.  - troponin peaking at 0.843, cardiology consulted and recommended a heart catheterization.    NPO tonight after midnight.  Plan for of [facility] tomorrow.    Gastroesophageal reflux disease  Resume PPI      HTN (hypertension), benign  Uncontrolled, does not want to take higher doses of losartan so is on clonidine 0.1 mg twice a day.  It appear she does not take this all of the time.  Will restart her home medications and monitor.      Depression  Resume home mirtazapine at night      VTE Risk Mitigation (From admission, onward)         Ordered     enoxaparin injection 90 mg  Every 12 hours (non-standard times)         10/16/22 1644     IP VTE HIGH  RISK PATIENT  Once         10/16/22 1638     Place sequential compression device  Until discontinued         10/16/22 1638                Discharge Planning   ROXANA:      Code Status: Full Code   Is the patient medically ready for discharge?:     Reason for patient still in hospital (select all that apply): Treatment                     John Lion MD  Department of Hospital Medicine   [facility]

## 2022-10-17 NOTE — ASSESSMENT & PLAN NOTE
Her troponin was elevated at 0.299.  She has no chest pain or shortness of breath.  Her EKG shows no ST changes.  Was given aspirin and statin.  Started on heparin but transition to Lovenox for now.  Will trend out troponin, if increases will consult Cardiology.  Check echocardiogram for thoroughness.  - troponin peaking at 0.843, cardiology consulted and recommended a heart catheterization.    NPO tonight after midnight.  Plan for of [facility] tomorrow.

## 2022-10-17 NOTE — HOSPITAL COURSE
"82F with pmh of depression, hyperlipidemia, hypertension and asthma who presents to the emergency department for "Slime" in her mouth. In the emergency department, she was found to be very hypertensive with systolic greater than 200.  Her troponin was also found to be elevated at 0.299.  Her EKG did not show any acute ST changes. Pt was admitted to hospital medicine for further evaluation. Pts  troponin peaking at 0.843, cardiology consulted and recommended a heart catheterization.    [facility] on 10/18 without significant coronary disease. Pts blood pressure was better controlled and amlodipine was added to her bp regimen at discharge. Pt with f/u with cardiology and her pcp on discharge. Pt also set up with home health nursing and aid to help with medication managment on discharge. Pt feeling good and asking to go home on the discharge.  "

## 2022-10-17 NOTE — CONSULTS
"[facility]  Cardiology  Consult Note    Patient Name: Jose Manuel Boyd  MRN: [id number]  Admission Date: 10/16/2022  Hospital Length of Stay: 1 days  Code Status: Full Code   Attending Provider: John Lion MD   Consulting Provider: Yadira Packer MD  Primary Care Physician: Ajit Piña MD  Principal Problem:HTN (hypertension), benign    Patient information was obtained from patient and ER records.     Inpatient consult to Cardiology  Consult performed by: Yadira Packer MD  Consult ordered by: John Lion MD        Subjective:     Chief Complaint:  "Too much slime in my mouth"     HPI:   Patient of Dr. Randall.  Came in with lot of slime in the throat.  Denies any chest pains.  On presentation blood pressure was 218/100 mmHg.  Troponins were checked and were found to be elevated at 0.8 as the recent value.  Patient denies any chest pains at rest on exertion, orthopnea, PND.  Recently saw Dr. Randall in his clinic and was asymptomatic at that time also.  Echo done today did not show any significant new large wall motion abnormality.  Patient continues to be chest pain free.      Echocardiogram 07/25/2022:      The estimated ejection fraction is 65%.   The left ventricle is normal in size with moderate concentric hypertrophy and normal systolic function.   Moderate to severe left atrial enlargement.   Grade I left ventricular diastolic dysfunction.   Normal right ventricular size with normal right ventricular systolic function.   Mild right atrial enlargement.   Normal central venous pressure (3 mmHg).   The estimated PA systolic pressure is 36 mmHg.     Echocardiogram 06/29/2021:      The estimated ejection fraction is 65%.   Concentric hypertrophy and normal systolic function.   Grade I left ventricular diastolic dysfunction.   Normal right ventricular size with normal right ventricular systolic function.   Mild to moderate left atrial enlargement.   Normal central venous pressure " (3 mmHg).   The estimated PA systolic pressure is 28 mmHg.      Past Medical History:   Diagnosis Date    Anxiety     Asthma     Depression     Headache     Hx of psychiatric care     Hypercholesterolemia     Hypertension     Psychiatric problem     Sleep difficulties     Therapy     Thyroid disease     multiple nodules       Past Surgical History:   Procedure Laterality Date    CHOLECYSTECTOMY      HYSTERECTOMY      knee repalcement         Review of patient's allergies indicates:   Allergen Reactions    Codeine      Other reaction(s): Rash       No current facility-administered medications on file prior to encounter.     Current Outpatient Medications on File Prior to Encounter   Medication Sig    acetaminophen (TYLENOL) 325 MG tablet Take 2 tablets (650 mg total) by mouth every 6 (six) hours as needed for Pain.    albuterol (VENTOLIN HFA) 90 mcg/actuation inhaler Inhale 2 puffs into the lungs every 4 (four) hours as needed for Wheezing.    ascorbic acid, vitamin C, (VITAMIN C) 1000 MG tablet Take 1,000 mg by mouth once daily.    aspirin (ECOTRIN) 81 MG EC tablet Take 81 mg by mouth once daily.    atorvastatin (LIPITOR) 40 MG tablet TAKE 1 TABLET BY MOUTH EVERY DAY    azelastine (ASTELIN) 137 mcg (0.1 %) nasal spray 1 spray (137 mcg total) by Nasal route 2 (two) times daily.    cloNIDine (CATAPRES) 0.1 MG tablet Take 1 tablet (0.1 mg total) by mouth 2 (two) times daily.    cyanocobalamin (VITAMIN B-12) 1000 MCG tablet Take 100 mcg by mouth once daily.    dicyclomine (BENTYL) 20 mg tablet TAKE 1 TABLET BY MOUTH TWICE A DAY AS NEEDED FOR ADOMINAL PAIN    fluticasone propionate (FLONASE) 50 mcg/actuation nasal spray 1 spray (50 mcg total) by Each Nostril route 2 (two) times daily. For allergic rhinitis/sinusitis    fluticasone-salmeterol diskus inhaler 500-50 mcg Inhale 1 puff into the lungs 2 (two) times daily. Controller    furosemide (LASIX) 20 MG tablet TAKE 1 TABLET BY MOUTH EVERY DAY     losartan (COZAAR) 50 MG tablet Take 1 tablet (50 mg total) by mouth once daily. For high blood pressure    mirtazapine (REMERON) 30 MG tablet Take 1 tablet (30 mg total) by mouth nightly as needed (insomnia). At bedtime for sleep, anxiety and depression.    multivitamin with minerals tablet Take 1 tablet by mouth once daily.    naphazoline HCl/pheniramine (EYE ALLERGY RELIEF OPHT) Apply to eye.    olopatadine (PATANOL) 0.1 % ophthalmic solution Place 1 drop into both eyes 2 (two) times daily.    omega-3 fatty acids/fish oil (FISH OIL-OMEGA-3 FATTY ACIDS) 300-1,000 mg capsule Take by mouth once daily.    omeprazole (PRILOSEC) 20 MG capsule Take 1 capsule (20 mg total) by mouth once daily. For gastric reflux    psyllium (METAMUCIL) powder Take 1 packet by mouth daily as needed.    aspirin 325 MG tablet Take 325 mg by mouth once daily.    busPIRone (BUSPAR) 7.5 MG tablet TAKE 1 TABLET (7.5 MG TOTAL) BY MOUTH 2 (TWO) TIMES DAILY.    diphenhydrAMINE-aluminum-magnesium hydroxide-simethicone-LIDOcaine HCl 2% Swish and spit 15 mLs every 4 (four) hours as needed.     Family History       Problem Relation (Age of Onset)    Bipolar disorder Daughter    Diabetes Mother    Heart disease Father    Hypertension Mother    Kidney disease Mother          Tobacco Use    Smoking status: Never    Smokeless tobacco: Never   Substance and Sexual Activity    Alcohol use: No    Drug use: No    Sexual activity: Not Currently     Review of Systems   Constitutional: Negative.   HENT: Negative.     Eyes: Negative.    Cardiovascular: Negative.    Respiratory: Negative.     Endocrine: Negative.    Hematologic/Lymphatic: Negative.    Skin: Negative.    Musculoskeletal: Negative.    Gastrointestinal: Negative.    Genitourinary: Negative.    Neurological: Negative.    Psychiatric/Behavioral: Negative.     Allergic/Immunologic: Negative.    Objective:     Vital Signs (Most Recent):  Temp: 98.3 °F (36.8 °C) (10/17/22 0733)  Pulse:  83 (10/17/22 0855)  Resp: 17 (10/17/22 0855)  BP: (!) 178/79 (10/17/22 0831)  SpO2: (!) 94 % (10/17/22 0856)   Vital Signs (24h Range):  Temp:  [98.2 °F (36.8 °C)-99.2 °F (37.3 °C)] 98.3 °F (36.8 °C)  Pulse:  [65-89] 83  Resp:  [16-20] 17  SpO2:  [90 %-96 %] 94 %  BP: (120-218)/() 178/79     Weight: 90.1 kg (198 lb 10.2 oz)  Body mass index is 38.79 kg/m².    SpO2: (!) 94 %  O2 Device (Oxygen Therapy): room air      Intake/Output Summary (Last 24 hours) at 10/17/2022 1046  Last data filed at 10/16/2022 1646  Gross per 24 hour   Intake 49.92 ml   Output --   Net 49.92 ml       Lines/Drains/Airways       Peripheral Intravenous Line  Duration                  Peripheral IV - Single Lumen 07/24/22 1923 Left;Posterior Hand 84 days                    Physical Exam  Constitutional:       Appearance: Normal appearance. She is well-developed.   HENT:      Head: Normocephalic.   Eyes:      Pupils: Pupils are equal, round, and reactive to light.   Cardiovascular:      Rate and Rhythm: Normal rate and regular rhythm.   Pulmonary:      Effort: Pulmonary effort is normal.      Breath sounds: Normal breath sounds.   Abdominal:      General: Bowel sounds are normal.      Palpations: Abdomen is soft.      Tenderness: There is no abdominal tenderness.   Musculoskeletal:         General: Normal range of motion.      Cervical back: Normal range of motion and neck supple.   Skin:     General: Skin is warm.   Neurological:      Mental Status: She is alert and oriented to person, place, and time.       Significant Labs: BMP:   Recent Labs   Lab 10/16/22  1227 10/17/22  0354    109    146*   K 3.3* 3.4*    109   CO2 31* 28   BUN 8 10   CREATININE 0.8 0.7   CALCIUM 9.6 9.0   , CMP   Recent Labs   Lab 10/16/22  1227 10/17/22  0354    146*   K 3.3* 3.4*    109   CO2 31* 28    109   BUN 8 10   CREATININE 0.8 0.7   CALCIUM 9.6 9.0   PROT 7.0  --    ALBUMIN 3.3*  --    BILITOT 0.5  --    ALKPHOS 106   --    AST 16  --    ALT 12  --    ANIONGAP 9 9   , CBC   Recent Labs   Lab 10/16/22  1227   WBC 9.48   HGB 13.4   HCT 41.5      , INR   Recent Labs   Lab 10/16/22  1227   INR 1.0   , Lipid Panel No results for input(s): CHOL, HDL, LDLCALC, TRIG, CHOLHDL in the last 48 hours., Troponin   Recent Labs   Lab 10/16/22  1608 10/16/22  2005 10/17/22  0004   TROPONINI 0.594* 0.843* 0.606*   , and All pertinent lab results from the last 24 hours have been reviewed.    Significant Imaging: Echocardiogram: Transthoracic echo (TTE) complete (Cupid Only):   Results for orders placed or performed during the hospital encounter of 10/16/22   Echo   Result Value Ref Range    BSA 1.95 m2    TDI SEPTAL 0.04 m/s    LV LATERAL E/E' RATIO 9.00 m/s    LV SEPTAL E/E' RATIO 15.75 m/s    LA WIDTH 4.70 cm    IVC diameter 1.91 cm    Left Ventricular Outflow Tract Mean Velocity 1.03 cm/s    Left Ventricular Outflow Tract Mean Gradient 4.52 mmHg    AORTIC VALVE CUSP SEPERATION 2.14 cm    TDI LATERAL 0.07 m/s    PV PEAK VELOCITY 0.87 cm/s    LVIDd 3.81 3.5 - 6.0 cm    IVS 1.40 (A) 0.6 - 1.1 cm    Posterior Wall 1.38 (A) 0.6 - 1.1 cm    Ao root annulus 3.62 cm    LVIDs 2.71 2.1 - 4.0 cm    FS 29 28 - 44 %    LA volume 86.00 cm3    Sinus 3.54 cm    STJ 2.83 cm    Ascending aorta 2.77 cm    LV mass 192.70 g    LA size 3.41 cm    RVDD 3.90 cm    TAPSE 2.02 cm    RV S' 0.01 cm/s    Left Ventricle Relative Wall Thickness 0.72 cm    AV mean gradient 5 mmHg    AV valve area 3.34 cm2    AV Velocity Ratio 0.93     AV index (prosthetic) 0.97     MV valve area p 1/2 method 3.69 cm2    E/A ratio 0.79     Mean e' 0.06 m/s    E wave deceleration time 205.58 msec    IVRT 144.62 msec    Pulm vein S/D ratio 1.69     LVOT diameter 2.09 cm    LVOT area 3.4 cm2    LVOT peak hoang 1.28 m/s    LVOT peak VTI 26.30 cm    Ao peak hoang 1.38 m/s    Ao VTI 27.0 cm    LVOT stroke volume 90.18 cm3    AV peak gradient 8 mmHg    E/E' ratio 11.45 m/s    MV Peak E Hoang 0.63 m/s     TR Max Hoang 2.21 m/s    MV stenosis pressure 1/2 time 59.62 ms    MV Peak A Hoang 0.80 m/s    PV Peak S Hoang 0.44 m/s    PV Peak D Hoang 0.26 m/s    LV Systolic Volume 27.34 mL    LV Systolic Volume Index 14.7 mL/m2    LV Diastolic Volume 62.53 mL    LV Diastolic Volume Index 33.62 mL/m2    LA Volume Index 46.2 mL/m2    LV Mass Index 104 g/m2    RA Major Axis 4.89 cm    Left Atrium Minor Axis 6.20 cm    Left Atrium Major Axis 6.43 cm    Triscuspid Valve Regurgitation Peak Gradient 20 mmHg    RA Width 4.30 cm    Right Atrial Pressure (from IVC) 8 mmHg    EF 65 %    TV rest pulmonary artery pressure 28 mmHg    Narrative    · The estimated ejection fraction is 65%.  · The left ventricle is normal in size with concentric hypertrophy and   normal systolic function.  · Moderate to severe left atrial enlargement.  · Grade I left ventricular diastolic dysfunction.  · Normal right ventricular size with normal right ventricular systolic   function.  · Moderate right atrial enlargement.  · Intermediate central venous pressure (8 mmHg).  · The estimated PA systolic pressure is 28 mmHg.        Assessment and Plan:     * HTN (hypertension), benign        Elevated troponin  Patient with elevated troponin to 0.8, now down trending.  No chest pains.  EKG without any acute ischemic changes.  Echocardiogram without any wall motion abnormalities.  No angina or anginal equivalent symptoms.  Blood pressure elevated at 200 18/100 mmHg on presentation.  This could be the reason for her troponin elevation, however she does have multiple risk factors for coronary artery disease.  Hence offered her a coronary angiogram with Dr. Randall for further evaluation of this troponin elevation.  At the current time patient states that she would like to think about it and let us know if she wants to proceed with it or not..  Aspirin, Plavix, statins.    Gastroesophageal reflux disease        Depression            VTE Risk Mitigation (From admission, onward)          Ordered     enoxaparin injection 90 mg  Every 12 hours (non-standard times)         10/16/22 1644     IP VTE HIGH RISK PATIENT  Once         10/16/22 1638     Place sequential compression device  Until discontinued         10/16/22 1638                Thank you for your consult. I will follow-up with patient. Please contact us if you have any additional questions.    Yadira Packer MD  Cardiology   [facility]

## 2022-10-17 NOTE — PLAN OF CARE
[place] - Med Surg  Initial Discharge Assessment  Lives alone.  Oxygen at home and use as needed. Paulie De Leon brings her to app and will transport home.  Plan A:  Home with family assistance     Primary Care Provider: Ajit Piña MD    Admission Diagnosis: Shortness of breath [R06.02]  Chest pain [R07.9]  Hypertensive emergency [I16.1]    Admission Date: 10/16/2022  Expected Discharge Date:     Discharge Barriers Identified: None    Payor: [employer]ARE / Plan: [employer] HEALTH / Product Type: Medicare Advantage /     Extended Emergency Contact Information  Primary Emergency Contact: Ashley Duggan  [address] [address] [facility]  Home Phone: [phone]  Mobile Phone: [phone]  Relation: Sister  Preferred language: English  Secondary Emergency Contact: Rossana Zafar   [facility]  Home Phone: [phone]  Mobile Phone: [phone]  Relation: Grandchild  Preferred language: English    Discharge Plan A: Home  Discharge Plan B: Home      [id number] - [place] - [address]  [address] [address]  Phone: [phone] Fax: [phone]    [facility] - [place] - [address]  [address]  [place] [address]  Phone: [phone] Fax: [phone]      Initial Assessment (most recent)       Adult Discharge Assessment - 10/17/22 1516          Discharge Assessment    Assessment Type Discharge Planning Assessment     Confirmed/corrected address, phone number and insurance Yes     Confirmed Demographics Correct on Facesheet     Source of Information patient     When was your last doctors appointment? 10/11/22     Does patient/caregiver understand observation status No     Communicated ROXANA with patient/caregiver Date not available/Unable to determine     Reason For Admission Elevated Troponin     Lives With alone     Do  you expect to return to your current living situation? Yes     Do you have help at home or someone to help you manage your care at home? Yes     Who are your caregiver(s) and their phone number(s)? Ashley Duggan (Sister)   [phone] (Home Phone)     Prior to hospitilization cognitive status: Alert/Oriented     Current cognitive status: Alert/Oriented     Walking or Climbing Stairs Difficulty none     Dressing/Bathing Difficulty none     Home Accessibility wheelchair accessible     Home Layout Able to live on 1st floor     Equipment Currently Used at Home oxygen     Readmission within 30 days? No     Patient currently being followed by outpatient case management? No     Do you currently have service(s) that help you manage your care at home? No     Do you take prescription medications? Yes     Do you have prescription coverage? Yes     Coverage [employer] [facility]     Do you have any problems affording any of your prescribed medications? No     Who is going to help you get home at discharge? Ashley Duggan (Sister)   [phone] (Home Phone)     How do you get to doctors appointments? family or friend will provide     Are you on dialysis? No     Do you take coumadin? No     Discharge Plan A Home     Discharge Plan B Home     DME Needed Upon Discharge  none     Discharge Plan discussed with: Patient     Discharge Barriers Identified None        Physical Activity    On average, how many days per week do you engage in moderate to strenuous exercise (like a brisk walk)? 1 day     On average, how many minutes do you engage in exercise at this level? 10 min        Financial Resource Strain    How hard is it for you to pay for the very basics like food, housing, medical care, and heating? Very hard        Housing Stability    In the last 12 months, was there a time when you were not able to pay the mortgage or rent on time? No     In the last 12 months, how many places have you  lived? 1     In the last 12 months, was there a time when you did not have a steady place to sleep or slept in a shelter (including now)? No        Transportation Needs    In the past 12 months, has lack of transportation kept you from medical appointments or from getting medications? Yes     In the past 12 months, has lack of transportation kept you from meetings, work, or from getting things needed for daily living? Yes        Food Insecurity    Within the past 12 months, you worried that your food would run out before you got the money to buy more. Never true     Within the past 12 months, the food you bought just didn't last and you didn't have money to get more. Sometimes true        Stress    Do you feel stress - tense, restless, nervous, or anxious, or unable to sleep at night because your mind is troubled all the time - these days? Very much        Social Connections    In a typical week, how many times do you talk on the phone with family, friends, or neighbors? More than three times a week     How often do you get together with friends or relatives? Once a week     How often do you attend Mandaeism or Restoration services? More than 4 times per year     Do you belong to any clubs or organizations such as [employer]ae[employer] groups, unions, fraternal or athletic groups, or school groups? No     How often do you attend meetings of the clubs or organizations you belong to? Never     Are you , , , , never , or living with a partner?         Alcohol Use    Q1: How often do you have a drink containing alcohol? Never     Q2: How many drinks containing alcohol do you have on a typical day when you are drinking? Patient does not drink     Q3: How often do you have six or more drinks on one occasion? Never

## 2022-10-17 NOTE — HPI
Patient of Dr. Randall.  Came in with lot of slime in the throat.  Denies any chest pains.  On presentation blood pressure was 218/100 mmHg.  Troponins were checked and were found to be elevated at 0.8 as the recent value.  Patient denies any chest pains at rest on exertion, orthopnea, PND.  Recently saw Dr. Randall in his clinic and was asymptomatic at that time also.  Echo done today did not show any significant new large wall motion abnormality.  Patient continues to be chest pain free.      Echocardiogram 07/25/2022:     The estimated ejection fraction is 65%.  The left ventricle is normal in size with moderate concentric hypertrophy and normal systolic function.  Moderate to severe left atrial enlargement.  Grade I left ventricular diastolic dysfunction.  Normal right ventricular size with normal right ventricular systolic function.  Mild right atrial enlargement.  Normal central venous pressure (3 mmHg).  The estimated PA systolic pressure is 36 mmHg.     Echocardiogram 06/29/2021:     The estimated ejection fraction is 65%.  Concentric hypertrophy and normal systolic function.  Grade I left ventricular diastolic dysfunction.  Normal right ventricular size with normal right ventricular systolic function.  Mild to moderate left atrial enlargement.  Normal central venous pressure (3 mmHg).  The estimated PA systolic pressure is 28 mmHg.

## 2022-10-17 NOTE — SUBJECTIVE & OBJECTIVE
Past Medical History:   Diagnosis Date    Anxiety     Asthma     Depression     Headache     Hx of psychiatric care     Hypercholesterolemia     Hypertension     Psychiatric problem     Sleep difficulties     Therapy     Thyroid disease     multiple nodules       Past Surgical History:   Procedure Laterality Date    CHOLECYSTECTOMY      HYSTERECTOMY      knee repalcement         Review of patient's allergies indicates:   Allergen Reactions    Codeine      Other reaction(s): Rash       No current facility-administered medications on file prior to encounter.     Current Outpatient Medications on File Prior to Encounter   Medication Sig    acetaminophen (TYLENOL) 325 MG tablet Take 2 tablets (650 mg total) by mouth every 6 (six) hours as needed for Pain.    albuterol (VENTOLIN HFA) 90 mcg/actuation inhaler Inhale 2 puffs into the lungs every 4 (four) hours as needed for Wheezing.    ascorbic acid, vitamin C, (VITAMIN C) 1000 MG tablet Take 1,000 mg by mouth once daily.    aspirin (ECOTRIN) 81 MG EC tablet Take 81 mg by mouth once daily.    atorvastatin (LIPITOR) 40 MG tablet TAKE 1 TABLET BY MOUTH EVERY DAY    azelastine (ASTELIN) 137 mcg (0.1 %) nasal spray 1 spray (137 mcg total) by Nasal route 2 (two) times daily.    cloNIDine (CATAPRES) 0.1 MG tablet Take 1 tablet (0.1 mg total) by mouth 2 (two) times daily.    cyanocobalamin (VITAMIN B-12) 1000 MCG tablet Take 100 mcg by mouth once daily.    dicyclomine (BENTYL) 20 mg tablet TAKE 1 TABLET BY MOUTH TWICE A DAY AS NEEDED FOR ADOMINAL PAIN    fluticasone propionate (FLONASE) 50 mcg/actuation nasal spray 1 spray (50 mcg total) by Each Nostril route 2 (two) times daily. For allergic rhinitis/sinusitis    fluticasone-salmeterol diskus inhaler 500-50 mcg Inhale 1 puff into the lungs 2 (two) times daily. Controller    furosemide (LASIX) 20 MG tablet TAKE 1 TABLET BY MOUTH EVERY DAY    losartan (COZAAR) 50 MG tablet Take 1 tablet (50 mg total) by mouth once daily. For high  blood pressure    mirtazapine (REMERON) 30 MG tablet Take 1 tablet (30 mg total) by mouth nightly as needed (insomnia). At bedtime for sleep, anxiety and depression.    multivitamin with minerals tablet Take 1 tablet by mouth once daily.    naphazoline HCl/pheniramine (EYE ALLERGY RELIEF OPHT) Apply to eye.    olopatadine (PATANOL) 0.1 % ophthalmic solution Place 1 drop into both eyes 2 (two) times daily.    omega-3 fatty acids/fish oil (FISH OIL-OMEGA-3 FATTY ACIDS) 300-1,000 mg capsule Take by mouth once daily.    omeprazole (PRILOSEC) 20 MG capsule Take 1 capsule (20 mg total) by mouth once daily. For gastric reflux    psyllium (METAMUCIL) powder Take 1 packet by mouth daily as needed.    aspirin 325 MG tablet Take 325 mg by mouth once daily.    busPIRone (BUSPAR) 7.5 MG tablet TAKE 1 TABLET (7.5 MG TOTAL) BY MOUTH 2 (TWO) TIMES DAILY.    diphenhydrAMINE-aluminum-magnesium hydroxide-simethicone-LIDOcaine HCl 2% Swish and spit 15 mLs every 4 (four) hours as needed.     Family History       Problem Relation (Age of Onset)    Bipolar disorder Daughter    Diabetes Mother    Heart disease Father    Hypertension Mother    Kidney disease Mother          Tobacco Use    Smoking status: Never    Smokeless tobacco: Never   Substance and Sexual Activity    Alcohol use: No    Drug use: No    Sexual activity: Not Currently     Review of Systems   Constitutional: Negative.   HENT: Negative.     Eyes: Negative.    Cardiovascular: Negative.    Respiratory: Negative.     Endocrine: Negative.    Hematologic/Lymphatic: Negative.    Skin: Negative.    Musculoskeletal: Negative.    Gastrointestinal: Negative.    Genitourinary: Negative.    Neurological: Negative.    Psychiatric/Behavioral: Negative.     Allergic/Immunologic: Negative.    Objective:     Vital Signs (Most Recent):  Temp: 98.3 °F (36.8 °C) (10/17/22 0733)  Pulse: 83 (10/17/22 0855)  Resp: 17 (10/17/22 0855)  BP: (!) 178/79 (10/17/22 0831)  SpO2: (!) 94 % (10/17/22  0856)   Vital Signs (24h Range):  Temp:  [98.2 °F (36.8 °C)-99.2 °F (37.3 °C)] 98.3 °F (36.8 °C)  Pulse:  [65-89] 83  Resp:  [16-20] 17  SpO2:  [90 %-96 %] 94 %  BP: (120-218)/() 178/79     Weight: 90.1 kg (198 lb 10.2 oz)  Body mass index is 38.79 kg/m².    SpO2: (!) 94 %  O2 Device (Oxygen Therapy): room air      Intake/Output Summary (Last 24 hours) at 10/17/2022 1046  Last data filed at 10/16/2022 1646  Gross per 24 hour   Intake 49.92 ml   Output --   Net 49.92 ml       Lines/Drains/Airways       Peripheral Intravenous Line  Duration                  Peripheral IV - Single Lumen 07/24/22 1923 Left;Posterior Hand 84 days                    Physical Exam  Constitutional:       Appearance: Normal appearance. She is well-developed.   HENT:      Head: Normocephalic.   Eyes:      Pupils: Pupils are equal, round, and reactive to light.   Cardiovascular:      Rate and Rhythm: Normal rate and regular rhythm.   Pulmonary:      Effort: Pulmonary effort is normal.      Breath sounds: Normal breath sounds.   Abdominal:      General: Bowel sounds are normal.      Palpations: Abdomen is soft.      Tenderness: There is no abdominal tenderness.   Musculoskeletal:         General: Normal range of motion.      Cervical back: Normal range of motion and neck supple.   Skin:     General: Skin is warm.   Neurological:      Mental Status: She is alert and oriented to person, place, and time.       Significant Labs: BMP:   Recent Labs   Lab 10/16/22  1227 10/17/22  0354    109    146*   K 3.3* 3.4*    109   CO2 31* 28   BUN 8 10   CREATININE 0.8 0.7   CALCIUM 9.6 9.0   , CMP   Recent Labs   Lab 10/16/22  1227 10/17/22  0354    146*   K 3.3* 3.4*    109   CO2 31* 28    109   BUN 8 10   CREATININE 0.8 0.7   CALCIUM 9.6 9.0   PROT 7.0  --    ALBUMIN 3.3*  --    BILITOT 0.5  --    ALKPHOS 106  --    AST 16  --    ALT 12  --    ANIONGAP 9 9   , CBC   Recent Labs   Lab 10/16/22  1227   WBC 9.48    HGB 13.4   HCT 41.5      , INR   Recent Labs   Lab 10/16/22  1227   INR 1.0   , Lipid Panel No results for input(s): CHOL, HDL, LDLCALC, TRIG, CHOLHDL in the last 48 hours., Troponin   Recent Labs   Lab 10/16/22  1608 10/16/22  2005 10/17/22  0004   TROPONINI 0.594* 0.843* 0.606*   , and All pertinent lab results from the last 24 hours have been reviewed.    Significant Imaging: Echocardiogram: Transthoracic echo (TTE) complete (Cupid Only):   Results for orders placed or performed during the hospital encounter of 10/16/22   Echo   Result Value Ref Range    BSA 1.95 m2    TDI SEPTAL 0.04 m/s    LV LATERAL E/E' RATIO 9.00 m/s    LV SEPTAL E/E' RATIO 15.75 m/s    LA WIDTH 4.70 cm    IVC diameter 1.91 cm    Left Ventricular Outflow Tract Mean Velocity 1.03 cm/s    Left Ventricular Outflow Tract Mean Gradient 4.52 mmHg    AORTIC VALVE CUSP SEPERATION 2.14 cm    TDI LATERAL 0.07 m/s    PV PEAK VELOCITY 0.87 cm/s    LVIDd 3.81 3.5 - 6.0 cm    IVS 1.40 (A) 0.6 - 1.1 cm    Posterior Wall 1.38 (A) 0.6 - 1.1 cm    Ao root annulus 3.62 cm    LVIDs 2.71 2.1 - 4.0 cm    FS 29 28 - 44 %    LA volume 86.00 cm3    Sinus 3.54 cm    STJ 2.83 cm    Ascending aorta 2.77 cm    LV mass 192.70 g    LA size 3.41 cm    RVDD 3.90 cm    TAPSE 2.02 cm    RV S' 0.01 cm/s    Left Ventricle Relative Wall Thickness 0.72 cm    AV mean gradient 5 mmHg    AV valve area 3.34 cm2    AV Velocity Ratio 0.93     AV index (prosthetic) 0.97     MV valve area p 1/2 method 3.69 cm2    E/A ratio 0.79     Mean e' 0.06 m/s    E wave deceleration time 205.58 msec    IVRT 144.62 msec    Pulm vein S/D ratio 1.69     LVOT diameter 2.09 cm    LVOT area 3.4 cm2    LVOT peak hoang 1.28 m/s    LVOT peak VTI 26.30 cm    Ao peak hoang 1.38 m/s    Ao VTI 27.0 cm    LVOT stroke volume 90.18 cm3    AV peak gradient 8 mmHg    E/E' ratio 11.45 m/s    MV Peak E Hoang 0.63 m/s    TR Max Hoang 2.21 m/s    MV stenosis pressure 1/2 time 59.62 ms    MV Peak A Hoang 0.80 m/s    PV  Peak S Hoang 0.44 m/s    PV Peak D Hoang 0.26 m/s    LV Systolic Volume 27.34 mL    LV Systolic Volume Index 14.7 mL/m2    LV Diastolic Volume 62.53 mL    LV Diastolic Volume Index 33.62 mL/m2    LA Volume Index 46.2 mL/m2    LV Mass Index 104 g/m2    RA Major Axis 4.89 cm    Left Atrium Minor Axis 6.20 cm    Left Atrium Major Axis 6.43 cm    Triscuspid Valve Regurgitation Peak Gradient 20 mmHg    RA Width 4.30 cm    Right Atrial Pressure (from IVC) 8 mmHg    EF 65 %    TV rest pulmonary artery pressure 28 mmHg    Narrative    · The estimated ejection fraction is 65%.  · The left ventricle is normal in size with concentric hypertrophy and   normal systolic function.  · Moderate to severe left atrial enlargement.  · Grade I left ventricular diastolic dysfunction.  · Normal right ventricular size with normal right ventricular systolic   function.  · Moderate right atrial enlargement.  · Intermediate central venous pressure (8 mmHg).  · The estimated PA systolic pressure is 28 mmHg.

## 2022-10-17 NOTE — PLAN OF CARE
Patient resting quietly in bed, no issues throughout the night. NAD. VSS. Patient AAO4. Denies pain at this time. Discussed POC, all questions answered, verbalized understanding. Will continue to monitor.  Problem: Adult Inpatient Plan of Care  Goal: Plan of Care Review  Outcome: Ongoing, Progressing  Goal: Patient-Specific Goal (Individualized)  Outcome: Ongoing, Progressing  Goal: Absence of Hospital-Acquired Illness or Injury  Outcome: Ongoing, Progressing  Goal: Optimal Comfort and Wellbeing  Outcome: Ongoing, Progressing  Goal: Readiness for Transition of Care  Outcome: Ongoing, Progressing

## 2022-10-17 NOTE — SUBJECTIVE & OBJECTIVE
Interval History:  No new issues, initially did not left heart catheterization but now that she spoke with her sister she would like to have this done before discharge.  Planning for tomorrow.    Review of Systems   Constitutional:  Negative for chills and fever.   HENT:  Positive for postnasal drip. Negative for congestion and nosebleeds.    Eyes:  Negative for visual disturbance.   Respiratory:  Negative for cough and shortness of breath.    Cardiovascular:  Negative for chest pain and leg swelling.   Gastrointestinal:  Negative for abdominal pain, diarrhea, nausea and vomiting.   Genitourinary:  Negative for dysuria and hematuria.   Musculoskeletal:  Negative for arthralgias.   Skin:  Negative for rash.   Neurological:  Negative for dizziness and weakness.   Psychiatric/Behavioral:  Negative for agitation and confusion.    Objective:     Vital Signs (Most Recent):  Temp: 98.2 °F (36.8 °C) (10/17/22 1123)  Pulse: 71 (10/17/22 1123)  Resp: 18 (10/17/22 1123)  BP: (!) 166/80 (10/17/22 1123)  SpO2: 97 % (10/17/22 1123)   Vital Signs (24h Range):  Temp:  [98.2 °F (36.8 °C)-99.2 °F (37.3 °C)] 98.2 °F (36.8 °C)  Pulse:  [69-89] 71  Resp:  [17-20] 18  SpO2:  [90 %-97 %] 97 %  BP: (120-218)/(55-88) 166/80     Weight: 90.1 kg (198 lb 10.2 oz)  Body mass index is 38.79 kg/m².    Intake/Output Summary (Last 24 hours) at 10/17/2022 1427  Last data filed at 10/17/2022 0830  Gross per 24 hour   Intake 289.92 ml   Output --   Net 289.92 ml      Physical Exam  Vitals and nursing note reviewed.   Constitutional:       General: She is not in acute distress.     Appearance: Normal appearance. She is not ill-appearing.   HENT:      Head: Normocephalic and atraumatic.      Right Ear: External ear normal.      Left Ear: External ear normal.      Nose: Nose normal. No congestion or rhinorrhea.      Mouth/Throat:      Mouth: Mucous membranes are moist.      Pharynx: Oropharynx is clear. No oropharyngeal exudate.   Eyes:      General: No  scleral icterus.     Conjunctiva/sclera: Conjunctivae normal.   Cardiovascular:      Rate and Rhythm: Normal rate and regular rhythm.      Pulses: Normal pulses.      Heart sounds: Normal heart sounds. No murmur heard.  Pulmonary:      Effort: Pulmonary effort is normal. No respiratory distress.      Breath sounds: Normal breath sounds. No wheezing, rhonchi or rales.   Abdominal:      General: Bowel sounds are normal. There is no distension.      Palpations: Abdomen is soft.      Tenderness: There is no abdominal tenderness. There is no guarding.   Musculoskeletal:         General: No swelling or tenderness. Normal range of motion.      Right lower leg: No edema.      Left lower leg: No edema.   Skin:     General: Skin is warm and dry.      Capillary Refill: Capillary refill takes less than 2 seconds.      Coloration: Skin is not jaundiced or pale.   Neurological:      General: No focal deficit present.      Mental Status: She is alert and oriented to person, place, and time. Mental status is at baseline.      Sensory: No sensory deficit.      Motor: No weakness.   Psychiatric:         Mood and Affect: Mood normal.         Behavior: Behavior normal.         Thought Content: Thought content normal.         Judgment: Judgment normal.       Significant Labs: All pertinent labs within the past 24 hours have been reviewed.    Significant Imaging: I have reviewed all pertinent imaging results/findings within the past 24 hours.

## 2022-10-18 LAB
ANION GAP SERPL CALC-SCNC: 10 MMOL/L (ref 8–16)
BUN SERPL-MCNC: 11 MG/DL (ref 8–23)
CALCIUM SERPL-MCNC: 9.3 MG/DL (ref 8.7–10.5)
CHLORIDE SERPL-SCNC: 107 MMOL/L (ref 95–110)
CO2 SERPL-SCNC: 28 MMOL/L (ref 23–29)
CREAT SERPL-MCNC: 0.7 MG/DL (ref 0.5–1.4)
EST. GFR  (NO RACE VARIABLE): >60 ML/MIN/1.73 M^2
GLUCOSE SERPL-MCNC: 103 MG/DL (ref 70–110)
POTASSIUM SERPL-SCNC: 3.4 MMOL/L (ref 3.5–5.1)
SODIUM SERPL-SCNC: 145 MMOL/L (ref 136–145)

## 2022-10-18 PROCEDURE — 11000001 HC ACUTE MED/SURG PRIVATE ROOM

## 2022-10-18 PROCEDURE — 99152 PR MOD CONSCIOUS SEDATION, SAME PHYS, 5+ YRS, FIRST 15 MIN: ICD-10-PCS | Mod: ,,, | Performed by: INTERNAL MEDICINE

## 2022-10-18 PROCEDURE — C1894 INTRO/SHEATH, NON-LASER: HCPCS | Performed by: INTERNAL MEDICINE

## 2022-10-18 PROCEDURE — 93454 CORONARY ARTERY ANGIO S&I: CPT | Mod: 26,,, | Performed by: INTERNAL MEDICINE

## 2022-10-18 PROCEDURE — 80048 BASIC METABOLIC PNL TOTAL CA: CPT | Performed by: STUDENT IN AN ORGANIZED HEALTH CARE EDUCATION/TRAINING PROGRAM

## 2022-10-18 PROCEDURE — 93454 PR CATH PLACE/CORONARY ANGIO, IMG SUPER/INTERP: ICD-10-PCS | Mod: 26,,, | Performed by: INTERNAL MEDICINE

## 2022-10-18 PROCEDURE — 25000003 PHARM REV CODE 250: Performed by: STUDENT IN AN ORGANIZED HEALTH CARE EDUCATION/TRAINING PROGRAM

## 2022-10-18 PROCEDURE — 63600175 PHARM REV CODE 636 W HCPCS: Performed by: INTERNAL MEDICINE

## 2022-10-18 PROCEDURE — 93454 CORONARY ARTERY ANGIO S&I: CPT | Performed by: INTERNAL MEDICINE

## 2022-10-18 PROCEDURE — 25000003 PHARM REV CODE 250: Performed by: INTERNAL MEDICINE

## 2022-10-18 PROCEDURE — C1887 CATHETER, GUIDING: HCPCS | Performed by: INTERNAL MEDICINE

## 2022-10-18 PROCEDURE — 99152 MOD SED SAME PHYS/QHP 5/>YRS: CPT | Mod: ,,, | Performed by: INTERNAL MEDICINE

## 2022-10-18 PROCEDURE — 36415 COLL VENOUS BLD VENIPUNCTURE: CPT | Performed by: STUDENT IN AN ORGANIZED HEALTH CARE EDUCATION/TRAINING PROGRAM

## 2022-10-18 PROCEDURE — C1769 GUIDE WIRE: HCPCS | Performed by: INTERNAL MEDICINE

## 2022-10-18 PROCEDURE — 63600175 PHARM REV CODE 636 W HCPCS: Performed by: STUDENT IN AN ORGANIZED HEALTH CARE EDUCATION/TRAINING PROGRAM

## 2022-10-18 RX ORDER — SODIUM CHLORIDE 9 MG/ML
INJECTION, SOLUTION INTRAVENOUS CONTINUOUS
Status: ACTIVE | OUTPATIENT
Start: 2022-10-18 | End: 2022-10-18

## 2022-10-18 RX ORDER — SODIUM CHLORIDE 9 MG/ML
INJECTION, SOLUTION INTRAVENOUS ONCE
Status: COMPLETED | OUTPATIENT
Start: 2022-10-18 | End: 2022-10-18

## 2022-10-18 RX ORDER — LIDOCAINE HYDROCHLORIDE 10 MG/ML
INJECTION, SOLUTION EPIDURAL; INFILTRATION; INTRACAUDAL; PERINEURAL
Status: DISCONTINUED | OUTPATIENT
Start: 2022-10-18 | End: 2022-10-18 | Stop reason: HOSPADM

## 2022-10-18 RX ORDER — FENTANYL CITRATE 50 UG/ML
INJECTION, SOLUTION INTRAMUSCULAR; INTRAVENOUS
Status: DISCONTINUED | OUTPATIENT
Start: 2022-10-18 | End: 2022-10-18 | Stop reason: HOSPADM

## 2022-10-18 RX ORDER — HEPARIN SODIUM 1000 [USP'U]/ML
INJECTION, SOLUTION INTRAVENOUS; SUBCUTANEOUS
Status: DISCONTINUED | OUTPATIENT
Start: 2022-10-18 | End: 2022-10-18 | Stop reason: HOSPADM

## 2022-10-18 RX ORDER — MIDAZOLAM HYDROCHLORIDE 1 MG/ML
INJECTION INTRAMUSCULAR; INTRAVENOUS
Status: DISCONTINUED | OUTPATIENT
Start: 2022-10-18 | End: 2022-10-18 | Stop reason: HOSPADM

## 2022-10-18 RX ORDER — ACETAMINOPHEN 325 MG/1
650 TABLET ORAL EVERY 4 HOURS PRN
Status: DISCONTINUED | OUTPATIENT
Start: 2022-10-18 | End: 2022-10-18

## 2022-10-18 RX ORDER — AMLODIPINE BESYLATE 5 MG/1
5 TABLET ORAL DAILY
Status: DISCONTINUED | OUTPATIENT
Start: 2022-10-18 | End: 2022-10-19 | Stop reason: HOSPADM

## 2022-10-18 RX ORDER — VERAPAMIL HYDROCHLORIDE 2.5 MG/ML
INJECTION, SOLUTION INTRAVENOUS
Status: DISCONTINUED | OUTPATIENT
Start: 2022-10-18 | End: 2022-10-18 | Stop reason: HOSPADM

## 2022-10-18 RX ADMIN — OLOPATADINE HYDROCHLORIDE 1 DROP: 1.11 SOLUTION/ DROPS OPHTHALMIC at 08:10

## 2022-10-18 RX ADMIN — FUROSEMIDE 20 MG: 20 TABLET ORAL at 08:10

## 2022-10-18 RX ADMIN — CLONIDINE HYDROCHLORIDE 0.1 MG: 0.1 TABLET ORAL at 08:10

## 2022-10-18 RX ADMIN — FLUTICASONE PROPIONATE 50 MCG: 50 SPRAY, METERED NASAL at 08:10

## 2022-10-18 RX ADMIN — SODIUM CHLORIDE: 0.9 INJECTION, SOLUTION INTRAVENOUS at 02:10

## 2022-10-18 RX ADMIN — PANTOPRAZOLE SODIUM 40 MG: 40 TABLET, DELAYED RELEASE ORAL at 08:10

## 2022-10-18 RX ADMIN — ATORVASTATIN CALCIUM 40 MG: 40 TABLET, FILM COATED ORAL at 08:10

## 2022-10-18 RX ADMIN — SODIUM CHLORIDE: 0.9 INJECTION, SOLUTION INTRAVENOUS at 03:10

## 2022-10-18 RX ADMIN — VITAM B12 100 MCG: 100 TAB at 08:10

## 2022-10-18 RX ADMIN — POTASSIUM CHLORIDE 10 MEQ: 750 TABLET, EXTENDED RELEASE ORAL at 08:10

## 2022-10-18 RX ADMIN — GUAIFENESIN 600 MG: 600 TABLET, EXTENDED RELEASE ORAL at 08:10

## 2022-10-18 RX ADMIN — MIRTAZAPINE 30 MG: 15 TABLET, FILM COATED ORAL at 08:10

## 2022-10-18 RX ADMIN — BUSPIRONE HYDROCHLORIDE 7.5 MG: 5 TABLET ORAL at 08:10

## 2022-10-18 RX ADMIN — AMLODIPINE BESYLATE 5 MG: 5 TABLET ORAL at 10:10

## 2022-10-18 RX ADMIN — OXYCODONE HYDROCHLORIDE AND ACETAMINOPHEN 1000 MG: 500 TABLET ORAL at 08:10

## 2022-10-18 RX ADMIN — CLOPIDOGREL 75 MG: 75 TABLET, FILM COATED ORAL at 08:10

## 2022-10-18 RX ADMIN — ASPIRIN 81 MG: 81 TABLET, COATED ORAL at 08:10

## 2022-10-18 RX ADMIN — HYDRALAZINE HYDROCHLORIDE 10 MG: 20 INJECTION INTRAMUSCULAR; INTRAVENOUS at 05:10

## 2022-10-18 RX ADMIN — ENOXAPARIN SODIUM 90 MG: 100 INJECTION SUBCUTANEOUS at 05:10

## 2022-10-18 NOTE — ASSESSMENT & PLAN NOTE
Her troponin was elevated at 0.299.  She has no chest pain or shortness of breath.  Her EKG shows no ST changes.  Was given aspirin and statin.  Started on heparin but transition to Lovenox for now.  Will trend out troponin, if increases will consult Cardiology.  Check echocardiogram for thoroughness.  - troponin peaking at 0.843, cardiology consulted and recommended a heart catheterization.      Plan for of [facility] today

## 2022-10-18 NOTE — PROGRESS NOTES
"[facility] Medicine  Progress Note    Patient Name: Jose Manuel Boyd  MRN: [id number]  Patient Class: IP- Inpatient   Admission Date: 10/16/2022  Length of Stay: 2 days  Attending Physician: Julio Cesar Albarran III, MD  Primary Care Provider: Ajit Piña MD        Subjective:     Principal Problem:Elevated troponin        HPI:  Ms. Boyd is an 82-year-old female with a past medical history of depression, hyperlipidemia, hypertension and asthma who presents to the emergency department for "Slime" in her mouth.  She states she woke up this morning and noted she had sticky 7 her mouth.  She tried to rinse her mouth out with mouthwash but did not help.  She states this is the reason why she came to the emergency department.  She denies any fevers, chills, cough or shortness of breath.  No chest pain either.  She otherwise has been feeling well.    In the emergency department, she was found to be very hypertensive with systolic greater than 200.  Her troponin was also found to be elevated at 0.299.  Her EKG did not show any acute ST changes.  She was started on hypertrophied emergency department however cardiology was not consulted.  She was asked to admitted to hospital medicine for further management.      Overview/Hospital Course:  Admitted for elevated troponin and uncontrolled hypertension. Started on lovenox, troponin continued to trend up to 0.843. Cardiology consulted recommending [facility] - plan for 10/18.      Interval History: PT states she is feeling good currently and without cp. Pt denies sob, fever, chills, n/v at this time. Plan for [facility] today.    Review of Systems  Objective:     Vital Signs (Most Recent):  Temp: 98.1 °F (36.7 °C) (10/18/22 0724)  Pulse: 83 (10/18/22 0724)  Resp: 18 (10/18/22 0724)  BP: (!) 189/83 (10/18/22 0843)  SpO2: (!) 92 % (10/18/22 0724)   Vital Signs (24h Range):  Temp:  [97.3 °F (36.3 °C)-98.7 °F (37.1 °C)] 98.1 °F (36.7 °C)  Pulse:  [71-87] 83  Resp:  [18-19] " 18  SpO2:  [92 %-98 %] 92 %  BP: (134-194)/(63-86) 189/83     Weight: 91.1 kg (200 lb 13.4 oz)  Body mass index is 39.22 kg/m².    Intake/Output Summary (Last 24 hours) at 10/18/2022 1036  Last data filed at 10/18/2022 0810  Gross per 24 hour   Intake 480 ml   Output --   Net 480 ml      Physical Exam  Vitals reviewed.   Constitutional:       General: She is not in acute distress.  HENT:      Head: Normocephalic and atraumatic.      Nose: No congestion or rhinorrhea.      Mouth/Throat:      Mouth: Mucous membranes are moist.      Pharynx: Oropharynx is clear.   Eyes:      General: No scleral icterus.     Extraocular Movements: Extraocular movements intact.   Cardiovascular:      Rate and Rhythm: Normal rate and regular rhythm.   Pulmonary:      Effort: Pulmonary effort is normal. No respiratory distress.      Breath sounds: Normal breath sounds.   Abdominal:      General: There is no distension.      Palpations: Abdomen is soft.   Musculoskeletal:         General: No swelling or tenderness.      Cervical back: Neck supple. No rigidity.   Skin:     General: Skin is warm and dry.   Neurological:      General: No focal deficit present.      Mental Status: She is alert and oriented to person, place, and time.   Psychiatric:         Mood and Affect: Mood normal.         Behavior: Behavior normal.       Significant Labs: All pertinent labs within the past 24 hours have been reviewed.    Significant Imaging: I have reviewed all pertinent imaging results/findings within the past 24 hours.      Assessment/Plan:      * Elevated troponin  Her troponin was elevated at 0.299.  She has no chest pain or shortness of breath.  Her EKG shows no ST changes.  Was given aspirin and statin.  Started on heparin but transition to Lovenox for now.  Will trend out troponin, if increases will consult Cardiology.  Check echocardiogram for thoroughness.  - troponin peaking at 0.843, cardiology consulted and recommended a heart catheterization.       Plan for of [facility] today    Gastroesophageal reflux disease  Resume PPI      HTN (hypertension), benign  Uncontrolled, does not want to take higher doses of losartan so is on clonidine 0.1 mg twice a day.  It appear she does not take this all of the time.  Will restart her home medications and monitor.    -amlodipine added for better control. Will restart losartan as well      Depression  Resume home mirtazapine at night      VTE Risk Mitigation (From admission, onward)         Ordered     enoxaparin injection 90 mg  Every 12 hours (non-standard times)         10/16/22 1644     IP VTE HIGH RISK PATIENT  Once         10/16/22 1638     Place sequential compression device  Until discontinued         10/16/22 1638                Discharge Planning   ROXANA:      Code Status: Full Code   Is the patient medically ready for discharge?:     Reason for patient still in hospital (select all that apply): Treatment  Discharge Plan A: Home                  Julio Cesar Albarran III, MD  Department of Hospital Medicine   [facility]

## 2022-10-18 NOTE — INTERVAL H&P NOTE
The patient has been examined and the H&P has been reviewed:    I concur with the findings and changes have been noted since the H&P was written: Patient amenable to heart catheterization.    Risks, benefits and alternatives of the catheterization procedure were discussed with the patient.The risks of coronary angiography include but are not limited to: bleeding, infection, death, heart attack, arrhythmia, kidney injury or failure, potential need for dialysis, allergic reactions, stroke, need for emergency surgery, hematoma, pseudoaneurysm etc.  Should stenting be indicated, the patient has agreed to dual anti-platelet therapy for 1-consecutive year with a drug-eluting stent and a minimum of 1-month with the use of a bare metal stent. Additionally, pt is aware that non-compliance is likely to result in stent clotting with heart attack, heart failure, and/or death  The risks of moderate sedation include hypotension, respiratory depression, arrhythmias, bronchospasm, and death. Informed consent was obtained and the  patient is agreeable to proceed with the procedure. Consent was placed on the chart.      Procedure risks, benefits and alternative options discussed and understood by patient/family.          Active Hospital Problems    Diagnosis  POA    *Elevated troponin [R77.8]  Yes    Gastroesophageal reflux disease [K21.9]  Yes    Depression [F32.A]  Yes    HTN (hypertension), benign [I10]  Yes     F/u per pcp        Resolved Hospital Problems   No resolved problems to display.

## 2022-10-18 NOTE — PLAN OF CARE
NPO, preop bath completed, gown with no snaps, hair shaved on right arm, umbilicus to knees, groin area, back and chest. DP marked with an x. Pulse ox on right thumb, no nail polish in place. Patient instructed to remove underwear prior to procedure at noon.  Problem: Adult Inpatient Plan of Care  Goal: Plan of Care Review  Outcome: Ongoing, Progressing  Goal: Optimal Comfort and Wellbeing  Outcome: Ongoing, Progressing  Goal: Readiness for Transition of Care  Outcome: Ongoing, Progressing    Report given to oncoming nurse Alisa to verify and jefe PT pulses, also.

## 2022-10-18 NOTE — BRIEF OP NOTE
Cardiac catheterization revealed normal coronary arteries. Systemic hypertension. Patient tolerated the procedure well. Transported to [facility] in good condition.

## 2022-10-18 NOTE — ASSESSMENT & PLAN NOTE
Uncontrolled, does not want to take higher doses of losartan so is on clonidine 0.1 mg twice a day.  It appear she does not take this all of the time.  Will restart her home medications and monitor.    -amlodipine added for better control. Will restart losartan as well

## 2022-10-18 NOTE — NURSING
10/18/417559 Patient arrives back to the floor from the cath lab at this time. Patient is alert and oriented. Patient on room air. Right radial site with TR band noted.     10/18/22 1510 TR band off at this time. Gauze  and tegaderm applied to site.

## 2022-10-18 NOTE — SUBJECTIVE & OBJECTIVE
Interval History: PT states she is feeling good currently and without cp. Pt denies sob, fever, chills, n/v at this time. Plan for [facility] today.    Review of Systems  Objective:     Vital Signs (Most Recent):  Temp: 98.1 °F (36.7 °C) (10/18/22 0724)  Pulse: 83 (10/18/22 0724)  Resp: 18 (10/18/22 0724)  BP: (!) 189/83 (10/18/22 0843)  SpO2: (!) 92 % (10/18/22 0724)   Vital Signs (24h Range):  Temp:  [97.3 °F (36.3 °C)-98.7 °F (37.1 °C)] 98.1 °F (36.7 °C)  Pulse:  [71-87] 83  Resp:  [18-19] 18  SpO2:  [92 %-98 %] 92 %  BP: (134-194)/(63-86) 189/83     Weight: 91.1 kg (200 lb 13.4 oz)  Body mass index is 39.22 kg/m².    Intake/Output Summary (Last 24 hours) at 10/18/2022 1036  Last data filed at 10/18/2022 0810  Gross per 24 hour   Intake 480 ml   Output --   Net 480 ml      Physical Exam  Vitals reviewed.   Constitutional:       General: She is not in acute distress.  HENT:      Head: Normocephalic and atraumatic.      Nose: No congestion or rhinorrhea.      Mouth/Throat:      Mouth: Mucous membranes are moist.      Pharynx: Oropharynx is clear.   Eyes:      General: No scleral icterus.     Extraocular Movements: Extraocular movements intact.   Cardiovascular:      Rate and Rhythm: Normal rate and regular rhythm.   Pulmonary:      Effort: Pulmonary effort is normal. No respiratory distress.      Breath sounds: Normal breath sounds.   Abdominal:      General: There is no distension.      Palpations: Abdomen is soft.   Musculoskeletal:         General: No swelling or tenderness.      Cervical back: Neck supple. No rigidity.   Skin:     General: Skin is warm and dry.   Neurological:      General: No focal deficit present.      Mental Status: She is alert and oriented to person, place, and time.   Psychiatric:         Mood and Affect: Mood normal.         Behavior: Behavior normal.       Significant Labs: All pertinent labs within the past 24 hours have been reviewed.    Significant Imaging: I have reviewed all pertinent  imaging results/findings within the past 24 hours.

## 2022-10-19 VITALS
DIASTOLIC BLOOD PRESSURE: 60 MMHG | WEIGHT: 200.81 LBS | SYSTOLIC BLOOD PRESSURE: 134 MMHG | HEIGHT: 60 IN | HEART RATE: 76 BPM | OXYGEN SATURATION: 95 % | TEMPERATURE: 99 F | RESPIRATION RATE: 20 BRPM | BODY MASS INDEX: 39.43 KG/M2

## 2022-10-19 LAB
ANION GAP SERPL CALC-SCNC: 12 MMOL/L (ref 8–16)
BUN SERPL-MCNC: 17 MG/DL (ref 8–23)
CALCIUM SERPL-MCNC: 9.5 MG/DL (ref 8.7–10.5)
CHLORIDE SERPL-SCNC: 106 MMOL/L (ref 95–110)
CO2 SERPL-SCNC: 26 MMOL/L (ref 23–29)
CREAT SERPL-MCNC: 0.8 MG/DL (ref 0.5–1.4)
EST. GFR  (NO RACE VARIABLE): >60 ML/MIN/1.73 M^2
GLUCOSE SERPL-MCNC: 104 MG/DL (ref 70–110)
POTASSIUM SERPL-SCNC: 3.7 MMOL/L (ref 3.5–5.1)
SODIUM SERPL-SCNC: 144 MMOL/L (ref 136–145)

## 2022-10-19 PROCEDURE — 25000242 PHARM REV CODE 250 ALT 637 W/ HCPCS: Performed by: STUDENT IN AN ORGANIZED HEALTH CARE EDUCATION/TRAINING PROGRAM

## 2022-10-19 PROCEDURE — 99233 PR SUBSEQUENT HOSPITAL CARE,LEVL III: ICD-10-PCS | Mod: ,,, | Performed by: INTERNAL MEDICINE

## 2022-10-19 PROCEDURE — 99233 SBSQ HOSP IP/OBS HIGH 50: CPT | Mod: ,,, | Performed by: INTERNAL MEDICINE

## 2022-10-19 PROCEDURE — 80048 BASIC METABOLIC PNL TOTAL CA: CPT | Performed by: STUDENT IN AN ORGANIZED HEALTH CARE EDUCATION/TRAINING PROGRAM

## 2022-10-19 PROCEDURE — 25000003 PHARM REV CODE 250: Performed by: STUDENT IN AN ORGANIZED HEALTH CARE EDUCATION/TRAINING PROGRAM

## 2022-10-19 PROCEDURE — 94640 AIRWAY INHALATION TREATMENT: CPT

## 2022-10-19 PROCEDURE — 94761 N-INVAS EAR/PLS OXIMETRY MLT: CPT

## 2022-10-19 PROCEDURE — 36415 COLL VENOUS BLD VENIPUNCTURE: CPT | Performed by: STUDENT IN AN ORGANIZED HEALTH CARE EDUCATION/TRAINING PROGRAM

## 2022-10-19 PROCEDURE — 63600175 PHARM REV CODE 636 W HCPCS: Performed by: STUDENT IN AN ORGANIZED HEALTH CARE EDUCATION/TRAINING PROGRAM

## 2022-10-19 PROCEDURE — 25000003 PHARM REV CODE 250: Performed by: INTERNAL MEDICINE

## 2022-10-19 RX ORDER — AMLODIPINE BESYLATE 5 MG/1
5 TABLET ORAL DAILY
Qty: 30 TABLET | Refills: 11 | Status: SHIPPED | OUTPATIENT
Start: 2022-10-19 | End: 2022-11-03

## 2022-10-19 RX ADMIN — FLUTICASONE FUROATE AND VILANTEROL TRIFENATATE 1 PUFF: 200; 25 POWDER RESPIRATORY (INHALATION) at 08:10

## 2022-10-19 RX ADMIN — ATORVASTATIN CALCIUM 40 MG: 40 TABLET, FILM COATED ORAL at 08:10

## 2022-10-19 RX ADMIN — OLOPATADINE HYDROCHLORIDE 1 DROP: 1.11 SOLUTION/ DROPS OPHTHALMIC at 08:10

## 2022-10-19 RX ADMIN — CLONIDINE HYDROCHLORIDE 0.1 MG: 0.1 TABLET ORAL at 08:10

## 2022-10-19 RX ADMIN — CLOPIDOGREL 75 MG: 75 TABLET, FILM COATED ORAL at 08:10

## 2022-10-19 RX ADMIN — PANTOPRAZOLE SODIUM 40 MG: 40 TABLET, DELAYED RELEASE ORAL at 08:10

## 2022-10-19 RX ADMIN — GUAIFENESIN 600 MG: 600 TABLET, EXTENDED RELEASE ORAL at 08:10

## 2022-10-19 RX ADMIN — ENOXAPARIN SODIUM 90 MG: 100 INJECTION SUBCUTANEOUS at 05:10

## 2022-10-19 RX ADMIN — ASPIRIN 81 MG: 81 TABLET, COATED ORAL at 08:10

## 2022-10-19 RX ADMIN — POTASSIUM CHLORIDE 10 MEQ: 750 TABLET, EXTENDED RELEASE ORAL at 08:10

## 2022-10-19 RX ADMIN — AMLODIPINE BESYLATE 5 MG: 5 TABLET ORAL at 08:10

## 2022-10-19 RX ADMIN — FUROSEMIDE 20 MG: 20 TABLET ORAL at 08:10

## 2022-10-19 RX ADMIN — VITAM B12 100 MCG: 100 TAB at 08:10

## 2022-10-19 RX ADMIN — OXYCODONE HYDROCHLORIDE AND ACETAMINOPHEN 1000 MG: 500 TABLET ORAL at 08:10

## 2022-10-19 RX ADMIN — FLUTICASONE PROPIONATE 50 MCG: 50 SPRAY, METERED NASAL at 08:10

## 2022-10-19 RX ADMIN — BUSPIRONE HYDROCHLORIDE 7.5 MG: 5 TABLET ORAL at 08:10

## 2022-10-19 NOTE — SUBJECTIVE & OBJECTIVE
Interval History:  Doing fine.  No complications from angiogram yesterday.    Review of Systems   Constitutional: Negative.   HENT: Negative.     Eyes: Negative.    Cardiovascular: Negative.    Respiratory: Negative.     Endocrine: Negative.    Hematologic/Lymphatic: Negative.    Skin: Negative.    Musculoskeletal: Negative.    Gastrointestinal: Negative.    Genitourinary: Negative.    Neurological: Negative.    Psychiatric/Behavioral: Negative.     Allergic/Immunologic: Negative.    Objective:     Vital Signs (Most Recent):  Temp: 98.7 °F (37.1 °C) (10/19/22 1136)  Pulse: 76 (10/19/22 1136)  Resp: 20 (10/19/22 1136)  BP: 134/60 (10/19/22 1136)  SpO2: 95 % (10/19/22 1136) Vital Signs (24h Range):  Temp:  [97.6 °F (36.4 °C)-99.6 °F (37.6 °C)] 98.7 °F (37.1 °C)  Pulse:  [74-85] 76  Resp:  [14-20] 20  SpO2:  [90 %-96 %] 95 %  BP: (123-172)/(60-90) 134/60     Weight: 91.1 kg (200 lb 13.4 oz)  Body mass index is 39.22 kg/m².     SpO2: 95 %  O2 Device (Oxygen Therapy): room air      Intake/Output Summary (Last 24 hours) at 10/19/2022 1306  Last data filed at 10/19/2022 1253  Gross per 24 hour   Intake 600 ml   Output --   Net 600 ml       Lines/Drains/Airways       Peripheral Intravenous Line  Duration                  Peripheral IV - Single Lumen 07/24/22 1923 Left;Posterior Hand 86 days         Peripheral IV - Single Lumen 10/18/22 1024 20 G Anterior;Left Upper Arm 1 day                    Physical Exam  Constitutional:       Appearance: Normal appearance. She is well-developed.   HENT:      Head: Normocephalic.   Eyes:      Pupils: Pupils are equal, round, and reactive to light.   Cardiovascular:      Rate and Rhythm: Normal rate and regular rhythm.   Pulmonary:      Effort: Pulmonary effort is normal.      Breath sounds: Normal breath sounds.   Abdominal:      General: Bowel sounds are normal.      Palpations: Abdomen is soft.      Tenderness: There is no abdominal tenderness.   Musculoskeletal:         General:  Normal range of motion.      Cervical back: Normal range of motion and neck supple.   Skin:     General: Skin is warm.   Neurological:      Mental Status: She is alert and oriented to person, place, and time.       Significant Labs: BMP:   Recent Labs   Lab 10/18/22  0421 10/19/22  0515    104    144   K 3.4* 3.7    106   CO2 28 26   BUN 11 17   CREATININE 0.7 0.8   CALCIUM 9.3 9.5   , CMP   Recent Labs   Lab 10/18/22  0421 10/19/22  0515    144   K 3.4* 3.7    106   CO2 28 26    104   BUN 11 17   CREATININE 0.7 0.8   CALCIUM 9.3 9.5   ANIONGAP 10 12   , CBC No results for input(s): WBC, HGB, HCT, PLT in the last 48 hours., INR No results for input(s): INR, PROTIME in the last 48 hours., Lipid Panel No results for input(s): CHOL, HDL, LDLCALC, TRIG, CHOLHDL in the last 48 hours., Troponin No results for input(s): TROPONINI in the last 48 hours., and All pertinent lab results from the last 24 hours have been reviewed.    Significant Imaging: Echocardiogram: Transthoracic echo (TTE) complete (Cupid Only):   Results for orders placed or performed during the hospital encounter of 10/16/22   Echo   Result Value Ref Range    BSA 1.95 m2    TDI SEPTAL 0.04 m/s    LV LATERAL E/E' RATIO 9.00 m/s    LV SEPTAL E/E' RATIO 15.75 m/s    LA WIDTH 4.70 cm    IVC diameter 1.91 cm    Left Ventricular Outflow Tract Mean Velocity 1.03 cm/s    Left Ventricular Outflow Tract Mean Gradient 4.52 mmHg    AORTIC VALVE CUSP SEPERATION 2.14 cm    TDI LATERAL 0.07 m/s    PV PEAK VELOCITY 0.87 cm/s    LVIDd 3.81 3.5 - 6.0 cm    IVS 1.40 (A) 0.6 - 1.1 cm    Posterior Wall 1.38 (A) 0.6 - 1.1 cm    Ao root annulus 3.62 cm    LVIDs 2.71 2.1 - 4.0 cm    FS 29 28 - 44 %    LA volume 86.00 cm3    Sinus 3.54 cm    STJ 2.83 cm    Ascending aorta 2.77 cm    LV mass 192.70 g    LA size 3.41 cm    RVDD 3.90 cm    TAPSE 2.02 cm    RV S' 0.01 cm/s    Left Ventricle Relative Wall Thickness 0.72 cm    AV mean gradient 5  mmHg    AV valve area 3.34 cm2    AV Velocity Ratio 0.93     AV index (prosthetic) 0.97     MV valve area p 1/2 method 3.69 cm2    E/A ratio 0.79     Mean e' 0.06 m/s    E wave deceleration time 205.58 msec    IVRT 144.62 msec    Pulm vein S/D ratio 1.69     LVOT diameter 2.09 cm    LVOT area 3.4 cm2    LVOT peak hoang 1.28 m/s    LVOT peak VTI 26.30 cm    Ao peak hoang 1.38 m/s    Ao VTI 27.0 cm    LVOT stroke volume 90.18 cm3    AV peak gradient 8 mmHg    E/E' ratio 11.45 m/s    MV Peak E Hoang 0.63 m/s    TR Max Hoang 2.21 m/s    MV stenosis pressure 1/2 time 59.62 ms    MV Peak A Hoang 0.80 m/s    PV Peak S Hoang 0.44 m/s    PV Peak D Hoang 0.26 m/s    LV Systolic Volume 27.34 mL    LV Systolic Volume Index 14.7 mL/m2    LV Diastolic Volume 62.53 mL    LV Diastolic Volume Index 33.62 mL/m2    LA Volume Index 46.2 mL/m2    LV Mass Index 104 g/m2    RA Major Axis 4.89 cm    Left Atrium Minor Axis 6.20 cm    Left Atrium Major Axis 6.43 cm    Triscuspid Valve Regurgitation Peak Gradient 20 mmHg    RA Width 4.30 cm    Right Atrial Pressure (from IVC) 8 mmHg    EF 65 %    TV rest pulmonary artery pressure 28 mmHg    Narrative    · The estimated ejection fraction is 65%.  · The left ventricle is normal in size with concentric hypertrophy and   normal systolic function.  · Moderate to severe left atrial enlargement.  · Grade I left ventricular diastolic dysfunction.  · Normal right ventricular size with normal right ventricular systolic   function.  · Moderate right atrial enlargement.  · Intermediate central venous pressure (8 mmHg).  · The estimated PA systolic pressure is 28 mmHg.

## 2022-10-19 NOTE — PLAN OF CARE
[facility] - Med Surg  Discharge Final Note  TN sent a secure chat to med surg nurse Allison that the  patient is cleared for discharge from case management's viewpoint.  Primary Care Provider: Ajit Piña MD    Expected Discharge Date: 10/19/2022    Final Discharge Note (most recent)       Final Note - 10/17/22 1516          Final Note    Assessment Type Discharge Planning Assessment        Post-Acute Status    Coverage [employer] - [facility]                     Important Message from Medicare  Important Message from Medicare regarding Discharge Appeal Rights: Given to patient/caregiver, Signed/date by patient/caregiver, Explained to patient/caregiver, Other (comments)     Date IMM was signed: 10/19/22  Time IMM was signed: 0800    Contact Info       Ajit Piña MD   Specialty: Internal Medicine, Wound Care   Relationship: PCP - General    [address] [address]   Phone: [phone]       Next Steps: Follow up on 10/28/2022    Instructions: @9:00 with Dr. Brumfield, TANISHA Packer MD   Specialty: Cardiology, Interventional Cardiology    [address]  [address]  [place] [id number]   Phone: [phone]       Next Steps: Follow up    Instructions: Dr. Packer Office will call to schedule a hospital follow up appointment.

## 2022-10-19 NOTE — PLAN OF CARE
[facility]      HOME HEALTH ORDERS  FACE TO FACE ENCOUNTER    Patient Name: Jose Manuel Boyd  YOB: 1940    PCP: Ajit Piña MD   PCP Address: [address]  PCP Phone Number: [phone]  PCP Fax: [phone]    Encounter Date: 10/16/22    Admit to Home Health    Diagnoses:  Active Hospital Problems    Diagnosis  POA    *Elevated troponin [R77.8]  Yes     Priority: 1 - High    Gastroesophageal reflux disease [K21.9]  Yes    Depression [F32.A]  Yes    HTN (hypertension), benign [I10]  Yes     F/u per pcp        Resolved Hospital Problems   No resolved problems to display.       Follow Up Appointments:  Future Appointments   Date Time Provider Department Center   10/28/2022  9:00 AM Fareed Brumfield NP [facility]   2/24/2023  1:00 PM Ajit Piña MD [facility]       Allergies:  Review of patient's allergies indicates:   Allergen Reactions    Codeine      Other reaction(s): Rash       Medications: Review discharge medications with patient and family and provide education.    Current Facility-Administered Medications   Medication Dose Route Frequency Provider Last Rate Last Admin    acetaminophen tablet 650 mg  650 mg Oral Q6H PRN John Lion MD   650 mg at 10/17/22 0519    albuterol inhaler 2 puff  2 puff Inhalation Q4H PRN John Lion MD        amLODIPine tablet 5 mg  5 mg Oral Daily Julio Cesar Albarran III, MD   5 mg at 10/19/22 0834    ascorbic acid (vitamin C) tablet 1,000 mg  1,000 mg Oral Daily John Lion MD   1,000 mg at 10/19/22 0833    aspirin EC tablet 81 mg  81 mg Oral Daily John Lion MD   81 mg at 10/19/22 0833    atorvastatin tablet 40 mg  40 mg Oral Daily John Lion MD   40 mg at 10/19/22 0834    busPIRone split tablet 7.5 mg  7.5 mg Oral BID John Lion MD   7.5 mg at 10/19/22 0834    cloNIDine tablet 0.1 mg  0.1 mg Oral BID John Lion MD   0.1 mg at 10/19/22 0834    clopidogreL tablet 75  mg  75 mg Oral Daily Yadira Packer MD   75 mg at 10/19/22 0834    cyanocobalamin tablet 100 mcg  100 mcg Oral Daily John Lion MD   100 mcg at 10/19/22 0833    dextrose 50% injection 25 g  25 g Intravenous PRN oJhn Lion MD        enoxaparin injection 90 mg  1 mg/kg Subcutaneous Q12H John Lion MD   90 mg at 10/19/22 0509    fluticasone furoate-vilanteroL 200-25 mcg/dose diskus inhaler 1 puff  1 puff Inhalation Daily John Lion MD   1 puff at 10/19/22 0819    fluticasone propionate 50 mcg/actuation nasal spray 50 mcg  1 spray Each Nostril BID John Lion MD   50 mcg at 10/18/22 2015    furosemide tablet 20 mg  20 mg Oral Daily John Lion MD   20 mg at 10/19/22 0834    glucagon (human recombinant) injection 1 mg  1 mg Intramuscular PRN John Lion MD        glucose chewable tablet 16 g  16 g Oral PRN John Lion MD        glucose chewable tablet 24 g  24 g Oral PRN John Lion MD        guaiFENesin 12 hr tablet 600 mg  600 mg Oral BID John Lion MD   600 mg at 10/19/22 0834    hydrALAZINE injection 10 mg  10 mg Intravenous Q6H PRN John Lion MD   10 mg at 10/18/22 0539    mirtazapine tablet 30 mg  30 mg Oral QHS John Lion MD   30 mg at 10/18/22 2015    naloxone 0.4 mg/mL injection 0.02 mg  0.02 mg Intravenous PRN John Lion MD        olopatadine 0.1 % ophthalmic solution 1 drop  1 drop Both Eyes BID John Lion MD   1 drop at 10/18/22 2016    ondansetron disintegrating tablet 8 mg  8 mg Oral Q8H PRN John Lion MD        pantoprazole EC tablet 40 mg  40 mg Oral Daily John Lion MD   40 mg at 10/19/22 0834    potassium chloride SA CR tablet 10 mEq  10 mEq Oral Daily John Lion MD   10 mEq at 10/19/22 0833    sodium chloride 0.9% flush 10 mL  10 mL Intravenous Q12H PRN John Lion MD         Current Discharge Medication List        START taking these medications    Details   amLODIPine (NORVASC) 5 MG tablet Take 1 tablet (5 mg total)  by mouth once daily.  Qty: 30 tablet, Refills: 11    Comments: .           CONTINUE these medications which have NOT CHANGED    Details   acetaminophen (TYLENOL) 325 MG tablet Take 2 tablets (650 mg total) by mouth every 6 (six) hours as needed for Pain.  Qty: 60 tablet, Refills: 0      albuterol (VENTOLIN HFA) 90 mcg/actuation inhaler Inhale 2 puffs into the lungs every 4 (four) hours as needed for Wheezing.  Qty: 6.7 g, Refills: 6    Associated Diagnoses: Exacerbation of asthma, unspecified asthma severity, unspecified whether persistent      ascorbic acid, vitamin C, (VITAMIN C) 1000 MG tablet Take 1,000 mg by mouth once daily.      aspirin (ECOTRIN) 81 MG EC tablet Take 81 mg by mouth once daily.      atorvastatin (LIPITOR) 40 MG tablet TAKE 1 TABLET BY MOUTH EVERY DAY  Qty: 90 tablet, Refills: 3    Comments: DX Code Needed  .  Associated Diagnoses: H/O: stroke      azelastine (ASTELIN) 137 mcg (0.1 %) nasal spray 1 spray (137 mcg total) by Nasal route 2 (two) times daily.  Qty: 30 mL, Refills: 3      cloNIDine (CATAPRES) 0.1 MG tablet Take 1 tablet (0.1 mg total) by mouth 2 (two) times daily.  Qty: 180 tablet, Refills: 3    Comments: .  Associated Diagnoses: Essential hypertension      cyanocobalamin (VITAMIN B-12) 1000 MCG tablet Take 100 mcg by mouth once daily.      dicyclomine (BENTYL) 20 mg tablet TAKE 1 TABLET BY MOUTH TWICE A DAY AS NEEDED FOR ADOMINAL PAIN  Qty: 60 tablet, Refills: 2    Associated Diagnoses: Diverticulitis of colon      fluticasone propionate (FLONASE) 50 mcg/actuation nasal spray 1 spray (50 mcg total) by Each Nostril route 2 (two) times daily. For allergic rhinitis/sinusitis  Qty: 18 mL, Refills: 11    Associated Diagnoses: Mild persistent asthma, unspecified whether complicated; Allergic rhinitis, unspecified seasonality, unspecified trigger      fluticasone-salmeterol diskus inhaler 500-50 mcg Inhale 1 puff into the lungs 2 (two) times daily. Controller  Qty: 60 each, Refills: 11     Associated Diagnoses: Moderate persistent asthma, unspecified whether complicated      furosemide (LASIX) 20 MG tablet TAKE 1 TABLET BY MOUTH EVERY DAY  Qty: 90 tablet, Refills: 1    Associated Diagnoses: HTN (hypertension), benign      losartan (COZAAR) 50 MG tablet Take 1 tablet (50 mg total) by mouth once daily. For high blood pressure  Qty: 90 tablet, Refills: 3    Comments: .  Associated Diagnoses: HTN (hypertension), benign      mirtazapine (REMERON) 30 MG tablet Take 1 tablet (30 mg total) by mouth nightly as needed (insomnia). At bedtime for sleep, anxiety and depression.  Qty: 90 tablet, Refills: 1    Associated Diagnoses: Major depressive disorder, recurrent episode, mild with anxious distress      multivitamin with minerals tablet Take 1 tablet by mouth once daily.      naphazoline HCl/pheniramine (EYE ALLERGY RELIEF OPHT) Apply to eye.      olopatadine (PATANOL) 0.1 % ophthalmic solution Place 1 drop into both eyes 2 (two) times daily.      omega-3 fatty acids/fish oil (FISH OIL-OMEGA-3 FATTY ACIDS) 300-1,000 mg capsule Take by mouth once daily.      omeprazole (PRILOSEC) 20 MG capsule Take 1 capsule (20 mg total) by mouth once daily. For gastric reflux  Qty: 90 capsule, Refills: 3    Associated Diagnoses: Gastroesophageal reflux disease, unspecified whether esophagitis present      psyllium (METAMUCIL) powder Take 1 packet by mouth daily as needed.      busPIRone (BUSPAR) 7.5 MG tablet TAKE 1 TABLET (7.5 MG TOTAL) BY MOUTH 2 (TWO) TIMES DAILY.  Qty: 180 tablet, Refills: 1    Associated Diagnoses: Major depressive disorder, recurrent episode, mild with anxious distress           STOP taking these medications       aspirin 325 MG tablet Comments:   Reason for Stopping:         diphenhydrAMINE-aluminum-magnesium hydroxide-simethicone-LIDOcaine HCl 2% Comments:   Reason for Stopping:                 I have seen and examined this patient within the last 30 days. My clinical findings that support the need for  the home health skilled services and home bound status are the following:no   Weakness/numbness causing balance and gait disturbance due to Weakness/Debility making it taxing to leave home.     Diet:   cardiac diet      Referrals/ Consults  Aide to provide assistance with personal care, ADLs, and vital signs.    Activities:   activity as tolerated    Nursing:   Agency to admit patient within 24 hours of hospital discharge unless specified on physician order or at patient request    SN to complete comprehensive assessment including routine vital signs. Instruct on disease process and s/s of complications to report to MD. Review/verify medication list sent home with the patient at time of discharge  and instruct patient/caregiver as needed. Frequency may be adjusted depending on start of care date.     Skilled nurse to perform up to 3 visits PRN for symptoms related to diagnosis        Ok to schedule additional visits based on staff availability and patient request on consecutive days within the home health episode.    When multiple disciplines ordered:    Start of Care occurs on Sunday - Wednesday schedule remaining discipline evaluations as ordered on separate consecutive days following the start of care.    Thursday SOC -schedule subsequent evaluations Friday and Monday the following week.     Friday - Saturday SOC - schedule subsequent discipline evaluations on consecutive days starting Monday of the following week.        Home Health Aide:  Nursing Three times weekly and Casey    I certify that this patient is confined to her home and needs intermittent skilled nursing care.

## 2022-10-19 NOTE — NURSING
Transported downstairs via wheelchair with belongings & family at side, no distress noted, safety maintained.

## 2022-10-19 NOTE — PROGRESS NOTES
[facility]  Cardiology  Progress Note    Patient Name: Jose Manuel Boyd  MRN: [id number]  Admission Date: 10/16/2022  Hospital Length of Stay: 3 days  Code Status: Full Code   Attending Physician: Julio Cesar Albarran III, MD   Primary Care Physician: Ajit Piña MD  Expected Discharge Date: 10/19/2022  Principal Problem:Elevated troponin    Subjective:       Interval History:  Doing fine.  No complications from angiogram yesterday.    Review of Systems   Constitutional: Negative.   HENT: Negative.     Eyes: Negative.    Cardiovascular: Negative.    Respiratory: Negative.     Endocrine: Negative.    Hematologic/Lymphatic: Negative.    Skin: Negative.    Musculoskeletal: Negative.    Gastrointestinal: Negative.    Genitourinary: Negative.    Neurological: Negative.    Psychiatric/Behavioral: Negative.     Allergic/Immunologic: Negative.    Objective:     Vital Signs (Most Recent):  Temp: 98.7 °F (37.1 °C) (10/19/22 1136)  Pulse: 76 (10/19/22 1136)  Resp: 20 (10/19/22 1136)  BP: 134/60 (10/19/22 1136)  SpO2: 95 % (10/19/22 1136) Vital Signs (24h Range):  Temp:  [97.6 °F (36.4 °C)-99.6 °F (37.6 °C)] 98.7 °F (37.1 °C)  Pulse:  [74-85] 76  Resp:  [14-20] 20  SpO2:  [90 %-96 %] 95 %  BP: (123-172)/(60-90) 134/60     Weight: 91.1 kg (200 lb 13.4 oz)  Body mass index is 39.22 kg/m².     SpO2: 95 %  O2 Device (Oxygen Therapy): room air      Intake/Output Summary (Last 24 hours) at 10/19/2022 1306  Last data filed at 10/19/2022 1253  Gross per 24 hour   Intake 600 ml   Output --   Net 600 ml       Lines/Drains/Airways       Peripheral Intravenous Line  Duration                  Peripheral IV - Single Lumen 07/24/22 1923 Left;Posterior Hand 86 days         Peripheral IV - Single Lumen 10/18/22 1024 20 G Anterior;Left Upper Arm 1 day                    Physical Exam  Constitutional:       Appearance: Normal appearance. She is well-developed.   HENT:      Head: Normocephalic.   Eyes:      Pupils: Pupils are equal,  round, and reactive to light.   Cardiovascular:      Rate and Rhythm: Normal rate and regular rhythm.   Pulmonary:      Effort: Pulmonary effort is normal.      Breath sounds: Normal breath sounds.   Abdominal:      General: Bowel sounds are normal.      Palpations: Abdomen is soft.      Tenderness: There is no abdominal tenderness.   Musculoskeletal:         General: Normal range of motion.      Cervical back: Normal range of motion and neck supple.   Skin:     General: Skin is warm.   Neurological:      Mental Status: She is alert and oriented to person, place, and time.       Significant Labs: BMP:   Recent Labs   Lab 10/18/22  0421 10/19/22  0515    104    144   K 3.4* 3.7    106   CO2 28 26   BUN 11 17   CREATININE 0.7 0.8   CALCIUM 9.3 9.5   , CMP   Recent Labs   Lab 10/18/22  0421 10/19/22  0515    144   K 3.4* 3.7    106   CO2 28 26    104   BUN 11 17   CREATININE 0.7 0.8   CALCIUM 9.3 9.5   ANIONGAP 10 12   , CBC No results for input(s): WBC, HGB, HCT, PLT in the last 48 hours., INR No results for input(s): INR, PROTIME in the last 48 hours., Lipid Panel No results for input(s): CHOL, HDL, LDLCALC, TRIG, CHOLHDL in the last 48 hours., Troponin No results for input(s): TROPONINI in the last 48 hours., and All pertinent lab results from the last 24 hours have been reviewed.    Significant Imaging: Echocardiogram: Transthoracic echo (TTE) complete (Cupid Only):   Results for orders placed or performed during the hospital encounter of 10/16/22   Echo   Result Value Ref Range    BSA 1.95 m2    TDI SEPTAL 0.04 m/s    LV LATERAL E/E' RATIO 9.00 m/s    LV SEPTAL E/E' RATIO 15.75 m/s    LA WIDTH 4.70 cm    IVC diameter 1.91 cm    Left Ventricular Outflow Tract Mean Velocity 1.03 cm/s    Left Ventricular Outflow Tract Mean Gradient 4.52 mmHg    AORTIC VALVE CUSP SEPERATION 2.14 cm    TDI LATERAL 0.07 m/s    PV PEAK VELOCITY 0.87 cm/s    LVIDd 3.81 3.5 - 6.0 cm    IVS 1.40 (A) 0.6  - 1.1 cm    Posterior Wall 1.38 (A) 0.6 - 1.1 cm    Ao root annulus 3.62 cm    LVIDs 2.71 2.1 - 4.0 cm    FS 29 28 - 44 %    LA volume 86.00 cm3    Sinus 3.54 cm    STJ 2.83 cm    Ascending aorta 2.77 cm    LV mass 192.70 g    LA size 3.41 cm    RVDD 3.90 cm    TAPSE 2.02 cm    RV S' 0.01 cm/s    Left Ventricle Relative Wall Thickness 0.72 cm    AV mean gradient 5 mmHg    AV valve area 3.34 cm2    AV Velocity Ratio 0.93     AV index (prosthetic) 0.97     MV valve area p 1/2 method 3.69 cm2    E/A ratio 0.79     Mean e' 0.06 m/s    E wave deceleration time 205.58 msec    IVRT 144.62 msec    Pulm vein S/D ratio 1.69     LVOT diameter 2.09 cm    LVOT area 3.4 cm2    LVOT peak hoang 1.28 m/s    LVOT peak VTI 26.30 cm    Ao peak hoang 1.38 m/s    Ao VTI 27.0 cm    LVOT stroke volume 90.18 cm3    AV peak gradient 8 mmHg    E/E' ratio 11.45 m/s    MV Peak E Hoang 0.63 m/s    TR Max Hoang 2.21 m/s    MV stenosis pressure 1/2 time 59.62 ms    MV Peak A Hoang 0.80 m/s    PV Peak S Hoang 0.44 m/s    PV Peak D Hoang 0.26 m/s    LV Systolic Volume 27.34 mL    LV Systolic Volume Index 14.7 mL/m2    LV Diastolic Volume 62.53 mL    LV Diastolic Volume Index 33.62 mL/m2    LA Volume Index 46.2 mL/m2    LV Mass Index 104 g/m2    RA Major Axis 4.89 cm    Left Atrium Minor Axis 6.20 cm    Left Atrium Major Axis 6.43 cm    Triscuspid Valve Regurgitation Peak Gradient 20 mmHg    RA Width 4.30 cm    Right Atrial Pressure (from IVC) 8 mmHg    EF 65 %    TV rest pulmonary artery pressure 28 mmHg    Narrative    · The estimated ejection fraction is 65%.  · The left ventricle is normal in size with concentric hypertrophy and   normal systolic function.  · Moderate to severe left atrial enlargement.  · Grade I left ventricular diastolic dysfunction.  · Normal right ventricular size with normal right ventricular systolic   function.  · Moderate right atrial enlargement.  · Intermediate central venous pressure (8 mmHg).  · The estimated PA systolic pressure  is 28 mmHg.        Assessment and Plan:     Brief HPI:     * Elevated troponin  No complications from angiogram yesterday.  No significant coronary artery stenosis seen as per report.  Continue medical management.  Follow-up in Cardiology Clinic with Dr. Randall.  Aggressive blood pressure control.    Gastroesophageal reflux disease        HTN (hypertension), benign        Depression            VTE Risk Mitigation (From admission, onward)         Ordered     enoxaparin injection 90 mg  Every 12 hours (non-standard times)         10/16/22 1644     IP VTE HIGH RISK PATIENT  Once         10/16/22 1638     Place sequential compression device  Until discontinued         10/16/22 1638                Yadira Packer MD  Cardiology  [facility]

## 2022-10-19 NOTE — PLAN OF CARE
Follow-up Information       Ajit Piña MD Follow up on 10/28/2022.    Specialties: Internal Medicine, Wound Care  Why: @9:00 with Dr. Brumfield NP  Contact information:  [address] [facility] [id number] [phone]               Yadira Packer MD Follow up.    Specialties: Cardiology, Interventional Cardiology  Why: Dr. Packer Office will call to schedule a hospital follow up appointment.  Contact information:  [address]  [address]  [facility] [id number] [phone]               [facility] NP Follow up.    Why: NP OFFICE WILL CALL PATIENT WITH AN APPOINTMENT.  Contact information:  [phone]             [facility] Follow up.    Why: HOME HEALTH PROVIDER  TO BE ASSIGNED BY [facility]  WILL CALL WITH AN APPOINTMENT.  [phone], Home Health  Contact information:  HOME HEALTH PROVIDER  TO BE ASSIGNED BY N  WILL CALL WITH AN APPOINTMENT.  [phone]                       [place] sent a referral to [facility] for home health [phone], patient to be assigned and notified.  Patient can [facility] from  [facility].\

## 2022-10-19 NOTE — PLAN OF CARE
10/19/22 0800   Medicare Message   Important Message from Medicare regarding Discharge Appeal Rights Given to patient/caregiver;Signed/date by patient/caregiver;Explained to patient/caregiver;Other (comments)   Date IMM was signed 10/19/22   Time IMM was signed 0800   [facility] mail [phone]   Alert and oriented, no focal deficits, no motor or sensory deficits.

## 2022-10-19 NOTE — ASSESSMENT & PLAN NOTE
No complications from angiogram yesterday.  No significant coronary artery stenosis seen as per report.  Continue medical management.  Follow-up in Cardiology Clinic with Dr. Randall.  Aggressive blood pressure control.

## 2022-10-19 NOTE — PLAN OF CARE
Problem: Adult Inpatient Plan of Care  Goal: Plan of Care Review  Outcome: Ongoing, Progressing  Flowsheets (Taken 10/19/2022 0204)  Plan of Care Reviewed With: patient    Patient remains free from injury and falls. Denies pain. Right radial angio site dressing clean, dry, and intact. Patient denies pain. Resting comfortably. Plan of care continued.

## 2022-10-20 ENCOUNTER — PATIENT OUTREACH (OUTPATIENT)
Dept: ADMINISTRATIVE | Facility: CLINIC | Age: 82
End: 2022-10-20
Payer: MEDICARE

## 2022-10-20 NOTE — PROGRESS NOTES
C3 nurse attempted to contact Jose Manuel Boyd for a TCC post hospital discharge follow up call. No answer. Left voicemail with callback information. The patient has a scheduled HOSFU appointment with Fareed Brumfield on 10/28/22 @ 0900.

## 2022-10-20 NOTE — PROGRESS NOTES
2nd Attempt made to reach patient for TCC call. Pt request call back tomorrow; pt denies access to medications to review at this time.

## 2022-10-20 NOTE — DISCHARGE SUMMARY
"[facility] Medicine  Discharge Summary      Patient Name: Jose Manuel Boyd  MRN: [id number]  Patient Class: IP- Inpatient  Admission Date: 10/16/2022  Hospital Length of Stay: 3 days  Discharge Date and Time: 10/19/2022  2:30 PM  Attending Physician: No att. providers found   Discharging Provider: Julio Cesar Albarran III, MD  Primary Care Provider: Ajit Piña MD      HPI:   Ms. Boyd is an 82-year-old female with a past medical history of depression, hyperlipidemia, hypertension and asthma who presents to the emergency department for "Slime" in her mouth.  She states she woke up this morning and noted she had sticky 7 her mouth.  She tried to rinse her mouth out with mouthwash but did not help.  She states this is the reason why she came to the emergency department.  She denies any fevers, chills, cough or shortness of breath.  No chest pain either.  She otherwise has been feeling well.    In the emergency department, she was found to be very hypertensive with systolic greater than 200.  Her troponin was also found to be elevated at 0.299.  Her EKG did not show any acute ST changes.  She was started on hypertrophied emergency department however cardiology was not consulted.  She was asked to admitted to hospital medicine for further management.      Procedure(s) (LRB):  Angiogram, Coronary, with Left Heart Cath (Left)      Hospital Course:   82F with pmh of depression, hyperlipidemia, hypertension and asthma who presents to the emergency department for "Slime" in her mouth. In the emergency department, she was found to be very hypertensive with systolic greater than 200.  Her troponin was also found to be elevated at 0.299.  Her EKG did not show any acute ST changes. Pt was admitted to hospital medicine for further evaluation. Pts  troponin peaking at 0.843, cardiology consulted and recommended a heart catheterization.    [facility] on 10/18 without significant coronary disease. Pts blood pressure " was better controlled and amlodipine was added to her bp regimen at discharge. Pt with f/u with cardiology and her pcp on discharge. Pt also set up with home health nursing and aid to help with medication managment on discharge. Pt feeling good and asking to go home on the discharge.       Goals of Care Treatment Preferences:  Code Status: Full Code      Consults:   Consults (From admission, onward)        Status Ordering Provider     Inpatient consult to Cardiology  Once        Provider:  Yadira Packer MD    Completed FENG BUSBY          No new Assessment & Plan notes have been filed under this hospital service since the last note was generated.  Service: Hospital Medicine    Final Active Diagnoses:    Diagnosis Date Noted POA    PRINCIPAL PROBLEM:  Elevated troponin [R77.8] 10/16/2022 Yes    Hypertensive emergency [I16.1] 07/24/2022 Yes    Gastroesophageal reflux disease [K21.9] 04/22/2021 Yes    Depression [F32.A] 11/05/2012 Yes    HTN (hypertension), benign [I10] 11/05/2012 Yes      Problems Resolved During this Admission:       Discharged Condition: fair    Disposition: Home or Self Care    Follow Up:   Follow-up Information     Ajit Piña MD Follow up on 10/28/2022.    Specialties: Internal Medicine, Wound Care  Why: @9:00 with Dr. Brumfield, NP  Contact information:  [address] [facility] [id number] [phone]             Yadira Packer MD Follow up.    Specialties: Cardiology, Interventional Cardiology  Why: Dr. Packer Office will call to schedule a hospital follow up appointment.  Contact information:  [address]  [address]  [facility] [address] [phone]             [facility] NP Follow up.    Why: NP OFFICE WILL CALL PATIENT WITH AN APPOINTMENT.  Contact information:  [phone]           [facility] Follow up.    Why: HOME HEALTH PROVIDER  TO BE ASSIGNED BY N  WILL CALL WITH AN APPOINTMENT.  [phone], Home Health  Contact information:  HOME HEALTH  PROVIDER  TO BE ASSIGNED BY SHE  WILL CALL WITH AN APPOINTMENT.  [phone]                     Patient Instructions:      Ambulatory referral/consult to Cardiology   Standing Status: Future   Referral Priority: Routine Referral Type: Consultation   Referral Reason: Specialty Services Required   Requested Specialty: Cardiology   Number of Visits Requested: 1     Ambulatory referral/consult to [facility]   Standing Status: Future   Referral Priority: Routine Referral Type: Consultation   Referral Reason: Specialty Services Required   Number of Visits Requested: 1     Diet Cardiac     Notify your health care provider if you experience any of the following:  increased confusion or weakness     Notify your health care provider if you experience any of the following:  persistent dizziness, light-headedness, or visual disturbances     Notify your health care provider if you experience any of the following:  worsening rash     Notify your health care provider if you experience any of the following:  severe persistent headache     Notify your health care provider if you experience any of the following:  difficulty breathing or increased cough     Notify your health care provider if you experience any of the following:  redness, tenderness, or signs of infection (pain, swelling, redness, odor or green/yellow discharge around incision site)     Notify your health care provider if you experience any of the following:  severe uncontrolled pain     Notify your health care provider if you experience any of the following:  persistent nausea and vomiting or diarrhea     Notify your health care provider if you experience any of the following:  temperature >100.4     Activity as tolerated       Significant Diagnostic Studies: Labs: All labs within the past 24 hours have been reviewed    Pending Diagnostic Studies:     None         Medications:  Reconciled Home Medications:      Medication List      START taking  these medications    amLODIPine 5 MG tablet  Commonly known as: NORVASC  Take 1 tablet (5 mg total) by mouth once daily.        CHANGE how you take these medications    aspirin 81 MG EC tablet  Commonly known as: ECOTRIN  Take 81 mg by mouth once daily.  What changed: Another medication with the same name was removed. Continue taking this medication, and follow the directions you see here.        CONTINUE taking these medications    acetaminophen 325 MG tablet  Commonly known as: TYLENOL  Take 2 tablets (650 mg total) by mouth every 6 (six) hours as needed for Pain.     albuterol 90 mcg/actuation inhaler  Commonly known as: VENTOLIN HFA  Inhale 2 puffs into the lungs every 4 (four) hours as needed for Wheezing.     ascorbic acid (vitamin C) 1000 MG tablet  Commonly known as: VITAMIN C  Take 1,000 mg by mouth once daily.     atorvastatin 40 MG tablet  Commonly known as: LIPITOR  TAKE 1 TABLET BY MOUTH EVERY DAY     azelastine 137 mcg (0.1 %) nasal spray  Commonly known as: ASTELIN  1 spray (137 mcg total) by Nasal route 2 (two) times daily.     busPIRone 7.5 MG tablet  Commonly known as: BUSPAR  TAKE 1 TABLET (7.5 MG TOTAL) BY MOUTH 2 (TWO) TIMES DAILY.     cloNIDine 0.1 MG tablet  Commonly known as: CATAPRES  Take 1 tablet (0.1 mg total) by mouth 2 (two) times daily.     cyanocobalamin 1000 MCG tablet  Commonly known as: VITAMIN B-12  Take 100 mcg by mouth once daily.     dicyclomine 20 mg tablet  Commonly known as: BENTYL  TAKE 1 TABLET BY MOUTH TWICE A DAY AS NEEDED FOR ADOMINAL PAIN     EYE ALLERGY RELIEF OPHT  Apply to eye.     fish oil-omega-3 fatty acids 300-1,000 mg capsule  Take by mouth once daily.     fluticasone propionate 50 mcg/actuation nasal spray  Commonly known as: FLONASE  1 spray (50 mcg total) by Each Nostril route 2 (two) times daily. For allergic rhinitis/sinusitis     fluticasone-salmeterol 500-50 mcg/dose 500-50 mcg/dose Dsdv diskus inhaler  Commonly known as: ADVAIR DISKUS  Inhale 1 puff  into the lungs 2 (two) times daily. Controller     furosemide 20 MG tablet  Commonly known as: LASIX  TAKE 1 TABLET BY MOUTH EVERY DAY     losartan 50 MG tablet  Commonly known as: COZAAR  Take 1 tablet (50 mg total) by mouth once daily. For high blood pressure     mirtazapine 30 MG tablet  Commonly known as: REMERON  Take 1 tablet (30 mg total) by mouth nightly as needed (insomnia). At bedtime for sleep, anxiety and depression.     multivitamin with minerals tablet  Take 1 tablet by mouth once daily.     olopatadine 0.1 % ophthalmic solution  Commonly known as: PATANOL  Place 1 drop into both eyes 2 (two) times daily.     omeprazole 20 MG capsule  Commonly known as: PRILOSEC  Take 1 capsule (20 mg total) by mouth once daily. For gastric reflux     psyllium powder  Commonly known as: METAMUCIL  Take 1 packet by mouth daily as needed.        STOP taking these medications    diphenhydrAMINE-aluminum-magnesium hydroxide-simethicone-LIDOcaine HCl 2%            Indwelling Lines/Drains at time of discharge:   Lines/Drains/Airways     None                 Time spent on the discharge of patient: >35 minutes         Julio Cesar Albarran III, MD  Department of Hospital Medicine  [facility]

## 2022-11-03 ENCOUNTER — OFFICE VISIT (OUTPATIENT)
Dept: CARDIOLOGY | Facility: CLINIC | Age: 82
End: 2022-11-03
Payer: MEDICARE

## 2022-11-03 VITALS
HEART RATE: 84 BPM | RESPIRATION RATE: 18 BRPM | TEMPERATURE: 96 F | OXYGEN SATURATION: 96 % | BODY MASS INDEX: 36.42 KG/M2 | SYSTOLIC BLOOD PRESSURE: 190 MMHG | HEIGHT: 61 IN | WEIGHT: 192.88 LBS | DIASTOLIC BLOOD PRESSURE: 88 MMHG

## 2022-11-03 DIAGNOSIS — F33.0 MAJOR DEPRESSIVE DISORDER, RECURRENT EPISODE, MILD WITH ANXIOUS DISTRESS: ICD-10-CM

## 2022-11-03 DIAGNOSIS — I10 HTN (HYPERTENSION), BENIGN: Primary | ICD-10-CM

## 2022-11-03 DIAGNOSIS — R51.9 GENERALIZED HEADACHES: ICD-10-CM

## 2022-11-03 DIAGNOSIS — E66.01 MORBID OBESITY DUE TO EXCESS CALORIES: ICD-10-CM

## 2022-11-03 DIAGNOSIS — R79.89 ELEVATED TROPONIN: ICD-10-CM

## 2022-11-03 DIAGNOSIS — Z76.89 ENCOUNTER FOR PSYCHIATRIC ASSESSMENT: ICD-10-CM

## 2022-11-03 DIAGNOSIS — J45.998 ASTHMA IN REMISSION: ICD-10-CM

## 2022-11-03 DIAGNOSIS — K21.9 GASTROESOPHAGEAL REFLUX DISEASE, UNSPECIFIED WHETHER ESOPHAGITIS PRESENT: ICD-10-CM

## 2022-11-03 DIAGNOSIS — R06.09 DOE (DYSPNEA ON EXERTION): ICD-10-CM

## 2022-11-03 DIAGNOSIS — I16.1 HYPERTENSIVE EMERGENCY: ICD-10-CM

## 2022-11-03 PROCEDURE — 1101F PT FALLS ASSESS-DOCD LE1/YR: CPT | Mod: CPTII,S$GLB,, | Performed by: INTERNAL MEDICINE

## 2022-11-03 PROCEDURE — 3077F PR MOST RECENT SYSTOLIC BLOOD PRESSURE >= 140 MM HG: ICD-10-PCS | Mod: CPTII,S$GLB,, | Performed by: INTERNAL MEDICINE

## 2022-11-03 PROCEDURE — 1111F DSCHRG MED/CURRENT MED MERGE: CPT | Mod: CPTII,S$GLB,, | Performed by: INTERNAL MEDICINE

## 2022-11-03 PROCEDURE — 1111F PR DISCHARGE MEDS RECONCILED W/ CURRENT OUTPATIENT MED LIST: ICD-10-PCS | Mod: CPTII,S$GLB,, | Performed by: INTERNAL MEDICINE

## 2022-11-03 PROCEDURE — 3288F PR FALLS RISK ASSESSMENT DOCUMENTED: ICD-10-PCS | Mod: CPTII,S$GLB,, | Performed by: INTERNAL MEDICINE

## 2022-11-03 PROCEDURE — 1126F PR PAIN SEVERITY QUANTIFIED, NO PAIN PRESENT: ICD-10-PCS | Mod: CPTII,S$GLB,, | Performed by: INTERNAL MEDICINE

## 2022-11-03 PROCEDURE — 99999 PR PBB SHADOW E&M-EST. PATIENT-LVL V: CPT | Mod: PBBFAC,,, | Performed by: INTERNAL MEDICINE

## 2022-11-03 PROCEDURE — 1159F PR MEDICATION LIST DOCUMENTED IN MEDICAL RECORD: ICD-10-PCS | Mod: CPTII,S$GLB,, | Performed by: INTERNAL MEDICINE

## 2022-11-03 PROCEDURE — 1101F PR PT FALLS ASSESS DOC 0-1 FALLS W/OUT INJ PAST YR: ICD-10-PCS | Mod: CPTII,S$GLB,, | Performed by: INTERNAL MEDICINE

## 2022-11-03 PROCEDURE — 3288F FALL RISK ASSESSMENT DOCD: CPT | Mod: CPTII,S$GLB,, | Performed by: INTERNAL MEDICINE

## 2022-11-03 PROCEDURE — 1126F AMNT PAIN NOTED NONE PRSNT: CPT | Mod: CPTII,S$GLB,, | Performed by: INTERNAL MEDICINE

## 2022-11-03 PROCEDURE — 3079F PR MOST RECENT DIASTOLIC BLOOD PRESSURE 80-89 MM HG: ICD-10-PCS | Mod: CPTII,S$GLB,, | Performed by: INTERNAL MEDICINE

## 2022-11-03 PROCEDURE — 1160F PR REVIEW ALL MEDS BY PRESCRIBER/CLIN PHARMACIST DOCUMENTED: ICD-10-PCS | Mod: CPTII,S$GLB,, | Performed by: INTERNAL MEDICINE

## 2022-11-03 PROCEDURE — 99214 PR OFFICE/OUTPT VISIT, EST, LEVL IV, 30-39 MIN: ICD-10-PCS | Mod: S$GLB,,, | Performed by: INTERNAL MEDICINE

## 2022-11-03 PROCEDURE — 99214 OFFICE O/P EST MOD 30 MIN: CPT | Mod: S$GLB,,, | Performed by: INTERNAL MEDICINE

## 2022-11-03 PROCEDURE — 3079F DIAST BP 80-89 MM HG: CPT | Mod: CPTII,S$GLB,, | Performed by: INTERNAL MEDICINE

## 2022-11-03 PROCEDURE — 3077F SYST BP >= 140 MM HG: CPT | Mod: CPTII,S$GLB,, | Performed by: INTERNAL MEDICINE

## 2022-11-03 PROCEDURE — 1160F RVW MEDS BY RX/DR IN RCRD: CPT | Mod: CPTII,S$GLB,, | Performed by: INTERNAL MEDICINE

## 2022-11-03 PROCEDURE — 99499 RISK ADDL DX/OHS AUDIT: ICD-10-PCS | Mod: S$GLB,,, | Performed by: INTERNAL MEDICINE

## 2022-11-03 PROCEDURE — 99999 PR PBB SHADOW E&M-EST. PATIENT-LVL V: ICD-10-PCS | Mod: PBBFAC,,, | Performed by: INTERNAL MEDICINE

## 2022-11-03 PROCEDURE — 99499 UNLISTED E&M SERVICE: CPT | Mod: S$GLB,,, | Performed by: INTERNAL MEDICINE

## 2022-11-03 PROCEDURE — 1159F MED LIST DOCD IN RCRD: CPT | Mod: CPTII,S$GLB,, | Performed by: INTERNAL MEDICINE

## 2022-11-03 RX ORDER — AMLODIPINE BESYLATE 10 MG/1
10 TABLET ORAL DAILY
Qty: 90 TABLET | Refills: 3 | Status: SHIPPED | OUTPATIENT
Start: 2022-11-03
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
## 2022-12-06 ENCOUNTER — HOSPITAL ENCOUNTER (EMERGENCY)
Facility: HOSPITAL | Age: 82
Discharge: HOME | End: 2022-12-07
Attending: INTERNAL MEDICINE
Payer: MEDICARE

## 2022-12-06 DIAGNOSIS — I10 HYPERTENSION, UNSPECIFIED TYPE: Primary | ICD-10-CM

## 2022-12-06 DIAGNOSIS — M79.89 LEG SWELLING: ICD-10-CM

## 2022-12-06 DIAGNOSIS — G44.209 ACUTE NON INTRACTABLE TENSION-TYPE HEADACHE: ICD-10-CM

## 2022-12-06 LAB
BASOPHILS # BLD AUTO: 0.06 K/UL (ref 0–0.2)
BASOPHILS NFR BLD: 0.7 % (ref 0–1.9)
DIFFERENTIAL METHOD: ABNORMAL
EOSINOPHIL # BLD AUTO: 0.3 K/UL (ref 0–0.5)
EOSINOPHIL NFR BLD: 3.2 % (ref 0–8)
ERYTHROCYTE [DISTWIDTH] IN BLOOD BY AUTOMATED COUNT: 15.5 % (ref 11.5–14.5)
HCT VFR BLD AUTO: 43.3 % (ref 37–48.5)
HGB BLD-MCNC: 13.8 G/DL (ref 12–16)
IMM GRANULOCYTES # BLD AUTO: 0.03 K/UL (ref 0–0.04)
IMM GRANULOCYTES NFR BLD AUTO: 0.4 % (ref 0–0.5)
LYMPHOCYTES # BLD AUTO: 2 K/UL (ref 1–4.8)
LYMPHOCYTES NFR BLD: 23.7 % (ref 18–48)
MCH RBC QN AUTO: 26.9 PG (ref 27–31)
MCHC RBC AUTO-ENTMCNC: 31.9 G/DL (ref 32–36)
MCV RBC AUTO: 84 FL (ref 82–98)
MONOCYTES # BLD AUTO: 0.8 K/UL (ref 0.3–1)
MONOCYTES NFR BLD: 9.5 % (ref 4–15)
NEUTROPHILS # BLD AUTO: 5.3 K/UL (ref 1.8–7.7)
NEUTROPHILS NFR BLD: 62.5 % (ref 38–73)
NRBC BLD-RTO: 0 /100 WBC
PLATELET # BLD AUTO: 229 K/UL (ref 150–450)
PMV BLD AUTO: 10 FL (ref 9.2–12.9)
RBC # BLD AUTO: 5.13 M/UL (ref 4–5.4)
WBC # BLD AUTO: 8.44 K/UL (ref 3.9–12.7)

## 2022-12-06 PROCEDURE — 99285 EMERGENCY DEPT VISIT HI MDM: CPT | Mod: 25

## 2022-12-06 PROCEDURE — 93010 EKG 12-LEAD: ICD-10-PCS | Mod: ,,, | Performed by: INTERNAL MEDICINE

## 2022-12-06 PROCEDURE — 85025 COMPLETE CBC W/AUTO DIFF WBC: CPT | Performed by: INTERNAL MEDICINE

## 2022-12-06 PROCEDURE — 80053 COMPREHEN METABOLIC PANEL: CPT | Performed by: INTERNAL MEDICINE

## 2022-12-06 PROCEDURE — 84484 ASSAY OF TROPONIN QUANT: CPT | Performed by: INTERNAL MEDICINE

## 2022-12-06 PROCEDURE — 93010 ELECTROCARDIOGRAM REPORT: CPT | Mod: ,,, | Performed by: INTERNAL MEDICINE

## 2022-12-06 PROCEDURE — 25000003 PHARM REV CODE 250: Performed by: INTERNAL MEDICINE

## 2022-12-06 PROCEDURE — 93005 ELECTROCARDIOGRAM TRACING: CPT

## 2022-12-06 RX ORDER — ACETAMINOPHEN 500 MG
500 TABLET ORAL
Status: COMPLETED | OUTPATIENT
Start: 2022-12-06 | End: 2022-12-06

## 2022-12-06 RX ADMIN — ACETAMINOPHEN 500 MG: 500 TABLET ORAL at 11:12

## 2022-12-07 VITALS
BODY MASS INDEX: 38.28 KG/M2 | RESPIRATION RATE: 20 BRPM | HEART RATE: 56 BPM | OXYGEN SATURATION: 95 % | SYSTOLIC BLOOD PRESSURE: 155 MMHG | WEIGHT: 195 LBS | HEIGHT: 60 IN | DIASTOLIC BLOOD PRESSURE: 71 MMHG | TEMPERATURE: 98 F

## 2022-12-07 PROBLEM — G44.209 ACUTE NON INTRACTABLE TENSION-TYPE HEADACHE: Status: ACTIVE | Noted: 2022-12-07

## 2022-12-07 LAB
ALBUMIN SERPL BCP-MCNC: 3.5 G/DL (ref 3.5–5.2)
ALP SERPL-CCNC: 100 U/L (ref 55–135)
ALT SERPL W/O P-5'-P-CCNC: 12 U/L (ref 10–44)
ANION GAP SERPL CALC-SCNC: 10 MMOL/L (ref 8–16)
AST SERPL-CCNC: 15 U/L (ref 10–40)
BILIRUB SERPL-MCNC: 0.3 MG/DL (ref 0.1–1)
BUN SERPL-MCNC: 14 MG/DL (ref 8–23)
CALCIUM SERPL-MCNC: 9.6 MG/DL (ref 8.7–10.5)
CHLORIDE SERPL-SCNC: 99 MMOL/L (ref 95–110)
CO2 SERPL-SCNC: 28 MMOL/L (ref 23–29)
CREAT SERPL-MCNC: 0.9 MG/DL (ref 0.5–1.4)
EST. GFR  (NO RACE VARIABLE): >60 ML/MIN/1.73 M^2
GLUCOSE SERPL-MCNC: 107 MG/DL (ref 70–110)
POTASSIUM SERPL-SCNC: 4 MMOL/L (ref 3.5–5.1)
PROT SERPL-MCNC: 7.4 G/DL (ref 6–8.4)
SODIUM SERPL-SCNC: 137 MMOL/L (ref 136–145)
TROPONIN I SERPL DL<=0.01 NG/ML-MCNC: 0.02 NG/ML (ref 0–0.03)

## 2022-12-07 NOTE — ED PROVIDER NOTES
Encounter Date: 12/6/2022       History     Chief Complaint   Patient presents with    Headache    Hypertension     Symptoms started earlier tonight. Pt reports blurred vision today, frontal headache, and hypertensive (home b/p 190/71). Triage b/p is 139/67. Pt denies n/v, CP. Bilateral leg swelling accompanied with SOB. Uses home oxygen PRN. Hx of HTN, asthma    Leg Swelling     82-year-old female presents to the emergency department complaining of frontal headache for several hours.  She states she experienced blurred vision earlier today and had an elevated blood pressure (190/71).  She has not taken any medications for headache relief at home.  She denies focal weakness/numbness/slurred speech.  She also states she experienced shortness of breath a few days ago as well as bilateral lower extremity swelling, but denies shortness of breath today.  She denies fever/chills/nausea/vomiting/chest pain.    The history is provided by the patient. No  was used.   Review of patient's allergies indicates:   Allergen Reactions    Codeine      Other reaction(s): Rash     Past Medical History:   Diagnosis Date    Anxiety     Asthma     Depression     Headache     Hx of psychiatric care     Hypercholesterolemia     Hypertension     Psychiatric problem     Sleep difficulties     Therapy     Thyroid disease     multiple nodules     Past Surgical History:   Procedure Laterality Date    ANGIOGRAM, CORONARY, WITH LEFT HEART CATHETERIZATION Left 10/18/2022    Procedure: Angiogram, Coronary, with Left Heart Cath;  Surgeon: Kumar Randall MD;  Location: [facility] CATH LAB;  Service: Cardiology;  Laterality: Left;    CHOLECYSTECTOMY      HYSTERECTOMY      knee repalcement       Family History   Problem Relation Age of Onset    Diabetes Mother     Hypertension Mother     Kidney disease Mother     Heart disease Father     Bipolar disorder Daughter      Social History     Tobacco Use    Smoking status: Never    Smokeless  tobacco: Never   Substance Use Topics    Alcohol use: No    Drug use: No     Review of Systems   Constitutional:  Negative for chills and fever.   HENT:  Negative for congestion.    Respiratory:  Negative for shortness of breath.    Cardiovascular:  Negative for chest pain.   Gastrointestinal:  Negative for abdominal pain, nausea and vomiting.   Skin:  Negative for rash.   Neurological:  Positive for headaches. Negative for dizziness, tremors, seizures, syncope, facial asymmetry, speech difficulty, weakness, light-headedness and numbness.   All other systems reviewed and are negative.    Physical Exam     Initial Vitals [12/06/22 2256]   BP Pulse Resp Temp SpO2   139/67 65 20 98.1 °F (36.7 °C) 95 %      MAP       --         Physical Exam    Nursing note and vitals reviewed.  Constitutional: She is not diaphoretic. No distress.   HENT:   Head: Normocephalic and atraumatic.   Right Ear: External ear normal.   Left Ear: External ear normal.   Mouth/Throat: Oropharynx is clear and moist.   Eyes: Conjunctivae and EOM are normal. Pupils are equal, round, and reactive to light.   Neck: Neck supple.   Normal range of motion.  Cardiovascular:  Normal rate, regular rhythm, normal heart sounds and intact distal pulses.           Pulmonary/Chest: Breath sounds normal. No respiratory distress.   Abdominal: Abdomen is soft. Bowel sounds are normal. There is no abdominal tenderness.   Musculoskeletal:         General: Normal range of motion.      Cervical back: Normal range of motion and neck supple.     Neurological: She is alert and oriented to person, place, and time. She has normal strength and normal reflexes. GCS score is 15. GCS eye subscore is 4. GCS verbal subscore is 5. GCS motor subscore is 6.   Skin: Skin is warm and dry. Capillary refill takes less than 2 seconds.   Psychiatric: She has a normal mood and affect. Her behavior is normal. Thought content normal.       ED Course   Procedures  Labs Reviewed   CBC W/ AUTO  DIFFERENTIAL - Abnormal; Notable for the following components:       Result Value    MCH 26.9 (*)     MCHC 31.9 (*)     RDW 15.5 (*)     All other components within normal limits   COMPREHENSIVE METABOLIC PANEL   TROPONIN I          Imaging Results              CT Head Without Contrast (Final result)  Result time 12/07/22 00:22:39      Final result by Elsy Ro MD (12/07/22 00:22:39)                   Impression:      1. No CT evidence of acute intracranial abnormality. If the patient's headaches are sufficiently clinically suspicious, or associated with signs of elevated ICP, focal neurologic deficits, nausea, or vomiting, further evaluation with MRI can be performed if there are no clinical contraindications.  2. Generalized cerebral volume loss and findings of chronic microvascular ischemic change.      Electronically signed by: Elsy Ro MD  Date:    12/07/2022  Time:    00:22               Narrative:    EXAMINATION:  CT HEAD WITHOUT CONTRAST    CLINICAL HISTORY:  Headache, new or worsening (Age >= 50y);    TECHNIQUE:  Low dose axial images were obtained through the head.  Coronal and sagittal reformations were also performed. Contrast was not administered.    COMPARISON:  Head CT 11/16/2022, 05/01/2022    FINDINGS:  There is no acute intracranial hemorrhage, hydrocephalus, midline shift or mass effect. There is generalized cerebral volume loss with compensatory prominence of cerebral sulci and the ventricular system.  There is hypoattenuation in the supratentorial white matter, nonspecific but likely reflecting sequela of chronic microvascular ischemic change.  Gray-white matter differentiation is otherwise maintained.  The basal cisterns are patent. The mastoid air cells and paranasal sinuses are clear of acute process. The visualized bones of the calvarium demonstrate no acute osseous abnormality.                                       Medications   acetaminophen tablet 500 mg (500 mg Oral Given  12/6/22 1839)     Medical Decision Making:   Initial Assessment:   82-year-old female presents to the emergency department complaining of frontal headache for several hours.  She states she experienced blurred vision earlier today and had an elevated blood pressure (190/71).  She denies focal weakness/numbness/slurred speech.  She also states she experienced shortness of breath a few days ago as well as bilateral lower extremity swelling, but denies shortness of breath today.  ED Management:  Patient was given Tylenol in the ED for headache and states headache resolved shortly after receiving Tylenol.  CT of the head was performed and revealed no acute disease.  Patient denied blurred vision throughout ED stay and was given instructions for hypertension/tension headache.  She was advised to follow-up with her primary care physician today for re-evaluation/return to the emergency department if condition worsens.                        Clinical Impression:   Final diagnoses:  [M79.89] Leg swelling  [I10] Hypertension, unspecified type (Primary)  [G44.209] Acute non intractable tension-type headache        ED Disposition Condition    Discharge Stable          ED Prescriptions    None       Follow-up Information       Follow up With Specialties Details Why Contact Info    Ajit Piña MD Internal Medicine, Wound Care Schedule an appointment as soon as possible for a visit in 1 day For reevaluation [address] [facility] [address]  [phone]               Anthony Cooley MD  12/07/22 0054

## 2022-12-07 NOTE — ED TRIAGE NOTES
"Pt reports frontal headache and neck pain that "comes and goes" for weeks, but has them weekly. She calls them tension headaches and mentions the price of groceries. Reports "pressure" feeling  "

## 2022-12-22 LAB
LEFT EYE DM RETINOPATHY: NEGATIVE
RIGHT EYE DM RETINOPATHY: NEGATIVE

## 2022-12-30 ENCOUNTER — TELEPHONE (OUTPATIENT)
Dept: FAMILY MEDICINE | Facility: CLINIC | Age: 82
End: 2022-12-30
Payer: MEDICARE

## 2022-12-30 NOTE — TELEPHONE ENCOUNTER
----- Message from Shaunna Montez sent at 12/29/2022  4:51 PM CST -----  Regarding: Sooner Appt Request  Who Is Calling : LUI CRUZ [[id number]]        Reason For The Call: Patient is requesting a sooner appointment.  Patient accepted first available and does not want to be added to the waitlist.  Please contact the patient to reschedule.        Preferred Contact Method: [phone]        Additional Information: Patient is wanting to be seen ASAP for Thyroids

## 2023-01-03 ENCOUNTER — PATIENT OUTREACH (OUTPATIENT)
Dept: ADMINISTRATIVE | Facility: HOSPITAL | Age: 83
End: 2023-01-03
Payer: MEDICARE

## 2023-01-07 ENCOUNTER — HOSPITAL ENCOUNTER (EMERGENCY)
Facility: HOSPITAL | Age: 83
Discharge: HOME | End: 2023-01-07
Attending: EMERGENCY MEDICINE
Payer: MEDICARE

## 2023-01-07 VITALS
BODY MASS INDEX: 38.09 KG/M2 | OXYGEN SATURATION: 95 % | HEIGHT: 60 IN | RESPIRATION RATE: 17 BRPM | WEIGHT: 194 LBS | DIASTOLIC BLOOD PRESSURE: 68 MMHG | HEART RATE: 62 BPM | TEMPERATURE: 98 F | SYSTOLIC BLOOD PRESSURE: 148 MMHG

## 2023-01-07 DIAGNOSIS — R51.9 FRONTAL HEADACHE: Primary | ICD-10-CM

## 2023-01-07 LAB
ALBUMIN SERPL BCP-MCNC: 3.2 G/DL (ref 3.5–5.2)
ALP SERPL-CCNC: 99 U/L (ref 55–135)
ALT SERPL W/O P-5'-P-CCNC: 16 U/L (ref 10–44)
ANION GAP SERPL CALC-SCNC: 12 MMOL/L (ref 8–16)
AST SERPL-CCNC: 20 U/L (ref 10–40)
BACTERIA #/AREA URNS HPF: ABNORMAL /HPF
BASOPHILS # BLD AUTO: 0.07 K/UL (ref 0–0.2)
BASOPHILS NFR BLD: 0.7 % (ref 0–1.9)
BILIRUB SERPL-MCNC: 0.3 MG/DL (ref 0.1–1)
BILIRUB UR QL STRIP: NEGATIVE
BUN SERPL-MCNC: 12 MG/DL (ref 8–23)
CALCIUM SERPL-MCNC: 8.9 MG/DL (ref 8.7–10.5)
CHLORIDE SERPL-SCNC: 99 MMOL/L (ref 95–110)
CLARITY UR: ABNORMAL
CO2 SERPL-SCNC: 28 MMOL/L (ref 23–29)
COLOR UR: YELLOW
CREAT SERPL-MCNC: 0.8 MG/DL (ref 0.5–1.4)
CTP QC/QA: YES
CTP QC/QA: YES
DIFFERENTIAL METHOD: ABNORMAL
EOSINOPHIL # BLD AUTO: 0.2 K/UL (ref 0–0.5)
EOSINOPHIL NFR BLD: 2.2 % (ref 0–8)
ERYTHROCYTE [DISTWIDTH] IN BLOOD BY AUTOMATED COUNT: 14.7 % (ref 11.5–14.5)
EST. GFR  (NO RACE VARIABLE): >60 ML/MIN/1.73 M^2
GLUCOSE SERPL-MCNC: 99 MG/DL (ref 70–110)
GLUCOSE UR QL STRIP: NEGATIVE
HCT VFR BLD AUTO: 42.3 % (ref 37–48.5)
HGB BLD-MCNC: 13.5 G/DL (ref 12–16)
HGB UR QL STRIP: NEGATIVE
IMM GRANULOCYTES # BLD AUTO: 0.03 K/UL (ref 0–0.04)
IMM GRANULOCYTES NFR BLD AUTO: 0.3 % (ref 0–0.5)
KETONES UR QL STRIP: NEGATIVE
LEUKOCYTE ESTERASE UR QL STRIP: ABNORMAL
LIPASE SERPL-CCNC: 4 U/L (ref 4–60)
LYMPHOCYTES # BLD AUTO: 2.4 K/UL (ref 1–4.8)
LYMPHOCYTES NFR BLD: 24.2 % (ref 18–48)
MCH RBC QN AUTO: 26.6 PG (ref 27–31)
MCHC RBC AUTO-ENTMCNC: 31.9 G/DL (ref 32–36)
MCV RBC AUTO: 83 FL (ref 82–98)
MICROSCOPIC COMMENT: ABNORMAL
MONOCYTES # BLD AUTO: 0.8 K/UL (ref 0.3–1)
MONOCYTES NFR BLD: 7.6 % (ref 4–15)
NEUTROPHILS # BLD AUTO: 6.5 K/UL (ref 1.8–7.7)
NEUTROPHILS NFR BLD: 65 % (ref 38–73)
NITRITE UR QL STRIP: NEGATIVE
NRBC BLD-RTO: 0 /100 WBC
PH UR STRIP: 6 [PH] (ref 5–8)
PLATELET # BLD AUTO: 259 K/UL (ref 150–450)
PLATELET BLD QL SMEAR: ABNORMAL
PMV BLD AUTO: 10.7 FL (ref 9.2–12.9)
POC MOLECULAR INFLUENZA A AGN: NEGATIVE
POC MOLECULAR INFLUENZA B AGN: NEGATIVE
POTASSIUM SERPL-SCNC: 4.6 MMOL/L (ref 3.5–5.1)
PROT SERPL-MCNC: 6.8 G/DL (ref 6–8.4)
PROT UR QL STRIP: NEGATIVE
RBC # BLD AUTO: 5.08 M/UL (ref 4–5.4)
RBC #/AREA URNS HPF: 2 /HPF (ref 0–4)
SARS-COV-2 RDRP RESP QL NAA+PROBE: NEGATIVE
SODIUM SERPL-SCNC: 139 MMOL/L (ref 136–145)
SP GR UR STRIP: 1.01 (ref 1–1.03)
SQUAMOUS #/AREA URNS HPF: 4 /HPF
UNIDENT CRYS URNS QL MICRO: ABNORMAL
URN SPEC COLLECT METH UR: ABNORMAL
UROBILINOGEN UR STRIP-ACNC: NEGATIVE EU/DL
WBC # BLD AUTO: 10.05 K/UL (ref 3.9–12.7)
WBC #/AREA URNS HPF: 3 /HPF (ref 0–5)
WBC CLUMPS URNS QL MICRO: ABNORMAL

## 2023-01-07 PROCEDURE — 85025 COMPLETE CBC W/AUTO DIFF WBC: CPT | Performed by: NURSE PRACTITIONER

## 2023-01-07 PROCEDURE — 80053 COMPREHEN METABOLIC PANEL: CPT | Performed by: EMERGENCY MEDICINE

## 2023-01-07 PROCEDURE — 87502 INFLUENZA DNA AMP PROBE: CPT

## 2023-01-07 PROCEDURE — 63600175 PHARM REV CODE 636 W HCPCS: Performed by: EMERGENCY MEDICINE

## 2023-01-07 PROCEDURE — 87635 SARS-COV-2 COVID-19 AMP PRB: CPT | Performed by: EMERGENCY MEDICINE

## 2023-01-07 PROCEDURE — 25000003 PHARM REV CODE 250: Performed by: NURSE PRACTITIONER

## 2023-01-07 PROCEDURE — 96374 THER/PROPH/DIAG INJ IV PUSH: CPT

## 2023-01-07 PROCEDURE — 83690 ASSAY OF LIPASE: CPT | Performed by: EMERGENCY MEDICINE

## 2023-01-07 PROCEDURE — 96375 TX/PRO/DX INJ NEW DRUG ADDON: CPT

## 2023-01-07 PROCEDURE — 81000 URINALYSIS NONAUTO W/SCOPE: CPT | Performed by: NURSE PRACTITIONER

## 2023-01-07 PROCEDURE — 99285 EMERGENCY DEPT VISIT HI MDM: CPT | Mod: 25

## 2023-01-07 RX ORDER — METOCLOPRAMIDE HYDROCHLORIDE 5 MG/ML
5 INJECTION INTRAMUSCULAR; INTRAVENOUS
Status: COMPLETED | OUTPATIENT
Start: 2023-01-07 | End: 2023-01-07

## 2023-01-07 RX ORDER — ACETAMINOPHEN, ASPRIN, CAFFEINE 250; 250; 65 MG/1; MG/1; MG/1
1 TABLET, COATED ORAL EVERY 6 HOURS PRN
Qty: 30 TABLET | Refills: 0 | Status: SHIPPED | OUTPATIENT
Start: 2023-01-07 | End: 2023-02-06

## 2023-01-07 RX ORDER — DIPHENHYDRAMINE HYDROCHLORIDE 50 MG/ML
25 INJECTION INTRAMUSCULAR; INTRAVENOUS
Status: COMPLETED | OUTPATIENT
Start: 2023-01-07 | End: 2023-01-07

## 2023-01-07 RX ORDER — ACETAMINOPHEN 325 MG/1
650 TABLET ORAL
Status: COMPLETED | OUTPATIENT
Start: 2023-01-07 | End: 2023-01-07

## 2023-01-07 RX ADMIN — METOCLOPRAMIDE 5 MG: 5 INJECTION, SOLUTION INTRAMUSCULAR; INTRAVENOUS at 06:01

## 2023-01-07 RX ADMIN — DIPHENHYDRAMINE HYDROCHLORIDE 25 MG: 50 INJECTION, SOLUTION INTRAMUSCULAR; INTRAVENOUS at 06:01

## 2023-01-07 RX ADMIN — ACETAMINOPHEN 650 MG: 325 TABLET ORAL at 04:01

## 2023-01-07 NOTE — ED PROVIDER NOTES
Encounter Date: 1/7/2023    SCRIBE #1 NOTE: I, Kelly Stewart, am scribing for, and in the presence of,  Charlie Cross MD. I have scribed the following portions of the note - Other sections scribed: HPI, ROS.     History     Chief Complaint   Patient presents with    Headache     Pt c/o headache and abdominal pain starting yesterday. Denies F/V/D and dysuria. Denies blurred vision, CP or SOB      Jose Manuel Boyd is a 82 y.o. female, with a PMHx of HTN and Headaches, who presents to the ED with a frontal headache that started yesterday, but is worse today. Patient notes she usually endorses tylenol, aspirin, and ice packs to help with the pain, but the pain is persisting. Patient is also complaining of congestion, abdominal pain, decreased appetite, inability to sleep, and numbness/paresthesia in the bilateral legs. Patient notes she is still able to ambulate on her own. No other exacerbating or alleviating factors. Patient denies neck pain or other associated symptoms.     The history is provided by the patient. No  was used.   Review of patient's allergies indicates:   Allergen Reactions    Codeine      Other reaction(s): Rash     Past Medical History:   Diagnosis Date    Anxiety     Asthma     Depression     Headache     Hx of psychiatric care     Hypercholesterolemia     Hypertension     Psychiatric problem     Sleep difficulties     Therapy     Thyroid disease     multiple nodules     Past Surgical History:   Procedure Laterality Date    ANGIOGRAM, CORONARY, WITH LEFT HEART CATHETERIZATION Left 10/18/2022    Procedure: Angiogram, Coronary, with Left Heart Cath;  Surgeon: Kumar Randall MD;  Location: [facility] CATH LAB;  Service: Cardiology;  Laterality: Left;    CHOLECYSTECTOMY      HYSTERECTOMY      knee repalcement       Family History   Problem Relation Age of Onset    Diabetes Mother     Hypertension Mother     Kidney disease Mother     Heart disease Father     Bipolar  disorder Daughter      Social History     Tobacco Use    Smoking status: Never    Smokeless tobacco: Never   Substance Use Topics    Alcohol use: No    Drug use: No     Review of Systems   Constitutional:  Positive for appetite change (decreased).   HENT:  Positive for congestion.    Eyes: Negative.  Negative for photophobia.   Respiratory: Negative.  Negative for shortness of breath.    Cardiovascular: Negative.  Negative for chest pain.   Gastrointestinal:  Positive for abdominal pain.   Endocrine: Negative for polyuria.   Genitourinary: Negative.  Negative for dysuria.   Musculoskeletal: Negative.  Negative for neck pain.   Skin: Negative.  Negative for rash.   Neurological:  Positive for numbness (and paresthesia in the bilateral legs) and headaches (frontal).        (+) inability to sleep at night     Physical Exam     Initial Vitals [01/07/23 1452]   BP Pulse Resp Temp SpO2   135/62 (!) 53 18 98 °F (36.7 °C) 97 %      MAP       --         Physical Exam    Nursing note and vitals reviewed.  Constitutional: She appears well-developed and well-nourished. She is not diaphoretic. She appears distressed (mildly, ice pack over forehead).   HENT:   Head: Normocephalic and atraumatic.   Nose: Nose normal.   Eyes: EOM are normal. Pupils are equal, round, and reactive to light.   Neck: Neck supple. No JVD present.   Normal range of motion.  Cardiovascular:  Normal rate, regular rhythm, normal heart sounds and intact distal pulses.           Pulmonary/Chest: Breath sounds normal. No stridor. No respiratory distress. She has no wheezes. She has no rales.   Abdominal: Abdomen is soft. Bowel sounds are normal. She exhibits no distension. There is no abdominal tenderness.   Musculoskeletal:         General: No tenderness or edema. Normal range of motion.      Cervical back: Normal range of motion and neck supple.     Neurological: She is alert and oriented to person, place, and time. She has normal strength. No cranial nerve  deficit or sensory deficit.   Skin: Skin is warm and dry. Capillary refill takes less than 2 seconds. No rash noted. No erythema.       ED Course   Procedures  Labs Reviewed   CBC W/ AUTO DIFFERENTIAL - Abnormal; Notable for the following components:       Result Value    MCH 26.6 (*)     MCHC 31.9 (*)     RDW 14.7 (*)     All other components within normal limits   URINALYSIS, REFLEX TO URINE CULTURE - Abnormal; Notable for the following components:    Appearance, UA Hazy (*)     Leukocytes, UA 1+ (*)     All other components within normal limits    Narrative:     Specimen Source->Urine   COMPREHENSIVE METABOLIC PANEL - Abnormal; Notable for the following components:    Albumin 3.2 (*)     All other components within normal limits   URINALYSIS MICROSCOPIC - Abnormal; Notable for the following components:    Bacteria Moderate (*)     All other components within normal limits    Narrative:     Specimen Source->Urine   LIPASE   SARS-COV-2 RDRP GENE   POCT INFLUENZA A/B MOLECULAR          Imaging Results              CT Head Without Contrast (Final result)  Result time 01/07/23 18:08:33      Final result by Chung Reagan MD (01/07/23 18:08:33)                   Impression:      1. No acute intracranial abnormalities noting sequela of chronic microvascular ischemic change and senescent change.      Electronically signed by: Chung Reagan MD  Date:    01/07/2023  Time:    18:08               Narrative:    EXAMINATION:  CT HEAD WITHOUT CONTRAST    CLINICAL HISTORY:  Headache, new or worsening (Age >= 50y);    TECHNIQUE:  Low dose axial images were obtained through the head.  Coronal and sagittal reformations were also performed. Contrast was not administered.    COMPARISON:  12/06/2022    FINDINGS:  There is generalized cerebral volume loss.  There is hypoattenuation in a periventricular fashion, likely sequela of chronic microvascular ischemic change.  There is no evidence of acute major vascular territory  infarct, hemorrhage, or mass.  There is no hydrocephalus.  There are no abnormal extra-axial fluid collections.  The paranasal sinuses and mastoid air cells are clear, and there is no evidence of calvarial fracture.  The visualized soft tissues are unremarkable.                                       Medications   acetaminophen tablet 650 mg (650 mg Oral Given 1/7/23 1649)   metoclopramide HCl injection 5 mg (5 mg Intravenous Given 1/7/23 1806)   diphenhydrAMINE injection 25 mg (25 mg Intravenous Given 1/7/23 1801)     Medical Decision Making:   History:   Old Medical Records: I decided to obtain old medical records.  Clinical Tests:   Lab Tests: Ordered and Reviewed  Radiological Study: Ordered and Reviewed       MDM:    82-year-old female with past medical history as noted above presenting with headache and abdominal pain x1 day.  Physical exam as noted above, ED workup notable for CMP within limits, lipase 4, COVID/flu negative, urinalysis with 1+ leukocyte, 3 WBC moderate bacteria and 4 squamous epithelial cells, CBC within limits, CT head unremarkable.  Patient presentation today consistent with frontal headache suspect this is an acute on chronic presentation without any further acute findings, additional imaging lab work today is unremarkable.  Patient was treated symptomatically with complete improvement upon reassessment.  Vital signs remaining stable, patient tolerating oral fluid and was advised on continued management at home. At this time given patient's history, physical exam, and ED workup do not suspect ICH, symptomatic aneurysm, temporal arteritis, meningitis/encephalitis, electrolyte abnormality, acute blood loss anemia, AACG, fracture/trauma, or any further malignant cause. Discussed diagnosis and further treatment with patient, including f/u.  Return precautions given and all questions answered.  Patient in understanding of plan.  Pt discharged to home improved and stable.       Scribe  Attestation:   Scribe #1: I performed the above scribed service and the documentation accurately describes the services I performed. I attest to the accuracy of the note.                   Clinical Impression:   Final diagnoses:  [R51.9] Frontal headache (Primary)        ED Disposition Condition    Discharge Stable        I, Charlie Cross M.D., personally performed the services described in this documentation. All medical record entries made by the scribe were at my direction and in my presence. I have reviewed the chart and agree that the record reflects my personal performance and is accurate and complete.   ED Prescriptions       Medication Sig Dispense Start Date End Date Auth. Provider    aspirin-acetaminophen-caffeine 250-250-65 mg (EXCEDRIN MIGRAINE) 250-250-65 mg per tablet Take 1 tablet by mouth every 6 (six) hours as needed for Pain. 30 tablet 1/7/2023 2/6/2023 Charlie Cross MD          Follow-up Information       Follow up With Specialties Details Why Contact Info    [facility] - Emergency Dept Emergency Medicine Go to  If symptoms worsen [address] [facility] [id number] [phone]    Ajit Piña MD Internal Medicine, Wound Care Go in 1 week As needed [address] [facility] [address]  [phone]               Charlie Cross MD  01/10/23 0974

## 2023-01-07 NOTE — ED TRIAGE NOTES
Pt c/o frontal headache 10/10 and abdominal pain starting yesterday. Denies F/V/D and dysuria. Denies blurred vision, CP or SOB.   Pt AAOX4

## 2023-01-11 ENCOUNTER — EXTERNAL HOME HEALTH (OUTPATIENT)
Dept: HOME HEALTH SERVICES | Facility: HOSPITAL | Age: 83
End: 2023-01-11
Payer: MEDICARE

## 2023-01-18 ENCOUNTER — TELEPHONE (OUTPATIENT)
Dept: FAMILY MEDICINE | Facility: CLINIC | Age: 83
End: 2023-01-18

## 2023-01-18 ENCOUNTER — LAB VISIT (OUTPATIENT)
Dept: LAB | Facility: HOSPITAL | Age: 83
End: 2023-01-18
Attending: NURSE PRACTITIONER
Payer: MEDICARE

## 2023-01-18 ENCOUNTER — OFFICE VISIT (OUTPATIENT)
Dept: FAMILY MEDICINE | Facility: CLINIC | Age: 83
End: 2023-01-18
Payer: MEDICARE

## 2023-01-18 VITALS
TEMPERATURE: 98 F | OXYGEN SATURATION: 95 % | SYSTOLIC BLOOD PRESSURE: 116 MMHG | WEIGHT: 191.56 LBS | DIASTOLIC BLOOD PRESSURE: 60 MMHG | BODY MASS INDEX: 37.61 KG/M2 | HEART RATE: 45 BPM | HEIGHT: 60 IN

## 2023-01-18 DIAGNOSIS — I70.0 AORTIC ATHEROSCLEROSIS: ICD-10-CM

## 2023-01-18 DIAGNOSIS — L65.9 HAIR LOSS: ICD-10-CM

## 2023-01-18 DIAGNOSIS — R63.4 WEIGHT LOSS: ICD-10-CM

## 2023-01-18 DIAGNOSIS — I27.20 PULMONARY HTN: ICD-10-CM

## 2023-01-18 DIAGNOSIS — R63.4 WEIGHT LOSS: Primary | ICD-10-CM

## 2023-01-18 DIAGNOSIS — F33.0 MAJOR DEPRESSIVE DISORDER, RECURRENT EPISODE, MILD WITH ANXIOUS DISTRESS: ICD-10-CM

## 2023-01-18 DIAGNOSIS — Z23 NEED FOR INFLUENZA VACCINATION: ICD-10-CM

## 2023-01-18 LAB
T4 FREE SERPL-MCNC: 0.98 NG/DL (ref 0.71–1.51)
TSH SERPL DL<=0.005 MIU/L-ACNC: 0.67 UIU/ML (ref 0.4–4)

## 2023-01-18 PROCEDURE — G0008 FLU VACCINE - QUADRIVALENT - ADJUVANTED: ICD-10-PCS | Mod: S$GLB,,, | Performed by: NURSE PRACTITIONER

## 2023-01-18 PROCEDURE — 1160F RVW MEDS BY RX/DR IN RCRD: CPT | Mod: CPTII,S$GLB,, | Performed by: NURSE PRACTITIONER

## 2023-01-18 PROCEDURE — 1160F PR REVIEW ALL MEDS BY PRESCRIBER/CLIN PHARMACIST DOCUMENTED: ICD-10-PCS | Mod: CPTII,S$GLB,, | Performed by: NURSE PRACTITIONER

## 2023-01-18 PROCEDURE — 99214 OFFICE O/P EST MOD 30 MIN: CPT | Mod: 25,S$GLB,, | Performed by: NURSE PRACTITIONER

## 2023-01-18 PROCEDURE — 1126F AMNT PAIN NOTED NONE PRSNT: CPT | Mod: CPTII,S$GLB,, | Performed by: NURSE PRACTITIONER

## 2023-01-18 PROCEDURE — 36415 COLL VENOUS BLD VENIPUNCTURE: CPT | Mod: PN | Performed by: NURSE PRACTITIONER

## 2023-01-18 PROCEDURE — 84439 ASSAY OF FREE THYROXINE: CPT | Performed by: NURSE PRACTITIONER

## 2023-01-18 PROCEDURE — 3288F PR FALLS RISK ASSESSMENT DOCUMENTED: ICD-10-PCS | Mod: CPTII,S$GLB,, | Performed by: NURSE PRACTITIONER

## 2023-01-18 PROCEDURE — 99214 PR OFFICE/OUTPT VISIT, EST, LEVL IV, 30-39 MIN: ICD-10-PCS | Mod: 25,S$GLB,, | Performed by: NURSE PRACTITIONER

## 2023-01-18 PROCEDURE — 1101F PR PT FALLS ASSESS DOC 0-1 FALLS W/OUT INJ PAST YR: ICD-10-PCS | Mod: CPTII,S$GLB,, | Performed by: NURSE PRACTITIONER

## 2023-01-18 PROCEDURE — 1159F MED LIST DOCD IN RCRD: CPT | Mod: CPTII,S$GLB,, | Performed by: NURSE PRACTITIONER

## 2023-01-18 PROCEDURE — 90694 FLU VACCINE - QUADRIVALENT - ADJUVANTED: ICD-10-PCS | Mod: S$GLB,,, | Performed by: NURSE PRACTITIONER

## 2023-01-18 PROCEDURE — G0008 ADMIN INFLUENZA VIRUS VAC: HCPCS | Mod: S$GLB,,, | Performed by: NURSE PRACTITIONER

## 2023-01-18 PROCEDURE — 99999 PR PBB SHADOW E&M-EST. PATIENT-LVL V: CPT | Mod: PBBFAC,,, | Performed by: NURSE PRACTITIONER

## 2023-01-18 PROCEDURE — 1159F PR MEDICATION LIST DOCUMENTED IN MEDICAL RECORD: ICD-10-PCS | Mod: CPTII,S$GLB,, | Performed by: NURSE PRACTITIONER

## 2023-01-18 PROCEDURE — 84443 ASSAY THYROID STIM HORMONE: CPT | Performed by: NURSE PRACTITIONER

## 2023-01-18 PROCEDURE — 3078F PR MOST RECENT DIASTOLIC BLOOD PRESSURE < 80 MM HG: ICD-10-PCS | Mod: CPTII,S$GLB,, | Performed by: NURSE PRACTITIONER

## 2023-01-18 PROCEDURE — 1126F PR PAIN SEVERITY QUANTIFIED, NO PAIN PRESENT: ICD-10-PCS | Mod: CPTII,S$GLB,, | Performed by: NURSE PRACTITIONER

## 2023-01-18 PROCEDURE — 1101F PT FALLS ASSESS-DOCD LE1/YR: CPT | Mod: CPTII,S$GLB,, | Performed by: NURSE PRACTITIONER

## 2023-01-18 PROCEDURE — 90694 VACC AIIV4 NO PRSRV 0.5ML IM: CPT | Mod: S$GLB,,, | Performed by: NURSE PRACTITIONER

## 2023-01-18 PROCEDURE — 3288F FALL RISK ASSESSMENT DOCD: CPT | Mod: CPTII,S$GLB,, | Performed by: NURSE PRACTITIONER

## 2023-01-18 PROCEDURE — 3074F PR MOST RECENT SYSTOLIC BLOOD PRESSURE < 130 MM HG: ICD-10-PCS | Mod: CPTII,S$GLB,, | Performed by: NURSE PRACTITIONER

## 2023-01-18 PROCEDURE — 3074F SYST BP LT 130 MM HG: CPT | Mod: CPTII,S$GLB,, | Performed by: NURSE PRACTITIONER

## 2023-01-18 PROCEDURE — 3078F DIAST BP <80 MM HG: CPT | Mod: CPTII,S$GLB,, | Performed by: NURSE PRACTITIONER

## 2023-01-18 PROCEDURE — 99999 PR PBB SHADOW E&M-EST. PATIENT-LVL V: ICD-10-PCS | Mod: PBBFAC,,, | Performed by: NURSE PRACTITIONER

## 2023-01-18 NOTE — PROGRESS NOTES
Chief Complaint  Chief Complaint   Patient presents with    Weight Loss     Over 3 months, patient want to check thyroid levels    Hair Loss     Over 3 months       HPI    HPI   Ms. Jose Manuel Boyd is a 82 y.o. female with medical problems as listed below. The patient presents to clinic with c/o weight loss and hair loss for the last 3 months. She states she has gone from 209 to 191. She denies a change in appetite or any other outside facts that could be causing her weight loss although she does admit to getting tired of chewing her food.     She denies changing her shampoo or conditioner and thus is unsure of why her hair is coming out.    She denies any pain, CP or SOB. No other complaints at this time.     PAST MEDICAL HISTORY:  Past Medical History:   Diagnosis Date    Anxiety     Asthma     Depression     Headache     Hx of psychiatric care     Hypercholesterolemia     Hypertension     Psychiatric problem     Sleep difficulties     Therapy     Thyroid disease     multiple nodules       PAST SURGICAL HISTORY:  Past Surgical History:   Procedure Laterality Date    ANGIOGRAM, CORONARY, WITH LEFT HEART CATHETERIZATION Left 10/18/2022    Procedure: Angiogram, Coronary, with Left Heart Cath;  Surgeon: Kumar Randall MD;  Location: [facility] CATH LAB;  Service: Cardiology;  Laterality: Left;    CHOLECYSTECTOMY      HYSTERECTOMY      knee repalcement         SOCIAL HISTORY:  Social History     Socioeconomic History    Marital status:     Number of children: 4   Occupational History    Occupation: retired     Comment: Domestic service, [employer]   Tobacco Use    Smoking status: Never    Smokeless tobacco: Never   Substance and Sexual Activity    Alcohol use: No    Drug use: No    Sexual activity: Not Currently   Other Topics Concern    Patient feels they ought to cut down on drinking/drug use No    Patient annoyed by others criticizing their drinking/drug use No    Patient has felt bad or guilty about  drinking/drug use No    Patient has had a drink/used drugs as an eye opener in the AM No   Social History Narrative    Enjoys going to [employer]     Social Determinants of Health     Financial Resource Strain: High Risk    Difficulty of Paying Living Expenses: Very hard   Food Insecurity: Food Insecurity Present    Worried About Running Out of Food in the Last Year: Never true    Ran Out of Food in the Last Year: Sometimes true   Transportation Needs: Unmet Transportation Needs    Lack of Transportation (Medical): Yes    Lack of Transportation (Non-Medical): Yes   Physical Activity: Insufficiently Active    Days of Exercise per Week: 1 day    Minutes of Exercise per Session: 10 min   Stress: Stress Concern Present    Feeling of Stress : Very much   Social Connections: Moderately Isolated    Frequency of Communication with Friends and Family: More than three times a week    Frequency of Social Gatherings with Friends and Family: Once a week    Attends [employer] Services: More than 4 times per year    Active Member of Clubs or Organizations: No    Attends Club or Organization Meetings: Never    Marital Status:    Housing Stability: Low Risk     Unable to Pay for Housing in the Last Year: No    Number of Places Lived in the Last Year: 1    Unstable Housing in the Last Year: No       FAMILY HISTORY:  Family History   Problem Relation Age of Onset    Diabetes Mother     Hypertension Mother     Kidney disease Mother     Heart disease Father     Bipolar disorder Daughter        ALLERGIES AND MEDICATIONS: updated and reviewed.  Review of patient's allergies indicates:   Allergen Reactions    Codeine      Other reaction(s): Rash     Current Outpatient Medications   Medication Sig Dispense Refill    acetaminophen (TYLENOL) 325 MG tablet Take 2 tablets (650 mg total) by mouth every 6 (six) hours as needed for Pain. 60 tablet 0    albuterol (VENTOLIN HFA) 90 mcg/actuation inhaler Inhale 2 puffs into the lungs every 4 (four)  hours as needed for Wheezing. 6.7 g 6    amLODIPine (NORVASC) 10 MG tablet Take 1 tablet (10 mg total) by mouth once daily. 90 tablet 3    ascorbic acid, vitamin C, (VITAMIN C) 1000 MG tablet Take 1,000 mg by mouth once daily.      aspirin (ECOTRIN) 81 MG EC tablet Take 81 mg by mouth once daily.      aspirin-acetaminophen-caffeine 250-250-65 mg (EXCEDRIN MIGRAINE) 250-250-65 mg per tablet Take 1 tablet by mouth every 6 (six) hours as needed for Pain. 30 tablet 0    atorvastatin (LIPITOR) 40 MG tablet TAKE 1 TABLET BY MOUTH EVERY DAY 90 tablet 3    azelastine (ASTELIN) 137 mcg (0.1 %) nasal spray 1 spray (137 mcg total) by Nasal route 2 (two) times daily. 30 mL 3    cloNIDine (CATAPRES) 0.1 MG tablet Take 1 tablet (0.1 mg total) by mouth 2 (two) times daily. 180 tablet 3    cyanocobalamin (VITAMIN B-12) 1000 MCG tablet Take 100 mcg by mouth once daily.      dicyclomine (BENTYL) 20 mg tablet TAKE 1 TABLET BY MOUTH TWICE A DAY AS NEEDED FOR ADOMINAL PAIN 60 tablet 2    fluticasone propionate (FLONASE) 50 mcg/actuation nasal spray 1 spray (50 mcg total) by Each Nostril route 2 (two) times daily. For allergic rhinitis/sinusitis 18 mL 11    fluticasone-salmeterol diskus inhaler 500-50 mcg Inhale 1 puff into the lungs 2 (two) times daily. Controller 60 each 11    furosemide (LASIX) 20 MG tablet TAKE 1 TABLET BY MOUTH EVERY DAY 90 tablet 1    losartan (COZAAR) 50 MG tablet Take 1 tablet (50 mg total) by mouth once daily. For high blood pressure 90 tablet 3    mirtazapine (REMERON) 30 MG tablet Take 1 tablet (30 mg total) by mouth nightly as needed (insomnia). At bedtime for sleep, anxiety and depression. 90 tablet 1    multivitamin with minerals tablet Take 1 tablet by mouth once daily.      naphazoline HCl/pheniramine (EYE ALLERGY RELIEF OPHT) Apply to eye.      olopatadine (PATANOL) 0.1 % ophthalmic solution Place 1 drop into both eyes 2 (two) times daily.      omega-3 fatty acids/fish oil (FISH OIL-OMEGA-3 FATTY ACIDS)  300-1,000 mg capsule Take by mouth once daily.      omeprazole (PRILOSEC) 20 MG capsule Take 1 capsule (20 mg total) by mouth once daily. For gastric reflux 90 capsule 3    psyllium (METAMUCIL) powder Take 1 packet by mouth daily as needed.      busPIRone (BUSPAR) 7.5 MG tablet TAKE 1 TABLET (7.5 MG TOTAL) BY MOUTH 2 (TWO) TIMES DAILY. 180 tablet 1     No current facility-administered medications for this visit.       Patient Care Team:  Ajit Piña MD as PCP - General (Internal Medicine)  Federico Madrigal MD as Consulting Physician (Psychiatry)  Srikanth Borden MD as Consulting Physician (Neurology)  Briana Hogan MA as Care Coordinator  Dis Diagnostic Imaging (Diagnostic Radiology)    ROS  Review of Systems   Constitutional:  Positive for unexpected weight change. Negative for chills, fatigue and fever.   HENT:  Negative for congestion, ear discharge, ear pain and postnasal drip.    Eyes:  Negative for photophobia, pain and visual disturbance.   Respiratory:  Negative for cough, shortness of breath and wheezing.    Cardiovascular:  Negative for chest pain, palpitations and leg swelling.   Gastrointestinal:  Negative for abdominal pain, constipation, diarrhea, nausea and vomiting.   Genitourinary:  Negative for dysuria, frequency, urgency and vaginal discharge.   Musculoskeletal:  Negative for back pain, joint swelling and neck stiffness.   Skin:  Negative for rash.        hairloss   Neurological:  Negative for weakness and headaches.   Psychiatric/Behavioral:  Negative for dysphoric mood and sleep disturbance. The patient is not nervous/anxious.          Physical Exam  Vitals:    01/18/23 1056   BP: 116/60   BP Location: Right arm   Patient Position: Sitting   BP Method: Large (Manual)   Pulse: (!) 45   Temp: 98.3 °F (36.8 °C)   TempSrc: Oral   SpO2: 95%   Weight: 86.9 kg (191 lb 9.3 oz)   Height: 5' (1.524 m)    Body mass index is 37.42 kg/m².  Weight: 86.9 kg (191 lb 9.3 oz)   Height: 5' (152.4  cm)   Physical Exam  Constitutional:       Appearance: She is well-developed.   HENT:      Head: Normocephalic and atraumatic.      Right Ear: External ear normal.      Left Ear: External ear normal.      Nose: Nose normal.   Eyes:      Extraocular Movements: Extraocular movements intact.   Cardiovascular:      Rate and Rhythm: Normal rate.   Pulmonary:      Effort: Pulmonary effort is normal.   Musculoskeletal:         General: Normal range of motion.      Cervical back: Normal range of motion and neck supple.   Skin:     General: Skin is warm and dry.   Neurological:      Mental Status: She is alert and oriented to person, place, and time.   Psychiatric:         Behavior: Behavior normal.       Health Maintenance         Date Due Completion Date    Shingles Vaccine (2 of 3) 06/29/2016 5/4/2016    Pneumococcal Vaccines (Age 65+) (2 - PPSV23 if available, else PCV20) 05/12/2018 5/12/2017    DEXA Scan 05/26/2020 5/26/2017    COVID-19 Vaccine (4 - Booster for Pfizer series) 10/13/2021 8/18/2021    Influenza Vaccine (1) 09/01/2022 10/27/2020    Hemoglobin A1c (Prediabetes) 08/15/2023 8/15/2022    TETANUS VACCINE 05/12/2027 5/12/2017    Lipid Panel 08/15/2027 8/15/2022    Override on 4/23/2015: Done          Health maintenance reviewed at this time.     Assessment & Plan  Weight loss  -     TSH; Future; Expected date: 01/18/2023  -     T4, FREE; Future; Expected date: 01/18/2023    Hair loss  -     TSH; Future; Expected date: 01/18/2023  -     T4, FREE; Future; Expected date: 01/18/2023    Patient wants her thyroid checked. Last blood work was unremarkable.       Pulmonary HTN  The current medical regimen is effective;  continue present plan and medications.  Followed by cardiology.    Aortic atherosclerosis  The current medical regimen is effective;  continue present plan and medications.    Major depressive disorder, recurrent episode, mild with anxious distress  The current medical regimen is effective;  continue  present plan and medications.    Need for influenza vaccination  -     Influenza (FLUAD) - Quadrivalent (Adjuvanted) *Preferred* (65+) (PF)           Follow-up: Follow up if symptoms worsen or fail to improve.

## 2023-01-18 NOTE — PROGRESS NOTES
Patient given flu vaccine to right deltoid, no complaints or reactions noted. Vis given 8/6/21 to patient. Instructed to wait in lobby for 15 minutes to monitor for reaction

## 2023-01-18 NOTE — TELEPHONE ENCOUNTER
----- Message from Ana Lyles NP sent at 1/18/2023  3:35 PM CST -----  Please call patient with their attached lab results.     Her thyroid test were both normal.     Thanks   Ana

## 2023-01-25 ENCOUNTER — TELEPHONE (OUTPATIENT)
Dept: PSYCHIATRY | Facility: CLINIC | Age: 83
End: 2023-01-25
Payer: MEDICARE

## 2023-01-25 NOTE — TELEPHONE ENCOUNTER
Pt wants to be seen right away. States she has not been sleeping, losing weight, anxious, and yelling & screaming. Advised pt that we do not have any appts for new patients where she can be seen today. In chart, pt has been seen at [facility] in the past by BROOKLYN Mejia NP. Pt given that number for scheduling to call if she would like to schedule an appointment. Advised pt if she feels she can not wait to be seen and needs immediate care to please call 911 or go to the nearest ER. Pt agrees and states that she will go to [facility] ER.

## 2023-01-26 ENCOUNTER — HOSPITAL ENCOUNTER (EMERGENCY)
Facility: HOSPITAL | Age: 83
Discharge: HOME | End: 2023-01-26
Attending: EMERGENCY MEDICINE
Payer: MEDICARE

## 2023-01-26 VITALS
HEART RATE: 65 BPM | BODY MASS INDEX: 36.71 KG/M2 | OXYGEN SATURATION: 94 % | WEIGHT: 187 LBS | SYSTOLIC BLOOD PRESSURE: 146 MMHG | RESPIRATION RATE: 20 BRPM | DIASTOLIC BLOOD PRESSURE: 69 MMHG | TEMPERATURE: 98 F | HEIGHT: 60 IN

## 2023-01-26 DIAGNOSIS — R63.4 WEIGHT LOSS: ICD-10-CM

## 2023-01-26 DIAGNOSIS — G47.00 INSOMNIA, UNSPECIFIED TYPE: Primary | ICD-10-CM

## 2023-01-26 DIAGNOSIS — F41.9 ANXIETY: ICD-10-CM

## 2023-01-26 LAB
ALBUMIN SERPL BCP-MCNC: 3.1 G/DL (ref 3.5–5.2)
ALP SERPL-CCNC: 97 U/L (ref 55–135)
ALT SERPL W/O P-5'-P-CCNC: 9 U/L (ref 10–44)
AMPHET+METHAMPHET UR QL: NEGATIVE
ANION GAP SERPL CALC-SCNC: 10 MMOL/L (ref 8–16)
APAP SERPL-MCNC: <3 UG/ML (ref 10–20)
AST SERPL-CCNC: 14 U/L (ref 10–40)
BARBITURATES UR QL SCN>200 NG/ML: NEGATIVE
BASOPHILS # BLD AUTO: 0.03 K/UL (ref 0–0.2)
BASOPHILS NFR BLD: 0.4 % (ref 0–1.9)
BENZODIAZ UR QL SCN>200 NG/ML: NEGATIVE
BILIRUB SERPL-MCNC: 0.3 MG/DL (ref 0.1–1)
BILIRUB UR QL STRIP: NEGATIVE
BUN SERPL-MCNC: 7 MG/DL (ref 8–23)
BZE UR QL SCN: NEGATIVE
CALCIUM SERPL-MCNC: 9.3 MG/DL (ref 8.7–10.5)
CANNABINOIDS UR QL SCN: NEGATIVE
CHLORIDE SERPL-SCNC: 104 MMOL/L (ref 95–110)
CLARITY UR: CLEAR
CO2 SERPL-SCNC: 26 MMOL/L (ref 23–29)
COLOR UR: YELLOW
CREAT SERPL-MCNC: 0.8 MG/DL (ref 0.5–1.4)
CREAT UR-MCNC: 66.9 MG/DL (ref 15–325)
DIFFERENTIAL METHOD: ABNORMAL
EOSINOPHIL # BLD AUTO: 0.1 K/UL (ref 0–0.5)
EOSINOPHIL NFR BLD: 1.1 % (ref 0–8)
ERYTHROCYTE [DISTWIDTH] IN BLOOD BY AUTOMATED COUNT: 14.8 % (ref 11.5–14.5)
EST. GFR  (NO RACE VARIABLE): >60 ML/MIN/1.73 M^2
ETHANOL SERPL-MCNC: <10 MG/DL
GLUCOSE SERPL-MCNC: 100 MG/DL (ref 70–110)
GLUCOSE UR QL STRIP: NEGATIVE
HCT VFR BLD AUTO: 43.6 % (ref 37–48.5)
HGB BLD-MCNC: 13.9 G/DL (ref 12–16)
HGB UR QL STRIP: NEGATIVE
IMM GRANULOCYTES # BLD AUTO: 0.04 K/UL (ref 0–0.04)
IMM GRANULOCYTES NFR BLD AUTO: 0.5 % (ref 0–0.5)
KETONES UR QL STRIP: NEGATIVE
LEUKOCYTE ESTERASE UR QL STRIP: NEGATIVE
LYMPHOCYTES # BLD AUTO: 1.6 K/UL (ref 1–4.8)
LYMPHOCYTES NFR BLD: 20.4 % (ref 18–48)
MCH RBC QN AUTO: 26.4 PG (ref 27–31)
MCHC RBC AUTO-ENTMCNC: 31.9 G/DL (ref 32–36)
MCV RBC AUTO: 83 FL (ref 82–98)
METHADONE UR QL SCN>300 NG/ML: NEGATIVE
MONOCYTES # BLD AUTO: 0.6 K/UL (ref 0.3–1)
MONOCYTES NFR BLD: 8.3 % (ref 4–15)
NEUTROPHILS # BLD AUTO: 5.3 K/UL (ref 1.8–7.7)
NEUTROPHILS NFR BLD: 69.3 % (ref 38–73)
NITRITE UR QL STRIP: NEGATIVE
NRBC BLD-RTO: 0 /100 WBC
OPIATES UR QL SCN: NEGATIVE
PCP UR QL SCN>25 NG/ML: NEGATIVE
PH UR STRIP: 7 [PH] (ref 5–8)
PLATELET # BLD AUTO: 256 K/UL (ref 150–450)
PMV BLD AUTO: 10.5 FL (ref 9.2–12.9)
POTASSIUM SERPL-SCNC: 4.2 MMOL/L (ref 3.5–5.1)
PROT SERPL-MCNC: 7.1 G/DL (ref 6–8.4)
PROT UR QL STRIP: NEGATIVE
RBC # BLD AUTO: 5.27 M/UL (ref 4–5.4)
SODIUM SERPL-SCNC: 140 MMOL/L (ref 136–145)
SP GR UR STRIP: 1.01 (ref 1–1.03)
TOXICOLOGY INFORMATION: NORMAL
TSH SERPL DL<=0.005 MIU/L-ACNC: 0.51 UIU/ML (ref 0.4–4)
URN SPEC COLLECT METH UR: NORMAL
UROBILINOGEN UR STRIP-ACNC: NEGATIVE EU/DL
WBC # BLD AUTO: 7.58 K/UL (ref 3.9–12.7)

## 2023-01-26 PROCEDURE — 99285 EMERGENCY DEPT VISIT HI MDM: CPT | Mod: 25

## 2023-01-26 PROCEDURE — 80307 DRUG TEST PRSMV CHEM ANLYZR: CPT | Performed by: EMERGENCY MEDICINE

## 2023-01-26 PROCEDURE — 96374 THER/PROPH/DIAG INJ IV PUSH: CPT

## 2023-01-26 PROCEDURE — 93010 ELECTROCARDIOGRAM REPORT: CPT | Mod: ,,, | Performed by: INTERNAL MEDICINE

## 2023-01-26 PROCEDURE — 81003 URINALYSIS AUTO W/O SCOPE: CPT | Mod: 59 | Performed by: EMERGENCY MEDICINE

## 2023-01-26 PROCEDURE — 85025 COMPLETE CBC W/AUTO DIFF WBC: CPT | Performed by: EMERGENCY MEDICINE

## 2023-01-26 PROCEDURE — 63600175 PHARM REV CODE 636 W HCPCS: Performed by: EMERGENCY MEDICINE

## 2023-01-26 PROCEDURE — 93010 EKG 12-LEAD: ICD-10-PCS | Mod: ,,, | Performed by: INTERNAL MEDICINE

## 2023-01-26 PROCEDURE — 80053 COMPREHEN METABOLIC PANEL: CPT | Performed by: EMERGENCY MEDICINE

## 2023-01-26 PROCEDURE — 84443 ASSAY THYROID STIM HORMONE: CPT | Performed by: EMERGENCY MEDICINE

## 2023-01-26 PROCEDURE — 82077 ASSAY SPEC XCP UR&BREATH IA: CPT | Performed by: EMERGENCY MEDICINE

## 2023-01-26 PROCEDURE — 80143 DRUG ASSAY ACETAMINOPHEN: CPT | Performed by: EMERGENCY MEDICINE

## 2023-01-26 PROCEDURE — 93005 ELECTROCARDIOGRAM TRACING: CPT

## 2023-01-26 RX ORDER — LORAZEPAM 2 MG/ML
1 INJECTION INTRAMUSCULAR
Status: COMPLETED | OUTPATIENT
Start: 2023-01-26 | End: 2023-01-26

## 2023-01-26 RX ORDER — CLONAZEPAM 0.5 MG/1
0.5 TABLET ORAL 2 TIMES DAILY PRN
Qty: 20 TABLET | Refills: 0 | Status: SHIPPED | OUTPATIENT
Start: 2023-01-26 | End: 2023-02-24 | Stop reason: SDUPTHER

## 2023-01-26 RX ADMIN — LORAZEPAM 1 MG: 2 INJECTION INTRAMUSCULAR; INTRAVENOUS at 12:01

## 2023-01-26 NOTE — ED PROVIDER NOTES
"Encounter Date: 1/26/2023       History     Chief Complaint   Patient presents with    Headache    Weight Loss    Shaking     The patient reports headache, unexplained weight loss, difficulty sleeping, and shakiness x 2 weeks. Patient states that she finds herself screaming at home. She states "I think I need something for my nerves". Denies chest pain, worsening sob, lightheadedness, URI symptoms, nausea, vomiting, diarrhea.     Insomnia     HPI  This 82-year-old white female presents emergency room complaining of a 25 lb weight loss over the course of the last 2 months.  The patient also complains of generalized nervousness.  She reports she is on medicines for anxiety but they are not working.  She denies abdominal pain nausea vomiting.  She reports that she is shaky all over and at times she winds up screaming at home.  She is concerned she needs something different for her nerves.  She has a frontal head pain without fever neurologic deficit neck pain.  The headache is recurrent.  It has been worse over the course of the last week.  Review of patient's allergies indicates:   Allergen Reactions    Codeine      Other reaction(s): Rash     Past Medical History:   Diagnosis Date    Anxiety     Asthma     Depression     Headache     Hx of psychiatric care     Hypercholesterolemia     Hypertension     Psychiatric problem     Sleep difficulties     Therapy     Thyroid disease     multiple nodules     Past Surgical History:   Procedure Laterality Date    ANGIOGRAM, CORONARY, WITH LEFT HEART CATHETERIZATION Left 10/18/2022    Procedure: Angiogram, Coronary, with Left Heart Cath;  Surgeon: Kumar Randall MD;  Location: [facility] CATH LAB;  Service: Cardiology;  Laterality: Left;    CHOLECYSTECTOMY      HYSTERECTOMY      knee repalcement       Family History   Problem Relation Age of Onset    Diabetes Mother     Hypertension Mother     Kidney disease Mother     Heart disease Father     Bipolar disorder Daughter      Social " History     Tobacco Use    Smoking status: Never    Smokeless tobacco: Never   Substance Use Topics    Alcohol use: No    Drug use: No     Review of Systems  The patient was questioned specifically with regard to the following.  General: Fever, chills, sweats. Neuro: Headache. Eyes: eye problems. ENT: Ear pain, sore throat. Cardiovascular: Chest pain. Respiratory: Cough, shortness of breath. Gastrointestinal: Abdominal pain, vomiting, diarrhea. Genitourinary: Painful urination.  Musculoskeletal: Arm and leg problems. Skin: Rash.  The review of systems was negative except for the following:  Nervous and shaky, frontal head pain, weight loss.  She complains of an infection in her mouth.  Physical Exam     Initial Vitals [01/26/23 1105]   BP Pulse Resp Temp SpO2   139/75 67 16 98.4 °F (36.9 °C) 97 %      MAP       --         Physical Exam  The patient was examined specifically for the following:   General:No significant distress, Good color, Warm and dry. Head and neck:Scalp atraumatic, Neck supple. Neurological:Appropriate conversation, Gross motor deficits. Eyes:Conjugate gaze, Clear corneas. ENT: No epistaxis. Cardiac: Regular rate and rhythm, Grossly normal heart tones. Pulmonary: Wheezing, Rales. Gastrointestinal: Abdominal tenderness, Abdominal distention. Musculoskeletal: Extremity deformity, Apparent pain with range of motion of the joints. Skin: Rash.   The findings on examination were normal except for the following:  The patient has an elevated BMI.  The lungs are clear and free of wheezing rales rubs or rhonchi.  The patient does appear to be tremulous.  I see no evidence of significant infection in the mouth.  There is no gingivitis, carious teeth, thrush.  The neck is supple.  Mental status examination, cranial nerves, motor and sensory examination are normal.  ED Course   Procedures  Labs Reviewed   CBC W/ AUTO DIFFERENTIAL - Abnormal; Notable for the following components:       Result Value    MCH 26.4  (*)     MCHC 31.9 (*)     RDW 14.8 (*)     All other components within normal limits   COMPREHENSIVE METABOLIC PANEL - Abnormal; Notable for the following components:    BUN 7 (*)     Albumin 3.1 (*)     ALT 9 (*)     All other components within normal limits   ACETAMINOPHEN LEVEL - Abnormal; Notable for the following components:    Acetaminophen (Tylenol), Serum <3.0 (*)     All other components within normal limits   TSH   URINALYSIS, REFLEX TO URINE CULTURE    Narrative:     Specimen Source->Urine   DRUG SCREEN PANEL, URINE EMERGENCY    Narrative:     Specimen Source->Urine   ALCOHOL,MEDICAL (ETHANOL)     EKG Readings: (Independently Interpreted)   This patient is in a sinus rhythm with a heart rate of 62.  There are no significant ST segment or T-wave changes.  There is no evidence of acute myocardial infarction or malignant arrhythmia.  This is a normal EKG.  There is no evidence of acute myocardial infarction.  This is doctor Jignesh dictating I independently interpreted this EKG.     Imaging Results              X-Ray Chest AP Portable (Final result)  Result time 01/26/23 12:26:27      Final result by Amilcar Epperson III, MD (01/26/23 12:26:27)                   Impression:      No acute process seen.      Electronically signed by: Amilcar Epperson MD  Date:    01/26/2023  Time:    12:26               Narrative:    EXAMINATION:  XR CHEST AP PORTABLE    CLINICAL HISTORY:  chest pain;    FINDINGS:  Chest one view AP portable.    Heart size is normal.  There is aortic plaque.  Lungs are clear.  The bones showed DJD.                                    Medical decision making:  Given the above, this patient presents to the emergency room complaining of anxiety, being shaky, 25 lb weight loss the last 2 months.  The patient is not suicidal homicidal or psychotic.  She responded nicely to 1 mg of IV Ativan in the emergency room.  It did not seem to make her sleepy but the tremor resolved.  The patient feels well.  I  will discharge to outpatient evaluation and treatment.  I will discharge to follow up with psychiatric clinic.  The patient is already on BuSpar and Remeron.  Patient did not appear to be psychotic.  She is not suicidal or homicidal.  She reports that her symptoms have been successfully treated in the past with Valium.  I will write her a small prescription of clonazepam and have her follow up with primary care and Psychiatry.  I could not find a physiologic reason for the weight loss.  The patient has normal white blood count and no evidence of infection.  She has a normal hemoglobin and hematocrit and no evidence of anemia.  She has normal chemistries, with no hepatic or renal failure to cause malaise and anorexia producing weight loss.  She has a normal TSH.  I doubt hypothyroidism producing her symptoms.  She was screened for psychiatric admission but did not require it her Tylenol level is normal.  Drug abuse seems unlikely with her negative tox screen.  Alcohol use and withdrawal seem unlikely given her history demographics and her negative alcohol level.  Is no evidence of urinary tract infection to produce anorexia.         Medications   LORazepam injection 1 mg (1 mg Intravenous Given 1/26/23 1226)                              Clinical Impression:   Final diagnoses:  [R63.4] Weight loss  [G47.00] Insomnia, unspecified type (Primary)  [F41.9] Anxiety        ED Disposition Condition    Discharge Stable          ED Prescriptions       Medication Sig Dispense Start Date End Date Auth. Provider    clonazePAM (KLONOPIN) 0.5 MG tablet Take 1 tablet (0.5 mg total) by mouth 2 (two) times daily as needed for Anxiety. 20 tablet 1/26/2023 1/26/2024 Regulo Egan MD          Follow-up Information       Follow up With Specialties Details Why Contact Info    Ajit Piña MD Internal Medicine, Wound Care In 1 week  [address] [facility] [address]  [phone]      [facility]  Health, Psychiatry, Psychology In 2 days Please call today for an appointment tomorrow. [address] [facility] [address]  [phone]               Regulo Egan MD  01/26/23 7272

## 2023-01-26 NOTE — ED TRIAGE NOTES
Pt BIB family with c/o headache x2 weeks. Pt states intermittent HA worsening, anxiety, insomnia and weight lose. Pt denies N/V/D, fever, or chills. PMHx anxiety, migraine HA, HTN, AND insomnia. Pt is AAOx4, NAD, VSS.

## 2023-01-26 NOTE — DISCHARGE INSTRUCTIONS
Please return immediately if you get worse or if new problems develop.  Please follow-up with the primary care doctor this week.  Please follow-up at the mental health clinic as above.  Medicines as directed.  Continue usual medicines.

## 2023-02-24 ENCOUNTER — OFFICE VISIT (OUTPATIENT)
Dept: FAMILY MEDICINE | Facility: CLINIC | Age: 83
End: 2023-02-24
Payer: MEDICARE

## 2023-02-24 VITALS
SYSTOLIC BLOOD PRESSURE: 160 MMHG | HEIGHT: 60 IN | HEART RATE: 75 BPM | OXYGEN SATURATION: 96 % | RESPIRATION RATE: 19 BRPM | BODY MASS INDEX: 36.7 KG/M2 | TEMPERATURE: 99 F | WEIGHT: 186.94 LBS | DIASTOLIC BLOOD PRESSURE: 80 MMHG

## 2023-02-24 DIAGNOSIS — I10 HTN (HYPERTENSION), BENIGN: ICD-10-CM

## 2023-02-24 DIAGNOSIS — F33.0 MAJOR DEPRESSIVE DISORDER, RECURRENT EPISODE, MILD WITH ANXIOUS DISTRESS: Primary | ICD-10-CM

## 2023-02-24 PROCEDURE — 99214 PR OFFICE/OUTPT VISIT, EST, LEVL IV, 30-39 MIN: ICD-10-PCS | Mod: S$GLB,,, | Performed by: INTERNAL MEDICINE

## 2023-02-24 PROCEDURE — 1159F MED LIST DOCD IN RCRD: CPT | Mod: CPTII,S$GLB,, | Performed by: INTERNAL MEDICINE

## 2023-02-24 PROCEDURE — 3079F PR MOST RECENT DIASTOLIC BLOOD PRESSURE 80-89 MM HG: ICD-10-PCS | Mod: CPTII,S$GLB,, | Performed by: INTERNAL MEDICINE

## 2023-02-24 PROCEDURE — 1101F PR PT FALLS ASSESS DOC 0-1 FALLS W/OUT INJ PAST YR: ICD-10-PCS | Mod: CPTII,S$GLB,, | Performed by: INTERNAL MEDICINE

## 2023-02-24 PROCEDURE — 1160F RVW MEDS BY RX/DR IN RCRD: CPT | Mod: CPTII,S$GLB,, | Performed by: INTERNAL MEDICINE

## 2023-02-24 PROCEDURE — 99214 OFFICE O/P EST MOD 30 MIN: CPT | Mod: S$GLB,,, | Performed by: INTERNAL MEDICINE

## 2023-02-24 PROCEDURE — 3288F PR FALLS RISK ASSESSMENT DOCUMENTED: ICD-10-PCS | Mod: CPTII,S$GLB,, | Performed by: INTERNAL MEDICINE

## 2023-02-24 PROCEDURE — 3077F SYST BP >= 140 MM HG: CPT | Mod: CPTII,S$GLB,, | Performed by: INTERNAL MEDICINE

## 2023-02-24 PROCEDURE — 1159F PR MEDICATION LIST DOCUMENTED IN MEDICAL RECORD: ICD-10-PCS | Mod: CPTII,S$GLB,, | Performed by: INTERNAL MEDICINE

## 2023-02-24 PROCEDURE — 99999 PR PBB SHADOW E&M-EST. PATIENT-LVL V: ICD-10-PCS | Mod: PBBFAC,,, | Performed by: INTERNAL MEDICINE

## 2023-02-24 PROCEDURE — 99999 PR PBB SHADOW E&M-EST. PATIENT-LVL V: CPT | Mod: PBBFAC,,, | Performed by: INTERNAL MEDICINE

## 2023-02-24 PROCEDURE — 1160F PR REVIEW ALL MEDS BY PRESCRIBER/CLIN PHARMACIST DOCUMENTED: ICD-10-PCS | Mod: CPTII,S$GLB,, | Performed by: INTERNAL MEDICINE

## 2023-02-24 PROCEDURE — 1101F PT FALLS ASSESS-DOCD LE1/YR: CPT | Mod: CPTII,S$GLB,, | Performed by: INTERNAL MEDICINE

## 2023-02-24 PROCEDURE — 1126F PR PAIN SEVERITY QUANTIFIED, NO PAIN PRESENT: ICD-10-PCS | Mod: CPTII,S$GLB,, | Performed by: INTERNAL MEDICINE

## 2023-02-24 PROCEDURE — 3077F PR MOST RECENT SYSTOLIC BLOOD PRESSURE >= 140 MM HG: ICD-10-PCS | Mod: CPTII,S$GLB,, | Performed by: INTERNAL MEDICINE

## 2023-02-24 PROCEDURE — 3288F FALL RISK ASSESSMENT DOCD: CPT | Mod: CPTII,S$GLB,, | Performed by: INTERNAL MEDICINE

## 2023-02-24 PROCEDURE — 1126F AMNT PAIN NOTED NONE PRSNT: CPT | Mod: CPTII,S$GLB,, | Performed by: INTERNAL MEDICINE

## 2023-02-24 PROCEDURE — 3079F DIAST BP 80-89 MM HG: CPT | Mod: CPTII,S$GLB,, | Performed by: INTERNAL MEDICINE

## 2023-02-24 RX ORDER — CLONAZEPAM 0.5 MG/1
0.5 TABLET ORAL 2 TIMES DAILY PRN
Qty: 30 TABLET | Refills: 0 | Status: SHIPPED | OUTPATIENT
Start: 2023-02-24 | End: 2023-03-14 | Stop reason: SDUPTHER

## 2023-02-24 NOTE — PROGRESS NOTES
"Subjective:       Patient ID: Jose Manuel Boyd is a 82 y.o. female.    Chief Complaint: Follow-up (6 month f/u)    Anxiety    HPI: 83 y/o w/ HTN MDD presents alone for follow up. She reports more anxious "screaming at everyone" has been takign mirtazapine most nights does not feel it helps maintain sleep. One month ago went to ED because felt "shakey" prescribed klonipin. Has been taking this two to three times per week with good effect no falls or sensation of being off balance     Review of Systems   Constitutional:  Negative for activity change, appetite change, fatigue, fever and unexpected weight change.   HENT:  Negative for ear pain, rhinorrhea and sore throat.    Eyes:  Negative for discharge and visual disturbance.   Respiratory:  Negative for chest tightness, shortness of breath and wheezing.    Cardiovascular:  Negative for chest pain, palpitations and leg swelling.   Gastrointestinal:  Negative for abdominal pain, constipation and diarrhea.   Endocrine: Negative for cold intolerance and heat intolerance.   Genitourinary:  Negative for dysuria and hematuria.   Musculoskeletal:  Negative for joint swelling and neck stiffness.   Skin:  Negative for rash.   Neurological:  Negative for dizziness, syncope, weakness and headaches.   Psychiatric/Behavioral:  Negative for suicidal ideas. The patient is nervous/anxious.      Objective:     Vitals:    02/24/23 1312   BP: (!) 160/80   Pulse: 75   Resp: 19   Temp: 98.5 °F (36.9 °C)   TempSrc: Oral   SpO2: 96%   Weight: 84.8 kg (186 lb 15.2 oz)   Height: 5' (1.524 m)          Physical Exam  Constitutional:       General: She is not in acute distress.     Appearance: She is obese.   HENT:      Head: Normocephalic and atraumatic.   Eyes:      General: No scleral icterus.  Cardiovascular:      Rate and Rhythm: Normal rate and regular rhythm.      Pulses: Normal pulses.   Pulmonary:      Effort: Pulmonary effort is normal. No respiratory distress.      Breath sounds: " No wheezing.   Abdominal:      General: There is no distension.      Tenderness: There is no abdominal tenderness. There is no guarding.   Musculoskeletal:      Right lower leg: No edema.      Left lower leg: No edema.   Neurological:      General: No focal deficit present.      Mental Status: She is alert.   Psychiatric:         Mood and Affect: Mood normal.         Behavior: Behavior normal.       Assessment and Plan   1. Major depressive disorder, recurrent episode, mild with anxious distress  Prn clonazepam   - clonazePAM (KLONOPIN) 0.5 MG tablet; Take 1 tablet (0.5 mg total) by mouth 2 (two) times daily as needed for Anxiety.  Dispense: 30 tablet; Refill: 0    2. HTN (hypertension), benign  Above goal she did not take any medicaitons today due to concern of having to urinate when out of home monitor at follow up

## 2023-03-01 DIAGNOSIS — F33.0 MAJOR DEPRESSIVE DISORDER, RECURRENT EPISODE, MILD WITH ANXIOUS DISTRESS: ICD-10-CM

## 2023-03-01 RX ORDER — MIRTAZAPINE 30 MG/1
TABLET, FILM COATED ORAL
Qty: 90 TABLET | Refills: 1 | Status: SHIPPED | OUTPATIENT
Start: 2023-03-01 | End: 2023-09-06

## 2023-03-01 NOTE — TELEPHONE ENCOUNTER
No new care gaps identified.  [facility] Embedded Care Gaps. Reference number: [id number]. 3/01/2023   1:24:56 AM CST

## 2023-03-01 NOTE — TELEPHONE ENCOUNTER
Refill Decision Note   Jose Manuel Boyd  is requesting a refill authorization.  Brief Assessment and Rationale for Refill:  Approve     Medication Therapy Plan:       Medication Reconciliation Completed: No   Comments:     No Care Gaps recommended.     Note composed:4:35 AM 03/01/2023

## 2023-03-14 ENCOUNTER — TELEPHONE (OUTPATIENT)
Dept: FAMILY MEDICINE | Facility: CLINIC | Age: 83
End: 2023-03-14
Payer: MEDICARE

## 2023-03-14 DIAGNOSIS — F33.0 MAJOR DEPRESSIVE DISORDER, RECURRENT EPISODE, MILD WITH ANXIOUS DISTRESS: ICD-10-CM

## 2023-03-14 RX ORDER — CLONAZEPAM 0.5 MG/1
0.5 TABLET ORAL 2 TIMES DAILY PRN
Qty: 30 TABLET | Refills: 0 | Status: SHIPPED | OUTPATIENT
Start: 2023-03-14 | End: 2023-09-20 | Stop reason: ALTCHOICE

## 2023-03-14 NOTE — TELEPHONE ENCOUNTER
Refill Routing Note   Medication(s) are not appropriate for processing by [facility] for the following reason(s):       Medication outside of protocol    [facility] action(s):  Route         Medication reconciliation completed: No   Extended chart review required: No  Alert overridden per protocol: No            Appointments  past 12m or future 3m with PCP    Date Provider   Last Visit   2/24/2023 Ajit Piña MD   Next Visit   6/27/2023 Ajit Piña MD   ED visits in past 90 days: 2        Note composed:12:16 PM 03/14/2023

## 2023-03-14 NOTE — TELEPHONE ENCOUNTER
Spoke with pt and let her know that Dr. Piña called in her medication clonazepam 0.5mg to her preferred pharmacy.

## 2023-03-14 NOTE — TELEPHONE ENCOUNTER
----- Message from Beth Phillips sent at 3/14/2023 12:07 PM CDT -----  Regarding: Refill request  .Type: RX Refill Request    Who Called:self     Have you contacted your pharmacy:yes     Refill or New Rx: Refill    RX Name and Strength:clonazePAM (KLONOPIN) 0.5 MG tablet    Preferred Pharmacy with phone number .  [id number] - [facility] [address]  [address] [place] [address]  Phone: [phone] Fax: [phone]              Local or Mail Order: local    Ordering Provider: AMILCAR Piña     Would the patient rather a call back or a response via [facility]? Call     Best Call Back Number:  [phone]      Additional Information:

## 2023-03-14 NOTE — TELEPHONE ENCOUNTER
No new care gaps identified.  [facility] Embedded Care Gaps. Reference number: [id number]. 3/14/2023   12:14:12 PM CDT

## 2023-05-01 DIAGNOSIS — K57.32 DIVERTICULITIS OF COLON: ICD-10-CM

## 2023-05-01 RX ORDER — DICYCLOMINE HYDROCHLORIDE 20 MG/1
TABLET ORAL
Qty: 180 TABLET | Refills: 0 | Status: SHIPPED | OUTPATIENT
Start: 2023-05-01 | End: 2023-07-31

## 2023-05-02 DIAGNOSIS — I10 HTN (HYPERTENSION), BENIGN: ICD-10-CM

## 2023-05-02 RX ORDER — FUROSEMIDE 20 MG/1
TABLET ORAL
Qty: 90 TABLET | Refills: 2 | Status: SHIPPED | OUTPATIENT
Start: 2023-05-02

## 2023-05-02 NOTE — TELEPHONE ENCOUNTER
Refill Routing Note   Medication(s) are not appropriate for processing by [facility] for the following reason(s):      Required vitals abnormal    [facility] action(s):  Defer          Medication reconciliation completed: No     Appointments  past 12m or future 3m with PCP    Date Provider   Last Visit   2/24/2023 Ajit Piña MD   Next Visit   6/27/2023 Ajit Piña MD   ED visits in past 90 days: 0        Note composed:10:56 AM 05/02/2023

## 2023-05-02 NOTE — TELEPHONE ENCOUNTER
No care due was identified.  [facility] Embedded Care Due Messages. Reference number: [id number].   5/02/2023 3:50:51 AM CDT

## 2023-05-24 ENCOUNTER — PATIENT OUTREACH (OUTPATIENT)
Dept: ADMINISTRATIVE | Facility: OTHER | Age: 83
End: 2023-05-24
Payer: MEDICARE

## 2023-05-24 ENCOUNTER — OFFICE VISIT (OUTPATIENT)
Dept: FAMILY MEDICINE | Facility: CLINIC | Age: 83
End: 2023-05-24
Payer: MEDICARE

## 2023-05-24 ENCOUNTER — HOSPITAL ENCOUNTER (EMERGENCY)
Facility: HOSPITAL | Age: 83
Discharge: HOME | End: 2023-05-24
Attending: EMERGENCY MEDICINE
Payer: MEDICARE

## 2023-05-24 VITALS
SYSTOLIC BLOOD PRESSURE: 91 MMHG | WEIGHT: 190.25 LBS | RESPIRATION RATE: 17 BRPM | HEART RATE: 41 BPM | OXYGEN SATURATION: 96 % | HEIGHT: 60 IN | DIASTOLIC BLOOD PRESSURE: 62 MMHG | BODY MASS INDEX: 37.35 KG/M2 | TEMPERATURE: 98 F

## 2023-05-24 VITALS
WEIGHT: 190 LBS | TEMPERATURE: 98 F | DIASTOLIC BLOOD PRESSURE: 82 MMHG | SYSTOLIC BLOOD PRESSURE: 194 MMHG | RESPIRATION RATE: 14 BRPM | BODY MASS INDEX: 37.3 KG/M2 | HEART RATE: 42 BPM | OXYGEN SATURATION: 95 % | HEIGHT: 60 IN

## 2023-05-24 DIAGNOSIS — I95.9 HYPOTENSION, UNSPECIFIED HYPOTENSION TYPE: ICD-10-CM

## 2023-05-24 DIAGNOSIS — E53.8 B12 DEFICIENCY: ICD-10-CM

## 2023-05-24 DIAGNOSIS — R00.1 BRADYCARDIA: ICD-10-CM

## 2023-05-24 DIAGNOSIS — R00.1 BRADYCARDIA: Primary | ICD-10-CM

## 2023-05-24 LAB
ALBUMIN SERPL BCP-MCNC: 3.2 G/DL (ref 3.5–5.2)
ALP SERPL-CCNC: 102 U/L (ref 55–135)
ALT SERPL W/O P-5'-P-CCNC: 16 U/L (ref 10–44)
ANION GAP SERPL CALC-SCNC: 8 MMOL/L (ref 8–16)
AST SERPL-CCNC: 16 U/L (ref 10–40)
BASOPHILS # BLD AUTO: 0.06 K/UL (ref 0–0.2)
BASOPHILS NFR BLD: 0.7 % (ref 0–1.9)
BILIRUB SERPL-MCNC: 0.4 MG/DL (ref 0.1–1)
BNP SERPL-MCNC: 149 PG/ML (ref 0–99)
BUN SERPL-MCNC: 15 MG/DL (ref 8–23)
CALCIUM SERPL-MCNC: 9.7 MG/DL (ref 8.7–10.5)
CHLORIDE SERPL-SCNC: 105 MMOL/L (ref 95–110)
CO2 SERPL-SCNC: 28 MMOL/L (ref 23–29)
CREAT SERPL-MCNC: 0.8 MG/DL (ref 0.5–1.4)
DIFFERENTIAL METHOD: ABNORMAL
EOSINOPHIL # BLD AUTO: 0.2 K/UL (ref 0–0.5)
EOSINOPHIL NFR BLD: 2.7 % (ref 0–8)
ERYTHROCYTE [DISTWIDTH] IN BLOOD BY AUTOMATED COUNT: 14.8 % (ref 11.5–14.5)
EST. GFR  (NO RACE VARIABLE): >60 ML/MIN/1.73 M^2
GLUCOSE SERPL-MCNC: 94 MG/DL (ref 70–110)
HCT VFR BLD AUTO: 40.5 % (ref 37–48.5)
HGB BLD-MCNC: 13 G/DL (ref 12–16)
IMM GRANULOCYTES # BLD AUTO: 0.03 K/UL (ref 0–0.04)
IMM GRANULOCYTES NFR BLD AUTO: 0.4 % (ref 0–0.5)
INR PPP: 1 (ref 0.8–1.2)
LACTATE SERPL-SCNC: 0.9 MMOL/L (ref 0.5–2.2)
LYMPHOCYTES # BLD AUTO: 2.3 K/UL (ref 1–4.8)
LYMPHOCYTES NFR BLD: 27.7 % (ref 18–48)
MAGNESIUM SERPL-MCNC: 2.1 MG/DL (ref 1.6–2.6)
MCH RBC QN AUTO: 26.8 PG (ref 27–31)
MCHC RBC AUTO-ENTMCNC: 32.1 G/DL (ref 32–36)
MCV RBC AUTO: 84 FL (ref 82–98)
MONOCYTES # BLD AUTO: 0.9 K/UL (ref 0.3–1)
MONOCYTES NFR BLD: 10.8 % (ref 4–15)
NEUTROPHILS # BLD AUTO: 4.8 K/UL (ref 1.8–7.7)
NEUTROPHILS NFR BLD: 57.7 % (ref 38–73)
NRBC BLD-RTO: 0 /100 WBC
PLATELET # BLD AUTO: 255 K/UL (ref 150–450)
PMV BLD AUTO: 10.6 FL (ref 9.2–12.9)
POTASSIUM SERPL-SCNC: 4.2 MMOL/L (ref 3.5–5.1)
PROT SERPL-MCNC: 6.8 G/DL (ref 6–8.4)
PROTHROMBIN TIME: 10.9 SEC (ref 9–12.5)
RBC # BLD AUTO: 4.85 M/UL (ref 4–5.4)
SODIUM SERPL-SCNC: 141 MMOL/L (ref 136–145)
TROPONIN I SERPL DL<=0.01 NG/ML-MCNC: 0.01 NG/ML (ref 0–0.03)
TSH SERPL DL<=0.005 MIU/L-ACNC: 1.18 UIU/ML (ref 0.4–4)
WBC # BLD AUTO: 8.27 K/UL (ref 3.9–12.7)

## 2023-05-24 PROCEDURE — 1101F PR PT FALLS ASSESS DOC 0-1 FALLS W/OUT INJ PAST YR: ICD-10-PCS | Mod: CPTII,S$GLB,, | Performed by: PHYSICIAN ASSISTANT

## 2023-05-24 PROCEDURE — 1159F PR MEDICATION LIST DOCUMENTED IN MEDICAL RECORD: ICD-10-PCS | Mod: CPTII,S$GLB,, | Performed by: PHYSICIAN ASSISTANT

## 2023-05-24 PROCEDURE — 99214 OFFICE O/P EST MOD 30 MIN: CPT | Mod: S$GLB,,, | Performed by: PHYSICIAN ASSISTANT

## 2023-05-24 PROCEDURE — 80053 COMPREHEN METABOLIC PANEL: CPT | Performed by: EMERGENCY MEDICINE

## 2023-05-24 PROCEDURE — 99999 PR PBB SHADOW E&M-EST. PATIENT-LVL V: CPT | Mod: PBBFAC,,, | Performed by: PHYSICIAN ASSISTANT

## 2023-05-24 PROCEDURE — 93010 ELECTROCARDIOGRAM REPORT: CPT | Mod: S$GLB,,, | Performed by: INTERNAL MEDICINE

## 2023-05-24 PROCEDURE — 85025 COMPLETE CBC W/AUTO DIFF WBC: CPT | Performed by: EMERGENCY MEDICINE

## 2023-05-24 PROCEDURE — 83880 ASSAY OF NATRIURETIC PEPTIDE: CPT | Performed by: EMERGENCY MEDICINE

## 2023-05-24 PROCEDURE — 99214 PR OFFICE/OUTPT VISIT, EST, LEVL IV, 30-39 MIN: ICD-10-PCS | Mod: S$GLB,,, | Performed by: PHYSICIAN ASSISTANT

## 2023-05-24 PROCEDURE — 83605 ASSAY OF LACTIC ACID: CPT | Performed by: EMERGENCY MEDICINE

## 2023-05-24 PROCEDURE — 84484 ASSAY OF TROPONIN QUANT: CPT | Performed by: EMERGENCY MEDICINE

## 2023-05-24 PROCEDURE — 85610 PROTHROMBIN TIME: CPT | Performed by: EMERGENCY MEDICINE

## 2023-05-24 PROCEDURE — 3078F DIAST BP <80 MM HG: CPT | Mod: CPTII,S$GLB,, | Performed by: PHYSICIAN ASSISTANT

## 2023-05-24 PROCEDURE — 3288F PR FALLS RISK ASSESSMENT DOCUMENTED: ICD-10-PCS | Mod: CPTII,S$GLB,, | Performed by: PHYSICIAN ASSISTANT

## 2023-05-24 PROCEDURE — 84443 ASSAY THYROID STIM HORMONE: CPT | Performed by: EMERGENCY MEDICINE

## 2023-05-24 PROCEDURE — 3074F SYST BP LT 130 MM HG: CPT | Mod: CPTII,S$GLB,, | Performed by: PHYSICIAN ASSISTANT

## 2023-05-24 PROCEDURE — 3074F PR MOST RECENT SYSTOLIC BLOOD PRESSURE < 130 MM HG: ICD-10-PCS | Mod: CPTII,S$GLB,, | Performed by: PHYSICIAN ASSISTANT

## 2023-05-24 PROCEDURE — 1160F RVW MEDS BY RX/DR IN RCRD: CPT | Mod: CPTII,S$GLB,, | Performed by: PHYSICIAN ASSISTANT

## 2023-05-24 PROCEDURE — 93010 EKG 12-LEAD: ICD-10-PCS | Mod: S$GLB,,, | Performed by: INTERNAL MEDICINE

## 2023-05-24 PROCEDURE — 83735 ASSAY OF MAGNESIUM: CPT | Performed by: EMERGENCY MEDICINE

## 2023-05-24 PROCEDURE — 1159F MED LIST DOCD IN RCRD: CPT | Mod: CPTII,S$GLB,, | Performed by: PHYSICIAN ASSISTANT

## 2023-05-24 PROCEDURE — 1101F PT FALLS ASSESS-DOCD LE1/YR: CPT | Mod: CPTII,S$GLB,, | Performed by: PHYSICIAN ASSISTANT

## 2023-05-24 PROCEDURE — 93005 ELECTROCARDIOGRAM TRACING: CPT

## 2023-05-24 PROCEDURE — 3288F FALL RISK ASSESSMENT DOCD: CPT | Mod: CPTII,S$GLB,, | Performed by: PHYSICIAN ASSISTANT

## 2023-05-24 PROCEDURE — 99285 EMERGENCY DEPT VISIT HI MDM: CPT | Mod: 25

## 2023-05-24 PROCEDURE — 99999 PR PBB SHADOW E&M-EST. PATIENT-LVL V: ICD-10-PCS | Mod: PBBFAC,,, | Performed by: PHYSICIAN ASSISTANT

## 2023-05-24 PROCEDURE — 3078F PR MOST RECENT DIASTOLIC BLOOD PRESSURE < 80 MM HG: ICD-10-PCS | Mod: CPTII,S$GLB,, | Performed by: PHYSICIAN ASSISTANT

## 2023-05-24 PROCEDURE — 1160F PR REVIEW ALL MEDS BY PRESCRIBER/CLIN PHARMACIST DOCUMENTED: ICD-10-PCS | Mod: CPTII,S$GLB,, | Performed by: PHYSICIAN ASSISTANT

## 2023-05-24 NOTE — PROGRESS NOTES
Subjective     Patient ID: Jose Manuel Boyd is a 82 y.o. female.    Chief Complaint: Weight Loss    HPI: 83 yo female presents for weight loss. States over the past 2 months her weight went from 200 to 190. She does states her appetite has decreased and and she want able to get the foods she like. Looking back in her record, she presented to primary care in January complaing of 10lb weight loss. Per our records from Feb 2023 to now she's gained 4 lbs. She states she has been eating better lately. She has chronic abdominal pain, takes bentyl prescribed by PCP with relief. Normal BMs and urination.   Pt presented today with bradycardia. She denies feeling dizzy. She state she is very tired all of the time but this is a chronic issue. She does get POWELL off and on. States it hasn't been bad today. She has a h/o bradycardia in the past where BB was d/cd. She is not currently on BB. Her BP was also low today. She states she took her BP meds this morning, stating she is on 2 meds but unsure which ones. She also states she has been having headaches.     Review of Systems   Constitutional:  Positive for fatigue.   Respiratory:  Positive for shortness of breath.    Cardiovascular:  Negative for chest pain.   Neurological:  Positive for headaches. Negative for dizziness.        Objective     Physical Exam  Constitutional:       Appearance: Normal appearance.   Cardiovascular:      Rate and Rhythm: Regular rhythm. Bradycardia present.   Pulmonary:      Effort: Pulmonary effort is normal.      Breath sounds: Normal breath sounds.   Neurological:      General: No focal deficit present.      Mental Status: She is alert.          Assessment and Plan     Problem List Items Addressed This Visit       Bradycardia - Primary    Relevant Orders    IN OFFICE EKG 12-LEAD (to Muse)     Other Visit Diagnoses       Hypotension, unspecified hypotension type        Relevant Orders    IN OFFICE EKG 12-LEAD (to Muse)    B12 deficiency         Relevant Orders    Vitamin B12             Amnatha was seen today for weight loss.    Diagnoses and all orders for this visit:    Bradycardia  -     IN OFFICE EKG 12-LEAD (to Muse)  -     HR ranging 38-42 in clinic. Pt has been very fatigued. Recommend she be taken to ER for further testing. Recommended EMS multiple times, she refuses EMS. She called her sister who will transport her    Hypotension, unspecified hypotension type  -     IN OFFICE EKG 12-LEAD (to Muse)    B12 deficiency  -     Vitamin B12; Future  -     pt wanting B12 injection, was high in the past, will check lab

## 2023-05-24 NOTE — PROGRESS NOTES
CHW - Initial Contact    This Community Health Worker completed OR the Social Determinant of Health questionnaire with patient  at bedside  today.    Pt identified barriers of most importance are: pt has no needs at this time.   Referrals to community agencies completed with patient/caregiver consent outside of [facility] include: none at this time.  Referrals were put through [facility] - no: none at this time.  Support and Services: has no support at this time.  Other information discussed the patient needs / wants help with: Completed SDOH with patient at bedside today, patient has no needs at this time, will follow up in one week for possible case closure.   Follow up required: yes.  Follow-up Outreach - Due: 5/30/2023

## 2023-05-24 NOTE — ED PROVIDER NOTES
"Encounter Date: 5/24/2023    SCRIBE #1 NOTE: I, Brittany Li, am scribing for, and in the presence of,  Valentín Diane MD.     History     Chief Complaint   Patient presents with    Bradycardia     Pt sent to ED from PCP office for bradycardia. Pt reports fatigue and increased weight loss. Denies any CP      83 yo F with a PMHx of anxiety, asthma, depression, hypertension, hypercholesterolemia, presents to the ED for evaluation of bradycardia. Addisonia history provided by independent historian, the primary care office, report she was in clinic this morning, when they performed an EKG that showed junctional bradycardia. She was sent here for further evaluation and workup. Upon arrival to the ED, she was bradycardic at 37 bpm. She reports she has been feeling more fatigued and dyspneic for the last couple of months. She further notes unintentional weight loss of 24lbs onset 6 mo ago, but started gaining back the weight 2 mo ago. She attributes this to "depression and stress". She initially scheduled her PCP appointment for her weight loss. No other exacerbating or alleviating factors. Denies weakness, lightheadedness, dyspnea, near syncope, or other associated symptoms. She is not on any significant calcium channel blockers causing severe lowering of her heart rate. She denies any h/o Afib.     The history is provided by the patient and medical records.   Review of patient's allergies indicates:   Allergen Reactions    Codeine      Other reaction(s): Rash     Past Medical History:   Diagnosis Date    Anxiety     Asthma     Depression     Headache     Hx of psychiatric care     Hypercholesterolemia     Hypertension     Psychiatric problem     Sleep difficulties     Therapy     Thyroid disease     multiple nodules     Past Surgical History:   Procedure Laterality Date    ANGIOGRAM, CORONARY, WITH LEFT HEART CATHETERIZATION Left 10/18/2022    Procedure: Angiogram, Coronary, with Left Heart Cath;  Surgeon: Kumar" PATSY Randall MD;  Location[facility] CATH LAB;  Service: Cardiology;  Laterality: Left;    CHOLECYSTECTOMY      HYSTERECTOMY      knee repalcement       Family History   Problem Relation Age of Onset    Diabetes Mother     Hypertension Mother     Kidney disease Mother     Heart disease Father     Bipolar disorder Daughter      Social History     Tobacco Use    Smoking status: Never    Smokeless tobacco: Never   Substance Use Topics    Alcohol use: No    Drug use: No     Review of Systems   Constitutional:  Positive for fatigue and unexpected weight change. Negative for chills and fever.   HENT:  Negative for congestion, rhinorrhea and sore throat.    Eyes:  Negative for visual disturbance.   Respiratory:  Positive for shortness of breath. Negative for cough.    Cardiovascular:  Negative for chest pain.   Gastrointestinal:  Negative for abdominal pain, diarrhea, nausea and vomiting.   Genitourinary:  Negative for dysuria, frequency and hematuria.   Musculoskeletal:  Negative for back pain.   Skin:  Negative for rash.   Neurological:  Negative for dizziness, syncope, speech difficulty, weakness, numbness and headaches.     Physical Exam     Initial Vitals [05/24/23 1130]   BP Pulse Resp Temp SpO2   138/64 (!) 38 18 97.9 °F (36.6 °C) 97 %      MAP       --         Physical Exam    Nursing note and vitals reviewed.  Constitutional: She appears well-developed and well-nourished.   HENT:   Head: Normocephalic and atraumatic.   Eyes: EOM are normal. Pupils are equal, round, and reactive to light.   Neck: Neck supple. No thyromegaly present. No JVD present.   Normal range of motion.  Cardiovascular:  Regular rhythm.     Exam reveals no gallop and no friction rub.       No murmur heard.  Bradycardic w/ HR 39 bpm.    Pulmonary/Chest: Breath sounds normal. No respiratory distress.   Abdominal: Abdomen is soft. Bowel sounds are normal. There is no abdominal tenderness.   Musculoskeletal:         General: No tenderness or edema. Normal  range of motion.      Cervical back: Normal range of motion and neck supple.      Comments: No significant edema.      Neurological: She is alert and oriented to person, place, and time. She has normal strength. GCS score is 15. GCS eye subscore is 4. GCS verbal subscore is 5. GCS motor subscore is 6.   Skin: Skin is warm and dry. Capillary refill takes less than 2 seconds.   Psychiatric: She has a normal mood and affect.       ED Course   Procedures  Labs Reviewed   CBC W/ AUTO DIFFERENTIAL - Abnormal; Notable for the following components:       Result Value    MCH 26.8 (*)     RDW 14.8 (*)     All other components within normal limits   COMPREHENSIVE METABOLIC PANEL - Abnormal; Notable for the following components:    Albumin 3.2 (*)     All other components within normal limits   B-TYPE NATRIURETIC PEPTIDE - Abnormal; Notable for the following components:     (*)     All other components within normal limits   PROTIME-INR   TSH   TROPONIN I   MAGNESIUM   LACTIC ACID, PLASMA     EKG Readings: (Independently Interpreted)   Initial Reading: No STEMI. Rhythm: Sinus Arrhythmia. Heart Rate: 41. Ectopy: No Ectopy.     Imaging Results              X-Ray Chest AP Portable (Final result)  Result time 05/24/23 12:14:24      Final result by Amilcar Epperson III, MD (05/24/23 12:14:24)                   Impression:      No acute process seen.      Electronically signed by: Amilcar Epperson MD  Date:    05/24/2023  Time:    12:14               Narrative:    EXAMINATION:  XR CHEST AP PORTABLE    CLINICAL HISTORY:  bradycardia;    FINDINGS:  Chest one view portable.    There is borderline cardiomegaly.  Lungs are clear.  There is DJD and aortic plaque.                                       Medications - No data to display  Medical Decision Making:   History:   I obtained history from: someone other than patient and primary care / consultant.       <> Summary of History: Additional history is provided by independent  historian: primary care, medical record.   Old Medical Records: I decided to obtain old medical records.  Old Records Summarized: records from clinic visits and other records.       <> Summary of Records: External documents reviewed:  Primary care visit PTA today: 81 yo female presents for weight loss. States over the past 2 months her weight went from 200 to 190. She does states her appetite has decreased and and she want able to get the foods she like. Looking back in her record, she presented to primary care in January complaing of 10lb weight loss. Per our records from Feb 2023 to now she's gained 4 lbs. She states she has been eating better lately. She has chronic abdominal pain, takes bentyl prescribed by PCP with relief. Normal BMs and urination.   Pt presented today with bradycardia. She denies feeling dizzy. She state she is very tired all of the time but this is a chronic issue. She does get POWELL off and on. States it hasn't been bad today. She has a h/o bradycardia in the past where BB was d/cd. She is not currently on BB. Her BP was also low today. She states she took her BP meds this morning, stating she is on 2 meds but unsure which ones. She also states she has been having headaches. HR ranging 38-42 in clinic. Pt has been very fatigued. Recommend she be taken to ER for further testing. Recommended EMS multiple times, she refuses EMS. She called her sister who will transport her.     TTE 10/17/22:   · The estimated ejection fraction is 65%.  · The left ventricle is normal in size with concentric hypertrophy and normal systolic function.  · Moderate to severe left atrial enlargement.  · Grade I left ventricular diastolic dysfunction.  · Normal right ventricular size with normal right ventricular systolic function.  · Moderate right atrial enlargement.  · Intermediate central venous pressure (8 mmHg).  · The estimated PA systolic pressure is 28 mmHg.  Initial Assessment:   This is an emergent evaluation  of a 81 yo F who presents to the ED with acute bradycardia. She reports weight loss for the last 6 mo, in addition to worsening fatigue in the last few weeks. On exam, she is bradycardic with HR of 39 bpm, she has no significant edema. Will order labs, EKG, CXR, and reassess. I anticipate admission d/t persistent bradycardia, and for further management and workup with cardiology.   Differential Diagnosis:   Differential diagnosis include but are not limited to:   Medication side effect, polypharmacy, hypothyroidism, heart block/arrhythmia, electrolyte abnormality, myocardial infarction  Independently Interpreted Test(s):   I have ordered and independently interpreted EKG Reading(s) - see summary below       <> Summary of EKG Reading(s): EKG independently interpreted by me reads: see above for interpretation.   Clinical Tests:   Lab Tests: Ordered and Reviewed  Radiological Study: Ordered and Reviewed  Medical Tests: Ordered and Reviewed  ED Management:  I consulted Cardiology for the case. In shared decision making with Cardiology, Dr Mojica, I consulted and discussed the case with him. Galina recommended for the patient to see him in clinic. I doubt ortho stasis. I believe this patient requires further management with the hospital medicine team. I consulted and discussed with Gio FALLON from , and in shared decision making, we will place her to observation status. In shared decision making with the patient, The diagnosis, treatment and plan for observation were discussed with the patient and understanding was verbalized. All questions or concerns have been addressed.   3:44 PM - Shared decision making: Patient declined plan for observation. Will be discharged.   I discussed with the patient/family the diagnosis, treatment plan, indications for return to the emergency department, and for expected follow-up. The patient/family verbalized an understanding. The patient/family is asked if there are any questions or  "concerns. We discuss the case, until all issues are addressed to the patient/family's satisfaction. Patient/family understands and is agreeable to the plan.   Social determinants of health affecting care: none.        Pt reports 6 months weight loss of 24 lbs but slight increase in weight in the past month. Report intermittent fatigue and SOB during this time frame but has neither while in the ED today. Pt went to her Family Practice clinic today regarding her weight loss and was found to be bradycardic and sent into the ER for further evaluation. No syncope or near syncope or lightheadedness reported. No chest pain. Pt unable to name her medications but states she took them all regularly, including today. She does not have them with her nor does she have a list. Only rate controlling medications would potentially be norvasc and clonidine. I spoke with Dr. Mojica cardiology who feels he does not need to see the patient in the ER, nor does the patient need to stay in the hospital for overnight observation. He feels the patient can follow up in clinic.  This is based on her current asymptomatic bradycardia. Pt has been observed in the ER for 4 hours. No hypotension. HR mostly in the 40's. Lowest I have seen is 36. At times on the monitor I question afib with slow ventricular response but EKG appears sinus.  At no time have I seen complete heart block. On rechecks the patient denies chest pain and denies SOB and denies weakness. She is not orthostatic. I spoke with [facility] Medicine who agrees patient can follow up but recommends she hold her clonidine.  During my initial evaluation I mentioned that we may have to observe her overnight because her heart rate was low. Her response was "oh no I do not have to do that, I just had my heart checked not long ago".  Chart review shows Cardiac cath and echo in 10/2022.  After normal work up here and on final reassessment I asked if the patient wanted to go home and felt " "comfortable going home and following up with cardiology in clinic. She responded that she wanted to go home ans specifically mentioned she had a grandson coming in from [place]. However at discharge per nursing patient was upset that she was being discharged and states "I guess I'll just go to another hospital".   Pt exhibited no signs of dementia, confusion, nor difficulty hearing during my assessments to explain the unexpected response to discharge.      Scribe Attestation:   Scribe #1: I performed the above scribed service and the documentation accurately describes the services I performed. I attest to the accuracy of the note.                 I, Valentín Diane, personally performed the services described in this documentation. All medical record entries made by the scribe were at my direction and in my presence. I have reviewed the chart and agree that the record reflects my personal performance and is accurate and complete.    Clinical Impression:   Final diagnoses:  [R00.1] Bradycardia        ED Disposition Condition    Discharge Stable          ED Prescriptions    None       Follow-up Information       Follow up With Specialties Details Why Contact Info    Amish Mojica MD Cardiology, Interventional Cardiology Schedule an appointment as soon as possible for a visit   [address] [facility] [address]  [address]  [place] [address]  [phone]      [facility] [place] [facility] Emergency Medicine  As needed, If new symptoms develop [address]  [facility] [address] [phone]             Valentín Diane MD  05/24/23 1627    "

## 2023-05-24 NOTE — ED TRIAGE NOTES
Pt to ED via POV- sent by PCP for bradycardia. Pt reports she had a follow up visit scheduled this AM with PCP due to recent weight loss. Pt reports experiencing SOB, fatigue, and bradycardia also. HR runs between 35-40's. Denies any symptoms at this time. NADN. Vitals otherwise WNL.

## 2023-05-24 NOTE — TELEPHONE ENCOUNTER
"----- Message from Mere Mott sent at 2/4/2020  3:40 PM CST -----  Contact: pt  Name of Who is Calling: Jose Manuel Boyd    What is the request in detail: pt states that she need to be seen tomorrow. Declined appt with NP stating " I don't wanna see no nurse." Please contact to further discuss and advise.      Can the clinic reply by [facility]: n      What Number to Call Back if not in [facility]: [phone]    " electronic electronic electronic electronic electronic electronic electronic electronic electronic electronic electronic electronic electronic electronic

## 2023-05-26 ENCOUNTER — OFFICE VISIT (OUTPATIENT)
Dept: FAMILY MEDICINE | Facility: CLINIC | Age: 83
End: 2023-05-26
Payer: MEDICARE

## 2023-05-26 VITALS
HEIGHT: 60 IN | SYSTOLIC BLOOD PRESSURE: 162 MMHG | BODY MASS INDEX: 37.23 KG/M2 | DIASTOLIC BLOOD PRESSURE: 84 MMHG | HEART RATE: 76 BPM | OXYGEN SATURATION: 96 % | TEMPERATURE: 98 F | WEIGHT: 189.63 LBS

## 2023-05-26 DIAGNOSIS — J45.40 MODERATE PERSISTENT ASTHMA, UNSPECIFIED WHETHER COMPLICATED: ICD-10-CM

## 2023-05-26 DIAGNOSIS — I10 HTN (HYPERTENSION), BENIGN: Primary | ICD-10-CM

## 2023-05-26 DIAGNOSIS — R06.09 DOE (DYSPNEA ON EXERTION): ICD-10-CM

## 2023-05-26 DIAGNOSIS — I27.20 PULMONARY HTN: ICD-10-CM

## 2023-05-26 DIAGNOSIS — E66.01 MORBID OBESITY DUE TO EXCESS CALORIES: ICD-10-CM

## 2023-05-26 PROCEDURE — 1101F PT FALLS ASSESS-DOCD LE1/YR: CPT | Mod: CPTII,S$GLB,, | Performed by: NURSE PRACTITIONER

## 2023-05-26 PROCEDURE — 1159F MED LIST DOCD IN RCRD: CPT | Mod: CPTII,S$GLB,, | Performed by: NURSE PRACTITIONER

## 2023-05-26 PROCEDURE — 99999 PR PBB SHADOW E&M-EST. PATIENT-LVL V: CPT | Mod: PBBFAC,,, | Performed by: NURSE PRACTITIONER

## 2023-05-26 PROCEDURE — 1126F PR PAIN SEVERITY QUANTIFIED, NO PAIN PRESENT: ICD-10-PCS | Mod: CPTII,S$GLB,, | Performed by: NURSE PRACTITIONER

## 2023-05-26 PROCEDURE — 99214 PR OFFICE/OUTPT VISIT, EST, LEVL IV, 30-39 MIN: ICD-10-PCS | Mod: S$GLB,,, | Performed by: NURSE PRACTITIONER

## 2023-05-26 PROCEDURE — 99999 PR PBB SHADOW E&M-EST. PATIENT-LVL V: ICD-10-PCS | Mod: PBBFAC,,, | Performed by: NURSE PRACTITIONER

## 2023-05-26 PROCEDURE — 1159F PR MEDICATION LIST DOCUMENTED IN MEDICAL RECORD: ICD-10-PCS | Mod: CPTII,S$GLB,, | Performed by: NURSE PRACTITIONER

## 2023-05-26 PROCEDURE — 3288F FALL RISK ASSESSMENT DOCD: CPT | Mod: CPTII,S$GLB,, | Performed by: NURSE PRACTITIONER

## 2023-05-26 PROCEDURE — 1101F PR PT FALLS ASSESS DOC 0-1 FALLS W/OUT INJ PAST YR: ICD-10-PCS | Mod: CPTII,S$GLB,, | Performed by: NURSE PRACTITIONER

## 2023-05-26 PROCEDURE — 3077F PR MOST RECENT SYSTOLIC BLOOD PRESSURE >= 140 MM HG: ICD-10-PCS | Mod: CPTII,S$GLB,, | Performed by: NURSE PRACTITIONER

## 2023-05-26 PROCEDURE — 3077F SYST BP >= 140 MM HG: CPT | Mod: CPTII,S$GLB,, | Performed by: NURSE PRACTITIONER

## 2023-05-26 PROCEDURE — 99499 RISK ADDL DX/OHS AUDIT: ICD-10-PCS | Mod: S$GLB,,, | Performed by: NURSE PRACTITIONER

## 2023-05-26 PROCEDURE — 3079F DIAST BP 80-89 MM HG: CPT | Mod: CPTII,S$GLB,, | Performed by: NURSE PRACTITIONER

## 2023-05-26 PROCEDURE — 99214 OFFICE O/P EST MOD 30 MIN: CPT | Mod: S$GLB,,, | Performed by: NURSE PRACTITIONER

## 2023-05-26 PROCEDURE — 3288F PR FALLS RISK ASSESSMENT DOCUMENTED: ICD-10-PCS | Mod: CPTII,S$GLB,, | Performed by: NURSE PRACTITIONER

## 2023-05-26 PROCEDURE — 1160F RVW MEDS BY RX/DR IN RCRD: CPT | Mod: CPTII,S$GLB,, | Performed by: NURSE PRACTITIONER

## 2023-05-26 PROCEDURE — 1160F PR REVIEW ALL MEDS BY PRESCRIBER/CLIN PHARMACIST DOCUMENTED: ICD-10-PCS | Mod: CPTII,S$GLB,, | Performed by: NURSE PRACTITIONER

## 2023-05-26 PROCEDURE — 99499 UNLISTED E&M SERVICE: CPT | Mod: S$GLB,,, | Performed by: NURSE PRACTITIONER

## 2023-05-26 PROCEDURE — 3079F PR MOST RECENT DIASTOLIC BLOOD PRESSURE 80-89 MM HG: ICD-10-PCS | Mod: CPTII,S$GLB,, | Performed by: NURSE PRACTITIONER

## 2023-05-26 PROCEDURE — 1126F AMNT PAIN NOTED NONE PRSNT: CPT | Mod: CPTII,S$GLB,, | Performed by: NURSE PRACTITIONER

## 2023-05-26 NOTE — PROGRESS NOTES
"Chief Complaint  Chief Complaint   Patient presents with    Follow-up     ER       HPI    HPI   Ms. Jose Manuel Boyd is a 82 y.o. female with medical problems as listed below. The patient presents to clinic for ED follow up. ED course as follows:     Bradycardia       Pt sent to ED from PCP office for bradycardia. Pt reports fatigue and increased weight loss. Denies any CP       81 yo F with a PMHx of anxiety, asthma, depression, hypertension, hypercholesterolemia, presents to the ED for evaluation of bradycardia. Addisonia history provided by independent historian, the primary care office, report she was in clinic this morning, when they performed an EKG that showed junctional bradycardia. She was sent here for further evaluation and workup. Upon arrival to the ED, she was bradycardic at 37 bpm. She reports she has been feeling more fatigued and dyspneic for the last couple of months. She further notes unintentional weight loss of 24lbs onset 6 mo ago, but started gaining back the weight 2 mo ago. She attributes this to "depression and stress". She initially scheduled her PCP appointment for her weight loss. No other exacerbating or alleviating factors. Denies weakness, lightheadedness, dyspnea, near syncope, or other associated symptoms. She is not on any significant calcium channel blockers causing severe lowering of her heart rate. She denies any h/o Afib.     ED Course   Procedures        Labs Reviewed   CBC W/ AUTO DIFFERENTIAL - Abnormal; Notable for the following components:       Result Value      MCH 26.8 (*)       RDW 14.8 (*)       All other components within normal limits   COMPREHENSIVE METABOLIC PANEL - Abnormal; Notable for the following components:     Albumin 3.2 (*)       All other components within normal limits   B-TYPE NATRIURETIC PEPTIDE - Abnormal; Notable for the following components:      (*)       All other components within normal limits   PROTIME-INR   TSH   TROPONIN I "   MAGNESIUM   LACTIC ACID, PLASMA      EKG Readings: (Independently Interpreted)   Initial Reading: No STEMI. Rhythm: Sinus Arrhythmia. Heart Rate: 41. Ectopy: No Ectopy.      Imaging Results                  X-Ray Chest AP Portable (Final result)  Result time 05/24/23 12:14:24            Final result by Amilcar Epperson III, MD (05/24/23 12:14:24)                           Impression:        No acute process seen.        Electronically signed by:        Amilcar Epperson MD  Date:                                        05/24/2023  Time:                                                12:14                     Narrative:     EXAMINATION:  XR CHEST AP PORTABLE     CLINICAL HISTORY:  bradycardia;     FINDINGS:  Chest one view portable.     There is borderline cardiomegaly.  Lungs are clear.  There is DJD and aortic plaque.                                               Medications - No data to display  Medical Decision Making:   History:   I obtained history from: someone other than patient and primary care / consultant.       <> Summary of History: Additional history is provided by independent historian: primary care, medical record.   Old Medical Records: I decided to obtain old medical records.  Old Records Summarized: records from clinic visits and other records.       <> Summary of Records: External documents reviewed:  Primary care visit PTA today: 81 yo female presents for weight loss. States over the past 2 months her weight went from 200 to 190. She does states her appetite has decreased and and she want able to get the foods she like. Looking back in her record, she presented to primary care in January complaing of 10lb weight loss. Per our records from Feb 2023 to now she's gained 4 lbs. She states she has been eating better lately. She has chronic abdominal pain, takes bentyl prescribed by PCP with relief. Normal BMs and urination.   Pt presented today with bradycardia. She denies feeling dizzy. She state she  is very tired all of the time but this is a chronic issue. She does get POWELL off and on. States it hasn't been bad today. She has a h/o bradycardia in the past where BB was d/cd. She is not currently on BB. Her BP was also low today. She states she took her BP meds this morning, stating she is on 2 meds but unsure which ones. She also states she has been having headaches. HR ranging 38-42 in clinic. Pt has been very fatigued. Recommend she be taken to ER for further testing. Recommended EMS multiple times, she refuses EMS. She called her sister who will transport her.      TTE 10/17/22:   ·           The estimated ejection fraction is 65%.  ·           The left ventricle is normal in size with concentric hypertrophy and normal systolic function.  ·           Moderate to severe left atrial enlargement.  ·           Grade I left ventricular diastolic dysfunction.  ·           Normal right ventricular size with normal right ventricular systolic function.  ·           Moderate right atrial enlargement.  ·           Intermediate central venous pressure (8 mmHg).  ·           The estimated PA systolic pressure is 28 mmHg.  Initial Assessment:   This is an emergent evaluation of a 83 yo F who presents to the ED with acute bradycardia. She reports weight loss for the last 6 mo, in addition to worsening fatigue in the last few weeks. On exam, she is bradycardic with HR of 39 bpm, she has no significant edema. Will order labs, EKG, CXR, and reassess. I anticipate admission d/t persistent bradycardia, and for further management and workup with cardiology.   Differential Diagnosis:   Differential diagnosis include but are not limited to:   Medication side effect, polypharmacy, hypothyroidism, heart block/arrhythmia, electrolyte abnormality, myocardial infarction  Independently Interpreted Test(s):   I have ordered and independently interpreted EKG Reading(s) - see summary below       <> Summary of EKG Reading(s): EKG  independently interpreted by me reads: see above for interpretation.   Clinical Tests:   Lab Tests: Ordered and Reviewed  Radiological Study: Ordered and Reviewed  Medical Tests: Ordered and Reviewed  ED Management:  I consulted Cardiology for the case. In shared decision making with Cardiology, Dr Mojica, I consulted and discussed the case with him. Galina recommended for the patient to see him in clinic. I doubt ortho stasis. I believe this patient requires further management with the hospital medicine team. I consulted and discussed with Gio FALLON from , and in shared decision making, we will place her to observation status. In shared decision making with the patient, The diagnosis, treatment and plan for observation were discussed with the patient and understanding was verbalized. All questions or concerns have been addressed.   3:44 PM - Shared decision making: Patient declined plan for observation. Will be discharged.   I discussed with the patient/family the diagnosis, treatment plan, indications for return to the emergency department, and for expected follow-up. The patient/family verbalized an understanding. The patient/family is asked if there are any questions or concerns. We discuss the case, until all issues are addressed to the patient/family's satisfaction. Patient/family understands and is agreeable to the plan.   Social determinants of health affecting care: none.         Pt reports 6 months weight loss of 24 lbs but slight increase in weight in the past month. Report intermittent fatigue and SOB during this time frame but has neither while in the ED today. Pt went to her Family Practice clinic today regarding her weight loss and was found to be bradycardic and sent into the ER for further evaluation. No syncope or near syncope or lightheadedness reported. No chest pain. Pt unable to name her medications but states she took them all regularly, including today. She does not have them with her nor  "does she have a list. Only rate controlling medications would potentially be norvasc and clonidine. I spoke with Dr. Mojica cardiology who feels he does not need to see the patient in the ER, nor does the patient need to stay in the hospital for overnight observation. He feels the patient can follow up in clinic.  This is based on her current asymptomatic bradycardia. Pt has been observed in the ER for 4 hours. No hypotension. HR mostly in the 40's. Lowest I have seen is 36. At times on the monitor I question afib with slow ventricular response but EKG appears sinus.  At no time have I seen complete heart block. On rechecks the patient denies chest pain and denies SOB and denies weakness. She is not orthostatic. I spoke with [facility] who agrees patient can follow up but recommends she hold her clonidine.  During my initial evaluation I mentioned that we may have to observe her overnight because her heart rate was low. Her response was "oh no I do not have to do that, I just had my heart checked not long ago".  Chart review shows Cardiac cath and echo in 10/2022.  After normal work up here and on final reassessment I asked if the patient wanted to go home and felt comfortable going home and following up with cardiology in clinic. She responded that she wanted to go home ans specifically mentioned she had a grandson coming in from [place]. However at discharge per nursing patient was upset that she was being discharged and states "I guess I'll just go to another hospital".   Pt exhibited no signs of dementia, confusion, nor difficulty hearing during my assessments to explain the unexpected response to discharge.      Since discharge:    The patient states she has been doing ok since her visit to the ED. She states she has not been assess her HR at home but does check her BP. She states she has still been elevated. She has  been taking the losartan 50 mg (cards wanted her to be on 100 mg but she could " not tolerate) and has been taking the norvasc 10 mg.     She was supposed to follow up with cardiology but has not done so yet as. Lat visit with cards was 11/2022. She was supposed to follow back up with them a month after that visit bit did not do so.      PAST MEDICAL HISTORY:  Past Medical History:   Diagnosis Date    Anxiety     Asthma     Depression     Headache     Hx of psychiatric care     Hypercholesterolemia     Hypertension     Psychiatric problem     Sleep difficulties     Therapy     Thyroid disease     multiple nodules       PAST SURGICAL HISTORY:  Past Surgical History:   Procedure Laterality Date    ANGIOGRAM, CORONARY, WITH LEFT HEART CATHETERIZATION Left 10/18/2022    Procedure: Angiogram, Coronary, with Left Heart Cath;  Surgeon: Kumar Randall MD;  Location: [facility] CATH LAB;  Service: Cardiology;  Laterality: Left;    CHOLECYSTECTOMY      HYSTERECTOMY      knee repalcement         SOCIAL HISTORY:  Social History     Socioeconomic History    Marital status:     Number of children: 4   Occupational History    Occupation: retired     Comment: Domestic service, [employer]   Tobacco Use    Smoking status: Never    Smokeless tobacco: Never   Substance and Sexual Activity    Alcohol use: No    Drug use: No    Sexual activity: Not Currently   Other Topics Concern    Patient feels they ought to cut down on drinking/drug use No    Patient annoyed by others criticizing their drinking/drug use No    Patient has felt bad or guilty about drinking/drug use No    Patient has had a drink/used drugs as an eye opener in the AM No   Social History Narrative    Enjoys going to [employer]     Social Determinants of Health     Financial Resource Strain: Low Risk     Difficulty of Paying Living Expenses: Not very hard   Food Insecurity: No Food Insecurity    Worried About Running Out of Food in the Last Year: Never true    Ran Out of Food in the Last Year: Never true   Transportation Needs: No Transportation Needs     Lack of Transportation (Medical): No    Lack of Transportation (Non-Medical): No   Physical Activity: Inactive    Days of Exercise per Week: 0 days    Minutes of Exercise per Session: 0 min   Stress: Stress Concern Present    Feeling of Stress : To some extent   Social Connections: Moderately Isolated    Frequency of Communication with Friends and Family: More than three times a week    Frequency of Social Gatherings with Friends and Family: More than three times a week    Attends Rastafari Services: 1 to 4 times per year    Active Member of Clubs or Organizations: No    Attends Club or Organization Meetings: Never    Marital Status:    Housing Stability: Low Risk     Unable to Pay for Housing in the Last Year: No    Number of Places Lived in the Last Year: 1    Unstable Housing in the Last Year: No       FAMILY HISTORY:  Family History   Problem Relation Age of Onset    Diabetes Mother     Hypertension Mother     Kidney disease Mother     Heart disease Father     Bipolar disorder Daughter        ALLERGIES AND MEDICATIONS: updated and reviewed.  Review of patient's allergies indicates:   Allergen Reactions    Codeine      Other reaction(s): Rash     Current Outpatient Medications   Medication Sig Dispense Refill    acetaminophen (TYLENOL) 325 MG tablet Take 2 tablets (650 mg total) by mouth every 6 (six) hours as needed for Pain. 60 tablet 0    albuterol (VENTOLIN HFA) 90 mcg/actuation inhaler Inhale 2 puffs into the lungs every 4 (four) hours as needed for Wheezing. 6.7 g 6    amLODIPine (NORVASC) 10 MG tablet Take 1 tablet (10 mg total) by mouth once daily. 90 tablet 3    ascorbic acid, vitamin C, (VITAMIN C) 1000 MG tablet Take 1,000 mg by mouth once daily.      aspirin (ECOTRIN) 81 MG EC tablet Take 81 mg by mouth once daily.      atorvastatin (LIPITOR) 40 MG tablet TAKE 1 TABLET BY MOUTH EVERY DAY 90 tablet 3    azelastine (ASTELIN) 137 mcg (0.1 %) nasal spray 1 spray (137 mcg total) by Nasal route 2  (two) times daily. 30 mL 3    clonazePAM (KLONOPIN) 0.5 MG tablet Take 1 tablet (0.5 mg total) by mouth 2 (two) times daily as needed for Anxiety. 30 tablet 0    cloNIDine (CATAPRES) 0.1 MG tablet Take 1 tablet (0.1 mg total) by mouth 2 (two) times daily. 180 tablet 3    cyanocobalamin (VITAMIN B-12) 1000 MCG tablet Take 100 mcg by mouth once daily.      dicyclomine (BENTYL) 20 mg tablet TAKE 1 TABLET BY MOUTH TWICE A DAY AS NEEDED FOR ADOMINAL PAIN 180 tablet 0    fluticasone propionate (FLONASE) 50 mcg/actuation nasal spray 1 spray (50 mcg total) by Each Nostril route 2 (two) times daily. For allergic rhinitis/sinusitis 18 mL 11    furosemide (LASIX) 20 MG tablet TAKE 1 TABLET BY MOUTH EVERY DAY 90 tablet 2    losartan (COZAAR) 50 MG tablet Take 1 tablet (50 mg total) by mouth once daily. For high blood pressure 90 tablet 3    mirtazapine (REMERON) 30 MG tablet TAKE 1 TABLET BY MOUTH NIGHTLY AT BEDTIME AS NEEDED FOR INSOMNIA , SLEEP, ANXIETY AND DEPRESSION. 90 tablet 1    multivitamin with minerals tablet Take 1 tablet by mouth once daily.      naphazoline HCl/pheniramine (EYE ALLERGY RELIEF OPHT) Apply to eye.      olopatadine (PATANOL) 0.1 % ophthalmic solution Place 1 drop into both eyes 2 (two) times daily.      omega-3 fatty acids/fish oil (FISH OIL-OMEGA-3 FATTY ACIDS) 300-1,000 mg capsule Take by mouth once daily.      omeprazole (PRILOSEC) 20 MG capsule Take 1 capsule (20 mg total) by mouth once daily. For gastric reflux 90 capsule 3    psyllium (METAMUCIL) powder Take 1 packet by mouth daily as needed.      busPIRone (BUSPAR) 7.5 MG tablet TAKE 1 TABLET (7.5 MG TOTAL) BY MOUTH 2 (TWO) TIMES DAILY. 180 tablet 1    fluticasone-salmeterol diskus inhaler 500-50 mcg Inhale 1 puff into the lungs 2 (two) times daily. Controller 60 each 11     No current facility-administered medications for this visit.       Patient Care Team:  Ajit Piña MD as PCP - General (Internal Medicine)  Federico Madrigal MD as  Consulting Physician (Psychiatry)  Srikanth Borden MD as Consulting Physician (Neurology)  Briana Hogan MA as Care Coordinator  [facility] (Diagnostic Radiology)  Libertad Cali as Community Health Worker    ROS  Review of Systems   Constitutional:  Positive for fatigue. Negative for chills, fever and unexpected weight change.   HENT:  Negative for congestion, ear discharge, ear pain and postnasal drip.    Eyes:  Negative for photophobia, pain and visual disturbance.   Respiratory:  Positive for shortness of breath (chronic). Negative for cough and wheezing.    Cardiovascular:  Negative for chest pain, palpitations and leg swelling.   Gastrointestinal:  Negative for abdominal pain, constipation, diarrhea, nausea and vomiting.   Genitourinary:  Negative for dysuria, frequency, urgency and vaginal discharge.   Musculoskeletal:  Negative for back pain, joint swelling and neck stiffness.   Skin:  Negative for rash.   Neurological:  Negative for weakness and headaches.   Psychiatric/Behavioral:  Negative for dysphoric mood and sleep disturbance. The patient is not nervous/anxious.          Physical Exam  Vitals:    05/26/23 0859   BP: (!) 162/84   BP Location: Right arm   Patient Position: Sitting   BP Method: Large (Manual)   Pulse: 76   Temp: 98.3 °F (36.8 °C)   TempSrc: Oral   SpO2: 96%   Weight: 86 kg (189 lb 9.5 oz)   Height: 5' (1.524 m)    Body mass index is 37.03 kg/m².  Weight: 86 kg (189 lb 9.5 oz)   Height: 5' (152.4 cm)     Physical Exam  Constitutional:       Appearance: She is well-developed.   HENT:      Head: Normocephalic and atraumatic.      Right Ear: External ear normal.      Left Ear: External ear normal.      Nose: Nose normal.   Eyes:      Extraocular Movements: Extraocular movements intact.   Cardiovascular:      Rate and Rhythm: Normal rate. Extrasystoles are present.  Pulmonary:      Effort: Pulmonary effort is normal.   Musculoskeletal:         General: Normal range of  motion.      Cervical back: Normal range of motion.   Skin:     General: Skin is warm and dry.   Neurological:      Mental Status: She is alert and oriented to person, place, and time.   Psychiatric:         Behavior: Behavior normal.           Health Maintenance         Date Due Completion Date    Shingles Vaccine (2 of 3) 06/29/2016 5/4/2016    Pneumococcal Vaccines (Age 65+) (2 - PPSV23 if available, else PCV20) 07/07/2017 5/12/2017    DEXA Scan 05/26/2020 5/26/2017    COVID-19 Vaccine (4 - Booster for Pfizer series) 10/13/2021 8/18/2021    Hemoglobin A1c (Prediabetes) 08/15/2023 8/15/2022    TETANUS VACCINE 05/12/2027 5/12/2017    Lipid Panel 08/15/2027 8/15/2022    Override on 4/23/2015: Done          Health maintenance reviewed at this time.    Assessment & Plan  HTN (hypertension), benign  Elevated in clinic. Has been taking medication as prescribed. Would benefit from increase in losartan but she does not want to as she has tried in the past and had unwanted side effects.     Followed up with cardiology.    Pulmonary HTN  Follow up with cardiology.    Moderate persistent asthma, unspecified whether complicated  The current medical regimen is effective;  continue present plan and medications.    POWELL (dyspnea on exertion)  The current medical regimen is effective;  continue present plan and medications.    Morbid obesity due to excess calories  The patient is asked to make an attempt to improve diet and exercise patterns to aid in medical management of this problem.     I have reviewed the following:     Details / Date    [x]   Labs 5/24- cbc, cmp PT-INR, TSH, troponin, BNP, mag, lactic acid    []   Micro     []   Pathology     [x]   Imaging 5-24 Chest xray    [x]   Cardiology Procedures 5/24 EKG 10/16/2022- echo    []   Other           Follow-up: Follow up in about 1 month (around 6/26/2023) for with cardiology.

## 2023-06-01 ENCOUNTER — OFFICE VISIT (OUTPATIENT)
Dept: CARDIOLOGY | Facility: CLINIC | Age: 83
End: 2023-06-01
Payer: MEDICARE

## 2023-06-01 VITALS
SYSTOLIC BLOOD PRESSURE: 136 MMHG | BODY MASS INDEX: 35.38 KG/M2 | RESPIRATION RATE: 15 BRPM | HEART RATE: 80 BPM | OXYGEN SATURATION: 96 % | WEIGHT: 187.38 LBS | DIASTOLIC BLOOD PRESSURE: 78 MMHG | HEIGHT: 61 IN

## 2023-06-01 DIAGNOSIS — Z86.73 H/O: STROKE: ICD-10-CM

## 2023-06-01 DIAGNOSIS — I10 PRIMARY HYPERTENSION: Primary | ICD-10-CM

## 2023-06-01 DIAGNOSIS — I27.20 PULMONARY HTN: ICD-10-CM

## 2023-06-01 DIAGNOSIS — R00.1 BRADYCARDIA: ICD-10-CM

## 2023-06-01 DIAGNOSIS — I70.0 AORTIC ATHEROSCLEROSIS: ICD-10-CM

## 2023-06-01 DIAGNOSIS — E66.01 SEVERE OBESITY (BMI 35.0-39.9) WITH COMORBIDITY: ICD-10-CM

## 2023-06-01 DIAGNOSIS — E78.2 MIXED HYPERLIPIDEMIA: ICD-10-CM

## 2023-06-01 PROCEDURE — 99214 PR OFFICE/OUTPT VISIT, EST, LEVL IV, 30-39 MIN: ICD-10-PCS | Mod: S$GLB,,, | Performed by: INTERNAL MEDICINE

## 2023-06-01 PROCEDURE — 1159F PR MEDICATION LIST DOCUMENTED IN MEDICAL RECORD: ICD-10-PCS | Mod: CPTII,S$GLB,, | Performed by: INTERNAL MEDICINE

## 2023-06-01 PROCEDURE — 1126F PR PAIN SEVERITY QUANTIFIED, NO PAIN PRESENT: ICD-10-PCS | Mod: CPTII,S$GLB,, | Performed by: INTERNAL MEDICINE

## 2023-06-01 PROCEDURE — 3075F SYST BP GE 130 - 139MM HG: CPT | Mod: CPTII,S$GLB,, | Performed by: INTERNAL MEDICINE

## 2023-06-01 PROCEDURE — 99999 PR PBB SHADOW E&M-EST. PATIENT-LVL V: ICD-10-PCS | Mod: PBBFAC,,, | Performed by: INTERNAL MEDICINE

## 2023-06-01 PROCEDURE — 99214 OFFICE O/P EST MOD 30 MIN: CPT | Mod: S$GLB,,, | Performed by: INTERNAL MEDICINE

## 2023-06-01 PROCEDURE — 1159F MED LIST DOCD IN RCRD: CPT | Mod: CPTII,S$GLB,, | Performed by: INTERNAL MEDICINE

## 2023-06-01 PROCEDURE — 3078F PR MOST RECENT DIASTOLIC BLOOD PRESSURE < 80 MM HG: ICD-10-PCS | Mod: CPTII,S$GLB,, | Performed by: INTERNAL MEDICINE

## 2023-06-01 PROCEDURE — 3075F PR MOST RECENT SYSTOLIC BLOOD PRESS GE 130-139MM HG: ICD-10-PCS | Mod: CPTII,S$GLB,, | Performed by: INTERNAL MEDICINE

## 2023-06-01 PROCEDURE — 99999 PR PBB SHADOW E&M-EST. PATIENT-LVL V: CPT | Mod: PBBFAC,,, | Performed by: INTERNAL MEDICINE

## 2023-06-01 PROCEDURE — 3078F DIAST BP <80 MM HG: CPT | Mod: CPTII,S$GLB,, | Performed by: INTERNAL MEDICINE

## 2023-06-01 PROCEDURE — 1126F AMNT PAIN NOTED NONE PRSNT: CPT | Mod: CPTII,S$GLB,, | Performed by: INTERNAL MEDICINE
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
## 2023-06-26 ENCOUNTER — PATIENT OUTREACH (OUTPATIENT)
Dept: ADMINISTRATIVE | Facility: OTHER | Age: 83
End: 2023-06-26
Payer: MEDICARE

## 2023-06-26 NOTE — PROGRESS NOTES
CHW - Case Closure    This Community Health Worker spoke to patient via telephone today.   Pt reported: Patient has no needs at this time, but she want [facility] transportation number only.  Pt denied any additional needs at this time and agrees with episode closure at this time.  Provided patient with Community Health Worker's contact information and encouraged her to contact this Community Health Worker if additional needs arise.

## 2023-06-27 ENCOUNTER — OFFICE VISIT (OUTPATIENT)
Dept: FAMILY MEDICINE | Facility: CLINIC | Age: 83
End: 2023-06-27
Payer: MEDICARE

## 2023-06-27 VITALS
TEMPERATURE: 98 F | HEIGHT: 61 IN | DIASTOLIC BLOOD PRESSURE: 70 MMHG | WEIGHT: 196.44 LBS | OXYGEN SATURATION: 95 % | SYSTOLIC BLOOD PRESSURE: 100 MMHG | RESPIRATION RATE: 18 BRPM | HEART RATE: 68 BPM | BODY MASS INDEX: 37.09 KG/M2

## 2023-06-27 DIAGNOSIS — R73.01 IMPAIRED FASTING GLUCOSE: ICD-10-CM

## 2023-06-27 DIAGNOSIS — I10 HTN (HYPERTENSION), BENIGN: Primary | ICD-10-CM

## 2023-06-27 PROCEDURE — 3078F DIAST BP <80 MM HG: CPT | Mod: CPTII,S$GLB,, | Performed by: INTERNAL MEDICINE

## 2023-06-27 PROCEDURE — 1101F PT FALLS ASSESS-DOCD LE1/YR: CPT | Mod: CPTII,S$GLB,, | Performed by: INTERNAL MEDICINE

## 2023-06-27 PROCEDURE — 1159F PR MEDICATION LIST DOCUMENTED IN MEDICAL RECORD: ICD-10-PCS | Mod: CPTII,S$GLB,, | Performed by: INTERNAL MEDICINE

## 2023-06-27 PROCEDURE — 3288F PR FALLS RISK ASSESSMENT DOCUMENTED: ICD-10-PCS | Mod: CPTII,S$GLB,, | Performed by: INTERNAL MEDICINE

## 2023-06-27 PROCEDURE — 1126F PR PAIN SEVERITY QUANTIFIED, NO PAIN PRESENT: ICD-10-PCS | Mod: CPTII,S$GLB,, | Performed by: INTERNAL MEDICINE

## 2023-06-27 PROCEDURE — 99213 PR OFFICE/OUTPT VISIT, EST, LEVL III, 20-29 MIN: ICD-10-PCS | Mod: S$GLB,,, | Performed by: INTERNAL MEDICINE

## 2023-06-27 PROCEDURE — 3288F FALL RISK ASSESSMENT DOCD: CPT | Mod: CPTII,S$GLB,, | Performed by: INTERNAL MEDICINE

## 2023-06-27 PROCEDURE — 99999 PR PBB SHADOW E&M-EST. PATIENT-LVL V: ICD-10-PCS | Mod: PBBFAC,,, | Performed by: INTERNAL MEDICINE

## 2023-06-27 PROCEDURE — 1101F PR PT FALLS ASSESS DOC 0-1 FALLS W/OUT INJ PAST YR: ICD-10-PCS | Mod: CPTII,S$GLB,, | Performed by: INTERNAL MEDICINE

## 2023-06-27 PROCEDURE — 1126F AMNT PAIN NOTED NONE PRSNT: CPT | Mod: CPTII,S$GLB,, | Performed by: INTERNAL MEDICINE

## 2023-06-27 PROCEDURE — 3074F SYST BP LT 130 MM HG: CPT | Mod: CPTII,S$GLB,, | Performed by: INTERNAL MEDICINE

## 2023-06-27 PROCEDURE — 1160F PR REVIEW ALL MEDS BY PRESCRIBER/CLIN PHARMACIST DOCUMENTED: ICD-10-PCS | Mod: CPTII,S$GLB,, | Performed by: INTERNAL MEDICINE

## 2023-06-27 PROCEDURE — 99213 OFFICE O/P EST LOW 20 MIN: CPT | Mod: S$GLB,,, | Performed by: INTERNAL MEDICINE

## 2023-06-27 PROCEDURE — 99999 PR PBB SHADOW E&M-EST. PATIENT-LVL V: CPT | Mod: PBBFAC,,, | Performed by: INTERNAL MEDICINE

## 2023-06-27 PROCEDURE — 1159F MED LIST DOCD IN RCRD: CPT | Mod: CPTII,S$GLB,, | Performed by: INTERNAL MEDICINE

## 2023-06-27 PROCEDURE — 3074F PR MOST RECENT SYSTOLIC BLOOD PRESSURE < 130 MM HG: ICD-10-PCS | Mod: CPTII,S$GLB,, | Performed by: INTERNAL MEDICINE

## 2023-06-27 PROCEDURE — 1160F RVW MEDS BY RX/DR IN RCRD: CPT | Mod: CPTII,S$GLB,, | Performed by: INTERNAL MEDICINE

## 2023-06-27 PROCEDURE — 3078F PR MOST RECENT DIASTOLIC BLOOD PRESSURE < 80 MM HG: ICD-10-PCS | Mod: CPTII,S$GLB,, | Performed by: INTERNAL MEDICINE

## 2023-06-27 NOTE — PROGRESS NOTES
"Subjective:       Patient ID: Jose Manuel Boyd is a 82 y.o. female.    Chief Complaint: Follow-up (4 month f/u)    F/u chronic conditions    HPI: 81 y/o w/ HTN pre diabetes (diet controlled) insomnia presents alone for scheduled follow up. Doing "okay" just completed visit from her son and grand children/great grandchildren enjoyed visiting with them planning trip to see daughter in [place] in August of this year. No LE swelling breathign "fine" weight stable no change in bowels    Review of Systems   Constitutional:  Negative for activity change, appetite change, fatigue, fever and unexpected weight change.   HENT:  Negative for ear pain, rhinorrhea and sore throat.    Eyes:  Negative for discharge and visual disturbance.   Respiratory:  Negative for chest tightness, shortness of breath and wheezing.    Cardiovascular:  Negative for chest pain, palpitations and leg swelling.   Gastrointestinal:  Negative for abdominal pain, constipation and diarrhea.   Endocrine: Negative for cold intolerance and heat intolerance.   Genitourinary:  Negative for dysuria and hematuria.   Musculoskeletal:  Negative for joint swelling and neck stiffness.   Skin:  Negative for rash.   Neurological:  Negative for dizziness, syncope, weakness and headaches.   Psychiatric/Behavioral:  Negative for suicidal ideas.      Objective:     Vitals:    06/27/23 1411   BP: 100/70   Pulse: 68   Resp: 18   Temp: 98.2 °F (36.8 °C)   TempSrc: Oral   SpO2: 95%   Weight: 89.1 kg (196 lb 6.9 oz)   Height: 5' 1" (1.549 m)          Physical Exam  Constitutional:       Appearance: She is well-developed.   HENT:      Head: Normocephalic and atraumatic.   Eyes:      Conjunctiva/sclera: Conjunctivae normal.   Cardiovascular:      Rate and Rhythm: Normal rate and regular rhythm.      Heart sounds: No murmur heard.    No friction rub. No gallop.   Pulmonary:      Effort: Pulmonary effort is normal.      Breath sounds: Normal breath sounds. No wheezing or rales. "   Abdominal:      Palpations: Abdomen is soft.      Tenderness: There is no abdominal tenderness. There is no right CVA tenderness, left CVA tenderness, guarding or rebound.   Musculoskeletal:         General: No tenderness. Normal range of motion.      Cervical back: Normal range of motion.      Right lower leg: No edema.      Left lower leg: No edema.   Skin:     General: Skin is warm and dry.   Neurological:      Mental Status: She is alert and oriented to person, place, and time.      Cranial Nerves: No cranial nerve deficit.       Assessment and Plan   1. HTN (hypertension), benign  BP at goal labs from recent ED visit reviewed no abnormalities repeat electrolytes and renal function in four months  - CBC Auto Differential; Future  - Comprehensive Metabolic Panel; Future    2. Impaired fasting glucose  Diet controlled repeat a1c in four months  - Hemoglobin A1C; Future

## 2023-07-31 DIAGNOSIS — K57.32 DIVERTICULITIS OF COLON: ICD-10-CM

## 2023-07-31 RX ORDER — DICYCLOMINE HYDROCHLORIDE 20 MG/1
20 TABLET ORAL 2 TIMES DAILY PRN
Qty: 180 TABLET | Refills: 0 | Status: SHIPPED | OUTPATIENT
Start: 2023-07-31

## 2023-09-06 DIAGNOSIS — I10 ESSENTIAL HYPERTENSION: ICD-10-CM

## 2023-09-06 RX ORDER — CLONIDINE HYDROCHLORIDE 0.1 MG/1
0.1 TABLET ORAL 2 TIMES DAILY
Qty: 180 TABLET | Refills: 3 | Status: SHIPPED | OUTPATIENT
Start: 2023-09-06

## 2023-09-18 ENCOUNTER — TELEPHONE (OUTPATIENT)
Dept: FAMILY MEDICINE | Facility: CLINIC | Age: 83
End: 2023-09-18
Payer: MEDICARE

## 2023-09-18 NOTE — TELEPHONE ENCOUNTER
----- Message from Cheri Olvera sent at 9/18/2023  9:01 AM CDT -----  Type: Patient Call Back    Who called: self     What is the request in detail: PT asking to be seen tomorrow / due to symptom screening  Symptom: Abdominal Pain - Female - Not Pregnant  Outcome: Schedule an appointment to be seen within 24 hours.  Reason: Caller denied all higher acuity questions    Can the clinic reply by [facility]? No     Would the patient rather a call back or a response via [facility]? CALL     Best call back number: [phone] (home)

## 2023-09-18 NOTE — TELEPHONE ENCOUNTER
Spoke with patient and was not able to make her appointment today and would like to reschedule with her provider. Appointment reschedule with her provider for 9/20/2023.

## 2023-09-18 NOTE — TELEPHONE ENCOUNTER
----- Message from Suzanne Gentile sent at 9/18/2023 10:13 AM CDT -----  Regarding: [id number]  Type: Patient Call Back    Who called:self     What is the request in detail: pt states she will like a call back from the nurse in regards to rescheduling her upcoming visit for today     Can the clinic reply by [facility]?no     Would the patient rather a call back or a response via [facility]? Call back     Best call back number:[phone] or [phone]      Additional Information:tried to reschedule pt but provider is booked for the rest of the year on my end

## 2023-09-20 ENCOUNTER — OFFICE VISIT (OUTPATIENT)
Dept: FAMILY MEDICINE | Facility: CLINIC | Age: 83
End: 2023-09-20
Payer: MEDICARE

## 2023-09-20 VITALS
WEIGHT: 196.88 LBS | TEMPERATURE: 98 F | BODY MASS INDEX: 37.17 KG/M2 | OXYGEN SATURATION: 96 % | DIASTOLIC BLOOD PRESSURE: 60 MMHG | HEIGHT: 61 IN | RESPIRATION RATE: 18 BRPM | SYSTOLIC BLOOD PRESSURE: 115 MMHG | HEART RATE: 60 BPM

## 2023-09-20 DIAGNOSIS — I10 HTN (HYPERTENSION), BENIGN: Primary | ICD-10-CM

## 2023-09-20 DIAGNOSIS — Z78.0 MENOPAUSE: ICD-10-CM

## 2023-09-20 DIAGNOSIS — F33.0 MAJOR DEPRESSIVE DISORDER, RECURRENT EPISODE, MILD WITH ANXIOUS DISTRESS: ICD-10-CM

## 2023-09-20 DIAGNOSIS — E66.01 MORBID OBESITY DUE TO EXCESS CALORIES: ICD-10-CM

## 2023-09-20 PROCEDURE — 1160F RVW MEDS BY RX/DR IN RCRD: CPT | Mod: CPTII,S$GLB,, | Performed by: INTERNAL MEDICINE

## 2023-09-20 PROCEDURE — 3074F SYST BP LT 130 MM HG: CPT | Mod: CPTII,S$GLB,, | Performed by: INTERNAL MEDICINE

## 2023-09-20 PROCEDURE — 3288F FALL RISK ASSESSMENT DOCD: CPT | Mod: CPTII,S$GLB,, | Performed by: INTERNAL MEDICINE

## 2023-09-20 PROCEDURE — 1126F PR PAIN SEVERITY QUANTIFIED, NO PAIN PRESENT: ICD-10-PCS | Mod: CPTII,S$GLB,, | Performed by: INTERNAL MEDICINE

## 2023-09-20 PROCEDURE — 99213 OFFICE O/P EST LOW 20 MIN: CPT | Mod: S$GLB,,, | Performed by: INTERNAL MEDICINE

## 2023-09-20 PROCEDURE — 1159F PR MEDICATION LIST DOCUMENTED IN MEDICAL RECORD: ICD-10-PCS | Mod: CPTII,S$GLB,, | Performed by: INTERNAL MEDICINE

## 2023-09-20 PROCEDURE — 3078F PR MOST RECENT DIASTOLIC BLOOD PRESSURE < 80 MM HG: ICD-10-PCS | Mod: CPTII,S$GLB,, | Performed by: INTERNAL MEDICINE

## 2023-09-20 PROCEDURE — 99213 PR OFFICE/OUTPT VISIT, EST, LEVL III, 20-29 MIN: ICD-10-PCS | Mod: S$GLB,,, | Performed by: INTERNAL MEDICINE

## 2023-09-20 PROCEDURE — 3078F DIAST BP <80 MM HG: CPT | Mod: CPTII,S$GLB,, | Performed by: INTERNAL MEDICINE

## 2023-09-20 PROCEDURE — 99999 PR PBB SHADOW E&M-EST. PATIENT-LVL V: ICD-10-PCS | Mod: PBBFAC,,, | Performed by: INTERNAL MEDICINE

## 2023-09-20 PROCEDURE — 1160F PR REVIEW ALL MEDS BY PRESCRIBER/CLIN PHARMACIST DOCUMENTED: ICD-10-PCS | Mod: CPTII,S$GLB,, | Performed by: INTERNAL MEDICINE

## 2023-09-20 PROCEDURE — 1159F MED LIST DOCD IN RCRD: CPT | Mod: CPTII,S$GLB,, | Performed by: INTERNAL MEDICINE

## 2023-09-20 PROCEDURE — 1101F PR PT FALLS ASSESS DOC 0-1 FALLS W/OUT INJ PAST YR: ICD-10-PCS | Mod: CPTII,S$GLB,, | Performed by: INTERNAL MEDICINE

## 2023-09-20 PROCEDURE — 1101F PT FALLS ASSESS-DOCD LE1/YR: CPT | Mod: CPTII,S$GLB,, | Performed by: INTERNAL MEDICINE

## 2023-09-20 PROCEDURE — 3074F PR MOST RECENT SYSTOLIC BLOOD PRESSURE < 130 MM HG: ICD-10-PCS | Mod: CPTII,S$GLB,, | Performed by: INTERNAL MEDICINE

## 2023-09-20 PROCEDURE — 99999 PR PBB SHADOW E&M-EST. PATIENT-LVL V: CPT | Mod: PBBFAC,,, | Performed by: INTERNAL MEDICINE

## 2023-09-20 PROCEDURE — 3288F PR FALLS RISK ASSESSMENT DOCUMENTED: ICD-10-PCS | Mod: CPTII,S$GLB,, | Performed by: INTERNAL MEDICINE

## 2023-09-20 PROCEDURE — 1126F AMNT PAIN NOTED NONE PRSNT: CPT | Mod: CPTII,S$GLB,, | Performed by: INTERNAL MEDICINE

## 2023-09-20 NOTE — PROGRESS NOTES
"Subjective:       Patient ID: Jose Manuel Boyd is a 83 y.o. female.    Chief Complaint: Follow-up, GI Problem, and Weight Loss    F/u chronic conditions discuss bloating    HPI: 82 y/o w/ HTN presents alone to discuss lwoer abdominal bloating this is a long standing issue for her no change with bowel movements is having smooth bowel movement one to two times per day no melena or BRBPR no LE swelling. She feels blood pressure has been better at home since she got back from visiting family in [place] no LE swelling breathing at baseline      Review of Systems   Constitutional:  Negative for activity change, appetite change, fatigue, fever and unexpected weight change.   HENT:  Negative for ear pain, rhinorrhea and sore throat.    Eyes:  Negative for discharge and visual disturbance.   Respiratory:  Negative for chest tightness, shortness of breath and wheezing.    Cardiovascular:  Negative for chest pain, palpitations and leg swelling.   Gastrointestinal:  Positive for abdominal distention. Negative for abdominal pain, constipation and diarrhea.   Endocrine: Negative for cold intolerance and heat intolerance.   Genitourinary:  Negative for dysuria and hematuria.   Musculoskeletal:  Negative for joint swelling and neck stiffness.   Skin:  Negative for rash.   Neurological:  Negative for dizziness, syncope, weakness and headaches.   Psychiatric/Behavioral:  Negative for suicidal ideas.        Objective:     Vitals:    09/20/23 1509   BP: 115/60   Pulse: 60   Resp: 18   Temp: 97.6 °F (36.4 °C)   TempSrc: Oral   SpO2: 96%   Weight: 89.3 kg (196 lb 13.9 oz)   Height: 5' 1" (1.549 m)          Physical Exam  Constitutional:       General: She is not in acute distress.     Appearance: She is well-developed.   HENT:      Head: Normocephalic and atraumatic.   Eyes:      Conjunctiva/sclera: Conjunctivae normal.   Cardiovascular:      Rate and Rhythm: Normal rate and regular rhythm.      Heart sounds: No murmur heard.     " No friction rub. No gallop.   Pulmonary:      Effort: Pulmonary effort is normal.      Breath sounds: Normal breath sounds. No wheezing or rales.   Abdominal:      General: Bowel sounds are normal. There is no distension.      Palpations: Abdomen is soft.      Tenderness: There is no abdominal tenderness. There is no right CVA tenderness, left CVA tenderness, guarding or rebound.   Musculoskeletal:         General: No tenderness. Normal range of motion.      Cervical back: Normal range of motion.   Skin:     General: Skin is warm and dry.   Neurological:      Mental Status: She is alert and oriented to person, place, and time.      Cranial Nerves: No cranial nerve deficit.         Assessment and Plan   1. HTN (hypertension), benign  Bp at goal continue current medicaitons    2. Morbid obesity due to excess calories  The patient is asked to make an attempt to improve diet and exercise patterns to aid in medical management of this problem.      3. Major depressive disorder, recurrent episode, mild with anxious distress  Continue nightly mirtazpaine    Benign abdominal exam today continue h2 antagonist

## 2023-10-05 ENCOUNTER — TELEPHONE (OUTPATIENT)
Dept: CARDIOLOGY | Facility: CLINIC | Age: 83
End: 2023-10-05
Payer: MEDICARE

## 2023-10-10 ENCOUNTER — TELEPHONE (OUTPATIENT)
Dept: FAMILY MEDICINE | Facility: CLINIC | Age: 83
End: 2023-10-10
Payer: MEDICARE

## 2023-10-10 DIAGNOSIS — F33.0 MAJOR DEPRESSIVE DISORDER, RECURRENT EPISODE, MILD WITH ANXIOUS DISTRESS: Primary | ICD-10-CM

## 2023-10-10 RX ORDER — CLONAZEPAM 0.5 MG/1
0.5 TABLET ORAL 2 TIMES DAILY PRN
Qty: 20 TABLET | Refills: 0 | Status: SHIPPED | OUTPATIENT
Start: 2023-10-10 | End: 2023-11-24 | Stop reason: SDUPTHER

## 2023-10-10 NOTE — TELEPHONE ENCOUNTER
----- Message from Elias Mosher sent at 10/10/2023 12:14 PM CDT -----  Name of Who is Calling:LUI CRUZ [[id number]]           What is the request in detail:pt stated that she would like to speak with the office directly in regards to her questions/concerns, PT DID NOT GIVE INFO ON WHAT IT WAS IN REGARDS TO .Please contact to further discuss and advise.           Can the clinic reply by [facility]: no           What Number to Call Back if not in [facility]:

## 2023-10-10 NOTE — TELEPHONE ENCOUNTER
----- Message from Fely Carrera sent at 10/10/2023  2:27 PM CDT -----  Regarding: self  [phone]  .Type: RX Refill Request    Who Called: self     Have you contacted your pharmacy: no    Refill or New Rx: refill     RX Name and Strength: clonazepam 5 mg      Preferred Pharmacy with phone number: .  [facility]/pharmacy #[id number] - [place], [place] - [address] [facility] [address] [place] [id number] [facility] [address]  Phone: [phone] Fax: [phone]      Local or Mail Order: local    Would the patient rather a call back or a response via [facility]? Call back    Best Call Back Number: .[phone]

## 2023-10-10 NOTE — TELEPHONE ENCOUNTER
No care due was identified.  [facility] Embedded Care Due Messages. Reference number: [id number].   10/10/2023 3:12:17 PM CDT

## 2023-10-20 ENCOUNTER — LAB VISIT (OUTPATIENT)
Dept: LAB | Facility: HOSPITAL | Age: 83
End: 2023-10-20
Attending: INTERNAL MEDICINE
Payer: MEDICARE

## 2023-10-20 DIAGNOSIS — I10 HTN (HYPERTENSION), BENIGN: ICD-10-CM

## 2023-10-20 DIAGNOSIS — R73.01 IMPAIRED FASTING GLUCOSE: ICD-10-CM

## 2023-10-20 LAB
ALBUMIN SERPL BCP-MCNC: 3.4 G/DL (ref 3.5–5.2)
ALP SERPL-CCNC: 109 U/L (ref 55–135)
ALT SERPL W/O P-5'-P-CCNC: 14 U/L (ref 10–44)
ANION GAP SERPL CALC-SCNC: 9 MMOL/L (ref 8–16)
AST SERPL-CCNC: 16 U/L (ref 10–40)
BASOPHILS # BLD AUTO: 0.08 K/UL (ref 0–0.2)
BASOPHILS NFR BLD: 0.8 % (ref 0–1.9)
BILIRUB SERPL-MCNC: 0.3 MG/DL (ref 0.1–1)
BUN SERPL-MCNC: 14 MG/DL (ref 8–23)
CALCIUM SERPL-MCNC: 9.8 MG/DL (ref 8.7–10.5)
CHLORIDE SERPL-SCNC: 104 MMOL/L (ref 95–110)
CO2 SERPL-SCNC: 31 MMOL/L (ref 23–29)
CREAT SERPL-MCNC: 0.8 MG/DL (ref 0.5–1.4)
DIFFERENTIAL METHOD: ABNORMAL
EOSINOPHIL # BLD AUTO: 0.4 K/UL (ref 0–0.5)
EOSINOPHIL NFR BLD: 3.7 % (ref 0–8)
ERYTHROCYTE [DISTWIDTH] IN BLOOD BY AUTOMATED COUNT: 15.6 % (ref 11.5–14.5)
EST. GFR  (NO RACE VARIABLE): >60 ML/MIN/1.73 M^2
ESTIMATED AVG GLUCOSE: 126 MG/DL (ref 68–131)
GLUCOSE SERPL-MCNC: 113 MG/DL (ref 70–110)
HBA1C MFR BLD: 6 % (ref 4–5.6)
HCT VFR BLD AUTO: 43.2 % (ref 37–48.5)
HGB BLD-MCNC: 13.2 G/DL (ref 12–16)
IMM GRANULOCYTES # BLD AUTO: 0.04 K/UL (ref 0–0.04)
IMM GRANULOCYTES NFR BLD AUTO: 0.4 % (ref 0–0.5)
LYMPHOCYTES # BLD AUTO: 1.9 K/UL (ref 1–4.8)
LYMPHOCYTES NFR BLD: 18.2 % (ref 18–48)
MCH RBC QN AUTO: 26 PG (ref 27–31)
MCHC RBC AUTO-ENTMCNC: 30.6 G/DL (ref 32–36)
MCV RBC AUTO: 85 FL (ref 82–98)
MONOCYTES # BLD AUTO: 1 K/UL (ref 0.3–1)
MONOCYTES NFR BLD: 9.3 % (ref 4–15)
NEUTROPHILS # BLD AUTO: 6.9 K/UL (ref 1.8–7.7)
NEUTROPHILS NFR BLD: 67.6 % (ref 38–73)
NRBC BLD-RTO: 0 /100 WBC
PLATELET # BLD AUTO: 225 K/UL (ref 150–450)
PMV BLD AUTO: 10.5 FL (ref 9.2–12.9)
POTASSIUM SERPL-SCNC: 4.2 MMOL/L (ref 3.5–5.1)
PROT SERPL-MCNC: 7.2 G/DL (ref 6–8.4)
RBC # BLD AUTO: 5.08 M/UL (ref 4–5.4)
SODIUM SERPL-SCNC: 144 MMOL/L (ref 136–145)
WBC # BLD AUTO: 10.19 K/UL (ref 3.9–12.7)

## 2023-10-20 PROCEDURE — 80053 COMPREHEN METABOLIC PANEL: CPT | Performed by: INTERNAL MEDICINE

## 2023-10-20 PROCEDURE — 36415 COLL VENOUS BLD VENIPUNCTURE: CPT | Performed by: INTERNAL MEDICINE

## 2023-10-20 PROCEDURE — 85025 COMPLETE CBC W/AUTO DIFF WBC: CPT | Performed by: INTERNAL MEDICINE

## 2023-10-20 PROCEDURE — 83036 HEMOGLOBIN GLYCOSYLATED A1C: CPT | Performed by: INTERNAL MEDICINE

## 2023-10-27 ENCOUNTER — OFFICE VISIT (OUTPATIENT)
Dept: FAMILY MEDICINE | Facility: CLINIC | Age: 83
End: 2023-10-27
Payer: MEDICARE

## 2023-10-27 VITALS
SYSTOLIC BLOOD PRESSURE: 136 MMHG | HEART RATE: 76 BPM | WEIGHT: 194.44 LBS | DIASTOLIC BLOOD PRESSURE: 64 MMHG | TEMPERATURE: 98 F | BODY MASS INDEX: 36.74 KG/M2 | OXYGEN SATURATION: 95 %

## 2023-10-27 DIAGNOSIS — I10 HYPERTENSION, ESSENTIAL: Primary | ICD-10-CM

## 2023-10-27 DIAGNOSIS — Z23 NEED FOR INFLUENZA VACCINATION: ICD-10-CM

## 2023-10-27 DIAGNOSIS — E78.2 MIXED HYPERLIPIDEMIA: ICD-10-CM

## 2023-10-27 DIAGNOSIS — R73.01 IMPAIRED FASTING GLUCOSE: ICD-10-CM

## 2023-10-27 PROCEDURE — 1101F PR PT FALLS ASSESS DOC 0-1 FALLS W/OUT INJ PAST YR: ICD-10-PCS | Mod: CPTII,S$GLB,, | Performed by: INTERNAL MEDICINE

## 2023-10-27 PROCEDURE — 99214 OFFICE O/P EST MOD 30 MIN: CPT | Mod: S$GLB,,, | Performed by: INTERNAL MEDICINE

## 2023-10-27 PROCEDURE — 3078F DIAST BP <80 MM HG: CPT | Mod: CPTII,S$GLB,, | Performed by: INTERNAL MEDICINE

## 2023-10-27 PROCEDURE — 3075F PR MOST RECENT SYSTOLIC BLOOD PRESS GE 130-139MM HG: ICD-10-PCS | Mod: CPTII,S$GLB,, | Performed by: INTERNAL MEDICINE

## 2023-10-27 PROCEDURE — 1125F PR PAIN SEVERITY QUANTIFIED, PAIN PRESENT: ICD-10-PCS | Mod: CPTII,S$GLB,, | Performed by: INTERNAL MEDICINE

## 2023-10-27 PROCEDURE — 99999 PR PBB SHADOW E&M-EST. PATIENT-LVL IV: CPT | Mod: PBBFAC,,, | Performed by: INTERNAL MEDICINE

## 2023-10-27 PROCEDURE — 3078F PR MOST RECENT DIASTOLIC BLOOD PRESSURE < 80 MM HG: ICD-10-PCS | Mod: CPTII,S$GLB,, | Performed by: INTERNAL MEDICINE

## 2023-10-27 PROCEDURE — 99999 PR PBB SHADOW E&M-EST. PATIENT-LVL IV: ICD-10-PCS | Mod: PBBFAC,,, | Performed by: INTERNAL MEDICINE

## 2023-10-27 PROCEDURE — 90694 FLU VACCINE - QUADRIVALENT - ADJUVANTED: ICD-10-PCS | Mod: S$GLB,,, | Performed by: INTERNAL MEDICINE

## 2023-10-27 PROCEDURE — 90694 VACC AIIV4 NO PRSRV 0.5ML IM: CPT | Mod: S$GLB,,, | Performed by: INTERNAL MEDICINE

## 2023-10-27 PROCEDURE — 1160F RVW MEDS BY RX/DR IN RCRD: CPT | Mod: CPTII,S$GLB,, | Performed by: INTERNAL MEDICINE

## 2023-10-27 PROCEDURE — 1101F PT FALLS ASSESS-DOCD LE1/YR: CPT | Mod: CPTII,S$GLB,, | Performed by: INTERNAL MEDICINE

## 2023-10-27 PROCEDURE — 3075F SYST BP GE 130 - 139MM HG: CPT | Mod: CPTII,S$GLB,, | Performed by: INTERNAL MEDICINE

## 2023-10-27 PROCEDURE — 3288F FALL RISK ASSESSMENT DOCD: CPT | Mod: CPTII,S$GLB,, | Performed by: INTERNAL MEDICINE

## 2023-10-27 PROCEDURE — 1159F PR MEDICATION LIST DOCUMENTED IN MEDICAL RECORD: ICD-10-PCS | Mod: CPTII,S$GLB,, | Performed by: INTERNAL MEDICINE

## 2023-10-27 PROCEDURE — 99214 PR OFFICE/OUTPT VISIT, EST, LEVL IV, 30-39 MIN: ICD-10-PCS | Mod: S$GLB,,, | Performed by: INTERNAL MEDICINE

## 2023-10-27 PROCEDURE — G0008 ADMIN INFLUENZA VIRUS VAC: HCPCS | Mod: S$GLB,,, | Performed by: INTERNAL MEDICINE

## 2023-10-27 PROCEDURE — 3288F PR FALLS RISK ASSESSMENT DOCUMENTED: ICD-10-PCS | Mod: CPTII,S$GLB,, | Performed by: INTERNAL MEDICINE

## 2023-10-27 PROCEDURE — 1125F AMNT PAIN NOTED PAIN PRSNT: CPT | Mod: CPTII,S$GLB,, | Performed by: INTERNAL MEDICINE

## 2023-10-27 PROCEDURE — 1159F MED LIST DOCD IN RCRD: CPT | Mod: CPTII,S$GLB,, | Performed by: INTERNAL MEDICINE

## 2023-10-27 PROCEDURE — 1160F PR REVIEW ALL MEDS BY PRESCRIBER/CLIN PHARMACIST DOCUMENTED: ICD-10-PCS | Mod: CPTII,S$GLB,, | Performed by: INTERNAL MEDICINE

## 2023-10-27 PROCEDURE — G0008 FLU VACCINE - QUADRIVALENT - ADJUVANTED: ICD-10-PCS | Mod: S$GLB,,, | Performed by: INTERNAL MEDICINE

## 2023-10-27 RX ORDER — ATORVASTATIN CALCIUM 40 MG/1
40 TABLET, FILM COATED ORAL DAILY
Qty: 90 TABLET | Refills: 3 | Status: SHIPPED | OUTPATIENT
Start: 2023-10-27

## 2023-10-27 NOTE — PROGRESS NOTES
"Subjective:       Patient ID: Jose Manuel Boyd is a 83 y.o. female.    Chief Complaint: No chief complaint on file.    F/u recent labs    HPI: 82 y/o w/ HTN presents alone for scheduled follw up. Stomach has been better with consistent use of ppi no change in bowels no LE swelling breathign "fine" usign mirtzapine to initiate sleep with good effect      Review of Systems   Constitutional:  Negative for activity change, appetite change, fatigue, fever and unexpected weight change.   HENT:  Negative for ear pain, rhinorrhea and sore throat.    Eyes:  Negative for discharge and visual disturbance.   Respiratory:  Negative for chest tightness, shortness of breath and wheezing.    Cardiovascular:  Negative for chest pain, palpitations and leg swelling.   Gastrointestinal:  Negative for abdominal pain, constipation and diarrhea.   Endocrine: Negative for cold intolerance and heat intolerance.   Genitourinary:  Negative for dysuria and hematuria.   Musculoskeletal:  Negative for joint swelling and neck stiffness.   Skin:  Negative for rash.   Neurological:  Negative for dizziness, syncope, weakness and headaches.   Psychiatric/Behavioral:  Negative for suicidal ideas.        Objective:     Vitals:    10/27/23 1546   BP: 136/64   BP Location: Right arm   Patient Position: Sitting   BP Method: Large (Manual)   Pulse: 76   Temp: 97.9 °F (36.6 °C)   TempSrc: Oral   SpO2: 95%   Weight: 88.2 kg (194 lb 7.1 oz)          Physical Exam  Constitutional:       Appearance: She is well-developed.   HENT:      Head: Normocephalic and atraumatic.   Eyes:      General: No scleral icterus.     Conjunctiva/sclera: Conjunctivae normal.   Cardiovascular:      Rate and Rhythm: Normal rate and regular rhythm.      Heart sounds: No murmur heard.     No friction rub. No gallop.   Pulmonary:      Effort: Pulmonary effort is normal.      Breath sounds: Normal breath sounds. No wheezing or rales.   Abdominal:      General: There is no " distension.      Palpations: Abdomen is soft.      Tenderness: There is no abdominal tenderness. There is no guarding or rebound.   Musculoskeletal:         General: No tenderness. Normal range of motion.      Cervical back: Normal range of motion.      Right lower leg: No edema.      Left lower leg: No edema.   Skin:     General: Skin is warm and dry.   Neurological:      Mental Status: She is alert and oriented to person, place, and time.      Cranial Nerves: No cranial nerve deficit.         Assessment and Plan   1. Hypertension, essential  Bp at goal continue current medicaiotns labs reviewed with patient showning normal electrolyes and renal function  - Comprehensive Metabolic Panel; Future    2. Mixed hyperlipidemia  On statin flp with next labs  - Lipid Panel; Future  - atorvastatin (LIPITOR) 40 MG tablet; Take 1 tablet (40 mg total) by mouth once daily.  Dispense: 90 tablet; Refill: 3    3. Need for influenza vaccination  Flu vaccine today  - Influenza (FLUAD) - Quadrivalent (Adjuvanted) *Preferred* (65+) (PF)    4. Impaired fasting glucose  Monitro with a1c  - Hemoglobin A1C; Future

## 2023-10-27 NOTE — PATIENT INSTRUCTIONS
Patient given flu vaccine to left deltoid, no complaints or reactions noted. Vis given 8/6/21 to patient. Instructed to wait in clinic for 15 minutes to note for reactions, verbalized understanding.

## 2023-11-10 ENCOUNTER — TELEPHONE (OUTPATIENT)
Dept: CARDIOLOGY | Facility: CLINIC | Age: 83
End: 2023-11-10
Payer: MEDICARE

## 2023-11-10 NOTE — TELEPHONE ENCOUNTER
Called pt in response to call back request regarding a sooner appt. Offered her the next appointment which she accepted. She let me know that her oxygen tank is out and asked me to let the doctor know. She stated that her relatives don't pay anything and asked why hers costs $150/month.

## 2023-11-10 NOTE — TELEPHONE ENCOUNTER
Degree, Sunshine Roger Staff  Caller: Unspecified (Today, 10:49 AM)  Type:  Sooner Appointment Request     Patient is requesting a sooner appointment.  Patient declined first available appointment listed as well as another facility and provider .  Patient will not accept being placed on the waitlist and is requesting a message be sent to doctor.     Name of Caller: self     When is the first available appointment? 01/2024     Symptoms: pt need new oxygen tank     Would the patient rather a call back or a response via [facility]? Call back     Best Call Back Number: [phone]     Additional Information: pt stated her tank is empty, she have to travel to family member house just for oxygen, pt stated that the company that comes out is too expensive to pay each month

## 2023-11-10 NOTE — TELEPHONE ENCOUNTER
Patient informed that her pulmonary doctor has not been seen 8/2021. Appointment made and patient informed if she becomes short of breath then to proceed to er or she can contact pcp who may be able to help her with her oxygen order until she is seen.

## 2023-11-24 DIAGNOSIS — F33.0 MAJOR DEPRESSIVE DISORDER, RECURRENT EPISODE, MILD WITH ANXIOUS DISTRESS: ICD-10-CM

## 2023-11-24 RX ORDER — CLONAZEPAM 0.5 MG/1
0.5 TABLET ORAL 2 TIMES DAILY PRN
Qty: 20 TABLET | Refills: 0 | Status: SHIPPED | OUTPATIENT
Start: 2023-11-24 | End: 2024-02-07

## 2023-11-24 NOTE — TELEPHONE ENCOUNTER
She also request these rx to be refilled , these were prescribed at her last hospital visit . Feels they've really helped her not have a episode as she said         Propranolol  Buspirone    LOV 10/27/2023  NOV 2/28/2024

## 2023-11-24 NOTE — TELEPHONE ENCOUNTER
----- Message from Dereje Qiu sent at 11/24/2023  3:34 PM CST -----  Regarding: Self [phone]  Type: Patient Call Back    Who called: Self     What is the request in detail: called in regards to talking to the doctor about her medications. Would like a call back.     Can the clinic reply by [facility]? No     Would the patient rather a call back or a response via [facility]? Call back     Best call back number: [phone]    Additional Information:    Thank you.

## 2023-11-24 NOTE — TELEPHONE ENCOUNTER
Care Due:                  Date            Visit Type   Department     Provider  --------------------------------------------------------------------------------                                [facility]                              PRIMARY      MEDICINE/  Last Visit: 10-      CARE ([facility])   INTERNAL MED   Ajit Piña                              [facility]                              PRIMARY      MEDICINE/  Next Visit: 02-      CARE ([facility])   INTERNAL MED   Ajit Piña                                                            Last  Test          Frequency    Reason                     Performed    Due Date  --------------------------------------------------------------------------------    Lipid Panel.  12 months..  atorvastatin.............  08-   08-    [facility] Embedded Care Due Messages. Reference number: [id number].   11/24/2023 4:30:18 PM CST

## 2023-12-04 ENCOUNTER — HOSPITAL ENCOUNTER (OUTPATIENT)
Facility: HOSPITAL | Age: 83
Discharge: HOME | End: 2023-12-05
Attending: EMERGENCY MEDICINE | Admitting: HOSPITALIST
Payer: MEDICARE

## 2023-12-04 ENCOUNTER — NURSE TRIAGE (OUTPATIENT)
Dept: ADMINISTRATIVE | Facility: CLINIC | Age: 83
End: 2023-12-04
Payer: MEDICARE

## 2023-12-04 DIAGNOSIS — R07.9 CHEST PAIN: ICD-10-CM

## 2023-12-04 DIAGNOSIS — R07.89 CHEST WALL PAIN: ICD-10-CM

## 2023-12-04 DIAGNOSIS — R94.31 ABNORMAL EKG: Primary | ICD-10-CM

## 2023-12-04 PROBLEM — E78.2 MIXED HYPERLIPIDEMIA: Chronic | Status: ACTIVE | Noted: 2023-06-01

## 2023-12-04 PROBLEM — R00.1 JUNCTIONAL BRADYCARDIA: Status: ACTIVE | Noted: 2023-12-04

## 2023-12-04 PROBLEM — F33.0 MAJOR DEPRESSIVE DISORDER, RECURRENT EPISODE, MILD WITH ANXIOUS DISTRESS: Chronic | Status: ACTIVE | Noted: 2022-05-09

## 2023-12-04 PROBLEM — R79.89 ELEVATED BRAIN NATRIURETIC PEPTIDE (BNP) LEVEL: Status: ACTIVE | Noted: 2023-12-04

## 2023-12-04 PROBLEM — J45.40 MODERATE PERSISTENT ASTHMA: Chronic | Status: ACTIVE | Noted: 2021-04-22

## 2023-12-04 LAB
ALBUMIN SERPL BCP-MCNC: 3.2 G/DL (ref 3.5–5.2)
ALP SERPL-CCNC: 98 U/L (ref 55–135)
ALT SERPL W/O P-5'-P-CCNC: 10 U/L (ref 10–44)
ANION GAP SERPL CALC-SCNC: 13 MMOL/L (ref 8–16)
AST SERPL-CCNC: 17 U/L (ref 10–40)
BASOPHILS # BLD AUTO: 0.08 K/UL (ref 0–0.2)
BASOPHILS NFR BLD: 0.9 % (ref 0–1.9)
BILIRUB SERPL-MCNC: 0.4 MG/DL (ref 0.1–1)
BNP SERPL-MCNC: 505 PG/ML (ref 0–99)
BUN SERPL-MCNC: 22 MG/DL (ref 8–23)
CALCIUM SERPL-MCNC: 9.3 MG/DL (ref 8.7–10.5)
CHLORIDE SERPL-SCNC: 103 MMOL/L (ref 95–110)
CO2 SERPL-SCNC: 26 MMOL/L (ref 23–29)
CREAT SERPL-MCNC: 1.1 MG/DL (ref 0.5–1.4)
DIFFERENTIAL METHOD: ABNORMAL
EOSINOPHIL # BLD AUTO: 0.3 K/UL (ref 0–0.5)
EOSINOPHIL NFR BLD: 3.7 % (ref 0–8)
ERYTHROCYTE [DISTWIDTH] IN BLOOD BY AUTOMATED COUNT: 15.4 % (ref 11.5–14.5)
EST. GFR  (NO RACE VARIABLE): 49 ML/MIN/1.73 M^2
GLUCOSE SERPL-MCNC: 71 MG/DL (ref 70–110)
HCT VFR BLD AUTO: 42.3 % (ref 37–48.5)
HGB BLD-MCNC: 13 G/DL (ref 12–16)
IMM GRANULOCYTES # BLD AUTO: 0.03 K/UL (ref 0–0.04)
IMM GRANULOCYTES NFR BLD AUTO: 0.3 % (ref 0–0.5)
LYMPHOCYTES # BLD AUTO: 2.4 K/UL (ref 1–4.8)
LYMPHOCYTES NFR BLD: 26.8 % (ref 18–48)
MCH RBC QN AUTO: 26.3 PG (ref 27–31)
MCHC RBC AUTO-ENTMCNC: 30.7 G/DL (ref 32–36)
MCV RBC AUTO: 86 FL (ref 82–98)
MONOCYTES # BLD AUTO: 1 K/UL (ref 0.3–1)
MONOCYTES NFR BLD: 11.5 % (ref 4–15)
NEUTROPHILS # BLD AUTO: 5.2 K/UL (ref 1.8–7.7)
NEUTROPHILS NFR BLD: 56.8 % (ref 38–73)
NRBC BLD-RTO: 0 /100 WBC
PLATELET # BLD AUTO: 233 K/UL (ref 150–450)
PMV BLD AUTO: 10.8 FL (ref 9.2–12.9)
POTASSIUM SERPL-SCNC: 3.8 MMOL/L (ref 3.5–5.1)
PROT SERPL-MCNC: 6.9 G/DL (ref 6–8.4)
RBC # BLD AUTO: 4.95 M/UL (ref 4–5.4)
SODIUM SERPL-SCNC: 142 MMOL/L (ref 136–145)
TROPONIN I SERPL DL<=0.01 NG/ML-MCNC: 0.01 NG/ML (ref 0–0.03)
TROPONIN I SERPL DL<=0.01 NG/ML-MCNC: 0.01 NG/ML (ref 0–0.03)
TROPONIN I SERPL DL<=0.01 NG/ML-MCNC: 0.02 NG/ML (ref 0–0.03)
WBC # BLD AUTO: 9.08 K/UL (ref 3.9–12.7)

## 2023-12-04 PROCEDURE — 80053 COMPREHEN METABOLIC PANEL: CPT | Performed by: NURSE PRACTITIONER

## 2023-12-04 PROCEDURE — 99214 OFFICE O/P EST MOD 30 MIN: CPT | Mod: 25,,, | Performed by: INTERNAL MEDICINE

## 2023-12-04 PROCEDURE — 63600175 PHARM REV CODE 636 W HCPCS: Performed by: HOSPITALIST

## 2023-12-04 PROCEDURE — 93005 ELECTROCARDIOGRAM TRACING: CPT

## 2023-12-04 PROCEDURE — G0378 HOSPITAL OBSERVATION PER HR: HCPCS

## 2023-12-04 PROCEDURE — 84484 ASSAY OF TROPONIN QUANT: CPT | Mod: 91 | Performed by: HOSPITALIST

## 2023-12-04 PROCEDURE — 84484 ASSAY OF TROPONIN QUANT: CPT | Performed by: NURSE PRACTITIONER

## 2023-12-04 PROCEDURE — 93010 ELECTROCARDIOGRAM REPORT: CPT | Mod: 76,,, | Performed by: INTERNAL MEDICINE

## 2023-12-04 PROCEDURE — 99214 PR OFFICE/OUTPT VISIT, EST, LEVL IV, 30-39 MIN: ICD-10-PCS | Mod: 25,,, | Performed by: INTERNAL MEDICINE

## 2023-12-04 PROCEDURE — 36415 COLL VENOUS BLD VENIPUNCTURE: CPT | Performed by: HOSPITALIST

## 2023-12-04 PROCEDURE — 25000003 PHARM REV CODE 250: Performed by: HOSPITALIST

## 2023-12-04 PROCEDURE — 96372 THER/PROPH/DIAG INJ SC/IM: CPT | Performed by: HOSPITALIST

## 2023-12-04 PROCEDURE — 93010 EKG 12-LEAD: ICD-10-PCS | Mod: 76,,, | Performed by: INTERNAL MEDICINE

## 2023-12-04 PROCEDURE — 85025 COMPLETE CBC W/AUTO DIFF WBC: CPT | Performed by: NURSE PRACTITIONER

## 2023-12-04 PROCEDURE — 83880 ASSAY OF NATRIURETIC PEPTIDE: CPT | Performed by: NURSE PRACTITIONER

## 2023-12-04 PROCEDURE — 93010 ELECTROCARDIOGRAM REPORT: CPT | Mod: ,,, | Performed by: INTERNAL MEDICINE

## 2023-12-04 PROCEDURE — 99285 EMERGENCY DEPT VISIT HI MDM: CPT | Mod: 25

## 2023-12-04 RX ORDER — SODIUM CHLORIDE 0.9 % (FLUSH) 0.9 %
10 SYRINGE (ML) INJECTION EVERY 8 HOURS PRN
Status: DISCONTINUED | OUTPATIENT
Start: 2023-12-04 | End: 2023-12-05 | Stop reason: HOSPADM

## 2023-12-04 RX ORDER — PROCHLORPERAZINE EDISYLATE 5 MG/ML
5 INJECTION INTRAMUSCULAR; INTRAVENOUS EVERY 6 HOURS PRN
Status: DISCONTINUED | OUTPATIENT
Start: 2023-12-04 | End: 2023-12-05 | Stop reason: HOSPADM

## 2023-12-04 RX ORDER — AMLODIPINE BESYLATE 5 MG/1
10 TABLET ORAL DAILY
Status: DISCONTINUED | OUTPATIENT
Start: 2023-12-05 | End: 2023-12-05 | Stop reason: HOSPADM

## 2023-12-04 RX ORDER — CLONIDINE HYDROCHLORIDE 0.1 MG/1
0.1 TABLET ORAL 2 TIMES DAILY
Status: DISCONTINUED | OUTPATIENT
Start: 2023-12-04 | End: 2023-12-05 | Stop reason: HOSPADM

## 2023-12-04 RX ORDER — ASPIRIN 81 MG/1
81 TABLET ORAL DAILY
Status: DISCONTINUED | OUTPATIENT
Start: 2023-12-05 | End: 2023-12-05 | Stop reason: HOSPADM

## 2023-12-04 RX ORDER — ENOXAPARIN SODIUM 100 MG/ML
40 INJECTION SUBCUTANEOUS EVERY 24 HOURS
Status: DISCONTINUED | OUTPATIENT
Start: 2023-12-04 | End: 2023-12-05 | Stop reason: HOSPADM

## 2023-12-04 RX ORDER — LOSARTAN POTASSIUM 25 MG/1
50 TABLET ORAL DAILY
Status: DISCONTINUED | OUTPATIENT
Start: 2023-12-05 | End: 2023-12-05 | Stop reason: HOSPADM

## 2023-12-04 RX ORDER — IBUPROFEN 200 MG
16 TABLET ORAL
Status: DISCONTINUED | OUTPATIENT
Start: 2023-12-04 | End: 2023-12-05 | Stop reason: HOSPADM

## 2023-12-04 RX ORDER — ATORVASTATIN CALCIUM 40 MG/1
40 TABLET, FILM COATED ORAL DAILY
Status: DISCONTINUED | OUTPATIENT
Start: 2023-12-05 | End: 2023-12-05 | Stop reason: HOSPADM

## 2023-12-04 RX ORDER — POLYETHYLENE GLYCOL 3350 17 G/17G
17 POWDER, FOR SOLUTION ORAL DAILY
Status: DISCONTINUED | OUTPATIENT
Start: 2023-12-04 | End: 2023-12-05 | Stop reason: HOSPADM

## 2023-12-04 RX ORDER — SIMETHICONE 80 MG
1 TABLET,CHEWABLE ORAL 4 TIMES DAILY PRN
Status: DISCONTINUED | OUTPATIENT
Start: 2023-12-04 | End: 2023-12-05 | Stop reason: HOSPADM

## 2023-12-04 RX ORDER — ACETAMINOPHEN 325 MG/1
650 TABLET ORAL EVERY 6 HOURS PRN
Status: DISCONTINUED | OUTPATIENT
Start: 2023-12-04 | End: 2023-12-05 | Stop reason: HOSPADM

## 2023-12-04 RX ORDER — MAG HYDROX/ALUMINUM HYD/SIMETH 200-200-20
30 SUSPENSION, ORAL (FINAL DOSE FORM) ORAL 4 TIMES DAILY PRN
Status: DISCONTINUED | OUTPATIENT
Start: 2023-12-04 | End: 2023-12-05 | Stop reason: HOSPADM

## 2023-12-04 RX ORDER — CLONAZEPAM 0.5 MG/1
0.5 TABLET ORAL 2 TIMES DAILY PRN
Status: DISCONTINUED | OUTPATIENT
Start: 2023-12-04 | End: 2023-12-05 | Stop reason: HOSPADM

## 2023-12-04 RX ORDER — ONDANSETRON 2 MG/ML
4 INJECTION INTRAMUSCULAR; INTRAVENOUS EVERY 8 HOURS PRN
Status: DISCONTINUED | OUTPATIENT
Start: 2023-12-04 | End: 2023-12-05 | Stop reason: HOSPADM

## 2023-12-04 RX ORDER — MIRTAZAPINE 15 MG/1
30 TABLET, FILM COATED ORAL NIGHTLY
Status: DISCONTINUED | OUTPATIENT
Start: 2023-12-04 | End: 2023-12-05 | Stop reason: HOSPADM

## 2023-12-04 RX ORDER — GLUCAGON 1 MG
1 KIT INJECTION
Status: DISCONTINUED | OUTPATIENT
Start: 2023-12-04 | End: 2023-12-05 | Stop reason: HOSPADM

## 2023-12-04 RX ORDER — TALC
6 POWDER (GRAM) TOPICAL NIGHTLY PRN
Status: DISCONTINUED | OUTPATIENT
Start: 2023-12-04 | End: 2023-12-05 | Stop reason: HOSPADM

## 2023-12-04 RX ORDER — IBUPROFEN 200 MG
24 TABLET ORAL
Status: DISCONTINUED | OUTPATIENT
Start: 2023-12-04 | End: 2023-12-05 | Stop reason: HOSPADM

## 2023-12-04 RX ORDER — NALOXONE HCL 0.4 MG/ML
0.02 VIAL (ML) INJECTION
Status: DISCONTINUED | OUTPATIENT
Start: 2023-12-04 | End: 2023-12-05 | Stop reason: HOSPADM

## 2023-12-04 RX ADMIN — ENOXAPARIN SODIUM 40 MG: 40 INJECTION SUBCUTANEOUS at 05:12

## 2023-12-04 RX ADMIN — CLONIDINE HYDROCHLORIDE 0.1 MG: 0.1 TABLET ORAL at 09:12

## 2023-12-04 RX ADMIN — MIRTAZAPINE 30 MG: 15 TABLET, FILM COATED ORAL at 09:12

## 2023-12-04 NOTE — ADMISSIONCARE
AdmissionCare    Guideline: Chest Pain - OBS, Observation    Based on the indications selected for the patient, the bed status of Admit to Observation was determined to be MET    The following indications were selected as present at the time of evaluation of the patient:      Chest pain (or other anginal equivalent) not classified as low risk for acute coronary syndrome, as indicated by 1 or more of the following:   -     Troponin results indeterminant or otherwise indicate need for monitoring and retesting beyond emergency department treatment time frame    -    - Other aspect of patient presentation indicative of need for monitoring or testing beyond emergency department treatment time frame (eg, concern for arrhythmia)   - Pain persists despite emergency department care.    AdmissionCare documentation entered by: Judy Han    [facility], 27th edition, Copyright © 2023 [facility], [facility] All Rights Reserved.  [id number]:00

## 2023-12-04 NOTE — TELEPHONE ENCOUNTER
Pt calling with c/o chest pain and she said that she has been having them on and off for the last 3 days. Pt said that she see's a heart Dr as well. Pt has been through a lot lately and she took ASA today and her pain is better. Pt presently without pain but triaged and care advice to go to the ED now and pt will call someone for a ride. If no ride pt told to call 911. Pt to call us back as needed if any issue or worse                    Reason for Disposition   Chest pain lasting longer than 5 minutes and occurred in last 3 days (72 hours) (Exception: Feels exactly the same as previously diagnosed heartburn and has accompanying sour taste in mouth.)    Additional Information   Negative: SEVERE difficulty breathing (e.g., struggling for each breath, speaks in single words)   Negative: Difficult to awaken or acting confused (e.g., disoriented, slurred speech)   Negative: Shock suspected (e.g., cold/pale/clammy skin, too weak to stand, low BP, rapid pulse)   Negative: Passed out (i.e., lost consciousness, collapsed and was not responding)   Negative: Chest pain lasting longer than 5 minutes and over 44 years old   Negative: Chest pain lasting longer than 5 minutes, over 30 years old, and at least one cardiac risk factor (e.g., diabetes mellitus, high blood pressure, high cholesterol, smoker, or strong family history of heart disease)   Negative: Chest pain lasting longer than 5 minutes and history of heart disease (i.e., angina, heart attack, heart failure, bypass surgery, takes nitroglycerin)   Negative: Chest pain lasting longer than 5 minutes and pain is crushing, pressure-like, or heavy   Negative: Heart beating < 50 beats per minute OR > 140 beats per minute   Negative: Visible sweat on face or sweat dripping down face   Negative: Sounds like a life-threatening emergency to the triager   Negative: SEVERE chest pain   Negative: Pain also in shoulder(s) or arm(s) or jaw   Negative: Difficulty breathing   Negative:  Cocaine use within last 3 days   Negative: Major surgery in the past month   Negative: Hip or leg fracture (broken bone) in past month (or had cast on leg or ankle in past month)   Negative: Illness requiring prolonged bedrest in past month (e.g., immobilization, long hospital stay)   Negative: Long-distance travel in past month (e.g., car, bus, train, plane; with trip lasting 6 or more hours)   Negative: History of prior 'blood clot' in leg or lungs (i.e., deep vein thrombosis, pulmonary embolism)   Negative: History of inherited increased risk of blood clots (e.g., Factor 5 Leiden, Anti-thrombin 3, Protein C or Protein S deficiency, Prothrombin mutation)   Negative: Cancer treatment in the past two months (or has cancer now)   Negative: Heart beating irregularly or very rapidly    Protocols used: Chest Pain-A-[place]

## 2023-12-04 NOTE — ED PROVIDER NOTES
Encounter Date: 2023       History     Chief Complaint   Patient presents with    Chest Pain     Patient reports CP that started 3 days ago after getting a call that her sister had passed. States heaviness to left chest     83-year-old female past medical history hypertension, hyperlipidemia, anxiety presenting secondary to chest pain that is been constant 3 days after she received the news that her sister .  No fevers chills runny nose cough congestion.  No urinary complaints.  No abdominal pain.  No nausea or vomiting.  No unilateral weakness.  Has chronic leg swelling which has been unchanged.  Has multiple family members that she can talk to to help with her stress.  Patient states that since it has been ongoing for 3 days she went to be evaluated.  Describes the pain as tightness        Review of patient's allergies indicates:   Allergen Reactions    Codeine      Other reaction(s): Rash     Past Medical History:   Diagnosis Date    Anxiety     Asthma     Depression     Headache     Hx of psychiatric care     Hypercholesterolemia     Hypertension     Psychiatric problem     Sleep difficulties     Therapy     Thyroid disease     multiple nodules     Past Surgical History:   Procedure Laterality Date    ANGIOGRAM, CORONARY, WITH LEFT HEART CATHETERIZATION Left 10/18/2022    Procedure: Angiogram, Coronary, with Left Heart Cath;  Surgeon: Kumar Randall MD;  Location: [facility] CATH LAB;  Service: Cardiology;  Laterality: Left;    CHOLECYSTECTOMY      HYSTERECTOMY      knee repalcement       Family History   Problem Relation Age of Onset    Diabetes Mother     Hypertension Mother     Kidney disease Mother     Heart disease Father     Bipolar disorder Daughter      Social History     Tobacco Use    Smoking status: Never    Smokeless tobacco: Never   Substance Use Topics    Alcohol use: No    Drug use: No     Review of Systems   Constitutional:  Negative for fever.   HENT:  Negative for sore throat.     Respiratory:  Negative for shortness of breath.    Cardiovascular:  Positive for chest pain.   Gastrointestinal:  Negative for nausea.   Genitourinary:  Negative for dysuria.   Musculoskeletal:  Negative for back pain.   Skin:  Negative for rash.   Neurological:  Negative for weakness.   Hematological:  Does not bruise/bleed easily.       Physical Exam     Initial Vitals [12/04/23 1200]   BP Pulse Resp Temp SpO2   (!) 95/45 (!) 50 20 98 °F (36.7 °C) 96 %      MAP       --         Physical Exam    Nursing note and vitals reviewed.  Constitutional: She appears well-developed and well-nourished.   HENT:   Head: Normocephalic and atraumatic.   Mouth/Throat: Oropharynx is clear and moist and mucous membranes are normal.   Eyes: Conjunctivae and EOM are normal. Pupils are equal, round, and reactive to light. Right conjunctiva is not injected. Left conjunctiva is not injected. No scleral icterus.   Neck: Neck supple.   Normal range of motion.   Full passive range of motion without pain.     Cardiovascular:  Normal rate, regular rhythm, S1 normal, S2 normal, normal heart sounds and normal pulses.     Exam reveals no gallop and no friction rub.       No murmur heard.  Pulses:       Radial pulses are 2+ on the right side and 2+ on the left side.   Pulmonary/Chest: Effort normal and breath sounds normal. No respiratory distress. She exhibits tenderness.   Abdominal: Abdomen is soft. She exhibits no distension. There is no abdominal tenderness.   Musculoskeletal:         General: No edema. Normal range of motion.      Cervical back: Full passive range of motion without pain, normal range of motion and neck supple.      Comments: Good active ROM of all extremities. No lower extremity edema or cyanosis.      Neurological: No cranial nerve deficit. Gait normal.   A&Ox4, normal speech.   Skin: Skin is warm. No ecchymosis and no rash noted.   Psychiatric: She has a normal mood and affect. Thought content normal.         ED Course    Procedures  Labs Reviewed   CBC W/ AUTO DIFFERENTIAL - Abnormal; Notable for the following components:       Result Value    MCH 26.3 (*)     MCHC 30.7 (*)     RDW 15.4 (*)     All other components within normal limits   COMPREHENSIVE METABOLIC PANEL - Abnormal; Notable for the following components:    Albumin 3.2 (*)     eGFR 49 (*)     All other components within normal limits   B-TYPE NATRIURETIC PEPTIDE - Abnormal; Notable for the following components:     (*)     All other components within normal limits   TROPONIN I     EKG Readings: (Independently Interpreted)   EKG done 1401 showing what appears to be mixing between a junctional and a sinus bradycardia with a rate of 44.  QRS is 80.  QTC is 415.  Abnormal EKG     EKG done at 11:56 a.m. appears to be a junctional which then goes into a sinus bradycardia.  Normal axis.  QRS is 82 and QTC is 426.  Abnormal EKG and different than previous.       Imaging Results              X-Ray Chest AP Portable (Final result)  Result time 12/04/23 13:10:14      Final result by Chelsey Mendes MD (12/04/23 13:10:14)                   Impression:      No acute intrathoracic process seen.      Electronically signed by: Chelsey Mendes MD  Date:    12/04/2023  Time:    13:10               Narrative:    EXAMINATION:  XR CHEST AP PORTABLE    CLINICAL HISTORY:  Chest Pain;    TECHNIQUE:  Single frontal view of the chest was performed.    COMPARISON:  01/26/2023    FINDINGS:  The cardiomediastinal silhouette is midline, upper normal limit in size.  The pulmonary vascularity is normal.    The lungs are well inflated, clear.  No focal airspace disease.    No pleural effusion, no pneumothorax.    The osseous structures appear within normal limits for age.                                       Medications - No data to display  Medical Decision Making  83-year-old female presenting secondary to chest pain.  Reproducible.  Patient has an EKG that is either showing pre  atrial contractions, sinus bradycardia, junctional rhythm or moving in between them.  I spoke with Dr. Mojica regarding this patient.  Recommended observation and echo.  Vitals reassuring.  Patient agreeable.  .  Troponin normal.    Also considered:     PE: normal rate, no sob/recent immobilization/surgery/travel/family history.  Wells score 0  Pneumonia: chest xray negative. No fever. No cough and lungs non consistent with pna  Tamponade: unlikely due to chest xray and ekg  STEMI: No STEMI on ekg  Dissection: equal pulses bilaterally and no ripping chest pain to the back  Esophageal rupture: no dysphagia or vomiting and chest xray negative for mediastinal air  Arrhythmia: no arrhythmia on ekg  CHF: no fluid overload on Cxr and physical exam  Pneumothorax: bilateral breath sounds and no signs of pneumothorax on chest xray     Amount and/or Complexity of Data Reviewed  Labs: ordered.     Details: Hemoglobin 13.  Radiology: ordered and independent interpretation performed.     Details: No pneumothorax  ECG/medicine tests: ordered and independent interpretation performed. Decision-making details documented in ED Course.      Additional MDM:   Heart Score:    History:          Slightly suspicious.  ECG:             Normal  Age:               >65 years  Risk factors: 1-2 risk factors  Troponin:       Less than or equal to normal limit  Heart Score = 3                                       Clinical Impression:  Final diagnoses:  [R07.9] Chest pain  [R07.89] Chest wall pain  [R94.31] Abnormal EKG (Primary)          ED Disposition Condition    Observation Stable                Martinez Esqueda MD  12/04/23 [id number]

## 2023-12-04 NOTE — HPI
83 y.o. female with attention, depression, and hyperlipidemia presents with complaint of chest pain.  Acute onset after the death of her sister, duration 3 days, has been constant, associated with emotional stress, exacerbated by palpation.  Denies fever, chills, cough, SOB, palpitations, dizziness, syncope, nausea, vomiting, diarrhea, or abdominal pain.  In the ED, EKG without evidence of acute ischemia, initial troponin negative.  Otherwise unremarkable for acute abnormality.  Placed in observation for ACS rule out.

## 2023-12-04 NOTE — ED TRIAGE NOTES
"Pt arrived to ED with c/o chest pain x 3 days. Pt reports chest pain began when she received the phone call that her sister, "was found dead on the floor." States intermittent pain. Reports pain got worse today. Denies Cp at this time. CP reproducible. Pt reports being under a lot of stress. Pt noted to be bradycardic. Denies sob.   "

## 2023-12-04 NOTE — ED NOTES
Pt presented today w/ c/o 3 days of constant chest pain after sister's death.  Denies sob, nausea or fever.  ED work up done and pt admitted to Observation, Dx chest pain.  Presently pt is calm, AAOx3, resp even and unlabored, skin warm and dry.  NAD noted. Denies any complaints at this time.  Awaiting admit bed.

## 2023-12-05 VITALS
RESPIRATION RATE: 20 BRPM | TEMPERATURE: 98 F | OXYGEN SATURATION: 93 % | BODY MASS INDEX: 37.44 KG/M2 | WEIGHT: 190.69 LBS | HEART RATE: 70 BPM | SYSTOLIC BLOOD PRESSURE: 153 MMHG | DIASTOLIC BLOOD PRESSURE: 71 MMHG | HEIGHT: 60 IN

## 2023-12-05 LAB
ASCENDING AORTA: 3.5 CM
AV INDEX (PROSTH): 0.88
AV MEAN GRADIENT: 5 MMHG
AV PEAK GRADIENT: 9 MMHG
AV VALVE AREA BY VELOCITY RATIO: 2.82 CM²
AV VALVE AREA: 3.02 CM²
AV VELOCITY RATIO: 0.82
BSA FOR ECHO PROCEDURE: 1.91 M2
CV ECHO LV RWT: 0.49 CM
DOP CALC AO PEAK VEL: 1.47 M/S
DOP CALC AO VTI: 34.5 CM
DOP CALC LVOT AREA: 3.4 CM2
DOP CALC LVOT DIAMETER: 2.09 CM
DOP CALC LVOT PEAK VEL: 1.21 M/S
DOP CALC LVOT STROKE VOLUME: 104.24 CM3
DOP CALCLVOT PEAK VEL VTI: 30.4 CM
E WAVE DECELERATION TIME: 240 MSEC
E/A RATIO: 0.88
E/E' RATIO: 10.27 M/S
ECHO LV POSTERIOR WALL: 1.05 CM (ref 0.6–1.1)
FRACTIONAL SHORTENING: 32 % (ref 28–44)
INTERVENTRICULAR SEPTUM: 1.14 CM (ref 0.6–1.1)
IVC DIAMETER: 1.62 CM
IVRT: 145.58 MSEC
LA MAJOR: 5.98 CM
LA MINOR: 5.55 CM
LA WIDTH: 3.6 CM
LEFT ATRIUM SIZE: 4.27 CM
LEFT ATRIUM VOLUME INDEX: 41.1 ML/M2
LEFT ATRIUM VOLUME: 75.22 CM3
LEFT INTERNAL DIMENSION IN SYSTOLE: 2.92 CM (ref 2.1–4)
LEFT VENTRICLE DIASTOLIC VOLUME INDEX: 45.04 ML/M2
LEFT VENTRICLE DIASTOLIC VOLUME: 82.43 ML
LEFT VENTRICLE MASS INDEX: 88 G/M2
LEFT VENTRICLE SYSTOLIC VOLUME INDEX: 17.9 ML/M2
LEFT VENTRICLE SYSTOLIC VOLUME: 32.67 ML
LEFT VENTRICULAR INTERNAL DIMENSION IN DIASTOLE: 4.29 CM (ref 3.5–6)
LEFT VENTRICULAR MASS: 161.3 G
LV LATERAL E/E' RATIO: 7.7 M/S
LV SEPTAL E/E' RATIO: 15.4 M/S
LVOT MG: 3.23 MMHG
LVOT MV: 0.86 CM/S
MV PEAK A VEL: 0.88 M/S
MV PEAK E VEL: 0.77 M/S
MV STENOSIS PRESSURE HALF TIME: 69.6 MS
MV VALVE AREA P 1/2 METHOD: 3.16 CM2
PISA TR MAX VEL: 1.24 M/S
PULM VEIN S/D RATIO: 1.42
PV PEAK D VEL: 0.38 M/S
PV PEAK GRADIENT: 3 MMHG
PV PEAK S VEL: 0.54 M/S
PV PEAK VELOCITY: 0.88 M/S
RA MAJOR: 5.17 CM
RA PRESSURE ESTIMATED: 8 MMHG
RA WIDTH: 3.4 CM
RIGHT VENTRICULAR END-DIASTOLIC DIMENSION: 3.63 CM
RV TB RVSP: 9 MMHG
RV TISSUE DOPPLER FREE WALL SYSTOLIC VELOCITY 1 (APICAL 4 CHAMBER VIEW): 10.47 CM/S
SINUS: 3.41 CM
STJ: 2.83 CM
TDI LATERAL: 0.1 M/S
TDI SEPTAL: 0.05 M/S
TDI: 0.08 M/S
TR MAX PG: 6 MMHG
TRICUSPID ANNULAR PLANE SYSTOLIC EXCURSION: 1.43 CM
TV PEAK GRADIENT: 2 MMHG
TV REST PULMONARY ARTERY PRESSURE: 14 MMHG
Z-SCORE OF LEFT VENTRICULAR DIMENSION IN END DIASTOLE: -1.67
Z-SCORE OF LEFT VENTRICULAR DIMENSION IN END SYSTOLE: -0.55

## 2023-12-05 PROCEDURE — 99213 PR OFFICE/OUTPT VISIT, EST, LEVL III, 20-29 MIN: ICD-10-PCS | Mod: 25,,, | Performed by: INTERNAL MEDICINE

## 2023-12-05 PROCEDURE — 25000003 PHARM REV CODE 250: Performed by: HOSPITALIST

## 2023-12-05 PROCEDURE — 99213 OFFICE O/P EST LOW 20 MIN: CPT | Mod: 25,,, | Performed by: INTERNAL MEDICINE

## 2023-12-05 PROCEDURE — G0378 HOSPITAL OBSERVATION PER HR: HCPCS

## 2023-12-05 RX ADMIN — CLONIDINE HYDROCHLORIDE 0.1 MG: 0.1 TABLET ORAL at 08:12

## 2023-12-05 RX ADMIN — LOSARTAN POTASSIUM 50 MG: 25 TABLET, FILM COATED ORAL at 08:12

## 2023-12-05 RX ADMIN — AMLODIPINE BESYLATE 10 MG: 5 TABLET ORAL at 08:12

## 2023-12-05 RX ADMIN — ASPIRIN 81 MG: 81 TABLET, COATED ORAL at 08:12

## 2023-12-05 RX ADMIN — ATORVASTATIN CALCIUM 40 MG: 40 TABLET, FILM COATED ORAL at 08:12

## 2023-12-05 NOTE — PROGRESS NOTES
[facility]  Cardiology  Progress Note    Patient Name: Jose Manuel Boyd  MRN: [id number]  Admission Date: 12/4/2023  Hospital Length of Stay: 0 days  Code Status: Full Code   Attending Physician: Fantasma Holliday MD   Primary Care Physician: Ajit Piña MD  Expected Discharge Date: 12/5/2023  Principal Problem:Chest pain    Subjective:       Interval Hx:  No chest pain or shortness of breath.  Echocardiogram normal.  No evidence of high-degree AV block on telemetry.    Tele: SR 70s (personally reviewed and interpreted)      Past Medical History:   Diagnosis Date    Anxiety     Asthma     Depression     Headache     Hx of psychiatric care     Hypercholesterolemia     Hypertension     Psychiatric problem     Sleep difficulties     Therapy     Thyroid disease     multiple nodules       Past Surgical History:   Procedure Laterality Date    ANGIOGRAM, CORONARY, WITH LEFT HEART CATHETERIZATION Left 10/18/2022    Procedure: Angiogram, Coronary, with Left Heart Cath;  Surgeon: Kumar Randall MD;  Location: [facility] CATH LAB;  Service: Cardiology;  Laterality: Left;    CHOLECYSTECTOMY      HYSTERECTOMY      knee repalcement         Review of patient's allergies indicates:   Allergen Reactions    Codeine      Other reaction(s): Rash       No current facility-administered medications on file prior to encounter.     Current Outpatient Medications on File Prior to Encounter   Medication Sig    acetaminophen (TYLENOL) 325 MG tablet Take 2 tablets (650 mg total) by mouth every 6 (six) hours as needed for Pain.    albuterol (VENTOLIN HFA) 90 mcg/actuation inhaler Inhale 2 puffs into the lungs every 4 (four) hours as needed for Wheezing.    amLODIPine (NORVASC) 10 MG tablet Take 1 tablet (10 mg total) by mouth once daily.    ascorbic acid, vitamin C, (VITAMIN C) 1000 MG tablet Take 1,000 mg by mouth once daily.    aspirin (ECOTRIN) 81 MG EC tablet Take 81 mg by mouth once daily.    atorvastatin (LIPITOR) 40 MG  tablet Take 1 tablet (40 mg total) by mouth once daily.    clonazePAM (KLONOPIN) 0.5 MG tablet Take 1 tablet (0.5 mg total) by mouth 2 (two) times daily as needed for Anxiety.    cloNIDine (CATAPRES) 0.1 MG tablet TAKE 1 TABLET BY MOUTH 2 TIMES DAILY.    cyanocobalamin (VITAMIN B-12) 1000 MCG tablet Take 100 mcg by mouth once daily.    dicyclomine (BENTYL) 20 mg tablet Take 1 tablet (20 mg total) by mouth 2 (two) times daily as needed.    losartan (COZAAR) 50 MG tablet TAKE 1 TABLET (50 MG TOTAL) BY MOUTH ONCE DAILY. FOR HIGH BLOOD PRESSURE    mirtazapine (REMERON) 30 MG tablet TAKE 1 TABLET BY MOUTH NIGHTLY AT BEDTIME AS NEEDED FOR INSOMNIA , SLEEP, ANXIETY AND DEPRESSION.    multivitamin with minerals tablet Take 1 tablet by mouth once daily.    olopatadine (PATANOL) 0.1 % ophthalmic solution Place 1 drop into both eyes 2 (two) times daily.    omega-3 fatty acids/fish oil (FISH OIL-OMEGA-3 FATTY ACIDS) 300-1,000 mg capsule Take by mouth once daily.    psyllium (METAMUCIL) powder Take 1 packet by mouth daily as needed.    fluticasone propionate (FLONASE) 50 mcg/actuation nasal spray 1 spray (50 mcg total) by Each Nostril route 2 (two) times daily. For allergic rhinitis/sinusitis    furosemide (LASIX) 20 MG tablet TAKE 1 TABLET BY MOUTH EVERY DAY    omeprazole (PRILOSEC) 20 MG capsule Take 1 capsule (20 mg total) by mouth once daily. For gastric reflux     Family History       Problem Relation (Age of Onset)    Bipolar disorder Daughter    Diabetes Mother    Heart disease Father    Hypertension Mother    Kidney disease Mother          Tobacco Use    Smoking status: Never    Smokeless tobacco: Never   Substance and Sexual Activity    Alcohol use: No    Drug use: No    Sexual activity: Not Currently     Review of Systems   Gastrointestinal:  Negative for melena.   Genitourinary:  Negative for hematuria.     Objective:     Vital Signs (Most Recent):  Temp: 97.5 °F (36.4 °C) (12/05/23 1131)  Pulse: 70 (12/05/23  1131)  Resp: 20 (12/05/23 1131)  BP: (!) 153/71 (12/05/23 1131)  SpO2: (!) 93 % (12/05/23 1131) Vital Signs (24h Range):  Temp:  [97.5 °F (36.4 °C)-98.4 °F (36.9 °C)] 97.5 °F (36.4 °C)  Pulse:  [45-70] 70  Resp:  [17-20] 20  SpO2:  [91 %-96 %] 93 %  BP: (117-172)/(60-80) 153/71     Weight: 86.5 kg (190 lb 11.2 oz)  Body mass index is 37.24 kg/m².    SpO2: (!) 93 %         Intake/Output Summary (Last 24 hours) at 12/5/2023 1210  Last data filed at 12/5/2023 0800  Gross per 24 hour   Intake 120 ml   Output --   Net 120 ml       Lines/Drains/Airways       Peripheral Intravenous Line  Duration                  Peripheral IV - Single Lumen 12/04/23 1355 20 G Right Antecubital <1 day                   Exam unchanged vs 12/4/23  Physical Exam  Constitutional:       General: She is not in acute distress.     Appearance: She is well-developed. She is obese. She is not ill-appearing, toxic-appearing or diaphoretic.   HENT:      Head: Normocephalic and atraumatic.   Eyes:      General: No scleral icterus.     Extraocular Movements: Extraocular movements intact.      Conjunctiva/sclera: Conjunctivae normal.      Pupils: Pupils are equal, round, and reactive to light.   Neck:      Vascular: No JVD.      Trachea: No tracheal deviation.   Cardiovascular:      Rate and Rhythm: Normal rate and regular rhythm.      Heart sounds: S1 normal and S2 normal. No murmur heard.     No friction rub. No gallop.   Pulmonary:      Effort: Pulmonary effort is normal. No respiratory distress.      Breath sounds: Normal breath sounds. No stridor. No wheezing, rhonchi or rales.   Chest:      Chest wall: No tenderness.   Abdominal:      General: There is no distension.      Palpations: Abdomen is soft.   Musculoskeletal:         General: No swelling or tenderness. Normal range of motion.      Cervical back: Normal range of motion and neck supple. No rigidity.   Skin:     General: Skin is warm and dry.      Coloration: Skin is not jaundiced.    Neurological:      General: No focal deficit present.      Mental Status: She is alert and oriented to person, place, and time.      Cranial Nerves: No cranial nerve deficit.   Psychiatric:         Mood and Affect: Mood normal.         Behavior: Behavior normal.          Current Medications:   amLODIPine  10 mg Oral Daily    aspirin  81 mg Oral Daily    atorvastatin  40 mg Oral Daily    cloNIDine  0.1 mg Oral BID    enoxparin  40 mg Subcutaneous Daily    losartan  50 mg Oral Daily    mirtazapine  30 mg Oral QHS    polyethylene glycol  17 g Oral Daily       acetaminophen, aluminum-magnesium hydroxide-simethicone, clonazePAM, dextrose 10%, dextrose 10%, glucagon (human recombinant), glucose, glucose, melatonin, naloxone, ondansetron, prochlorperazine, simethicone, sodium chloride 0.9%    Laboratory (all labs reviewed):  CBC:  Recent Labs   Lab 01/07/23  1641 01/26/23  1201 05/24/23  1218 10/20/23  0837 12/04/23  1401   WBC 10.05 7.58 8.27 10.19 9.08   Hemoglobin 13.5 13.9 13.0 13.2 13.0   Hematocrit 42.3 43.6 40.5 43.2 42.3   Platelets 259 256 255 225 233         CHEMISTRIES:  Recent Labs   Lab 06/28/21  1007 06/29/21  0549 08/09/21  0900 11/05/21  0957 11/27/21  1839 05/09/22  1104 05/31/22  1520 07/24/22  1605 08/15/22  0930 01/07/23  1711 01/26/23  1201 05/24/23  1218 10/20/23  0837 12/04/23  1401   Glucose 153 H 117 H   < > 132 H 101 124 H 140 H 115 H   < > 99 100 94 113 H 71   Sodium 140 140   < > 146 H 144 145 143 142   < > 139 140 141 144 142   Potassium 4.0 3.9   < > 4.3 3.9 3.8 3.7 4.9   < > 4.6 4.2 4.2 4.2 3.8   BUN 16 15   < > 13 13 16 14 8   < > 12 7 L 15 14 22   Creatinine 0.8 0.7   < > 0.8 0.8 0.8 0.9 0.8   < > 0.8 0.8 0.8 0.8 1.1   eGFR if non African American >60 >60   < > >60 >60 >60 59 A >60  --   --   --   --   --   --    eGFR  --   --   --   --   --   --   --   --    < > >60 >60 >60 >60 49 A   Calcium 9.4 9.1   < > 10.3 9.2 9.5 9.3 9.6   < > 8.9 9.3 9.7 9.8 9.3   Magnesium 2.0 2.0  --   --   --    --   --  2.2  --   --   --  2.1  --   --     < > = values in this interval not displayed.         CARDIAC BIOMARKERS:  Recent Labs   Lab 12/06/22  2342 05/24/23  1218 12/04/23  1401 12/04/23  1804 12/04/23  2217   Troponin I 0.023 0.015 0.016 0.014 0.008         COAGS:  Recent Labs   Lab 10/16/22  1227 05/24/23  1218   INR 1.0 1.0         LIPIDS/LFTS:  Recent Labs   Lab 08/15/22  0930 10/16/22  1227 01/07/23  1711 01/26/23  1201 05/24/23  1218 10/20/23  0837 12/04/23  1401   Cholesterol 179  --   --   --   --   --   --    Triglycerides 77  --   --   --   --   --   --    HDL 52  --   --   --   --   --   --    LDL Cholesterol 111.6  --   --   --   --   --   --    Non-HDL Cholesterol 127  --   --   --   --   --   --    AST  --    < > 20 14 16 16 17   ALT  --    < > 16 9 L 16 14 10    < > = values in this interval not displayed.         BNP:  Recent Labs   Lab 06/28/21  1007 07/24/22  1510 10/16/22  1227 05/24/23  1218 12/04/23  1401   BNP 94 571 H 378 H 149 H 505 H         TSH:  Recent Labs   Lab 06/28/21  1007 08/15/22  0930 01/18/23  1131 01/26/23  1201 05/24/23  1218   TSH 0.980 0.636 0.669 0.513 1.184         Free T4:  Recent Labs   Lab 01/18/23  1131   Free T4 0.98         Diagnostic Results:  ECG (personally reviewed and interpreted tracing(s)):  12/4/23 1156 junct 49  12/4/23 1401 junct 44    Chest X-Ray (personally reviewed and interpreted image(s)): 12/4/23 CMeg, mild CHF, aortic atherosclerosis.    Echo: 12/5/23 (images personally reviewed and interpreted)    Left Ventricle: The left ventricle is normal in size. Increased wall thickness. There is concentric remodeling. Normal wall motion. There is normal systolic function with a visually estimated ejection fraction of 60 - 65%. Grade I diastolic dysfunction.    Right Ventricle: Normal right ventricular cavity size. Systolic function is normal.    Aortic Valve: The aortic valve is a trileaflet valve. There is moderate aortic valve sclerosis.    Mitral Valve:  There is mild regurgitation.    Pulmonary Artery: The estimated pulmonary artery systolic pressure is 14 mmHg.    Cath: 10/18/22  Normal coronary arteries.  Hypertensive heart disease.    Assessment and Plan:     * Chest pain  Ongoing for several days, appears to have abated  Trop neg  Doubt ACS  Cath with normal cors 10/2022  Trops neg  Echo without WMA  No further cardiac testing planned.    Junctional bradycardia  Intermittent junct rhythm noted.  No LOC/palps/LH  This rhythm has been noted on prev EKGs  Echo with normal LV fxn  No plan need for PPM    Hypertension  Cont med rx    Mixed hyperlipidemia  Cont statin    Aortic atherosclerosis  Noted on CXR 12/4/23    Elevated brain natriuretic peptide (BNP) level  No sxs of CHF aside from LE edema  Consider switching norvasc to a different medication        VTE Risk Mitigation (From admission, onward)           Ordered     enoxaparin injection 40 mg  Daily         12/04/23 1610     IP VTE HIGH RISK PATIENT  Once         12/04/23 1610     Place sequential compression device  Until discontinued         12/04/23 1610                  Disposition planning appropriate.    Cardiology will sign off, please call with questions.    Patient to follow up with Dr. Randall in the office.    Amish Mojica MD  Cardiology  [facility] - Telemetry

## 2023-12-05 NOTE — SUBJECTIVE & OBJECTIVE
Past Medical History:   Diagnosis Date    Anxiety     Asthma     Depression     Headache     Hx of psychiatric care     Hypercholesterolemia     Hypertension     Psychiatric problem     Sleep difficulties     Therapy     Thyroid disease     multiple nodules       Past Surgical History:   Procedure Laterality Date    ANGIOGRAM, CORONARY, WITH LEFT HEART CATHETERIZATION Left 10/18/2022    Procedure: Angiogram, Coronary, with Left Heart Cath;  Surgeon: Kumar Randall MD;  Location: [facility] CATH LAB;  Service: Cardiology;  Laterality: Left;    CHOLECYSTECTOMY      HYSTERECTOMY      knee repalcement         Review of patient's allergies indicates:   Allergen Reactions    Codeine      Other reaction(s): Rash       No current facility-administered medications on file prior to encounter.     Current Outpatient Medications on File Prior to Encounter   Medication Sig    acetaminophen (TYLENOL) 325 MG tablet Take 2 tablets (650 mg total) by mouth every 6 (six) hours as needed for Pain.    albuterol (VENTOLIN HFA) 90 mcg/actuation inhaler Inhale 2 puffs into the lungs every 4 (four) hours as needed for Wheezing.    amLODIPine (NORVASC) 10 MG tablet Take 1 tablet (10 mg total) by mouth once daily.    ascorbic acid, vitamin C, (VITAMIN C) 1000 MG tablet Take 1,000 mg by mouth once daily.    aspirin (ECOTRIN) 81 MG EC tablet Take 81 mg by mouth once daily.    atorvastatin (LIPITOR) 40 MG tablet Take 1 tablet (40 mg total) by mouth once daily.    clonazePAM (KLONOPIN) 0.5 MG tablet Take 1 tablet (0.5 mg total) by mouth 2 (two) times daily as needed for Anxiety.    cloNIDine (CATAPRES) 0.1 MG tablet TAKE 1 TABLET BY MOUTH 2 TIMES DAILY.    cyanocobalamin (VITAMIN B-12) 1000 MCG tablet Take 100 mcg by mouth once daily.    dicyclomine (BENTYL) 20 mg tablet Take 1 tablet (20 mg total) by mouth 2 (two) times daily as needed.    losartan (COZAAR) 50 MG tablet TAKE 1 TABLET (50 MG TOTAL) BY MOUTH ONCE DAILY. FOR HIGH BLOOD PRESSURE     mirtazapine (REMERON) 30 MG tablet TAKE 1 TABLET BY MOUTH NIGHTLY AT BEDTIME AS NEEDED FOR INSOMNIA , SLEEP, ANXIETY AND DEPRESSION.    multivitamin with minerals tablet Take 1 tablet by mouth once daily.    olopatadine (PATANOL) 0.1 % ophthalmic solution Place 1 drop into both eyes 2 (two) times daily.    omega-3 fatty acids/fish oil (FISH OIL-OMEGA-3 FATTY ACIDS) 300-1,000 mg capsule Take by mouth once daily.    psyllium (METAMUCIL) powder Take 1 packet by mouth daily as needed.    fluticasone propionate (FLONASE) 50 mcg/actuation nasal spray 1 spray (50 mcg total) by Each Nostril route 2 (two) times daily. For allergic rhinitis/sinusitis    furosemide (LASIX) 20 MG tablet TAKE 1 TABLET BY MOUTH EVERY DAY    omeprazole (PRILOSEC) 20 MG capsule Take 1 capsule (20 mg total) by mouth once daily. For gastric reflux     Family History       Problem Relation (Age of Onset)    Bipolar disorder Daughter    Diabetes Mother    Heart disease Father    Hypertension Mother    Kidney disease Mother          Tobacco Use    Smoking status: Never    Smokeless tobacco: Never   Substance and Sexual Activity    Alcohol use: No    Drug use: No    Sexual activity: Not Currently     Review of Systems   Gastrointestinal:  Negative for melena.   Genitourinary:  Negative for hematuria.     Objective:     Vital Signs (Most Recent):  Temp: 97.5 °F (36.4 °C) (12/05/23 1131)  Pulse: 70 (12/05/23 1131)  Resp: 20 (12/05/23 1131)  BP: (!) 153/71 (12/05/23 1131)  SpO2: (!) 93 % (12/05/23 1131) Vital Signs (24h Range):  Temp:  [97.5 °F (36.4 °C)-98.4 °F (36.9 °C)] 97.5 °F (36.4 °C)  Pulse:  [45-70] 70  Resp:  [17-20] 20  SpO2:  [91 %-96 %] 93 %  BP: (117-172)/(60-80) 153/71     Weight: 86.5 kg (190 lb 11.2 oz)  Body mass index is 37.24 kg/m².    SpO2: (!) 93 %         Intake/Output Summary (Last 24 hours) at 12/5/2023 1210  Last data filed at 12/5/2023 0800  Gross per 24 hour   Intake 120 ml   Output --   Net 120 ml       Lines/Drains/Airways        Peripheral Intravenous Line  Duration                  Peripheral IV - Single Lumen 12/04/23 1355 20 G Right Antecubital <1 day                   Exam unchanged vs 12/4/23  Physical Exam  Constitutional:       General: She is not in acute distress.     Appearance: She is well-developed. She is obese. She is not ill-appearing, toxic-appearing or diaphoretic.   HENT:      Head: Normocephalic and atraumatic.   Eyes:      General: No scleral icterus.     Extraocular Movements: Extraocular movements intact.      Conjunctiva/sclera: Conjunctivae normal.      Pupils: Pupils are equal, round, and reactive to light.   Neck:      Vascular: No JVD.      Trachea: No tracheal deviation.   Cardiovascular:      Rate and Rhythm: Normal rate and regular rhythm.      Heart sounds: S1 normal and S2 normal. No murmur heard.     No friction rub. No gallop.   Pulmonary:      Effort: Pulmonary effort is normal. No respiratory distress.      Breath sounds: Normal breath sounds. No stridor. No wheezing, rhonchi or rales.   Chest:      Chest wall: No tenderness.   Abdominal:      General: There is no distension.      Palpations: Abdomen is soft.   Musculoskeletal:         General: No swelling or tenderness. Normal range of motion.      Cervical back: Normal range of motion and neck supple. No rigidity.   Skin:     General: Skin is warm and dry.      Coloration: Skin is not jaundiced.   Neurological:      General: No focal deficit present.      Mental Status: She is alert and oriented to person, place, and time.      Cranial Nerves: No cranial nerve deficit.   Psychiatric:         Mood and Affect: Mood normal.         Behavior: Behavior normal.          Current Medications:   amLODIPine  10 mg Oral Daily    aspirin  81 mg Oral Daily    atorvastatin  40 mg Oral Daily    cloNIDine  0.1 mg Oral BID    enoxparin  40 mg Subcutaneous Daily    losartan  50 mg Oral Daily    mirtazapine  30 mg Oral QHS    polyethylene glycol  17 g Oral Daily        acetaminophen, aluminum-magnesium hydroxide-simethicone, clonazePAM, dextrose 10%, dextrose 10%, glucagon (human recombinant), glucose, glucose, melatonin, naloxone, ondansetron, prochlorperazine, simethicone, sodium chloride 0.9%    Laboratory (all labs reviewed):  CBC:  Recent Labs   Lab 01/07/23  1641 01/26/23  1201 05/24/23  1218 10/20/23  0837 12/04/23  1401   WBC 10.05 7.58 8.27 10.19 9.08   Hemoglobin 13.5 13.9 13.0 13.2 13.0   Hematocrit 42.3 43.6 40.5 43.2 42.3   Platelets 259 256 255 225 233         CHEMISTRIES:  Recent Labs   Lab 06/28/21  1007 06/29/21  0549 08/09/21  0900 11/05/21  0957 11/27/21  1839 05/09/22  1104 05/31/22  1520 07/24/22  1605 08/15/22  0930 01/07/23  1711 01/26/23  1201 05/24/23  1218 10/20/23  0837 12/04/23  1401   Glucose 153 H 117 H   < > 132 H 101 124 H 140 H 115 H   < > 99 100 94 113 H 71   Sodium 140 140   < > 146 H 144 145 143 142   < > 139 140 141 144 142   Potassium 4.0 3.9   < > 4.3 3.9 3.8 3.7 4.9   < > 4.6 4.2 4.2 4.2 3.8   BUN 16 15   < > 13 13 16 14 8   < > 12 7 L 15 14 22   Creatinine 0.8 0.7   < > 0.8 0.8 0.8 0.9 0.8   < > 0.8 0.8 0.8 0.8 1.1   eGFR if non African American >60 >60   < > >60 >60 >60 59 A >60  --   --   --   --   --   --    eGFR  --   --   --   --   --   --   --   --    < > >60 >60 >60 >60 49 A   Calcium 9.4 9.1   < > 10.3 9.2 9.5 9.3 9.6   < > 8.9 9.3 9.7 9.8 9.3   Magnesium 2.0 2.0  --   --   --   --   --  2.2  --   --   --  2.1  --   --     < > = values in this interval not displayed.         CARDIAC BIOMARKERS:  Recent Labs   Lab 12/06/22  2342 05/24/23  1218 12/04/23  1401 12/04/23  1804 12/04/23  2217   Troponin I 0.023 0.015 0.016 0.014 0.008         COAGS:  Recent Labs   Lab 10/16/22  1227 05/24/23  1218   INR 1.0 1.0         LIPIDS/LFTS:  Recent Labs   Lab 08/15/22  0930 10/16/22  1227 01/07/23  1711 01/26/23  1201 05/24/23  1218 10/20/23  0837 12/04/23  1401   Cholesterol 179  --   --   --   --   --   --    Triglycerides 77  --   --   --    --   --   --    HDL 52  --   --   --   --   --   --    LDL Cholesterol 111.6  --   --   --   --   --   --    Non-HDL Cholesterol 127  --   --   --   --   --   --    AST  --    < > 20 14 16 16 17   ALT  --    < > 16 9 L 16 14 10    < > = values in this interval not displayed.         BNP:  Recent Labs   Lab 06/28/21  1007 07/24/22  1510 10/16/22  1227 05/24/23  1218 12/04/23  1401   BNP 94 571 H 378 H 149 H 505 H         TSH:  Recent Labs   Lab 06/28/21  1007 08/15/22  0930 01/18/23  1131 01/26/23  1201 05/24/23  1218   TSH 0.980 0.636 0.669 0.513 1.184         Free T4:  Recent Labs   Lab 01/18/23  1131   Free T4 0.98         Diagnostic Results:  ECG (personally reviewed and interpreted tracing(s)):  12/4/23 1156 junct 49  12/4/23 1401 junct 44    Chest X-Ray (personally reviewed and interpreted image(s)): 12/4/23 CMeg, mild CHF, aortic atherosclerosis.    Echo: 12/5/23 (images personally reviewed and interpreted)    Left Ventricle: The left ventricle is normal in size. Increased wall thickness. There is concentric remodeling. Normal wall motion. There is normal systolic function with a visually estimated ejection fraction of 60 - 65%. Grade I diastolic dysfunction.    Right Ventricle: Normal right ventricular cavity size. Systolic function is normal.    Aortic Valve: The aortic valve is a trileaflet valve. There is moderate aortic valve sclerosis.    Mitral Valve: There is mild regurgitation.    Pulmonary Artery: The estimated pulmonary artery systolic pressure is 14 mmHg.    Cath: 10/18/22  Normal coronary arteries.  Hypertensive heart disease.

## 2023-12-05 NOTE — HOSPITAL COURSE
Interval Hx:  No chest pain or shortness of breath.  Echocardiogram normal.  No evidence of high-degree AV block on telemetry.    Tele: SR 70s (personally reviewed and interpreted)

## 2023-12-05 NOTE — SUBJECTIVE & OBJECTIVE
Past Medical History:   Diagnosis Date    Anxiety     Asthma     Depression     Headache     Hx of psychiatric care     Hypercholesterolemia     Hypertension     Psychiatric problem     Sleep difficulties     Therapy     Thyroid disease     multiple nodules       Past Surgical History:   Procedure Laterality Date    ANGIOGRAM, CORONARY, WITH LEFT HEART CATHETERIZATION Left 10/18/2022    Procedure: Angiogram, Coronary, with Left Heart Cath;  Surgeon: Kumar Randall MD;  Location: [facility] CATH LAB;  Service: Cardiology;  Laterality: Left;    CHOLECYSTECTOMY      HYSTERECTOMY      knee repalcement         Review of patient's allergies indicates:   Allergen Reactions    Codeine      Other reaction(s): Rash       No current facility-administered medications on file prior to encounter.     Current Outpatient Medications on File Prior to Encounter   Medication Sig    acetaminophen (TYLENOL) 325 MG tablet Take 2 tablets (650 mg total) by mouth every 6 (six) hours as needed for Pain.    albuterol (VENTOLIN HFA) 90 mcg/actuation inhaler Inhale 2 puffs into the lungs every 4 (four) hours as needed for Wheezing.    amLODIPine (NORVASC) 10 MG tablet Take 1 tablet (10 mg total) by mouth once daily.    ascorbic acid, vitamin C, (VITAMIN C) 1000 MG tablet Take 1,000 mg by mouth once daily.    aspirin (ECOTRIN) 81 MG EC tablet Take 81 mg by mouth once daily.    atorvastatin (LIPITOR) 40 MG tablet Take 1 tablet (40 mg total) by mouth once daily.    clonazePAM (KLONOPIN) 0.5 MG tablet Take 1 tablet (0.5 mg total) by mouth 2 (two) times daily as needed for Anxiety.    cloNIDine (CATAPRES) 0.1 MG tablet TAKE 1 TABLET BY MOUTH 2 TIMES DAILY.    cyanocobalamin (VITAMIN B-12) 1000 MCG tablet Take 100 mcg by mouth once daily.    dicyclomine (BENTYL) 20 mg tablet Take 1 tablet (20 mg total) by mouth 2 (two) times daily as needed.    losartan (COZAAR) 50 MG tablet TAKE 1 TABLET (50 MG TOTAL) BY MOUTH ONCE DAILY. FOR HIGH BLOOD PRESSURE     mirtazapine (REMERON) 30 MG tablet TAKE 1 TABLET BY MOUTH NIGHTLY AT BEDTIME AS NEEDED FOR INSOMNIA , SLEEP, ANXIETY AND DEPRESSION.    multivitamin with minerals tablet Take 1 tablet by mouth once daily.    olopatadine (PATANOL) 0.1 % ophthalmic solution Place 1 drop into both eyes 2 (two) times daily.    omega-3 fatty acids/fish oil (FISH OIL-OMEGA-3 FATTY ACIDS) 300-1,000 mg capsule Take by mouth once daily.    psyllium (METAMUCIL) powder Take 1 packet by mouth daily as needed.    fluticasone propionate (FLONASE) 50 mcg/actuation nasal spray 1 spray (50 mcg total) by Each Nostril route 2 (two) times daily. For allergic rhinitis/sinusitis    furosemide (LASIX) 20 MG tablet TAKE 1 TABLET BY MOUTH EVERY DAY    omeprazole (PRILOSEC) 20 MG capsule Take 1 capsule (20 mg total) by mouth once daily. For gastric reflux    [DISCONTINUED] azelastine (ASTELIN) 137 mcg (0.1 %) nasal spray 1 spray (137 mcg total) by Nasal route 2 (two) times daily.    [DISCONTINUED] naphazoline HCl/pheniramine (EYE ALLERGY RELIEF OPHT) Apply to eye.     Family History       Problem Relation (Age of Onset)    Bipolar disorder Daughter    Diabetes Mother    Heart disease Father    Hypertension Mother    Kidney disease Mother          Tobacco Use    Smoking status: Never    Smokeless tobacco: Never   Substance and Sexual Activity    Alcohol use: No    Drug use: No    Sexual activity: Not Currently     Review of Systems   Constitutional:  Negative for chills, fatigue and fever.   HENT:  Negative for congestion and rhinorrhea.    Eyes:  Negative for photophobia and visual disturbance.   Respiratory:  Negative for cough and shortness of breath.    Cardiovascular:  Positive for chest pain. Negative for palpitations and leg swelling.   Gastrointestinal:  Negative for abdominal pain, diarrhea, nausea and vomiting.   Genitourinary:  Negative for dysuria, frequency and urgency.   Skin:  Negative for pallor, rash and wound.   Neurological:  Negative for  light-headedness and headaches.   Psychiatric/Behavioral:  Positive for dysphoric mood. Negative for confusion and decreased concentration.      Objective:     Vital Signs (Most Recent):  Temp: 98.4 °F (36.9 °C) (12/04/23 1602)  Pulse: 68 (12/04/23 1803)  Resp: 19 (12/04/23 1803)  BP: (!) 168/72 (12/04/23 1803)  SpO2: (!) 92 % (12/04/23 1803) Vital Signs (24h Range):  Temp:  [98 °F (36.7 °C)-98.4 °F (36.9 °C)] 98.4 °F (36.9 °C)  Pulse:  [45-69] 68  Resp:  [17-20] 19  SpO2:  [92 %-96 %] 92 %  BP: ()/(45-72) 168/72     Weight: 86.2 kg (190 lb)  Body mass index is 37.11 kg/m².     Physical Exam  Vitals and nursing note reviewed.   Constitutional:       General: She is not in acute distress.     Appearance: She is well-developed.   HENT:      Head: Normocephalic and atraumatic.      Right Ear: External ear normal.      Left Ear: External ear normal.      Nose: Nose normal.   Eyes:      Conjunctiva/sclera: Conjunctivae normal.   Cardiovascular:      Rate and Rhythm: Normal rate and regular rhythm.   Pulmonary:      Effort: Pulmonary effort is normal. No respiratory distress.   Abdominal:      General: There is no distension.      Palpations: Abdomen is soft.   Musculoskeletal:         General: Normal range of motion.   Skin:     General: Skin is warm and dry.   Neurological:      Mental Status: She is alert and oriented to person, place, and time.   Psychiatric:         Thought Content: Thought content normal.                Significant Labs: All pertinent labs within the past 24 hours have been reviewed.    Significant Imaging: I have reviewed all pertinent imaging results/findings within the past 24 hours.

## 2023-12-05 NOTE — SUBJECTIVE & OBJECTIVE
Past Medical History:   Diagnosis Date    Anxiety     Asthma     Depression     Headache     Hx of psychiatric care     Hypercholesterolemia     Hypertension     Psychiatric problem     Sleep difficulties     Therapy     Thyroid disease     multiple nodules       Past Surgical History:   Procedure Laterality Date    ANGIOGRAM, CORONARY, WITH LEFT HEART CATHETERIZATION Left 10/18/2022    Procedure: Angiogram, Coronary, with Left Heart Cath;  Surgeon: Kumar Randall MD;  Location: [facility] CATH LAB;  Service: Cardiology;  Laterality: Left;    CHOLECYSTECTOMY      HYSTERECTOMY      knee repalcement         Review of patient's allergies indicates:   Allergen Reactions    Codeine      Other reaction(s): Rash       No current facility-administered medications on file prior to encounter.     Current Outpatient Medications on File Prior to Encounter   Medication Sig    acetaminophen (TYLENOL) 325 MG tablet Take 2 tablets (650 mg total) by mouth every 6 (six) hours as needed for Pain.    albuterol (VENTOLIN HFA) 90 mcg/actuation inhaler Inhale 2 puffs into the lungs every 4 (four) hours as needed for Wheezing.    amLODIPine (NORVASC) 10 MG tablet Take 1 tablet (10 mg total) by mouth once daily.    ascorbic acid, vitamin C, (VITAMIN C) 1000 MG tablet Take 1,000 mg by mouth once daily.    aspirin (ECOTRIN) 81 MG EC tablet Take 81 mg by mouth once daily.    atorvastatin (LIPITOR) 40 MG tablet Take 1 tablet (40 mg total) by mouth once daily.    clonazePAM (KLONOPIN) 0.5 MG tablet Take 1 tablet (0.5 mg total) by mouth 2 (two) times daily as needed for Anxiety.    cloNIDine (CATAPRES) 0.1 MG tablet TAKE 1 TABLET BY MOUTH 2 TIMES DAILY.    cyanocobalamin (VITAMIN B-12) 1000 MCG tablet Take 100 mcg by mouth once daily.    dicyclomine (BENTYL) 20 mg tablet Take 1 tablet (20 mg total) by mouth 2 (two) times daily as needed.    losartan (COZAAR) 50 MG tablet TAKE 1 TABLET (50 MG TOTAL) BY MOUTH ONCE DAILY. FOR HIGH BLOOD PRESSURE     mirtazapine (REMERON) 30 MG tablet TAKE 1 TABLET BY MOUTH NIGHTLY AT BEDTIME AS NEEDED FOR INSOMNIA , SLEEP, ANXIETY AND DEPRESSION.    multivitamin with minerals tablet Take 1 tablet by mouth once daily.    olopatadine (PATANOL) 0.1 % ophthalmic solution Place 1 drop into both eyes 2 (two) times daily.    omega-3 fatty acids/fish oil (FISH OIL-OMEGA-3 FATTY ACIDS) 300-1,000 mg capsule Take by mouth once daily.    psyllium (METAMUCIL) powder Take 1 packet by mouth daily as needed.    fluticasone propionate (FLONASE) 50 mcg/actuation nasal spray 1 spray (50 mcg total) by Each Nostril route 2 (two) times daily. For allergic rhinitis/sinusitis    furosemide (LASIX) 20 MG tablet TAKE 1 TABLET BY MOUTH EVERY DAY    omeprazole (PRILOSEC) 20 MG capsule Take 1 capsule (20 mg total) by mouth once daily. For gastric reflux    [DISCONTINUED] azelastine (ASTELIN) 137 mcg (0.1 %) nasal spray 1 spray (137 mcg total) by Nasal route 2 (two) times daily.    [DISCONTINUED] naphazoline HCl/pheniramine (EYE ALLERGY RELIEF OPHT) Apply to eye.     Family History       Problem Relation (Age of Onset)    Bipolar disorder Daughter    Diabetes Mother    Heart disease Father    Hypertension Mother    Kidney disease Mother          Tobacco Use    Smoking status: Never    Smokeless tobacco: Never   Substance and Sexual Activity    Alcohol use: No    Drug use: No    Sexual activity: Not Currently     Review of Systems   Constitutional: Negative for chills, diaphoresis, fever and malaise/fatigue.   HENT:  Negative for nosebleeds.    Eyes:  Negative for blurred vision and double vision.   Cardiovascular:  Positive for leg swelling. Negative for chest pain, claudication, cyanosis, dyspnea on exertion, orthopnea, palpitations, paroxysmal nocturnal dyspnea and syncope.   Respiratory:  Negative for cough, shortness of breath and wheezing.    Skin:  Negative for dry skin and poor wound healing.   Musculoskeletal:  Negative for back pain, joint swelling  and myalgias.   Gastrointestinal:  Negative for abdominal pain, nausea and vomiting.   Genitourinary:  Negative for hematuria.   Neurological:  Negative for dizziness, headaches, numbness, seizures and weakness.   Psychiatric/Behavioral:  Negative for altered mental status and depression.      Objective:     Vital Signs (Most Recent):  Temp: 98.4 °F (36.9 °C) (12/04/23 1602)  Pulse: 67 (12/04/23 1903)  Resp: 19 (12/04/23 1903)  BP: (!) 143/67 (12/04/23 1903)  SpO2: (!) 93 % (12/04/23 1903) Vital Signs (24h Range):  Temp:  [98 °F (36.7 °C)-98.4 °F (36.9 °C)] 98.4 °F (36.9 °C)  Pulse:  [45-69] 67  Resp:  [17-20] 19  SpO2:  [92 %-96 %] 93 %  BP: ()/(45-72) 143/67     Weight: 86.2 kg (190 lb)  Body mass index is 37.11 kg/m².    SpO2: (!) 93 %       No intake or output data in the 24 hours ending 12/04/23 1940    Lines/Drains/Airways       Peripheral Intravenous Line  Duration                  Peripheral IV - Single Lumen 12/04/23 1355 20 G Right Antecubital <1 day                     Physical Exam  Constitutional:       General: She is not in acute distress.     Appearance: She is well-developed. She is obese. She is not ill-appearing, toxic-appearing or diaphoretic.   HENT:      Head: Normocephalic and atraumatic.   Eyes:      General: No scleral icterus.     Extraocular Movements: Extraocular movements intact.      Conjunctiva/sclera: Conjunctivae normal.      Pupils: Pupils are equal, round, and reactive to light.   Neck:      Vascular: No JVD.      Trachea: No tracheal deviation.   Cardiovascular:      Rate and Rhythm: Normal rate and regular rhythm.      Heart sounds: S1 normal and S2 normal. No murmur heard.     No friction rub. No gallop.   Pulmonary:      Effort: Pulmonary effort is normal. No respiratory distress.      Breath sounds: Normal breath sounds. No stridor. No wheezing, rhonchi or rales.   Chest:      Chest wall: No tenderness.   Abdominal:      General: There is no distension.      Palpations:  Abdomen is soft.   Musculoskeletal:         General: No swelling or tenderness. Normal range of motion.      Cervical back: Normal range of motion and neck supple. No rigidity.   Skin:     General: Skin is warm and dry.      Coloration: Skin is not jaundiced.   Neurological:      General: No focal deficit present.      Mental Status: She is alert and oriented to person, place, and time.      Cranial Nerves: No cranial nerve deficit.   Psychiatric:         Mood and Affect: Mood normal.         Behavior: Behavior normal.          Current Medications:   [START ON 12/5/2023] amLODIPine  10 mg Oral Daily    [START ON 12/5/2023] aspirin  81 mg Oral Daily    [START ON 12/5/2023] atorvastatin  40 mg Oral Daily    cloNIDine  0.1 mg Oral BID    enoxparin  40 mg Subcutaneous Daily    [START ON 12/5/2023] losartan  50 mg Oral Daily    mirtazapine  30 mg Oral QHS    polyethylene glycol  17 g Oral Daily       acetaminophen, aluminum-magnesium hydroxide-simethicone, clonazePAM, dextrose 10%, dextrose 10%, glucagon (human recombinant), glucose, glucose, melatonin, naloxone, ondansetron, prochlorperazine, simethicone, sodium chloride 0.9%    Laboratory (all labs reviewed):  CBC:  Recent Labs   Lab 01/07/23  1641 01/26/23  1201 05/24/23  1218 10/20/23  0837 12/04/23  1401   WBC 10.05 7.58 8.27 10.19 9.08   Hemoglobin 13.5 13.9 13.0 13.2 13.0   Hematocrit 42.3 43.6 40.5 43.2 42.3   Platelets 259 256 255 225 233       CHEMISTRIES:  Recent Labs   Lab 06/28/21  1007 06/29/21  0549 08/09/21  0900 11/05/21  0957 11/27/21  1839 05/09/22  1104 05/31/22  1520 07/24/22  1605 08/15/22  0930 01/07/23  1711 01/26/23  1201 05/24/23  1218 10/20/23  0837 12/04/23  1401   Glucose 153 H 117 H   < > 132 H 101 124 H 140 H 115 H   < > 99 100 94 113 H 71   Sodium 140 140   < > 146 H 144 145 143 142   < > 139 140 141 144 142   Potassium 4.0 3.9   < > 4.3 3.9 3.8 3.7 4.9   < > 4.6 4.2 4.2 4.2 3.8   BUN 16 15   < > 13 13 16 14 8   < > 12 7 L 15 14 22    Creatinine 0.8 0.7   < > 0.8 0.8 0.8 0.9 0.8   < > 0.8 0.8 0.8 0.8 1.1   eGFR if non African American >60 >60   < > >60 >60 >60 59 A >60  --   --   --   --   --   --    eGFR  --   --   --   --   --   --   --   --    < > >60 >60 >60 >60 49 A   Calcium 9.4 9.1   < > 10.3 9.2 9.5 9.3 9.6   < > 8.9 9.3 9.7 9.8 9.3   Magnesium 2.0 2.0  --   --   --   --   --  2.2  --   --   --  2.1  --   --     < > = values in this interval not displayed.       CARDIAC BIOMARKERS:  Recent Labs   Lab 10/17/22  0004 12/06/22  2342 05/24/23  1218 12/04/23  1401 12/04/23  1804   Troponin I 0.606 H 0.023 0.015 0.016 0.014       COAGS:  Recent Labs   Lab 10/16/22  1227 05/24/23  1218   INR 1.0 1.0       LIPIDS/LFTS:  Recent Labs   Lab 08/15/22  0930 10/16/22  1227 01/07/23  1711 01/26/23  1201 05/24/23  1218 10/20/23  0837 12/04/23  1401   Cholesterol 179  --   --   --   --   --   --    Triglycerides 77  --   --   --   --   --   --    HDL 52  --   --   --   --   --   --    LDL Cholesterol 111.6  --   --   --   --   --   --    Non-HDL Cholesterol 127  --   --   --   --   --   --    AST  --    < > 20 14 16 16 17   ALT  --    < > 16 9 L 16 14 10    < > = values in this interval not displayed.       BNP:  Recent Labs   Lab 06/28/21 1007 07/24/22  1510 10/16/22  1227 05/24/23  1218 12/04/23  1401   BNP 94 571 H 378 H 149 H 505 H       TSH:  Recent Labs   Lab 06/28/21  1007 08/15/22  0930 01/18/23  1131 01/26/23  1201 05/24/23  1218   TSH 0.980 0.636 0.669 0.513 1.184       Free T4:  Recent Labs   Lab 01/18/23  1131   Free T4 0.98       Diagnostic Results:  ECG (personally reviewed and interpreted tracing(s)):  12/4/23 1156 junct 49  12/4/23 1401 junct 44    Chest X-Ray (personally reviewed and interpreted image(s)): 12/4/23 CMeg, mild CHF, aortic atherosclerosis.    Echo: 10/17/22 (repeat ordered)  The estimated ejection fraction is 65%.  The left ventricle is normal in size with concentric hypertrophy and normal systolic function.  Moderate to  severe left atrial enlargement.  Grade I left ventricular diastolic dysfunction.  Normal right ventricular size with normal right ventricular systolic function.  Moderate right atrial enlargement.  Intermediate central venous pressure (8 mmHg).  The estimated PA systolic pressure is 28 mmHg.    Cath: 10/18/22  Normal coronary arteries.  Hypertensive heart disease.

## 2023-12-05 NOTE — ASSESSMENT & PLAN NOTE
Atypical pain for several days, currently pain free.  Cardiology consult, appreciate recs.  -monitor on tele  -obtain serial troponin  -check echo  -NPO p MN

## 2023-12-05 NOTE — ASSESSMENT & PLAN NOTE
Chronic, controlled. Latest blood pressure and vitals reviewed-     Temp:  [98 °F (36.7 °C)-98.4 °F (36.9 °C)]   Pulse:  [45-69]   Resp:  [17-20]   BP: ()/(45-72)   SpO2:  [92 %-96 %] .   Home meds for hypertension were reviewed and noted below.   Hypertension Medications               amLODIPine (NORVASC) 10 MG tablet Take 1 tablet (10 mg total) by mouth once daily.    cloNIDine (CATAPRES) 0.1 MG tablet TAKE 1 TABLET BY MOUTH 2 TIMES DAILY.    furosemide (LASIX) 20 MG tablet TAKE 1 TABLET BY MOUTH EVERY DAY    losartan (COZAAR) 50 MG tablet TAKE 1 TABLET (50 MG TOTAL) BY MOUTH ONCE DAILY. FOR HIGH BLOOD PRESSURE            While in the hospital, will manage blood pressure as follows; Continue home antihypertensive regimen    Will utilize p.r.n. blood pressure medication only if patient's blood pressure greater than 180/110 and she develops symptoms such as worsening chest pain or shortness of breath.

## 2023-12-05 NOTE — HOSPITAL COURSE
Admission to the hospital for chest pain. EKG junctional bradycardia and for ischemia. No recent travel or injury. Troponin trend negative.  No chest pain. 2 D echo normal EF , grade 1 DD and normal wall motion. Seen by Cardiologist and no plan for PPM. Noted history intermittent junctional rhythm on previous EKG. Elevated BNP however lung clear and breathing comfortably on RA. Continue norvasc and follow up with Dr. Randall and can consider switch norvasc to a different antihypertensive. Remained chest pain free. Patient is stable for discharge home with close follow up with PCP and Cardiologist.

## 2023-12-05 NOTE — ASSESSMENT & PLAN NOTE
Intermittent junct rhythm noted.  No LOC/palps/LH  This rhythm has been noted on prev EKGs  Check echo  No plan need for PPM

## 2023-12-05 NOTE — PLAN OF CARE
Problem: Adult Inpatient Plan of Care  Goal: Plan of Care Review  Outcome: Adequate for Care Transition  Goal: Patient-Specific Goal (Individualized)  Outcome: Adequate for Care Transition  Goal: Absence of Hospital-Acquired Illness or Injury  Outcome: Adequate for Care Transition  Goal: Optimal Comfort and Wellbeing  Outcome: Adequate for Care Transition  Goal: Readiness for Transition of Care  Outcome: Adequate for Care Transition     Problem: Infection  Goal: Absence of Infection Signs and Symptoms  Outcome: Adequate for Care Transition     Problem: Fall Injury Risk  Goal: Absence of Fall and Fall-Related Injury  Outcome: Adequate for Care Transition     Problem: Chest Pain  Goal: Resolution of Chest Pain Symptoms  Outcome: Adequate for Care Transition

## 2023-12-05 NOTE — PLAN OF CARE
Problem: Adult Inpatient Plan of Care  Goal: Plan of Care Review  Outcome: Ongoing, Progressing     Problem: Fall Injury Risk  Goal: Absence of Fall and Fall-Related Injury  Outcome: Ongoing, Progressing     Problem: Chest Pain  Goal: Resolution of Chest Pain Symptoms  Outcome: Ongoing, Progressing

## 2023-12-05 NOTE — NURSING
[facility] [facility] [place] 4 [place]      12/5/2023       Skin assessed on: Admit      [x] No Pressure Injuries Present    [x]Prevention Measures Documented    [] Yes LDA  for Pressure Injury Previously documented     [] Yes New Pressure Injury Discovered   [] LDA for New Pressure Injury Added      Attending RN:  Olga Reyes RN     Second RN: ANNIE Ansari

## 2023-12-05 NOTE — CONSULTS
[facility] Emergency Dept  Cardiology  Consult Note    Patient Name: Jose Manuel Boyd  MRN: [id number]  Admission Date: 2023  Hospital Length of Stay: 0 days  Code Status: Full Code   Attending Provider: Alvin Vale, *   Consulting Provider: Amish Mojica MD  Primary Care Physician: Ajit Piña MD  Principal Problem:Chest pain    Patient information was obtained from patient and ER records.     Inpatient consult to Cardiology  Consult performed by: Amish Mojica MD  Consult ordered by: Martinez Esqueda MD  Reason for consult: abnl EKG        Subjective:     Chief Complaint:  CP     HPI:   83-year-old female past medical history hypertension, hyperlipidemia, anxiety presenting secondary to chest pain that is been constant 3 days after she received the news that her sister .  No fevers chills runny nose cough congestion.  No urinary complaints.  No abdominal pain.  No nausea or vomiting.  No unilateral weakness.  Has chronic leg swelling which has been unchanged.  Has multiple family members that she can talk to to help with her stress.  Patient states that since it has been ongoing for 3 days she went to be evaluated.  Describes the pain as tightness     Pt follows with Dr. Randall, last seen 2023.    Cardiology consulted for abnl EKG.    The patient is a very pleasant 78 year old woman who presents to the emergency room with complaints of reproducible chest pain, although this is not going on at the present time.  She apparently found out that she lost her sister several days he has been having chest pain since that time.  Her troponin is negative.  Her BNP is elevated.  She has intermittent junctional rhythm on her EKG.  She denies any history of syncope.  Reviewing her prior EKGs, junctional rhythm has been noted previously.  She is not on any offending medications.  She denies any history of lightheadedness, dizziness, or syncope.  At this time, she is been admitted for  observation.  We will check an echocardiogram in the morning.  Assuming no evidence of high-degree AV block, or significant findings on her echocardiogram, I do not plan further inpatient cardiac testing.    Past Medical History:   Diagnosis Date    Anxiety     Asthma     Depression     Headache     Hx of psychiatric care     Hypercholesterolemia     Hypertension     Psychiatric problem     Sleep difficulties     Therapy     Thyroid disease     multiple nodules       Past Surgical History:   Procedure Laterality Date    ANGIOGRAM, CORONARY, WITH LEFT HEART CATHETERIZATION Left 10/18/2022    Procedure: Angiogram, Coronary, with Left Heart Cath;  Surgeon: Kumar Randall MD;  Location: [facility] CATH LAB;  Service: Cardiology;  Laterality: Left;    CHOLECYSTECTOMY      HYSTERECTOMY      knee repalcement         Review of patient's allergies indicates:   Allergen Reactions    Codeine      Other reaction(s): Rash       No current facility-administered medications on file prior to encounter.     Current Outpatient Medications on File Prior to Encounter   Medication Sig    acetaminophen (TYLENOL) 325 MG tablet Take 2 tablets (650 mg total) by mouth every 6 (six) hours as needed for Pain.    albuterol (VENTOLIN HFA) 90 mcg/actuation inhaler Inhale 2 puffs into the lungs every 4 (four) hours as needed for Wheezing.    amLODIPine (NORVASC) 10 MG tablet Take 1 tablet (10 mg total) by mouth once daily.    ascorbic acid, vitamin C, (VITAMIN C) 1000 MG tablet Take 1,000 mg by mouth once daily.    aspirin (ECOTRIN) 81 MG EC tablet Take 81 mg by mouth once daily.    atorvastatin (LIPITOR) 40 MG tablet Take 1 tablet (40 mg total) by mouth once daily.    clonazePAM (KLONOPIN) 0.5 MG tablet Take 1 tablet (0.5 mg total) by mouth 2 (two) times daily as needed for Anxiety.    cloNIDine (CATAPRES) 0.1 MG tablet TAKE 1 TABLET BY MOUTH 2 TIMES DAILY.    cyanocobalamin (VITAMIN B-12) 1000 MCG tablet Take 100 mcg by mouth once daily.     dicyclomine (BENTYL) 20 mg tablet Take 1 tablet (20 mg total) by mouth 2 (two) times daily as needed.    losartan (COZAAR) 50 MG tablet TAKE 1 TABLET (50 MG TOTAL) BY MOUTH ONCE DAILY. FOR HIGH BLOOD PRESSURE    mirtazapine (REMERON) 30 MG tablet TAKE 1 TABLET BY MOUTH NIGHTLY AT BEDTIME AS NEEDED FOR INSOMNIA , SLEEP, ANXIETY AND DEPRESSION.    multivitamin with minerals tablet Take 1 tablet by mouth once daily.    olopatadine (PATANOL) 0.1 % ophthalmic solution Place 1 drop into both eyes 2 (two) times daily.    omega-3 fatty acids/fish oil (FISH OIL-OMEGA-3 FATTY ACIDS) 300-1,000 mg capsule Take by mouth once daily.    psyllium (METAMUCIL) powder Take 1 packet by mouth daily as needed.    fluticasone propionate (FLONASE) 50 mcg/actuation nasal spray 1 spray (50 mcg total) by Each Nostril route 2 (two) times daily. For allergic rhinitis/sinusitis    furosemide (LASIX) 20 MG tablet TAKE 1 TABLET BY MOUTH EVERY DAY    omeprazole (PRILOSEC) 20 MG capsule Take 1 capsule (20 mg total) by mouth once daily. For gastric reflux    [DISCONTINUED] azelastine (ASTELIN) 137 mcg (0.1 %) nasal spray 1 spray (137 mcg total) by Nasal route 2 (two) times daily.    [DISCONTINUED] naphazoline HCl/pheniramine (EYE ALLERGY RELIEF OPHT) Apply to eye.     Family History       Problem Relation (Age of Onset)    Bipolar disorder Daughter    Diabetes Mother    Heart disease Father    Hypertension Mother    Kidney disease Mother          Tobacco Use    Smoking status: Never    Smokeless tobacco: Never   Substance and Sexual Activity    Alcohol use: No    Drug use: No    Sexual activity: Not Currently     Review of Systems   Constitutional: Negative for chills, diaphoresis, fever and malaise/fatigue.   HENT:  Negative for nosebleeds.    Eyes:  Negative for blurred vision and double vision.   Cardiovascular:  Positive for leg swelling. Negative for chest pain, claudication, cyanosis, dyspnea on exertion, orthopnea, palpitations, paroxysmal  nocturnal dyspnea and syncope.   Respiratory:  Negative for cough, shortness of breath and wheezing.    Skin:  Negative for dry skin and poor wound healing.   Musculoskeletal:  Negative for back pain, joint swelling and myalgias.   Gastrointestinal:  Negative for abdominal pain, nausea and vomiting.   Genitourinary:  Negative for hematuria.   Neurological:  Negative for dizziness, headaches, numbness, seizures and weakness.   Psychiatric/Behavioral:  Negative for altered mental status and depression.      Objective:     Vital Signs (Most Recent):  Temp: 98.4 °F (36.9 °C) (12/04/23 1602)  Pulse: 67 (12/04/23 1903)  Resp: 19 (12/04/23 1903)  BP: (!) 143/67 (12/04/23 1903)  SpO2: (!) 93 % (12/04/23 1903) Vital Signs (24h Range):  Temp:  [98 °F (36.7 °C)-98.4 °F (36.9 °C)] 98.4 °F (36.9 °C)  Pulse:  [45-69] 67  Resp:  [17-20] 19  SpO2:  [92 %-96 %] 93 %  BP: ()/(45-72) 143/67     Weight: 86.2 kg (190 lb)  Body mass index is 37.11 kg/m².    SpO2: (!) 93 %       No intake or output data in the 24 hours ending 12/04/23 1940    Lines/Drains/Airways       Peripheral Intravenous Line  Duration                  Peripheral IV - Single Lumen 12/04/23 1355 20 G Right Antecubital <1 day                     Physical Exam  Constitutional:       General: She is not in acute distress.     Appearance: She is well-developed. She is obese. She is not ill-appearing, toxic-appearing or diaphoretic.   HENT:      Head: Normocephalic and atraumatic.   Eyes:      General: No scleral icterus.     Extraocular Movements: Extraocular movements intact.      Conjunctiva/sclera: Conjunctivae normal.      Pupils: Pupils are equal, round, and reactive to light.   Neck:      Vascular: No JVD.      Trachea: No tracheal deviation.   Cardiovascular:      Rate and Rhythm: Normal rate and regular rhythm.      Heart sounds: S1 normal and S2 normal. No murmur heard.     No friction rub. No gallop.   Pulmonary:      Effort: Pulmonary effort is normal. No  respiratory distress.      Breath sounds: Normal breath sounds. No stridor. No wheezing, rhonchi or rales.   Chest:      Chest wall: No tenderness.   Abdominal:      General: There is no distension.      Palpations: Abdomen is soft.   Musculoskeletal:         General: No swelling or tenderness. Normal range of motion.      Cervical back: Normal range of motion and neck supple. No rigidity.   Skin:     General: Skin is warm and dry.      Coloration: Skin is not jaundiced.   Neurological:      General: No focal deficit present.      Mental Status: She is alert and oriented to person, place, and time.      Cranial Nerves: No cranial nerve deficit.   Psychiatric:         Mood and Affect: Mood normal.         Behavior: Behavior normal.          Current Medications:   [START ON 12/5/2023] amLODIPine  10 mg Oral Daily    [START ON 12/5/2023] aspirin  81 mg Oral Daily    [START ON 12/5/2023] atorvastatin  40 mg Oral Daily    cloNIDine  0.1 mg Oral BID    enoxparin  40 mg Subcutaneous Daily    [START ON 12/5/2023] losartan  50 mg Oral Daily    mirtazapine  30 mg Oral QHS    polyethylene glycol  17 g Oral Daily       acetaminophen, aluminum-magnesium hydroxide-simethicone, clonazePAM, dextrose 10%, dextrose 10%, glucagon (human recombinant), glucose, glucose, melatonin, naloxone, ondansetron, prochlorperazine, simethicone, sodium chloride 0.9%    Laboratory (all labs reviewed):  CBC:  Recent Labs   Lab 01/07/23  1641 01/26/23  1201 05/24/23  1218 10/20/23  0837 12/04/23  1401   WBC 10.05 7.58 8.27 10.19 9.08   Hemoglobin 13.5 13.9 13.0 13.2 13.0   Hematocrit 42.3 43.6 40.5 43.2 42.3   Platelets 259 256 255 225 233       CHEMISTRIES:  Recent Labs   Lab 06/28/21  1007 06/29/21  0549 08/09/21  0900 11/05/21  0957 11/27/21  1839 05/09/22  1104 05/31/22  1520 07/24/22  1605 08/15/22  0930 01/07/23  1711 01/26/23  1201 05/24/23  1218 10/20/23  0837 12/04/23  1401   Glucose 153 H 117 H   < > 132 H 101 124 H 140 H 115 H   < > 99 100  94 113 H 71   Sodium 140 140   < > 146 H 144 145 143 142   < > 139 140 141 144 142   Potassium 4.0 3.9   < > 4.3 3.9 3.8 3.7 4.9   < > 4.6 4.2 4.2 4.2 3.8   BUN 16 15   < > 13 13 16 14 8   < > 12 7 L 15 14 22   Creatinine 0.8 0.7   < > 0.8 0.8 0.8 0.9 0.8   < > 0.8 0.8 0.8 0.8 1.1   eGFR if non African American >60 >60   < > >60 >60 >60 59 A >60  --   --   --   --   --   --    eGFR  --   --   --   --   --   --   --   --    < > >60 >60 >60 >60 49 A   Calcium 9.4 9.1   < > 10.3 9.2 9.5 9.3 9.6   < > 8.9 9.3 9.7 9.8 9.3   Magnesium 2.0 2.0  --   --   --   --   --  2.2  --   --   --  2.1  --   --     < > = values in this interval not displayed.       CARDIAC BIOMARKERS:  Recent Labs   Lab 10/17/22  0004 12/06/22  2342 05/24/23  1218 12/04/23  1401 12/04/23  1804   Troponin I 0.606 H 0.023 0.015 0.016 0.014       COAGS:  Recent Labs   Lab 10/16/22  1227 05/24/23  1218   INR 1.0 1.0       LIPIDS/LFTS:  Recent Labs   Lab 08/15/22  0930 10/16/22  1227 01/07/23  1711 01/26/23  1201 05/24/23  1218 10/20/23  0837 12/04/23  1401   Cholesterol 179  --   --   --   --   --   --    Triglycerides 77  --   --   --   --   --   --    HDL 52  --   --   --   --   --   --    LDL Cholesterol 111.6  --   --   --   --   --   --    Non-HDL Cholesterol 127  --   --   --   --   --   --    AST  --    < > 20 14 16 16 17   ALT  --    < > 16 9 L 16 14 10    < > = values in this interval not displayed.       BNP:  Recent Labs   Lab 06/28/21  1007 07/24/22  1510 10/16/22  1227 05/24/23  1218 12/04/23  1401   BNP 94 571 H 378 H 149 H 505 H       TSH:  Recent Labs   Lab 06/28/21  1007 08/15/22  0930 01/18/23  1131 01/26/23  1201 05/24/23  1218   TSH 0.980 0.636 0.669 0.513 1.184       Free T4:  Recent Labs   Lab 01/18/23  1131   Free T4 0.98       Diagnostic Results:  ECG (personally reviewed and interpreted tracing(s)):  12/4/23 1156 junct 49  12/4/23 1401 junct 44    Chest X-Ray (personally reviewed and interpreted image(s)): 12/4/23 CMeg, mild CHF,  aortic atherosclerosis.    Echo: 10/17/22 (repeat ordered)  The estimated ejection fraction is 65%.  The left ventricle is normal in size with concentric hypertrophy and normal systolic function.  Moderate to severe left atrial enlargement.  Grade I left ventricular diastolic dysfunction.  Normal right ventricular size with normal right ventricular systolic function.  Moderate right atrial enlargement.  Intermediate central venous pressure (8 mmHg).  The estimated PA systolic pressure is 28 mmHg.    Cath: 10/18/22  Normal coronary arteries.  Hypertensive heart disease.    Assessment and Plan:     * Chest pain  Ongoing for several days, appears to have abated  Trop neg  Doubt ACS  Cath with normal cors 10/2022  Check echo  Cycle CE  No plan for inpat isch eval    Junctional bradycardia  Intermittent junct rhythm noted.  No LOC/palps/LH  This rhythm has been noted on prev EKGs  Check echo  No plan need for PPM    Hypertension  Cont med rx    Mixed hyperlipidemia  Cont statin    Aortic atherosclerosis  Noted on CXR 12/4/23    Elevated brain natriuretic peptide (BNP) level  No sxs of CHF aside from LE edema  Consider switching norvasc to a different medication        VTE Risk Mitigation (From admission, onward)           Ordered     enoxaparin injection 40 mg  Daily         12/04/23 1610     IP VTE HIGH RISK PATIENT  Once         12/04/23 1610     Place sequential compression device  Until discontinued         12/04/23 1610                    Thank you for your consult. I will follow-up with patient. Please contact us if you have any additional questions.    Amish Mojica MD  Cardiology   [facility] - Emergency Dept

## 2023-12-05 NOTE — ASSESSMENT & PLAN NOTE
Patient has recurrent depression which is moderate and is currently uncontrolled. Will Continue anti-depressant medications. We will not consult psychiatry at this time. Patient does not display psychosis at this time. Continue to monitor closely and adjust plan of care as needed.

## 2023-12-05 NOTE — ASSESSMENT & PLAN NOTE
Intermittent junct rhythm noted.  No LOC/palps/LH  This rhythm has been noted on prev EKGs  Echo with normal LV fxn  No plan need for PPM

## 2023-12-05 NOTE — H&P
[facility] Medicine  History & Physical    Patient Name: Jose Manuel Boyd  MRN: [id number]  Patient Class: OP- Observation  Admission Date: 12/4/2023  Attending Physician: Alvin Vale, *   Primary Care Provider: Ajit Piña MD         Patient information was obtained from patient, past medical records, and ER records.     Subjective:     Principal Problem:Chest pain    Chief Complaint:   Chief Complaint   Patient presents with    Chest Pain     Patient reports CP that started 3 days ago after getting a call that her sister had passed. States heaviness to left chest        HPI: 83 y.o. female with attention, depression, and hyperlipidemia presents with complaint of chest pain.  Acute onset after the death of her sister, duration 3 days, has been constant, associated with emotional stress, exacerbated by palpation.  Denies fever, chills, cough, SOB, palpitations, dizziness, syncope, nausea, vomiting, diarrhea, or abdominal pain.  In the ED, EKG without evidence of acute ischemia, initial troponin negative.  Otherwise unremarkable for acute abnormality.  Placed in observation for ACS rule out.    Past Medical History:   Diagnosis Date    Anxiety     Asthma     Depression     Headache     Hx of psychiatric care     Hypercholesterolemia     Hypertension     Psychiatric problem     Sleep difficulties     Therapy     Thyroid disease     multiple nodules       Past Surgical History:   Procedure Laterality Date    ANGIOGRAM, CORONARY, WITH LEFT HEART CATHETERIZATION Left 10/18/2022    Procedure: Angiogram, Coronary, with Left Heart Cath;  Surgeon: Kumar Randall MD;  Location: [facility] CATH LAB;  Service: Cardiology;  Laterality: Left;    CHOLECYSTECTOMY      HYSTERECTOMY      knee repalcement         Review of patient's allergies indicates:   Allergen Reactions    Codeine      Other reaction(s): Rash       No current facility-administered medications on file prior to encounter.      Current Outpatient Medications on File Prior to Encounter   Medication Sig    acetaminophen (TYLENOL) 325 MG tablet Take 2 tablets (650 mg total) by mouth every 6 (six) hours as needed for Pain.    albuterol (VENTOLIN HFA) 90 mcg/actuation inhaler Inhale 2 puffs into the lungs every 4 (four) hours as needed for Wheezing.    amLODIPine (NORVASC) 10 MG tablet Take 1 tablet (10 mg total) by mouth once daily.    ascorbic acid, vitamin C, (VITAMIN C) 1000 MG tablet Take 1,000 mg by mouth once daily.    aspirin (ECOTRIN) 81 MG EC tablet Take 81 mg by mouth once daily.    atorvastatin (LIPITOR) 40 MG tablet Take 1 tablet (40 mg total) by mouth once daily.    clonazePAM (KLONOPIN) 0.5 MG tablet Take 1 tablet (0.5 mg total) by mouth 2 (two) times daily as needed for Anxiety.    cloNIDine (CATAPRES) 0.1 MG tablet TAKE 1 TABLET BY MOUTH 2 TIMES DAILY.    cyanocobalamin (VITAMIN B-12) 1000 MCG tablet Take 100 mcg by mouth once daily.    dicyclomine (BENTYL) 20 mg tablet Take 1 tablet (20 mg total) by mouth 2 (two) times daily as needed.    losartan (COZAAR) 50 MG tablet TAKE 1 TABLET (50 MG TOTAL) BY MOUTH ONCE DAILY. FOR HIGH BLOOD PRESSURE    mirtazapine (REMERON) 30 MG tablet TAKE 1 TABLET BY MOUTH NIGHTLY AT BEDTIME AS NEEDED FOR INSOMNIA , SLEEP, ANXIETY AND DEPRESSION.    multivitamin with minerals tablet Take 1 tablet by mouth once daily.    olopatadine (PATANOL) 0.1 % ophthalmic solution Place 1 drop into both eyes 2 (two) times daily.    omega-3 fatty acids/fish oil (FISH OIL-OMEGA-3 FATTY ACIDS) 300-1,000 mg capsule Take by mouth once daily.    psyllium (METAMUCIL) powder Take 1 packet by mouth daily as needed.    fluticasone propionate (FLONASE) 50 mcg/actuation nasal spray 1 spray (50 mcg total) by Each Nostril route 2 (two) times daily. For allergic rhinitis/sinusitis    furosemide (LASIX) 20 MG tablet TAKE 1 TABLET BY MOUTH EVERY DAY    omeprazole (PRILOSEC) 20 MG capsule Take 1 capsule (20 mg total) by mouth  once daily. For gastric reflux    [DISCONTINUED] azelastine (ASTELIN) 137 mcg (0.1 %) nasal spray 1 spray (137 mcg total) by Nasal route 2 (two) times daily.    [DISCONTINUED] naphazoline HCl/pheniramine (EYE ALLERGY RELIEF OPHT) Apply to eye.     Family History       Problem Relation (Age of Onset)    Bipolar disorder Daughter    Diabetes Mother    Heart disease Father    Hypertension Mother    Kidney disease Mother          Tobacco Use    Smoking status: Never    Smokeless tobacco: Never   Substance and Sexual Activity    Alcohol use: No    Drug use: No    Sexual activity: Not Currently     Review of Systems   Constitutional:  Negative for chills, fatigue and fever.   HENT:  Negative for congestion and rhinorrhea.    Eyes:  Negative for photophobia and visual disturbance.   Respiratory:  Negative for cough and shortness of breath.    Cardiovascular:  Positive for chest pain. Negative for palpitations and leg swelling.   Gastrointestinal:  Negative for abdominal pain, diarrhea, nausea and vomiting.   Genitourinary:  Negative for dysuria, frequency and urgency.   Skin:  Negative for pallor, rash and wound.   Neurological:  Negative for light-headedness and headaches.   Psychiatric/Behavioral:  Positive for dysphoric mood. Negative for confusion and decreased concentration.      Objective:     Vital Signs (Most Recent):  Temp: 98.4 °F (36.9 °C) (12/04/23 1602)  Pulse: 68 (12/04/23 1803)  Resp: 19 (12/04/23 1803)  BP: (!) 168/72 (12/04/23 1803)  SpO2: (!) 92 % (12/04/23 1803) Vital Signs (24h Range):  Temp:  [98 °F (36.7 °C)-98.4 °F (36.9 °C)] 98.4 °F (36.9 °C)  Pulse:  [45-69] 68  Resp:  [17-20] 19  SpO2:  [92 %-96 %] 92 %  BP: ()/(45-72) 168/72     Weight: 86.2 kg (190 lb)  Body mass index is 37.11 kg/m².     Physical Exam  Vitals and nursing note reviewed.   Constitutional:       General: She is not in acute distress.     Appearance: She is well-developed.   HENT:      Head: Normocephalic and atraumatic.       Right Ear: External ear normal.      Left Ear: External ear normal.      Nose: Nose normal.   Eyes:      Conjunctiva/sclera: Conjunctivae normal.   Cardiovascular:      Rate and Rhythm: Normal rate and regular rhythm.   Pulmonary:      Effort: Pulmonary effort is normal. No respiratory distress.   Abdominal:      General: There is no distension.      Palpations: Abdomen is soft.   Musculoskeletal:         General: Normal range of motion.   Skin:     General: Skin is warm and dry.   Neurological:      Mental Status: She is alert and oriented to person, place, and time.   Psychiatric:         Thought Content: Thought content normal.                Significant Labs: All pertinent labs within the past 24 hours have been reviewed.    Significant Imaging: I have reviewed all pertinent imaging results/findings within the past 24 hours.  Assessment/Plan:     * Chest pain  Atypical pain for several days, currently pain free.  Cardiology consult, appreciate recs.  -monitor on tele  -obtain serial troponin  -check echo  -NPO p MN    Junctional bradycardia  Cardiology consult, appreciate recs.  Monitor on tele, check echo.    Mixed hyperlipidemia  Continue statin    Major depressive disorder, recurrent episode, moderate with anxious distress  Patient has recurrent depression which is moderate and is currently uncontrolled. Will Continue anti-depressant medications. We will not consult psychiatry at this time. Patient does not display psychosis at this time. Continue to monitor closely and adjust plan of care as needed.    Hypertension  Chronic, controlled. Latest blood pressure and vitals reviewed-     Temp:  [98 °F (36.7 °C)-98.4 °F (36.9 °C)]   Pulse:  [45-69]   Resp:  [17-20]   BP: ()/(45-72)   SpO2:  [92 %-96 %] .   Home meds for hypertension were reviewed and noted below.   Hypertension Medications               amLODIPine (NORVASC) 10 MG tablet Take 1 tablet (10 mg total) by mouth once daily.    cloNIDine (CATAPRES)  0.1 MG tablet TAKE 1 TABLET BY MOUTH 2 TIMES DAILY.    furosemide (LASIX) 20 MG tablet TAKE 1 TABLET BY MOUTH EVERY DAY    losartan (COZAAR) 50 MG tablet TAKE 1 TABLET (50 MG TOTAL) BY MOUTH ONCE DAILY. FOR HIGH BLOOD PRESSURE            While in the hospital, will manage blood pressure as follows; Continue home antihypertensive regimen    Will utilize p.r.n. blood pressure medication only if patient's blood pressure greater than 180/110 and she develops symptoms such as worsening chest pain or shortness of breath.      VTE Risk Mitigation (From admission, onward)           Ordered     enoxaparin injection 40 mg  Daily         12/04/23 1610     IP VTE HIGH RISK PATIENT  Once         12/04/23 1610     Place sequential compression device  Until discontinued         12/04/23 1610                         On 12/04/2023, patient should be placed in hospital observation services under my care in collaboration with Alvin Vale MD.      AdmissionCare    Guideline: Chest Pain - OBS, Observation    Based on the indications selected for the patient, the bed status of Admit to Observation was determined to be MET    The following indications were selected as present at the time of evaluation of the patient:      Chest pain (or other anginal equivalent) not classified as low risk for acute coronary syndrome, as indicated by 1 or more of the following:   -     Troponin results indeterminant or otherwise indicate need for monitoring and retesting beyond emergency department treatment time frame    -    - Other aspect of patient presentation indicative of need for monitoring or testing beyond emergency department treatment time frame (eg, concern for arrhythmia)   - Pain persists despite emergency department care.    AdmissionCare documentation entered by: Judy Han    [facility] "[employer]", 27th edition, Copyright © 2023 [facility] [employer]", [facility] All Rights Reserved.  [id number]    Chano Melgoza Jr., APRN,  AGACNP-BC  Hospitalist - Department of Hospital Medicine  [facility] [address] [place] [id number]  Office #: [phone]; Pager #: [phone]

## 2023-12-05 NOTE — ASSESSMENT & PLAN NOTE
Ongoing for several days, appears to have abated  Trop neg  Doubt ACS  Cath with normal cors 10/2022  Trops neg  Echo without WMA  No further cardiac testing planned.

## 2023-12-05 NOTE — DISCHARGE SUMMARY
[facility] Medicine  Discharge Summary      Patient Name: Jose Manuel Boyd  MRN: [id number]  GENE: [id number]  Patient Class: OP- Observation  Admission Date: 12/4/2023  Hospital Length of Stay: 0 days  Discharge Date and Time:  12/05/2023 1:39 PM  Attending Physician: Fantasma Holliday MD   Discharging Provider: Padmini Shah NP  Primary Care Provider: Ajit Piña MD    Primary Care Team: PADMINI SHAH    HPI:   83 y.o. female with attention, depression, and hyperlipidemia presents with complaint of chest pain.  Acute onset after the death of her sister, duration 3 days, has been constant, associated with emotional stress, exacerbated by palpation.  Denies fever, chills, cough, SOB, palpitations, dizziness, syncope, nausea, vomiting, diarrhea, or abdominal pain.  In the ED, EKG without evidence of acute ischemia, initial troponin negative.  Otherwise unremarkable for acute abnormality.  Placed in observation for ACS rule out.    * No surgery found *      Hospital Course:   Admission to the hospital for chest pain. EKG junctional bradycardia and for ischemia. No recent travel or injury. Troponin trend negative.  No chest pain. 2 D echo normal EF , grade 1 DD and normal wall motion. Seen by Cardiologist and no plan for PPM. Noted history intermittent junctional rhythm on previous EKG. Elevated BNP however lung clear and breathing comfortably on RA. Continue norvasc and follow up with Dr. Randall and can consider switch norvasc to a different antihypertensive. Remained chest pain free. Patient is stable for discharge home with close follow up with PCP and Cardiologist.      Goals of Care Treatment Preferences:  Code Status: Full Code      Consults:   Consults (From admission, onward)          Status Ordering Provider     Inpatient consult to Cardiology  Once        Provider:  Amish Mojica MD    Completed MADELIN HERNANDEZ            No new Assessment & Plan notes have been filed  under this hospital service since the last note was generated.  Service: Hospital Medicine    Final Active Diagnoses:    Diagnosis Date Noted POA    PRINCIPAL PROBLEM:  Chest pain [R07.9] 12/04/2023 Yes    Junctional bradycardia [R00.1] 12/04/2023 Yes    Mixed hyperlipidemia [E78.2] 06/01/2023 Yes     Chronic    Major depressive disorder, recurrent episode, moderate with anxious distress [F33.0] 05/09/2022 Yes     Chronic    Hypertension [I10] 11/05/2012 Yes     Chronic      Problems Resolved During this Admission:       Discharged Condition: good    Disposition: Home or Self Care    Follow Up:   Follow-up Information       Ajit Piña MD Follow up.    Specialties: Internal Medicine, Wound Care  Contact information:  [address] [place] [address] [phone]                           Patient Instructions:      Diet Cardiac     Notify your health care provider if you experience any of the following:  temperature >100.4     Notify your health care provider if you experience any of the following:  difficulty breathing or increased cough     Notify your health care provider if you experience any of the following:  increased confusion or weakness     Activity as tolerated       Significant Diagnostic Studies: N/A    Pending Diagnostic Studies:       None           Medications:  Reconciled Home Medications:      Medication List        CONTINUE taking these medications      acetaminophen 325 MG tablet  Commonly known as: TYLENOL  Take 2 tablets (650 mg total) by mouth every 6 (six) hours as needed for Pain.     albuterol 90 mcg/actuation inhaler  Commonly known as: VENTOLIN HFA  Inhale 2 puffs into the lungs every 4 (four) hours as needed for Wheezing.     amLODIPine 10 MG tablet  Commonly known as: NORVASC  Take 1 tablet (10 mg total) by mouth once daily.     ascorbic acid (vitamin C) 1000 MG tablet  Commonly known as: VITAMIN C  Take 1,000 mg by mouth once daily.     aspirin 81 MG EC tablet  Commonly known  as: ECOTRIN  Take 81 mg by mouth once daily.     atorvastatin 40 MG tablet  Commonly known as: LIPITOR  Take 1 tablet (40 mg total) by mouth once daily.     clonazePAM 0.5 MG tablet  Commonly known as: KlonoPIN  Take 1 tablet (0.5 mg total) by mouth 2 (two) times daily as needed for Anxiety.     cloNIDine 0.1 MG tablet  Commonly known as: CATAPRES  TAKE 1 TABLET BY MOUTH 2 TIMES DAILY.     cyanocobalamin 1000 MCG tablet  Commonly known as: VITAMIN B-12  Take 100 mcg by mouth once daily.     dicyclomine 20 mg tablet  Commonly known as: BENTYL  Take 1 tablet (20 mg total) by mouth 2 (two) times daily as needed.     fish oil-omega-3 fatty acids 300-1,000 mg capsule  Take by mouth once daily.     fluticasone propionate 50 mcg/actuation nasal spray  Commonly known as: FLONASE  1 spray (50 mcg total) by Each Nostril route 2 (two) times daily. For allergic rhinitis/sinusitis     furosemide 20 MG tablet  Commonly known as: LASIX  TAKE 1 TABLET BY MOUTH EVERY DAY     losartan 50 MG tablet  Commonly known as: COZAAR  TAKE 1 TABLET (50 MG TOTAL) BY MOUTH ONCE DAILY. FOR HIGH BLOOD PRESSURE     mirtazapine 30 MG tablet  Commonly known as: REMERON  TAKE 1 TABLET BY MOUTH NIGHTLY AT BEDTIME AS NEEDED FOR INSOMNIA , SLEEP, ANXIETY AND DEPRESSION.     multivitamin with minerals tablet  Take 1 tablet by mouth once daily.     olopatadine 0.1 % ophthalmic solution  Commonly known as: PATANOL  Place 1 drop into both eyes 2 (two) times daily.     omeprazole 20 MG capsule  Commonly known as: PRILOSEC  Take 1 capsule (20 mg total) by mouth once daily. For gastric reflux     psyllium powder  Commonly known as: METAMUCIL  Take 1 packet by mouth daily as needed.              Indwelling Lines/Drains at time of discharge:   Lines/Drains/Airways       None                   Time spent on the discharge of patient: 35 minutes       Padmini Soriano NP  Department of Hospital Medicine  [facility]

## 2023-12-05 NOTE — HPI
83-year-old female past medical history hypertension, hyperlipidemia, anxiety presenting secondary to chest pain that is been constant 3 days after she received the news that her sister .  No fevers chills runny nose cough congestion.  No urinary complaints.  No abdominal pain.  No nausea or vomiting.  No unilateral weakness.  Has chronic leg swelling which has been unchanged.  Has multiple family members that she can talk to to help with her stress.  Patient states that since it has been ongoing for 3 days she went to be evaluated.  Describes the pain as tightness     Pt follows with Dr. Randall, last seen 2023.    Cardiology consulted for [facility] EKG.    The patient is a very pleasant 78 year old woman who presents to the emergency room with complaints of reproducible chest pain, although this is not going on at the present time.  She apparently found out that she lost her sister several days he has been having chest pain since that time.  Her troponin is negative.  Her BNP is elevated.  She has intermittent junctional rhythm on her EKG.  She denies any history of syncope.  Reviewing her prior EKGs, junctional rhythm has been noted previously.  She is not on any offending medications.  She denies any history of lightheadedness, dizziness, or syncope.  At this time, she is been admitted for observation.  We will check an echocardiogram in the morning.  Assuming no evidence of high-degree AV block, or significant findings on her echocardiogram, I do not plan further inpatient cardiac testing.

## 2023-12-05 NOTE — ASSESSMENT & PLAN NOTE
Ongoing for several days, appears to have abated  Trop neg  Doubt ACS  Cath with normal cors 10/2022  Check echo  Cycle CE  No plan for inpat isch eval

## 2023-12-05 NOTE — NURSING
[facility] [place]  Nurses Note -- 4 Eyes      12/5/2023       Skin assessed on: Q Shift      [x] No Pressure Injuries Present    [x]Prevention Measures Documented    [] Yes LDA  for Pressure Injury Previously documented     [] Yes New Pressure Injury Discovered   [] LDA for New Pressure Injury Added      Attending RN:  Eren Heller, RN     Second RN:  Olga ROBERTS RN

## 2023-12-06 ENCOUNTER — PATIENT OUTREACH (OUTPATIENT)
Dept: ADMINISTRATIVE | Facility: CLINIC | Age: 83
End: 2023-12-06
Payer: MEDICARE

## 2023-12-06 NOTE — PROGRESS NOTES
C3 nurse attempted to contact Jose Manuel Boyd for a TCC post hospital discharge follow up call. No answer. No voicemail available. The patient has a scheduled HOSFU appointment with Fareed Brumfield NP on 12/14/23 @ 4409.

## 2023-12-06 NOTE — PROGRESS NOTES
2nd Attempt made to reach patient for TCC call. Left voicemail please call [phone] leave first name, last name, and  Jailyn will return your call.

## 2023-12-14 ENCOUNTER — OFFICE VISIT (OUTPATIENT)
Dept: FAMILY MEDICINE | Facility: CLINIC | Age: 83
End: 2023-12-14
Payer: MEDICARE

## 2023-12-14 VITALS
DIASTOLIC BLOOD PRESSURE: 78 MMHG | BODY MASS INDEX: 38.48 KG/M2 | OXYGEN SATURATION: 98 % | HEART RATE: 50 BPM | WEIGHT: 196 LBS | HEIGHT: 60 IN | TEMPERATURE: 98 F | RESPIRATION RATE: 16 BRPM | SYSTOLIC BLOOD PRESSURE: 124 MMHG

## 2023-12-14 DIAGNOSIS — Z09 HOSPITAL DISCHARGE FOLLOW-UP: Primary | ICD-10-CM

## 2023-12-14 DIAGNOSIS — I10 PRIMARY HYPERTENSION: Chronic | ICD-10-CM

## 2023-12-14 DIAGNOSIS — R00.1 JUNCTIONAL BRADYCARDIA: ICD-10-CM

## 2023-12-14 DIAGNOSIS — F43.21 GRIEVING: ICD-10-CM

## 2023-12-14 DIAGNOSIS — I27.20 PULMONARY HTN: ICD-10-CM

## 2023-12-14 PROCEDURE — 3288F PR FALLS RISK ASSESSMENT DOCUMENTED: ICD-10-PCS | Mod: CPTII,S$GLB,, | Performed by: NURSE PRACTITIONER

## 2023-12-14 PROCEDURE — 99214 PR OFFICE/OUTPT VISIT, EST, LEVL IV, 30-39 MIN: ICD-10-PCS | Mod: S$GLB,,, | Performed by: NURSE PRACTITIONER

## 2023-12-14 PROCEDURE — 1101F PR PT FALLS ASSESS DOC 0-1 FALLS W/OUT INJ PAST YR: ICD-10-PCS | Mod: CPTII,S$GLB,, | Performed by: NURSE PRACTITIONER

## 2023-12-14 PROCEDURE — 99999 PR PBB SHADOW E&M-EST. PATIENT-LVL V: ICD-10-PCS | Mod: PBBFAC,,, | Performed by: NURSE PRACTITIONER

## 2023-12-14 PROCEDURE — 1160F PR REVIEW ALL MEDS BY PRESCRIBER/CLIN PHARMACIST DOCUMENTED: ICD-10-PCS | Mod: CPTII,S$GLB,, | Performed by: NURSE PRACTITIONER

## 2023-12-14 PROCEDURE — 99214 OFFICE O/P EST MOD 30 MIN: CPT | Mod: S$GLB,,, | Performed by: NURSE PRACTITIONER

## 2023-12-14 PROCEDURE — 3074F SYST BP LT 130 MM HG: CPT | Mod: CPTII,S$GLB,, | Performed by: NURSE PRACTITIONER

## 2023-12-14 PROCEDURE — 1159F PR MEDICATION LIST DOCUMENTED IN MEDICAL RECORD: ICD-10-PCS | Mod: CPTII,S$GLB,, | Performed by: NURSE PRACTITIONER

## 2023-12-14 PROCEDURE — 1160F RVW MEDS BY RX/DR IN RCRD: CPT | Mod: CPTII,S$GLB,, | Performed by: NURSE PRACTITIONER

## 2023-12-14 PROCEDURE — 1126F PR PAIN SEVERITY QUANTIFIED, NO PAIN PRESENT: ICD-10-PCS | Mod: CPTII,S$GLB,, | Performed by: NURSE PRACTITIONER

## 2023-12-14 PROCEDURE — 1126F AMNT PAIN NOTED NONE PRSNT: CPT | Mod: CPTII,S$GLB,, | Performed by: NURSE PRACTITIONER

## 2023-12-14 PROCEDURE — 99999 PR PBB SHADOW E&M-EST. PATIENT-LVL V: CPT | Mod: PBBFAC,,, | Performed by: NURSE PRACTITIONER

## 2023-12-14 PROCEDURE — 1101F PT FALLS ASSESS-DOCD LE1/YR: CPT | Mod: CPTII,S$GLB,, | Performed by: NURSE PRACTITIONER

## 2023-12-14 PROCEDURE — 3078F DIAST BP <80 MM HG: CPT | Mod: CPTII,S$GLB,, | Performed by: NURSE PRACTITIONER

## 2023-12-14 PROCEDURE — 3288F FALL RISK ASSESSMENT DOCD: CPT | Mod: CPTII,S$GLB,, | Performed by: NURSE PRACTITIONER

## 2023-12-14 PROCEDURE — 3078F PR MOST RECENT DIASTOLIC BLOOD PRESSURE < 80 MM HG: ICD-10-PCS | Mod: CPTII,S$GLB,, | Performed by: NURSE PRACTITIONER

## 2023-12-14 PROCEDURE — 3074F PR MOST RECENT SYSTOLIC BLOOD PRESSURE < 130 MM HG: ICD-10-PCS | Mod: CPTII,S$GLB,, | Performed by: NURSE PRACTITIONER

## 2023-12-14 PROCEDURE — 1159F MED LIST DOCD IN RCRD: CPT | Mod: CPTII,S$GLB,, | Performed by: NURSE PRACTITIONER

## 2023-12-14 NOTE — PROGRESS NOTES
Routine Office Visit    Patient Name: Jose Manuel Boyd    : 1940  MRN: [id number]    Chief Complaint:  Hospital follow-up    Subjective:  Jose Manuel is a 83 y.o. female who presents today for follow-up.  Discharge summary is as follows in bold:    [facility] Medicine  Discharge Summary        Patient Name: Jose Manuel Boyd  MRN: [id number]  GENE: [id number]  Patient Class: OP- Observation  Admission Date: 2023  Hospital Length of Stay: 0 days  Discharge Date and Time:  2023 1:39 PM  Attending Physician: Fantasma Holliday MD   Discharging Provider: Padmini Shah NP  Primary Care Provider: Ajit Piña MD     Primary Care Team: PADMINI SHAH     HPI:   83 y.o. female with attention, depression, and hyperlipidemia presents with complaint of chest pain.  Acute onset after the death of her sister, duration 3 days, has been constant, associated with emotional stress, exacerbated by palpation.  Denies fever, chills, cough, SOB, palpitations, dizziness, syncope, nausea, vomiting, diarrhea, or abdominal pain.  In the ED, EKG without evidence of acute ischemia, initial troponin negative.  Otherwise unremarkable for acute abnormality.  Placed in observation for ACS rule out.     * No surgery found *       Hospital Course:   Admission to the hospital for chest pain. EKG junctional bradycardia and for ischemia. No recent travel or injury. Troponin trend negative.  No chest pain. 2 D echo normal EF , grade 1 DD and normal wall motion. Seen by Cardiologist and no plan for PPM. Noted history intermittent junctional rhythm on previous EKG. Elevated BNP however lung clear and breathing comfortably on RA. Continue norvasc and follow up with Dr. Randall and can consider switch norvasc to a different antihypertensive. Remained chest pain free. Patient is stable for discharge home with close follow up with PCP and Cardiologist.       Goals of Care Treatment Preferences:  Code Status: Full  Code     Today alone for hospital follow-up.  Since being discharged from the hospital she denies any chest pain, shortness of breath, wheezing, palpitations, or other acute cardiac/respiratory concerns.  She has been compliant with her medications and feels like she is back to her usual state of health.  In regards to the loss of her sister, she states she feels she is grieving appropriately.  She has a lot of family and Sabianist support.  She denies any other problems or concerns today.      Past Medical History  Past Medical History:   Diagnosis Date    Anxiety     Asthma     Depression     Headache     Hx of psychiatric care     Hypercholesterolemia     Hypertension     Psychiatric problem     Sleep difficulties     Therapy     Thyroid disease     multiple nodules       Family History  Family History   Problem Relation Age of Onset    Diabetes Mother     Hypertension Mother     Kidney disease Mother     Heart disease Father     Bipolar disorder Daughter        Current Medications  Current Outpatient Medications on File Prior to Visit   Medication Sig Dispense Refill    acetaminophen (TYLENOL) 325 MG tablet Take 2 tablets (650 mg total) by mouth every 6 (six) hours as needed for Pain. 60 tablet 0    albuterol (VENTOLIN HFA) 90 mcg/actuation inhaler Inhale 2 puffs into the lungs every 4 (four) hours as needed for Wheezing. 6.7 g 6    amLODIPine (NORVASC) 10 MG tablet Take 1 tablet (10 mg total) by mouth once daily. 90 tablet 3    ascorbic acid, vitamin C, (VITAMIN C) 1000 MG tablet Take 1,000 mg by mouth once daily.      aspirin (ECOTRIN) 81 MG EC tablet Take 81 mg by mouth once daily.      atorvastatin (LIPITOR) 40 MG tablet Take 1 tablet (40 mg total) by mouth once daily. 90 tablet 3    clonazePAM (KLONOPIN) 0.5 MG tablet Take 1 tablet (0.5 mg total) by mouth 2 (two) times daily as needed for Anxiety. 20 tablet 0    cloNIDine (CATAPRES) 0.1 MG tablet TAKE 1 TABLET BY MOUTH 2 TIMES DAILY. 180 tablet 3     cyanocobalamin (VITAMIN B-12) 1000 MCG tablet Take 100 mcg by mouth once daily.      dicyclomine (BENTYL) 20 mg tablet Take 1 tablet (20 mg total) by mouth 2 (two) times daily as needed. 180 tablet 0    fluticasone propionate (FLONASE) 50 mcg/actuation nasal spray 1 spray (50 mcg total) by Each Nostril route 2 (two) times daily. For allergic rhinitis/sinusitis 18 mL 11    furosemide (LASIX) 20 MG tablet TAKE 1 TABLET BY MOUTH EVERY DAY 90 tablet 2    losartan (COZAAR) 50 MG tablet TAKE 1 TABLET (50 MG TOTAL) BY MOUTH ONCE DAILY. FOR HIGH BLOOD PRESSURE 90 tablet 2    mirtazapine (REMERON) 30 MG tablet TAKE 1 TABLET BY MOUTH NIGHTLY AT BEDTIME AS NEEDED FOR INSOMNIA , SLEEP, ANXIETY AND DEPRESSION. 90 tablet 2    multivitamin with minerals tablet Take 1 tablet by mouth once daily.      olopatadine (PATANOL) 0.1 % ophthalmic solution Place 1 drop into both eyes 2 (two) times daily.      omega-3 fatty acids/fish oil (FISH OIL-OMEGA-3 FATTY ACIDS) 300-1,000 mg capsule Take by mouth once daily.      omeprazole (PRILOSEC) 20 MG capsule Take 1 capsule (20 mg total) by mouth once daily. For gastric reflux 90 capsule 3    psyllium (METAMUCIL) powder Take 1 packet by mouth daily as needed.       No current facility-administered medications on file prior to visit.       Allergies   Review of patient's allergies indicates:   Allergen Reactions    Codeine      Other reaction(s): Rash       Review of Systems (Pertinent positives)  Review of Systems   Constitutional: Negative.  Negative for chills and fever.   HENT: Negative.  Negative for congestion, sinus pain and sore throat.    Eyes: Negative.    Respiratory:  Negative for cough, shortness of breath and wheezing.    Cardiovascular:  Negative for chest pain, palpitations, orthopnea and claudication.   Gastrointestinal: Negative.  Negative for abdominal pain, diarrhea, nausea and vomiting.   Genitourinary: Negative.  Negative for dysuria, frequency and urgency.    Musculoskeletal: Negative.  Negative for back pain, joint pain and neck pain.   Skin: Negative.    Neurological: Negative.  Negative for dizziness, tingling, loss of consciousness and headaches.   Endo/Heme/Allergies: Negative.    Psychiatric/Behavioral: Negative.         /78 (BP Location: Left arm, Patient Position: Sitting, BP Method: X-Large (Manual))   Pulse (!) 50   Temp 97.9 °F (36.6 °C) (Oral)   Resp 16   Ht 5' (1.524 m)   Wt 88.9 kg (195 lb 15.8 oz)   SpO2 98%   BMI 38.28 kg/m²     Physical Exam  Vitals reviewed.   Constitutional:       General: She is not in acute distress.     Appearance: Normal appearance. She is not ill-appearing, toxic-appearing or diaphoretic.   HENT:      Head: Normocephalic and atraumatic.   Cardiovascular:      Rate and Rhythm: Normal rate and regular rhythm.      Pulses: Normal pulses.      Heart sounds: Normal heart sounds.   Pulmonary:      Effort: Pulmonary effort is normal. No respiratory distress.      Breath sounds: Normal breath sounds. No wheezing.   Abdominal:      General: Bowel sounds are normal. There is no distension.      Palpations: Abdomen is soft.      Tenderness: There is no abdominal tenderness.   Musculoskeletal:         General: No swelling, tenderness or deformity. Normal range of motion.   Skin:     General: Skin is warm and dry.      Capillary Refill: Capillary refill takes less than 2 seconds.   Neurological:      General: No focal deficit present.      Mental Status: She is alert and oriented to person, place, and time.   Psychiatric:         Mood and Affect: Mood normal.         Behavior: Behavior normal.          Assessment/Plan:  Jose Manuel Boyd is a 83 y.o. female who presents today for :    Jose Manuel was seen today for follow-up.    Diagnoses and all orders for this visit:    Hospital discharge follow-up    Grieving    Junctional bradycardia    Pulmonary HTN    Primary hypertension    Patient doing better since hospitalization.   Hypertension controlled.  Relatively asymptomatic from cardiac/respiratory standpoint.  She should continue current medications.      As for her grieving, she feels like she is grieving appropriately.  She does not feel like she needs counseling at this time.  Informed her that she can contact me if she would like to speak to our therapy team.    She does have a follow-up with Cardiology coming up.  Recommended patient to keep that follow-up.      Labs from hospitalization did show minor decrease in GFR.  Offered to recheck this today but patient wants to wait till her scheduled labs in February.  Recommended patient to stay hydrated.    All questions answered.        This office note has been dictated.  This dictation has been generated using M-Modal Fluency Direct dictation; some phonetic errors may occur.

## 2023-12-28 ENCOUNTER — OFFICE VISIT (OUTPATIENT)
Dept: CARDIOLOGY | Facility: CLINIC | Age: 83
End: 2023-12-28
Payer: MEDICARE

## 2023-12-28 VITALS
SYSTOLIC BLOOD PRESSURE: 130 MMHG | WEIGHT: 197.31 LBS | BODY MASS INDEX: 38.74 KG/M2 | OXYGEN SATURATION: 94 % | DIASTOLIC BLOOD PRESSURE: 80 MMHG | HEIGHT: 60 IN | HEART RATE: 44 BPM

## 2023-12-28 DIAGNOSIS — E78.2 MIXED HYPERLIPIDEMIA: Chronic | ICD-10-CM

## 2023-12-28 DIAGNOSIS — R06.09 DOE (DYSPNEA ON EXERTION): ICD-10-CM

## 2023-12-28 DIAGNOSIS — R00.1 BRADYCARDIA: ICD-10-CM

## 2023-12-28 DIAGNOSIS — R07.2 PRECORDIAL PAIN: ICD-10-CM

## 2023-12-28 DIAGNOSIS — I10 PRIMARY HYPERTENSION: Primary | Chronic | ICD-10-CM

## 2023-12-28 PROCEDURE — 1159F PR MEDICATION LIST DOCUMENTED IN MEDICAL RECORD: ICD-10-PCS | Mod: CPTII,S$GLB,, | Performed by: INTERNAL MEDICINE

## 2023-12-28 PROCEDURE — 99214 PR OFFICE/OUTPT VISIT, EST, LEVL IV, 30-39 MIN: ICD-10-PCS | Mod: S$GLB,,, | Performed by: INTERNAL MEDICINE

## 2023-12-28 PROCEDURE — 3075F SYST BP GE 130 - 139MM HG: CPT | Mod: CPTII,S$GLB,, | Performed by: INTERNAL MEDICINE

## 2023-12-28 PROCEDURE — 1101F PT FALLS ASSESS-DOCD LE1/YR: CPT | Mod: CPTII,S$GLB,, | Performed by: INTERNAL MEDICINE

## 2023-12-28 PROCEDURE — 1126F AMNT PAIN NOTED NONE PRSNT: CPT | Mod: CPTII,S$GLB,, | Performed by: INTERNAL MEDICINE

## 2023-12-28 PROCEDURE — 99214 OFFICE O/P EST MOD 30 MIN: CPT | Mod: S$GLB,,, | Performed by: INTERNAL MEDICINE

## 2023-12-28 PROCEDURE — 3079F PR MOST RECENT DIASTOLIC BLOOD PRESSURE 80-89 MM HG: ICD-10-PCS | Mod: CPTII,S$GLB,, | Performed by: INTERNAL MEDICINE

## 2023-12-28 PROCEDURE — 3288F FALL RISK ASSESSMENT DOCD: CPT | Mod: CPTII,S$GLB,, | Performed by: INTERNAL MEDICINE

## 2023-12-28 PROCEDURE — 1101F PR PT FALLS ASSESS DOC 0-1 FALLS W/OUT INJ PAST YR: ICD-10-PCS | Mod: CPTII,S$GLB,, | Performed by: INTERNAL MEDICINE

## 2023-12-28 PROCEDURE — 3079F DIAST BP 80-89 MM HG: CPT | Mod: CPTII,S$GLB,, | Performed by: INTERNAL MEDICINE

## 2023-12-28 PROCEDURE — 99999 PR PBB SHADOW E&M-EST. PATIENT-LVL IV: ICD-10-PCS | Mod: PBBFAC,,, | Performed by: INTERNAL MEDICINE

## 2023-12-28 PROCEDURE — 1126F PR PAIN SEVERITY QUANTIFIED, NO PAIN PRESENT: ICD-10-PCS | Mod: CPTII,S$GLB,, | Performed by: INTERNAL MEDICINE

## 2023-12-28 PROCEDURE — 99999 PR PBB SHADOW E&M-EST. PATIENT-LVL IV: CPT | Mod: PBBFAC,,, | Performed by: INTERNAL MEDICINE

## 2023-12-28 PROCEDURE — 3075F PR MOST RECENT SYSTOLIC BLOOD PRESS GE 130-139MM HG: ICD-10-PCS | Mod: CPTII,S$GLB,, | Performed by: INTERNAL MEDICINE

## 2023-12-28 PROCEDURE — 1159F MED LIST DOCD IN RCRD: CPT | Mod: CPTII,S$GLB,, | Performed by: INTERNAL MEDICINE

## 2023-12-28 PROCEDURE — 3288F PR FALLS RISK ASSESSMENT DOCUMENTED: ICD-10-PCS | Mod: CPTII,S$GLB,, | Performed by: INTERNAL MEDICINE

## 2023-12-28 NOTE — PROGRESS NOTES
Subjective:   Patient ID:  Jose Manuel Boyd is a 83 y.o. female who presents for follow-up of Hypertension      HPI    Followed by Dr Randall  Hypertension:  Continue current management.  Hyperlipidemia:  Continue current management.  Bradycardia: monitor. She is on clonidine. If need to can discontinue and increase losartan if needed.  Diastolic dysfunction: Continue current management.      Cardiac catheterization 10/18/2022:     Normal coronary arteries.  Hypertensive heart disease.     Echo 12/5/23      Left Ventricle: The left ventricle is normal in size. Increased wall thickness. There is concentric remodeling. Normal wall motion. There is normal systolic function with a visually estimated ejection fraction of 60 - 65%. Grade I diastolic dysfunction.    Right Ventricle: Normal right ventricular cavity size. Systolic function is normal.    Aortic Valve: The aortic valve is a trileaflet valve. There is moderate aortic valve sclerosis.    Mitral Valve: There is mild regurgitation.    Pulmonary Artery: The estimated pulmonary artery systolic pressure is 14 mmHg.      Admitted 12/4/23  Admission to the hospital for chest pain. EKG junctional bradycardia and for ischemia. No recent travel or injury. Troponin trend negative.  No chest pain. 2 D echo normal EF , grade 1 DD and normal wall motion. Seen by Cardiologist and no plan for PPM. Noted history intermittent junctional rhythm on previous EKG. Elevated BNP however lung clear and breathing comfortably on RA. Continue norvasc and follow up with Dr. Randall and can consider switch norvasc to a different antihypertensive. Remained chest pain free. Patient is stable for discharge home with close follow up with PCP and Cardiologist.       12/28/23 Denies CP or SOB. Denies dizziness or syncope  BP controlled  HR 44    Review of Systems   Constitutional: Negative for decreased appetite.   HENT:  Negative for ear discharge.    Eyes:  Negative for blurred vision.    Respiratory:  Negative for hemoptysis.    Endocrine: Negative for polyphagia.   Hematologic/Lymphatic: Negative for adenopathy.   Skin:  Negative for color change.   Musculoskeletal:  Negative for joint swelling.   Genitourinary:  Negative for bladder incontinence.   Neurological:  Negative for brief paralysis.   Psychiatric/Behavioral:  Negative for hallucinations.    Allergic/Immunologic: Negative for hives.       Objective:   Physical Exam  Constitutional:       Appearance: She is well-developed.   HENT:      Head: Normocephalic and atraumatic.   Eyes:      Conjunctiva/sclera: Conjunctivae normal.      Pupils: Pupils are equal, round, and reactive to light.   Cardiovascular:      Rate and Rhythm: Bradycardia present.      Pulses: Intact distal pulses.      Heart sounds: Normal heart sounds.   Pulmonary:      Effort: Pulmonary effort is normal.      Breath sounds: Normal breath sounds.   Abdominal:      General: Bowel sounds are normal.      Palpations: Abdomen is soft.   Musculoskeletal:         General: Normal range of motion.      Cervical back: Normal range of motion and neck supple.   Skin:     General: Skin is warm and dry.   Neurological:      Mental Status: She is alert and oriented to person, place, and time.         Assessment:      1. Primary hypertension    2. Mixed hyperlipidemia    3. POWELL (dyspnea on exertion)    4. Bradycardia    5. Precordial pain        Plan:     Bradycardia remains asymptomatic - continue to monitor  Continue Rx for HTN, HLD  OV 6 months

## 2024-01-23 ENCOUNTER — HOSPITAL ENCOUNTER (EMERGENCY)
Facility: HOSPITAL | Age: 84
Discharge: HOME | End: 2024-01-23
Attending: EMERGENCY MEDICINE
Payer: MEDICARE

## 2024-01-23 VITALS
WEIGHT: 190 LBS | SYSTOLIC BLOOD PRESSURE: 158 MMHG | BODY MASS INDEX: 37.11 KG/M2 | TEMPERATURE: 99 F | RESPIRATION RATE: 19 BRPM | OXYGEN SATURATION: 98 % | DIASTOLIC BLOOD PRESSURE: 73 MMHG | HEART RATE: 71 BPM

## 2024-01-23 DIAGNOSIS — B37.0 THRUSH: Primary | ICD-10-CM

## 2024-01-23 DIAGNOSIS — R42 LIGHTHEADED: ICD-10-CM

## 2024-01-23 DIAGNOSIS — E86.0 DEHYDRATION: ICD-10-CM

## 2024-01-23 LAB
ALBUMIN SERPL BCP-MCNC: 3.5 G/DL (ref 3.5–5.2)
ALP SERPL-CCNC: 102 U/L (ref 55–135)
ALT SERPL W/O P-5'-P-CCNC: 14 U/L (ref 10–44)
ANION GAP SERPL CALC-SCNC: 7 MMOL/L (ref 8–16)
AST SERPL-CCNC: 16 U/L (ref 10–40)
BASOPHILS # BLD AUTO: 0.09 K/UL (ref 0–0.2)
BASOPHILS NFR BLD: 0.9 % (ref 0–1.9)
BILIRUB SERPL-MCNC: 0.4 MG/DL (ref 0.1–1)
BILIRUB UR QL STRIP: NEGATIVE
BUN SERPL-MCNC: 12 MG/DL (ref 8–23)
CALCIUM SERPL-MCNC: 9.5 MG/DL (ref 8.7–10.5)
CHLORIDE SERPL-SCNC: 100 MMOL/L (ref 95–110)
CLARITY UR: CLEAR
CO2 SERPL-SCNC: 34 MMOL/L (ref 23–29)
COLOR UR: COLORLESS
CREAT SERPL-MCNC: 0.8 MG/DL (ref 0.5–1.4)
CTP QC/QA: YES
CTP QC/QA: YES
DIFFERENTIAL METHOD BLD: ABNORMAL
EOSINOPHIL # BLD AUTO: 0.2 K/UL (ref 0–0.5)
EOSINOPHIL NFR BLD: 2 % (ref 0–8)
ERYTHROCYTE [DISTWIDTH] IN BLOOD BY AUTOMATED COUNT: 14.9 % (ref 11.5–14.5)
EST. GFR  (NO RACE VARIABLE): >60 ML/MIN/1.73 M^2
GLUCOSE SERPL-MCNC: 119 MG/DL (ref 70–110)
GLUCOSE UR QL STRIP: NEGATIVE
HCT VFR BLD AUTO: 44.3 % (ref 37–48.5)
HGB BLD-MCNC: 13.5 G/DL (ref 12–16)
HGB UR QL STRIP: NEGATIVE
IMM GRANULOCYTES # BLD AUTO: 0.02 K/UL (ref 0–0.04)
IMM GRANULOCYTES NFR BLD AUTO: 0.2 % (ref 0–0.5)
KETONES UR QL STRIP: NEGATIVE
LEUKOCYTE ESTERASE UR QL STRIP: NEGATIVE
LYMPHOCYTES # BLD AUTO: 1.8 K/UL (ref 1–4.8)
LYMPHOCYTES NFR BLD: 19 % (ref 18–48)
MCH RBC QN AUTO: 25.4 PG (ref 27–31)
MCHC RBC AUTO-ENTMCNC: 30.5 G/DL (ref 32–36)
MCV RBC AUTO: 83 FL (ref 82–98)
MONOCYTES # BLD AUTO: 0.7 K/UL (ref 0.3–1)
MONOCYTES NFR BLD: 7.5 % (ref 4–15)
NEUTROPHILS # BLD AUTO: 6.7 K/UL (ref 1.8–7.7)
NEUTROPHILS NFR BLD: 70.4 % (ref 38–73)
NITRITE UR QL STRIP: NEGATIVE
NRBC BLD-RTO: 0 /100 WBC
PH UR STRIP: 7 [PH] (ref 5–8)
PLATELET # BLD AUTO: 254 K/UL (ref 150–450)
PMV BLD AUTO: 11.2 FL (ref 9.2–12.9)
POC MOLECULAR INFLUENZA A AGN: NEGATIVE
POC MOLECULAR INFLUENZA B AGN: NEGATIVE
POCT GLUCOSE: 115 MG/DL (ref 70–110)
POTASSIUM SERPL-SCNC: 4 MMOL/L (ref 3.5–5.1)
PROT SERPL-MCNC: 7.2 G/DL (ref 6–8.4)
PROT UR QL STRIP: NEGATIVE
RBC # BLD AUTO: 5.31 M/UL (ref 4–5.4)
SARS-COV-2 RDRP RESP QL NAA+PROBE: NEGATIVE
SODIUM SERPL-SCNC: 141 MMOL/L (ref 136–145)
SP GR UR STRIP: 1.01 (ref 1–1.03)
URN SPEC COLLECT METH UR: ABNORMAL
UROBILINOGEN UR STRIP-ACNC: NEGATIVE EU/DL
WBC # BLD AUTO: 9.52 K/UL (ref 3.9–12.7)

## 2024-01-23 PROCEDURE — 81003 URINALYSIS AUTO W/O SCOPE: CPT | Performed by: EMERGENCY MEDICINE

## 2024-01-23 PROCEDURE — 25000003 PHARM REV CODE 250: Performed by: EMERGENCY MEDICINE

## 2024-01-23 PROCEDURE — 99285 EMERGENCY DEPT VISIT HI MDM: CPT | Mod: 25

## 2024-01-23 PROCEDURE — 85025 COMPLETE CBC W/AUTO DIFF WBC: CPT | Performed by: EMERGENCY MEDICINE

## 2024-01-23 PROCEDURE — 82962 GLUCOSE BLOOD TEST: CPT

## 2024-01-23 PROCEDURE — 87635 SARS-COV-2 COVID-19 AMP PRB: CPT | Performed by: EMERGENCY MEDICINE

## 2024-01-23 PROCEDURE — 93010 ELECTROCARDIOGRAM REPORT: CPT | Mod: ,,, | Performed by: INTERNAL MEDICINE

## 2024-01-23 PROCEDURE — 80053 COMPREHEN METABOLIC PANEL: CPT | Performed by: EMERGENCY MEDICINE

## 2024-01-23 PROCEDURE — 87502 INFLUENZA DNA AMP PROBE: CPT

## 2024-01-23 PROCEDURE — 93005 ELECTROCARDIOGRAM TRACING: CPT

## 2024-01-23 RX ORDER — CLONIDINE HYDROCHLORIDE 0.1 MG/1
0.1 TABLET ORAL
Status: COMPLETED | OUTPATIENT
Start: 2024-01-23 | End: 2024-01-23

## 2024-01-23 RX ORDER — FUROSEMIDE 10 MG/ML
20 INJECTION INTRAMUSCULAR; INTRAVENOUS
Status: DISCONTINUED | OUTPATIENT
Start: 2024-01-23 | End: 2024-01-23

## 2024-01-23 RX ORDER — AMLODIPINE BESYLATE 5 MG/1
10 TABLET ORAL
Status: COMPLETED | OUTPATIENT
Start: 2024-01-23 | End: 2024-01-23

## 2024-01-23 RX ORDER — ACETAMINOPHEN 500 MG
500 TABLET ORAL EVERY 6 HOURS PRN
Qty: 13 TABLET | Refills: 0 | Status: SHIPPED | OUTPATIENT
Start: 2024-01-23

## 2024-01-23 RX ADMIN — CLONIDINE HYDROCHLORIDE 0.1 MG: 0.1 TABLET ORAL at 07:01

## 2024-01-23 RX ADMIN — AMLODIPINE BESYLATE 10 MG: 5 TABLET ORAL at 07:01

## 2024-01-23 RX ADMIN — DIPHENHYDRAMINE HCL 15 ML: 12.5 LIQUID ORAL at 09:01

## 2024-01-23 NOTE — ED PROVIDER NOTES
"Encounter Date: 1/23/2024    SCRIBE #1 NOTE: I, Fatmata Jazmin, am scribing for, and in the presence of,  Martinez Esqueda MD. I have scribed the following portions of the note - Other sections scribed: HPI, ROS, PE, EKG, MDM.       History     Chief Complaint   Patient presents with    Headache     X this morning, pt took tylenol and benadryl without relief.      This 83 y.o female, with a medical history of Asthma, Headache, Hypercholesterolemia, Hypertension, Psychiatric problem, and Thyroid disease, presents to the ED c/o acute, constant light-headedness that began this morning. Pt reports that her head feels heavy, noting that she feels as though she is "going to tumble over." She states that she is experiencing mild pain to the top of the head and also notes feeling fatigued. Additionally, she c/o of her mouth feeling "slimy" since yesterday. This has been constant since onset and has not improved with use of mouthwash. Pt also notes chronic bilateral leg swelling for which she takes Lasix. No recent illnesses. Pt denies chest pain, abdominal pain, dysuria, upper extremity issues, or any other associated symptoms. No alleviating factors.    The history is provided by the patient.     Review of patient's allergies indicates:   Allergen Reactions    Codeine      Other reaction(s): Rash     Past Medical History:   Diagnosis Date    Anxiety     Asthma     Depression     Headache     Hx of psychiatric care     Hypercholesterolemia     Hypertension     Psychiatric problem     Sleep difficulties     Therapy     Thyroid disease     multiple nodules     Past Surgical History:   Procedure Laterality Date    ANGIOGRAM, CORONARY, WITH LEFT HEART CATHETERIZATION Left 10/18/2022    Procedure: Angiogram, Coronary, with Left Heart Cath;  Surgeon: Kumar Randall MD;  Location: [facility] CATH LAB;  Service: Cardiology;  Laterality: Left;    CHOLECYSTECTOMY      HYSTERECTOMY      knee repalcement       Family History   Problem " "Relation Age of Onset    Diabetes Mother     Hypertension Mother     Kidney disease Mother     Heart disease Father     Bipolar disorder Daughter      Social History     Tobacco Use    Smoking status: Never    Smokeless tobacco: Never   Substance Use Topics    Alcohol use: No    Drug use: No     Review of Systems   Constitutional:  Negative for chills and fever.        (+) mouth feels "slimy"   HENT:  Negative for congestion and sore throat.    Eyes:  Negative for pain and visual disturbance.   Respiratory:  Negative for shortness of breath.    Cardiovascular:  Positive for leg swelling (chronic; bilateral). Negative for chest pain.   Gastrointestinal:  Negative for abdominal pain and nausea.   Genitourinary:  Negative for dysuria.   Musculoskeletal:  Negative for back pain.   Skin:  Negative for rash.   Neurological:  Positive for light-headedness (feels like "going to tumble over") and headaches (to top of head; mild). Negative for weakness.   All other systems reviewed and are negative.      Physical Exam     Initial Vitals [01/23/24 0600]   BP Pulse Resp Temp SpO2   (!) 203/93 66 18 98.6 °F (37 °C) 99 %      MAP       --         Physical Exam    Nursing note and vitals reviewed.  Constitutional: She appears well-developed and well-nourished.   HENT:   Head: Normocephalic and atraumatic.   Mouth/Throat: Oropharynx is clear and moist and mucous membranes are normal.   Thrush is present.   Eyes: Conjunctivae and EOM are normal. Pupils are equal, round, and reactive to light. Right conjunctiva is not injected. Left conjunctiva is not injected. No scleral icterus.   Neck: Neck supple.   Normal range of motion.   Full passive range of motion without pain.     Cardiovascular:  Normal rate, regular rhythm, S1 normal, S2 normal, normal heart sounds and normal pulses.     Exam reveals no gallop and no friction rub.       No murmur heard.  Pulses:       Radial pulses are 2+ on the right side and 2+ on the left side. "   Pulmonary/Chest: Effort normal and breath sounds normal. No respiratory distress.   Abdominal: Abdomen is soft. She exhibits no distension. There is no abdominal tenderness.   Musculoskeletal:         General: No edema. Normal range of motion.      Cervical back: Full passive range of motion without pain, normal range of motion and neck supple.      Comments: Good active ROM of all extremities. No lower extremity edema or cyanosis.      Neurological: No cranial nerve deficit. Gait normal.   A&Ox4, normal speech.   Skin: Skin is warm. No ecchymosis and no rash noted.   Psychiatric: She has a normal mood and affect. Thought content normal.         ED Course   Procedures  Labs Reviewed   CBC W/ AUTO DIFFERENTIAL - Abnormal; Notable for the following components:       Result Value    MCH 25.4 (*)     MCHC 30.5 (*)     RDW 14.9 (*)     All other components within normal limits   URINALYSIS, REFLEX TO URINE CULTURE - Abnormal; Notable for the following components:    Color, UA Colorless (*)     All other components within normal limits    Narrative:     Specimen Source->Urine   COMPREHENSIVE METABOLIC PANEL - Abnormal; Notable for the following components:    CO2 34 (*)     Glucose 119 (*)     Anion Gap 7 (*)     All other components within normal limits   POCT GLUCOSE - Abnormal; Notable for the following components:    POCT Glucose 115 (*)     All other components within normal limits   SARS-COV-2 RDRP GENE   POCT INFLUENZA A/B MOLECULAR     EKG Readings: (Independently Interpreted)   Rhythm: Normal Sinus Rhythm. Heart Rate: 69. Ectopy: No Ectopy. Conduction: Normal. ST Segments: Normal ST Segments. T Waves: Normal. Clinical Impression: Normal Sinus Rhythm       Imaging Results              X-Ray Chest AP Portable (Final result)  Result time 01/23/24 07:47:37      Final result by Regulo Blackwell MD (01/23/24 07:47:37)                   Impression:      No significant intrathoracic abnormality.  No significant  detrimental interval change in the appearance of the chest since 12/04/2023 is appreciated.      Electronically signed by: Regulo Blackwell MD  Date:    01/23/2024  Time:    07:47               Narrative:    EXAMINATION:  XR CHEST AP PORTABLE    CLINICAL HISTORY:  lightheaded;    TECHNIQUE:  One view    COMPARISON:  Comparison is made to 12/04/2023.    FINDINGS:  Allowing for magnification of the cardiomediastinal silhouette related to projection, the heart size is felt to be no larger than upper limit of normal, and the heart size and appearance of the cardiomediastinal silhouette have not changed appreciably since the examination referenced above.  Pulmonary vascularity is normal, and there are no findings indicating current cardiac decompensation.  Lung zones are stable and remain essentially clear, free of significant superimposed airspace consolidation or volume loss.  No pleural fluid of any substantial volume is noted on either side.  No pneumothorax.  Calcification in the wall of the aortic arch is again incidentally observed.                                       Medications   magic mouthwash (diphenhydrAMINE 12.5 mg/5 mL 20 mL, aluminum & magnesium hydroxide-simethicone (MYLANTA) 20 mL, LIDOcaine HCl 2% (XYLOCAINE) 20 mL) solution (15 mLs Swish & Spit Given 1/23/24 0903)   amLODIPine tablet 10 mg (10 mg Oral Given 1/23/24 0743)   cloNIDine tablet 0.1 mg (0.1 mg Oral Given 1/23/24 0744)     Medical Decision Making  Differential diagnosis includes but is not limited to:  Thrush, mild trauma, cellulitis, dehydration, UTI, tension headache, electrolyte abnormality, presyncopal,.    External records reviewed: Per chart review, patient was seen in the ED on 12/4/23, for chest pain. Patient had an abnormal EKG showing pre atrial contractions, sinus bradycardia, junctional rhythm or moving in between them. She was admitted into the hospital for observation and an echo, which showed normal EF , grade 1 DD and normal wall  motion. She was instructed to continue Norvasc and follow up with cardiology. Patient followed up with cardiology on 12/28/23, and was discharged with a plan to continue monitoring her asymptomatic bradycardia, continue medications as prescribed and follow up in 6 months.      83-year-old female presenting secondary lightheadedness and also what appears to be thrush in her mouth.  Labs and workup reassuring.  Afebrile.  Vitals reassuring.  Patient given magic mouthwash.  Already feels better.  Labs and workup reassuring.  Patient follow-up with primary care.  Prescriptions below.  States pain is gone.  Patient was sleeping her O2 sat dropped a little bit.  I repositioned patient and sat her upright.  Patient on room air and satting 94-96% with no respiratory complaints.  Discharged with outpatient follow-up. I discussed with the patient/family the diagnosis, treatment plan, indications for return to the emergency department, and for expected follow-up. The patient/family verbalized an understanding. The patient/family is asked if there are any questions or concerns. We discuss the case, until all issues are addressed to the patient/family's satisfaction. Patient/family understands and is agreeable to the plan.   Martinez Esqueda    DISCLAIMER: This note was prepared with [employer] voice recognition transcription software. Garbled syntax, mangled pronouns, and other bizarre constructions may be attributed to that software system.        Amount and/or Complexity of Data Reviewed  External Data Reviewed: labs, radiology and ECG.  Labs: ordered.  Radiology: ordered.  ECG/medicine tests: ordered.    Risk  OTC drugs.  Prescription drug management.            Scribe Attestation:   Scribe #1: I performed the above scribed service and the documentation accurately describes the services I performed. I attest to the accuracy of the note.        ED Course as of 01/23/24 1037   Tue Jan 23, 2024   0944 Patient is sitting upright off  oxygen satting 94-96% with no respiratory complaints. [BA]      ED Course User Index  [BA] Martinez Esqueda MD                     I, martinez esqueda, personally performed the services described in this documentation. All medical record entries made by the scribe were at my direction and in my presence. I have reviewed the chart and agree that the record reflects my personal performance and is accurate and complete.       Clinical Impression:  Final diagnoses:  [R42] Lightheaded  [B37.0] Thrush (Primary)  [E86.0] Dehydration          ED Disposition Condition    Discharge Stable          ED Prescriptions       Medication Sig Dispense Start Date End Date Auth. Provider    acetaminophen (TYLENOL) 500 MG tablet Take 1 tablet (500 mg total) by mouth every 6 (six) hours as needed. 13 tablet 1/23/2024 -- Martinez Esqueda MD    diphenhydrAMINE-aluminum-magnesium hydroxide-simethicone-LIDOcaine viscous HCl 2% Swish and spit 15 mLs every 4 (four) hours as needed. 1 each 1/23/2024 -- Martinez Esqueda MD          Follow-up Information       Follow up With Specialties Details Why Contact Info    Ajit Piña MD Internal Medicine, Wound Care Schedule an appointment as soon as possible for a visit in 2 days  [address] [facility] [address]  [phone]               Martinez Esqueda MD  01/23/24 1037

## 2024-01-23 NOTE — ED NOTES
Pt sats dropped to 88%, when into room, pt sleeping.  When she awoke Sats eduar to 92%.  Pt wears O2 at home PRN.  O2 administered at 2LNC>  MD notified

## 2024-01-23 NOTE — DISCHARGE INSTRUCTIONS

## 2024-01-25 ENCOUNTER — PATIENT OUTREACH (OUTPATIENT)
Dept: EMERGENCY MEDICINE | Facility: HOSPITAL | Age: 84
End: 2024-01-25

## 2024-01-31 DIAGNOSIS — B37.0 THRUSH: Primary | ICD-10-CM

## 2024-02-01 ENCOUNTER — TELEPHONE (OUTPATIENT)
Dept: FAMILY MEDICINE | Facility: CLINIC | Age: 84
End: 2024-02-01
Payer: MEDICARE

## 2024-02-01 NOTE — TELEPHONE ENCOUNTER
Unable to leave message. Did reach out to pharmacy said they did not receive the medication but, also they are currently out of lidocaine. Will message ED provider to make him aware.

## 2024-02-01 NOTE — TELEPHONE ENCOUNTER
----- Message from Stephen Gonzales MA sent at 1/31/2024  8:32 AM CST -----  Type: Patient Call Back    Who called: Self    What is the request in detail: pt. States she needs a appt. For Friday to get medication regarding her mouth feeling slimy.. she states she was in the ED and was told something was being called in but it never was ..     Can the clinic reply by [facility]?NO    Would the patient rather a call back or a response via [facility]? Yes, call     Best call back number: [phone]

## 2024-02-02 ENCOUNTER — TELEPHONE (OUTPATIENT)
Dept: FAMILY MEDICINE | Facility: CLINIC | Age: 84
End: 2024-02-02
Payer: MEDICARE

## 2024-02-02 NOTE — TELEPHONE ENCOUNTER
----- Message from Yvette العلي sent at 2/2/2024  3:30 PM CST -----  Regarding: Request for Sooner Apt  Type:  Sooner Appointment Request    Patient is requesting a sooner appointment.  Patient declined first available appointment listed as well as another facility and provider .  Patient will not accept being placed on the waitlist and is requesting a message be sent to doctor.    Name of Caller: Self     When is the first available appointment? Wed 2/7/24    Symptoms: hospital F/U- medicine for mouth. Asked to be seen Monday     Would the patient rather a call back or a response via [facility]? Call     Best Call Back Number: .[phone]

## 2024-02-07 DIAGNOSIS — F33.0 MAJOR DEPRESSIVE DISORDER, RECURRENT EPISODE, MILD WITH ANXIOUS DISTRESS: ICD-10-CM

## 2024-02-07 RX ORDER — CLONAZEPAM 0.5 MG/1
0.5 TABLET ORAL 2 TIMES DAILY PRN
Qty: 20 TABLET | Refills: 0 | Status: SHIPPED | OUTPATIENT
Start: 2024-02-07 | End: 2024-02-28 | Stop reason: SDUPTHER

## 2024-02-07 NOTE — TELEPHONE ENCOUNTER
Care Due:                  Date            Visit Type   Department     Provider  --------------------------------------------------------------------------------                                [facility]                              PRIMARY      MEDICINE/  Last Visit: 10-      CARE ([facility])   INTERNAL MED   Ajit Piña                              [facility]                              PRIMARY      MEDICINE/  Next Visit: 02-      CARE ([facility])   INTERNAL MED   Ajit Piña                                                            Last  Test          Frequency    Reason                     Performed    Due Date  --------------------------------------------------------------------------------    Lipid Panel.  12 months..  atorvastatin.............  08-   08-    Health [facility] Embedded Care Due Messages. Reference number: [id number].   2/07/2024 3:50:44 PM CST

## 2024-02-22 ENCOUNTER — LAB VISIT (OUTPATIENT)
Dept: LAB | Facility: HOSPITAL | Age: 84
End: 2024-02-22
Attending: INTERNAL MEDICINE
Payer: MEDICARE

## 2024-02-22 DIAGNOSIS — I10 HYPERTENSION, ESSENTIAL: ICD-10-CM

## 2024-02-22 DIAGNOSIS — E78.2 MIXED HYPERLIPIDEMIA: ICD-10-CM

## 2024-02-22 DIAGNOSIS — R73.01 IMPAIRED FASTING GLUCOSE: ICD-10-CM

## 2024-02-22 LAB
ALBUMIN SERPL BCP-MCNC: 3.2 G/DL (ref 3.5–5.2)
ALP SERPL-CCNC: 101 U/L (ref 55–135)
ALT SERPL W/O P-5'-P-CCNC: 14 U/L (ref 10–44)
ANION GAP SERPL CALC-SCNC: 13 MMOL/L (ref 8–16)
AST SERPL-CCNC: 14 U/L (ref 10–40)
BILIRUB SERPL-MCNC: 0.6 MG/DL (ref 0.1–1)
BUN SERPL-MCNC: 14 MG/DL (ref 8–23)
CALCIUM SERPL-MCNC: 9.3 MG/DL (ref 8.7–10.5)
CHLORIDE SERPL-SCNC: 104 MMOL/L (ref 95–110)
CHOLEST SERPL-MCNC: 148 MG/DL (ref 120–199)
CHOLEST/HDLC SERPL: 3.4 {RATIO} (ref 2–5)
CO2 SERPL-SCNC: 28 MMOL/L (ref 23–29)
CREAT SERPL-MCNC: 0.8 MG/DL (ref 0.5–1.4)
EST. GFR  (NO RACE VARIABLE): >60 ML/MIN/1.73 M^2
ESTIMATED AVG GLUCOSE: 128 MG/DL (ref 68–131)
GLUCOSE SERPL-MCNC: 116 MG/DL (ref 70–110)
HBA1C MFR BLD: 6.1 % (ref 4–5.6)
HDLC SERPL-MCNC: 43 MG/DL (ref 40–75)
HDLC SERPL: 29.1 % (ref 20–50)
LDLC SERPL CALC-MCNC: 91.2 MG/DL (ref 63–159)
NONHDLC SERPL-MCNC: 105 MG/DL
POTASSIUM SERPL-SCNC: 3.7 MMOL/L (ref 3.5–5.1)
PROT SERPL-MCNC: 6.9 G/DL (ref 6–8.4)
SODIUM SERPL-SCNC: 145 MMOL/L (ref 136–145)
TRIGL SERPL-MCNC: 69 MG/DL (ref 30–150)

## 2024-02-22 PROCEDURE — 83036 HEMOGLOBIN GLYCOSYLATED A1C: CPT | Performed by: INTERNAL MEDICINE

## 2024-02-22 PROCEDURE — 36415 COLL VENOUS BLD VENIPUNCTURE: CPT | Mod: PN | Performed by: INTERNAL MEDICINE

## 2024-02-22 PROCEDURE — 80053 COMPREHEN METABOLIC PANEL: CPT | Performed by: INTERNAL MEDICINE

## 2024-02-22 PROCEDURE — 80061 LIPID PANEL: CPT | Performed by: INTERNAL MEDICINE

## 2024-02-28 DIAGNOSIS — F33.0 MAJOR DEPRESSIVE DISORDER, RECURRENT EPISODE, MILD WITH ANXIOUS DISTRESS: ICD-10-CM

## 2024-02-28 RX ORDER — CLONAZEPAM 0.5 MG/1
0.5 TABLET ORAL 2 TIMES DAILY PRN
Qty: 20 TABLET | Refills: 0 | Status: SHIPPED | OUTPATIENT
Start: 2024-02-28 | End: 2024-03-28 | Stop reason: SDUPTHER

## 2024-02-28 NOTE — TELEPHONE ENCOUNTER
No care due was identified.  Health [facility] Embedded Care Due Messages. Reference number: [id number].   2/28/2024 11:41:12 AM CST

## 2024-03-28 ENCOUNTER — OFFICE VISIT (OUTPATIENT)
Dept: FAMILY MEDICINE | Facility: CLINIC | Age: 84
End: 2024-03-28
Payer: MEDICARE

## 2024-03-28 VITALS
SYSTOLIC BLOOD PRESSURE: 132 MMHG | DIASTOLIC BLOOD PRESSURE: 74 MMHG | OXYGEN SATURATION: 95 % | HEART RATE: 81 BPM | TEMPERATURE: 98 F | BODY MASS INDEX: 37.87 KG/M2 | WEIGHT: 192.88 LBS | HEIGHT: 60 IN

## 2024-03-28 DIAGNOSIS — E66.01 MORBID OBESITY DUE TO EXCESS CALORIES: ICD-10-CM

## 2024-03-28 DIAGNOSIS — F33.41 RECURRENT MAJOR DEPRESSIVE DISORDER, IN PARTIAL REMISSION: ICD-10-CM

## 2024-03-28 DIAGNOSIS — E78.2 MIXED HYPERLIPIDEMIA: ICD-10-CM

## 2024-03-28 DIAGNOSIS — R73.01 IMPAIRED FASTING GLUCOSE: ICD-10-CM

## 2024-03-28 DIAGNOSIS — F33.0 MAJOR DEPRESSIVE DISORDER, RECURRENT EPISODE, MILD WITH ANXIOUS DISTRESS: ICD-10-CM

## 2024-03-28 DIAGNOSIS — I10 HYPERTENSION, ESSENTIAL: Primary | ICD-10-CM

## 2024-03-28 DIAGNOSIS — I70.0 ATHEROSCLEROSIS OF AORTA: ICD-10-CM

## 2024-03-28 PROBLEM — I27.20 PULMONARY HTN: Status: RESOLVED | Noted: 2023-01-18 | Resolved: 2024-03-28

## 2024-03-28 PROCEDURE — 99214 OFFICE O/P EST MOD 30 MIN: CPT | Mod: S$GLB,,, | Performed by: INTERNAL MEDICINE

## 2024-03-28 PROCEDURE — 99999 PR PBB SHADOW E&M-EST. PATIENT-LVL V: CPT | Mod: PBBFAC,,, | Performed by: INTERNAL MEDICINE

## 2024-03-28 RX ORDER — BUSPIRONE HYDROCHLORIDE 7.5 MG/1
7.5 TABLET ORAL 2 TIMES DAILY
Qty: 180 TABLET | Refills: 1 | Status: SHIPPED | OUTPATIENT
Start: 2024-03-28 | End: 2024-05-28 | Stop reason: SDUPTHER

## 2024-03-28 RX ORDER — CLONAZEPAM 0.5 MG/1
0.5 TABLET ORAL 2 TIMES DAILY PRN
Qty: 20 TABLET | Refills: 0 | Status: SHIPPED | OUTPATIENT
Start: 2024-03-28 | End: 2024-05-28 | Stop reason: SDUPTHER

## 2024-03-28 NOTE — PROGRESS NOTES
"Subjective:       Patient ID: Jose Manuel Boyd is a 83 y.o. female.    Chief Complaint: Follow-up (4m f/u)    F/u mood    HPI: 84 y/o w/ HTN HLD MDD presents alone for scheduled follow up. Feels "okay" taking buspar 7.5mg twice per day for last two months feels this has helped with anxiety using clonazepam only fro acute anxiety no falls       Review of Systems   Constitutional:  Negative for activity change, appetite change, fatigue, fever and unexpected weight change.   HENT:  Negative for ear pain, rhinorrhea and sore throat.    Eyes:  Negative for discharge and visual disturbance.   Respiratory:  Negative for chest tightness, shortness of breath and wheezing.    Cardiovascular:  Negative for chest pain, palpitations and leg swelling.   Gastrointestinal:  Negative for abdominal pain, constipation and diarrhea.   Endocrine: Negative for cold intolerance and heat intolerance.   Genitourinary:  Negative for dysuria and hematuria.   Musculoskeletal:  Negative for joint swelling and neck stiffness.   Skin:  Negative for rash.   Neurological:  Negative for dizziness, syncope, weakness and headaches.   Psychiatric/Behavioral:  Negative for suicidal ideas.        Objective:     Vitals:    03/28/24 1105   BP: 132/74   Pulse: 81   Temp: 97.9 °F (36.6 °C)   TempSrc: Oral   SpO2: 95%   Weight: 87.5 kg (192 lb 14.4 oz)   Height: 5' (1.524 m)          Physical Exam  Constitutional:       Appearance: She is well-developed.   HENT:      Head: Normocephalic and atraumatic.   Eyes:      Conjunctiva/sclera: Conjunctivae normal.   Cardiovascular:      Rate and Rhythm: Normal rate and regular rhythm.      Heart sounds: No murmur heard.     No friction rub. No gallop.   Pulmonary:      Effort: Pulmonary effort is normal.      Breath sounds: Normal breath sounds. No wheezing or rales.   Abdominal:      Tenderness: There is no abdominal tenderness. There is no guarding or rebound.   Musculoskeletal:         General: No tenderness. " Normal range of motion.      Cervical back: Normal range of motion.      Right lower leg: No edema.      Left lower leg: No edema.   Skin:     General: Skin is warm and dry.   Neurological:      Mental Status: She is alert and oriented to person, place, and time.      Cranial Nerves: No cranial nerve deficit.         Assessment and Plan   1. Hypertension, essential  BP at goal continue current medicaitosn  - Comprehensive Metabolic Panel; Future    2. Mixed hyperlipidemia  Continue statin therapy    3. Impaired fasting glucose   Screen with A1c   - Hemoglobin A1C; Future    4. Morbid obesity due to excess calories  The patient is asked to make an attempt to improve diet and exercise patterns to aid in medical management of this problem.      5. Atherosclerosis of aorta  Statin therapy    6. Recurrent major depressive disorder, in partial remission  Buspar prn clonizidne nightly mirtazapine    7. Major depressive disorder, recurrent episode, mild with anxious distress  As above  - busPIRone (BUSPAR) 7.5 MG tablet; Take 1 tablet (7.5 mg total) by mouth 2 (two) times a day.  Dispense: 180 tablet; Refill: 1  - clonazePAM (KLONOPIN) 0.5 MG tablet; Take 1 tablet (0.5 mg total) by mouth 2 (two) times daily as needed for Anxiety.  Dispense: 20 tablet; Refill: 0

## 2024-05-22 ENCOUNTER — HOSPITAL ENCOUNTER (EMERGENCY)
Facility: HOSPITAL | Age: 84
Discharge: HOME | End: 2024-05-23
Attending: EMERGENCY MEDICINE
Payer: MEDICARE

## 2024-05-22 VITALS
SYSTOLIC BLOOD PRESSURE: 148 MMHG | DIASTOLIC BLOOD PRESSURE: 71 MMHG | BODY MASS INDEX: 35.15 KG/M2 | RESPIRATION RATE: 13 BRPM | OXYGEN SATURATION: 95 % | HEART RATE: 74 BPM | TEMPERATURE: 99 F | WEIGHT: 180 LBS

## 2024-05-22 DIAGNOSIS — R42 DIZZINESS: ICD-10-CM

## 2024-05-22 DIAGNOSIS — R68.2 DRY MOUTH: Primary | ICD-10-CM

## 2024-05-22 DIAGNOSIS — R51.9 NONINTRACTABLE HEADACHE, UNSPECIFIED CHRONICITY PATTERN, UNSPECIFIED HEADACHE TYPE: ICD-10-CM

## 2024-05-22 LAB
ALBUMIN SERPL BCP-MCNC: 3.4 G/DL (ref 3.5–5.2)
ALP SERPL-CCNC: 107 U/L (ref 55–135)
ALT SERPL W/O P-5'-P-CCNC: 13 U/L (ref 10–44)
ANION GAP SERPL CALC-SCNC: 13 MMOL/L (ref 8–16)
AST SERPL-CCNC: 17 U/L (ref 10–40)
BASOPHILS # BLD AUTO: 0.07 K/UL (ref 0–0.2)
BASOPHILS NFR BLD: 0.7 % (ref 0–1.9)
BILIRUB SERPL-MCNC: 0.4 MG/DL (ref 0.1–1)
BILIRUB UR QL STRIP: NEGATIVE
BNP SERPL-MCNC: 116 PG/ML (ref 0–99)
BUN SERPL-MCNC: 11 MG/DL (ref 8–23)
CALCIUM SERPL-MCNC: 10 MG/DL (ref 8.7–10.5)
CHLORIDE SERPL-SCNC: 104 MMOL/L (ref 95–110)
CLARITY UR: CLEAR
CO2 SERPL-SCNC: 27 MMOL/L (ref 23–29)
COLOR UR: COLORLESS
CREAT SERPL-MCNC: 0.8 MG/DL (ref 0.5–1.4)
DIFFERENTIAL METHOD BLD: ABNORMAL
EOSINOPHIL # BLD AUTO: 0.2 K/UL (ref 0–0.5)
EOSINOPHIL NFR BLD: 2.3 % (ref 0–8)
ERYTHROCYTE [DISTWIDTH] IN BLOOD BY AUTOMATED COUNT: 14.9 % (ref 11.5–14.5)
EST. GFR  (NO RACE VARIABLE): >60 ML/MIN/1.73 M^2
GLUCOSE SERPL-MCNC: 85 MG/DL (ref 70–110)
GLUCOSE UR QL STRIP: NEGATIVE
HCT VFR BLD AUTO: 44.5 % (ref 37–48.5)
HGB BLD-MCNC: 13.6 G/DL (ref 12–16)
HGB UR QL STRIP: NEGATIVE
IMM GRANULOCYTES # BLD AUTO: 0.04 K/UL (ref 0–0.04)
IMM GRANULOCYTES NFR BLD AUTO: 0.4 % (ref 0–0.5)
KETONES UR QL STRIP: NEGATIVE
LEUKOCYTE ESTERASE UR QL STRIP: NEGATIVE
LYMPHOCYTES # BLD AUTO: 2.3 K/UL (ref 1–4.8)
LYMPHOCYTES NFR BLD: 21.8 % (ref 18–48)
MAGNESIUM SERPL-MCNC: 1.9 MG/DL (ref 1.6–2.6)
MCH RBC QN AUTO: 25.9 PG (ref 27–31)
MCHC RBC AUTO-ENTMCNC: 30.6 G/DL (ref 32–36)
MCV RBC AUTO: 85 FL (ref 82–98)
MONOCYTES # BLD AUTO: 0.9 K/UL (ref 0.3–1)
MONOCYTES NFR BLD: 8.8 % (ref 4–15)
NEUTROPHILS # BLD AUTO: 6.9 K/UL (ref 1.8–7.7)
NEUTROPHILS NFR BLD: 66 % (ref 38–73)
NITRITE UR QL STRIP: NEGATIVE
NRBC BLD-RTO: 0 /100 WBC
PH UR STRIP: 7 [PH] (ref 5–8)
PHOSPHATE SERPL-MCNC: 3.4 MG/DL (ref 2.7–4.5)
PLATELET # BLD AUTO: 296 K/UL (ref 150–450)
PMV BLD AUTO: 10.7 FL (ref 9.2–12.9)
POTASSIUM SERPL-SCNC: 3.6 MMOL/L (ref 3.5–5.1)
PROT SERPL-MCNC: 7.5 G/DL (ref 6–8.4)
PROT UR QL STRIP: NEGATIVE
RBC # BLD AUTO: 5.25 M/UL (ref 4–5.4)
SODIUM SERPL-SCNC: 144 MMOL/L (ref 136–145)
SP GR UR STRIP: 1 (ref 1–1.03)
URN SPEC COLLECT METH UR: ABNORMAL
UROBILINOGEN UR STRIP-ACNC: NEGATIVE EU/DL
WBC # BLD AUTO: 10.39 K/UL (ref 3.9–12.7)

## 2024-05-22 PROCEDURE — 96361 HYDRATE IV INFUSION ADD-ON: CPT

## 2024-05-22 PROCEDURE — 96360 HYDRATION IV INFUSION INIT: CPT

## 2024-05-22 PROCEDURE — 85025 COMPLETE CBC W/AUTO DIFF WBC: CPT | Performed by: EMERGENCY MEDICINE

## 2024-05-22 PROCEDURE — 93005 ELECTROCARDIOGRAM TRACING: CPT

## 2024-05-22 PROCEDURE — 99284 EMERGENCY DEPT VISIT MOD MDM: CPT | Mod: 25

## 2024-05-22 PROCEDURE — 25000003 PHARM REV CODE 250: Performed by: EMERGENCY MEDICINE

## 2024-05-22 PROCEDURE — 83735 ASSAY OF MAGNESIUM: CPT | Performed by: EMERGENCY MEDICINE

## 2024-05-22 PROCEDURE — 83880 ASSAY OF NATRIURETIC PEPTIDE: CPT | Performed by: EMERGENCY MEDICINE

## 2024-05-22 PROCEDURE — 93010 ELECTROCARDIOGRAM REPORT: CPT | Mod: ,,, | Performed by: INTERNAL MEDICINE

## 2024-05-22 PROCEDURE — 84100 ASSAY OF PHOSPHORUS: CPT | Performed by: EMERGENCY MEDICINE

## 2024-05-22 PROCEDURE — 81003 URINALYSIS AUTO W/O SCOPE: CPT | Performed by: EMERGENCY MEDICINE

## 2024-05-22 PROCEDURE — 80053 COMPREHEN METABOLIC PANEL: CPT | Performed by: EMERGENCY MEDICINE

## 2024-05-22 RX ADMIN — SODIUM CHLORIDE 1000 ML: 9 INJECTION, SOLUTION INTRAVENOUS at 07:05

## 2024-05-22 NOTE — ED TRIAGE NOTES
"Pt to ED c/o headache and states "I just feel like I will fall" Pt denies dizziness but could not explain characteristics of headache appropriately. Pt denies any F/V/D. Pt with hx of HTN states "My blood pressure been high but I got it down though" Pt denies any blurred vision, CP or SOB at present. BLE edema noted.  Pt AAO x4. VSS. Connected to cardiac and O2 monitor. EKG completed and shown   "

## 2024-05-23 LAB
OHS QRS DURATION: 78 MS
OHS QTC CALCULATION: 462 MS

## 2024-05-23 NOTE — ED PROVIDER NOTES
"Encounter Date: 5/22/2024    SCRIBE #1 NOTE: I, Brittany Carri, am scribing for, and in the presence of,  Joo Sharp MD.       History     Chief Complaint   Patient presents with    Headache     Pt reports to ED stating her head feels heavy and reports "sticky" feeling in mouth. She states her bilateral hand "stickiness"     82 yo F w/ anxiety, asthma, depression, hypertension, hypercholesterolemia presenting to the ED for acute headache. She reports a headache onset earlier today, describes as "heaviness" in character, but took a dose of Excedrin PTA today with relief, now states her pain is mild and mostly resolved. Also reports associated dizziness w/ her headaches.     She also reports concern for dry and "sticky" membranes in her mouth. She has been using a mouthwash w/out relief and only experiences "slime and sticky feeling in my mouth and spitting out pieces" after. She does wear dentures. She states she had recently started consuming green tea and does occasionally consumes coke. She reports decreased urinary output and increased bl leg swelling lately despite being on Lasix. She admits she does not stay hydrated w/ water usually. She also reports concern for "stickiness" to her hands and feet when she gets out of her shower. She denies any ambulatory issues or experiencing any falls w/ her "stickiness". No other exacerbating or alleviating factors. Denies cough, dysphagia, decreased appetite, vomiting, diarrhea, or other associated symptoms.     The history is provided by the patient.     Review of patient's allergies indicates:   Allergen Reactions    Codeine      Other reaction(s): Rash     Past Medical History:   Diagnosis Date    Anxiety     Asthma     Depression     Headache     Hx of psychiatric care     Hypercholesterolemia     Hypertension     Psychiatric problem     Sleep difficulties     Therapy     Thyroid disease     multiple nodules     Past Surgical History:   Procedure Laterality Date    " ANGIOGRAM, CORONARY, WITH LEFT HEART CATHETERIZATION Left 10/18/2022    Procedure: Angiogram, Coronary, with Left Heart Cath;  Surgeon: Kumar Randall MD;  Location: [facility] CATH LAB;  Service: Cardiology;  Laterality: Left;    CHOLECYSTECTOMY      HYSTERECTOMY      knee repalcement       Family History   Problem Relation Name Age of Onset    Diabetes Mother      Hypertension Mother      Kidney disease Mother      Heart disease Father      Bipolar disorder Daughter Kourtney      Social History     Tobacco Use    Smoking status: Never    Smokeless tobacco: Never   Substance Use Topics    Alcohol use: No    Drug use: No     Review of Systems   Constitutional:  Negative for appetite change.   HENT:  Negative for trouble swallowing.    Respiratory:  Negative for cough.    Cardiovascular:  Positive for leg swelling.   Gastrointestinal:  Negative for diarrhea and vomiting.   Genitourinary:  Positive for frequency.   Musculoskeletal:  Negative for gait problem.   Neurological:  Positive for dizziness and headaches.   All other systems reviewed and are negative.      Physical Exam     Initial Vitals [05/22/24 1526]   BP Pulse Resp Temp SpO2   128/62 66 18 98.7 °F (37.1 °C) (!) 94 %      MAP       --         Physical Exam    Nursing note and vitals reviewed.  Constitutional: She appears well-developed and well-nourished. She is not diaphoretic. No distress.   HENT:   Head: Normocephalic and atraumatic.   Mouth/Throat: Uvula is midline and oropharynx is clear and moist. Mucous membranes are dry. No uvula swelling. No posterior oropharyngeal edema or posterior oropharyngeal erythema.   Eyes: EOM are normal.   Neck: Trachea normal and phonation normal. No stridor present.   Normal range of motion.  Cardiovascular:  Normal rate, regular rhythm and normal heart sounds.           Pulses:       Dorsalis pedis pulses are 2+ on the right side and 2+ on the left side.   Pulmonary/Chest: No accessory muscle usage. No tachypnea. No  respiratory distress. She has wheezes in the right upper field, the right middle field, the right lower field, the left upper field, the left middle field and the left lower field.   Abdominal: Abdomen is soft. There is no abdominal tenderness. There is no rebound and no guarding.   Musculoskeletal:         General: Normal range of motion.      Cervical back: Normal range of motion.      Right upper leg: No edema.      Left upper leg: No edema.      Right lower leg: No edema.      Left lower leg: No edema.     Neurological: She is alert and oriented to person, place, and time. She has normal strength. No cranial nerve deficit or sensory deficit. Coordination and gait normal. GCS score is 15. GCS eye subscore is 4. GCS verbal subscore is 5. GCS motor subscore is 6.   No direction changing nystagmus on eccentric gaze.   Head impulse test is reassuring  No skew deviation     Skin: Skin is dry.         ED Course   Procedures  Labs Reviewed   URINALYSIS, REFLEX TO URINE CULTURE - Abnormal; Notable for the following components:       Result Value    Color, UA Colorless (*)     All other components within normal limits    Narrative:     Specimen Source->Urine   CBC W/ AUTO DIFFERENTIAL - Abnormal; Notable for the following components:    MCH 25.9 (*)     MCHC 30.6 (*)     RDW 14.9 (*)     All other components within normal limits   COMPREHENSIVE METABOLIC PANEL - Abnormal; Notable for the following components:    Albumin 3.4 (*)     All other components within normal limits   B-TYPE NATRIURETIC PEPTIDE - Abnormal; Notable for the following components:     (*)     All other components within normal limits   MAGNESIUM   PHOSPHORUS     EKG Readings: (Independently Interpreted)   Initial Reading: No STEMI. Rhythm: Normal Sinus Rhythm. Heart Rate: 75. Ectopy: No Ectopy. Conduction: Normal. ST Segments: Normal ST Segments. T Waves: Normal. Clinical Impression: Normal Sinus Rhythm     ECG Results              EKG 12-lead  (Final result)        Collection Time Result Time QRS Duration [facility] QTC Calculation    05/22/24 17:25:08 05/23/24 16:39:36 78 462                     Final result by Interface, Lab In [facility] (05/23/24 16:39:44)                   Narrative:    Test Reason : R42,    Vent. Rate : 075 BPM     Atrial Rate : 075 BPM     P-R Int : 200 ms          QRS Dur : 078 ms      QT Int : 414 ms       P-R-T Axes : 036 001 039 degrees     QTc Int : 462 ms    Sinus rhythm with Premature atrial complexes  Otherwise normal ECG  When compared with ECG of 23-JAN-2024 07:34,  Significant changes have occurred  Confirmed by Amish Mojica MD ([id number]) on 5/23/2024 4:39:34 PM    Referred By: LEIGHANN   SELF           Confirmed By:Amish Mojica MD                                  Imaging Results    None          Medications   sodium chloride 0.9% bolus 1,000 mL 1,000 mL (0 mLs Intravenous Stopped 5/22/24 2129)     Medical Decision Making  Amount and/or Complexity of Data Reviewed  Labs: ordered. Decision-making details documented in ED Course.  ECG/medicine tests: ordered and independent interpretation performed. Decision-making details documented in ED Course.      Labs Reviewed      Admission on 05/22/2024, Discharged on 05/23/2024   Component Date Value Ref Range Status    Specimen UA 05/22/2024 Urine, Clean Catch   Final    Color, UA 05/22/2024 Colorless (A)  Yellow, Straw, Dulce Maria Final    Appearance, UA 05/22/2024 Clear  Clear Final    pH, UA 05/22/2024 7.0  5.0 - 8.0 Final    Specific Gravity, UA 05/22/2024 1.005  1.005 - 1.030 Final    Protein, UA 05/22/2024 Negative  Negative Final    Comment: Recommend a 24 hour urine protein or a urine   protein/creatinine ratio if globulin induced proteinuria is  clinically suspected.      Glucose, UA 05/22/2024 Negative  Negative Final    Ketones, UA 05/22/2024 Negative  Negative Final    Bilirubin (UA) 05/22/2024 Negative  Negative Final    Occult Blood UA 05/22/2024 Negative  Negative Final     Nitrite, UA 05/22/2024 Negative  Negative Final    Urobilinogen, UA 05/22/2024 Negative  <2.0 EU/dL Final    Leukocytes, UA 05/22/2024 Negative  Negative Final    QRS Duration 05/22/2024 78  ms Final    OHS QTC Calculation 05/22/2024 462  ms Final    WBC 05/22/2024 10.39  3.90 - 12.70 K/uL Final    RBC 05/22/2024 5.25  4.00 - 5.40 M/uL Final    Hemoglobin 05/22/2024 13.6  12.0 - 16.0 g/dL Final    Hematocrit 05/22/2024 44.5  37.0 - 48.5 % Final    MCV 05/22/2024 85  82 - 98 fL Final    MCH 05/22/2024 25.9 (L)  27.0 - 31.0 pg Final    MCHC 05/22/2024 30.6 (L)  32.0 - 36.0 g/dL Final    RDW 05/22/2024 14.9 (H)  11.5 - 14.5 % Final    Platelets 05/22/2024 296  150 - 450 K/uL Final    MPV 05/22/2024 10.7  9.2 - 12.9 fL Final    Immature Granulocytes 05/22/2024 0.4  0.0 - 0.5 % Final    Gran # (ANC) 05/22/2024 6.9  1.8 - 7.7 K/uL Final    Immature Grans (Abs) 05/22/2024 0.04  0.00 - 0.04 K/uL Final    Comment: Mild elevation in immature granulocytes is non specific and   can be seen in a variety of conditions including stress response,   acute inflammation, trauma and pregnancy. Correlation with other   laboratory and clinical findings is essential.      Lymph # 05/22/2024 2.3  1.0 - 4.8 K/uL Final    Mono # 05/22/2024 0.9  0.3 - 1.0 K/uL Final    Eos # 05/22/2024 0.2  0.0 - 0.5 K/uL Final    Baso # 05/22/2024 0.07  0.00 - 0.20 K/uL Final    nRBC 05/22/2024 0  0 /100 WBC Final    Gran % 05/22/2024 66.0  38.0 - 73.0 % Final    Lymph % 05/22/2024 21.8  18.0 - 48.0 % Final    Mono % 05/22/2024 8.8  4.0 - 15.0 % Final    Eosinophil % 05/22/2024 2.3  0.0 - 8.0 % Final    Basophil % 05/22/2024 0.7  0.0 - 1.9 % Final    Differential Method 05/22/2024 Automated   Final    Sodium 05/22/2024 144  136 - 145 mmol/L Final    Potassium 05/22/2024 3.6  3.5 - 5.1 mmol/L Final    Chloride 05/22/2024 104  95 - 110 mmol/L Final    CO2 05/22/2024 27  23 - 29 mmol/L Final    Glucose 05/22/2024 85  70 - 110 mg/dL Final    BUN 05/22/2024 11  8  - 23 mg/dL Final    Creatinine 05/22/2024 0.8  0.5 - 1.4 mg/dL Final    Calcium 05/22/2024 10.0  8.7 - 10.5 mg/dL Final    Total Protein 05/22/2024 7.5  6.0 - 8.4 g/dL Final    Albumin 05/22/2024 3.4 (L)  3.5 - 5.2 g/dL Final    Total Bilirubin 05/22/2024 0.4  0.1 - 1.0 mg/dL Final    Comment: For infants and newborns, interpretation of results should be based  on gestational age, weight and in agreement with clinical  observations.    Premature Infant recommended reference ranges:  Up to 24 hours.............<8.0 mg/dL  Up to 48 hours............<12.0 mg/dL  3-5 days..................<15.0 mg/dL  6-29 days.................<15.0 mg/dL      Alkaline Phosphatase 05/22/2024 107  55 - 135 U/L Final    AST 05/22/2024 17  10 - 40 U/L Final    ALT 05/22/2024 13  10 - 44 U/L Final    eGFR 05/22/2024 >60  >60 mL/min/1.73 m^2 Final    Anion Gap 05/22/2024 13  8 - 16 mmol/L Final    Magnesium 05/22/2024 1.9  1.6 - 2.6 mg/dL Final    Phosphorus 05/22/2024 3.4  2.7 - 4.5 mg/dL Final    BNP 05/22/2024 116 (H)  0 - 99 pg/mL Final    Values of less than 100 pg/ml are consistent with non-CHF populations.        Imaging Reviewed    Imaging Results    None         Medications given in ED    Medications   sodium chloride 0.9% bolus 1,000 mL 1,000 mL (0 mLs Intravenous Stopped 5/22/24 2129)         Note was created using voice recognition software. Note may have occasional typographical errors that may not have been identified and edited despite good linda initial review prior to signing.    IJoo MD, personally performed the services described in this documentation. All medical record entries made by the scribe were at my direction and in my presence.  I have reviewed the chart and agree that the record reflects my personal performance and is accurate and complete.              Scribe Attestation:   Scribe #1: I performed the above scribed service and the documentation accurately describes the services I performed. I attest  to the accuracy of the note.        ED Course as of 05/28/24 0322   Wed May 22, 2024   2202 Symptoms improved with ED treatment. Patient advised to discontinue drinking green tea and cokes [DL]      ED Course User Index  [DL] Joo Sharp MD               Medical Decision Making:   Differential Diagnosis:   Dry mouth: hyperglycemia, dehydration, autoimmune disorder (Sjogren's), adverse effect of medication, others  Clinical Tests:   Lab Tests: Ordered and Reviewed  Medical Tests: Ordered and Reviewed  ED Management:  Tests results for acute abnormality. Test results, outpatient management plan, outpatient PCP follow up and ED return precautions discussed with patient with understanding and agreement.                Clinical Impression:  Final diagnoses:  [R42] Dizziness  [R68.2] Dry mouth (Primary)  [R51.9] Nonintractable headache, unspecified chronicity pattern, unspecified headache type - resolved with home treatment          ED Disposition Condition    Discharge Stable          ED Prescriptions    None       Follow-up Information       Follow up With Specialties Details Why Contact Info    The nearest emergency department.  Go to  As needed, If symptoms worsen     Ajit Piña MD Internal Medicine, Wound Care Call in 1 day to schedule an appointment, for re-evaluation of today's complaint, and ongoing care [address] [facility]  [place] [address]  [phone]               Joo Sharp MD  05/28/24 0322

## 2024-05-28 ENCOUNTER — OFFICE VISIT (OUTPATIENT)
Dept: FAMILY MEDICINE | Facility: CLINIC | Age: 84
End: 2024-05-28
Payer: MEDICARE

## 2024-05-28 VITALS
OXYGEN SATURATION: 98 % | DIASTOLIC BLOOD PRESSURE: 80 MMHG | SYSTOLIC BLOOD PRESSURE: 148 MMHG | WEIGHT: 187.38 LBS | TEMPERATURE: 98 F | HEIGHT: 60 IN | HEART RATE: 68 BPM | BODY MASS INDEX: 36.79 KG/M2

## 2024-05-28 DIAGNOSIS — F33.0 MAJOR DEPRESSIVE DISORDER, RECURRENT EPISODE, MILD WITH ANXIOUS DISTRESS: ICD-10-CM

## 2024-05-28 DIAGNOSIS — I10 HYPERTENSION, ESSENTIAL: Primary | ICD-10-CM

## 2024-05-28 PROCEDURE — 1159F MED LIST DOCD IN RCRD: CPT | Mod: CPTII,S$GLB,, | Performed by: INTERNAL MEDICINE

## 2024-05-28 PROCEDURE — 3288F FALL RISK ASSESSMENT DOCD: CPT | Mod: CPTII,S$GLB,, | Performed by: INTERNAL MEDICINE

## 2024-05-28 PROCEDURE — 3079F DIAST BP 80-89 MM HG: CPT | Mod: CPTII,S$GLB,, | Performed by: INTERNAL MEDICINE

## 2024-05-28 PROCEDURE — 3077F SYST BP >= 140 MM HG: CPT | Mod: CPTII,S$GLB,, | Performed by: INTERNAL MEDICINE

## 2024-05-28 PROCEDURE — 99214 OFFICE O/P EST MOD 30 MIN: CPT | Mod: S$GLB,,, | Performed by: INTERNAL MEDICINE

## 2024-05-28 PROCEDURE — 1101F PT FALLS ASSESS-DOCD LE1/YR: CPT | Mod: CPTII,S$GLB,, | Performed by: INTERNAL MEDICINE

## 2024-05-28 PROCEDURE — 1125F AMNT PAIN NOTED PAIN PRSNT: CPT | Mod: CPTII,S$GLB,, | Performed by: INTERNAL MEDICINE

## 2024-05-28 PROCEDURE — 99999 PR PBB SHADOW E&M-EST. PATIENT-LVL V: CPT | Mod: PBBFAC,,, | Performed by: INTERNAL MEDICINE

## 2024-05-28 RX ORDER — BUSPIRONE HYDROCHLORIDE 7.5 MG/1
7.5 TABLET ORAL 2 TIMES DAILY
Qty: 180 TABLET | Refills: 1 | Status: SHIPPED | OUTPATIENT
Start: 2024-05-28 | End: 2025-05-28

## 2024-05-28 RX ORDER — AMLODIPINE BESYLATE 10 MG/1
10 TABLET ORAL DAILY
Qty: 90 TABLET | Refills: 3 | Status: SHIPPED | OUTPATIENT
Start: 2024-05-28

## 2024-05-28 RX ORDER — CLONAZEPAM 0.5 MG/1
0.5 TABLET ORAL 2 TIMES DAILY PRN
Qty: 20 TABLET | Refills: 0 | Status: SHIPPED | OUTPATIENT
Start: 2024-05-28

## 2024-05-28 NOTE — PROGRESS NOTES
"Subjective:       Patient ID: Jose Manuel Boyd is a 83 y.o. female.    Chief Complaint: Headache    ED follow up    HPI: 82 y/o w/ HTN anxiety presents alone for followu p of ED visit x two in last week. She notes after washing hands her hands feel "sticky" no difficulty with  strength able to perform fine motor tasks (opening bottles doing buttons) no LE swelling breathing at baseline       Review of Systems   Constitutional:  Negative for activity change, appetite change, fatigue, fever and unexpected weight change.   HENT:  Negative for ear pain, rhinorrhea and sore throat.    Eyes:  Negative for discharge and visual disturbance.   Respiratory:  Negative for chest tightness, shortness of breath and wheezing.    Cardiovascular:  Negative for chest pain, palpitations and leg swelling.   Gastrointestinal:  Negative for abdominal pain, constipation and diarrhea.   Endocrine: Negative for cold intolerance and heat intolerance.   Genitourinary:  Negative for dysuria and hematuria.   Musculoskeletal:  Negative for joint swelling and neck stiffness.   Skin:  Negative for rash.   Neurological:  Negative for dizziness, syncope, weakness and headaches.   Psychiatric/Behavioral:  Positive for sleep disturbance. Negative for suicidal ideas. The patient is nervous/anxious.        Objective:     Vitals:    05/28/24 0901 05/28/24 0913   BP: (!) 170/84 (!) 148/80   BP Location: Left arm    Patient Position: Sitting    BP Method: Large (Manual)    Pulse: 68 68   Temp: 98.2 °F (36.8 °C)    TempSrc: Oral    SpO2: 98%    Weight: 85 kg (187 lb 6.3 oz)    Height: 5' (1.524 m)           Physical Exam  Constitutional:       General: She is not in acute distress.     Appearance: She is well-developed. She is obese.   HENT:      Head: Normocephalic and atraumatic.   Eyes:      General: No scleral icterus.     Conjunctiva/sclera: Conjunctivae normal.   Cardiovascular:      Rate and Rhythm: Normal rate and regular rhythm.      Heart " sounds: No murmur heard.     No friction rub. No gallop.   Pulmonary:      Effort: Pulmonary effort is normal.      Breath sounds: Normal breath sounds. No wheezing or rales.   Abdominal:      Palpations: Abdomen is soft.      Tenderness: There is no abdominal tenderness. There is no guarding or rebound.   Musculoskeletal:         General: No tenderness. Normal range of motion.      Cervical back: Normal range of motion.      Right lower leg: No edema.      Left lower leg: No edema.   Skin:     General: Skin is warm and dry.   Neurological:      Mental Status: She is alert and oriented to person, place, and time.      Cranial Nerves: No cranial nerve deficit.   Psychiatric:      Comments: Normal speech somewhat tangential but redirectable not responding to internal stimuli         Assessment and Plan   1. Hypertension, essential  Bp above goal has not been taking ccb resume  - amLODIPine (NORVASC) 10 MG tablet; Take 1 tablet (10 mg total) by mouth once daily.  Dispense: 90 tablet; Refill: 3    2. Major depressive disorder, recurrent episode, mild with anxious distress  Continue buspar prn clonazepam  - busPIRone (BUSPAR) 7.5 MG tablet; Take 1 tablet (7.5 mg total) by mouth 2 (two) times a day.  Dispense: 180 tablet; Refill: 1  - clonazePAM (KLONOPIN) 0.5 MG tablet; Take 1 tablet (0.5 mg total) by mouth 2 (two) times daily as needed for Anxiety.  Dispense: 20 tablet; Refill: 0

## 2024-06-07 ENCOUNTER — TELEPHONE (OUTPATIENT)
Dept: FAMILY MEDICINE | Facility: CLINIC | Age: 84
End: 2024-06-07
Payer: MEDICARE

## 2024-06-07 NOTE — TELEPHONE ENCOUNTER
----- Message from Stephen Gonzales MA sent at 6/7/2024  4:18 PM CDT -----  Type: Patient Call Back    Who called: Self    What is the request in detail: pt, is asking for her medication to be refilled , stating that she's shaking really bad and needs it ..       Can the clinic reply by [facility]?NO    Would the patient rather a call back or a response via [facility]? Yes, call     Best call back number: [phone] (home)

## 2024-06-12 DIAGNOSIS — G25.0 ESSENTIAL TREMOR: ICD-10-CM

## 2024-06-12 DIAGNOSIS — I10 BENIGN ESSENTIAL HTN: ICD-10-CM

## 2024-06-12 RX ORDER — PROPRANOLOL HYDROCHLORIDE 10 MG/1
10 TABLET ORAL 2 TIMES DAILY
Qty: 180 TABLET | Refills: 1 | Status: SHIPPED | OUTPATIENT
Start: 2024-06-12 | End: 2025-06-12

## 2024-06-12 NOTE — TELEPHONE ENCOUNTER
----- Message from Delores Moscoso sent at 6/12/2024 12:22 PM CDT -----  Regarding: self  [phone]  .Type: Patient Call Back    Who called: self    What is the request in detail: patient requesting a new prescription for PROPRANOLOL 10MG.    [facility]/pharmacy #[id number] - [place], [place] - [address] [facility]  [address] [facility] [address]  Phone: [phone] Fax: [phone]    Can the clinic reply by [facility]?no    Would the patient rather a call back or a response via [facility]?call back    Best call back number [phone]

## 2024-06-12 NOTE — TELEPHONE ENCOUNTER
No care due was identified.  [facility] Embedded Care Due Messages. Reference number: [id number].   6/12/2024 1:56:26 PM CDT

## 2024-06-26 DIAGNOSIS — I10 HTN (HYPERTENSION), BENIGN: ICD-10-CM

## 2024-06-26 RX ORDER — LOSARTAN POTASSIUM 50 MG/1
50 TABLET ORAL DAILY
Qty: 90 TABLET | Refills: 2 | Status: SHIPPED | OUTPATIENT
Start: 2024-06-26

## 2024-06-26 NOTE — TELEPHONE ENCOUNTER
Refill Routing Note   Medication(s) are not appropriate for processing by [facility] for the following reason(s):        Required vitals abnormal    [facility] action(s):  Defer               Appointments  past 12m or future 3m with PCP    Date Provider   Last Visit   5/28/2024 Ajit Piña MD   Next Visit   8/6/2024 Ajit Piña MD   ED visits in past 90 days: 1        Note composed:2:23 AM 06/26/2024

## 2024-06-26 NOTE — TELEPHONE ENCOUNTER
No care due was identified.  [facility] Embedded Care Due Messages. Reference number: [id number].   6/26/2024 12:14:08 AM CDT

## 2024-07-29 ENCOUNTER — LAB VISIT (OUTPATIENT)
Dept: LAB | Facility: HOSPITAL | Age: 84
End: 2024-07-29
Attending: INTERNAL MEDICINE
Payer: MEDICARE

## 2024-07-29 DIAGNOSIS — R73.01 IMPAIRED FASTING GLUCOSE: ICD-10-CM

## 2024-07-29 DIAGNOSIS — I10 HYPERTENSION, ESSENTIAL: ICD-10-CM

## 2024-07-29 LAB
ALBUMIN SERPL BCP-MCNC: 3.2 G/DL (ref 3.5–5.2)
ALP SERPL-CCNC: 86 U/L (ref 55–135)
ALT SERPL W/O P-5'-P-CCNC: 10 U/L (ref 10–44)
ANION GAP SERPL CALC-SCNC: 12 MMOL/L (ref 8–16)
AST SERPL-CCNC: 14 U/L (ref 10–40)
BILIRUB SERPL-MCNC: 0.3 MG/DL (ref 0.1–1)
BUN SERPL-MCNC: 12 MG/DL (ref 8–23)
CALCIUM SERPL-MCNC: 9.5 MG/DL (ref 8.7–10.5)
CHLORIDE SERPL-SCNC: 105 MMOL/L (ref 95–110)
CO2 SERPL-SCNC: 24 MMOL/L (ref 23–29)
CREAT SERPL-MCNC: 0.8 MG/DL (ref 0.5–1.4)
EST. GFR  (NO RACE VARIABLE): >60 ML/MIN/1.73 M^2
GLUCOSE SERPL-MCNC: 117 MG/DL (ref 70–110)
POTASSIUM SERPL-SCNC: 3.7 MMOL/L (ref 3.5–5.1)
PROT SERPL-MCNC: 6.8 G/DL (ref 6–8.4)
SODIUM SERPL-SCNC: 141 MMOL/L (ref 136–145)

## 2024-07-29 PROCEDURE — 36415 COLL VENOUS BLD VENIPUNCTURE: CPT | Mod: PN | Performed by: INTERNAL MEDICINE

## 2024-07-29 PROCEDURE — 83036 HEMOGLOBIN GLYCOSYLATED A1C: CPT | Performed by: INTERNAL MEDICINE

## 2024-07-29 PROCEDURE — 80053 COMPREHEN METABOLIC PANEL: CPT | Performed by: INTERNAL MEDICINE

## 2024-07-30 LAB
ESTIMATED AVG GLUCOSE: 128 MG/DL (ref 68–131)
HBA1C MFR BLD: 6.1 % (ref 4–5.6)

## 2024-08-06 ENCOUNTER — OFFICE VISIT (OUTPATIENT)
Dept: FAMILY MEDICINE | Facility: CLINIC | Age: 84
End: 2024-08-06
Payer: MEDICARE

## 2024-08-06 VITALS
WEIGHT: 187.63 LBS | DIASTOLIC BLOOD PRESSURE: 64 MMHG | TEMPERATURE: 98 F | HEART RATE: 64 BPM | OXYGEN SATURATION: 95 % | HEIGHT: 60 IN | BODY MASS INDEX: 36.84 KG/M2 | SYSTOLIC BLOOD PRESSURE: 142 MMHG

## 2024-08-06 DIAGNOSIS — I10 HTN (HYPERTENSION), BENIGN: ICD-10-CM

## 2024-08-06 DIAGNOSIS — G25.0 ESSENTIAL TREMOR: Primary | ICD-10-CM

## 2024-08-06 DIAGNOSIS — F33.0 MAJOR DEPRESSIVE DISORDER, RECURRENT EPISODE, MILD WITH ANXIOUS DISTRESS: ICD-10-CM

## 2024-08-06 DIAGNOSIS — I10 HYPERTENSION, ESSENTIAL: ICD-10-CM

## 2024-08-06 DIAGNOSIS — R73.01 IMPAIRED FASTING GLUCOSE: ICD-10-CM

## 2024-08-06 PROCEDURE — 1159F MED LIST DOCD IN RCRD: CPT | Mod: CPTII,S$GLB,, | Performed by: INTERNAL MEDICINE

## 2024-08-06 PROCEDURE — 1101F PT FALLS ASSESS-DOCD LE1/YR: CPT | Mod: CPTII,S$GLB,, | Performed by: INTERNAL MEDICINE

## 2024-08-06 PROCEDURE — 1160F RVW MEDS BY RX/DR IN RCRD: CPT | Mod: CPTII,S$GLB,, | Performed by: INTERNAL MEDICINE

## 2024-08-06 PROCEDURE — 1126F AMNT PAIN NOTED NONE PRSNT: CPT | Mod: CPTII,S$GLB,, | Performed by: INTERNAL MEDICINE

## 2024-08-06 PROCEDURE — 99214 OFFICE O/P EST MOD 30 MIN: CPT | Mod: S$GLB,,, | Performed by: INTERNAL MEDICINE

## 2024-08-06 PROCEDURE — 99999 PR PBB SHADOW E&M-EST. PATIENT-LVL V: CPT | Mod: PBBFAC,,, | Performed by: INTERNAL MEDICINE

## 2024-08-06 PROCEDURE — 3288F FALL RISK ASSESSMENT DOCD: CPT | Mod: CPTII,S$GLB,, | Performed by: INTERNAL MEDICINE

## 2024-08-06 RX ORDER — FUROSEMIDE 20 MG/1
20 TABLET ORAL DAILY
Qty: 90 TABLET | Refills: 2 | Status: SHIPPED | OUTPATIENT
Start: 2024-08-06

## 2024-08-06 RX ORDER — CLONAZEPAM 0.5 MG/1
0.5 TABLET ORAL 2 TIMES DAILY PRN
Qty: 20 TABLET | Refills: 0 | Status: SHIPPED | OUTPATIENT
Start: 2024-08-06

## 2024-08-09 ENCOUNTER — TELEPHONE (OUTPATIENT)
Dept: FAMILY MEDICINE | Facility: CLINIC | Age: 84
End: 2024-08-09
Payer: MEDICARE

## 2024-08-09 DIAGNOSIS — I10 HYPERTENSION, ESSENTIAL: ICD-10-CM

## 2024-08-20 ENCOUNTER — CLINICAL SUPPORT (OUTPATIENT)
Dept: FAMILY MEDICINE | Facility: CLINIC | Age: 84
End: 2024-08-20
Payer: MEDICARE

## 2024-08-20 VITALS — SYSTOLIC BLOOD PRESSURE: 120 MMHG | DIASTOLIC BLOOD PRESSURE: 80 MMHG | HEART RATE: 40 BPM

## 2024-08-20 DIAGNOSIS — I10 HYPERTENSION, ESSENTIAL: Primary | ICD-10-CM

## 2024-08-20 PROCEDURE — 99999 PR PBB SHADOW E&M-EST. PATIENT-LVL I: CPT | Mod: PBBFAC,,,

## 2024-08-20 PROCEDURE — 99499 UNLISTED E&M SERVICE: CPT | Mod: S$GLB,,, | Performed by: INTERNAL MEDICINE

## 2024-08-20 NOTE — PROGRESS NOTES
Jose Manuel Boyd 84 y.o. female is here today for Blood Pressure check.   History of HTN yes.    Review of patient's allergies indicates:   Allergen Reactions    Codeine      Other reaction(s): Rash     Creatinine   Date Value Ref Range Status   07/29/2024 0.8 0.5 - 1.4 mg/dL Final     Sodium   Date Value Ref Range Status   07/29/2024 141 136 - 145 mmol/L Final     Potassium   Date Value Ref Range Status   07/29/2024 3.7 3.5 - 5.1 mmol/L Final   ]  Patient verifies taking blood pressure medications on a regular basis at the same time of the day.     Current Outpatient Medications:     acetaminophen (TYLENOL) 325 MG tablet, Take 2 tablets (650 mg total) by mouth every 6 (six) hours as needed for Pain., Disp: 60 tablet, Rfl: 0    acetaminophen (TYLENOL) 500 MG tablet, Take 1 tablet (500 mg total) by mouth every 6 (six) hours as needed., Disp: 13 tablet, Rfl: 0    albuterol (VENTOLIN HFA) 90 mcg/actuation inhaler, Inhale 2 puffs into the lungs every 4 (four) hours as needed for Wheezing., Disp: 6.7 g, Rfl: 6    amLODIPine (NORVASC) 10 MG tablet, Take 1 tablet (10 mg total) by mouth once daily., Disp: 90 tablet, Rfl: 3    ascorbic acid, vitamin C, (VITAMIN C) 1000 MG tablet, Take 1,000 mg by mouth once daily., Disp: , Rfl:     aspirin (ECOTRIN) 81 MG EC tablet, Take 81 mg by mouth once daily., Disp: , Rfl:     atorvastatin (LIPITOR) 40 MG tablet, Take 1 tablet (40 mg total) by mouth once daily., Disp: 90 tablet, Rfl: 3    busPIRone (BUSPAR) 7.5 MG tablet, Take 1 tablet (7.5 mg total) by mouth 2 (two) times a day., Disp: 180 tablet, Rfl: 1    clonazePAM (KLONOPIN) 0.5 MG tablet, Take 1 tablet (0.5 mg total) by mouth 2 (two) times daily as needed for Anxiety., Disp: 20 tablet, Rfl: 0    cyanocobalamin (VITAMIN B-12) 1000 MCG tablet, Take 100 mcg by mouth once daily., Disp: , Rfl:     dicyclomine (BENTYL) 20 mg tablet, Take 1 tablet (20 mg total) by mouth 2 (two) times daily as needed., Disp: 180 tablet, Rfl: 0     diphenhydrAMINE-aluminum-magnesium hydroxide-simethicone-LIDOcaine viscous HCl 2%, Swish and spit 15 mLs every 4 (four) hours as needed., Disp: 1 each, Rfl: 0    fluticasone propionate (FLONASE) 50 mcg/actuation nasal spray, 1 spray (50 mcg total) by Each Nostril route 2 (two) times daily. For allergic rhinitis/sinusitis, Disp: 18 mL, Rfl: 11    furosemide (LASIX) 20 MG tablet, Take 1 tablet (20 mg total) by mouth once daily., Disp: 90 tablet, Rfl: 2    losartan (COZAAR) 50 MG tablet, TAKE 1 TABLET (50 MG TOTAL) BY MOUTH ONCE DAILY. FOR HIGH BLOOD PRESSURE, Disp: 90 tablet, Rfl: 2    magic mouthwash diphen/antac/lidoc, Swish and spit 15ml every 6 hours as needed for oral pain (Patient not taking: Reported on 8/6/2024), Disp: 420 mL, Rfl: 0    mirtazapine (REMERON) 30 MG tablet, TAKE 1 TABLET BY MOUTH NIGHTLY AT BEDTIME AS NEEDED FOR INSOMNIA , SLEEP, ANXIETY AND DEPRESSION., Disp: 90 tablet, Rfl: 2    multivitamin with minerals tablet, Take 1 tablet by mouth once daily., Disp: , Rfl:     olopatadine (PATANOL) 0.1 % ophthalmic solution, Place 1 drop into both eyes 2 (two) times daily., Disp: , Rfl:     omega-3 fatty acids/fish oil (FISH OIL-OMEGA-3 FATTY ACIDS) 300-1,000 mg capsule, Take by mouth once daily., Disp: , Rfl:     omeprazole (PRILOSEC) 20 MG capsule, Take 1 capsule (20 mg total) by mouth once daily. For gastric reflux, Disp: 90 capsule, Rfl: 3    propranoloL (INDERAL) 10 MG tablet, Take 1 tablet (10 mg total) by mouth 2 (two) times daily. For shaking, Disp: 180 tablet, Rfl: 1    psyllium (METAMUCIL) powder, Take 1 packet by mouth daily as needed., Disp: , Rfl:   Does patient have record of home blood pressure readings yes. Readings have been averaging 120.   Last dose of blood pressure medication was taken at this am.  Patient is asymptomatic.   Complains of feeling tired.    BP: 120/80 , Pulse: (!) 40 .      Dr. Piña notified.

## 2024-09-05 DIAGNOSIS — K57.32 DIVERTICULITIS OF COLON: ICD-10-CM

## 2024-09-05 RX ORDER — DICYCLOMINE HYDROCHLORIDE 20 MG/1
20 TABLET ORAL 2 TIMES DAILY PRN
Qty: 180 TABLET | Refills: 0 | Status: SHIPPED | OUTPATIENT
Start: 2024-09-05

## 2024-09-05 NOTE — TELEPHONE ENCOUNTER
Refill Routing Note   Medication(s) are not appropriate for processing by [facility] for the following reason(s):        Outside of protocol    [facility] action(s):  Route             Appointments  past 12m or future 3m with PCP    Date Provider   Last Visit   8/6/2024 Ajit Piña MD   Next Visit   10/30/2024 Ajit Piña MD   ED visits in past 90 days: 0        Note composed:8:14 AM 09/05/2024

## 2024-09-05 NOTE — TELEPHONE ENCOUNTER
No care due was identified.  [facility] Embedded Care Due Messages. Reference number: [id number].   9/05/2024 12:06:23 AM CDT

## 2024-09-26 ENCOUNTER — TELEPHONE (OUTPATIENT)
Dept: FAMILY MEDICINE | Facility: CLINIC | Age: 84
End: 2024-09-26
Payer: MEDICARE

## 2024-09-26 NOTE — TELEPHONE ENCOUNTER
----- Message from Lyn Mercedes sent at 9/26/2024  3:19 PM CDT -----  Regarding: medication  Name of caller:yolette       What is the requesting detail: pt is requesting a call back discuss a medication that made her sick. Please give her a call back to further discuss.       Can the clinic reply by [facility]:       What number to call back: [phone]

## 2024-09-26 NOTE — TELEPHONE ENCOUNTER
----- Message from Devin Solis sent at 9/26/2024  3:05 PM CDT -----  Regarding: Jose Manuel  Type:Patient Callback     Who called: Amnajayantrajan     What is the request in detail: Stated that she would like for the doctor to call and speak with her because she uses oxygen and she is being billed for it and it needs reauthorization. She would like for someone to get with her as soon as possible. Pt is also having a side effect to medication that she is taking.     Can the clinic reply by [facility]? No     Would the patient rather a call back or a response via [facility]? Callback     Best call back number: [phone]      Additional Information:

## 2024-10-25 ENCOUNTER — LAB VISIT (OUTPATIENT)
Dept: LAB | Facility: HOSPITAL | Age: 84
End: 2024-10-25
Attending: INTERNAL MEDICINE
Payer: MEDICARE

## 2024-10-25 DIAGNOSIS — R73.01 IMPAIRED FASTING GLUCOSE: ICD-10-CM

## 2024-10-25 DIAGNOSIS — G25.0 ESSENTIAL TREMOR: ICD-10-CM

## 2024-10-25 DIAGNOSIS — I10 HYPERTENSION, ESSENTIAL: ICD-10-CM

## 2024-10-25 DIAGNOSIS — F33.0 MAJOR DEPRESSIVE DISORDER, RECURRENT EPISODE, MILD WITH ANXIOUS DISTRESS: ICD-10-CM

## 2024-10-25 LAB
ALBUMIN SERPL BCP-MCNC: 3.4 G/DL (ref 3.5–5.2)
ALP SERPL-CCNC: 95 U/L (ref 40–150)
ALT SERPL W/O P-5'-P-CCNC: 6 U/L (ref 10–44)
ANION GAP SERPL CALC-SCNC: 12 MMOL/L (ref 8–16)
AST SERPL-CCNC: 12 U/L (ref 10–40)
BILIRUB SERPL-MCNC: 0.3 MG/DL (ref 0.1–1)
BUN SERPL-MCNC: 16 MG/DL (ref 8–23)
CALCIUM SERPL-MCNC: 9.5 MG/DL (ref 8.7–10.5)
CHLORIDE SERPL-SCNC: 105 MMOL/L (ref 95–110)
CO2 SERPL-SCNC: 27 MMOL/L (ref 23–29)
CREAT SERPL-MCNC: 0.8 MG/DL (ref 0.5–1.4)
EST. GFR  (NO RACE VARIABLE): >60 ML/MIN/1.73 M^2
ESTIMATED AVG GLUCOSE: 123 MG/DL (ref 68–131)
GLUCOSE SERPL-MCNC: 116 MG/DL (ref 70–110)
HBA1C MFR BLD: 5.9 % (ref 4–5.6)
POTASSIUM SERPL-SCNC: 4 MMOL/L (ref 3.5–5.1)
PROT SERPL-MCNC: 7.2 G/DL (ref 6–8.4)
SODIUM SERPL-SCNC: 144 MMOL/L (ref 136–145)
TSH SERPL DL<=0.005 MIU/L-ACNC: 0.75 UIU/ML (ref 0.4–4)

## 2024-10-25 PROCEDURE — 84443 ASSAY THYROID STIM HORMONE: CPT | Performed by: INTERNAL MEDICINE

## 2024-10-25 PROCEDURE — 80053 COMPREHEN METABOLIC PANEL: CPT | Performed by: INTERNAL MEDICINE

## 2024-10-25 PROCEDURE — 83036 HEMOGLOBIN GLYCOSYLATED A1C: CPT | Performed by: INTERNAL MEDICINE

## 2024-10-25 PROCEDURE — 36415 COLL VENOUS BLD VENIPUNCTURE: CPT | Mod: PO | Performed by: INTERNAL MEDICINE

## 2024-10-30 ENCOUNTER — OFFICE VISIT (OUTPATIENT)
Dept: FAMILY MEDICINE | Facility: CLINIC | Age: 84
End: 2024-10-30
Payer: MEDICARE

## 2024-10-30 VITALS
TEMPERATURE: 99 F | BODY MASS INDEX: 36.22 KG/M2 | OXYGEN SATURATION: 95 % | DIASTOLIC BLOOD PRESSURE: 80 MMHG | WEIGHT: 184.5 LBS | HEIGHT: 60 IN | SYSTOLIC BLOOD PRESSURE: 136 MMHG | HEART RATE: 70 BPM

## 2024-10-30 DIAGNOSIS — I10 HYPERTENSION, ESSENTIAL: ICD-10-CM

## 2024-10-30 DIAGNOSIS — Z23 NEED FOR INFLUENZA VACCINATION: ICD-10-CM

## 2024-10-30 DIAGNOSIS — R73.01 IMPAIRED FASTING GLUCOSE: ICD-10-CM

## 2024-10-30 DIAGNOSIS — G25.0 ESSENTIAL TREMOR: Primary | ICD-10-CM

## 2024-10-30 PROCEDURE — 99214 OFFICE O/P EST MOD 30 MIN: CPT | Mod: S$GLB,,, | Performed by: INTERNAL MEDICINE

## 2024-10-30 PROCEDURE — 1126F AMNT PAIN NOTED NONE PRSNT: CPT | Mod: CPTII,S$GLB,, | Performed by: INTERNAL MEDICINE

## 2024-10-30 PROCEDURE — 99999 PR PBB SHADOW E&M-EST. PATIENT-LVL V: CPT | Mod: PBBFAC,,, | Performed by: INTERNAL MEDICINE

## 2024-10-30 PROCEDURE — 3288F FALL RISK ASSESSMENT DOCD: CPT | Mod: CPTII,S$GLB,, | Performed by: INTERNAL MEDICINE

## 2024-10-30 PROCEDURE — 1101F PT FALLS ASSESS-DOCD LE1/YR: CPT | Mod: CPTII,S$GLB,, | Performed by: INTERNAL MEDICINE

## 2024-10-30 PROCEDURE — G2211 COMPLEX E/M VISIT ADD ON: HCPCS | Mod: S$GLB,,, | Performed by: INTERNAL MEDICINE

## 2024-10-30 PROCEDURE — 90653 IIV ADJUVANT VACCINE IM: CPT | Mod: S$GLB,,, | Performed by: INTERNAL MEDICINE

## 2024-10-30 PROCEDURE — 1159F MED LIST DOCD IN RCRD: CPT | Mod: CPTII,S$GLB,, | Performed by: INTERNAL MEDICINE

## 2024-10-30 PROCEDURE — G0008 ADMIN INFLUENZA VIRUS VAC: HCPCS | Mod: S$GLB,,, | Performed by: INTERNAL MEDICINE

## 2024-10-30 PROCEDURE — 1160F RVW MEDS BY RX/DR IN RCRD: CPT | Mod: CPTII,S$GLB,, | Performed by: INTERNAL MEDICINE

## 2024-11-12 ENCOUNTER — OFFICE VISIT (OUTPATIENT)
Dept: FAMILY MEDICINE | Facility: CLINIC | Age: 84
End: 2024-11-12
Payer: MEDICARE

## 2024-11-12 VITALS
WEIGHT: 183 LBS | OXYGEN SATURATION: 92 % | HEART RATE: 76 BPM | SYSTOLIC BLOOD PRESSURE: 136 MMHG | HEIGHT: 60 IN | DIASTOLIC BLOOD PRESSURE: 72 MMHG | TEMPERATURE: 98 F | BODY MASS INDEX: 35.93 KG/M2

## 2024-11-12 DIAGNOSIS — K21.9 GASTROESOPHAGEAL REFLUX DISEASE, UNSPECIFIED WHETHER ESOPHAGITIS PRESENT: ICD-10-CM

## 2024-11-12 DIAGNOSIS — I10 HYPERTENSION, ESSENTIAL: ICD-10-CM

## 2024-11-12 DIAGNOSIS — K14.0 GLOSSITIS: Primary | ICD-10-CM

## 2024-11-12 PROCEDURE — 3288F FALL RISK ASSESSMENT DOCD: CPT | Mod: CPTII,S$GLB,,

## 2024-11-12 PROCEDURE — 1159F MED LIST DOCD IN RCRD: CPT | Mod: CPTII,S$GLB,,

## 2024-11-12 PROCEDURE — 1160F RVW MEDS BY RX/DR IN RCRD: CPT | Mod: CPTII,S$GLB,,

## 2024-11-12 PROCEDURE — 99999 PR PBB SHADOW E&M-EST. PATIENT-LVL V: CPT | Mod: PBBFAC,,,

## 2024-11-12 PROCEDURE — 1101F PT FALLS ASSESS-DOCD LE1/YR: CPT | Mod: CPTII,S$GLB,,

## 2024-11-12 PROCEDURE — 1126F AMNT PAIN NOTED NONE PRSNT: CPT | Mod: CPTII,S$GLB,,

## 2024-11-12 PROCEDURE — 99213 OFFICE O/P EST LOW 20 MIN: CPT | Mod: S$GLB,,,

## 2024-11-12 RX ORDER — OMEPRAZOLE 20 MG/1
20 CAPSULE, DELAYED RELEASE ORAL DAILY
Qty: 90 CAPSULE | Refills: 0 | Status: SHIPPED | OUTPATIENT
Start: 2024-11-12

## 2024-11-12 NOTE — PROGRESS NOTES
HPI     Jose Manuel Boyd is a 84 y.o. female with multiple medical diagnoses as listed in the medical history and problem list that presents for   Chief Complaint   Patient presents with    Abdominal Pain       HPI  Patient has been experiencing the same salivary issues for the past few years. She states that it's remained the same, the saliva feels thick, causing her to wipe her mouth often, and noticing white substance on the tissue. She has seen Dr. Cabello for this back in 6/24/22 and had labs done to check for any vitamin deficiencies, which showed normal results. She had also used magic mouthwash in the past for thrush, which did temporarily help with the discomfort. She had a flexible laryngoscopy done, which did not show any findings that would suggest the cause of her saliva production, though a narrow pharynx was noted and lead to possible cause of dysphagia. She was offered a swallow study test and patient had declined at that time. She did not return for a follow up. She denies any trouble swallowing liquid and eating food. She denies choking, pain to her throat, nausea, vomiting, trouble speaking, cough, fevers, congestion, chest pain.     Abd pain: Did not like the Bentyl, as it did not provide her much benefit and she would like to remove this from her med list. She is concerned that weight has also been going down and is concerned that she lost 2 lbs within the past 2 weeks. She states that her appetite has remained the same and hasn't had trouble keeping food down lately. She denies nausea and vomiting. She endorses diarrhea, last episode on Sunday but had a normal episode yesterday. Denies blood in stool. She does a hx of Diverticulitis and has been avoiding food triggers due to it.     Assessment & Plan     Problem List Items Addressed This Visit          GI    Gastroesophageal reflux disease    Relevant Medications    omeprazole (PRILOSEC) 20 MG capsule    - After a thorough abdominal exam,  did not present any red flags sxs to indicate the need for emergent evaluation or imaging at this time.  - Patient has not been taking her Prilosec, suggest that she try this as the pain may be an exacerbation of her GERD.     Other Visit Diagnoses       Glossitis    -  Primary    Relevant Medications    diphenhydrAMINE-aluminum-magnesium hydroxide-simethicone-LIDOcaine viscous HCl 2%    Other Relevant Orders    Ambulatory referral/consult to ENT.    - PE shows glossitis/white patchy landmarks. Due to the chronicity of her sxs, discussed with patient about getting back with ENT again for possible reassessment with laryngoscope/swallow study. She is agreeable with this plan. She would like a refill of her Magic Mouthwash, as this helped the most for her sxs.  - Also discussed that her Klonopin use may be contributing to the excess saliva production, but unsure if it may be the sole cause as it can also be poorly fitting dentures or dental issues (she has a dentist appointment coming up next week).      Hypertension, essential                - Controlled today, continue with current plan and regimen.       --------------------------------------------    Health Maintenance         Date Due Completion Date    RSV Vaccine (Age 60+ and Pregnant patients) (1 - 1-dose 75+ series) Never done ---    Shingles Vaccine (2 of 3) 06/29/2016 5/4/2016    Pneumococcal Vaccines (Age 65+) (2 of 2 - PPSV23 or PCV20) 05/12/2018 5/12/2017    COVID-19 Vaccine (4 - 2024-25 season) 09/01/2024 8/18/2021    Hemoglobin A1c (Prediabetes) 10/25/2025 10/25/2024    TETANUS VACCINE 05/12/2027 5/12/2017    Lipid Panel 02/22/2029 2/22/2024    Override on 4/23/2015: Done            Health maintenance reviewed address at next follow up    Follow Up:  Follow up if symptoms worsen or fail to improve.    Exam     Review of Systems:  (as noted above)  Review of Systems   Constitutional:  Negative for appetite change, fatigue and fever.   HENT:  Negative for  congestion, mouth sores and trouble swallowing.    Eyes:  Negative for visual disturbance.   Respiratory:  Negative for cough, chest tightness, shortness of breath and wheezing.    Cardiovascular:  Positive for leg swelling. Negative for chest pain and palpitations.   Gastrointestinal:  Positive for abdominal pain and diarrhea. Negative for blood in stool, constipation, nausea and vomiting.   Genitourinary:  Negative for difficulty urinating.   Musculoskeletal:  Negative for joint swelling and neck stiffness.   Skin:  Negative for rash.   Neurological:  Negative for dizziness, weakness, light-headedness and headaches.   Hematological: Negative.    Psychiatric/Behavioral: Negative.  The patient is not nervous/anxious.        Physical Exam  Constitutional:       General: She is not in acute distress.     Appearance: Normal appearance. She is not ill-appearing.   HENT:      Head: Normocephalic and atraumatic.      Mouth/Throat:      Comments: White patchiness to tongue noted.  No lesions or abscess noted.  Eyes:      Extraocular Movements: Extraocular movements intact.   Cardiovascular:      Rate and Rhythm: Normal rate and regular rhythm.      Pulses: Normal pulses.      Heart sounds: Normal heart sounds. No murmur heard.     No friction rub. No gallop.   Pulmonary:      Effort: Pulmonary effort is normal.   Abdominal:      General: There is no distension.      Palpations: Abdomen is soft.      Tenderness: There is no abdominal tenderness. There is no guarding or rebound.   Skin:     General: Skin is warm and dry.      Capillary Refill: Capillary refill takes less than 2 seconds.   Neurological:      General: No focal deficit present.      Mental Status: She is alert and oriented to person, place, and time.   Psychiatric:         Mood and Affect: Mood normal.         Behavior: Behavior normal.       Vitals:    11/12/24 1056   BP: 136/72   BP Location: Left arm   Patient Position: Sitting   Pulse: 76   Temp: 97.6 °F  (36.4 °C)   TempSrc: Oral   SpO2: (!) 92%   Weight: 83 kg (182 lb 15.7 oz)   Height: 5' (1.524 m)      Body mass index is 35.74 kg/m².        History     Past Medical History:   Diagnosis Date    Anxiety     Asthma     Depression     Headache     Hx of psychiatric care     Hypercholesterolemia     Hypertension     Psychiatric problem     Sleep difficulties     Therapy     Thyroid disease     multiple nodules       Family History   Problem Relation Name Age of Onset    Diabetes Mother      Hypertension Mother      Kidney disease Mother      Heart disease Father      Bipolar disorder Daughter Kourtney        Allergies and Medications: (updated and reviewed)  Review of patient's allergies indicates:   Allergen Reactions    Codeine      Other reaction(s): Rash     Current Outpatient Medications   Medication Sig Dispense Refill    acetaminophen (TYLENOL) 325 MG tablet Take 2 tablets (650 mg total) by mouth every 6 (six) hours as needed for Pain. 60 tablet 0    acetaminophen (TYLENOL) 500 MG tablet Take 1 tablet (500 mg total) by mouth every 6 (six) hours as needed. 13 tablet 0    albuterol (VENTOLIN HFA) 90 mcg/actuation inhaler Inhale 2 puffs into the lungs every 4 (four) hours as needed for Wheezing. 6.7 g 6    amLODIPine (NORVASC) 10 MG tablet Take 1 tablet (10 mg total) by mouth once daily. 90 tablet 3    ascorbic acid, vitamin C, (VITAMIN C) 1000 MG tablet Take 1,000 mg by mouth once daily.      aspirin (ECOTRIN) 81 MG EC tablet Take 81 mg by mouth once daily.      atorvastatin (LIPITOR) 40 MG tablet Take 1 tablet (40 mg total) by mouth once daily. 90 tablet 3    busPIRone (BUSPAR) 7.5 MG tablet Take 1 tablet (7.5 mg total) by mouth 2 (two) times a day. 180 tablet 1    clonazePAM (KLONOPIN) 0.5 MG tablet Take 1 tablet (0.5 mg total) by mouth 2 (two) times daily as needed for Anxiety. 20 tablet 0    cyanocobalamin (VITAMIN B-12) 1000 MCG tablet Take 100 mcg by mouth once daily.      fluticasone propionate (FLONASE) 50  mcg/actuation nasal spray 1 spray (50 mcg total) by Each Nostril route 2 (two) times daily. For allergic rhinitis/sinusitis 18 mL 11    furosemide (LASIX) 20 MG tablet Take 1 tablet (20 mg total) by mouth once daily. 90 tablet 2    losartan (COZAAR) 50 MG tablet TAKE 1 TABLET (50 MG TOTAL) BY MOUTH ONCE DAILY. FOR HIGH BLOOD PRESSURE 90 tablet 2    mirtazapine (REMERON) 30 MG tablet TAKE 1 TABLET BY MOUTH NIGHTLY AT BEDTIME AS NEEDED FOR INSOMNIA , SLEEP, ANXIETY AND DEPRESSION. 90 tablet 2    multivitamin with minerals tablet Take 1 tablet by mouth once daily.      olopatadine (PATANOL) 0.1 % ophthalmic solution Place 1 drop into both eyes 2 (two) times daily.      omega-3 fatty acids/fish oil (FISH OIL-OMEGA-3 FATTY ACIDS) 300-1,000 mg capsule Take by mouth once daily.      psyllium (METAMUCIL) powder Take 1 packet by mouth daily as needed.      diphenhydrAMINE-aluminum-magnesium hydroxide-simethicone-LIDOcaine viscous HCl 2% Swish and spit 10 mLs every 4 (four) hours as needed (excess saliva). 14 each 0    omeprazole (PRILOSEC) 20 MG capsule Take 1 capsule (20 mg total) by mouth once daily. For gastric reflux 90 capsule 0    propranoloL (INDERAL) 10 MG tablet Take 1 tablet (10 mg total) by mouth 2 (two) times daily. For shaking (Patient not taking: Reported on 11/12/2024) 180 tablet 1     No current facility-administered medications for this visit.       Patient Care Team:  Ajit Piña MD as PCP - General (Internal Medicine)  Federico Madrigal MD as Consulting Physician (Psychiatry)  Srikanth Borden MD as Consulting Physician (Neurology)  Imaging, Dis Diagnostic (Diagnostic Radiology)  Marysol Campbell as ED Navigator         - The patient is given an After Visit Summary that lists all medications with directions, allergies, education, orders placed during this encounter and follow-up instructions.      - I have reviewed the patient's medical information including past medical and family history sections  including the medications and allergies.      - We discussed the patient's current medications.          Samuel Marie NP

## 2024-12-12 ENCOUNTER — OFFICE VISIT (OUTPATIENT)
Dept: FAMILY MEDICINE | Facility: CLINIC | Age: 84
End: 2024-12-12
Payer: MEDICARE

## 2024-12-12 VITALS
BODY MASS INDEX: 37.27 KG/M2 | WEIGHT: 189.81 LBS | HEART RATE: 43 BPM | SYSTOLIC BLOOD PRESSURE: 148 MMHG | TEMPERATURE: 98 F | OXYGEN SATURATION: 95 % | HEIGHT: 60 IN | DIASTOLIC BLOOD PRESSURE: 68 MMHG

## 2024-12-12 DIAGNOSIS — F41.9 ANXIETY: ICD-10-CM

## 2024-12-12 DIAGNOSIS — G47.00 INSOMNIA, UNSPECIFIED TYPE: ICD-10-CM

## 2024-12-12 DIAGNOSIS — F33.0 MAJOR DEPRESSIVE DISORDER, RECURRENT EPISODE, MILD WITH ANXIOUS DISTRESS: ICD-10-CM

## 2024-12-12 DIAGNOSIS — R00.1 BRADYCARDIA: ICD-10-CM

## 2024-12-12 DIAGNOSIS — G44.209 ACUTE NON INTRACTABLE TENSION-TYPE HEADACHE: Primary | ICD-10-CM

## 2024-12-12 PROCEDURE — 99999 PR PBB SHADOW E&M-EST. PATIENT-LVL V: CPT | Mod: PBBFAC,,,

## 2024-12-12 PROCEDURE — 93010 ELECTROCARDIOGRAM REPORT: CPT | Mod: S$GLB,,, | Performed by: INTERNAL MEDICINE

## 2024-12-12 RX ORDER — ACETAMINOPHEN, ASPRIN, CAFFEINE 250; 250; 65 MG/1; MG/1; MG/1
1 TABLET, COATED ORAL DAILY PRN
Qty: 30 TABLET | Refills: 0 | Status: SHIPPED | OUTPATIENT
Start: 2024-12-12

## 2024-12-12 RX ORDER — MIRTAZAPINE 45 MG/1
TABLET, FILM COATED ORAL
Qty: 30 TABLET | Refills: 2 | Status: SHIPPED | OUTPATIENT
Start: 2024-12-12

## 2024-12-12 NOTE — PROGRESS NOTES
HPI     Jose Manuel Boyd is a 84 y.o. female with multiple medical diagnoses as listed in the medical history and problem list that presents for   Chief Complaint   Patient presents with    Headache     Pt c/o of head pain left side of head and unable to sleep.       HPI  For the past couple days, patient has been having increasing anxiety due to a death in her family. Because of this, she's been experiencing trouble sleeping and having left-sided headaches. She's been taking Tylenol PRN, which hasn't provided her with much relief, however, she is not experiencing her headaches today. She does take the Remeron 30 mg nightly, however, it hasn't been helping with her sleep. She declines any drowsiness or dizziness when she takes it. She does report taking her Buspar twice daily, though on the chart it states PRN. She also takes her Propanolol BID for her essential tremors. Bradycardia noted today, but she denies dizziness, lightheadedness, or orthostatic sxs.   Denies chest pain, neck stiffness, ringing in the ears, visual disturbance, SI/HI.    Assessment & Plan     Problem List Items Addressed This Visit          Neuro    Acute non intractable tension-type headache - Primary    Relevant Medications    aspirin-acetaminophen-caffeine 250-250-65 mg (EXCEDRIN MIGRAINE) 250-250-65 mg per tablet    - Trial of Excedrin for her headaches, and she remembered this helping with headaches in the past. Advised to not concurrently take with her 81 mg ASA to prevent risk of bleeding.         Cardiac/Vascular    Bradycardia    Relevant Orders    IN OFFICE EKG 12-LEAD (to [facility])    - Bradycardia noted during exam, hx of junctional bradycardia, last saw Dr. Camilo for this in the past and was instructed to continue monitoring while she is asymptomatic. Advised that if she develops any dizziness, lightheadedness, signs of syncope, to refer to ER immediately. However, none of those sxs today.  - EKG showed marked sinus bradycardia  with sinus arrhythmia. She is also taking Propanolol BID for her essential tremors, which she notes significantly improves her tremors and would prefer not to be off of it or adjusted.       Other Visit Diagnoses       Major depressive disorder, recurrent episode, mild with anxious distress        Relevant Medications    mirtazapine (REMERON) 45 MG tablet    - Increased Remeron from 30 mg to 45 mg to take nightly. Advised on increased drowsy effects and inc risk of falls and to take with caution. Reassess with PCP in 3 months.  - No SI/HI.  - Advised on continuing to take Buspar 7.5 mg BID and can consider increasing to TID if sxs do not improve.       Insomnia, unspecified type        Relevant Medications    mirtazapine (REMERON) 45 MG tablet    - See above for changes in regimen and follow up in 3 months.      Anxiety        Relevant Medications    mirtazapine (REMERON) 45 MG tablet    - - See above for changes in regimen and follow up in 3 months.     --------------------------------------------    Health Maintenance         Date Due Completion Date    RSV Vaccine (Age 60+ and Pregnant patients) (1 - 1-dose 75+ series) Never done ---    Shingles Vaccine (2 of 3) 06/29/2016 5/4/2016    Pneumococcal Vaccines (Age 65+) (2 of 2 - PPSV23 or PCV20) 05/12/2018 5/12/2017    COVID-19 Vaccine (4 - 2024-25 season) 09/01/2024 8/18/2021    Hemoglobin A1c (Prediabetes) 10/25/2025 10/25/2024    TETANUS VACCINE 05/12/2027 5/12/2017    Lipid Panel 02/22/2029 2/22/2024    Override on 4/23/2015: Done            Health maintenance reviewed    Follow Up:  Follow up if symptoms worsen or fail to improve.    Exam     Review of Systems:  (as noted above)  Review of Systems   Constitutional:  Negative for activity change, chills, fatigue and fever.   HENT:  Negative for trouble swallowing.    Eyes:  Negative for visual disturbance.   Respiratory:  Negative for cough, chest tightness, shortness of breath and wheezing.    Cardiovascular:   Negative for chest pain and palpitations.   Musculoskeletal:  Negative for arthralgias, gait problem, myalgias, neck pain and neck stiffness.   Neurological:  Positive for headaches. Negative for dizziness, weakness, light-headedness and numbness.   Psychiatric/Behavioral:  Positive for sleep disturbance. The patient is nervous/anxious.        Physical Exam  Constitutional:       General: She is not in acute distress.     Appearance: Normal appearance. She is not ill-appearing.   HENT:      Head: Normocephalic and atraumatic.   Cardiovascular:      Rate and Rhythm: Regular rhythm. Bradycardia present.      Pulses: Normal pulses.      Heart sounds: Normal heart sounds. No murmur heard.     No friction rub. No gallop.   Pulmonary:      Effort: Pulmonary effort is normal. No respiratory distress.      Breath sounds: Normal breath sounds. No wheezing, rhonchi or rales.   Musculoskeletal:      Cervical back: Normal range of motion. No rigidity.   Skin:     General: Skin is warm and dry.      Capillary Refill: Capillary refill takes less than 2 seconds.   Neurological:      General: No focal deficit present.      Mental Status: She is alert and oriented to person, place, and time.   Psychiatric:         Mood and Affect: Mood normal.         Behavior: Behavior normal.       Vitals:    12/12/24 0906   BP: (!) 148/68   BP Location: Left arm   Patient Position: Sitting   Pulse: (!) 43   Temp: 97.6 °F (36.4 °C)   TempSrc: Oral   SpO2: 95%   Weight: 86.1 kg (189 lb 13.1 oz)   Height: 5' (1.524 m)      Body mass index is 37.07 kg/m².        History     Past Medical History:   Diagnosis Date    Anxiety     Asthma     Depression     Headache     Hx of psychiatric care     Hypercholesterolemia     Hypertension     Psychiatric problem     Sleep difficulties     Therapy     Thyroid disease     multiple nodules       Family History   Problem Relation Name Age of Onset    Diabetes Mother      Hypertension Mother      Kidney disease  Mother      Heart disease Father      Bipolar disorder Daughter Kourtney        Allergies and Medications: (updated and reviewed)  Review of patient's allergies indicates:   Allergen Reactions    Codeine      Other reaction(s): Rash     Current Outpatient Medications   Medication Sig Dispense Refill    albuterol (VENTOLIN HFA) 90 mcg/actuation inhaler Inhale 2 puffs into the lungs every 4 (four) hours as needed for Wheezing. 6.7 g 6    amLODIPine (NORVASC) 10 MG tablet Take 1 tablet (10 mg total) by mouth once daily. 90 tablet 3    ascorbic acid, vitamin C, (VITAMIN C) 1000 MG tablet Take 1,000 mg by mouth once daily.      aspirin (ECOTRIN) 81 MG EC tablet Take 81 mg by mouth once daily.      atorvastatin (LIPITOR) 40 MG tablet Take 1 tablet (40 mg total) by mouth once daily. 90 tablet 3    busPIRone (BUSPAR) 7.5 MG tablet Take 1 tablet (7.5 mg total) by mouth 2 (two) times a day. 180 tablet 1    clonazePAM (KLONOPIN) 0.5 MG tablet Take 1 tablet (0.5 mg total) by mouth 2 (two) times daily as needed for Anxiety. 20 tablet 0    cyanocobalamin (VITAMIN B-12) 1000 MCG tablet Take 100 mcg by mouth once daily.      diphenhydrAMINE-aluminum-magnesium hydroxide-simethicone-LIDOcaine viscous HCl 2% Swish and spit 10 mLs every 4 (four) hours as needed (excess saliva). 14 each 0    fluticasone propionate (FLONASE) 50 mcg/actuation nasal spray 1 spray (50 mcg total) by Each Nostril route 2 (two) times daily. For allergic rhinitis/sinusitis 18 mL 11    furosemide (LASIX) 20 MG tablet Take 1 tablet (20 mg total) by mouth once daily. 90 tablet 2    losartan (COZAAR) 50 MG tablet TAKE 1 TABLET (50 MG TOTAL) BY MOUTH ONCE DAILY. FOR HIGH BLOOD PRESSURE 90 tablet 2    multivitamin with minerals tablet Take 1 tablet by mouth once daily.      olopatadine (PATANOL) 0.1 % ophthalmic solution Place 1 drop into both eyes 2 (two) times daily.      omega-3 fatty acids/fish oil (FISH OIL-OMEGA-3 FATTY ACIDS) 300-1,000 mg capsule Take by mouth  once daily.      omeprazole (PRILOSEC) 20 MG capsule Take 1 capsule (20 mg total) by mouth once daily. For gastric reflux 90 capsule 0    propranoloL (INDERAL) 10 MG tablet Take 1 tablet (10 mg total) by mouth 2 (two) times daily. For shaking 180 tablet 3    psyllium (METAMUCIL) powder Take 1 packet by mouth daily as needed.      aspirin-acetaminophen-caffeine 250-250-65 mg (EXCEDRIN MIGRAINE) 250-250-65 mg per tablet Take 1 tablet by mouth daily as needed for Pain (headaches). 30 tablet 0    mirtazapine (REMERON) 45 MG tablet TAKE 1 TABLET BY MOUTH NIGHTLY AT BEDTIME AS NEEDED FOR INSOMNIA , SLEEP, ANXIETY AND DEPRESSION 30 tablet 2     No current facility-administered medications for this visit.       Patient Care Team:  Ajit Piña MD as PCP - General (Internal Medicine)  Federico Madrigal MD as Consulting Physician (Psychiatry)  Srikanth Borden MD as Consulting Physician (Neurology)  Imaging, Dis Diagnostic (Diagnostic Radiology)  Marysol Campbell as ED Navigator         - The patient is given an After Visit Summary that lists all medications with directions, allergies, education, orders placed during this encounter and follow-up instructions.      - I have reviewed the patient's medical information including past medical and family history sections including the medications and allergies.      - We discussed the patient's current medications.          Samuel Marie NP

## 2024-12-13 LAB
OHS QRS DURATION: 82 MS
OHS QTC CALCULATION: 415 MS

## 2024-12-18 DIAGNOSIS — F33.0 MAJOR DEPRESSIVE DISORDER, RECURRENT EPISODE, MILD WITH ANXIOUS DISTRESS: ICD-10-CM

## 2024-12-18 RX ORDER — BUSPIRONE HYDROCHLORIDE 7.5 MG/1
7.5 TABLET ORAL 2 TIMES DAILY
Qty: 180 TABLET | Refills: 1 | Status: SHIPPED | OUTPATIENT
Start: 2024-12-18

## 2024-12-18 NOTE — TELEPHONE ENCOUNTER
No care due was identified.  Health [facility] Embedded Care Due Messages. Reference number: [id number].   12/18/2024 12:10:47 AM CST

## 2024-12-19 ENCOUNTER — OFFICE VISIT (OUTPATIENT)
Dept: CARDIOLOGY | Facility: CLINIC | Age: 84
End: 2024-12-19
Payer: MEDICARE

## 2024-12-19 VITALS
HEIGHT: 60 IN | WEIGHT: 183.19 LBS | SYSTOLIC BLOOD PRESSURE: 105 MMHG | RESPIRATION RATE: 15 BRPM | HEART RATE: 44 BPM | BODY MASS INDEX: 35.96 KG/M2 | DIASTOLIC BLOOD PRESSURE: 60 MMHG | OXYGEN SATURATION: 94 %

## 2024-12-19 DIAGNOSIS — Z86.73 H/O: STROKE: ICD-10-CM

## 2024-12-19 DIAGNOSIS — R00.1 BRADYCARDIA: ICD-10-CM

## 2024-12-19 DIAGNOSIS — R94.31 ABNORMAL EKG: Primary | ICD-10-CM

## 2024-12-19 DIAGNOSIS — I10 PRIMARY HYPERTENSION: Chronic | ICD-10-CM

## 2024-12-19 DIAGNOSIS — E66.01 SEVERE OBESITY (BMI 35.0-39.9) WITH COMORBIDITY: ICD-10-CM

## 2024-12-19 DIAGNOSIS — R79.89 ELEVATED BRAIN NATRIURETIC PEPTIDE (BNP) LEVEL: ICD-10-CM

## 2024-12-19 DIAGNOSIS — I27.20 PULMONARY HYPERTENSION: ICD-10-CM

## 2024-12-19 DIAGNOSIS — I70.0 AORTIC ATHEROSCLEROSIS: ICD-10-CM

## 2024-12-19 DIAGNOSIS — E78.2 MIXED HYPERLIPIDEMIA: Chronic | ICD-10-CM

## 2024-12-19 PROCEDURE — 1101F PT FALLS ASSESS-DOCD LE1/YR: CPT | Mod: CPTII,S$GLB,, | Performed by: INTERNAL MEDICINE

## 2024-12-19 PROCEDURE — 3288F FALL RISK ASSESSMENT DOCD: CPT | Mod: CPTII,S$GLB,, | Performed by: INTERNAL MEDICINE

## 2024-12-19 PROCEDURE — 99213 OFFICE O/P EST LOW 20 MIN: CPT | Mod: S$GLB,,, | Performed by: INTERNAL MEDICINE

## 2024-12-19 PROCEDURE — 1159F MED LIST DOCD IN RCRD: CPT | Mod: CPTII,S$GLB,, | Performed by: INTERNAL MEDICINE

## 2024-12-19 PROCEDURE — 1126F AMNT PAIN NOTED NONE PRSNT: CPT | Mod: CPTII,S$GLB,, | Performed by: INTERNAL MEDICINE

## 2024-12-19 PROCEDURE — 99999 PR PBB SHADOW E&M-EST. PATIENT-LVL V: CPT | Mod: PBBFAC,,, | Performed by: INTERNAL MEDICINE
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
## 2024-12-27 ENCOUNTER — HOSPITAL ENCOUNTER (EMERGENCY)
Facility: HOSPITAL | Age: 84
Discharge: HOME | End: 2024-12-27
Attending: EMERGENCY MEDICINE
Payer: MEDICARE

## 2024-12-27 VITALS
DIASTOLIC BLOOD PRESSURE: 68 MMHG | WEIGHT: 184 LBS | HEIGHT: 60 IN | HEART RATE: 74 BPM | BODY MASS INDEX: 36.12 KG/M2 | TEMPERATURE: 98 F | RESPIRATION RATE: 20 BRPM | OXYGEN SATURATION: 95 % | SYSTOLIC BLOOD PRESSURE: 145 MMHG

## 2024-12-27 DIAGNOSIS — S39.012A BACK STRAIN, INITIAL ENCOUNTER: Primary | ICD-10-CM
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
## 2024-12-27 PROCEDURE — 99283 EMERGENCY DEPT VISIT LOW MDM: CPT | Mod: 25

## 2024-12-27 RX ORDER — BACLOFEN 5 MG/1
5 TABLET ORAL 3 TIMES DAILY
Qty: 21 TABLET | Refills: 0 | Status: SHIPPED | OUTPATIENT
Start: 2024-12-27 | End: 2025-01-03
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
## 2024-12-30 ENCOUNTER — TELEPHONE (OUTPATIENT)
Dept: FAMILY MEDICINE | Facility: CLINIC | Age: 84
End: 2024-12-30
Payer: MEDICARE
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
## 2025-01-03 ENCOUNTER — OFFICE VISIT (OUTPATIENT)
Dept: FAMILY MEDICINE | Facility: CLINIC | Age: 85
End: 2025-01-03
Payer: MEDICARE

## 2025-01-03 VITALS
OXYGEN SATURATION: 95 % | HEART RATE: 68 BPM | TEMPERATURE: 98 F | SYSTOLIC BLOOD PRESSURE: 138 MMHG | DIASTOLIC BLOOD PRESSURE: 78 MMHG | WEIGHT: 182.75 LBS | BODY MASS INDEX: 35.88 KG/M2 | HEIGHT: 60 IN

## 2025-01-03 DIAGNOSIS — F33.0 MAJOR DEPRESSIVE DISORDER, RECURRENT EPISODE, MILD WITH ANXIOUS DISTRESS: ICD-10-CM

## 2025-01-03 DIAGNOSIS — E66.01 SEVERE OBESITY (BMI 35.0-39.9) WITH COMORBIDITY: ICD-10-CM

## 2025-01-03 DIAGNOSIS — M54.9 UPPER BACK PAIN: Primary | ICD-10-CM

## 2025-01-03 DIAGNOSIS — I27.20 PULMONARY HYPERTENSION: ICD-10-CM

## 2025-01-03 PROCEDURE — 99999 PR PBB SHADOW E&M-EST. PATIENT-LVL V: CPT | Mod: PBBFAC,,,

## 2025-01-03 RX ORDER — IBUPROFEN 800 MG/1
800 TABLET ORAL EVERY 6 HOURS PRN
COMMUNITY
Start: 2024-11-21 | End: 2025-01-03
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
## 2025-01-14 ENCOUNTER — TELEPHONE (OUTPATIENT)
Dept: FAMILY MEDICINE | Facility: CLINIC | Age: 85
End: 2025-01-14
Payer: MEDICARE

## 2025-01-14 DIAGNOSIS — F41.9 ANXIETY: Primary | ICD-10-CM

## 2025-01-14 NOTE — TELEPHONE ENCOUNTER
Pt's granddaughter Rossana called in with [facility] about her grandmother. Pt stated seeing things in her home and something in her home giving her skin infections and also making her sick. Pt's granddaughter is unsure if a mental health evaluation is needed.

## 2025-02-04 DIAGNOSIS — F33.0 MAJOR DEPRESSIVE DISORDER, RECURRENT EPISODE, MILD WITH ANXIOUS DISTRESS: ICD-10-CM

## 2025-02-04 RX ORDER — CLONAZEPAM 0.5 MG/1
0.5 TABLET ORAL 2 TIMES DAILY PRN
Qty: 20 TABLET | Refills: 0 | Status: SHIPPED | OUTPATIENT
Start: 2025-02-04

## 2025-02-04 NOTE — TELEPHONE ENCOUNTER
----- Message from Rafipreston sent at 2/4/2025  1:48 PM CST -----  Regarding: Self   Type: RX Refill Request    Who Called: Self     Have you contacted your pharmacy: yes     Refill    RX Name and Strength:clonazePAM (KLONOPIN) 0.5 MG tablet     Preferred Pharmacy with phone number: .  [facility]/pharmacy #[id number] - [facility] [address] [employer] [facility]  [address] [facility] [address]  Phone: [phone] Fax: [phone]            Local or Mail Order: local     Would the patient rather a call back or a response via [facility]? Call back     Best Call Back Number:.[phone]      Additional Information: Pt states she's out of meds and she feels herself going backwards from not taking it     Thank you.

## 2025-02-04 NOTE — TELEPHONE ENCOUNTER
Care Due:                  Date            Visit Type   Department     Provider  --------------------------------------------------------------------------------                                [facility]                              PRIMARY      MEDICINE/  Last Visit: 10-      CARE ([facility])   INTERNAL MED   Ajit Piña                              [facility]                              PRIMARY      MEDICINE/  Next Visit: 03-      CARE ([facility])   INTERNAL MED   Ajit Piña                                                            Last  Test          Frequency    Reason                     Performed    Due Date  --------------------------------------------------------------------------------    Lipid Panel.  12 months..  atorvastatin.............  02- 02-    [facility] Embedded Care Due Messages. Reference number: [id number].   2/04/2025 1:57:57 PM CST

## 2025-02-17 ENCOUNTER — TELEPHONE (OUTPATIENT)
Dept: FAMILY MEDICINE | Facility: CLINIC | Age: 85
End: 2025-02-17
Payer: MEDICARE

## 2025-02-17 VITALS — SYSTOLIC BLOOD PRESSURE: 116 MMHG | DIASTOLIC BLOOD PRESSURE: 99 MMHG

## 2025-02-17 NOTE — TELEPHONE ENCOUNTER
----- Message from Marissa sent at 2/17/2025 12:32 PM CST -----  .Type: Patient Call BackWho called:Self What is the request in detail:Symptom: HeadacheOutcome: Instruct patient to Call 911 NOW!Reason: Acting confusedCan the clinic reply by [facility]? No Would the patient rather a call back or a response via [facility]? Call [facility] call back number: .[phone] (home) Additional Information: Patient would like a same day appointment

## 2025-02-17 NOTE — TELEPHONE ENCOUNTER
Spoke to pt She reported that her bp was 116/99 and her head was hurting really bad. Pt rechecked her bp while on the phone and it was 135/67 and she feels much better. Pt has been advised that if she starts feeling bad again to recheck her bp and if elevated to go to the ER.

## 2025-02-21 DIAGNOSIS — I10 HTN (HYPERTENSION), BENIGN: ICD-10-CM

## 2025-02-21 RX ORDER — FUROSEMIDE 20 MG/1
20 TABLET ORAL
Qty: 90 TABLET | Refills: 2 | Status: SHIPPED | OUTPATIENT
Start: 2025-02-21

## 2025-02-21 RX ORDER — LOSARTAN POTASSIUM 50 MG/1
50 TABLET ORAL DAILY
Qty: 90 TABLET | Refills: 2 | Status: SHIPPED | OUTPATIENT
Start: 2025-02-21

## 2025-02-21 NOTE — TELEPHONE ENCOUNTER
No care due was identified.  Health [facility] Embedded Care Due Messages. Reference number: [id number].   2/21/2025 12:02:27 AM CST

## 2025-02-25 NOTE — PROGRESS NOTES
Not dismissing Outpatient Psychiatry Initial Visit   2/27/2025    Jose Manuel Boyd, a 84 y.o. female, presenting for initial evaluation visit. Met with patient and granddaughter .    Reason for Encounter: Referral from Dr. Ajit Piña . Patient complains of   Chief Complaint   Patient presents with    Anxiety    Depression    Insomnia         History of Present Illness:    SUBJECTIVE:   Psych Interview 02/27/2025:   Jose Manuel Boyd is a 84 y.o. female with past psychiatric history of ZAK, MDD, and insomnia presented for initial evaluation and treatment for anxiety and depression. Pt has been seen by psychiatry in the past (Carly Mejia, T[facility]IS[facility]).    Pt's granddaughter states that she wanted pt to be seen because she seems to be doing worse mentally. She notes that her grandmother lives alone and has limited family here. She reports that pt has a friend that will do occasional favors for her and a few cousins that will bring her to appointments. Granddaughter reports that she lives in [place] and wont be coming back to visit until 4/2025. She notes that she last saw the pt in 12/2025 and that she has lost a significant amount of weight since then. She states that she just wants to make sure that the pt is taking the proper medications at the right times. She adds that she also wants to look into becoming the pt's POA.    Pt reports that she has suffered with anxiety and depression her whole life. She notes that she's been through multiple deaths in her family including losing her spouse and children. She adds that her aunt that she was very close to recently passed away a couple months ago. She states that about a 1.5 years ago is when it started worsening after she signed a reverse mortgage. She notes that she has reading and writing limitations and didn't consult with anyone before doing this. She state that she was given half the value of her home and now doesn't have the income to keep up her house. She  "reports that she feels depressed and doesn't have the energy to do things anymore. She notes that she used to love cooking but doesn't have the energy to do it. She adds that all she does is watch TV or talk to people on the phone. She states that she only eats 2 meals per day, breakfast and lunch due to decreased appetite. She adds that she's tried a drink in the past from her doctor that helped with her appetite and wants to ask her PCP about starting that again. She notes having trouble with bathing, will wash up in the sink if she has to go somewhere due to difficulty getting into her bathtub. She reports worrying a lot and adds "my nerves have always been bad". She adds that she is concerned about her health. She states that she used to take Librium and Valium but was taken off of it  years ago and notes that she hasn't been the same since. She reports that they discontinued the medication due to her age and an increased risk of falls. She notes that she has been having trouble sleeping which is upsetting her. She states that she has trouble falling asleep and wakes up multiple times throughout the night, getting up 5-6 times to use the bathroom. Pt's granddaughter reports that she hears her walking around the house at night and sometimes she can hear her praying or screaming. She states that she does take naps during the day.     Pt notes that her medications are currently being managed by her PCP. Pt has pill bottles of medications with her today which include: Buspar 7.5 mg BID, Remeron 30 mg QHS, Propranolol 10 mg BID for tremor, and Klonopin 0.5 mg BID prn anxiety. Pt reports that she only takes the Klonopin 0.5 mg once or twice per week when she's going out such as to [facility] on Sundays or if she has a doctors appointment. On chart review pt was prescribed Remeron 45 mg by her PCP on 12/12/24. She states that she tried it one night and it didn't seem to help so she went back to taking the Remeron 30 " "mg. Pt has a hx of noncompliance and not following directions for medications. Instructed pt to give the Remeron 45 mg another try. Pt also has a large supply of Buspar 7.5 mg with her, unclear if she is actually taking it BID but instructed to increase to TID.     On chart review pt's last visit with psychiatry on 12/10/19: "The patient also made questionable comments that might be indicative for a mild psychosis but it was not clear at that time if it is an actual psychosis or personality". Pt endorses today that the doors and furniture in her home have been changing colors. She reports that in 7/2024 her house was sprayed with insulation and the next morning when she got up it evaporated. She notes that she also feels that some medications make her skin sticky, "as you get older your skin changes". She denies having any recent falls, last one was 2 years ago. She states that her memory is pretty good. Will continue to monitor.         Stressors - finances, health      Denies prior hx of psychiatric hospitalizations. Denies hx of suicide attempts. Pt denies hx self harm. Pt denies hx hallucinations.  Pt denies hx of eating disorders.       Pt denies hx trauma. denies physical/sexual abuse. Pt denies symptoms including nightmares, hypervigilance, flashbacks, re-experiencing trauma, avoidance behaviors, and disassociation.    Reports depression today as 8-9/10, and anxiety as 7/10.    Reports poor sleeping, and decreased appetite (eats 2 meals, breakfast and lunch).     Denies SI/HI/AVH. Denies side effects of medications.    Pt states that there support consists of her family    Access to Gun - denies.    Denies recreational drug use. Pt denies drinks alcohol use, denies tobacco use, denies vaping, endorses caffeine use (1 cup of coffee, soda every day).        Current Psychiatric Medications:  Buspar 7.5 mg BID  Remeron 30 mg QHS  Propranolol 10 mg BID for tremor - PCP  Klonopin 0.5 BID prn anxiety - takes once or " twice per week when she's going out      Past Psychiatric Medications:  Valium  Librium      DX:  Depression    The patient complained of depressed mood with lethargy, decreased appetite , insomnia, psycho-motor retardation, anhedonia, apathy, worsening self-esteem, guilt, decreased concentration and ability to make decisions.     Pt denies hx symptoms/episodes of jovita.    Anxiety    Admits to symptoms of anxiety including excessive anxiety/worry/fear, more days than not, about numerous issues, difficulty controlling the worry, over thinking, rumination, restlessness, poor concentration, fatigue, and increased irritability. Denies panic attacks at this time.       Standardized Screenings tools:   PHQ9: 19  ZAK- 7: 15      Psychiatric Review Of Systems - Is patient experiencing or having changes in:  sleep: yes  appetite: yes  weight: yes  energy/anergy: yes  interest/pleasure/anhedonia: yes  somatic symptoms: no  libido: no  anxiety/panic: yes  guilty/hopelessness: yes  concentration: yes  S.I.B.s/risky behavior: no  Irritability: yes  Racing thoughts: no  Impulsive behaviors: no  Paranoia: no  AVH: no    Risk Parameters:  Patient reports no suicidal ideation  Patient reports no homicidal ideation  Patient reports no self-injurious behavior  Patient reports no violent behavior      Review Of Systems:     Review of Systems   Constitutional:  Negative for weight loss.   HENT:  Negative for tinnitus.    Eyes:  Negative for blurred vision.   Respiratory:  Negative for cough and shortness of breath.    Cardiovascular:  Negative for chest pain.   Gastrointestinal:  Negative for abdominal pain.   Genitourinary:  Negative for dysuria.   Musculoskeletal:  Negative for back pain and neck pain.   Neurological:  Negative for dizziness, seizures and weakness.   Psychiatric/Behavioral:  Positive for depression. Negative for hallucinations, memory loss, substance abuse and suicidal ideas. The patient is nervous/anxious and has  insomnia.          OBJECTIVE     Past Psychiatric History:   Previous Psychiatric Hospitalizations: NO  Previous Medication Trials: YES: Hydroxyzine, Valium, Librium     History of psychotherapy:  YES: in the past     Previous Suicide Attempts: NO  History of Violence:  NO  History of physical/sexual abuse: NO  Outpatient psychiatric provider(past): YES: Carly Mejia NP    Substance Abuse History:   Tobacco: NO  Alcohol: NO  Illicit Substances: NO  Detox/Rehab: NO    Neurological History:   Seizures: NO  Head trauma: NO    Family Psychiatric History: Yes - daughter (bipolar and schizophrenia)    Social History:  Developmental/Childhood:Achieved all developmental milestones timely  *Education: 9th grade  Employment Status/Finances:Retired   Relationship Status/Sexual Orientation:    Children: 4, 2 alive, 2   Housing Status: Home    history:  NO  Access to gun: NO  Roman [employer]:Actively participates in organized [employer]  Recreational activities: spending time with family  Person patient is closest to/confides in: her sister    Legal History:   Past Charges/Incarcerations:  No      Past Medical/Surgical History:   Past Medical History:   Diagnosis Date    Anxiety     Asthma     Depression     Headache     Hx of psychiatric care     Hypercholesterolemia     Hypertension     Psychiatric problem     Sleep difficulties     Therapy     Thyroid disease     multiple nodules     Past Surgical History:   Procedure Laterality Date    ANGIOGRAM, CORONARY, WITH LEFT HEART CATHETERIZATION Left 10/18/2022    Procedure: Angiogram, Coronary, with Left Heart Cath;  Surgeon: Kumar Randall MD;  Location: [facility] CATH LAB;  Service: Cardiology;  Laterality: Left;    CHOLECYSTECTOMY      HYSTERECTOMY      knee repalcement           Current Medications:   Medication List with Changes/Refills   Current Medications    ALBUTEROL (VENTOLIN HFA) 90 MCG/ACTUATION INHALER    Inhale 2 puffs into the lungs every 4 (four)  hours as needed for Wheezing.    AMLODIPINE (NORVASC) 10 MG TABLET    Take 1 tablet (10 mg total) by mouth once daily.    ASCORBIC ACID, VITAMIN C, (VITAMIN C) 1000 MG TABLET    Take 1,000 mg by mouth once daily.    ASPIRIN (ECOTRIN) 81 MG EC TABLET    Take 81 mg by mouth once daily.    ASPIRIN-ACETAMINOPHEN-CAFFEINE 250-250-65 MG (EXCEDRIN MIGRAINE) 250-250-65 MG PER TABLET    Take 1 tablet by mouth daily as needed for Pain (headaches).    ATORVASTATIN (LIPITOR) 40 MG TABLET    Take 1 tablet (40 mg total) by mouth once daily.    BUSPIRONE (BUSPAR) 7.5 MG TABLET    TAKE 1 TABLET BY MOUTH 2 TIMES A DAY.    CLONAZEPAM (KLONOPIN) 0.5 MG TABLET    Take 1 tablet (0.5 mg total) by mouth 2 (two) times daily as needed for Anxiety.    CYANOCOBALAMIN (VITAMIN B-12) 1000 MCG TABLET    Take 100 mcg by mouth once daily.    DIPHENHYDRAMINE-ALUMINUM-MAGNESIUM HYDROXIDE-SIMETHICONE-LIDOCAINE VISCOUS HCL 2%    Swish and spit 10 mLs every 4 (four) hours as needed (excess saliva).    FLUTICASONE PROPIONATE (FLONASE) 50 MCG/ACTUATION NASAL SPRAY    1 spray (50 mcg total) by Each Nostril route 2 (two) times daily. For allergic rhinitis/sinusitis    FUROSEMIDE (LASIX) 20 MG TABLET    TAKE 1 TABLET BY MOUTH EVERY DAY    LOSARTAN (COZAAR) 50 MG TABLET    TAKE 1 TABLET (50 MG TOTAL) BY MOUTH ONCE DAILY. FOR HIGH BLOOD PRESSURE    MIRTAZAPINE (REMERON) 45 MG TABLET    TAKE 1 TABLET BY MOUTH NIGHTLY AT BEDTIME AS NEEDED FOR INSOMNIA , SLEEP, ANXIETY AND DEPRESSION    MULTIVITAMIN WITH MINERALS TABLET    Take 1 tablet by mouth once daily.    OLOPATADINE (PATANOL) 0.1 % OPHTHALMIC SOLUTION    Place 1 drop into both eyes 2 (two) times daily.    OMEGA-3 FATTY ACIDS/FISH OIL (FISH OIL-OMEGA-3 FATTY ACIDS) 300-1,000 MG CAPSULE    Take by mouth once daily.    OMEPRAZOLE (PRILOSEC) 20 MG CAPSULE    Take 1 capsule (20 mg total) by mouth once daily. For gastric reflux    PROPRANOLOL (INDERAL) 10 MG TABLET    Take 1 tablet (10 mg total) by mouth 2 (two)  "times daily. For shaking    PSYLLIUM (METAMUCIL) POWDER    Take 1 packet by mouth daily as needed.         Allergies:   Review of patient's allergies indicates:   Allergen Reactions    Codeine      Other reaction(s): Rash         Psychotherapy:  Target symptoms: recurrent depression, anxiety , grief  Why chosen therapy is appropriate versus another modality: evidence based practice  Outcome monitoring methods: self-report, observation, feedback from family, checklist/rating scale  Therapeutic intervention type: supportive psychotherapy  Topics discussed/themes: illness/death of a loved one, stress related to medical comorbidities, building skills sets for symptom management, symptom recognition, financial stressors  The patient's response to the intervention is guarded. The patient's progress toward treatment goals is fair.   Duration of intervention: 10 minutes.        Vitals   Vitals:    02/27/25 1305   BP: 119/77   Pulse: 80          Labs/Imaging/Studies:   No results found for this or any previous visit (from the past 48 hours).     No results found for: "PHENYTOIN", "PHENOBARB", "VALPROATE", "CBMZ"      Exam (detailed: at least 9 elements; comprehensive: all 15 elements)   Constitutional  Vitals:  Most recent vital signs, dated less than 90 days prior to this appointment, were reviewed.   Vitals:    02/27/25 1305   BP: 119/77   Pulse: 80   Weight: 78.3 kg (172 lb 8.2 oz)   Height: 5' (1.524 m)        General:  unremarkable, age appropriate     Musculoskeletal  Muscle Strength/Tone:  no spasicity, no rigidity, no cogwheeling, no flaccidity, no paratonia, no dyskinesia, no dystonia, tremor noted bilateral hands - pt taking Propranolol which helps, no tic, no choreoathetosis, no atrophy   Gait & Station:  non-ataxic     Psychiatric  Speech:  no latency; no press   Mood & Affect:  "Ok"  congruent and appropriate, anxious, tearful at times   Thought Process:  normal and logical   Associations:  intact   Thought " Content:  normal, no suicidality, no homicidality, delusions, or paranoia   Insight:  intact, has awareness of illness   Judgement: limited, in relation to medication compliance   Orientation:  grossly intact, person, place, situation, time/date   Memory: Able to name 3/3 objects but 1/3 in 3 minutes    Language: grossly intact   Attention Span & Concentration:  able to focus   Fund of Knowledge:  intact and appropriate to age and level of education, 3 of 4 recent presidents         Relevant Elements of Neurological Exam: normal gait        Assessment / Plan:   Impression: Jose Manuel Boyd, a 84 y.o. female, presenting for initial evaluation visit.    Diagnosis:      ICD-10-CM ICD-9-CM   1. MDD (major depressive disorder), recurrent episode, moderate  F33.1 296.32   2. ZAK (generalized anxiety disorder)  F41.1 300.02   3. Insomnia, unspecified type  G47.00 780.52       Strengths and Liabilities: Strength: Patient accepts guidance/feedback, Strength: Patient is expressive/articulate., Liability: Patient lacks coping skills.    Treatment Goals:  Specify outcomes written in observable, behavioral terms:   Anxiety: acquiring relapse prevention skills, reducing negative automatic thoughts, reducing physical symptoms of anxiety, and reducing time spent worrying (<30 minutes/day)  Depression: acquiring relapse prevention skills, increasing energy, increasing interest in usual activities, increasing motivation, reducing excessive guilt, reducing fatigue, and reducing negative automatic thoughts    Treatment Plan/Recommendations:     Increase Buspar 7.5 mg BID to TID - targeting anxiety.  Start Remeron 45 mg QHS - targeting depression and insomnia.   Continue Klonopin 0.5 mg BID prn anxiety.  reviewed - Pt had refilled - 2/4/25. Pt only takes once or twice per week when she's going out.   Discussed the importance of compliance with pt. Suggested getting a pill organizer to hopefully help with compliance  issues.  Discussed therapy options with pt. Pt is interested in talk therapy but [place] have [place] [facility] [place] Pt was given additional local psychotherapy resources. Also recommended free therapy sessions at [facility] since it is close to her home.  Discussed getting an / to help with POA.     Discussed diagnosis, risks and benefits of proposed treatment above vs alternative treatments vs no treatment, and potential side effects of these treatments, and the inherent unpredictability of individual response to treatment.  The patient expresses understanding and gives informed consent to pursue treatment.  The potential benefits outweigh the potential risks. Patient has no other questions. Risks/adverse effects discussed at this time including but not limited to: GI side effects, sexual dysfunction, activation vs sedation, triggering of suicidal thoughts, and serotonin syndrome.  Discussed with patient, the potential adverse effects of Benzodiazepines, including, but not limited to, drowsiness, dizziness, risk of falls, and abuse potential. Counseled patient on avoiding alcohol, while using this medication, due to the risk of respiratory depression. Patient instructed not to operate any heavy machinery or drive a vehicle while on this medication. Informed patient of the risks of continuous benzodiazepine use, including tolerance, dependence and withdrawals that may be life threatening, upon abrupt cessation. Also advised patient not to take benzodiazepines with opiates and/or other sedatives. The patient expresses an understanding of the above as well as the inherent unpredictability of said treatment.  The patient agrees that, currently, the benefits outweigh the risks, and would like to pursue said treatment at this time.    Serotonin syndrome   Mental status changes can include anxiety, restlessness, disorientation, and agitated delirium.    Autonomic manifestations can include  diaphoresis, tachycardia, hyperthermia, hypertension, vomiting, and diarrhea   Neuromuscular hyperactivity can manifest as tremor, myoclonus, hyperreflexia, rigidity, hyperthermia, seizure, and bilateral Babinski sign.   Pt was informed that if they experience any of these symptoms to go the ED.     Safety Plan   Patient voices understanding and agreement with this plan  Provided crisis numbers  Encouraged patient to keep future appointments.  Instructed patient to call or message with questions or concerns  In the event of an emergency, including suicidal ideation, patient was advised to go to the emergency room and/or call 911    Return to Clinic: 1 month      Total face to face time: 60 min  Total time (chart review, patient contact, documentation): 80 min    A diagnostic psychiatric evaluation was performed and responsiveness to treatment was assessed.  The patient demonstrates adequate ability/capacity to respond to treatment.      Imelda Chung PA-C

## 2025-02-27 ENCOUNTER — OFFICE VISIT (OUTPATIENT)
Dept: PSYCHIATRY | Facility: CLINIC | Age: 85
End: 2025-02-27
Payer: COMMERCIAL

## 2025-02-27 VITALS
HEIGHT: 60 IN | DIASTOLIC BLOOD PRESSURE: 77 MMHG | BODY MASS INDEX: 33.86 KG/M2 | WEIGHT: 172.5 LBS | SYSTOLIC BLOOD PRESSURE: 119 MMHG | HEART RATE: 80 BPM

## 2025-02-27 DIAGNOSIS — F33.1 MDD (MAJOR DEPRESSIVE DISORDER), RECURRENT EPISODE, MODERATE: Primary | ICD-10-CM

## 2025-02-27 DIAGNOSIS — G47.00 INSOMNIA, UNSPECIFIED TYPE: ICD-10-CM

## 2025-02-27 DIAGNOSIS — F41.1 GAD (GENERALIZED ANXIETY DISORDER): ICD-10-CM

## 2025-02-27 PROCEDURE — 99999 PR PBB SHADOW E&M-EST. PATIENT-LVL IV: CPT | Mod: PBBFAC,,, | Performed by: PHYSICIAN ASSISTANT

## 2025-03-05 ENCOUNTER — LAB VISIT (OUTPATIENT)
Dept: LAB | Facility: HOSPITAL | Age: 85
End: 2025-03-05
Attending: INTERNAL MEDICINE
Payer: MEDICARE

## 2025-03-05 DIAGNOSIS — R73.01 IMPAIRED FASTING GLUCOSE: ICD-10-CM

## 2025-03-05 DIAGNOSIS — I10 HYPERTENSION, ESSENTIAL: ICD-10-CM

## 2025-03-05 LAB
ALBUMIN SERPL BCP-MCNC: 2.8 G/DL (ref 3.5–5.2)
ALP SERPL-CCNC: 86 U/L (ref 40–150)
ALT SERPL W/O P-5'-P-CCNC: 11 U/L (ref 10–44)
ANION GAP SERPL CALC-SCNC: 8 MMOL/L (ref 8–16)
AST SERPL-CCNC: 16 U/L (ref 10–40)
BILIRUB SERPL-MCNC: 0.2 MG/DL (ref 0.1–1)
BUN SERPL-MCNC: 14 MG/DL (ref 8–23)
CALCIUM SERPL-MCNC: 9.1 MG/DL (ref 8.7–10.5)
CHLORIDE SERPL-SCNC: 105 MMOL/L (ref 95–110)
CO2 SERPL-SCNC: 28 MMOL/L (ref 23–29)
CREAT SERPL-MCNC: 0.8 MG/DL (ref 0.5–1.4)
ERYTHROCYTE [DISTWIDTH] IN BLOOD BY AUTOMATED COUNT: 16.7 % (ref 11.5–14.5)
EST. GFR  (NO RACE VARIABLE): >60 ML/MIN/1.73 M^2
ESTIMATED AVG GLUCOSE: 128 MG/DL (ref 68–131)
GLUCOSE SERPL-MCNC: 173 MG/DL (ref 70–110)
HBA1C MFR BLD: 6.1 % (ref 4–5.6)
HCT VFR BLD AUTO: 44.6 % (ref 37–48.5)
HGB BLD-MCNC: 13.5 G/DL (ref 12–16)
MCH RBC QN AUTO: 25.9 PG (ref 27–31)
MCHC RBC AUTO-ENTMCNC: 30.3 G/DL (ref 32–36)
MCV RBC AUTO: 86 FL (ref 82–98)
PLATELET # BLD AUTO: 314 K/UL (ref 150–450)
PMV BLD AUTO: 11.4 FL (ref 9.2–12.9)
POTASSIUM SERPL-SCNC: 4.5 MMOL/L (ref 3.5–5.1)
PROT SERPL-MCNC: 6.5 G/DL (ref 6–8.4)
RBC # BLD AUTO: 5.21 M/UL (ref 4–5.4)
SODIUM SERPL-SCNC: 141 MMOL/L (ref 136–145)
WBC # BLD AUTO: 11.06 K/UL (ref 3.9–12.7)

## 2025-03-05 PROCEDURE — 83036 HEMOGLOBIN GLYCOSYLATED A1C: CPT | Performed by: INTERNAL MEDICINE

## 2025-03-05 PROCEDURE — 36415 COLL VENOUS BLD VENIPUNCTURE: CPT | Mod: PO | Performed by: INTERNAL MEDICINE

## 2025-03-05 PROCEDURE — 80053 COMPREHEN METABOLIC PANEL: CPT | Performed by: INTERNAL MEDICINE

## 2025-03-05 PROCEDURE — 85027 COMPLETE CBC AUTOMATED: CPT | Performed by: INTERNAL MEDICINE

## 2025-03-11 ENCOUNTER — OFFICE VISIT (OUTPATIENT)
Dept: FAMILY MEDICINE | Facility: CLINIC | Age: 85
End: 2025-03-11
Payer: MEDICARE

## 2025-03-11 VITALS
BODY MASS INDEX: 33.72 KG/M2 | DIASTOLIC BLOOD PRESSURE: 78 MMHG | SYSTOLIC BLOOD PRESSURE: 116 MMHG | TEMPERATURE: 98 F | WEIGHT: 171.75 LBS | HEART RATE: 53 BPM | OXYGEN SATURATION: 95 % | HEIGHT: 60 IN

## 2025-03-11 DIAGNOSIS — I10 HTN (HYPERTENSION), BENIGN: Primary | ICD-10-CM

## 2025-03-11 DIAGNOSIS — F33.0 MAJOR DEPRESSIVE DISORDER, RECURRENT EPISODE, MILD WITH ANXIOUS DISTRESS: ICD-10-CM

## 2025-03-11 DIAGNOSIS — R73.01 IFG (IMPAIRED FASTING GLUCOSE): ICD-10-CM

## 2025-03-11 DIAGNOSIS — G25.0 ESSENTIAL TREMOR: ICD-10-CM

## 2025-03-11 PROCEDURE — 1126F AMNT PAIN NOTED NONE PRSNT: CPT | Mod: CPTII,S$GLB,, | Performed by: INTERNAL MEDICINE

## 2025-03-11 PROCEDURE — 1101F PT FALLS ASSESS-DOCD LE1/YR: CPT | Mod: CPTII,S$GLB,, | Performed by: INTERNAL MEDICINE

## 2025-03-11 PROCEDURE — G2211 COMPLEX E/M VISIT ADD ON: HCPCS | Mod: S$GLB,,, | Performed by: INTERNAL MEDICINE

## 2025-03-11 PROCEDURE — 99214 OFFICE O/P EST MOD 30 MIN: CPT | Mod: S$GLB,,, | Performed by: INTERNAL MEDICINE

## 2025-03-11 PROCEDURE — 1160F RVW MEDS BY RX/DR IN RCRD: CPT | Mod: CPTII,S$GLB,, | Performed by: INTERNAL MEDICINE

## 2025-03-11 PROCEDURE — 1159F MED LIST DOCD IN RCRD: CPT | Mod: CPTII,S$GLB,, | Performed by: INTERNAL MEDICINE

## 2025-03-11 PROCEDURE — 3288F FALL RISK ASSESSMENT DOCD: CPT | Mod: CPTII,S$GLB,, | Performed by: INTERNAL MEDICINE

## 2025-03-11 PROCEDURE — 99999 PR PBB SHADOW E&M-EST. PATIENT-LVL V: CPT | Mod: PBBFAC,,, | Performed by: INTERNAL MEDICINE

## 2025-03-11 NOTE — PROGRESS NOTES
"Subjective:       Patient ID: Jose Manuel Boyd is a 84 y.o. female.    Chief Complaint: Follow-up (F/U)    F/u chronic conditions/lab review    HPI: 83 y/o w/ HTN essential tremor MDD IFG presents alone for followup. Doing "okay" recently established with mental health clinic buspar increased to TID no worsening anxiety no nausea no LE swelling no change in urinary frequency      Review of Systems   Constitutional:  Negative for activity change, appetite change, fatigue, fever and unexpected weight change.   HENT:  Negative for ear pain, rhinorrhea and sore throat.    Eyes:  Negative for discharge and visual disturbance.   Respiratory:  Negative for chest tightness, shortness of breath and wheezing.    Cardiovascular:  Negative for chest pain, palpitations and leg swelling.   Gastrointestinal:  Negative for abdominal pain, constipation and diarrhea.   Endocrine: Negative for cold intolerance and heat intolerance.   Genitourinary:  Negative for dysuria and hematuria.   Musculoskeletal:  Negative for joint swelling and neck stiffness.   Skin:  Negative for rash.   Neurological:  Negative for dizziness, syncope, weakness and headaches.   Psychiatric/Behavioral:  Positive for dysphoric mood. Negative for suicidal ideas. The patient is nervous/anxious.        Objective:     Vitals:    03/11/25 1111   BP: 116/78   BP Location: Left arm   Patient Position: Sitting   Pulse: (!) 53   Temp: 97.9 °F (36.6 °C)   TempSrc: Oral   SpO2: 95%   Weight: 77.9 kg (171 lb 11.8 oz)   Height: 5' (1.524 m)          Physical Exam  Constitutional:       Appearance: She is well-developed.   HENT:      Head: Normocephalic and atraumatic.   Eyes:      General: No scleral icterus.     Conjunctiva/sclera: Conjunctivae normal.   Cardiovascular:      Rate and Rhythm: Normal rate and regular rhythm.      Heart sounds: No murmur heard.     No friction rub. No gallop.   Pulmonary:      Effort: Pulmonary effort is normal.      Breath sounds: Normal " breath sounds. No wheezing or rales.   Abdominal:      Palpations: Abdomen is soft.      Tenderness: There is no abdominal tenderness. There is no guarding or rebound.   Musculoskeletal:         General: No tenderness. Normal range of motion.      Cervical back: Normal range of motion.      Right lower leg: No edema.      Left lower leg: No edema.   Skin:     General: Skin is warm and dry.   Neurological:      Mental Status: She is alert and oriented to person, place, and time.      Cranial Nerves: No cranial nerve deficit.         Assessment and Plan   1. HTN (hypertension), benign (Primary)  Bp at goal reviewed labs with patient showing normal electrolytes and renal function    2. Major depressive disorder, recurrent episode, mild with anxious distress   Continue mangement with mental health clinic    3. Essential tremor  Improved with propranolol continue    4. IFG (impaired fasting glucose)  A1c unchanged monitor q 6 months

## 2025-03-17 NOTE — PROGRESS NOTES
Outpatient Psychiatry Follow-Up Visit   3/18/2025    Clinical Status of Patient:  Outpatient (Ambulatory)    Chief Complaint:  Jose Manuel Boyd is a 84 y.o. female who presents today for follow-up of depression and anxiety.  Met with patient.      Current Psychiatric Medications:  Buspar 7.5 mg TID  Remeron 45 mg QHS  Propranolol 10 mg BID for tremor - PCP  Klonopin 0.5 BID prn anxiety - takes once or twice per week when she's going out      Past Psychiatric Medications:  Valium  Librium      Prior Visit:  Psych Interview 02/27/2025:   Jose Manuel Boyd is a 84 y.o. female with past psychiatric history of ZAK, MDD, and insomnia presented for initial evaluation and treatment for anxiety and depression. Pt has been seen by psychiatry in the past (Carly Mejia, T[facility]IS[facility]).     Pt's granddaughter states that she wanted pt to be seen because she seems to be doing worse mentally. She notes that her grandmother lives alone and has limited family here. She reports that pt has a friend that will do occasional favors for her and a few cousins that will bring her to appointments. Granddaughter reports that she lives in [place] and wont be coming back to visit until 4/2025. She notes that she last saw the pt in 12/2025 and that she has lost a significant amount of weight since then. She states that she just wants to make sure that the pt is taking the proper medications at the right times. She adds that she also wants to look into becoming the pt's POA.     Pt reports that she has suffered with anxiety and depression her whole life. She notes that she's been through multiple deaths in her family including losing her spouse and children. She adds that her aunt that she was very close to recently passed away a couple months ago. She states that about a 1.5 years ago is when it started worsening after she signed a reverse mortgage. She notes that she has reading and writing limitations and didn't consult with anyone before doing  "this. She state that she was given half the value of her home and now doesn't have the income to keep up her house. She reports that she feels depressed and doesn't have the energy to do things anymore. She notes that she used to love cooking but doesn't have the energy to do it. She adds that all she does is watch TV or talk to people on the phone. She states that she only eats 2 meals per day, breakfast and lunch due to decreased appetite. She adds that she's tried a drink in the past from her doctor that helped with her appetite and wants to ask her PCP about starting that again. She notes having trouble with bathing, will wash up in the sink if she has to go somewhere due to difficulty getting into her bathtub. She reports worrying a lot and adds "my nerves have always been bad". She adds that she is concerned about her health. She states that she used to take Librium and Valium but was taken off of it  years ago and notes that she hasn't been the same since. She reports that they discontinued the medication due to her age and an increased risk of falls. She notes that she has been having trouble sleeping which is upsetting her. She states that she has trouble falling asleep and wakes up multiple times throughout the night, getting up 5-6 times to use the bathroom. Pt's granddaughter reports that she hears her walking around the house at night and sometimes she can hear her praying or screaming. She states that she does take naps during the day.      Pt notes that her medications are currently being managed by her PCP. Pt has pill bottles of medications with her today which include: Buspar 7.5 mg BID, Remeron 30 mg QHS, Propranolol 10 mg BID for tremor, and Klonopin 0.5 mg BID prn anxiety. Pt reports that she only takes the Klonopin 0.5 mg once or twice per week when she's going out such as to [employer] on Sundays or if she has a doctors appointment. On chart review pt was prescribed Remeron 45 mg by her PCP " "on 12/12/24. She states that she tried it one night and it didn't seem to help so she went back to taking the Remeron 30 mg. Pt has a hx of noncompliance and not following directions for medications. Instructed pt to give the Remeron 45 mg another try. Pt also has a large supply of Buspar 7.5 mg with her, unclear if she is actually taking it BID but instructed to increase to TID.      On chart review pt's last visit with psychiatry on 12/10/19: "The patient also made questionable comments that might be indicative for a mild psychosis but it was not clear at that time if it is an actual psychosis or personality". Pt endorses today that the doors and furniture in her home have been changing colors. She reports that in 7/2024 her house was sprayed with insulation and the next morning when she got up it evaporated. She notes that she also feels that some medications make her skin sticky, "as you get older your skin changes". She denies having any recent falls, last one was 2 years ago. She states that her memory is pretty good. Will continue to monitor.            Stressors - finances, health        Denies prior hx of psychiatric hospitalizations. Denies hx of suicide attempts. Pt denies hx self harm. Pt denies hx hallucinations.  Pt denies hx of eating disorders.        Pt denies hx trauma. denies physical/sexual abuse. Pt denies symptoms including nightmares, hypervigilance, flashbacks, re-experiencing trauma, avoidance behaviors, and disassociation.     Reports depression today as 8-9/10, and anxiety as 7/10.     Reports poor sleeping, and decreased appetite (eats 2 meals, breakfast and lunch).      Denies SI/HI/AVH. Denies side effects of medications.     Pt states that there support consists of her family     Access to Gun - denies.     Denies recreational drug use. Pt denies drinks alcohol use, denies tobacco use, denies vaping, endorses caffeine use (1 cup of coffee, soda every day).       Standardized Screenings " "tools:   PHQ9: 19  ZAK- 7: 15      Content of Current Session 03/18/2025:  Patient seen and chart reviewed. Last seen on 2/27/25    Patient has a psychiatric history of: ZAK, MDD, and insomnia      Mood: "pretty good"    Pt reports that she has been doing good since her last visit. She states that she is taking Buspar 7.5 mg TID and Remeron 45 mg QHS. She adds that she continues to take Klonopin 0.5 mg BID prn for anxiety, once or twice per week when she's going out. She denies taking the Klonopin today. She notes that her sleep has been much better than it was. She adds that she still isn't eating much, 2 meals per day but she started drinking ensures. She states that her son has been staying with her and is making sure she is eating. She reports that her depression "stays there" and last month and this month are just hard for her due to her daughter and husbands deaths happening in February and March. She adds that the past couple days she has felt more tired and low energy, hasn't wanted to do anything. She notes that she was just "born with bad nerves" and states that she constantly thinks about the reverse mortgage and debt that she is in. She continues to talk about being on Librium and Valium for years which helped her nerves. She continues to talk about what evaporated in her home years ago and that everyone around her says that it's in her mind. She notes that next month her granddaughter will be visiting her. Pt unable to do PHQ9 and GAD7 today, states that she will do it at next visit when her granddaughter is here to help her.     Still concerned about medication compliance. She states that she writes down her medications. She reports putting her BP medication bottle in her pocket this morning and forgot to take it until we checked her BP in clinic today.       Reports depression today as 5/10, and anxiety as 4-5/10.    Pt reports taking medications as prescribed and behaving appropriately during " "interview today.    Denies SI/HI/AVH. Denies side effects of medications.  Pt reports sleeping "pretty fair" and same appetite (2 meals and drinking ensure).     Denies recreational drug use. Pt denies drinks alcohol use, denies tobacco use, denies vaping, endorses caffeine use (1 cup of coffee, soda every day).      DX:  Depression    The patient complained of depressed mood with lethargy, decreased appetite , insomnia, psycho-motor retardation, anhedonia, apathy, worsening self-esteem, guilt, decreased concentration and ability to make decisions.    Pt denies hx symptoms/episodes of jovita.    Anxiety    Admits to symptoms of anxiety including excessive anxiety/worry/fear, more days than not, about numerous issues, difficulty controlling the worry, over thinking, rumination, restlessness, poor concentration, fatigue, and increased irritability. Denies panic attacks at this time.       Standardized Screenings tools: Pt did not fill out this visit, says she will fill out next month when her granddaughter is here to help her fill it out  PHQ9:   ZAK- 7:         Psychiatric Review Of Systems - Is patient experiencing or having changes in:  sleep: no  appetite: yes  weight: no  energy/anergy: yes  interest/pleasure/anhedonia: yes  somatic symptoms: no  libido: no  anxiety/panic: yes  guilty/hopelessness: yes  concentration: no  S.I.B.s/risky behavior: no  Irritability: yes  Racing thoughts: no  Impulsive behaviors: no  Paranoia: no  AVH: no      Risk Parameters:  Patient reports no suicidal ideation  Patient reports no homicidal ideation  Patient reports no self-injurious behavior  Patient reports no violent behavior      Review Of Systems:     Review of Systems   Constitutional:  Negative for weight loss.   HENT:  Negative for tinnitus.    Eyes:  Negative for blurred vision.   Respiratory:  Negative for cough and shortness of breath.    Cardiovascular:  Negative for chest pain.   Gastrointestinal:  Negative for " abdominal pain.   Genitourinary:  Negative for dysuria.   Musculoskeletal:  Negative for back pain and neck pain.   Neurological:  Negative for dizziness, seizures and weakness.   Psychiatric/Behavioral:  Positive for depression. Negative for hallucinations, memory loss, substance abuse and suicidal ideas. The patient is nervous/anxious. The patient does not have insomnia.          OBJECTIVE  Past Psychiatric History:   Previous Psychiatric Hospitalizations: NO  Previous Medication Trials: YES: Hydroxyzine, Valium, Librium     History of psychotherapy:  YES: in the past     Previous Suicide Attempts: NO  History of Violence:  NO  History of physical/sexual abuse: NO  Outpatient psychiatric provider(past): YES: Carly Mejia NP     Substance Abuse History:   Tobacco: NO  Alcohol: NO  Illicit Substances: NO  Detox/Rehab: NO     Neurological History:   Seizures: NO  Head trauma: NO     Family Psychiatric History: Yes - daughter (bipolar and schizophrenia)     Social History:  Developmental/Childhood:Achieved all developmental milestones timely  *Education: 9th grade  Employment Status/Finances:Retired   Relationship Status/Sexual Orientation:    Children: 4, 2 alive, 2   Housing Status: Home    history:  NO  Access to gun: NO  Pentecostalism:Actively participates in organized [employer]ism  Recreational activities: spending time with family  Person patient is closest to/confides in: her sister     Legal History:   Past Charges/Incarcerations:  No            Past Medical, Family and Social History: The patient's past medical, family and social history have been reviewed and updated as appropriate within the electronic medical record - see encounter notes.      Current Psychiatric Medications:  Buspar 7.5 mg TID  Remeron 45 mg QHS  Propranolol 10 mg BID for tremor - PCP  Klonopin 0.5 BID prn anxiety - takes once or twice per week when she's going out    Compliance: yes      Side effects: None      Past  Psychiatric Medications:  Valium  Librium    Current Medications:   Medication List with Changes/Refills   Current Medications    ALBUTEROL (VENTOLIN HFA) 90 MCG/ACTUATION INHALER    Inhale 2 puffs into the lungs every 4 (four) hours as needed for Wheezing.    AMLODIPINE (NORVASC) 10 MG TABLET    Take 1 tablet (10 mg total) by mouth once daily.    ASCORBIC ACID, VITAMIN C, (VITAMIN C) 1000 MG TABLET    Take 1,000 mg by mouth once daily.    ASPIRIN (ECOTRIN) 81 MG EC TABLET    Take 81 mg by mouth once daily.    ASPIRIN-ACETAMINOPHEN-CAFFEINE 250-250-65 MG (EXCEDRIN MIGRAINE) 250-250-65 MG PER TABLET    Take 1 tablet by mouth daily as needed for Pain (headaches).    ATORVASTATIN (LIPITOR) 40 MG TABLET    Take 1 tablet (40 mg total) by mouth once daily.    BUSPIRONE (BUSPAR) 7.5 MG TABLET    TAKE 1 TABLET BY MOUTH 2 TIMES A DAY.    CLONAZEPAM (KLONOPIN) 0.5 MG TABLET    Take 1 tablet (0.5 mg total) by mouth 2 (two) times daily as needed for Anxiety.    CYANOCOBALAMIN (VITAMIN B-12) 1000 MCG TABLET    Take 100 mcg by mouth once daily.    DIPHENHYDRAMINE-ALUMINUM-MAGNESIUM HYDROXIDE-SIMETHICONE-LIDOCAINE VISCOUS HCL 2%    Swish and spit 10 mLs every 4 (four) hours as needed (excess saliva).    FLUTICASONE PROPIONATE (FLONASE) 50 MCG/ACTUATION NASAL SPRAY    1 spray (50 mcg total) by Each Nostril route 2 (two) times daily. For allergic rhinitis/sinusitis    FUROSEMIDE (LASIX) 20 MG TABLET    TAKE 1 TABLET BY MOUTH EVERY DAY    LOSARTAN (COZAAR) 50 MG TABLET    TAKE 1 TABLET (50 MG TOTAL) BY MOUTH ONCE DAILY. FOR HIGH BLOOD PRESSURE    MIRTAZAPINE (REMERON) 45 MG TABLET    TAKE 1 TABLET BY MOUTH NIGHTLY AT BEDTIME AS NEEDED FOR INSOMNIA , SLEEP, ANXIETY AND DEPRESSION    MULTIVITAMIN WITH MINERALS TABLET    Take 1 tablet by mouth once daily.    OLOPATADINE (PATANOL) 0.1 % OPHTHALMIC SOLUTION    Place 1 drop into both eyes 2 (two) times daily.    OMEGA-3 FATTY ACIDS/FISH OIL (FISH OIL-OMEGA-3 FATTY ACIDS) 300-1,000 MG  "CAPSULE    Take by mouth once daily.    OMEPRAZOLE (PRILOSEC) 20 MG CAPSULE    Take 1 capsule (20 mg total) by mouth once daily. For gastric reflux    PROPRANOLOL (INDERAL) 10 MG TABLET    Take 1 tablet (10 mg total) by mouth 2 (two) times daily. For shaking    PSYLLIUM (METAMUCIL) POWDER    Take 1 packet by mouth daily as needed.         Allergies:   Review of patient's allergies indicates:   Allergen Reactions    Codeine      Other reaction(s): Rash            Psychotherapy:  Target symptoms: recurrent depression, anxiety , grief  Why chosen therapy is appropriate versus another modality: evidence based practice  Outcome monitoring methods: self-report  Therapeutic intervention type: supportive psychotherapy  Topics discussed/themes: illness/death of a loved one, building skills sets for symptom management, symptom recognition, financial stressors  The patient's response to the intervention is guarded. The patient's progress toward treatment goals is fair.   Duration of intervention: 5 minutes.        Vitals   Vitals:    03/18/25 0932   BP: (!) 146/78   Pulse: 74          Labs/Imaging/Studies:   No results found for this or any previous visit (from the past 48 hours).   No results found for: "PHENYTOIN", "PHENOBARB", "VALPROATE", "CBMZ"      Exam (detailed: at least 9 elements; comprehensive: all 15 elements)   Constitutional  Vitals:  Most recent vital signs, dated less than 90 days prior to this appointment, were reviewed.   Vitals:    03/18/25 0932   BP: (!) 146/78   Pulse: 74   Weight: 78.6 kg (173 lb 4.5 oz)   Height: 5' (1.524 m)        General:  unremarkable, age appropriate     Musculoskeletal  Muscle Strength/Tone:  no spasicity, no rigidity, no cogwheeling, no flaccidity, no paratonia, no dyskinesia, no dystonia, tremor noted  tremor noted bilateral hands - pt taking Propranolol which helps, no tic, no choreoathetosis, no atrophy   Gait & Station:  non-ataxic     Psychiatric  Speech:  no latency; no " "press   Mood & Affect:  "Pretty good"  congruent and appropriate   Thought Process:  normal and logical   Associations:  intact, circumstantial able to be redirected   Thought Content:  normal, no suicidality, no homicidality, delusions, or paranoia   Insight:  intact, has awareness of illness   Judgement: limited, in relation to medication compliance   Orientation:  grossly intact, person, place, time/date   Memory: intact for content of interview   Language: grossly intact   Attention Span & Concentration:  able to focus   Fund of Knowledge:  intact and appropriate to age and level of education     Assessment and Diagnosis   Status/Progress: Based on the examination today, the patient's problem(s) is/are resolving.  New problems have not been presented today.   Lack of compliance are complicating management of the primary condition.  There are no active rule-out diagnoses for this patient at this time.     General Impression:      ICD-10-CM ICD-9-CM   1. ZAK (generalized anxiety disorder)  F41.1 300.02   2. MDD (major depressive disorder), recurrent episode, moderate  F33.1 296.32   3. Insomnia, unspecified type  G47.00 780.52       Intervention/Counseling/Treatment Plan     Continue Buspar 7.5 mg TID - targeting anxiety. Will consider increasing in the future to Buspar 10 mg TID.   Continue Remeron 45 mg QHS - targeting depression and insomnia.   Continue Klonopin 0.5 mg BID prn anxiety.  reviewed - Pt had refilled - 2/4/25. Pt only takes once or twice per week when she's going out.   Discussed the importance of compliance with pt. Still concerned about medication compliance.   Discussed therapy options with pt. Pt is interested in talk therapy but doesn't have transportation to get to [facility]. Pt was given additional local psychotherapy resources. Also recommended free therapy sessions at [facility] since it is close to her home. Pt states that she called [facility] but they only offered virtual " appointments. She notes that [facility].     Discussed diagnosis, risks and benefits of proposed treatment above vs alternative treatments vs no treatment, and potential side effects of these treatments, and the inherent unpredictability of individual response to treatment.  The patient expresses understanding and gives informed consent to pursue treatment.  The potential benefits outweigh the potential risks. Patient has no other questions. Risks/adverse effects discussed at this time including but not limited to: GI side effects, sexual dysfunction, activation vs sedation, triggering of suicidal thoughts, and serotonin syndrome.  Discussed with patient, the potential adverse effects of Benzodiazepines, including, but not limited to, drowsiness, dizziness, risk of falls, and abuse potential. Counseled patient on avoiding alcohol, while using this medication, due to the risk of respiratory depression. Patient instructed not to operate any heavy machinery or drive a vehicle while on this medication. Informed patient of the risks of continuous benzodiazepine use, including tolerance, dependence and withdrawals that may be life threatening, upon abrupt cessation. Also advised patient not to take benzodiazepines with opiates and/or other sedatives. The patient expresses an understanding of the above as well as the inherent unpredictability of said treatment.  The patient agrees that, currently, the benefits outweigh the risks, and would like to pursue said treatment at this time.    Serotonin syndrome   Mental status changes can include anxiety, restlessness, disorientation, and agitated delirium.    Autonomic manifestations can include diaphoresis, tachycardia, hyperthermia, hypertension, vomiting, and diarrhea   Neuromuscular hyperactivity can manifest as tremor, myoclonus, hyperreflexia, rigidity, hyperthermia, seizure, and bilateral Babinski sign.    Pt was informed that if they experience any of these symptoms to go the ED.     Safety Plan   Patient voices understanding and agreement with this plan  Provided crisis numbers  Encouraged patient to keep future appointments.  Instructed patient to call or message with questions or concerns  In the event of an emergency, including suicidal ideation, patient was advised to go to the emergency room and/or call 911    Return to Clinic: 1 month      Total face to face time: 30 min  Total time (chart review, patient contact, documentation): 45 min    A diagnostic psychiatric evaluation was performed and responsiveness to treatment was assessed.  The patient demonstrates adequate ability/capacity to respond to treatment.    Imelda Chung PA-C

## 2025-03-18 ENCOUNTER — OFFICE VISIT (OUTPATIENT)
Dept: PSYCHIATRY | Facility: CLINIC | Age: 85
End: 2025-03-18
Payer: COMMERCIAL

## 2025-03-18 VITALS
HEIGHT: 60 IN | BODY MASS INDEX: 34.02 KG/M2 | DIASTOLIC BLOOD PRESSURE: 78 MMHG | WEIGHT: 173.31 LBS | HEART RATE: 74 BPM | SYSTOLIC BLOOD PRESSURE: 146 MMHG

## 2025-03-18 DIAGNOSIS — F41.1 GAD (GENERALIZED ANXIETY DISORDER): Primary | ICD-10-CM

## 2025-03-18 DIAGNOSIS — F33.1 MDD (MAJOR DEPRESSIVE DISORDER), RECURRENT EPISODE, MODERATE: ICD-10-CM

## 2025-03-18 DIAGNOSIS — G47.00 INSOMNIA, UNSPECIFIED TYPE: ICD-10-CM

## 2025-03-18 PROCEDURE — 99214 OFFICE O/P EST MOD 30 MIN: CPT | Mod: S$GLB,,, | Performed by: PHYSICIAN ASSISTANT

## 2025-03-18 PROCEDURE — 1101F PT FALLS ASSESS-DOCD LE1/YR: CPT | Mod: CPTII,S$GLB,, | Performed by: PHYSICIAN ASSISTANT

## 2025-03-18 PROCEDURE — 1159F MED LIST DOCD IN RCRD: CPT | Mod: CPTII,S$GLB,, | Performed by: PHYSICIAN ASSISTANT

## 2025-03-18 PROCEDURE — 1160F RVW MEDS BY RX/DR IN RCRD: CPT | Mod: CPTII,S$GLB,, | Performed by: PHYSICIAN ASSISTANT

## 2025-03-18 PROCEDURE — 99999 PR PBB SHADOW E&M-EST. PATIENT-LVL IV: CPT | Mod: PBBFAC,,, | Performed by: PHYSICIAN ASSISTANT

## 2025-03-18 PROCEDURE — 3288F FALL RISK ASSESSMENT DOCD: CPT | Mod: CPTII,S$GLB,, | Performed by: PHYSICIAN ASSISTANT

## 2025-03-22 DIAGNOSIS — F33.0 MAJOR DEPRESSIVE DISORDER, RECURRENT EPISODE, MILD WITH ANXIOUS DISTRESS: ICD-10-CM

## 2025-03-22 DIAGNOSIS — I10 HYPERTENSION, ESSENTIAL: ICD-10-CM

## 2025-03-22 NOTE — TELEPHONE ENCOUNTER
No care due was identified.  [facility] Embedded Care Due Messages. Reference number: [id number].   3/22/2025 2:55:55 PM CDT

## 2025-03-23 NOTE — TELEPHONE ENCOUNTER
Refill Routing Note   Medication(s) are not appropriate for processing by [facility] for the following reason(s):        Outside of protocol: Buspar  Required vitals abnormal    [facility] action(s):  Route  Defer             Appointments  past 12m or future 3m with PCP    Date Provider   Last Visit   3/11/2025 Ajit Piña MD   Next Visit   7/23/2025 Ajit Piña MD   ED visits in past 90 days: 1        Note composed:2:19 PM 03/23/2025

## 2025-03-24 RX ORDER — AMLODIPINE BESYLATE 10 MG/1
10 TABLET ORAL DAILY
Qty: 90 TABLET | Refills: 3 | Status: SHIPPED | OUTPATIENT
Start: 2025-03-24

## 2025-03-24 RX ORDER — BUSPIRONE HYDROCHLORIDE 7.5 MG/1
7.5 TABLET ORAL 2 TIMES DAILY
Qty: 180 TABLET | Refills: 1 | Status: SHIPPED | OUTPATIENT
Start: 2025-03-24

## 2025-04-21 NOTE — PROGRESS NOTES
HPI     Jose Manuel Boyd is a 84 y.o. female with multiple medical diagnoses as listed in the medical history and problem list that presents for   Chief Complaint   Patient presents with    Insomnia       HPI  Pt presents today for insomnia. She reports improved and stable moods at home, her son recently moved in with her and has been appreciating his company.  She states being compliant with Remeron 45 mg, though feels like it's still not helping. She is getting 2-3 hours of sleep/night. She stays up watching tv throughout the night. She was told that her tremors have been worsened while being on the 45 mg Remeron. She is open to trying something else. She's tried Melatonin previously with no relief.  She reports rarely taking her Klonopin for her anxiety, though does help her relax. She routinely follows up with psychiatry to manage her anxiety.  She uses home oxygen, unsure how many liters, though states it helps.She reports not being short of breath currently.      Assessment & Plan     1. Insomnia, unspecified type  - Discussed changing sleep hygiene at home - decreasing screen time, consider sleepytime tea.  - Discussed that Remeron would require taper and washout period if she would like to change to something else. She is open to this. Decreased from 45 mg to 30 mg. Will reach out to psychiatry to see if there's anything else she can take safely.    - mirtazapine (REMERON) 30 MG tablet; TAKE 1 TABLET BY MOUTH NIGHTLY AT BEDTIME AS NEEDED FOR INSOMNIA , SLEEP, ANXIETY AND DEPRESSION  Dispense: 30 tablet; Refill: 3    2. Chronic respiratory failure with hypoxia, on home O2 therapy  - Not in respiratory distress currently, lungs clear. Continue to use home o2 PRN    3. HTN (hypertension), benign  - Blood pressure today well controlled in clinic. stable, continue with Amlodipine, Losartan  - Recommend low-sodium diet and compliance with blood pressure medication.  - Keep blood pressure goal <140/90 and f/u  with clinic if persistently elevated      4. Major depressive disorder, recurrent episode, mild with anxious distress  - Denies SI/HI, reports strong support system at home  - Discussed that Remeron would require taper and washout period if she would like to change to something else. She is open to this. Decreased from 45 mg to 30 mg. Will reach out to psychiatry to see if there's anything else she can take safely.    - mirtazapine (REMERON) 30 MG tablet; TAKE 1 TABLET BY MOUTH NIGHTLY AT BEDTIME AS NEEDED FOR INSOMNIA , SLEEP, ANXIETY AND DEPRESSION  Dispense: 30 tablet; Refill: 3    5. Anxiety  - Denies SI/HI, reports strong support system at home  - Discussed that Remeron would require taper and washout period if she would like to change to something else. She is open to this. Decreased from 45 mg to 30 mg. Will reach out to psychiatry to see if there's anything else she can take safely.    - mirtazapine (REMERON) 30 MG tablet; TAKE 1 TABLET BY MOUTH NIGHTLY AT BEDTIME AS NEEDED FOR INSOMNIA , SLEEP, ANXIETY AND DEPRESSION  Dispense: 30 tablet; Refill: 3      --------------------------------------------    Health Maintenance         Date Due Completion Date    RSV Vaccine (Age 60+ and Pregnant patients) (1 - 1-dose 75+ series) Never done ---    Shingles Vaccine (2 of 3) 06/29/2016 5/4/2016    Pneumococcal Vaccines (Age 50+) (2 of 2 - PPSV23) 05/12/2018 5/12/2017    COVID-19 Vaccine (4 - 2024-25 season) 09/01/2024 8/18/2021    Hemoglobin A1c (Prediabetes) 03/05/2026 3/5/2025    TETANUS VACCINE 05/12/2027 5/12/2017    Lipid Panel 02/22/2029 2/22/2024    Override on 4/23/2015: Done            Health maintenance reviewed    Follow Up:  Follow up if symptoms worsen or fail to improve.    Exam     Review of Systems:  (as noted above)  Review of Systems   Constitutional:  Negative for fatigue.   HENT:  Negative for trouble swallowing.    Eyes:  Negative for visual disturbance.   Respiratory:  Negative for cough, chest  tightness and shortness of breath.    Cardiovascular:  Negative for chest pain, palpitations and leg swelling.   Gastrointestinal:  Negative for diarrhea, nausea and vomiting.   Musculoskeletal:  Negative for arthralgias, gait problem and myalgias.   Skin:  Negative for rash.   Neurological:  Negative for dizziness, weakness, light-headedness and headaches.   Psychiatric/Behavioral:  Positive for sleep disturbance. The patient is not nervous/anxious.        Physical Exam  Constitutional:       General: She is not in acute distress.     Appearance: Normal appearance. She is not ill-appearing.   Cardiovascular:      Rate and Rhythm: Normal rate and regular rhythm.      Pulses: Normal pulses.      Heart sounds: Normal heart sounds. No murmur heard.     No friction rub. No gallop.   Pulmonary:      Effort: Pulmonary effort is normal. No respiratory distress.      Breath sounds: Normal breath sounds. No wheezing, rhonchi or rales.   Skin:     General: Skin is warm and dry.      Capillary Refill: Capillary refill takes less than 2 seconds.   Neurological:      General: No focal deficit present.      Mental Status: She is alert and oriented to person, place, and time.   Psychiatric:         Mood and Affect: Mood normal.         Behavior: Behavior normal.       Vitals:    04/22/25 1513   BP: 116/68   Pulse: 63   Temp: 98.5 °F (36.9 °C)   TempSrc: Oral   SpO2: (!) 93%   Weight: 77.2 kg (170 lb 3.1 oz)   Height: 5' (1.524 m)      Body mass index is 33.24 kg/m².        History     Past Medical History:   Diagnosis Date    Anxiety     Asthma     Depression     Headache     Hx of psychiatric care     Hypercholesterolemia     Hypertension     Psychiatric problem     Sleep difficulties     Therapy     Thyroid disease     multiple nodules       Family History   Problem Relation Name Age of Onset    Diabetes Mother      Hypertension Mother      Kidney disease Mother      Heart disease Father      Bipolar disorder Daughter Kourtney         Allergies and Medications: (updated and reviewed)  Review of patient's allergies indicates:   Allergen Reactions    Codeine      Other reaction(s): Rash     Current Medications[1]    Patient Care Team:  Ajit Piña MD as PCP - General (Internal Medicine)  Federico Madrigal MD as Consulting Physician (Psychiatry)  Srikanth Borden MD as Consulting Physician (Neurology)  Imaging, Dis Diagnostic (Diagnostic Radiology)  Marysol Campbell as ED Navigator         - The patient is given an After Visit Summary that lists all medications with directions, allergies, education, orders placed during this encounter and follow-up instructions.      - I have reviewed the patient's medical information including past medical and family history sections including the medications and allergies.      - We discussed the patient's current medications.   This note was generated with the assistance of ambient listening technology. Verbal consent was obtained by the patient and accompanying visitor(s) for the recording of patient appointment to facilitate this note. I attest to having reviewed and edited the generated note for accuracy, though some syntax or spelling errors may persist. Please contact the author of this note for any clarification.          Samuel Marie NP                      [1]   Current Outpatient Medications   Medication Sig Dispense Refill    albuterol (VENTOLIN HFA) 90 mcg/actuation inhaler Inhale 2 puffs into the lungs every 4 (four) hours as needed for Wheezing. 6.7 g 6    amLODIPine (NORVASC) 10 MG tablet Take 1 tablet (10 mg total) by mouth once daily. 90 tablet 3    ascorbic acid, vitamin C, (VITAMIN C) 1000 MG tablet Take 1,000 mg by mouth once daily.      aspirin (ECOTRIN) 81 MG EC tablet Take 81 mg by mouth once daily.      aspirin-acetaminophen-caffeine 250-250-65 mg (EXCEDRIN MIGRAINE) 250-250-65 mg per tablet Take 1 tablet by mouth daily as needed for Pain (headaches). 30 tablet 0     atorvastatin (LIPITOR) 40 MG tablet Take 1 tablet (40 mg total) by mouth once daily. 90 tablet 3    busPIRone (BUSPAR) 7.5 MG tablet TAKE 1 TABLET BY MOUTH TWICE A  tablet 1    clonazePAM (KLONOPIN) 0.5 MG tablet Take 1 tablet (0.5 mg total) by mouth 2 (two) times daily as needed for Anxiety. 20 tablet 0    cyanocobalamin (VITAMIN B-12) 1000 MCG tablet Take 100 mcg by mouth once daily.      diphenhydrAMINE-aluminum-magnesium hydroxide-simethicone-LIDOcaine viscous HCl 2% Swish and spit 10 mLs every 4 (four) hours as needed (excess saliva). 14 each 0    fluticasone propionate (FLONASE) 50 mcg/actuation nasal spray 1 spray (50 mcg total) by Each Nostril route 2 (two) times daily. For allergic rhinitis/sinusitis 18 mL 11    furosemide (LASIX) 20 MG tablet TAKE 1 TABLET BY MOUTH EVERY DAY 90 tablet 2    losartan (COZAAR) 50 MG tablet TAKE 1 TABLET (50 MG TOTAL) BY MOUTH ONCE DAILY. FOR HIGH BLOOD PRESSURE 90 tablet 2    multivitamin with minerals tablet Take 1 tablet by mouth once daily.      olopatadine (PATANOL) 0.1 % ophthalmic solution Place 1 drop into both eyes 2 (two) times daily.      omega-3 fatty acids/fish oil (FISH OIL-OMEGA-3 FATTY ACIDS) 300-1,000 mg capsule Take by mouth once daily.      omeprazole (PRILOSEC) 20 MG capsule Take 1 capsule (20 mg total) by mouth once daily. For gastric reflux 90 capsule 0    propranoloL (INDERAL) 10 MG tablet Take 1 tablet (10 mg total) by mouth 2 (two) times daily. For shaking 180 tablet 3    psyllium (METAMUCIL) powder Take 1 packet by mouth daily as needed.      mirtazapine (REMERON) 30 MG tablet TAKE 1 TABLET BY MOUTH NIGHTLY AT BEDTIME AS NEEDED FOR INSOMNIA , SLEEP, ANXIETY AND DEPRESSION 30 tablet 3     No current facility-administered medications for this visit.

## 2025-04-22 ENCOUNTER — OFFICE VISIT (OUTPATIENT)
Dept: FAMILY MEDICINE | Facility: CLINIC | Age: 85
End: 2025-04-22
Payer: MEDICARE

## 2025-04-22 VITALS
SYSTOLIC BLOOD PRESSURE: 116 MMHG | BODY MASS INDEX: 33.41 KG/M2 | HEART RATE: 63 BPM | OXYGEN SATURATION: 93 % | HEIGHT: 60 IN | DIASTOLIC BLOOD PRESSURE: 68 MMHG | TEMPERATURE: 99 F | WEIGHT: 170.19 LBS

## 2025-04-22 DIAGNOSIS — I10 HTN (HYPERTENSION), BENIGN: ICD-10-CM

## 2025-04-22 DIAGNOSIS — F33.0 MAJOR DEPRESSIVE DISORDER, RECURRENT EPISODE, MILD WITH ANXIOUS DISTRESS: ICD-10-CM

## 2025-04-22 DIAGNOSIS — G47.00 INSOMNIA, UNSPECIFIED TYPE: Primary | ICD-10-CM

## 2025-04-22 DIAGNOSIS — J96.11 CHRONIC RESPIRATORY FAILURE WITH HYPOXIA, ON HOME O2 THERAPY: ICD-10-CM

## 2025-04-22 DIAGNOSIS — Z99.81 CHRONIC RESPIRATORY FAILURE WITH HYPOXIA, ON HOME O2 THERAPY: ICD-10-CM

## 2025-04-22 DIAGNOSIS — F41.9 ANXIETY: ICD-10-CM

## 2025-04-22 PROCEDURE — 99999 PR PBB SHADOW E&M-EST. PATIENT-LVL IV: CPT | Mod: PBBFAC,,,

## 2025-04-22 PROCEDURE — 1101F PT FALLS ASSESS-DOCD LE1/YR: CPT | Mod: CPTII,S$GLB,,

## 2025-04-22 PROCEDURE — 1159F MED LIST DOCD IN RCRD: CPT | Mod: CPTII,S$GLB,,

## 2025-04-22 PROCEDURE — 99214 OFFICE O/P EST MOD 30 MIN: CPT | Mod: S$GLB,,,

## 2025-04-22 PROCEDURE — 3288F FALL RISK ASSESSMENT DOCD: CPT | Mod: CPTII,S$GLB,,

## 2025-04-22 PROCEDURE — 1126F AMNT PAIN NOTED NONE PRSNT: CPT | Mod: CPTII,S$GLB,,

## 2025-04-22 RX ORDER — MIRTAZAPINE 30 MG/1
TABLET, FILM COATED ORAL
Qty: 30 TABLET | Refills: 3 | Status: SHIPPED | OUTPATIENT
Start: 2025-04-22

## 2025-04-27 NOTE — PROGRESS NOTES
Outpatient Psychiatry Follow-Up Visit   4/28/2025    Clinical Status of Patient:  Outpatient (Ambulatory)    Chief Complaint:  Jose Manuel Boyd is a 84 y.o. female who presents today for follow-up of depression and anxiety.  Met with patient and granddaughter .      Current Psychiatric Medications:  Buspar 7.5 mg TID - pt has been taking BID  Remeron 30 mg QHS  Propranolol 10 mg BID for tremor - PCP  Klonopin 0.5 BID prn anxiety - takes once or twice per week when she's going out      Past Psychiatric Medications:  Valium  Librium      Prior Visit:  Psych Interview 02/27/2025:   Jose Manuel Boyd is a 84 y.o. female with past psychiatric history of ZAK, MDD, and insomnia presented for initial evaluation and treatment for anxiety and depression. Pt has been seen by psychiatry in the past (Carly Mejia, T[facility]IS[facility]).     Pt's granddaughter states that she wanted pt to be seen because she seems to be doing worse mentally. She notes that her grandmother lives alone and has limited family here. She reports that pt has a friend that will do occasional favors for her and a few cousins that will bring her to appointments. Granddaughter reports that she lives in [place] and wont be coming back to visit until 4/2025. She notes that she last saw the pt in 12/2025 and that she has lost a significant amount of weight since then. She states that she just wants to make sure that the pt is taking the proper medications at the right times. She adds that she also wants to look into becoming the pt's POA.     Pt reports that she has suffered with anxiety and depression her whole life. She notes that she's been through multiple deaths in her family including losing her spouse and children. She adds that her aunt that she was very close to recently passed away a couple months ago. She states that about a 1.5 years ago is when it started worsening after she signed a reverse mortgage. She notes that she has reading and writing limitations and  "didn't consult with anyone before doing this. She state that she was given half the value of her home and now doesn't have the income to keep up her house. She reports that she feels depressed and doesn't have the energy to do things anymore. She notes that she used to love cooking but doesn't have the energy to do it. She adds that all she does is watch TV or talk to people on the phone. She states that she only eats 2 meals per day, breakfast and lunch due to decreased appetite. She adds that she's tried a drink in the past from her doctor that helped with her appetite and wants to ask her PCP about starting that again. She notes having trouble with bathing, will wash up in the sink if she has to go somewhere due to difficulty getting into her bathtub. She reports worrying a lot and adds "my nerves have always been bad". She adds that she is concerned about her health. She states that she used to take Librium and Valium but was taken off of it  years ago and notes that she hasn't been the same since. She reports that they discontinued the medication due to her age and an increased risk of falls. She notes that she has been having trouble sleeping which is upsetting her. She states that she has trouble falling asleep and wakes up multiple times throughout the night, getting up 5-6 times to use the bathroom. Pt's granddaughter reports that she hears her walking around the house at night and sometimes she can hear her praying or screaming. She states that she does take naps during the day.      Pt notes that her medications are currently being managed by her PCP. Pt has pill bottles of medications with her today which include: Buspar 7.5 mg BID, Remeron 30 mg QHS, Propranolol 10 mg BID for tremor, and Klonopin 0.5 mg BID prn anxiety. Pt reports that she only takes the Klonopin 0.5 mg once or twice per week when she's going out such as to [employer] on Sundays or if she has a doctors appointment. On chart review pt " "was prescribed Remeron 45 mg by her PCP on 12/12/24. She states that she tried it one night and it didn't seem to help so she went back to taking the Remeron 30 mg. Pt has a hx of noncompliance and not following directions for medications. Instructed pt to give the Remeron 45 mg another try. Pt also has a large supply of Buspar 7.5 mg with her, unclear if she is actually taking it BID but instructed to increase to TID.      On chart review pt's last visit with psychiatry on 12/10/19: "The patient also made questionable comments that might be indicative for a mild psychosis but it was not clear at that time if it is an actual psychosis or personality". Pt endorses today that the doors and furniture in her home have been changing colors. She reports that in 7/2024 her house was sprayed with insulation and the next morning when she got up it evaporated. She notes that she also feels that some medications make her skin sticky, "as you get older your skin changes". She denies having any recent falls, last one was 2 years ago. She states that her memory is pretty good. Will continue to monitor.            Stressors - finances, health        Denies prior hx of psychiatric hospitalizations. Denies hx of suicide attempts. Pt denies hx self harm. Pt denies hx hallucinations.  Pt denies hx of eating disorders.        Pt denies hx trauma. denies physical/sexual abuse. Pt denies symptoms including nightmares, hypervigilance, flashbacks, re-experiencing trauma, avoidance behaviors, and disassociation.     Reports depression today as 8-9/10, and anxiety as 7/10.     Reports poor sleeping, and decreased appetite (eats 2 meals, breakfast and lunch).      Denies SI/HI/AVH. Denies side effects of medications.     Pt states that there support consists of her family     Access to Gun - denies.     Denies recreational drug use. Pt denies drinks alcohol use, denies tobacco use, denies vaping, endorses caffeine use (1 cup of coffee, soda " "every day).       Standardized Screenings tools:   PHQ9: 19  ZAK- 7: 15      Prior Visit 3/18/2025:  Patient seen and chart reviewed. Last seen on 2/27/25    Patient has a psychiatric history of: ZAK, MDD, and insomnia      Mood: "pretty good"    Pt reports that she has been doing good since her last visit. She states that she is taking Buspar 7.5 mg TID and Remeron 45 mg QHS. She adds that she continues to take Klonopin 0.5 mg BID prn for anxiety, once or twice per week when she's going out. She denies taking the Klonopin today. She notes that her sleep has been much better than it was. She adds that she still isn't eating much, 2 meals per day but she started drinking ensures. She states that her son has been staying with her and is making sure she is eating. She reports that her depression "stays there" and last month and this month are just hard for her due to her daughter and husbands deaths happening in February and March. She adds that the past couple days she has felt more tired and low energy, hasn't wanted to do anything. She notes that she was just "born with bad nerves" and states that she constantly thinks about the reverse mortgage and debt that she is in. She continues to talk about being on Librium and Valium for years which helped her nerves. She continues to talk about what evaporated in her home years ago and that everyone around her says that it's in her mind. She notes that next month her granddaughter will be visiting her. Pt unable to do PHQ9 and GAD7 today, states that she will do it at next visit when her granddaughter is here to help her.     Still concerned about medication compliance. She states that she writes down her medications. She reports putting her BP medication bottle in her pocket this morning and forgot to take it until we checked her BP in clinic today.       Reports depression today as 5/10, and anxiety as 4-5/10.    Pt reports taking medications as prescribed and behaving " "appropriately during interview today.    Denies SI/HI/AVH. Denies side effects of medications.  Pt reports sleeping "pretty fair" and same appetite (2 meals and drinking ensure).     Denies recreational drug use. Pt denies drinks alcohol use, denies tobacco use, denies vaping, endorses caffeine use (1 cup of coffee, soda every day).      DX:  Depression    The patient complained of depressed mood with lethargy, decreased appetite , insomnia, psycho-motor retardation, anhedonia, apathy, worsening self-esteem, guilt, decreased concentration and ability to make decisions.    Pt denies hx symptoms/episodes of jovita.    Anxiety    Admits to symptoms of anxiety including excessive anxiety/worry/fear, more days than not, about numerous issues, difficulty controlling the worry, over thinking, rumination, restlessness, poor concentration, fatigue, and increased irritability. Denies panic attacks at this time.       Standardized Screenings tools: Pt did not fill out this visit, says she will fill out next month when her granddaughter is here to help her fill it out  PHQ9:   ZAK- 7:         Content of Current Session 04/28/2025:  Patient seen and chart reviewed. Last seen on 3/18/25    Patient has a psychiatric history of: ZAK, MDD, and insomnia      Mood: " pretty good"      Pt reports that she has been "up and down" since her last visit. She states that she saw her PCP on 4/22/25 and that the Remeron was decreased from 45 mg to 30 mg because it hasn't been helping her sleep. She notes that she has only gotten a good night of sleep once or twice. She states that otherwise she is only getting a few hours per night but is napping during the day. She adds that she also stays up watching TV and sleeps with the TV and lights on. She notes that she has been told that she snores. On chart review hx of "sleep-related breathing disorder" listed on 6/18/21 and was recommended that she get a sleep study done, however no visit for sleep " study in chart and pt does not remember this. Pt endorses using home O2.       Pt notes that she is still having anxiety and depression due to what happened with her reverse mortgage. She states that she just keeps thinking about the past and how she messed up her life. She reports that she feels like she can't do anything she used to do, used to love cooking. She adds that she just feels tired and doesn't do anything. She states that she does still go to Shinto and has a banquet for her Shinto this weekend. She adds that she is going shopping with her granddaughter after her appointment today for an outfit for the banquet. She notes having an anxiety attack yesterday and this morning, she prays when this happens which helps. She reports that her son started living with her last month, he's been helping with cooking and cleaning. Pt's granddaughter notes that he doesn't have enough patience with pt and pt is afraid to ask him to do certain things because he has a short temper. She states that he will take pt to the grocery store but rushes her and gets an attitude. Pt only goes shopping now when her granddaughter is in town, she will be back in 6/2025.       Pt denies any recent falls. She states that she does occasionally forget things such as where she put her earrings. Pt's granddaughter states that she still talks about the furniture and doors changing colors in the house, woke her up last night at 11 pm to tell her this.           Reports depression today as 6-7/10, and anxiety as 8/10.     Pt reports taking medications as prescribed and behaving appropriately during interview today.    Denies SI/HI/AVH. Denies side effects of medications.  Pt reports poor sleep and same appetite (2 meals and drinking ensure).     Denies recreational drug use. Pt denies drinks alcohol use, denies tobacco use, denies vaping, endorses caffeine use (1 cup of coffee, soda every day).      DX:  Depression    The patient complained  of depressed mood with lethargy, decreased appetite , insomnia, psycho-motor retardation, anhedonia, apathy, worsening self-esteem, guilt, decreased concentration and ability to make decisions.    Pt denies hx symptoms/episodes of jovita.    Anxiety    Admits to symptoms of anxiety including excessive anxiety/worry/fear, more days than not, about numerous issues, difficulty controlling the worry, over thinking, rumination, restlessness, poor concentration, fatigue, and increased irritability. Denies panic attacks at this time.       Standardized Screenings tools: Pt did not fill out  PHQ9:   ZAK- 7:             Psychiatric Review Of Systems - Is patient experiencing or having changes in:  sleep: yes  appetite: yes  weight: no  energy/anergy: yes  interest/pleasure/anhedonia: yes  somatic symptoms: no  libido: no  anxiety/panic: yes  guilty/hopelessness: yes  concentration: no  S.I.B.s/risky behavior: no  Irritability: yes  Racing thoughts: no  Impulsive behaviors: no  Paranoia: no  AVH: no      Risk Parameters:  Patient reports no suicidal ideation  Patient reports no homicidal ideation  Patient reports no self-injurious behavior  Patient reports no violent behavior      Review Of Systems:     Review of Systems   Constitutional:  Negative for weight loss.   HENT:  Negative for tinnitus.    Eyes:  Negative for blurred vision.   Respiratory:  Negative for cough and shortness of breath.    Cardiovascular:  Negative for chest pain.   Gastrointestinal:  Negative for abdominal pain.   Genitourinary:  Negative for dysuria.   Musculoskeletal:  Negative for back pain and neck pain.   Neurological:  Negative for dizziness, seizures and weakness.   Psychiatric/Behavioral:  Positive for depression. Negative for hallucinations, memory loss, substance abuse and suicidal ideas. The patient is nervous/anxious and has insomnia.          OBJECTIVE  Past Psychiatric History:   Previous Psychiatric Hospitalizations: NO  Previous  Medication Trials: YES: Hydroxyzine, Valium, Librium     History of psychotherapy:  YES: in the past     Previous Suicide Attempts: NO  History of Violence:  NO  History of physical/sexual abuse: NO  Outpatient psychiatric provider(past): YES: Carly Mejia NP     Substance Abuse History:   Tobacco: NO  Alcohol: NO  Illicit Substances: NO  Detox/Rehab: NO     Neurological History:   Seizures: NO  Head trauma: NO     Family Psychiatric History: Yes - daughter (bipolar and schizophrenia)     Social History:  Developmental/Childhood:Achieved all developmental milestones timely  *Education: 9th grade  Employment Status/Finances:Retired   Relationship Status/Sexual Orientation:    Children: 4, 2 alive, 2   Housing Status: Home    history:  NO  Access to gun: NO  Rastafari:Actively participates in organized [employer]  Recreational activities: spending time with family  Person patient is closest to/confides in: her sister     Legal History:   Past Charges/Incarcerations:  No            Past Medical, Family and Social History: The patient's past medical, family and social history have been reviewed and updated as appropriate within the electronic medical record - see encounter notes.      Current Psychiatric Medications:  Buspar 7.5 mg TID - pt has been taking BID  Remeron 30 mg QHS  Propranolol 10 mg BID for tremor - PCP  Klonopin 0.5 BID prn anxiety - takes once or twice per week when she's going out    Compliance: yes      Side effects: None      Past Psychiatric Medications:  Valium  Librium    Current Medications:   Medication List with Changes/Refills   Current Medications    ALBUTEROL (VENTOLIN HFA) 90 MCG/ACTUATION INHALER    Inhale 2 puffs into the lungs every 4 (four) hours as needed for Wheezing.    AMLODIPINE (NORVASC) 10 MG TABLET    Take 1 tablet (10 mg total) by mouth once daily.    ASCORBIC ACID, VITAMIN C, (VITAMIN C) 1000 MG TABLET    Take 1,000 mg by mouth once daily.    ASPIRIN  (ECOTRIN) 81 MG EC TABLET    Take 81 mg by mouth once daily.    ASPIRIN-ACETAMINOPHEN-CAFFEINE 250-250-65 MG (EXCEDRIN MIGRAINE) 250-250-65 MG PER TABLET    Take 1 tablet by mouth daily as needed for Pain (headaches).    ATORVASTATIN (LIPITOR) 40 MG TABLET    Take 1 tablet (40 mg total) by mouth once daily.    BUSPIRONE (BUSPAR) 7.5 MG TABLET    TAKE 1 TABLET BY MOUTH TWICE A DAY    CLONAZEPAM (KLONOPIN) 0.5 MG TABLET    Take 1 tablet (0.5 mg total) by mouth 2 (two) times daily as needed for Anxiety.    CYANOCOBALAMIN (VITAMIN B-12) 1000 MCG TABLET    Take 100 mcg by mouth once daily.    DIPHENHYDRAMINE-ALUMINUM-MAGNESIUM HYDROXIDE-SIMETHICONE-LIDOCAINE VISCOUS HCL 2%    Swish and spit 10 mLs every 4 (four) hours as needed (excess saliva).    FLUTICASONE PROPIONATE (FLONASE) 50 MCG/ACTUATION NASAL SPRAY    1 spray (50 mcg total) by Each Nostril route 2 (two) times daily. For allergic rhinitis/sinusitis    FUROSEMIDE (LASIX) 20 MG TABLET    TAKE 1 TABLET BY MOUTH EVERY DAY    LOSARTAN (COZAAR) 50 MG TABLET    TAKE 1 TABLET (50 MG TOTAL) BY MOUTH ONCE DAILY. FOR HIGH BLOOD PRESSURE    MIRTAZAPINE (REMERON) 30 MG TABLET    TAKE 1 TABLET BY MOUTH NIGHTLY AT BEDTIME AS NEEDED FOR INSOMNIA , SLEEP, ANXIETY AND DEPRESSION    MULTIVITAMIN WITH MINERALS TABLET    Take 1 tablet by mouth once daily.    OLOPATADINE (PATANOL) 0.1 % OPHTHALMIC SOLUTION    Place 1 drop into both eyes 2 (two) times daily.    OMEGA-3 FATTY ACIDS/FISH OIL (FISH OIL-OMEGA-3 FATTY ACIDS) 300-1,000 MG CAPSULE    Take by mouth once daily.    OMEPRAZOLE (PRILOSEC) 20 MG CAPSULE    Take 1 capsule (20 mg total) by mouth once daily. For gastric reflux    PROPRANOLOL (INDERAL) 10 MG TABLET    Take 1 tablet (10 mg total) by mouth 2 (two) times daily. For shaking    PSYLLIUM (METAMUCIL) POWDER    Take 1 packet by mouth daily as needed.         Allergies:   Review of patient's allergies indicates:   Allergen Reactions    Codeine      Other reaction(s): Rash  "           Psychotherapy:  Target symptoms: recurrent depression, anxiety , grief  Why chosen therapy is appropriate versus another modality: evidence based practice  Outcome monitoring methods: self-report  Therapeutic intervention type: supportive psychotherapy  Topics discussed/themes: illness/death of a loved one, building skills sets for symptom management, symptom recognition, financial stressors  The patient's response to the intervention is guarded. The patient's progress toward treatment goals is fair.   Duration of intervention: 5 minutes.        Vitals   Vitals:    04/28/25 1146   BP: 116/77   Pulse: 83            Labs/Imaging/Studies:   No results found for this or any previous visit (from the past 48 hours).   No results found for: "PHENYTOIN", "PHENOBARB", "VALPROATE", "CBMZ"      Exam (detailed: at least 9 elements; comprehensive: all 15 elements)   Constitutional  Vitals:  Most recent vital signs, dated less than 90 days prior to this appointment, were reviewed.   Vitals:    04/28/25 1146   BP: 116/77   Pulse: 83   Weight: 77.3 kg (170 lb 4.9 oz)   Height: 5' (1.524 m)          General:  unremarkable, age appropriate     Musculoskeletal  Muscle Strength/Tone:  no spasicity, no rigidity, no cogwheeling, no flaccidity, no paratonia, no dyskinesia, no dystonia, tremor noted  tremor noted bilateral hands - pt taking Propranolol which helps, no tic, no choreoathetosis, no atrophy   Gait & Station:  non-ataxic     Psychiatric  Speech:  no latency; no press   Mood & Affect:  "Pretty good"  congruent and appropriate   Thought Process:  normal and logical   Associations:  intact, circumstantial able to be redirected   Thought Content:  normal, no suicidality, no homicidality, delusions, or paranoia   Insight:  intact, has awareness of illness   Judgement: limited, in relation to medication compliance   Orientation:  grossly intact, person, place, time/date   Memory: intact for content of interview   Language: " grossly intact   Attention Span & Concentration:  able to focus   Fund of Knowledge:  intact and appropriate to age and level of education     Assessment and Diagnosis   Status/Progress: Based on the examination today, the patient's problem(s) is/are resolving.  New problems have not been presented today.   Lack of compliance are complicating management of the primary condition.  There are no active rule-out diagnoses for this patient at this time.     General Impression:      ICD-10-CM ICD-9-CM   1. ZAK (generalized anxiety disorder)  F41.1 300.02   2. MDD (major depressive disorder), recurrent episode, moderate  F33.1 296.32   3. Insomnia, unspecified type  G47.00 780.52         Intervention/Counseling/Treatment Plan     Discussed changing Remeron to Ramelteon. Pt would like to stay on Remeron for now until she has sleep study done.   Increase Buspar 7.5 mg BID to TID - targeting anxiety. Will consider increasing in the future to Buspar 10 mg TID.   Continue Remeron 30 mg QHS - targeting depression, appetite, and insomnia.   Continue Klonopin 0.5 mg BID prn anxiety.  reviewed - Pt had refilled - 2/4/25. Pt only takes once or twice per week when she's going out.   Discussed the importance of compliance with pt. Still concerned about medication compliance.   Referral for sleep medicine placed for pt to be evaluated for sleep apnea.   Discussed therapy options with pt. Pt is interested in talk therapy but doesn't have transportation to get to [facility]. Pt was given additional local psychotherapy resources. Also recommended free therapy sessions at [facility] since it is close to her home. Pt states that she called [facility] but they only offered virtual appointments. She notes that she is still looking to find someone on the [facility] that does in person visits.     Discussed diagnosis, risks and benefits of proposed treatment above vs alternative treatments vs no treatment, and potential side effects of these  treatments, and the inherent unpredictability of individual response to treatment.  The patient expresses understanding and gives informed consent to pursue treatment.  The potential benefits outweigh the potential risks. Patient has no other questions. Risks/adverse effects discussed at this time including but not limited to: GI side effects, sexual dysfunction, activation vs sedation, triggering of suicidal thoughts, and serotonin syndrome.  Discussed with patient, the potential adverse effects of Benzodiazepines, including, but not limited to, drowsiness, dizziness, risk of falls, and abuse potential. Counseled patient on avoiding alcohol, while using this medication, due to the risk of respiratory depression. Patient instructed not to operate any heavy machinery or drive a vehicle while on this medication. Informed patient of the risks of continuous benzodiazepine use, including tolerance, dependence and withdrawals that may be life threatening, upon abrupt cessation. Also advised patient not to take benzodiazepines with opiates and/or other sedatives. The patient expresses an understanding of the above as well as the inherent unpredictability of said treatment.  The patient agrees that, currently, the benefits outweigh the risks, and would like to pursue said treatment at this time.    Serotonin syndrome   Mental status changes can include anxiety, restlessness, disorientation, and agitated delirium.    Autonomic manifestations can include diaphoresis, tachycardia, hyperthermia, hypertension, vomiting, and diarrhea   Neuromuscular hyperactivity can manifest as tremor, myoclonus, hyperreflexia, rigidity, hyperthermia, seizure, and bilateral Babinski sign.   Pt was informed that if they experience any of these symptoms to go the ED.     Safety Plan   Patient voices understanding and agreement with this plan  Provided crisis numbers  Encouraged patient to keep future appointments.  Instructed patient to call or  message with questions or concerns  In the event of an emergency, including suicidal ideation, patient was advised to go to the emergency room and/or call 911    Return to Clinic: 2 months or sooner if needed      Total face to face time: 30 min  Total time (chart review, patient contact, documentation): 45 min    A diagnostic psychiatric evaluation was performed and responsiveness to treatment was assessed.  The patient demonstrates adequate ability/capacity to respond to treatment.    Imelda Chung PA-C

## 2025-04-28 ENCOUNTER — OFFICE VISIT (OUTPATIENT)
Dept: PSYCHIATRY | Facility: CLINIC | Age: 85
End: 2025-04-28
Payer: MEDICARE

## 2025-04-28 VITALS
SYSTOLIC BLOOD PRESSURE: 116 MMHG | DIASTOLIC BLOOD PRESSURE: 77 MMHG | HEIGHT: 60 IN | HEART RATE: 83 BPM | WEIGHT: 170.31 LBS | BODY MASS INDEX: 33.44 KG/M2

## 2025-04-28 DIAGNOSIS — F33.1 MDD (MAJOR DEPRESSIVE DISORDER), RECURRENT EPISODE, MODERATE: ICD-10-CM

## 2025-04-28 DIAGNOSIS — F41.1 GAD (GENERALIZED ANXIETY DISORDER): ICD-10-CM

## 2025-04-28 DIAGNOSIS — G47.00 INSOMNIA, UNSPECIFIED TYPE: Primary | ICD-10-CM

## 2025-04-28 PROCEDURE — 99214 OFFICE O/P EST MOD 30 MIN: CPT | Mod: PBBFAC | Performed by: PHYSICIAN ASSISTANT

## 2025-04-28 PROCEDURE — 1160F RVW MEDS BY RX/DR IN RCRD: CPT | Mod: CPTII,,, | Performed by: PHYSICIAN ASSISTANT

## 2025-04-28 PROCEDURE — 99999 PR PBB SHADOW E&M-EST. PATIENT-LVL IV: CPT | Mod: PBBFAC,SA,HB, | Performed by: PHYSICIAN ASSISTANT

## 2025-04-28 PROCEDURE — 1159F MED LIST DOCD IN RCRD: CPT | Mod: CPTII,,, | Performed by: PHYSICIAN ASSISTANT

## 2025-04-28 PROCEDURE — 99214 OFFICE O/P EST MOD 30 MIN: CPT | Mod: S$PBB,,, | Performed by: PHYSICIAN ASSISTANT

## 2025-05-03 DIAGNOSIS — F33.0 MAJOR DEPRESSIVE DISORDER, RECURRENT EPISODE, MILD WITH ANXIOUS DISTRESS: ICD-10-CM

## 2025-05-03 NOTE — TELEPHONE ENCOUNTER
Care Due:                  Date            Visit Type   Department     Provider  --------------------------------------------------------------------------------                                [facility] FAM[facility]                              PRIMARY      MEDICINE /  Last Visit: 03-      CARE ([facility])   INTERNAL MED   Ajit Piña                              [facility]                              PRIMARY      MEDICINE /  Next Visit: 07-      CARE ([facility])   INTERNAL MED   Ajit Piña                                                            Last  Test          Frequency    Reason                     Performed    Due Date  --------------------------------------------------------------------------------    Lipid Panel.  12 months..  atorvastatin.............  02- 02-    Health [facility] Embedded Care Due Messages. Reference number: [id number].   5/03/2025 10:46:04 AM CDT

## 2025-05-05 RX ORDER — BUSPIRONE HYDROCHLORIDE 7.5 MG/1
7.5 TABLET ORAL 2 TIMES DAILY
Qty: 180 TABLET | Refills: 1 | Status: SHIPPED | OUTPATIENT
Start: 2025-05-05

## 2025-05-23 ENCOUNTER — TELEPHONE (OUTPATIENT)
Dept: FAMILY MEDICINE | Facility: CLINIC | Age: 85
End: 2025-05-23
Payer: MEDICARE

## 2025-05-23 DIAGNOSIS — I10 HYPERTENSION, ESSENTIAL: ICD-10-CM

## 2025-05-23 DIAGNOSIS — F33.0 MAJOR DEPRESSIVE DISORDER, RECURRENT EPISODE, MILD WITH ANXIOUS DISTRESS: ICD-10-CM

## 2025-05-23 RX ORDER — CLONAZEPAM 0.5 MG/1
0.5 TABLET ORAL 2 TIMES DAILY PRN
Qty: 20 TABLET | Refills: 0 | Status: SHIPPED | OUTPATIENT
Start: 2025-05-23

## 2025-05-23 RX ORDER — AMLODIPINE BESYLATE 10 MG/1
10 TABLET ORAL DAILY
Qty: 90 TABLET | Refills: 3 | Status: SHIPPED | OUTPATIENT
Start: 2025-05-23

## 2025-05-23 NOTE — TELEPHONE ENCOUNTER
No care due was identified.  Health [facility] Embedded Care Due Messages. Reference number: [id number].   5/23/2025 2:01:27 PM CDT

## 2025-05-23 NOTE — TELEPHONE ENCOUNTER
----- Message from John sent at 5/22/2025 10:58 AM CDT -----  Regarding: self  Type: Patient Call BackWho called:selfWhat is the request in detail: Patient requesting refill of: clonazePAM (KLONOPIN) 0.5 MG tabletamLODIPine (NORVASC) 10 MG tablet[id number] - [place], [place] - [address] [id number]: [phone] Fax: [phone]  Can the clinic reply by [facility]? NoWould the patient rather a call back or a response via [facility]? Call [facility] call back number:[phone] Information: Patient is completely outThank you.

## 2025-06-09 NOTE — PROGRESS NOTES
Outpatient Psychiatry Follow-Up Visit   6/10/2025    Clinical Status of Patient:  Outpatient (Ambulatory)    Chief Complaint:  Jose Manuel Boyd is a 84 y.o. female who presents today for follow-up of depression and anxiety.  Met with patient and granddaughter.      Current Psychiatric Medications:  Buspar 7.5 mg TID   Remeron 30 mg QHS  Propranolol 10 mg BID for tremor - PCP  Klonopin 0.5 BID prn anxiety - PCP, takes once or twice per week when she's going out      Past Psychiatric Medications:  Valium  Librium      Prior Visit:  Psych Interview 02/27/2025:   Jose Manuel Boyd is a 84 y.o. female with past psychiatric history of ZAK, MDD, and insomnia presented for initial evaluation and treatment for anxiety and depression. Pt has been seen by psychiatry in the past (Carly Mejia NP).     Pt's granddaughter states that she wanted pt to be seen because she seems to be doing worse mentally. She notes that her grandmother lives alone and has limited family here. She reports that pt has a friend that will do occasional favors for her and a few cousins that will bring her to appointments. Granddaughter reports that she lives in [place] and wont be coming back to visit until 4/2025. She notes that she last saw the pt in 12/2025 and that she has lost a significant amount of weight since then. She states that she just wants to make sure that the pt is taking the proper medications at the right times. She adds that she also wants to look into becoming the pt's POA.     Pt reports that she has suffered with anxiety and depression her whole life. She notes that she's been through multiple deaths in her family including losing her spouse and children. She adds that her aunt that she was very close to recently passed away a couple months ago. She states that about a 1.5 years ago is when it started worsening after she signed a reverse mortgage. She notes that she has reading and writing limitations and didn't consult with  "anyone before doing this. She state that she was given half the value of her home and now doesn't have the income to keep up her house. She reports that she feels depressed and doesn't have the energy to do things anymore. She notes that she used to love cooking but doesn't have the energy to do it. She adds that all she does is watch TV or talk to people on the phone. She states that she only eats 2 meals per day, breakfast and lunch due to decreased appetite. She adds that she's tried a drink in the past from her doctor that helped with her appetite and wants to ask her PCP about starting that again. She notes having trouble with bathing, will wash up in the sink if she has to go somewhere due to difficulty getting into her bathtub. She reports worrying a lot and adds "my nerves have always been bad". She adds that she is concerned about her health. She states that she used to take Librium and Valium but was taken off of it  years ago and notes that she hasn't been the same since. She reports that they discontinued the medication due to her age and an increased risk of falls. She notes that she has been having trouble sleeping which is upsetting her. She states that she has trouble falling asleep and wakes up multiple times throughout the night, getting up 5-6 times to use the bathroom. Pt's granddaughter reports that she hears her walking around the house at night and sometimes she can hear her praying or screaming. She states that she does take naps during the day.      Pt notes that her medications are currently being managed by her PCP. Pt has pill bottles of medications with her today which include: Buspar 7.5 mg BID, Remeron 30 mg QHS, Propranolol 10 mg BID for tremor, and Klonopin 0.5 mg BID prn anxiety. Pt reports that she only takes the Klonopin 0.5 mg once or twice per week when she's going out such as to [employer] on Sundays or if she has a doctors appointment. On chart review pt was prescribed " "Remeron 45 mg by her PCP on 12/12/24. She states that she tried it one night and it didn't seem to help so she went back to taking the Remeron 30 mg. Pt has a hx of noncompliance and not following directions for medications. Instructed pt to give the Remeron 45 mg another try. Pt also has a large supply of Buspar 7.5 mg with her, unclear if she is actually taking it BID but instructed to increase to TID.      On chart review pt's last visit with psychiatry on 12/10/19: "The patient also made questionable comments that might be indicative for a mild psychosis but it was not clear at that time if it is an actual psychosis or personality". Pt endorses today that the doors and furniture in her home have been changing colors. She reports that in 7/2024 her house was sprayed with insulation and the next morning when she got up it evaporated. She notes that she also feels that some medications make her skin sticky, "as you get older your skin changes". She denies having any recent falls, last one was 2 years ago. She states that her memory is pretty good. Will continue to monitor.            Stressors - finances, health        Denies prior hx of psychiatric hospitalizations. Denies hx of suicide attempts. Pt denies hx self harm. Pt denies hx hallucinations.  Pt denies hx of eating disorders.        Pt denies hx trauma. denies physical/sexual abuse. Pt denies symptoms including nightmares, hypervigilance, flashbacks, re-experiencing trauma, avoidance behaviors, and disassociation.     Reports depression today as 8-9/10, and anxiety as 7/10.     Reports poor sleeping, and decreased appetite (eats 2 meals, breakfast and lunch).      Denies SI/HI/AVH. Denies side effects of medications.     Pt states that there support consists of her family     Access to Gun - denies.     Denies recreational drug use. Pt denies drinks alcohol use, denies tobacco use, denies vaping, endorses caffeine use (1 cup of coffee, soda every day).     " "  Standardized Screenings tools:   PHQ9: 19  ZAK- 7: 15      Prior Visit 3/18/2025:  Patient seen and chart reviewed. Last seen on 2/27/25    Patient has a psychiatric history of: ZAK, MDD, and insomnia      Mood: "pretty good"    Pt reports that she has been doing good since her last visit. She states that she is taking Buspar 7.5 mg TID and Remeron 45 mg QHS. She adds that she continues to take Klonopin 0.5 mg BID prn for anxiety, once or twice per week when she's going out. She denies taking the Klonopin today. She notes that her sleep has been much better than it was. She adds that she still isn't eating much, 2 meals per day but she started drinking ensures. She states that her son has been staying with her and is making sure she is eating. She reports that her depression "stays there" and last month and this month are just hard for her due to her daughter and husbands deaths happening in February and March. She adds that the past couple days she has felt more tired and low energy, hasn't wanted to do anything. She notes that she was just "born with bad nerves" and states that she constantly thinks about the reverse mortgage and debt that she is in. She continues to talk about being on Librium and Valium for years which helped her nerves. She continues to talk about what evaporated in her home years ago and that everyone around her says that it's in her mind. She notes that next month her granddaughter will be visiting her. Pt unable to do PHQ9 and GAD7 today, states that she will do it at next visit when her granddaughter is here to help her.     Still concerned about medication compliance. She states that she writes down her medications. She reports putting her BP medication bottle in her pocket this morning and forgot to take it until we checked her BP in clinic today.       Reports depression today as 5/10, and anxiety as 4-5/10.    Pt reports taking medications as prescribed and behaving appropriately " "during interview today.    Denies SI/HI/AVH. Denies side effects of medications.  Pt reports sleeping "pretty fair" and same appetite (2 meals and drinking ensure).     Denies recreational drug use. Pt denies drinks alcohol use, denies tobacco use, denies vaping, endorses caffeine use (1 cup of coffee, soda every day).      DX:  Depression    The patient complained of depressed mood with lethargy, decreased appetite , insomnia, psycho-motor retardation, anhedonia, apathy, worsening self-esteem, guilt, decreased concentration and ability to make decisions.    Pt denies hx symptoms/episodes of jovita.    Anxiety    Admits to symptoms of anxiety including excessive anxiety/worry/fear, more days than not, about numerous issues, difficulty controlling the worry, over thinking, rumination, restlessness, poor concentration, fatigue, and increased irritability. Denies panic attacks at this time.       Standardized Screenings tools: Pt did not fill out this visit, says she will fill out next month when her granddaughter is here to help her fill it out  PHQ9:   ZAK- 7:         Prior Visit 04/28/2025:  Patient seen and chart reviewed. Last seen on 3/18/25    Patient has a psychiatric history of: ZAK, MDD, and insomnia      Mood: " pretty good"      Pt reports that she has been "up and down" since her last visit. She states that she saw her PCP on 4/22/25 and that the Remeron was decreased from 45 mg to 30 mg because it hasn't been helping her sleep. She notes that she has only gotten a good night of sleep once or twice. She states that otherwise she is only getting a few hours per night but is napping during the day. She adds that she also stays up watching TV and sleeps with the TV and lights on. She notes that she has been told that she snores. On chart review hx of "sleep-related breathing disorder" listed on 6/18/21 and was recommended that she get a sleep study done, however no visit for sleep study in chart and pt does " not remember this. Pt endorses using home O2.       Pt notes that she is still having anxiety and depression due to what happened with her reverse mortgage. She states that she just keeps thinking about the past and how she messed up her life. She reports that she feels like she can't do anything she used to do, used to love cooking. She adds that she just feels tired and doesn't do anything. She states that she does still go to [employer] and has a banquet for her [employer] this weekend. She adds that she is going shopping with her granddaughter after her appointment today for an outfit for the banquet. She notes having an anxiety attack yesterday and this morning, she prays when this happens which helps. She reports that her son started living with her last month, he's been helping with cooking and cleaning. Pt's granddaughter notes that he doesn't have enough patience with pt and pt is afraid to ask him to do certain things because he has a short temper. She states that he will take pt to the grocery store but rushes her and gets an attitude. Pt only goes shopping now when her granddaughter is in town, she will be back in 6/2025.       Pt denies any recent falls. She states that she does occasionally forget things such as where she put her earrings. Pt's granddaughter states that she still talks about the furniture and doors changing colors in the house, woke her up last night at 11 pm to tell her this.           Reports depression today as 6-7/10, and anxiety as 8/10.     Pt reports taking medications as prescribed and behaving appropriately during interview today.    Denies SI/HI/AVH. Denies side effects of medications.  Pt reports poor sleep and same appetite (2 meals and drinking ensure).     Denies recreational drug use. Pt denies drinks alcohol use, denies tobacco use, denies vaping, endorses caffeine use (1 cup of coffee, soda every day).      Standardized Screenings tools: Pt did not fill out  PHQ9:   ZAK- 7:  "        Content of Current Session 06/10/2025:  Patient seen and chart reviewed. Last seen on 4/28/25    Patient has a psychiatric history of: ZAK, MDD, and insomnia      Mood: "pretty fair"      Pt reports that she has been doing "pretty fair" since last visit and increasing Buspar 7.5 mg BID to TID. She reports that she is still facing the same problem and worries about her financial situation but has been "asking God to remove it". She notes that she feels like it's not quite as bad as it was. She states that she woke up this morning with trouble breathing so she had to use her inhaler and did a breathing treatment. She notes that this made her more anxious today so she took her Klonopin 0.5 mg this morning.     Pt presents with her granddaughter today who states that she hasn't been complaining about the furniture of doors changing color anymore. Pt states that she "asks God to remove it from my mind". She denies any recent falls or memory problems. She notes that she just has trouble sometimes getting into her bathtub because she has to raise her leg to get in. She reports that her son is still staying with her and helps with the cooking and cleaning. She states that her son, grandson, and granddaughter have been staying with her recently and it's been nice. She adds that they went to [place] this past weekend and went out yesterday. Pt's granddaughter is unsure when she'll be back in town. She states that she has noticed pt's breathing when she sleeps is abnormal. Pt reports that she wakes up multiple times per night and will watch TV, doesn't nap as much during the day. She states that her [employer] banquet last month was great and that she tries to get out and go for a ride on the bus sometimes. She notes that she used to do it more frequently, would go once or twice a week with her aunt but she passed away last year. She reports that the last time she rode on the bus was last month and she just likes riding around " the city.       Reports depression today as 7/10, and anxiety as 8/10.     Pt reports taking medications as prescribed and behaving appropriately during interview today.    Denies SI/HI/AVH. Denies side effects of medications.  Pt reports poor sleep and appetite same (2 meals and drinking ensure).     Denies recreational drug use. Pt denies drinks alcohol use, denies tobacco use, denies vaping, endorses caffeine use (1 cup of coffee, soda every day).      DX:  Depression    The patient complained of depressed mood with lethargy, decreased appetite , insomnia, psycho-motor retardation, anhedonia, apathy, worsening self-esteem, guilt, decreased concentration and ability to make decisions.    Pt denies hx symptoms/episodes of jovita.    Anxiety    Admits to symptoms of anxiety including excessive anxiety/worry/fear, more days than not, about numerous issues, difficulty controlling the worry, over thinking, rumination, restlessness, poor concentration, fatigue, and increased irritability. Denies panic attacks at this time.       Standardized Screenings tools:  PHQ9: 18  ZAK- 7: 17      Psychiatric Review Of Systems - Is patient experiencing or having changes in:  sleep: yes  appetite: yes  weight: no  energy/anergy: yes  interest/pleasure/anhedonia: yes  somatic symptoms: no  libido: no  anxiety/panic: yes  guilty/hopelessness: yes  concentration: no  S.I.B.s/risky behavior: no  Irritability: yes  Racing thoughts: no  Impulsive behaviors: no  Paranoia: no  AVH: no      Risk Parameters:  Patient reports no suicidal ideation  Patient reports no homicidal ideation  Patient reports no self-injurious behavior  Patient reports no violent behavior      Review Of Systems:     Review of Systems   Constitutional:  Negative for weight loss.   HENT:  Negative for tinnitus.    Eyes:  Negative for blurred vision.   Respiratory:  Negative for cough and shortness of breath.    Cardiovascular:  Negative for chest pain.    Gastrointestinal:  Negative for abdominal pain.   Genitourinary:  Negative for dysuria.   Musculoskeletal:  Negative for back pain and neck pain.   Neurological:  Negative for dizziness, seizures and weakness.   Psychiatric/Behavioral:  Positive for depression. Negative for hallucinations, memory loss, substance abuse and suicidal ideas. The patient is nervous/anxious and has insomnia.          OBJECTIVE  Past Psychiatric History:   Previous Psychiatric Hospitalizations: NO  Previous Medication Trials: YES: Hydroxyzine, Valium, Librium     History of psychotherapy:  YES: in the past     Previous Suicide Attempts: NO  History of Violence:  NO  History of physical/sexual abuse: NO  Outpatient psychiatric provider(past): YES: Carly Mejia NP     Substance Abuse History:   Tobacco: NO  Alcohol: NO  Illicit Substances: NO  Detox/Rehab: NO     Neurological History:   Seizures: NO  Head trauma: NO     Family Psychiatric History: Yes - daughter (bipolar and schizophrenia)     Social History:  Developmental/Childhood:Achieved all developmental milestones timely  *Education: 9th grade  Employment Status/Finances:Retired   Relationship Status/Sexual Orientation:    Children: 4, 2 alive, 2   Housing Status: Home    history:  NO  Access to gun: NO  Jainism:Actively participates in organized Islam  Recreational activities: spending time with family  Person patient is closest to/confides in: her sister     Legal History:   Past Charges/Incarcerations:  No            Past Medical, Family and Social History: The patient's past medical, family and social history have been reviewed and updated as appropriate within the electronic medical record - see encounter notes.      Current Psychiatric Medications:  Buspar 7.5 mg TID   Remeron 30 mg QHS  Propranolol 10 mg BID for tremor - PCP  Klonopin 0.5 BID prn anxiety - PCP, takes once or twice per week when she's going out    Compliance: yes      Side  effects: None      Past Psychiatric Medications:  Valium  Librium    Current Medications:   Medication List with Changes/Refills   Current Medications    ALBUTEROL (VENTOLIN HFA) 90 MCG/ACTUATION INHALER    Inhale 2 puffs into the lungs every 4 (four) hours as needed for Wheezing.    AMLODIPINE (NORVASC) 10 MG TABLET    Take 1 tablet (10 mg total) by mouth once daily.    ASCORBIC ACID, VITAMIN C, (VITAMIN C) 1000 MG TABLET    Take 1,000 mg by mouth once daily.    ASPIRIN (ECOTRIN) 81 MG EC TABLET    Take 81 mg by mouth once daily.    ASPIRIN-ACETAMINOPHEN-CAFFEINE 250-250-65 MG (EXCEDRIN MIGRAINE) 250-250-65 MG PER TABLET    Take 1 tablet by mouth daily as needed for Pain (headaches).    ATORVASTATIN (LIPITOR) 40 MG TABLET    Take 1 tablet (40 mg total) by mouth once daily.    BUSPIRONE (BUSPAR) 7.5 MG TABLET    Take 1 tablet (7.5 mg total) by mouth 2 (two) times daily.    CLONAZEPAM (KLONOPIN) 0.5 MG TABLET    Take 1 tablet (0.5 mg total) by mouth 2 (two) times daily as needed for Anxiety.    CYANOCOBALAMIN (VITAMIN B-12) 1000 MCG TABLET    Take 100 mcg by mouth once daily.    DIPHENHYDRAMINE-ALUMINUM-MAGNESIUM HYDROXIDE-SIMETHICONE-LIDOCAINE VISCOUS HCL 2%    Swish and spit 10 mLs every 4 (four) hours as needed (excess saliva).    FLUTICASONE PROPIONATE (FLONASE) 50 MCG/ACTUATION NASAL SPRAY    1 spray (50 mcg total) by Each Nostril route 2 (two) times daily. For allergic rhinitis/sinusitis    FUROSEMIDE (LASIX) 20 MG TABLET    TAKE 1 TABLET BY MOUTH EVERY DAY    LOSARTAN (COZAAR) 50 MG TABLET    TAKE 1 TABLET (50 MG TOTAL) BY MOUTH ONCE DAILY. FOR HIGH BLOOD PRESSURE    MIRTAZAPINE (REMERON) 30 MG TABLET    TAKE 1 TABLET BY MOUTH NIGHTLY AT BEDTIME AS NEEDED FOR INSOMNIA , SLEEP, ANXIETY AND DEPRESSION    MULTIVITAMIN WITH MINERALS TABLET    Take 1 tablet by mouth once daily.    OLOPATADINE (PATANOL) 0.1 % OPHTHALMIC SOLUTION    Place 1 drop into both eyes 2 (two) times daily.    OMEGA-3 FATTY ACIDS/FISH OIL  "(FISH OIL-OMEGA-3 FATTY ACIDS) 300-1,000 MG CAPSULE    Take by mouth once daily.    OMEPRAZOLE (PRILOSEC) 20 MG CAPSULE    Take 1 capsule (20 mg total) by mouth once daily. For gastric reflux    PROPRANOLOL (INDERAL) 10 MG TABLET    Take 1 tablet (10 mg total) by mouth 2 (two) times daily. For shaking    PSYLLIUM (METAMUCIL) POWDER    Take 1 packet by mouth daily as needed.         Allergies:   Review of patient's allergies indicates:   Allergen Reactions    Codeine      Other reaction(s): Rash            Psychotherapy:  Target symptoms: recurrent depression, anxiety   Why chosen therapy is appropriate versus another modality: evidence based practice  Outcome monitoring methods: self-report, checklist and rating scale, feedback from family member  Therapeutic intervention type: supportive psychotherapy  Topics discussed/themes: building skills sets for symptom management, symptom recognition, financial stressors  The patient's response to the intervention is guarded. The patient's progress toward treatment goals is fair.   Duration of intervention: 5 minutes.        Vitals   Vitals:    06/10/25 0940   BP: 134/60   Pulse: (!) 40   Temp: 98.3 °F (36.8 °C)              Labs/Imaging/Studies:   No results found for this or any previous visit (from the past 48 hours).   No results found for: "PHENYTOIN", "PHENOBARB", "VALPROATE", "CBMZ"      Exam (detailed: at least 9 elements; comprehensive: all 15 elements)   Constitutional  Vitals:  Most recent vital signs, dated less than 90 days prior to this appointment, were reviewed.   Vitals:    06/10/25 0940   BP: 134/60   Pulse: (!) 40   Temp: 98.3 °F (36.8 °C)   Weight: 79.3 kg (174 lb 13.2 oz)   Height: 5' (1.524 m)            General:  unremarkable, age appropriate     Musculoskeletal  Muscle Strength/Tone:  no spasicity, no rigidity, no cogwheeling, no flaccidity, no paratonia, no dyskinesia, no dystonia, tremor noted  tremor noted bilateral hands - pt taking Propranolol " "which helps, no tic, no choreoathetosis, no atrophy   Gait & Station:  non-ataxic     Psychiatric  Speech:  no latency; no press   Mood & Affect:  "Pretty fair"  congruent and appropriate   Thought Process:  normal and logical   Associations:  intact, circumstantial able to be redirected   Thought Content:  normal, no suicidality, no homicidality, delusions, or paranoia   Insight:  intact, has awareness of illness   Judgement: limited, in relation to medication compliance   Orientation:  grossly intact, person, place, time/date   Memory: intact for content of interview   Language: grossly intact   Attention Span & Concentration:  able to focus   Fund of Knowledge:  intact and appropriate to age and level of education     Assessment and Diagnosis   Status/Progress: Based on the examination today, the patient's problem(s) is/are resolving.  New problems have not been presented today.   Lack of compliance are complicating management of the primary condition.  There are no active rule-out diagnoses for this patient at this time.     General Impression:      ICD-10-CM ICD-9-CM   1. MDD (major depressive disorder), recurrent episode, moderate  F33.1 296.32   2. ZAK (generalized anxiety disorder)  F41.1 300.02   3. Insomnia, unspecified type  G47.00 780.52           Intervention/Counseling/Treatment Plan     Continue Buspar 7.5 mg TID - targeting anxiety. Will consider increasing in the future to Buspar 10 mg TID.   Continue Remeron 30 mg QHS - targeting depression, appetite, and insomnia.   Continue Klonopin 0.5 mg BID prn anxiety. PCP prescribes.  reviewed - Pt had refilled - 5/23/2025. Pt only takes once or twice per week when she's going out.   Discussed the importance of compliance with pt.   Referral for sleep medicine placed at last visit for pt to be evaluated for sleep apnea.   Will place referral for pulmonology as well since pt hasn't followed up in years and is having asthma attacks and possible sleep apnea. "   Referral for talk therapy placed. Pt was extensively educated in the importance of making and keeping a talk therapy appointment.    Discussed diagnosis, risks and benefits of proposed treatment above vs alternative treatments vs no treatment, and potential side effects of these treatments, and the inherent unpredictability of individual response to treatment.  The patient expresses understanding and gives informed consent to pursue treatment.  The potential benefits outweigh the potential risks. Patient has no other questions. Risks/adverse effects discussed at this time including but not limited to: GI side effects, sexual dysfunction, activation vs sedation, triggering of suicidal thoughts, and serotonin syndrome.  Discussed with patient, the potential adverse effects of Benzodiazepines, including, but not limited to, drowsiness, dizziness, risk of falls, and abuse potential. Counseled patient on avoiding alcohol, while using this medication, due to the risk of respiratory depression. Patient instructed not to operate any heavy machinery or drive a vehicle while on this medication. Informed patient of the risks of continuous benzodiazepine use, including tolerance, dependence and withdrawals that may be life threatening, upon abrupt cessation. Also advised patient not to take benzodiazepines with opiates and/or other sedatives. The patient expresses an understanding of the above as well as the inherent unpredictability of said treatment.  The patient agrees that, currently, the benefits outweigh the risks, and would like to pursue said treatment at this time.    Serotonin syndrome   Mental status changes can include anxiety, restlessness, disorientation, and agitated delirium.    Autonomic manifestations can include diaphoresis, tachycardia, hyperthermia, hypertension, vomiting, and diarrhea   Neuromuscular hyperactivity can manifest as tremor, myoclonus, hyperreflexia, rigidity, hyperthermia, seizure, and  bilateral Babinski sign.   Pt was informed that if they experience any of these symptoms to go the ED.     Safety Plan   Patient voices understanding and agreement with this plan  Provided crisis numbers  Encouraged patient to keep future appointments.  Instructed patient to call or message with questions or concerns  In the event of an emergency, including suicidal ideation, patient was advised to go to the emergency room and/or call 911    Return to Clinic: 2 months or sooner if needed      Total face to face time: 30 min  Total time (chart review, patient contact, documentation): 45 min    A diagnostic psychiatric evaluation was performed and responsiveness to treatment was assessed.  The patient demonstrates adequate ability/capacity to respond to treatment.    Imelda Chung PA-C

## 2025-06-10 ENCOUNTER — OFFICE VISIT (OUTPATIENT)
Dept: PSYCHIATRY | Facility: CLINIC | Age: 85
End: 2025-06-10
Payer: MEDICARE

## 2025-06-10 VITALS
TEMPERATURE: 98 F | HEIGHT: 60 IN | BODY MASS INDEX: 34.32 KG/M2 | SYSTOLIC BLOOD PRESSURE: 134 MMHG | DIASTOLIC BLOOD PRESSURE: 60 MMHG | HEART RATE: 40 BPM | WEIGHT: 174.81 LBS

## 2025-06-10 DIAGNOSIS — G47.00 INSOMNIA, UNSPECIFIED TYPE: ICD-10-CM

## 2025-06-10 DIAGNOSIS — F41.1 GAD (GENERALIZED ANXIETY DISORDER): ICD-10-CM

## 2025-06-10 DIAGNOSIS — J45.909 ASTHMA, UNSPECIFIED ASTHMA SEVERITY, UNSPECIFIED WHETHER COMPLICATED, UNSPECIFIED WHETHER PERSISTENT: Primary | ICD-10-CM

## 2025-06-10 DIAGNOSIS — F33.1 MDD (MAJOR DEPRESSIVE DISORDER), RECURRENT EPISODE, MODERATE: ICD-10-CM

## 2025-06-10 PROCEDURE — 3288F FALL RISK ASSESSMENT DOCD: CPT | Mod: CPTII,S$GLB,, | Performed by: PHYSICIAN ASSISTANT

## 2025-06-10 PROCEDURE — 99214 OFFICE O/P EST MOD 30 MIN: CPT | Mod: S$GLB,,, | Performed by: PHYSICIAN ASSISTANT

## 2025-06-10 PROCEDURE — 99999 PR PBB SHADOW E&M-EST. PATIENT-LVL V: CPT | Mod: PBBFAC,,, | Performed by: PHYSICIAN ASSISTANT

## 2025-06-10 PROCEDURE — 1159F MED LIST DOCD IN RCRD: CPT | Mod: CPTII,S$GLB,, | Performed by: PHYSICIAN ASSISTANT

## 2025-06-10 PROCEDURE — 1101F PT FALLS ASSESS-DOCD LE1/YR: CPT | Mod: CPTII,S$GLB,, | Performed by: PHYSICIAN ASSISTANT

## 2025-06-10 PROCEDURE — 1160F RVW MEDS BY RX/DR IN RCRD: CPT | Mod: CPTII,S$GLB,, | Performed by: PHYSICIAN ASSISTANT

## 2025-06-25 ENCOUNTER — NURSE TRIAGE (OUTPATIENT)
Dept: ADMINISTRATIVE | Facility: CLINIC | Age: 85
End: 2025-06-25
Payer: MEDICARE

## 2025-06-25 ENCOUNTER — LAB VISIT (OUTPATIENT)
Dept: LAB | Facility: HOSPITAL | Age: 85
End: 2025-06-25
Payer: MEDICARE

## 2025-06-25 ENCOUNTER — OFFICE VISIT (OUTPATIENT)
Dept: FAMILY MEDICINE | Facility: CLINIC | Age: 85
End: 2025-06-25
Payer: MEDICARE

## 2025-06-25 VITALS
HEART RATE: 40 BPM | TEMPERATURE: 98 F | SYSTOLIC BLOOD PRESSURE: 102 MMHG | DIASTOLIC BLOOD PRESSURE: 60 MMHG | BODY MASS INDEX: 33.33 KG/M2 | HEIGHT: 60 IN | OXYGEN SATURATION: 96 % | WEIGHT: 169.75 LBS

## 2025-06-25 DIAGNOSIS — R63.4 WEIGHT LOSS, UNINTENTIONAL: ICD-10-CM

## 2025-06-25 DIAGNOSIS — F41.9 ANXIETY: ICD-10-CM

## 2025-06-25 DIAGNOSIS — R79.9 ABNORMAL FINDING OF BLOOD CHEMISTRY, UNSPECIFIED: ICD-10-CM

## 2025-06-25 DIAGNOSIS — R00.1 BRADYCARDIA: ICD-10-CM

## 2025-06-25 DIAGNOSIS — I10 HTN (HYPERTENSION), BENIGN: Primary | ICD-10-CM

## 2025-06-25 LAB
ABSOLUTE EOSINOPHIL (OHS): 0.21 K/UL
ABSOLUTE MONOCYTE (OHS): 0.91 K/UL (ref 0.3–1)
ABSOLUTE NEUTROPHIL COUNT (OHS): 6.18 K/UL (ref 1.8–7.7)
ALBUMIN SERPL BCP-MCNC: 3.6 G/DL (ref 3.5–5.2)
ALP SERPL-CCNC: 103 UNIT/L (ref 40–150)
ALT SERPL W/O P-5'-P-CCNC: 12 UNIT/L (ref 10–44)
ANION GAP (OHS): 9 MMOL/L (ref 8–16)
AST SERPL-CCNC: 13 UNIT/L (ref 11–45)
BASOPHILS # BLD AUTO: 0.12 K/UL
BASOPHILS NFR BLD AUTO: 1.2 %
BILIRUB SERPL-MCNC: 0.4 MG/DL (ref 0.1–1)
BUN SERPL-MCNC: 18 MG/DL (ref 8–23)
CALCIUM SERPL-MCNC: 9.5 MG/DL (ref 8.7–10.5)
CHLORIDE SERPL-SCNC: 102 MMOL/L (ref 95–110)
CO2 SERPL-SCNC: 29 MMOL/L (ref 23–29)
CREAT SERPL-MCNC: 0.9 MG/DL (ref 0.5–1.4)
ERYTHROCYTE [DISTWIDTH] IN BLOOD BY AUTOMATED COUNT: 15.7 % (ref 11.5–14.5)
GFR SERPLBLD CREATININE-BSD FMLA CKD-EPI: >60 ML/MIN/1.73/M2
GLUCOSE SERPL-MCNC: 97 MG/DL (ref 70–110)
HCT VFR BLD AUTO: 45.8 % (ref 37–48.5)
HGB BLD-MCNC: 13.7 GM/DL (ref 12–16)
IMM GRANULOCYTES # BLD AUTO: 0.02 K/UL (ref 0–0.04)
IMM GRANULOCYTES NFR BLD AUTO: 0.2 % (ref 0–0.5)
LYMPHOCYTES # BLD AUTO: 2.25 K/UL (ref 1–4.8)
MCH RBC QN AUTO: 25.7 PG (ref 27–31)
MCHC RBC AUTO-ENTMCNC: 29.9 G/DL (ref 32–36)
MCV RBC AUTO: 86 FL (ref 82–98)
NUCLEATED RBC (/100WBC) (OHS): 0 /100 WBC
PLATELET # BLD AUTO: 283 K/UL (ref 150–450)
PMV BLD AUTO: 10.9 FL (ref 9.2–12.9)
POTASSIUM SERPL-SCNC: 4.9 MMOL/L (ref 3.5–5.1)
PROT SERPL-MCNC: 7.4 GM/DL (ref 6–8.4)
RBC # BLD AUTO: 5.33 M/UL (ref 4–5.4)
RELATIVE EOSINOPHIL (OHS): 2.2 %
RELATIVE LYMPHOCYTE (OHS): 23.2 % (ref 18–48)
RELATIVE MONOCYTE (OHS): 9.4 % (ref 4–15)
RELATIVE NEUTROPHIL (OHS): 63.8 % (ref 38–73)
SODIUM SERPL-SCNC: 140 MMOL/L (ref 136–145)
WBC # BLD AUTO: 9.69 K/UL (ref 3.9–12.7)

## 2025-06-25 PROCEDURE — 99214 OFFICE O/P EST MOD 30 MIN: CPT | Mod: S$GLB,,,

## 2025-06-25 PROCEDURE — 1101F PT FALLS ASSESS-DOCD LE1/YR: CPT | Mod: CPTII,S$GLB,,

## 2025-06-25 PROCEDURE — 1160F RVW MEDS BY RX/DR IN RCRD: CPT | Mod: CPTII,S$GLB,,

## 2025-06-25 PROCEDURE — 85025 COMPLETE CBC W/AUTO DIFF WBC: CPT

## 2025-06-25 PROCEDURE — 36415 COLL VENOUS BLD VENIPUNCTURE: CPT | Mod: PO

## 2025-06-25 PROCEDURE — 1126F AMNT PAIN NOTED NONE PRSNT: CPT | Mod: CPTII,S$GLB,,

## 2025-06-25 PROCEDURE — 1159F MED LIST DOCD IN RCRD: CPT | Mod: CPTII,S$GLB,,

## 2025-06-25 PROCEDURE — 3288F FALL RISK ASSESSMENT DOCD: CPT | Mod: CPTII,S$GLB,,

## 2025-06-25 PROCEDURE — 99999 PR PBB SHADOW E&M-EST. PATIENT-LVL V: CPT | Mod: PBBFAC,,,

## 2025-06-25 PROCEDURE — 82435 ASSAY OF BLOOD CHLORIDE: CPT

## 2025-06-25 RX ORDER — OFLOXACIN 3 MG/ML
1 SOLUTION/ DROPS OPHTHALMIC 4 TIMES DAILY
COMMUNITY
Start: 2025-06-18

## 2025-06-25 NOTE — TELEPHONE ENCOUNTER
Pt calling with [employer] insurance company rep on the line with pt. Pt reports BP of 170/110 this morning and 190/150 prior to call. Pt just recently took BP medications. Current BP of 178/140. Pt using wrist BP cuff. Pt reports HA this morning but denies current HA. Pt denies SOB, chest pain, dizziness, changes in vision, HA. Care advice to go to office now. Appointment scheduled for pt. Pt verbalizes understanding.   Reason for Disposition   Systolic BP >= 200 OR Diastolic >= 120 and having NO cardiac or neurologic symptoms    Additional Information   Negative: Sounds like a life-threatening emergency to the triager   Negative: Systolic BP >= 160 OR Diastolic >= 100, and any cardiac (e.g., breathing difficulty, chest pain) or neurologic symptoms (e.g., new-onset blurred or double vision)   Negative: Pregnant 20 or more weeks (or postpartum < 6 weeks) with new hand or face swelling   Negative: Pregnant 20 or more weeks (or postpartum < 6 weeks) and Systolic BP >= 160 OR Diastolic >= 110   Negative: Patient sounds very sick or weak to the triager    Protocols used: Blood Pressure - High-A-OH

## 2025-06-25 NOTE — PROGRESS NOTES
HPI     Jose Manuel Boyd is a 84 y.o. female with multiple medical diagnoses as listed in the medical history and problem list that presents for hypertension. PCP Dr. Piña with last visit in this clinic on date not found.     Chief Complaint   Patient presents with    Hypertension     HPI    CHIEF COMPLAINT:  Patient presents for follow-up regarding elevated blood pressure and recent weight loss.    HPI:  Patient reports elevated blood pressure over the last day or two, with readings higher than ever before. She checks her blood pressure daily at home using a blood pressure cuff kept on her kitchen table. Her blood pressure reached as high as 200 at one point, which was unprecedented for her. Her blood pressure typically runs in the 180s/190s when elevated. It fluctuates, sometimes returning to the normal range, especially when she relaxes and takes deep breaths.    Patient expresses concern about significant weight loss, which has become more noticeable and severe within the last month. She reports losing about 20 lbs over the past year, with the rate of loss accelerating recently. She denies intentionally trying to lose weight or dieting. She notes a decrease in appetite, eating much less than she used to and drinking more than eating. To address this, her son has purchased Ensure and Boost drinks to help her maintain nutrition.    Patient reports tremors, for which she takes propranolol as needed. She takes medication for stress and depression 3 times daily. She indicates feeling stressed and depressed, particularly due to her weight loss.    Patient mentions being evaluated by a pulmonology/sleep medicine doctor recently and is scheduled for follow-up in about 2 or 3 months. She also reports being evaluated at multiple hospitals, including [facility], for her health concerns.    MEDICATIONS:  Patient is on Amlodipine and Losartan daily for blood pressure management. She is also taking Propranolol PRN for  tremors. For stress and depression, she is on Buspar (buspirone) 7.5 3 times daily. Patient takes Klonopin (clonazepam) 0.5 mg at night for sleep. She uses Ensure and Boost nutritional drinks as needed to help with weight loss and medication intake.    MEDICAL HISTORY:  Patient has a history of hypertension and depression.      Assessment & Plan     1. HTN (hypertension), benign    BP labile in clinic today, but all readings in the normal ranges. Initial reading 138/82; recheck after sitting and resting for 15 minutes was 102/60. Patient denies headache, lightheadedness, dizziness. Discussed that we should continue monitoring home BP readings and encouraged to keep log. Will schedule 2 week follow up appointment with NP and encouraged to bring both her BP cuff and log of readings with her. Her home BP cuff may need calibration. Offered Digital Medicine program; patient declined. Encouraged hydration and stress relief techniques. Encouraged the patient to perform self-calming techniques, such as deep breathing/relaxation techniques and exercise.     Continued current medication regimen including amlodipine and losartan for BP management.   Educated the patient on the importance of relaxation techniques, particularly deep breathing exercises for 10-20 seconds before checking BP to help lower it naturally. Discussed how stress potentially affects blood pressure.  Discussed ED precautions for significantly elevated readings.     2. Bradycardia    Apical pulse 40 bpm. 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 Patient asymptomatic and reports her HR is always in the 40's. Encouraged to use PRN propanolol sparingly as it may further reduce HR. Follow up with clinic for any worsening symptoms, or if symptoms fail to improve.       3. Anxiety     Evaluated that the patient is experiencing stress and anxiety, which may be contributing to weight loss and blood pressure issues.  Encouraged the patient to  perform self-calming techniques, such as deep breathing/relaxation techniques and exercise.  She finds the medication regimen she is currently on helpful; continue PRN Buspar and Klonopin as ordered. Follow up with clinic for any worsening symptoms, or if symptoms fail to improve.       - CBC Auto Differential; Future  - Comprehensive Metabolic Panel; Future    4. Weight loss, unintentional     Noted the patient has been losing weight rapidly, with 20 lbs lost overall in the past year and more severe weight loss in the last month.   Patient is not intentionally trying to lose weight and reports decreased appetite, eating less than usual.   Offered referral to gastroenterologist for further evaluation of this concerning weight loss; patient declined at this time. Will obtain labs and UA to rule out infection. Continue Boost and Ensure shakes and increased protein intake.  Follow up with clinic for any worsening symptoms, or if symptoms fail to improve.       - Urinalysis, Reflex to Urine Culture Urine, Clean Catch; Future    5. Abnormal finding of blood chemistry, unspecified    - CBC Auto Differential; Future              Discussed DDx, condition, and treatment.   Education sent to patient portal/included in after visit summary.  ED precautions given.   Notify provider if symptoms do not resolve or increase in severity.   Patient verbalizes understanding and agrees with plan of care.  --------------------------------------------      Health Maintenance:  Health Maintenance         Date Due Completion Date    RSV Vaccine (Age 60+ and Pregnant patients) (1 - 1-dose 75+ series) Never done ---    Shingles Vaccine (2 of 3) 06/29/2016 5/4/2016    Pneumococcal Vaccines (Age 50+) (2 of 2 - PPSV23) 05/12/2018 5/12/2017    COVID-19 Vaccine (4 - 2024-25 season) 09/01/2024 8/18/2021    Hemoglobin A1c (Prediabetes) 03/05/2026 3/5/2025    TETANUS VACCINE 05/12/2027 5/12/2017    Lipid Panel 02/22/2029 2/22/2024    Override on  4/23/2015: Done            Discussed the importance of overdue vaccines which were offered during this encounter. Patient declined overdue vaccines at this time and Advised patient on the importance of completing overdue health maintenance items    Follow Up:  Follow up in about 2 weeks (around 7/9/2025) for BP follow up.    Exam     Review of Systems:  (as noted above)  Review of Systems   Constitutional:  Positive for unexpected weight change. Negative for fever.   HENT:  Negative for trouble swallowing.    Respiratory:  Negative for shortness of breath.    Cardiovascular:  Negative for chest pain.   Gastrointestinal:  Negative for blood in stool and vomiting.   Genitourinary:  Negative for hematuria.   Skin:  Negative for rash.   Psychiatric/Behavioral:  The patient is nervous/anxious.        Physical Exam:   Physical Exam  Constitutional:       General: She is not in acute distress.     Appearance: Normal appearance. She is not ill-appearing.   HENT:      Head: Normocephalic and atraumatic.   Cardiovascular:      Rate and Rhythm: Normal rate and regular rhythm.      Pulses: Normal pulses.      Heart sounds: Normal heart sounds. No murmur heard.  Pulmonary:      Effort: Pulmonary effort is normal. No respiratory distress.      Breath sounds: Normal breath sounds. No wheezing.   Abdominal:      General: Abdomen is flat. Bowel sounds are normal. There is no distension.      Palpations: Abdomen is soft.      Tenderness: There is no abdominal tenderness.   Musculoskeletal:         General: Normal range of motion.   Skin:     General: Skin is warm and dry.      Capillary Refill: Capillary refill takes less than 2 seconds.   Neurological:      General: No focal deficit present.      Mental Status: She is alert and oriented to person, place, and time. Mental status is at baseline.   Psychiatric:         Mood and Affect: Mood normal.         Behavior: Behavior normal.       Vitals:    06/25/25 1509   BP: 102/60   BP  Location: Right arm   Patient Position: Sitting   Pulse: (!) 40   Temp: 97.9 °F (36.6 °C)   TempSrc: Oral   SpO2: 96%   Weight: 77 kg (169 lb 12.1 oz)   Height: 5' (1.524 m)      Body mass index is 33.15 kg/m².        History     Past Medical History:  Past Medical History:   Diagnosis Date    Anxiety     Asthma     Depression     Headache     Hx of psychiatric care     Hypercholesterolemia     Hypertension     Psychiatric problem     Sleep difficulties     Therapy     Thyroid disease     multiple nodules       Past Surgical History:  Past Surgical History:   Procedure Laterality Date    ANGIOGRAM, CORONARY, WITH LEFT HEART CATHETERIZATION Left 10/18/2022    Procedure: Angiogram, Coronary, with Left Heart Cath;  Surgeon: Kumar Randall MD;  Location: [facility] CATH LAB;  Service: Cardiology;  Laterality: Left;    CHOLECYSTECTOMY      HYSTERECTOMY      knee repalcement         Social History:  Social History[1]    Family History:  Family History   Problem Relation Name Age of Onset    Diabetes Mother      Hypertension Mother      Kidney disease Mother      Heart disease Father      Bipolar disorder Daughter Kourtney        Allergies and Medications: (updated and reviewed)  Review of patient's allergies indicates:   Allergen Reactions    Codeine      Other reaction(s): Rash     Current Medications[2]    Patient Care Team:  Ajit Piña MD as PCP - General (Internal Medicine)  Federico Madrigal MD as Consulting Physician (Psychiatry)  Srikanth Borden MD as Consulting Physician (Neurology)  Imaging, Dis Diagnostic (Diagnostic Radiology)  Marysol Campbell as ED Navigator         - The patient is given an After Visit Summary that lists all medications with directions, allergies, education, orders placed during this encounter and follow-up instructions.      - I have reviewed the patient's medical information including past medical, family, and social history sections including the medications and allergies.      - We  discussed the patient's current medications.     This note was created by combination of typed  and MModal dictation.  Transcription errors may be present.  If there are any questions, please contact me.     This note was generated with the assistance of ambient listening technology. Verbal consent was obtained by the patient and accompanying visitor(s) for the recording of patient appointment to facilitate this note. I attest to having reviewed and edited the generated note for accuracy, though some syntax or spelling errors may persist. Please contact the author of this note for any clarification.                    MORGAN Ramos         [1]   Social History  Socioeconomic History    Marital status:     Number of children: 4   Occupational History    Occupation: retired     Comment: Domestic service, [employer]   Tobacco Use    Smoking status: Never    Smokeless tobacco: Never   Substance and Sexual Activity    Alcohol use: No    Drug use: No    Sexual activity: Not Currently   Other Topics Concern    Patient feels they ought to cut down on drinking/drug use No    Patient annoyed by others criticizing their drinking/drug use No    Patient has felt bad or guilty about drinking/drug use No    Patient has had a drink/used drugs as an eye opener in the AM No   Social History Narrative    Enjoys going to [employer]     Social Drivers of Health     Financial Resource Strain: Medium Risk (3/10/2025)    Overall Financial Resource Strain (CARDIA)     Difficulty of Paying Living Expenses: Somewhat hard   Food Insecurity: Food Insecurity Present (3/10/2025)    Hunger Vital Sign     Worried About Running Out of Food in the Last Year: Sometimes true     Ran Out of Food in the Last Year: Sometimes true   Transportation Needs: Unmet Transportation Needs (3/10/2025)    PRAPARE - Transportation     Lack of Transportation (Medical): Yes     Lack of Transportation (Non-Medical): No   Physical Activity: Inactive  (3/10/2025)    Exercise Vital Sign     Days of Exercise per Week: 0 days     Minutes of Exercise per Session: 0 min   Stress: Stress Concern Present (3/10/2025)    [employer] - Occupational Stress Questionnaire     Feeling of Stress : Very much   Housing Stability: Unknown (3/10/2025)    Housing Stability Vital Sign     Unable to Pay for Housing in the Last Year: Patient declined     Number of Times Moved in the Last Year: 0     Homeless in the Last Year: No   [2]   Current Outpatient Medications   Medication Sig Dispense Refill    albuterol (VENTOLIN HFA) 90 mcg/actuation inhaler Inhale 2 puffs into the lungs every 4 (four) hours as needed for Wheezing. 6.7 g 6    amLODIPine (NORVASC) 10 MG tablet Take 1 tablet (10 mg total) by mouth once daily. 90 tablet 3    ascorbic acid, vitamin C, (VITAMIN C) 1000 MG tablet Take 1,000 mg by mouth once daily.      aspirin (ECOTRIN) 81 MG EC tablet Take 81 mg by mouth once daily.      aspirin-acetaminophen-caffeine 250-250-65 mg (EXCEDRIN MIGRAINE) 250-250-65 mg per tablet Take 1 tablet by mouth daily as needed for Pain (headaches). 30 tablet 0    atorvastatin (LIPITOR) 40 MG tablet Take 1 tablet (40 mg total) by mouth once daily. 90 tablet 3    busPIRone (BUSPAR) 7.5 MG tablet Take 1 tablet (7.5 mg total) by mouth 2 (two) times daily. 180 tablet 1    clonazePAM (KLONOPIN) 0.5 MG tablet Take 1 tablet (0.5 mg total) by mouth 2 (two) times daily as needed for Anxiety. 20 tablet 0    cyanocobalamin (VITAMIN B-12) 1000 MCG tablet Take 100 mcg by mouth once daily.      diphenhydrAMINE-aluminum-magnesium hydroxide-simethicone-LIDOcaine viscous HCl 2% Swish and spit 10 mLs every 4 (four) hours as needed (excess saliva). 14 each 0    fluticasone propionate (FLONASE) 50 mcg/actuation nasal spray 1 spray (50 mcg total) by Each Nostril route 2 (two) times daily. For allergic rhinitis/sinusitis 18 mL 11    furosemide (LASIX) 20 MG tablet TAKE 1 TABLET BY MOUTH  EVERY DAY 90 tablet 2    losartan (COZAAR) 50 MG tablet TAKE 1 TABLET (50 MG TOTAL) BY MOUTH ONCE DAILY. FOR HIGH BLOOD PRESSURE 90 tablet 2    mirtazapine (REMERON) 30 MG tablet TAKE 1 TABLET BY MOUTH NIGHTLY AT BEDTIME AS NEEDED FOR INSOMNIA , SLEEP, ANXIETY AND DEPRESSION 30 tablet 3    multivitamin with minerals tablet Take 1 tablet by mouth once daily.      ofloxacin (OCUFLOX) 0.3 % ophthalmic solution Place 1 drop into both eyes 4 (four) times daily.      olopatadine (PATANOL) 0.1 % ophthalmic solution Place 1 drop into both eyes 2 (two) times daily.      omega-3 fatty acids/fish oil (FISH OIL-OMEGA-3 FATTY ACIDS) 300-1,000 mg capsule Take by mouth once daily.      omeprazole (PRILOSEC) 20 MG capsule Take 1 capsule (20 mg total) by mouth once daily. For gastric reflux 90 capsule 0    propranoloL (INDERAL) 10 MG tablet Take 1 tablet (10 mg total) by mouth 2 (two) times daily. For shaking 180 tablet 3    psyllium (METAMUCIL) powder Take 1 packet by mouth daily as needed.      cephALEXin (KEFLEX) 500 MG capsule Take 1 capsule (500 mg total) by mouth every 12 (twelve) hours. for 7 days 14 capsule 0     No current facility-administered medications for this visit.

## 2025-06-26 ENCOUNTER — RESULTS FOLLOW-UP (OUTPATIENT)
Dept: FAMILY MEDICINE | Facility: CLINIC | Age: 85
End: 2025-06-26

## 2025-06-26 DIAGNOSIS — N30.00 ACUTE CYSTITIS WITHOUT HEMATURIA: Primary | ICD-10-CM

## 2025-06-26 RX ORDER — CEPHALEXIN 500 MG/1
500 CAPSULE ORAL EVERY 12 HOURS
Qty: 14 CAPSULE | Refills: 0 | Status: SHIPPED | OUTPATIENT
Start: 2025-06-26 | End: 2025-07-03

## 2025-06-27 ENCOUNTER — TELEPHONE (OUTPATIENT)
Dept: FAMILY MEDICINE | Facility: CLINIC | Age: 85
End: 2025-06-27
Payer: MEDICARE

## 2025-06-27 NOTE — TELEPHONE ENCOUNTER
Spoke with patient about urine results per MORGAN Ramos. Patient verbalized understanding. Patient has no other questions or concerns at this time.

## 2025-06-27 NOTE — TELEPHONE ENCOUNTER
----- Message from MORGAN Ramos sent at 6/26/2025 12:53 PM CDT -----  Hi Ms. Richard,    Your urine results show that you do have a UTI. I will send in an antibiotic to your pharmacy for you to take. Please continue to stay hydrated. Please let me know if you have any questions or   concerns.     All the best,  TANISHA Bustillos  ----- Message -----  From: Lab, Background User  Sent: 6/25/2025   8:47 PM CDT  To: MORGAN Latham

## 2025-07-30 ENCOUNTER — OFFICE VISIT (OUTPATIENT)
Dept: FAMILY MEDICINE | Facility: CLINIC | Age: 85
End: 2025-07-30
Payer: MEDICARE

## 2025-07-30 ENCOUNTER — LAB VISIT (OUTPATIENT)
Dept: LAB | Facility: HOSPITAL | Age: 85
End: 2025-07-30
Attending: INTERNAL MEDICINE
Payer: MEDICARE

## 2025-07-30 VITALS
TEMPERATURE: 98 F | WEIGHT: 169.75 LBS | HEIGHT: 60 IN | SYSTOLIC BLOOD PRESSURE: 114 MMHG | DIASTOLIC BLOOD PRESSURE: 62 MMHG | BODY MASS INDEX: 33.33 KG/M2 | HEART RATE: 44 BPM | OXYGEN SATURATION: 93 %

## 2025-07-30 DIAGNOSIS — F33.0 MAJOR DEPRESSIVE DISORDER, RECURRENT EPISODE, MILD WITH ANXIOUS DISTRESS: Primary | ICD-10-CM

## 2025-07-30 DIAGNOSIS — R73.01 IFG (IMPAIRED FASTING GLUCOSE): ICD-10-CM

## 2025-07-30 DIAGNOSIS — G25.0 ESSENTIAL TREMOR: ICD-10-CM

## 2025-07-30 DIAGNOSIS — I10 HYPERTENSION, ESSENTIAL: ICD-10-CM

## 2025-07-30 DIAGNOSIS — R63.4 WEIGHT LOSS, UNINTENTIONAL: ICD-10-CM

## 2025-07-30 LAB
ALBUMIN SERPL BCP-MCNC: 3.4 G/DL (ref 3.5–5.2)
ALP SERPL-CCNC: 96 UNIT/L (ref 40–150)
ALT SERPL W/O P-5'-P-CCNC: 9 UNIT/L (ref 10–44)
ANION GAP (OHS): 7 MMOL/L (ref 8–16)
AST SERPL-CCNC: 21 UNIT/L (ref 11–45)
BILIRUB SERPL-MCNC: 0.3 MG/DL (ref 0.1–1)
BUN SERPL-MCNC: 15 MG/DL (ref 8–23)
CALCIUM SERPL-MCNC: 9.2 MG/DL (ref 8.7–10.5)
CHLORIDE SERPL-SCNC: 104 MMOL/L (ref 95–110)
CO2 SERPL-SCNC: 31 MMOL/L (ref 23–29)
CREAT SERPL-MCNC: 0.9 MG/DL (ref 0.5–1.4)
ERYTHROCYTE [DISTWIDTH] IN BLOOD BY AUTOMATED COUNT: 16 % (ref 11.5–14.5)
GFR SERPLBLD CREATININE-BSD FMLA CKD-EPI: >60 ML/MIN/1.73/M2
GLUCOSE SERPL-MCNC: 98 MG/DL (ref 70–110)
HCT VFR BLD AUTO: 45.9 % (ref 37–48.5)
HGB BLD-MCNC: 13.3 GM/DL (ref 12–16)
MCH RBC QN AUTO: 25.1 PG (ref 27–31)
MCHC RBC AUTO-ENTMCNC: 29 G/DL (ref 32–36)
MCV RBC AUTO: 87 FL (ref 82–98)
PLATELET # BLD AUTO: 251 K/UL (ref 150–450)
PMV BLD AUTO: 11.2 FL (ref 9.2–12.9)
POTASSIUM SERPL-SCNC: 3.9 MMOL/L (ref 3.5–5.1)
PROT SERPL-MCNC: 6.8 GM/DL (ref 6–8.4)
RBC # BLD AUTO: 5.29 M/UL (ref 4–5.4)
SODIUM SERPL-SCNC: 142 MMOL/L (ref 136–145)
TSH SERPL-ACNC: 0.81 UIU/ML (ref 0.4–4)
WBC # BLD AUTO: 9.1 K/UL (ref 3.9–12.7)

## 2025-07-30 PROCEDURE — 80053 COMPREHEN METABOLIC PANEL: CPT

## 2025-07-30 PROCEDURE — 1160F RVW MEDS BY RX/DR IN RCRD: CPT | Mod: CPTII,S$GLB,, | Performed by: INTERNAL MEDICINE

## 2025-07-30 PROCEDURE — 3288F FALL RISK ASSESSMENT DOCD: CPT | Mod: CPTII,S$GLB,, | Performed by: INTERNAL MEDICINE

## 2025-07-30 PROCEDURE — 1126F AMNT PAIN NOTED NONE PRSNT: CPT | Mod: CPTII,S$GLB,, | Performed by: INTERNAL MEDICINE

## 2025-07-30 PROCEDURE — 84443 ASSAY THYROID STIM HORMONE: CPT

## 2025-07-30 PROCEDURE — 99999 PR PBB SHADOW E&M-EST. PATIENT-LVL V: CPT | Mod: PBBFAC,,, | Performed by: INTERNAL MEDICINE

## 2025-07-30 PROCEDURE — 83036 HEMOGLOBIN GLYCOSYLATED A1C: CPT

## 2025-07-30 PROCEDURE — 99214 OFFICE O/P EST MOD 30 MIN: CPT | Mod: S$GLB,,, | Performed by: INTERNAL MEDICINE

## 2025-07-30 PROCEDURE — 85027 COMPLETE CBC AUTOMATED: CPT

## 2025-07-30 PROCEDURE — G2211 COMPLEX E/M VISIT ADD ON: HCPCS | Mod: S$GLB,,, | Performed by: INTERNAL MEDICINE

## 2025-07-30 PROCEDURE — 1101F PT FALLS ASSESS-DOCD LE1/YR: CPT | Mod: CPTII,S$GLB,, | Performed by: INTERNAL MEDICINE

## 2025-07-30 PROCEDURE — 36415 COLL VENOUS BLD VENIPUNCTURE: CPT | Mod: PN

## 2025-07-30 PROCEDURE — 1159F MED LIST DOCD IN RCRD: CPT | Mod: CPTII,S$GLB,, | Performed by: INTERNAL MEDICINE

## 2025-07-30 NOTE — PROGRESS NOTES
"Subjective:       Patient ID: Jose Manuel Boyd is a 84 y.o. female.    Chief Complaint: Follow-up and Weight Loss    F/u chronic conditions    HPI: 83 y/o w/ HTN MDD essential tremor presents alone for follow up. Feels "okay" using propranolol twice per day no orthostatic symptoms she notes unintentional weight loss over last six months (down 10lbs over that time) no change in bowels or urinary frequency no LE swelling breathing at baseline she feels sleep is better with buspar three times per day also taking nightly mirtazapine.       Review of Systems    Objective:     Vitals:    07/30/25 1317   BP: 114/62   BP Location: Left arm   Patient Position: Sitting   Pulse: (!) 44   Temp: 97.5 °F (36.4 °C)   TempSrc: Oral   SpO2: (!) 93%   Weight: 77 kg (169 lb 12.1 oz)   Height: 5' (1.524 m)          Physical Exam  Constitutional:       Appearance: She is well-developed.   HENT:      Head: Normocephalic and atraumatic.   Eyes:      Conjunctiva/sclera: Conjunctivae normal.   Cardiovascular:      Rate and Rhythm: Regular rhythm. Bradycardia present.      Heart sounds: No murmur heard.     No friction rub. No gallop.   Pulmonary:      Effort: Pulmonary effort is normal.      Breath sounds: Normal breath sounds. No wheezing or rales.   Abdominal:      General: Bowel sounds are normal. There is no distension.      Palpations: Abdomen is soft.      Tenderness: There is no abdominal tenderness. There is no right CVA tenderness, left CVA tenderness, guarding or rebound.   Musculoskeletal:         General: No tenderness. Normal range of motion.      Cervical back: Normal range of motion.      Right lower leg: No edema.      Left lower leg: No edema.   Skin:     General: Skin is warm and dry.   Neurological:      Mental Status: She is alert and oriented to person, place, and time.      Cranial Nerves: No cranial nerve deficit.         Assessment and Plan   1. Major depressive disorder, recurrent episode, mild with anxious " distress (Primary)  Current medicaitons are effective contineu    2. Hypertension, essential  Bp at goal no adjustment  - CBC Without Differential; Future  - Comprehensive Metabolic Panel; Future    3. Essential tremor  Propranolol is effecticve    4. IFG (impaired fasting glucose)  Check A1c for dm screening today  - Hemoglobin A1C; Future    5. Weight loss, unintentional  Check thyroid function  - TSH; Future

## 2025-07-31 ENCOUNTER — TELEPHONE (OUTPATIENT)
Dept: FAMILY MEDICINE | Facility: CLINIC | Age: 85
End: 2025-07-31
Payer: MEDICARE

## 2025-07-31 LAB
EAG (OHS): 120 MG/DL (ref 68–131)
HBA1C MFR BLD: 5.8 % (ref 4–5.6)

## 2025-07-31 NOTE — TELEPHONE ENCOUNTER
Patient contacted and ID confirmed by name and   Reviewed normal labs as discussed yesterday she will decrease furosemide to one tablet per day all questions answered

## 2025-08-12 ENCOUNTER — OFFICE VISIT (OUTPATIENT)
Dept: PULMONOLOGY | Facility: CLINIC | Age: 85
End: 2025-08-12
Payer: MEDICARE

## 2025-08-12 VITALS
WEIGHT: 166.56 LBS | SYSTOLIC BLOOD PRESSURE: 163 MMHG | DIASTOLIC BLOOD PRESSURE: 82 MMHG | HEART RATE: 66 BPM | BODY MASS INDEX: 32.53 KG/M2

## 2025-08-12 DIAGNOSIS — J44.89 ASTHMA-COPD OVERLAP SYNDROME: Primary | ICD-10-CM

## 2025-08-12 DIAGNOSIS — J45.909 ASTHMA, UNSPECIFIED ASTHMA SEVERITY, UNSPECIFIED WHETHER COMPLICATED, UNSPECIFIED WHETHER PERSISTENT: ICD-10-CM

## 2025-08-12 DIAGNOSIS — G47.00 INSOMNIA, UNSPECIFIED TYPE: ICD-10-CM

## 2025-08-12 DIAGNOSIS — F51.8 OTHER SLEEP DISORDERS NOT DUE TO A SUBSTANCE OR KNOWN PHYSIOLOGICAL CONDITION: ICD-10-CM

## 2025-08-12 DIAGNOSIS — J45.901 EXACERBATION OF ASTHMA, UNSPECIFIED ASTHMA SEVERITY, UNSPECIFIED WHETHER PERSISTENT: ICD-10-CM

## 2025-08-12 DIAGNOSIS — R29.818 SUSPECTED SLEEP APNEA: ICD-10-CM

## 2025-08-12 DIAGNOSIS — J96.12 CHRONIC HYPERCAPNIC RESPIRATORY FAILURE: ICD-10-CM

## 2025-08-12 PROCEDURE — 1159F MED LIST DOCD IN RCRD: CPT | Mod: CPTII,S$GLB,, | Performed by: INTERNAL MEDICINE

## 2025-08-12 PROCEDURE — 1125F AMNT PAIN NOTED PAIN PRSNT: CPT | Mod: CPTII,S$GLB,, | Performed by: INTERNAL MEDICINE

## 2025-08-12 PROCEDURE — 3288F FALL RISK ASSESSMENT DOCD: CPT | Mod: CPTII,S$GLB,, | Performed by: INTERNAL MEDICINE

## 2025-08-12 PROCEDURE — 99999 PR PBB SHADOW E&M-EST. PATIENT-LVL V: CPT | Mod: PBBFAC,,, | Performed by: INTERNAL MEDICINE

## 2025-08-12 PROCEDURE — 99204 OFFICE O/P NEW MOD 45 MIN: CPT | Mod: S$GLB,,, | Performed by: INTERNAL MEDICINE

## 2025-08-12 PROCEDURE — 1101F PT FALLS ASSESS-DOCD LE1/YR: CPT | Mod: CPTII,S$GLB,, | Performed by: INTERNAL MEDICINE

## 2025-08-12 RX ORDER — FLUTICASONE FUROATE, UMECLIDINIUM BROMIDE AND VILANTEROL TRIFENATATE 200; 62.5; 25 UG/1; UG/1; UG/1
1 POWDER RESPIRATORY (INHALATION) DAILY
Qty: 60 EACH | Refills: 11 | Status: SHIPPED | OUTPATIENT
Start: 2025-08-12

## 2025-08-12 RX ORDER — ALBUTEROL SULFATE 90 UG/1
2 INHALANT RESPIRATORY (INHALATION) EVERY 6 HOURS PRN
Qty: 18 G | Refills: 11 | Status: SHIPPED | OUTPATIENT
Start: 2025-08-12 | End: 2026-08-12

## 2025-08-12 RX ORDER — ALBUTEROL SULFATE 0.83 MG/ML
2.5 SOLUTION RESPIRATORY (INHALATION) EVERY 6 HOURS PRN
Qty: 270 ML | Refills: 11 | Status: SHIPPED | OUTPATIENT
Start: 2025-08-12 | End: 2026-08-12

## 2025-08-13 ENCOUNTER — OFFICE VISIT (OUTPATIENT)
Dept: PSYCHIATRY | Facility: CLINIC | Age: 85
End: 2025-08-13
Payer: MEDICARE

## 2025-08-13 VITALS
HEIGHT: 60 IN | WEIGHT: 164.25 LBS | BODY MASS INDEX: 32.25 KG/M2 | HEART RATE: 40 BPM | DIASTOLIC BLOOD PRESSURE: 70 MMHG | SYSTOLIC BLOOD PRESSURE: 116 MMHG

## 2025-08-13 DIAGNOSIS — F33.0 MAJOR DEPRESSIVE DISORDER, RECURRENT EPISODE, MILD WITH ANXIOUS DISTRESS: Primary | ICD-10-CM

## 2025-08-13 DIAGNOSIS — F41.1 GAD (GENERALIZED ANXIETY DISORDER): ICD-10-CM

## 2025-08-13 DIAGNOSIS — G47.00 INSOMNIA, UNSPECIFIED TYPE: ICD-10-CM

## 2025-08-13 PROCEDURE — 99999 PR PBB SHADOW E&M-EST. PATIENT-LVL IV: CPT | Mod: PBBFAC,,, | Performed by: PHYSICIAN ASSISTANT

## 2025-08-13 PROCEDURE — 1101F PT FALLS ASSESS-DOCD LE1/YR: CPT | Mod: CPTII,S$GLB,, | Performed by: PHYSICIAN ASSISTANT

## 2025-08-13 PROCEDURE — 3288F FALL RISK ASSESSMENT DOCD: CPT | Mod: CPTII,S$GLB,, | Performed by: PHYSICIAN ASSISTANT

## 2025-08-13 PROCEDURE — 1159F MED LIST DOCD IN RCRD: CPT | Mod: CPTII,S$GLB,, | Performed by: PHYSICIAN ASSISTANT

## 2025-08-13 PROCEDURE — 99214 OFFICE O/P EST MOD 30 MIN: CPT | Mod: S$GLB,,, | Performed by: PHYSICIAN ASSISTANT

## 2025-08-13 PROCEDURE — 1160F RVW MEDS BY RX/DR IN RCRD: CPT | Mod: CPTII,S$GLB,, | Performed by: PHYSICIAN ASSISTANT

## 2025-08-18 ENCOUNTER — TELEPHONE (OUTPATIENT)
Dept: PULMONOLOGY | Facility: CLINIC | Age: 85
End: 2025-08-18
Payer: MEDICARE

## 2025-08-19 ENCOUNTER — HOSPITAL ENCOUNTER (OUTPATIENT)
Dept: RESPIRATORY THERAPY | Facility: HOSPITAL | Age: 85
Discharge: HOME | End: 2025-08-19
Attending: INTERNAL MEDICINE
Payer: MEDICARE

## 2025-08-19 DIAGNOSIS — J44.89 ASTHMA-COPD OVERLAP SYNDROME: ICD-10-CM

## 2025-08-19 PROCEDURE — 94618 PULMONARY STRESS TESTING: CPT | Mod: 26,,, | Performed by: INTERNAL MEDICINE

## 2025-08-19 PROCEDURE — 94618 PULMONARY STRESS TESTING: CPT

## 2025-08-28 ENCOUNTER — PATIENT MESSAGE (OUTPATIENT)
Dept: PULMONOLOGY | Facility: CLINIC | Age: 85
End: 2025-08-28
Payer: MEDICARE

## 2025-08-28 DIAGNOSIS — J44.89 ASTHMA-COPD OVERLAP SYNDROME: Primary | ICD-10-CM

## (undated) DEVICE — CATH DXTERITY JL40 100CM 5FR

## (undated) DEVICE — KIT GLIDESHEATH SLEND 6FR 10CM

## (undated) DEVICE — HEMOSTAT VASC BAND REG 24CM

## (undated) DEVICE — PACK CATH LAB

## (undated) DEVICE — OMNIPAQUE 350MG 150ML VIAL

## (undated) DEVICE — GUIDEWIRE TORQUE 3CM/260CML

## (undated) DEVICE — KIT MANIFOLD LOW PRESS TUBING

## (undated) DEVICE — KIT SYR REUSABLE

## (undated) DEVICE — KIT HAND CONTROL HIGH PRESSUR

## (undated) DEVICE — WIRE GUIDE SAFE-T-J .035 260CM

## (undated) DEVICE — CATH OPTITORQUE TIGER 5F 100CM

## (undated) DEVICE — PAD DEFIB CADENCE ADULT R2